# Patient Record
Sex: FEMALE | Race: BLACK OR AFRICAN AMERICAN | Employment: UNEMPLOYED | URBAN - METROPOLITAN AREA
[De-identification: names, ages, dates, MRNs, and addresses within clinical notes are randomized per-mention and may not be internally consistent; named-entity substitution may affect disease eponyms.]

---

## 2017-01-11 ENCOUNTER — OFFICE VISIT (OUTPATIENT)
Dept: FAMILY MEDICINE CLINIC | Age: 57
End: 2017-01-11

## 2017-01-11 VITALS
BODY MASS INDEX: 27.79 KG/M2 | OXYGEN SATURATION: 100 % | DIASTOLIC BLOOD PRESSURE: 100 MMHG | RESPIRATION RATE: 12 BRPM | SYSTOLIC BLOOD PRESSURE: 178 MMHG | TEMPERATURE: 99 F | WEIGHT: 147.2 LBS | HEART RATE: 80 BPM | HEIGHT: 61 IN

## 2017-01-11 DIAGNOSIS — I10 BENIGN ESSENTIAL HTN: Primary | ICD-10-CM

## 2017-01-11 DIAGNOSIS — D50.9 IRON DEFICIENCY ANEMIA, UNSPECIFIED IRON DEFICIENCY ANEMIA TYPE: ICD-10-CM

## 2017-01-11 DIAGNOSIS — Z72.0 TOBACCO USE: ICD-10-CM

## 2017-01-11 DIAGNOSIS — E78.5 HYPERLIPIDEMIA, UNSPECIFIED HYPERLIPIDEMIA TYPE: ICD-10-CM

## 2017-01-11 DIAGNOSIS — E53.8 FOLIC ACID DEFICIENCY: ICD-10-CM

## 2017-01-11 DIAGNOSIS — E87.6 HYPOKALEMIA: ICD-10-CM

## 2017-01-11 RX ORDER — AMLODIPINE BESYLATE 5 MG/1
5 TABLET ORAL DAILY
Qty: 30 TAB | Refills: 5 | Status: SHIPPED | OUTPATIENT
Start: 2017-01-11 | End: 2017-09-14 | Stop reason: SDUPTHER

## 2017-01-11 RX ORDER — ROSUVASTATIN CALCIUM 10 MG/1
10 TABLET, COATED ORAL
Qty: 30 TAB | Refills: 5 | Status: SHIPPED | OUTPATIENT
Start: 2017-01-11 | End: 2017-03-27 | Stop reason: SDUPTHER

## 2017-01-11 RX ORDER — GUAIFENESIN 100 MG/5ML
81 LIQUID (ML) ORAL DAILY
Qty: 30 TAB | Refills: 5 | Status: SHIPPED | OUTPATIENT
Start: 2017-01-11 | End: 2017-09-14 | Stop reason: SDUPTHER

## 2017-01-11 RX ORDER — LOSARTAN POTASSIUM AND HYDROCHLOROTHIAZIDE 25; 100 MG/1; MG/1
1 TABLET ORAL DAILY
Qty: 30 TAB | Refills: 5 | Status: SHIPPED | OUTPATIENT
Start: 2017-01-11 | End: 2017-09-14 | Stop reason: SDUPTHER

## 2017-01-11 NOTE — PROGRESS NOTES
1. Have you been to the ER, urgent care clinic since your last visit? Hospitalized since your last visit? No    2. Have you seen or consulted any other health care providers outside of the [address] [place] [address] since your last visit? Include any pap smears or colon screening.  No     Chief Complaint   Patient presents with    Headache    Medication Evaluation     stopped lipitor completely and headache pain decreased- would like an alternative

## 2017-01-11 NOTE — MR AVS SNAPSHOT
Visit Information Date & Time Provider Department Dept. Phone Encounter #  
 1/11/2017  9:30 AM Ernesto Love  [facility] [address] [phone] [id number] Follow-up Instructions Return in about 2 weeks (around 1/25/2017) for blood pressure. Upcoming Health Maintenance Date Due Pneumococcal 19-64 Medium Risk (1 of 1 - PPSV23) 2/21/1980 DTaP/Tdap/Td series (1 - Tdap) 2/21/1982 BREAST CANCER SCRN MAMMOGRAM 6/13/2018 PAP AKA CERVICAL CYTOLOGY 6/21/2019 COLONOSCOPY 7/1/2023 Allergies as of 1/11/2017  Review Complete On: 1/11/2017 By: Ernesto Love NP Severity Noted Reaction Type Reactions Robaxin [Methocarbamol] High 01/04/2016    Hives, Rash, Itching Red splotches Codeine  02/07/2012    Itching Neosporin [Neomycin-bacitracin-polymyxin]  03/19/2010    Rash Current Immunizations  Reviewed on 2/7/2012 No immunizations on file. Not reviewed this visit You Were Diagnosed With   
  
 Codes Comments Benign essential HTN    -  Primary ICD-10-CM: I10 
ICD-9-CM: 401.1 Hyperlipidemia, unspecified hyperlipidemia type     ICD-10-CM: E78.5 ICD-9-CM: 272.4 Iron deficiency anemia, unspecified     ICD-10-CM: D50.9 ICD-9-CM: 280.9 Hypokalemia     ICD-10-CM: E87.6 ICD-9-CM: 276.8 Vitals BP Pulse Temp Resp Height(growth percentile) Weight(growth percentile) (!) 178/100 (BP 1 Location: Left arm, BP Patient Position: Sitting) 80 99 °F (37.2 °C) (Oral) 12 5' 1\" (1.549 m) 147 lb 3.2 oz (66.8 kg) SpO2 BMI OB Status Smoking Status 100% 27.81 kg/m2 Premenopausal Current Every Day Smoker Vitals History BMI and BSA Data Body Mass Index Body Surface Area  
 27.81 kg/m 2 1.7 m 2 Preferred Pharmacy Pharmacy Name Phone [facility] [address], [address] [place] [address] Darylene Lenz [phone] Your Updated Medication List  
  
   
 This list is accurate as of: 1/11/17 10:19 AM.  Always use your most recent med list. amLODIPine 5 mg tablet Commonly known as:  Emani Hernandezer Take 1 Tab by mouth daily. for blood pressure  
  
 aspirin 81 mg chewable tablet Take 1 Tab by mouth daily. folic acid 1 mg tablet Commonly known as:  FOLVITE  
TAKE ONE TABLET BY MOUTH DAILY  
  
 KLOR-CON M20 20 mEq tablet Generic drug:  potassium chloride TAKE ONE TABLET BY MOUTH TWICE A DAY  
  
 losartan-hydroCHLOROthiazide 100-25 mg per tablet Commonly known as:  HYZAAR Take 1 Tab by mouth daily. For blood pressure  
  
 rosuvastatin 10 mg tablet Commonly known as:  CRESTOR Take 1 Tab by mouth nightly. For cholesterol  
  
 triamcinolone acetonide 0.1 % topical cream  
Commonly known as:  KENALOG  
APPLY A THIN LAYER TO AFFECTED AREA(S) TWO TIMES A DAY AS NEEDED FOR SKIN IRRITATION  
  
 zolpidem 10 mg tablet Commonly known as:  AMBIEN  
TAKE ONE TABLET BY MOUTH EVERY NIGHT AT BEDTIME AS NEEDED FOR INSOMNIA Prescriptions Sent to Pharmacy Refills  
 losartan-hydroCHLOROthiazide (HYZAAR) 100-25 mg per tablet 5 Sig: Take 1 Tab by mouth daily. For blood pressure Class: Normal  
 Pharmacy: [address] [place] [address]  [address] Ph #: [phone] Route: Oral  
 aspirin 81 mg chewable tablet 5 Sig: Take 1 Tab by mouth daily. Class: Normal  
 Pharmacy: [address] [facility]  [address] Ph #: [phone] Route: Oral  
 rosuvastatin (CRESTOR) 10 mg tablet 5 Sig: Take 1 Tab by mouth nightly. For cholesterol Class: Normal  
 Pharmacy: [address] [facility]  [address] Ph #: [phone] Route: Oral  
 amLODIPine (NORVASC) 5 mg tablet 5 Sig: Take 1 Tab by mouth daily. for blood pressure  Class: Normal  
 Pharmacy: [address] [place] [address]  [address]  #: [phone] Route: Oral  
  
Follow-up Instructions Return in about 2 weeks (around 1/25/2017) for blood pressure. Patient Instructions High Blood Pressure: Care Instructions Your Care Instructions If your blood pressure is usually above 140/90, you have high blood pressure, or hypertension. That means the top number is 140 or higher or the bottom number is 90 or higher, or both. Despite what a lot of people think, high blood pressure usually doesn't cause headaches or make you feel dizzy or lightheaded. It usually has no symptoms. But it does increase your risk for heart attack, stroke, and kidney or eye damage. The higher your blood pressure, the more your risk increases. Your doctor will give you a goal for your blood pressure. Your goal will be based on your health and your age. An example of a goal is to keep your blood pressure below 140/90. Lifestyle changes, such as eating healthy and being active, are always important to help lower blood pressure. You might also take medicine to reach your blood pressure goal. 
Follow-up care is a key part of your treatment and safety. Be sure to make and go to all appointments, and call your doctor if you are having problems. It's also a good idea to know your test results and keep a list of the medicines you take. How can you care for yourself at home? Medical treatment · If you stop taking your medicine, your blood pressure will go back up. You may take one or more types of medicine to lower your blood pressure. Be safe with medicines. Take your medicine exactly as prescribed. Call your doctor if you think you are having a problem with your medicine. · Talk to your doctor before you start taking aspirin every day. Aspirin can help certain people lower their risk of a heart attack or stroke. But taking aspirin isn't right for everyone, because it can cause serious bleeding. · See your doctor regularly. You may need to see the doctor more often at first or until your blood pressure comes down. · If you are taking blood pressure medicine, talk to your doctor before you take decongestants or anti-inflammatory medicine, such as ibuprofen. Some of these medicines can raise blood pressure. · Learn how to check your blood pressure at home. Lifestyle changes · Stay at a healthy weight. This is especially important if you put on weight around the waist. Losing even 10 pounds can help you lower your blood pressure. · If your doctor recommends it, get more exercise. Walking is a good choice. Bit by bit, increase the amount you walk every day. Try for at least 30 minutes on most days of the week. You also may want to swim, bike, or do other activities. · Avoid or limit alcohol. Talk to your doctor about whether you can drink any alcohol. · Try to limit how much sodium you eat to less than 2,300 milligrams (mg) a day. Your doctor may ask you to try to eat less than 1,500 mg a day. · Eat plenty of fruits (such as bananas and oranges), vegetables, legumes, whole grains, and low-fat dairy products. · Lower the amount of saturated fat in your diet. Saturated fat is found in animal products such as milk, cheese, and meat. Limiting these foods may help you lose weight and also lower your risk for heart disease. · Do not smoke. Smoking increases your risk for heart attack and stroke. If you need help quitting, talk to your doctor about stop-smoking programs and medicines. These can increase your chances of quitting for good. When should you call for help? Call 911 anytime you think you may need emergency care. This may mean having symptoms that suggest that your blood pressure is causing a serious heart or blood vessel problem. Your blood pressure may be over 180/110. For example, call 911 if: 
· You have symptoms of a heart attack. These may include: ¨ Chest pain or pressure, or a strange feeling in the chest. 
¨ Sweating. ¨ Shortness of breath. ¨ Nausea or vomiting. ¨ Pain, pressure, or a strange feeling in the back, neck, jaw, or upper belly or in one or both shoulders or arms. ¨ Lightheadedness or sudden weakness. ¨ A fast or irregular heartbeat. · You have symptoms of a stroke. These may include: 
¨ Sudden numbness, tingling, weakness, or loss of movement in your face, arm, or leg, especially on only one side of your body. ¨ Sudden vision changes. ¨ Sudden trouble speaking. ¨ Sudden confusion or trouble understanding simple statements. ¨ Sudden problems with walking or balance. ¨ A sudden, severe headache that is different from past headaches. · You have severe back or belly pain. Do not wait until your blood pressure comes down on its own. Get help right away. Call your doctor now or seek immediate care if: 
· Your blood pressure is much higher than normal (such as 180/110 or higher), but you don't have symptoms. · You think high blood pressure is causing symptoms, such as: ¨ Severe headache. ¨ Blurry vision. Watch closely for changes in your health, and be sure to contact your doctor if: 
· Your blood pressure measures 140/90 or higher at least 2 times. That means the top number is 140 or higher or the bottom number is 90 or higher, or both. · You think you may be having side effects from your blood pressure medicine. · Your blood pressure is usually normal, but it goes above normal at least 2 times. Where can you learn more? Go to http://erick-jason.info/. Enter F522 in the search box to learn more about \"High Blood Pressure: Care Instructions. \" Current as of: August 8, 2016 Content Version: 11.1 © 7397-6773 [employer]. Care instructions adapted under license by [employer] (which disclaims liability or warranty for this information).  If you have questions about a medical condition or this instruction, always ask your healthcare professional. [facility] [address] any warranty or liability for your use of this information. Introducing [facility]! [facility] introduces GI Track patient portal. Now you can access parts of your medical record, email your doctor's office, and request medication refills online. 1. In your internet browser, go to [email or web address] 2. Click on the First Time User? Click Here link in the Sign In box. You will see the New Member Sign Up page. 3. Enter your GI Track Access Code exactly as it appears below. You will not need to use this code after youve completed the sign-up process. If you do not sign up before the expiration date, you must request a new code. · GI Track Access Code: [email or web address] Expires: 4/11/2017 10:19 AM 
 
4. Enter the last four digits of your Social Security Number (xxxx) and Date of Birth (mm/dd/yyyy) as indicated and click Submit. You will be taken to the next sign-up page. 5. Create a GI Track ID. This will be your GI Track login ID and cannot be changed, so think of one that is secure and easy to remember. 6. Create a GI Track password. You can change your password at any time. 7. Enter your Password Reset Question and Answer. This can be used at a later time if you forget your password. 8. Enter your e-mail address. You will receive e-mail notification when new information is available in [address]. 9. Click Sign Up. You can now view and download portions of your medical record. 10. Click the Download Summary menu link to download a portable copy of your medical information. If you have questions, please visit the Frequently Asked Questions section of the GI Track website. Remember, GI Track is NOT to be used for urgent needs. For medical emergencies, dial 911. Now available from your iPhone and Android! Please provide this summary of care documentation to your next provider. Your primary care clinician is listed as Tati Grimaldo. If you have any questions after today's visit, please call [phone].

## 2017-01-11 NOTE — PROGRESS NOTES
HISTORY OF PRESENT ILLNESS  Gail Thompson is a 55 y.o. female. HPI  Cardiovascular Review:  The patient has hypertension and hyperlipidemia. Diet and Lifestyle: generally follows a low fat low cholesterol diet, generally follows a low sodium diet, smoker 1/2 ppd  Home BP Monitoring: is not measured at home. Pertinent ROS: taking medications as instructed, no TIA's, no chest pain on exertion, no dyspnea on exertion, no swelling of ankles. Patient reports Lipitor caused a headache so she discontinued the medication. Anemia  Patient presents for evaluation of anemia. It has been present for several years. Associated signs & symptoms: none. Stable at present, not currently taking supplement. Colonoscopy in 2013 was normal.       Current Outpatient Prescriptions   Medication Sig Dispense Refill    losartan-hydroCHLOROthiazide (HYZAAR) 100-25 mg per tablet Take 1 Tab by mouth daily. For blood pressure 30 Tab 5    aspirin 81 mg chewable tablet Take 1 Tab by mouth daily. 30 Tab 5    rosuvastatin (CRESTOR) 10 mg tablet Take 1 Tab by mouth nightly. For cholesterol 30 Tab 5    amLODIPine (NORVASC) 5 mg tablet Take 1 Tab by mouth daily.  for blood pressure 30 Tab 5    zolpidem (AMBIEN) 10 mg tablet TAKE ONE TABLET BY MOUTH EVERY NIGHT AT BEDTIME AS NEEDED FOR INSOMNIA 30 Tab 5    folic acid (FOLVITE) 1 mg tablet TAKE ONE TABLET BY MOUTH DAILY 30 Tab 3    KLOR-CON M20 20 mEq tablet TAKE ONE TABLET BY MOUTH TWICE A DAY 60 Tab 4    triamcinolone acetonide (KENALOG) 0.1 % topical cream APPLY A THIN LAYER TO AFFECTED AREA(S) TWO TIMES A DAY AS NEEDED FOR SKIN IRRITATION 80 g 1     Past Medical History   Diagnosis Date    Anemia 4/04    Anemia NEC     Insomnia 11/09    PPD positive      treated w/ INH- tests since have been negative    Pure hypercholesterolemia     Scoliosis 12/28/15     [facility]    Tobacco dependence     Unspecified essential hypertension     Uterine bleeding, dysfunctional 2/12    Uterine fibroid       Lab Results   Component Value Date/Time    Hemoglobin A1c 5.5 09/03/2015 11:09 AM    Glucose 94 09/28/2016 09:52 AM    LDL, calculated 163 09/28/2016 09:52 AM    Creatinine 0.68 09/28/2016 09:52 AM      Lab Results   Component Value Date/Time    Cholesterol, total 254 09/28/2016 09:52 AM    HDL Cholesterol 76 09/28/2016 09:52 AM    LDL, calculated 163 09/28/2016 09:52 AM    Triglyceride 77 09/28/2016 09:52 AM     Review of Systems   Constitutional: Negative for malaise/fatigue and weight loss. Cardiovascular: Negative for palpitations and leg swelling. Gastrointestinal: Negative for heartburn. Musculoskeletal: Positive for back pain. Negative for joint pain and myalgias. Neurological: Negative for dizziness and weakness. Psychiatric/Behavioral: Negative for depression. Physical Exam   Constitutional: She is oriented to person, place, and time. She appears well-developed and well-nourished. Neck: Normal range of motion. Neck supple. No JVD present. Carotid bruit is not present. No thyromegaly present. Cardiovascular: Normal rate, regular rhythm and intact distal pulses. Exam reveals no gallop and no friction rub. No murmur heard. Pulmonary/Chest: Effort normal and breath sounds normal. No respiratory distress. Musculoskeletal: She exhibits no edema. Lymphadenopathy:     She has no cervical adenopathy. Neurological: She is alert and oriented to person, place, and time. Psychiatric: She has a normal mood and affect. Her behavior is normal.   Nursing note and vitals reviewed. ASSESSMENT and PLAN  Jacinta Sewell was seen today for headache and medication evaluation. Diagnoses and all orders for this visit:    Benign essential HTN  Not to goal. Add HCTZ and Amlodipine. Stop Atenolol. Begin home monitoring and call for reading >140/90  -     losartan-hydroCHLOROthiazide (HYZAAR) 100-25 mg per tablet; Take 1 Tab by mouth daily. For blood pressure  -     amLODIPine (NORVASC) 5 mg tablet; Take 1 Tab by mouth daily. for blood pressure    Hyperlipidemia, unspecified hyperlipidemia type  Given CVD risk factors, patient needs cholesterol lowering medication. Hold Lipitor given ?side effects. Begin Crestor daily. Repeat labs in 4 weeks. Carotid dopplers given risk factors and family history. -     aspirin 81 mg chewable tablet; Take 1 Tab by mouth daily. -     rosuvastatin (CRESTOR) 10 mg tablet; Take 1 Tab by mouth nightly. For cholesterol  -     DUPLEX CAROTID BILATERAL; Future    Hypokalemia  Stable, no changes to current therapy    Tobacco use  Counseled patient on need to quit smoking.  -     DUPLEX CAROTID BILATERAL; Future    Iron deficiency anemia, unspecified iron deficiency anemia type  Stable, no changes to current therapy    Folic acid deficiency  Stable, no changes to current therapy      I have discussed the diagnosis with the patient and the intended plan as seen in the above orders. The patient has received an after-visit summary along with patient information handout. I have discussed medication side effects and warnings with the patient as well. Follow-up Disposition:  Return in about 2 weeks (around 1/25/2017) for blood pressure.

## 2017-01-11 NOTE — PATIENT INSTRUCTIONS

## 2017-01-16 ENCOUNTER — HOSPITAL ENCOUNTER (OUTPATIENT)
Dept: VASCULAR SURGERY | Age: 57
Discharge: HOME | End: 2017-01-16
Payer: COMMERCIAL

## 2017-01-16 DIAGNOSIS — Z72.0 TOBACCO USE: ICD-10-CM

## 2017-01-16 DIAGNOSIS — E78.5 HYPERLIPIDEMIA, UNSPECIFIED HYPERLIPIDEMIA TYPE: ICD-10-CM

## 2017-01-16 PROCEDURE — 93880 EXTRACRANIAL BILAT STUDY: CPT

## 2017-01-16 NOTE — PROCEDURES
[facility]  *** FINAL REPORT ***    Name: Chasity Lacy  MRN: [id number]    Outpatient  : 1960  HIS Order #: [id number]  [address] [facility] Visit #: [id number]  Date: 2017    TYPE OF TEST: Cerebrovascular Duplex    REASON FOR TEST  Smoker, HTN, Hyperlipedemia    Right Carotid:-             Proximal               Mid                 Distal  cm/s  Systolic  Diastolic  Systolic  Diastolic  Systolic  Diastolic  CCA:     91.1                                      66.0  Bulb:  ECA:     56.0  ICA:     50.0                 73.0                 67.0  ICA/CCA:  0.8    ICA Stenosis: <50%    Right Vertebral:-  Finding: Antegrade  Sys:       61.0  Priyanka:    Right Subclavian: Normal    Left Carotid:-            Proximal                Mid                 Distal  cm/s  Systolic  Diastolic  Systolic  Diastolic  Systolic  Diastolic  CCA:     58.6                                      66.0  Bulb:  ECA:     42.0  ICA:     41.0                 72.0                 79.0  ICA/CCA:  0.6    ICA Stenosis: <50%    Left Vertebral:-  Finding: Antegrade  Sys:       46.0  Priyanka:    Left Subclavian: Normal    INTERPRETATION/FINDINGS  PROCEDURE:  Carotid Duplex Examination using B-mode, color and  spectral Doppler of the extracranial cerebrovascular arteries. 1. Bilateral <50% stenosis of the internal carotid arteries. 2. No significant stenosis in the external carotid arteries  bilaterally. 3. Antegrade flow in both vertebral arteries. 4. Normal flow in both subclavian arteries. Plaque Morphology:  1. Heterogeneous plaque in the bulb and right ICA. 2. Heterogeneous plaque in the bulb and left ICA. ADDITIONAL COMMENTS    I have personally reviewed the data relevant to the interpretation of  this  study.     TECHNOLOGIST: Irais Bear RVT  Signed: 2017 02:24 PM    PHYSICIAN: Wilfredo Drake MD  Signed: 2017 03:02 PM

## 2017-01-16 NOTE — PROGRESS NOTES
[facility]  Preliminary Report:  Carotid Duplex Scan    Right:  Mild plaque noted in the right carotid system. Right ICA velocities suggest less than 50% diameter reduction. Right vertebral artery flow is antegrade. Left:  Mild plaque noted in the left carotid system. Left ICA velocities suggest less than 50% diameter reduction. Left vertebral artery flow is antegrade. Final report to follow.

## 2017-01-23 NOTE — PROGRESS NOTES
Carotid artery study showed mild plaque (<50%) build up of her arteries. I recommend continuing the Crestor and smoking cessation as previously discussed to prevent further blockage.

## 2017-01-23 NOTE — PROGRESS NOTES
Outbound call to pt, informed her of her results/recommendations, pt states she will need a rx to help with smoking cessation, pt also states cholesterol medication causes her to have muscle pain in her legs and also causes her toes to lock up.

## 2017-03-06 ENCOUNTER — TELEPHONE (OUTPATIENT)
Dept: FAMILY MEDICINE CLINIC | Age: 57
End: 2017-03-06

## 2017-03-06 NOTE — TELEPHONE ENCOUNTER
Contact # is  [phone]    Patient states her  is being seen on March 27th @ 9am and would like to know if she can be seen at the same time for their convenience.

## 2017-03-14 DIAGNOSIS — F51.01 PRIMARY INSOMNIA: ICD-10-CM

## 2017-03-15 RX ORDER — ZOLPIDEM TARTRATE 10 MG/1
TABLET ORAL
Qty: 30 TAB | Refills: 0 | Status: SHIPPED | OUTPATIENT
Start: 2017-03-15 | End: 2017-03-27 | Stop reason: DRUGHIGH

## 2017-03-27 ENCOUNTER — OFFICE VISIT (OUTPATIENT)
Dept: FAMILY MEDICINE CLINIC | Age: 57
End: 2017-03-27

## 2017-03-27 VITALS
OXYGEN SATURATION: 98 % | HEART RATE: 85 BPM | SYSTOLIC BLOOD PRESSURE: 101 MMHG | RESPIRATION RATE: 12 BRPM | DIASTOLIC BLOOD PRESSURE: 65 MMHG | WEIGHT: 146.2 LBS | BODY MASS INDEX: 27.6 KG/M2 | TEMPERATURE: 98.7 F | HEIGHT: 61 IN

## 2017-03-27 DIAGNOSIS — I10 ESSENTIAL HYPERTENSION: Primary | ICD-10-CM

## 2017-03-27 DIAGNOSIS — F51.01 PRIMARY INSOMNIA: ICD-10-CM

## 2017-03-27 DIAGNOSIS — M41.9 SCOLIOSIS OF LUMBOSACRAL SPINE, UNSPECIFIED SCOLIOSIS TYPE: ICD-10-CM

## 2017-03-27 DIAGNOSIS — D64.9 ANEMIA, UNSPECIFIED TYPE: ICD-10-CM

## 2017-03-27 DIAGNOSIS — E78.5 HYPERLIPIDEMIA, UNSPECIFIED HYPERLIPIDEMIA TYPE: ICD-10-CM

## 2017-03-27 RX ORDER — ZOLPIDEM TARTRATE 10 MG/1
TABLET ORAL
Qty: 30 TAB | Refills: 2 | Status: CANCELLED | OUTPATIENT
Start: 2017-03-27

## 2017-03-27 RX ORDER — ZOLPIDEM TARTRATE 5 MG/1
5 TABLET ORAL
Qty: 30 TAB | Refills: 0 | Status: SHIPPED | OUTPATIENT
Start: 2017-03-27 | End: 2017-05-03 | Stop reason: SDUPTHER

## 2017-03-27 RX ORDER — ROSUVASTATIN CALCIUM 10 MG/1
10 TABLET, COATED ORAL
Qty: 30 TAB | Refills: 5 | Status: SHIPPED | OUTPATIENT
Start: 2017-03-27 | End: 2017-09-25 | Stop reason: SINTOL

## 2017-03-27 NOTE — PATIENT INSTRUCTIONS
Acute High Blood Pressure: Care Instructions  Your Care Instructions  Acute high blood pressure is very high blood pressure. It's a serious problem. Very high blood pressure can damage your brain, heart, eyes, and kidneys. You may have been given medicines to lower your blood pressure. You may have gotten them as pills or through a needle in one of your veins. This is called an IV. And maybe you were given other medicines too. These can be needed when high blood pressure causes other problems. To keep your blood pressure at a lower level, you may need to make healthy lifestyle changes. And you will probably need to take medicines. Be sure to follow up with your doctor about your blood pressure and what you can do about it. Follow-up care is a key part of your treatment and safety. Be sure to make and go to all appointments, and call your doctor if you are having problems. It's also a good idea to know your test results and keep a list of the medicines you take. How can you care for yourself at home? · See your doctor as often as he or she recommends. This is to make sure your blood pressure is under control. You will probably go at least 2 times a year. But it may be more often at first.  · Take your blood pressure medicine exactly as prescribed. You may take one or more types. They include diuretics, beta-blockers, ACE inhibitors, calcium channel blockers, and angiotensin II receptor blockers. Call your doctor if you think you are having a problem with your medicine. · If you take blood pressure medicine, talk to your doctor before you take decongestants or anti-inflammatory medicine, such as ibuprofen. These can raise blood pressure. · Learn how to check your blood pressure at home. Check it often. · Ask your doctor if it's okay to drink alcohol. · Talk to your doctor about lifestyle changes that can help blood pressure. These include being active and not smoking.   When should you call for help?  Call 911 anytime you think you may need emergency care. This may mean having symptoms that suggest that your blood pressure is causing a serious heart or blood vessel problem. Your blood pressure may be over 180/110. For example, call 911 if:  · You have symptoms of a heart attack. These may include:  ¨ Chest pain or pressure, or a strange feeling in the chest.  ¨ Sweating. ¨ Shortness of breath. ¨ Nausea or vomiting. ¨ Pain, pressure, or a strange feeling in the back, neck, jaw, or upper belly or in one or both shoulders or arms. ¨ Lightheadedness or sudden weakness. ¨ A fast or irregular heartbeat. · You have symptoms of a stroke. These may include:  ¨ Sudden numbness, tingling, weakness, or loss of movement in your face, arm, or leg, especially on only one side of your body. ¨ Sudden vision changes. ¨ Sudden trouble speaking. ¨ Sudden confusion or trouble understanding simple statements. ¨ Sudden problems with walking or balance. ¨ A sudden, severe headache that is different from past headaches. · You have severe back or belly pain. Do not wait until your blood pressure comes down on its own. Get help right away. Call your doctor now or seek immediate care if:  · Your blood pressure is much higher than normal (such as 180/110 or higher), but you don't have symptoms. · You think high blood pressure is causing symptoms, such as:  ¨ Severe headache. ¨ Blurry vision. Watch closely for changes in your health, and be sure to contact your doctor if:  · Your blood pressure measures 140/90 or higher at least 2 times. That means the top number is 140 or higher or the bottom number is 90 or higher, or both. · You think you may be having side effects from your blood pressure medicine. · Your blood pressure is usually normal, but it goes above normal at least 2 times. Where can you learn more? Go to http://erick-jason.info/.   Enter K376 in the search box to learn more about \"Acute High Blood Pressure: Care Instructions. \"  Current as of: August 8, 2016  Content Version: 11.1  © 5064-1946 [employer]. Care instructions adapted under license by [employer] (which disclaims liability or warranty for this information). If you have questions about a medical condition or this instruction, always ask your healthcare professional. [address] any warranty or liability for your use of this information.

## 2017-03-27 NOTE — PROGRESS NOTES
1. Have you been to the ER, urgent care clinic since your last visit? Hospitalized since your last visit? No    2. Have you seen or consulted any other health care providers outside of the [address] [place] [address] since your last visit? Include any pap smears or colon screening.  No

## 2017-03-27 NOTE — PROGRESS NOTES
Conrad Thomas is a 65 y.o. female who was seen in clinic today (3/27/2017). Subjective:  Cardiovascular Review:  The patient has hypertension and hyperlipidemia. Diet and Lifestyle: generally follows a low fat low cholesterol diet, generally follows a low sodium diet, smoker 1/2 ppd    Carotid Duplex Examination using B-mode, color and  spectral Doppler of the extracranial cerebrovascular arteries.     1. Bilateral <50% stenosis of the internal carotid arteries. 2. No significant stenosis in the external carotid arteries  bilaterally. Home BP Monitoring: home readings 140/90s. Pertinent ROS: taking medications as instructed, no TIA's, no chest pain on exertion, no dyspnea on exertion, no swelling of ankles. Patient reports Lipitor caused a headache so she discontinued the medication. Taking Crestor and getting myalgias in the legs. Anemia  Patient presents for evaluation of anemia. It has been present for several years. Associated signs & symptoms: none. Stable at present, not currently taking supplement. Colonoscopy in 2013 was normal.     Osteoarthritis and Chronic Pain:  Patient has osteoarthritis, spondylosis and scoliosis, primarily affecting the back. Symptoms onset: problem is longstanding. Rheumatological ROS: using NSAID medication intermittently as needed. Response to treatment plan: symptoms have progressed to a point and plateaued. Insominia  Patient reports presents with difficulty sleeping. Onset: problem is longstanding. Description of symtoms:  difficulty initiating sleep, frequent awakening. Associated symptoms: none. Denies: depression, situational anxiety and ETOH overuse. Treatment to date: Ambien >10 years. Prior to Admission medications    Medication Sig Start Date End Date Taking? Authorizing Provider   rosuvastatin (CRESTOR) 10 mg tablet Take 1 Tab by mouth nightly.  For cholesterol 3/27/17  Yes Ernesto Love, ELIZABETH   zolpidem (AMBIEN) 5 mg tablet Take 1 Tab by mouth nightly as needed for Sleep. Max Daily Amount: 5 mg. 3/27/17  Yes Herson Shea NP   folic acid (FOLVITE) 1 mg tablet TAKE ONE TABLET BY MOUTH DAILY 1/24/17  Yes Herson Shea NP   losartan-hydroCHLOROthiazide (HYZAAR) 100-25 mg per tablet Take 1 Tab by mouth daily. For blood pressure 1/11/17  Yes Herson Shea NP   aspirin 81 mg chewable tablet Take 1 Tab by mouth daily. 1/11/17  Yes Herson Shea NP   amLODIPine (NORVASC) 5 mg tablet Take 1 Tab by mouth daily. for blood pressure 1/11/17  Yes Herson Shea NP   KLOR-CON M20 20 mEq tablet TAKE ONE TABLET BY MOUTH TWICE A DAY 5/23/16  Yes Herson Shea NP   triamcinolone acetonide (KENALOG) 0.1 % topical cream APPLY A THIN LAYER TO AFFECTED AREA(S) TWO TIMES A DAY AS NEEDED FOR SKIN IRRITATION 4/13/16  Yes Herson Shea NP          Allergies   Allergen Reactions    Robaxin [Methocarbamol] Hives, Rash and Itching     Red splotches    Codeine Itching    Neosporin [Neomycin-Bacitracin-Polymyxin] Rash           Review of Systems   Constitutional: Negative for malaise/fatigue and weight loss. Respiratory: Negative for shortness of breath. Cardiovascular: Negative for chest pain, palpitations and leg swelling. Gastrointestinal: Negative for heartburn. Musculoskeletal: Positive for back pain and myalgias. Negative for joint pain. Neurological: Negative for dizziness, weakness and headaches. Psychiatric/Behavioral: Negative for depression. Objective:   Physical Exam   Constitutional: She is oriented to person, place, and time. She appears well-developed and well-nourished. Neck: Normal range of motion. Neck supple. No JVD present. Carotid bruit is not present. No thyromegaly present. Cardiovascular: Normal rate, regular rhythm and intact distal pulses. Exam reveals no gallop and no friction rub. No murmur heard. Pulmonary/Chest: Effort normal and breath sounds normal. No respiratory distress. Musculoskeletal: She exhibits no edema. Lymphadenopathy:     She has no cervical adenopathy. Neurological: She is alert and oriented to person, place, and time. Psychiatric: She has a normal mood and affect. Her behavior is normal.   Nursing note and vitals reviewed. Visit Vitals    /65 (BP 1 Location: Left arm, BP Patient Position: Sitting)    Pulse 85    Temp 98.7 °F (37.1 °C) (Oral)    Resp 12    Ht 5' 1\" (1.549 m)    Wt 146 lb 3.2 oz (66.3 kg)    SpO2 98%    BMI 27.62 kg/m2       Assessment & Plan:  Zulema Arrington was seen today for medication refill. Diagnoses and all orders for this visit:    Essential hypertension  Well controlled, no medication changes. -     CBC W/O DIFF  -     METABOLIC PANEL, COMPREHENSIVE  -     TSH AND FREE T4    Hyperlipidemia, unspecified hyperlipidemia type  Patient can cut Crestor in half and see if myalgias improve. -     rosuvastatin (CRESTOR) 10 mg tablet; Take 1 Tab by mouth nightly. For cholesterol  -     LIPID PANEL    Primary insomnia  Cautioned the patient on long term use of Ambien. Reviewed possible short term and long term side effects with medication. Reviewed BMJ 2/2016 article citing possible link between medications long term use and Alzheimer's. Will reduce to 5 mg QHS. -     zolpidem (AMBIEN) 5 mg tablet; Take 1 Tab by mouth nightly as needed for Sleep. Max Daily Amount: 5 mg. Anemia, unspecified type  Stable, no changes to current therapy    Scoliosis of lumbosacral spine, unspecified scoliosis type  Treatment and referral deferred by patient at present. I have discussed the diagnosis with the patient and the intended plan as seen in the above orders. The patient has received an after-visit summary along with patient information handout. I have discussed medication side effects and warnings with the patient as well. Follow-up Disposition:  Return in about 6 months (around 9/27/2017) for blood pressure.         Cristel Mclaughlin Sumeet Kamara, NP

## 2017-03-27 NOTE — MR AVS SNAPSHOT
Visit Information Date & Time Provider Department Dept. Phone Encounter #  
 3/27/2017  9:45 AM Diomedes Flores  [facility] [phone] [id number] Follow-up Instructions Return in about 6 months (around 9/27/2017) for blood pressure. Your Appointments 3/27/2017  9:45 AM  
ROUTINE CARE with Diomedes Flores  [facility] ([address]) Appt Note: Follow up  
 [address] [phone]  
[phone]  
  
   
 [address] [address] Upcoming Health Maintenance Date Due  
 BREAST CANCER SCRN MAMMOGRAM 6/13/2018 PAP AKA CERVICAL CYTOLOGY 6/21/2019 COLONOSCOPY 7/1/2023 DTaP/Tdap/Td series (2 - Td) 3/27/2027 Allergies as of 3/27/2017  Review Complete On: 3/27/2017 By: Diomedes Flores NP Severity Noted Reaction Type Reactions Robaxin [Methocarbamol] High 01/04/2016    Hives, Rash, Itching Red splotches Codeine  02/07/2012    Itching Neosporin [Neomycin-bacitracin-polymyxin]  03/19/2010    Rash Current Immunizations  Reviewed on 2/7/2012 No immunizations on file. Not reviewed this visit You Were Diagnosed With   
  
 Codes Comments Essential hypertension    -  Primary ICD-10-CM: I10 
ICD-9-CM: 401.9 Hyperlipidemia, unspecified hyperlipidemia type     ICD-10-CM: E78.5 ICD-9-CM: 272.4 Primary insomnia     ICD-10-CM: F51.01 
ICD-9-CM: 307.42 Anemia, unspecified type     ICD-10-CM: D64.9 ICD-9-CM: 285.9 Scoliosis of lumbosacral spine, unspecified scoliosis type     ICD-10-CM: M41.9 ICD-9-CM: 737.30 Vitals BP Pulse Temp Resp Height(growth percentile) Weight(growth percentile) 101/65 (BP 1 Location: Left arm, BP Patient Position: Sitting) 85 98.7 °F (37.1 °C) (Oral) 12 5' 1\" (1.549 m) 146 lb 3.2 oz (66.3 kg) SpO2 BMI OB Status Smoking Status 98% 27.62 kg/m2 Premenopausal Current Every Day Smoker BMI and BSA Data Body Mass Index Body Surface Area  
 27.62 kg/m 2 1.69 m 2 Preferred Pharmacy Pharmacy Name Phone [address] [place] [phone] Your Updated Medication List  
  
   
This list is accurate as of: 3/27/17  9:44 AM.  Always use your most recent med list. amLODIPine 5 mg tablet Commonly known as:  Keith Del Take 1 Tab by mouth daily. for blood pressure  
  
 aspirin 81 mg chewable tablet Take 1 Tab by mouth daily. folic acid 1 mg tablet Commonly known as:  FOLVITE  
TAKE ONE TABLET BY MOUTH DAILY  
  
 KLOR-CON M20 20 mEq tablet Generic drug:  potassium chloride TAKE ONE TABLET BY MOUTH TWICE A DAY  
  
 losartan-hydroCHLOROthiazide 100-25 mg per tablet Commonly known as:  HYZAAR Take 1 Tab by mouth daily. For blood pressure  
  
 rosuvastatin 10 mg tablet Commonly known as:  CRESTOR Take 1 Tab by mouth nightly. For cholesterol  
  
 triamcinolone acetonide 0.1 % topical cream  
Commonly known as:  KENALOG  
APPLY A THIN LAYER TO AFFECTED AREA(S) TWO TIMES A DAY AS NEEDED FOR SKIN IRRITATION  
  
 zolpidem 5 mg tablet Commonly known as:  AMBIEN Take 1 Tab by mouth nightly as needed for Sleep. Max Daily Amount: 5 mg. Prescriptions Printed Refills  
 zolpidem (AMBIEN) 5 mg tablet 0 Sig: Take 1 Tab by mouth nightly as needed for Sleep. Max Daily Amount: 5 mg. Class: Print Route: Oral  
  
Prescriptions Sent to Pharmacy Refills  
 rosuvastatin (CRESTOR) 10 mg tablet 5 Sig: Take 1 Tab by mouth nightly. For cholesterol Class: Normal  
 Pharmacy: [address] [place] Ph #: [phone] Route: Oral  
  
We Performed the Following CBC W/O DIFF [47939 CPT(R)] LIPID PANEL [92912 CPT(R)] METABOLIC PANEL, COMPREHENSIVE [20235 CPT(R)] TSH AND FREE T4 [82673 CPT(R)] Follow-up Instructions Return in about 6 months (around 9/27/2017) for blood pressure. Patient Instructions Acute High Blood Pressure: Care Instructions Your Care Instructions Acute high blood pressure is very high blood pressure. It's a serious problem. Very high blood pressure can damage your brain, heart, eyes, and kidneys. You may have been given medicines to lower your blood pressure. You may have gotten them as pills or through a needle in one of your veins. This is called an IV. And maybe you were given other medicines too. These can be needed when high blood pressure causes other problems. To keep your blood pressure at a lower level, you may need to make healthy lifestyle changes. And you will probably need to take medicines. Be sure to follow up with your doctor about your blood pressure and what you can do about it. Follow-up care is a key part of your treatment and safety. Be sure to make and go to all appointments, and call your doctor if you are having problems. It's also a good idea to know your test results and keep a list of the medicines you take. How can you care for yourself at home? · See your doctor as often as he or she recommends. This is to make sure your blood pressure is under control. You will probably go at least 2 times a year. But it may be more often at first. 
· Take your blood pressure medicine exactly as prescribed. You may take one or more types. They include diuretics, beta-blockers, ACE inhibitors, calcium channel blockers, and angiotensin II receptor blockers. Call your doctor if you think you are having a problem with your medicine. · If you take blood pressure medicine, talk to your doctor before you take decongestants or anti-inflammatory medicine, such as ibuprofen. These can raise blood pressure. · Learn how to check your blood pressure at home. Check it often. · Ask your doctor if it's okay to drink alcohol. · Talk to your doctor about lifestyle changes that can help blood pressure. These include being active and not smoking. When should you call for help? Call 911 anytime you think you may need emergency care. This may mean having symptoms that suggest that your blood pressure is causing a serious heart or blood vessel problem. Your blood pressure may be over 180/110. For example, call 911 if: 
· You have symptoms of a heart attack. These may include: ¨ Chest pain or pressure, or a strange feeling in the chest. 
¨ Sweating. ¨ Shortness of breath. ¨ Nausea or vomiting. ¨ Pain, pressure, or a strange feeling in the back, neck, jaw, or upper belly or in one or both shoulders or arms. ¨ Lightheadedness or sudden weakness. ¨ A fast or irregular heartbeat. · You have symptoms of a stroke. These may include: 
¨ Sudden numbness, tingling, weakness, or loss of movement in your face, arm, or leg, especially on only one side of your body. ¨ Sudden vision changes. ¨ Sudden trouble speaking. ¨ Sudden confusion or trouble understanding simple statements. ¨ Sudden problems with walking or balance. ¨ A sudden, severe headache that is different from past headaches. · You have severe back or belly pain. Do not wait until your blood pressure comes down on its own. Get help right away. Call your doctor now or seek immediate care if: 
· Your blood pressure is much higher than normal (such as 180/110 or higher), but you don't have symptoms. · You think high blood pressure is causing symptoms, such as: ¨ Severe headache. ¨ Blurry vision. Watch closely for changes in your health, and be sure to contact your doctor if: 
· Your blood pressure measures 140/90 or higher at least 2 times. That means the top number is 140 or higher or the bottom number is 90 or higher, or both. · You think you may be having side effects from your blood pressure medicine. · Your blood pressure is usually normal, but it goes above normal at least 2 times. Where can you learn more? Go to http://erick-jason.info/. Enter Z464 in the search box to learn more about \"Acute High Blood Pressure: Care Instructions. \" Current as of: August 8, 2016 Content Version: 11.1 © 9679-3885 [employer]. Care instructions adapted under license by [employer] (which disclaims liability or warranty for this information). If you have questions about a medical condition or this instruction, always ask your healthcare professional. [address] any warranty or liability for your use of this information. Introducing [facility]! [facility] introduces Moprise patient portal. Now you can access parts of your medical record, email your doctor's office, and request medication refills online. 1. In your internet browser, go to [email or web address] 2. Click on the First Time User? Click Here link in the Sign In box. You will see the New Member Sign Up page. 3. Enter your Moprise Access Code exactly as it appears below. You will not need to use this code after youve completed the sign-up process. If you do not sign up before the expiration date, you must request a new code. · Moprise Access Code: [email or web address] Expires: 4/11/2017 11:19 AM 
 
4. Enter the last four digits of your Social Security Number (xxxx) and Date of Birth (mm/dd/yyyy) as indicated and click Submit. You will be taken to the next sign-up page. 5. Create a [employer] Corpt ID. This will be your Moprise login ID and cannot be changed, so think of one that is secure and easy to remember. 6. Create a Moprise password. You can change your password at any time. 7. Enter your Password Reset Question and Answer.  This can be used at a later time if you forget your password. 8. Enter your e-mail address. You will receive e-mail notification when new information is available in [address] 9. Click Sign Up. You can now view and download portions of your medical record. 10. Click the Download Summary menu link to download a portable copy of your medical information. If you have questions, please visit the Frequently Asked Questions section of the [employer]ubaTribe website. Remember, ScubaTribe is NOT to be used for urgent needs. For medical emergencies, dial 911. Now available from your iPhone and Android! Please provide this summary of care documentation to your next provider. Your primary care clinician is listed as Paul Sheldon. If you have any questions after today's visit, please call [phone].

## 2017-03-28 LAB
ALBUMIN SERPL-MCNC: 4.3 G/DL (ref 3.5–5.5)
ALBUMIN/GLOB SERPL: 1.7 {RATIO} (ref 1.2–2.2)
ALP SERPL-CCNC: 71 IU/L (ref 39–117)
ALT SERPL-CCNC: 15 IU/L (ref 0–32)
AST SERPL-CCNC: 27 IU/L (ref 0–40)
BILIRUB SERPL-MCNC: 0.4 MG/DL (ref 0–1.2)
BUN SERPL-MCNC: 22 MG/DL (ref 6–24)
BUN/CREAT SERPL: 19 (ref 9–23)
CALCIUM SERPL-MCNC: 9.5 MG/DL (ref 8.7–10.2)
CHLORIDE SERPL-SCNC: 94 MMOL/L (ref 96–106)
CHOLEST SERPL-MCNC: 202 MG/DL (ref 100–199)
CO2 SERPL-SCNC: 22 MMOL/L (ref 18–29)
CREAT SERPL-MCNC: 1.15 MG/DL (ref 0.57–1)
ERYTHROCYTE [DISTWIDTH] IN BLOOD BY AUTOMATED COUNT: 14.7 % (ref 12.3–15.4)
GLOBULIN SER CALC-MCNC: 2.5 G/DL (ref 1.5–4.5)
GLUCOSE SERPL-MCNC: 95 MG/DL (ref 65–99)
HCT VFR BLD AUTO: 30 % (ref 34–46.6)
HDLC SERPL-MCNC: 76 MG/DL
HGB BLD-MCNC: 10.2 G/DL (ref 11.1–15.9)
INTERPRETATION, 910389: NORMAL
INTERPRETATION: NORMAL
LDLC SERPL CALC-MCNC: 109 MG/DL (ref 0–99)
MCH RBC QN AUTO: 34.2 PG (ref 26.6–33)
MCHC RBC AUTO-ENTMCNC: 34 G/DL (ref 31.5–35.7)
MCV RBC AUTO: 101 FL (ref 79–97)
PDF IMAGE, 910387: NORMAL
PLATELET # BLD AUTO: 279 X10E3/UL (ref 150–379)
POTASSIUM SERPL-SCNC: 5.8 MMOL/L (ref 3.5–5.2)
PROT SERPL-MCNC: 6.8 G/DL (ref 6–8.5)
RBC # BLD AUTO: 2.98 X10E6/UL (ref 3.77–5.28)
SODIUM SERPL-SCNC: 135 MMOL/L (ref 134–144)
T4 FREE SERPL-MCNC: 0.74 NG/DL (ref 0.82–1.77)
TRIGL SERPL-MCNC: 83 MG/DL (ref 0–149)
TSH SERPL DL<=0.005 MIU/L-ACNC: 0.64 UIU/ML (ref 0.45–4.5)
VLDLC SERPL CALC-MCNC: 17 MG/DL (ref 5–40)
WBC # BLD AUTO: 6.1 X10E3/UL (ref 3.4–10.8)

## 2017-04-02 DIAGNOSIS — E87.5 HYPERKALEMIA: ICD-10-CM

## 2017-04-02 DIAGNOSIS — D64.9 ANEMIA, UNSPECIFIED TYPE: Primary | ICD-10-CM

## 2017-04-02 DIAGNOSIS — N28.9 RENAL INSUFFICIENCY: ICD-10-CM

## 2017-04-03 NOTE — PROGRESS NOTES
Patient's potassium level was elevated and kidney function was diminished. Blood cells showed patient has mild anemia. I recommend recheck patient's labs in in next 2-4 weeks. No appointment needed, labs ordered. Total cholesterol, triglycerides and LDL (bad cholesterol) were improved. Good work!

## 2017-04-04 NOTE — PROGRESS NOTES
[phone] (home) verified  Patient notified of above note and voiced understanding of what was read.

## 2017-05-03 DIAGNOSIS — F51.01 PRIMARY INSOMNIA: ICD-10-CM

## 2017-05-03 RX ORDER — ZOLPIDEM TARTRATE 5 MG/1
TABLET ORAL
Qty: 30 TAB | Refills: 0 | Status: SHIPPED | OUTPATIENT
Start: 2017-05-03 | End: 2017-05-30 | Stop reason: SDUPTHER

## 2017-05-30 DIAGNOSIS — F51.01 PRIMARY INSOMNIA: ICD-10-CM

## 2017-05-30 NOTE — TELEPHONE ENCOUNTER
Reeves Michele  [phone]    Patient is requesting refills of Klor-con and Zolpidem. Pharmacy verified. 72 hour rule given, however, patient states that she only has one Zolpidem left.

## 2017-05-30 NOTE — TELEPHONE ENCOUNTER
LOV 3/27/2017   Has appointment 9/25/2017     The Prescription Monitoring Program has been reviewed for recent activity regarding controlled substances for this patient.      Per  last refill for Silvia Elmer 5/3/2017 #30

## 2017-05-31 RX ORDER — POTASSIUM CHLORIDE 20 MEQ/1
20 TABLET, EXTENDED RELEASE ORAL 2 TIMES DAILY
Qty: 60 TAB | Refills: 5 | Status: SHIPPED | OUTPATIENT
Start: 2017-05-31 | End: 2017-08-01 | Stop reason: SDUPTHER

## 2017-05-31 RX ORDER — ZOLPIDEM TARTRATE 5 MG/1
5 TABLET ORAL
Qty: 30 TAB | Refills: 1 | Status: SHIPPED | OUTPATIENT
Start: 2017-05-31 | End: 2017-08-07 | Stop reason: SDUPTHER

## 2017-05-31 NOTE — TELEPHONE ENCOUNTER
[phone] Monica called in prescription for Ambien via      [phone] notified patient prescription was sent to pharmacy

## 2017-08-01 RX ORDER — POTASSIUM CHLORIDE 1500 MG/1
TABLET, EXTENDED RELEASE ORAL
Qty: 60 TAB | Refills: 3 | Status: ON HOLD | OUTPATIENT
Start: 2017-08-01 | End: 2018-01-26

## 2017-08-07 DIAGNOSIS — F51.01 PRIMARY INSOMNIA: ICD-10-CM

## 2017-08-07 NOTE — TELEPHONE ENCOUNTER
Refill request:    Requested Prescriptions     Pending Prescriptions Disp Refills    zolpidem (AMBIEN) 5 mg tablet 30 Tab 1     Sig: Take 1 Tab by mouth nightly as needed for Sleep. Max Daily Amount: 5 mg.

## 2017-08-07 NOTE — TELEPHONE ENCOUNTER
LOV 3/27/2017  Has appointment 9/25  The Prescription Monitoring Program has been reviewed for recent activity regarding controlled substances for this patient.      Per  last refill 6/26/2017 #30

## 2017-08-08 RX ORDER — ZOLPIDEM TARTRATE 5 MG/1
5 TABLET ORAL
Qty: 30 TAB | Refills: 1 | Status: SHIPPED | OUTPATIENT
Start: 2017-08-08 | End: 2017-10-16 | Stop reason: SDUPTHER

## 2017-09-14 DIAGNOSIS — I10 BENIGN ESSENTIAL HTN: ICD-10-CM

## 2017-09-14 DIAGNOSIS — E78.5 HYPERLIPIDEMIA, UNSPECIFIED HYPERLIPIDEMIA TYPE: ICD-10-CM

## 2017-09-15 RX ORDER — AMLODIPINE BESYLATE 5 MG/1
TABLET ORAL
Qty: 30 TAB | Refills: 5 | Status: ON HOLD | OUTPATIENT
Start: 2017-09-15 | End: 2018-01-26

## 2017-09-15 RX ORDER — GUAIFENESIN 100 MG/5ML
LIQUID (ML) ORAL
Qty: 30 TAB | Refills: 5 | Status: SHIPPED | OUTPATIENT
Start: 2017-09-15 | End: 2020-06-02

## 2017-09-15 RX ORDER — LOSARTAN POTASSIUM AND HYDROCHLOROTHIAZIDE 25; 100 MG/1; MG/1
TABLET ORAL
Qty: 30 TAB | Refills: 5 | Status: ON HOLD | OUTPATIENT
Start: 2017-09-15 | End: 2018-01-26

## 2017-09-25 ENCOUNTER — OFFICE VISIT (OUTPATIENT)
Dept: FAMILY MEDICINE CLINIC | Age: 57
End: 2017-09-25

## 2017-09-25 VITALS
BODY MASS INDEX: 24.84 KG/M2 | SYSTOLIC BLOOD PRESSURE: 93 MMHG | HEART RATE: 50 BPM | RESPIRATION RATE: 12 BRPM | WEIGHT: 131.6 LBS | DIASTOLIC BLOOD PRESSURE: 61 MMHG | HEIGHT: 61 IN | OXYGEN SATURATION: 94 %

## 2017-09-25 DIAGNOSIS — Z72.0 TOBACCO USE: ICD-10-CM

## 2017-09-25 DIAGNOSIS — F51.01 PRIMARY INSOMNIA: ICD-10-CM

## 2017-09-25 DIAGNOSIS — I10 BENIGN ESSENTIAL HTN: Primary | ICD-10-CM

## 2017-09-25 DIAGNOSIS — D50.9 IRON DEFICIENCY ANEMIA, UNSPECIFIED: ICD-10-CM

## 2017-09-25 DIAGNOSIS — E78.2 MIXED HYPERLIPIDEMIA: ICD-10-CM

## 2017-09-25 DIAGNOSIS — R63.4 WEIGHT LOSS: ICD-10-CM

## 2017-09-25 RX ORDER — EZETIMIBE 10 MG/1
10 TABLET ORAL DAILY
Qty: 30 TAB | Refills: 5 | Status: SHIPPED | OUTPATIENT
Start: 2017-09-25 | End: 2018-02-02

## 2017-09-25 RX ORDER — BUPROPION HYDROCHLORIDE 150 MG/1
150 TABLET ORAL
Qty: 30 TAB | Refills: 0 | Status: SHIPPED | OUTPATIENT
Start: 2017-09-25 | End: 2017-11-04 | Stop reason: SDUPTHER

## 2017-09-25 RX ORDER — TRAZODONE HYDROCHLORIDE 50 MG/1
50-100 TABLET ORAL
Qty: 60 TAB | Refills: 5 | Status: SHIPPED | OUTPATIENT
Start: 2017-09-25 | End: 2017-10-16 | Stop reason: SINTOL

## 2017-09-25 NOTE — PROGRESS NOTES
Liane Cordova is a 65 y.o. female who was seen in clinic today (9/25/2017). Subjective:  Cardiovascular Review:  The patient has hypertension, hyperlipidemia and PVD (Bilateral <50% stenosis of the internal carotid arteries). Diet and Lifestyle: generally follows a low fat low cholesterol diet, generally follows a low sodium diet, smoker 1/2 ppd  Home BP Monitoring: home readings 140/90s.     Pertinent ROS: taking medications as instructed, no TIA's, no chest pain on exertion, no dyspnea on exertion, no swelling of ankles.      Patient reports Lipitor caused a headache so she discontinued the medication. Reports Crestor caused myalgias in the legs.      Weight down 15 lbs in last 6 months through decreased appetite. Anemia  Patient presents for evaluation of anemia. It has been present for several years. Associated signs & symptoms: feeling cold. Stable at present, not currently taking supplement. Colonoscopy in 2013 was normal. Denies abnormal uterine bleeding but reports h/o uterine fibroids.      Osteoarthritis and Chronic Pain:  Patient has osteoarthritis, spondylosis and scoliosis, primarily affecting the back. Symptoms onset: problem is longstanding. Rheumatological ROS: using NSAID medication intermittently as needed. Response to treatment plan: symptoms have progressed to a point and plateaued.     Insominia  Patient reports presents with difficulty sleeping. Onset: problem is longstanding. Description of symtoms:  difficulty initiating sleep, frequent awakening. Associated symptoms: none. Denies: depression, situational anxiety and ETOH overuse. Treatment to date: Ambien >10 years. Reports medication is ineffective. Prior to Admission medications    Medication Sig Start Date End Date Taking? Authorizing Provider   buPROPion XL (WELLBUTRIN XL) 150 mg tablet Take 1 Tab by mouth every morning.  For smoking cessation 9/25/17  Yes Darryle Pulley, NP   ezetimibe (ZETIA) 10 mg tablet Take 1 Tab by mouth daily. For cholesterol 9/25/17  Yes Ellis Stephens NP   traZODone (DESYREL) 50 mg tablet Take 1-2 Tabs by mouth nightly. For sleep 9/25/17  Yes Ellis Stephens NP   aspirin 81 mg chewable tablet CHEW ONE TABLET BY MOUTH DAILY 9/15/17  Yes Chalo Edgar MD   losartan-hydroCHLOROthiazide (HYZAAR) 100-25 mg per tablet TAKE ONE TABLET BY MOUTH DAILY 9/15/17  Yes Chalo Edgar MD   amLODIPine (NORVASC) 5 mg tablet TAKE ONE TABLET BY MOUTH DAILY 9/15/17  Yes Chalo Edgar MD   zolpidem (AMBIEN) 5 mg tablet Take 1 Tab by mouth nightly as needed for Sleep. Max Daily Amount: 5 mg. 8/8/17  Yes Ellis Stephens NP   KLOR-CON M20 20 mEq tablet TAKE ONE TABLET BY MOUTH TWICE A DAY 8/1/17  Yes Ellis Stephens NP   folic acid (FOLVITE) 1 mg tablet TAKE ONE TABLET BY MOUTH DAILY 1/24/17  Yes Ellis Stephens NP   triamcinolone acetonide (KENALOG) 0.1 % topical cream APPLY A THIN LAYER TO AFFECTED AREA(S) TWO TIMES A DAY AS NEEDED FOR SKIN IRRITATION 4/13/16  Yes Ellis Stephens NP          Allergies   Allergen Reactions    Robaxin [Methocarbamol] Hives, Rash and Itching     Red splotches    Statins-Hmg-Coa Reductase Inhibitors Myalgia    Codeine Itching    Neosporin [Neomycin-Bacitracin-Polymyxin] Rash           ROS  See HPI    Objective:   Physical Exam   Constitutional: She is oriented to person, place, and time. She appears well-developed and well-nourished. Neck: Normal range of motion. Neck supple. No JVD present. Carotid bruit is not present. No thyromegaly present. Cardiovascular: Normal rate, regular rhythm and intact distal pulses. Exam reveals no gallop and no friction rub. No murmur heard. Pulmonary/Chest: Effort normal and breath sounds normal. No respiratory distress. Musculoskeletal: She exhibits no edema. Lymphadenopathy:     She has no cervical adenopathy. Neurological: She is alert and oriented to person, place, and time.    Psychiatric: She has a normal mood and affect. Her behavior is normal.   Nursing note and vitals reviewed. Visit Vitals    BP 93/61 (BP 1 Location: Right arm, BP Patient Position: Sitting)    Pulse (!) 50    Resp 12    Ht 5' 1\" (1.549 m)    Wt 131 lb 9.6 oz (59.7 kg)    SpO2 94%    BMI 24.87 kg/m2       Assessment & Plan:  Diagnoses and all orders for this visit:    1. Benign essential HTN  Hypotensive at present, hold medications. Continue home monitoring and call for reading >107/59  -     METABOLIC PANEL, COMPREHENSIVE    2. Iron deficiency anemia, unspecified  Check iron levels. GI or hematology consults as needed. -     ANEMIA PROFILE B  -     PATHOLOGIST REVIEW SMEARS  -     OCCULT BLOOD, IMMUNOASSAY (FIT)    3. Mixed hyperlipidemia  Intolerant of statins. Begin Zetia. -     ezetimibe (ZETIA) 10 mg tablet; Take 1 Tab by mouth daily. For cholesterol    4. Primary insomnia  Ambien ineffective. Begin Trazodone. -     traZODone (DESYREL) 50 mg tablet; Take 1-2 Tabs by mouth nightly. For sleep    5. Tobacco use  -     Begin buPROPion XL (WELLBUTRIN XL) 150 mg tablet; Take 1 Tab by mouth every morning. For smoking cessation    6. Weight loss  Likely s/t to decreased caloric intake. Will monitor.   -     Check TSH AND FREE T4      I have discussed the diagnosis with the patient and the intended plan as seen in the above orders. The patient has received an after-visit summary along with patient information handout. I have discussed medication side effects and warnings with the patient as well. Follow-up Disposition:  Return in about 3 months (around 12/25/2017) for blood pressure, anemia.         Barbara Murphy NP

## 2017-09-25 NOTE — PROGRESS NOTES
1. Have you been to the ER, urgent care clinic since your last visit? Hospitalized since your last visit? No    2. Have you seen or consulted any other health care providers outside of the [address] [place] [address] since your last visit? Include any pap smears or colon screening.  No       Chief Complaint   Patient presents with    Abdominal Pain     was dx with IBS-    Medication Evaluation     crestor causing terrible leg pain, and ambien only works for about 4 hours- would like to discuss alternatives

## 2017-09-25 NOTE — MR AVS SNAPSHOT
Visit Information Date & Time Provider Department Dept. Phone Encounter #  
 9/25/2017  9:30 AM Oliva Rosenberg  [facility] [phone] [id number] Follow-up Instructions Return in about 3 months (around 12/25/2017) for blood pressure, anemia. Upcoming Health Maintenance Date Due  
 BREAST CANCER SCRN MAMMOGRAM 6/13/2018 PAP AKA CERVICAL CYTOLOGY 6/21/2019 COLONOSCOPY 7/1/2023 DTaP/Tdap/Td series (2 - Td) 3/27/2027 Allergies as of 9/25/2017  Review Complete On: 9/25/2017 By: Tung Willis LPN Severity Noted Reaction Type Reactions Robaxin [Methocarbamol] High 01/04/2016    Hives, Rash, Itching Red splotches Statins-hmg-coa Reductase Inhibitors Medium 09/25/2017   Side Effect Myalgia Codeine  02/07/2012    Itching Neosporin [Neomycin-bacitracin-polymyxin]  03/19/2010    Rash Current Immunizations  Reviewed on 2/7/2012 No immunizations on file. Not reviewed this visit You Were Diagnosed With   
  
 Codes Comments Benign essential HTN    -  Primary ICD-10-CM: I10 
ICD-9-CM: 401.1 Iron deficiency anemia, unspecified     ICD-10-CM: D50.9 ICD-9-CM: 280.9 Mixed hyperlipidemia     ICD-10-CM: E78.2 ICD-9-CM: 272.2 Primary insomnia     ICD-10-CM: F51.01 
ICD-9-CM: 307.42 Tobacco use     ICD-10-CM: Z72.0 ICD-9-CM: 305.1 Weight loss     ICD-10-CM: R63.4 ICD-9-CM: 783.21 Vitals BP Pulse Resp Height(growth percentile) Weight(growth percentile) SpO2  
 93/61 (BP 1 Location: Right arm, BP Patient Position: Sitting) (!) 50 12 5' 1\" (1.549 m) 131 lb 9.6 oz (59.7 kg) 94% BMI OB Status Smoking Status 24.87 kg/m2 Premenopausal Current Every Day Smoker Vitals History BMI and BSA Data Body Mass Index Body Surface Area  
 24.87 kg/m 2 1.6 m 2 Preferred Pharmacy Pharmacy Name Phone  [facility] [address], [address] [place] [address] [address] [facility] Thuan Madsen [phone] Your Updated Medication List  
  
   
This list is accurate as of: 9/25/17 10:31 AM.  Always use your most recent med list. amLODIPine 5 mg tablet Commonly known as:  Alphonsecie Collins TAKE ONE TABLET BY MOUTH DAILY  
  
 aspirin 81 mg chewable tablet CHEW ONE TABLET BY MOUTH DAILY  
  
 buPROPion  mg tablet Commonly known as:  Fran Beulah Take 1 Tab by mouth every morning. For smoking cessation  
  
 ezetimibe 10 mg tablet Commonly known as:  Al More Take 1 Tab by mouth daily. For cholesterol  
  
 folic acid 1 mg tablet Commonly known as:  FOLVITE  
TAKE ONE TABLET BY MOUTH DAILY  
  
 KLOR-CON M20 20 mEq tablet Generic drug:  potassium chloride TAKE ONE TABLET BY MOUTH TWICE A DAY  
  
 losartan-hydroCHLOROthiazide 100-25 mg per tablet Commonly known as:  HYZAAR  
TAKE ONE TABLET BY MOUTH DAILY  
  
 traZODone 50 mg tablet Commonly known as:  Suni Homme Take 1-2 Tabs by mouth nightly. For sleep  
  
 triamcinolone acetonide 0.1 % topical cream  
Commonly known as:  KENALOG  
APPLY A THIN LAYER TO AFFECTED AREA(S) TWO TIMES A DAY AS NEEDED FOR SKIN IRRITATION  
  
 zolpidem 5 mg tablet Commonly known as:  AMBIEN Take 1 Tab by mouth nightly as needed for Sleep. Max Daily Amount: 5 mg. Prescriptions Sent to Pharmacy Refills buPROPion XL (WELLBUTRIN XL) 150 mg tablet 0 Sig: Take 1 Tab by mouth every morning. For smoking cessation Class: Normal  
 Pharmacy: [address] [place], [address]  [address] Ph #: [phone] Route: Oral  
 ezetimibe (ZETIA) 10 mg tablet 5 Sig: Take 1 Tab by mouth daily. For cholesterol Class: Normal  
 Pharmacy: [address] [place], [address]  [address] Ph #: [phone] Route: Oral  
 traZODone (DESYREL) 50 mg tablet 5 Sig: Take 1-2 Tabs by mouth nightly. For sleep  Class: Normal  
 Pharmacy: [facility] [address] [place] [address]  [address]  #: [phone] Route: Oral  
  
We Performed the Following ANEMIA PROFILE B [id number] CPT(R)] METABOLIC PANEL, COMPREHENSIVE [96341 CPT(R)] OCCULT BLOOD, IMMUNOASSAY (FIT) [id number] CPT(R)] PATHOLOGIST REVIEW SMEARS [JCB7971 Custom] TSH AND FREE T4 [41154 CPT(R)] Follow-up Instructions Return in about 3 months (around 12/25/2017) for blood pressure, anemia. Patient Instructions Iron Deficiency Anemia: Care Instructions Your Care Instructions Anemia means that you do not have enough red blood cells. Red blood cells carry oxygen around your body. When you have anemia, it can make you pale, weak, and tired. Many things can cause anemia. The most common cause is loss of blood. This can happen if you have heavy menstrual periods. It can also happen if you have bleeding in your stomach or bowel. You can also get anemia if you don't have enough iron in your diet or if it's hard for your body to absorb iron. In some cases, pregnancy causes anemia. That's because a pregnant woman needs more iron. Your doctor may do more tests to find the cause of your anemia. If a disease or other health problem is causing it, your doctor will treat that problem. It's important to follow up with your doctor to make sure that your iron level returns to normal. 
Follow-up care is a key part of your treatment and safety. Be sure to make and go to all appointments, and call your doctor if you are having problems. It's also a good idea to know your test results and keep a list of the medicines you take. How can you care for yourself at home? · If your doctor recommended iron pills, take them as directed. ¨ Try to take the pills on an empty stomach. You can do this about 1 hour before or 2 hours after meals. But you may need to take iron with food to avoid an upset stomach. ¨ Do not take antacids or drink milk or anything with caffeine within 2 hours of when you take your iron. They can keep your body from absorbing the iron well. ¨ Vitamin C helps your body absorb iron. You may want to take iron pills with a glass of orange juice or some other food high in vitamin C. 
¨ Iron pills may cause stomach problems. These include heartburn, nausea, diarrhea, constipation, and cramps. It can help to drink plenty of fluids and include fruits, vegetables, and fiber in your diet. ¨ It's normal for iron pills to make your stool a greenish or grayish black. But internal bleeding can also cause dark stool. So it's important to tell your doctor about any color changes. ¨ Call your doctor if you think you are having a problem with your iron pills. Even after you start to feel better, it will take several months for your body to build up its supply of iron. ¨ If you miss a pill, don't take a double dose. ¨ Keep iron pills out of the reach of small children. Too much iron can be very dangerous. · Eat foods with a lot of iron. These include red meat, shellfish, poultry, and eggs. They also include beans, raisins, whole-grain bread, and leafy green vegetables. · Steam your vegetables. This is the best way to prepare them if you want to get as much iron as possible. · Be safe with medicines. Do not take nonsteroidal anti-inflammatory pain relievers unless your doctor tells you to. These include aspirin, naproxen (Aleve), and ibuprofen (Advil, Motrin). · Liquid iron can stain your teeth. But you can mix it with water or juice and drink it with a straw. Then it won't get on your teeth. When should you call for help? Call 911 anytime you think you may need emergency care. For example, call if: 
· You passed out (lost consciousness). · You vomit blood or what looks like coffee grounds. · You pass maroon or very bloody stools. Call your doctor now or seek immediate medical care if: · Your stools are black and look like tar, or they have streaks of blood. · You are dizzy or lightheaded, or you feel like you may faint. Watch closely for changes in your health, and be sure to contact your doctor if: 
· Your fatigue and weakness continue or get worse. · You have side effects from taking iron pills, such as nausea, vomiting, constipation, diarrhea, or heartburn. · You do not get better as expected. Where can you learn more? Go to http://erick-jason.info/. Enter K273 in the search box to learn more about \"Iron Deficiency Anemia: Care Instructions. \" Current as of: October 13, 2016 Content Version: 11.3 © 2229-4204 [employer]. Care instructions adapted under license by [employer] (which disclaims liability or warranty for this information). If you have questions about a medical condition or this instruction, always ask your healthcare professional. [address] any warranty or liability for your use of this information. Introducing [facility]! [employer] introduces EUDOWEB patient portal. Now you can access parts of your medical record, email your doctor's office, and request medication refills online. 1. In your internet browser, go to [email or web address] 2. Click on the First Time User? Click Here link in the Sign In box. You will see the New Member Sign Up page. 3. Enter your EUDOWEB Access Code exactly as it appears below. You will not need to use this code after youve completed the sign-up process. If you do not sign up before the expiration date, you must request a new code. · EUDOWEB Access Code: [email or web address] Expires: 12/24/2017 10:31 AM 
 
4. Enter the last four digits of your Social Security Number (xxxx) and Date of Birth (mm/dd/yyyy) as indicated and click Submit. You will be taken to the next sign-up page. 5. Create a Imperva ID. This will be your Imperva login ID and cannot be changed, so think of one that is secure and easy to remember. 6. Create a Imperva password. You can change your password at any time. 7. Enter your Password Reset Question and Answer. This can be used at a later time if you forget your password. 8. Enter your e-mail address. You will receive e-mail notification when new information is available in [address] 9. Click Sign Up. You can now view and download portions of your medical record. 10. Click the Download Summary menu link to download a portable copy of your medical information. If you have questions, please visit the Frequently Asked Questions section of the Imperva website. Remember, Imperva is NOT to be used for urgent needs. For medical emergencies, dial 911. Now available from your iPhone and Android! Please provide this summary of care documentation to your next provider. Your primary care clinician is listed as Francis Siddiqui. If you have any questions after today's visit, please call [phone].

## 2017-09-25 NOTE — PATIENT INSTRUCTIONS
Iron Deficiency Anemia: Care Instructions  Your Care Instructions    Anemia means that you do not have enough red blood cells. Red blood cells carry oxygen around your body. When you have anemia, it can make you pale, weak, and tired. Many things can cause anemia. The most common cause is loss of blood. This can happen if you have heavy menstrual periods. It can also happen if you have bleeding in your stomach or bowel. You can also get anemia if you don't have enough iron in your diet or if it's hard for your body to absorb iron. In some cases, pregnancy causes anemia. That's because a pregnant woman needs more iron. Your doctor may do more tests to find the cause of your anemia. If a disease or other health problem is causing it, your doctor will treat that problem. It's important to follow up with your doctor to make sure that your iron level returns to normal.  Follow-up care is a key part of your treatment and safety. Be sure to make and go to all appointments, and call your doctor if you are having problems. It's also a good idea to know your test results and keep a list of the medicines you take. How can you care for yourself at home? · If your doctor recommended iron pills, take them as directed. ¨ Try to take the pills on an empty stomach. You can do this about 1 hour before or 2 hours after meals. But you may need to take iron with food to avoid an upset stomach. ¨ Do not take antacids or drink milk or anything with caffeine within 2 hours of when you take your iron. They can keep your body from absorbing the iron well. ¨ Vitamin C helps your body absorb iron. You may want to take iron pills with a glass of orange juice or some other food high in vitamin C.  ¨ Iron pills may cause stomach problems. These include heartburn, nausea, diarrhea, constipation, and cramps. It can help to drink plenty of fluids and include fruits, vegetables, and fiber in your diet.   ¨ It's normal for iron pills to make your stool a greenish or grayish black. But internal bleeding can also cause dark stool. So it's important to tell your doctor about any color changes. ¨ Call your doctor if you think you are having a problem with your iron pills. Even after you start to feel better, it will take several months for your body to build up its supply of iron. ¨ If you miss a pill, don't take a double dose. ¨ Keep iron pills out of the reach of small children. Too much iron can be very dangerous. · Eat foods with a lot of iron. These include red meat, shellfish, poultry, and eggs. They also include beans, raisins, whole-grain bread, and leafy green vegetables. · Steam your vegetables. This is the best way to prepare them if you want to get as much iron as possible. · Be safe with medicines. Do not take nonsteroidal anti-inflammatory pain relievers unless your doctor tells you to. These include aspirin, naproxen (Aleve), and ibuprofen (Advil, Motrin). · Liquid iron can stain your teeth. But you can mix it with water or juice and drink it with a straw. Then it won't get on your teeth. When should you call for help? Call 911 anytime you think you may need emergency care. For example, call if:  · You passed out (lost consciousness). · You vomit blood or what looks like coffee grounds. · You pass maroon or very bloody stools. Call your doctor now or seek immediate medical care if:  · Your stools are black and look like tar, or they have streaks of blood. · You are dizzy or lightheaded, or you feel like you may faint. Watch closely for changes in your health, and be sure to contact your doctor if:  · Your fatigue and weakness continue or get worse. · You have side effects from taking iron pills, such as nausea, vomiting, constipation, diarrhea, or heartburn. · You do not get better as expected. Where can you learn more? Go to http://erick-jason.info/.   Enter B538 in the search box to learn more about \"Iron Deficiency Anemia: Care Instructions. \"  Current as of: October 13, 2016  Content Version: 11.3  © 8427-0553 [employer]. Care instructions adapted under license by [employer] (which disclaims liability or warranty for this information). If you have questions about a medical condition or this instruction, always ask your healthcare professional. [address] any warranty or liability for your use of this information.

## 2017-09-26 LAB
ALBUMIN SERPL-MCNC: 4.6 G/DL (ref 3.5–5.5)
ALBUMIN/GLOB SERPL: 1.8 {RATIO} (ref 1.2–2.2)
ALP SERPL-CCNC: 114 IU/L (ref 39–117)
ALT SERPL-CCNC: 31 IU/L (ref 0–32)
AST SERPL-CCNC: 101 IU/L (ref 0–40)
BASOPHILS # BLD AUTO: 0 X10E3/UL (ref 0–0.2)
BASOPHILS NFR BLD AUTO: 0 %
BILIRUB SERPL-MCNC: 0.5 MG/DL (ref 0–1.2)
BUN SERPL-MCNC: 9 MG/DL (ref 6–24)
BUN/CREAT SERPL: 12 (ref 9–23)
CALCIUM SERPL-MCNC: 8.8 MG/DL (ref 8.7–10.2)
CHLORIDE SERPL-SCNC: 83 MMOL/L (ref 96–106)
CO2 SERPL-SCNC: 20 MMOL/L (ref 18–29)
CREAT SERPL-MCNC: 0.75 MG/DL (ref 0.57–1)
EOSINOPHIL # BLD AUTO: 0 X10E3/UL (ref 0–0.4)
EOSINOPHIL NFR BLD AUTO: 1 %
ERYTHROCYTE [DISTWIDTH] IN BLOOD BY AUTOMATED COUNT: 15.1 % (ref 12.3–15.4)
FERRITIN SERPL-MCNC: 722 NG/ML (ref 15–150)
FOLATE SERPL-MCNC: 14.7 NG/ML
GLOBULIN SER CALC-MCNC: 2.5 G/DL (ref 1.5–4.5)
GLUCOSE SERPL-MCNC: 70 MG/DL (ref 65–99)
HCT VFR BLD AUTO: 29.4 % (ref 34–46.6)
HGB BLD-MCNC: 9.6 G/DL (ref 11.1–15.9)
IMM GRANULOCYTES # BLD: 0 X10E3/UL (ref 0–0.1)
IMM GRANULOCYTES NFR BLD: 0 %
IRON SATN MFR SERPL: 53 % (ref 15–55)
IRON SERPL-MCNC: 119 UG/DL (ref 27–159)
LYMPHOCYTES # BLD AUTO: 1.2 X10E3/UL (ref 0.7–3.1)
LYMPHOCYTES NFR BLD AUTO: 30 %
MCH RBC QN AUTO: 32.9 PG (ref 26.6–33)
MCHC RBC AUTO-ENTMCNC: 32.7 G/DL (ref 31.5–35.7)
MCV RBC AUTO: 101 FL (ref 79–97)
MONOCYTES # BLD AUTO: 0.4 X10E3/UL (ref 0.1–0.9)
MONOCYTES NFR BLD AUTO: 9 %
NEUTROPHILS # BLD AUTO: 2.5 X10E3/UL (ref 1.4–7)
NEUTROPHILS NFR BLD AUTO: 60 %
PATH REV BLD -IMP: ABNORMAL
PATH REV BLD -IMP: NORMAL
PATH REV BLD -IMP: NORMAL
PATHOLOGIST NAME: ABNORMAL
PLATELET # BLD AUTO: 174 X10E3/UL (ref 150–379)
POTASSIUM SERPL-SCNC: 4.5 MMOL/L (ref 3.5–5.2)
PROT SERPL-MCNC: 7.1 G/DL (ref 6–8.5)
RBC # BLD AUTO: 2.92 X10E6/UL (ref 3.77–5.28)
RETICS/RBC NFR AUTO: 1.4 % (ref 0.6–2.6)
SODIUM SERPL-SCNC: 128 MMOL/L (ref 134–144)
T4 FREE SERPL-MCNC: 0.97 NG/DL (ref 0.82–1.77)
TIBC SERPL-MCNC: 225 UG/DL (ref 250–450)
TSH SERPL DL<=0.005 MIU/L-ACNC: 0.93 UIU/ML (ref 0.45–4.5)
UIBC SERPL-MCNC: 106 UG/DL (ref 131–425)
VIT B12 SERPL-MCNC: 259 PG/ML (ref 211–946)
WBC # BLD AUTO: 4.2 X10E3/UL (ref 3.4–10.8)

## 2017-10-02 NOTE — PROGRESS NOTES
Iron levels elevated. Please advise patient to stop iron supplement if she is taking one. If she is not taking one, I recommend an evaluation by hematology. Patient's liver enzyme (AST) was slightly elevated. This can be caused by many things including infection, medications, diet, alcohol, etc. I recommend rechecking her liver function in 2 weeks.     Her thyroid levels, glucose and kidneys were normal.

## 2017-10-05 ENCOUNTER — TELEPHONE (OUTPATIENT)
Dept: FAMILY MEDICINE CLINIC | Age: 57
End: 2017-10-05

## 2017-10-05 NOTE — PROGRESS NOTES
[phone] verified  Patient notified of above note and voiced understanding of what was read.   Patient is ok to see Hematologist for her Iron needs referral

## 2017-10-16 ENCOUNTER — OFFICE VISIT (OUTPATIENT)
Dept: FAMILY MEDICINE CLINIC | Age: 57
End: 2017-10-16

## 2017-10-16 VITALS
OXYGEN SATURATION: 98 % | WEIGHT: 128 LBS | RESPIRATION RATE: 14 BRPM | HEIGHT: 61 IN | SYSTOLIC BLOOD PRESSURE: 125 MMHG | TEMPERATURE: 97.4 F | BODY MASS INDEX: 24.17 KG/M2 | DIASTOLIC BLOOD PRESSURE: 92 MMHG | HEART RATE: 105 BPM

## 2017-10-16 DIAGNOSIS — F51.01 PRIMARY INSOMNIA: ICD-10-CM

## 2017-10-16 DIAGNOSIS — L50.9 URTICARIA: Primary | ICD-10-CM

## 2017-10-16 DIAGNOSIS — R63.4 WEIGHT LOSS: ICD-10-CM

## 2017-10-16 DIAGNOSIS — R79.89 ELEVATED FERRITIN LEVEL: ICD-10-CM

## 2017-10-16 DIAGNOSIS — R53.83 MALAISE AND FATIGUE: ICD-10-CM

## 2017-10-16 DIAGNOSIS — R74.01 ELEVATED AST (SGOT): ICD-10-CM

## 2017-10-16 DIAGNOSIS — R53.81 MALAISE AND FATIGUE: ICD-10-CM

## 2017-10-16 RX ORDER — CETIRIZINE HCL 10 MG
10 TABLET ORAL DAILY
Qty: 30 TAB | Refills: 1 | Status: ON HOLD | OUTPATIENT
Start: 2017-10-16 | End: 2018-01-26

## 2017-10-16 RX ORDER — FAMOTIDINE 40 MG/1
40 TABLET, FILM COATED ORAL DAILY
Qty: 30 TAB | Refills: 1 | Status: ON HOLD | OUTPATIENT
Start: 2017-10-16 | End: 2018-01-26

## 2017-10-16 RX ORDER — ZOLPIDEM TARTRATE 5 MG/1
5 TABLET ORAL
Qty: 30 TAB | Refills: 1 | Status: SHIPPED | OUTPATIENT
Start: 2017-10-16 | End: 2018-01-22 | Stop reason: SDUPTHER

## 2017-10-16 NOTE — PROGRESS NOTES
1. Have you been to the ER, urgent care clinic since your last visit? Hospitalized since your last visit? No    2. Have you seen or consulted any other health care providers outside of the [address] [place] [address] since your last visit? Include any pap smears or colon screening. No     Chief Complaint   Patient presents with    Allergic Reaction     Patient noticed whelps all over her body on Sat[place], states they are red and itchy. Does not remember doing anything different. States she has also been feeling very weak recently.       Not fasting

## 2017-10-16 NOTE — PROGRESS NOTES
Fei Brown is a 65 y.o. female who was seen in clinic today (10/16/2017). Subjective: Allergic Reaction  Patient presents for evaluation of hives. Onset of symptoms was abrupt and 2 days ago, with unchanged since that time. Patient denies shortness of breath, wheezing, or swelling of mouth. Patient denies similar previous allergic reactions. Patient reports that Trazodone may have caused her symptoms. Elevated LFTs  Patient seen for follow up of elevated LFTs (AST). Patient reports fatigue, nausea/vomiting and weight loss. Denies abdominal pain or blood in stool. Denies recent change in medications, history of blood transfusions, tattoos, or intravenous drug abuse. Patient denies any known history of chronic liver disease. Admits to drinking gin 500-750 mL per week. Prior to Admission medications    Medication Sig Start Date End Date Taking? Authorizing Provider   cetirizine (ZYRTEC) 10 mg tablet Take 1 Tab by mouth daily. For allergic reaction 10/16/17  Yes Cathy Keane NP   famotidine (PEPCID) 40 mg tablet Take 1 Tab by mouth daily. For allergic reaction 10/16/17  Yes Cathy Keane NP   zolpidem (AMBIEN) 5 mg tablet Take 1 Tab by mouth nightly as needed for Sleep. Max Daily Amount: 5 mg. 10/16/17  Yes Cathy Keane NP   buPROPion XL (WELLBUTRIN XL) 150 mg tablet Take 1 Tab by mouth every morning. For smoking cessation 9/25/17  Yes Cathy Keane NP   ezetimibe (ZETIA) 10 mg tablet Take 1 Tab by mouth daily.  For cholesterol 9/25/17  Yes Cathy Keane NP   aspirin 81 mg chewable tablet CHEW ONE TABLET BY MOUTH DAILY 9/15/17  Yes Balwinder Hoyt MD   folic acid (FOLVITE) 1 mg tablet TAKE ONE TABLET BY MOUTH DAILY 1/24/17  Yes Cathy Keane NP   triamcinolone acetonide (KENALOG) 0.1 % topical cream APPLY A THIN LAYER TO AFFECTED AREA(S) TWO TIMES A DAY AS NEEDED FOR SKIN IRRITATION 4/13/16  Yes Cathy Keane NP   losartan-hydroCHLOROthiazide (HYZAAR) 100-25 mg per tablet TAKE ONE TABLET BY MOUTH DAILY  Patient not taking: Reported on 10/16/2017 9/15/17   Shena Proctor MD   amLODIPine (NORVASC) 5 mg tablet TAKE ONE TABLET BY MOUTH DAILY  Patient not taking: Reported on 10/16/2017 9/15/17   Shena Proctor MD   KLOR-CON M20 20 mEq tablet TAKE ONE TABLET BY MOUTH TWICE A DAY  Patient not taking: Reported on 10/16/2017 8/1/17   Hui Jack NP          Allergies   Allergen Reactions    Robaxin [Methocarbamol] Hives, Rash and Itching     Red splotches    Statins-Hmg-Coa Reductase Inhibitors Myalgia    Codeine Itching    Neosporin [Neomycin-Bacitracin-Polymyxin] Rash           ROS  See HPI    Objective:   Physical Exam   Constitutional: She is oriented to person, place, and time. She appears well-developed and well-nourished. Neck: Normal range of motion. Neck supple. No JVD present. Carotid bruit is not present. No thyromegaly present. Cardiovascular: Normal rate, regular rhythm and intact distal pulses. Exam reveals no gallop and no friction rub. No murmur heard. Pulmonary/Chest: Effort normal and breath sounds normal. No respiratory distress. Abdominal: Normal appearance and bowel sounds are normal. There is no hepatosplenomegaly. There is no tenderness. Musculoskeletal: She exhibits no edema. Lymphadenopathy:     She has no cervical adenopathy. Neurological: She is alert and oriented to person, place, and time. Skin: Rash noted. Rash is urticarial (of legs). Psychiatric: She has a normal mood and affect. Her behavior is normal.   Nursing note and vitals reviewed. Visit Vitals    BP (!) 125/92 (BP 1 Location: Left arm, BP Patient Position: Sitting)    Pulse (!) 105    Temp 97.4 °F (36.3 °C) (Oral)    Resp 14    Ht 5' 1\" (1.549 m)    Wt 128 lb (58.1 kg)    SpO2 98%    BMI 24.19 kg/m2       Assessment & Plan:  Diagnoses and all orders for this visit:    1. Urticaria  Etiology unclear. Stop Trazodone. Cover with H1/H2 blocker.   - cetirizine (ZYRTEC) 10 mg tablet; Take 1 Tab by mouth daily. For allergic reaction  -     famotidine (PEPCID) 40 mg tablet; Take 1 Tab by mouth daily. For allergic reaction    2. Elevated AST (SGOT)  Secondary to alcohol use. Advised patient to abstain form drinking. Recheck levels. US to evaluate for cirrhosis. Hepatology referral as needed. -      ABD LTD; Future  -     VITAMIN B1, WHOLE BLOOD  -     HEPATITIS PANEL, ACUTE  -     GGT    3. Elevated ferritin level  Likely hepatic etiology. 4. Weight loss  Etiology unclear, consider s/t alcohol use. 5. Malaise and fatigue  -     METABOLIC PANEL, COMPREHENSIVE  -     CBC WITH AUTOMATED DIFF  -     MAGNESIUM    6. Primary insomnia  -     Refill zolpidem (AMBIEN) 5 mg tablet; Take 1 Tab by mouth nightly as needed for Sleep. Max Daily Amount: 5 mg. I have discussed the diagnosis with the patient and the intended plan as seen in the above orders. The patient has received an after-visit summary along with patient information handout. I have discussed medication side effects and warnings with the patient as well. Follow-up Disposition:  Return if symptoms worsen or fail to improve.         Jc Allison NP

## 2017-10-16 NOTE — PATIENT INSTRUCTIONS
Abnormal Weight Loss: Care Instructions  Your Care Instructions  There are two types of weight loss--normal and abnormal. The normal kind happens when you are trying to lose weight by exercising more or eating less. The abnormal kind happens when you are not trying to lose weight. Many medical problems can cause abnormal weight loss. These include problems with your thyroid gland, long-term infections, mouth or throat problems that make it hard to eat, and digestive problems. They also include depression and cancer. Some medicines also may cause you to lose weight. You can work with your doctor to find the cause of your weight loss. You will probably need tests to do this. Follow-up care is a key part of your treatment and safety. Be sure to make and go to all appointments, and call your doctor if you are having problems. It's also a good idea to know your test results and keep a list of the medicines you take. How can you care for yourself at home? · Weigh yourself at the same time every day. It's best to do it first thing in the morning after you empty your bladder. Be sure to always wear the same amount of clothing. · Write down any changes in your weight and the possible causes. Discuss these with your doctor. · Your doctor may want you to change your diet. Do your best to follow his or her advice. · Ask your doctor if you should see a dietitian. This is a person who can help you plan meals that work best for your lifestyle. · Note any changes in bowel habits. These may include changes in how often you have a bowel movement. Other changes include the color and size of your stools and how solid they are. · If you are prescribed medicines, take them exactly as prescribed. Call your doctor if you think you are having a problem with your medicine. You will get more details on the specific medicines your doctor prescribes. When should you call for help?   Watch closely for changes in your health, and be sure to contact your doctor if:  · You do not get better as expected. · You continue to lose weight. Where can you learn more? Go to http://erick-jason.info/. Enter A790 in the search box to learn more about \"Abnormal Weight Loss: Care Instructions. \"  Current as of: October 13, 2016  Content Version: 11.3  © 6746-9932 [employer]. Care instructions adapted under license by [employer] (which disclaims liability or warranty for this information). If you have questions about a medical condition or this instruction, always ask your healthcare professional. [place] [address] any warranty or liability for your use of this information.

## 2017-10-16 NOTE — MR AVS SNAPSHOT
Visit Information Date & Time Provider Department Dept. Phone Encounter #  
 10/16/2017  2:45 PM Otf Fabricio, [address] [phone] [id number] Follow-up Instructions Return if symptoms worsen or fail to improve. Upcoming Health Maintenance Date Due  
 BREAST CANCER SCRN MAMMOGRAM 6/13/2018 PAP AKA CERVICAL CYTOLOGY 6/21/2019 COLONOSCOPY 7/1/2023 DTaP/Tdap/Td series (2 - Td) 3/27/2027 Allergies as of 10/16/2017  Review Complete On: 10/16/2017 By: Otf Regalado NP Severity Noted Reaction Type Reactions Robaxin [Methocarbamol] High 01/04/2016    Hives, Rash, Itching Red splotches Statins-hmg-coa Reductase Inhibitors Medium 09/25/2017   Side Effect Myalgia Codeine  02/07/2012    Itching Neosporin [Neomycin-bacitracin-polymyxin]  03/19/2010    Rash Current Immunizations  Reviewed on 2/7/2012 No immunizations on file. Not reviewed this visit You Were Diagnosed With   
  
 Codes Comments Weight loss    -  Primary ICD-10-CM: R63.4 ICD-9-CM: 783.21 Malaise and fatigue     ICD-10-CM: R53.81, R53.83 ICD-9-CM: 780.79 Urticaria     ICD-10-CM: L50.9 ICD-9-CM: 708.9 Elevated AST (SGOT)     ICD-10-CM: R74.0 ICD-9-CM: 790.4 Elevated ferritin level     ICD-10-CM: R79.89 ICD-9-CM: 790.6 Primary insomnia     ICD-10-CM: F51.01 
ICD-9-CM: 307.42 Vitals BP Pulse Temp Resp Height(growth percentile) Weight(growth percentile) (!) 125/92 (BP 1 Location: Left arm, BP Patient Position: Sitting) (!) 105 97.4 °F (36.3 °C) (Oral) 14 5' 1\" (1.549 m) 128 lb (58.1 kg) SpO2 BMI OB Status Smoking Status 98% 24.19 kg/m2 Premenopausal Current Every Day Smoker Vitals History BMI and BSA Data Body Mass Index Body Surface Area  
 24.19 kg/m 2 1.58 m 2 Preferred Pharmacy Pharmacy Name Phone  [address] [place], [address] [facility] [address] Ajay Martinez [phone] Your Updated Medication List  
  
   
This list is accurate as of: 10/16/17  3:43 PM.  Always use your most recent med list. amLODIPine 5 mg tablet Commonly known as:  Keerthi Colon TAKE ONE TABLET BY MOUTH DAILY  
  
 aspirin 81 mg chewable tablet CHEW ONE TABLET BY MOUTH DAILY  
  
 buPROPion  mg tablet Commonly known as:  Jefferson Jake Take 1 Tab by mouth every morning. For smoking cessation  
  
 cetirizine 10 mg tablet Commonly known as:  ZYRTEC Take 1 Tab by mouth daily. For allergic reaction  
  
 ezetimibe 10 mg tablet Commonly known as:  Elner Glassing Take 1 Tab by mouth daily. For cholesterol  
  
 famotidine 40 mg tablet Commonly known as:  PEPCID Take 1 Tab by mouth daily. For allergic reaction  
  
 folic acid 1 mg tablet Commonly known as:  FOLVITE  
TAKE ONE TABLET BY MOUTH DAILY  
  
 KLOR-CON M20 20 mEq tablet Generic drug:  potassium chloride TAKE ONE TABLET BY MOUTH TWICE A DAY  
  
 losartan-hydroCHLOROthiazide 100-25 mg per tablet Commonly known as:  HYZAAR  
TAKE ONE TABLET BY MOUTH DAILY  
  
 triamcinolone acetonide 0.1 % topical cream  
Commonly known as:  KENALOG  
APPLY A THIN LAYER TO AFFECTED AREA(S) TWO TIMES A DAY AS NEEDED FOR SKIN IRRITATION  
  
 zolpidem 5 mg tablet Commonly known as:  AMBIEN Take 1 Tab by mouth nightly as needed for Sleep. Max Daily Amount: 5 mg. Prescriptions Printed Refills  
 zolpidem (AMBIEN) 5 mg tablet 1 Sig: Take 1 Tab by mouth nightly as needed for Sleep. Max Daily Amount: 5 mg. Class: Print Route: Oral  
  
Prescriptions Sent to Pharmacy Refills  
 cetirizine (ZYRTEC) 10 mg tablet 1 Sig: Take 1 Tab by mouth daily. For allergic reaction Class: Normal  
 Pharmacy: [address] [place], [address] [facility] [address] [place] [address]  [address] Ph #: [phone]  Route: Oral  
 famotidine (PEPCID) 40 mg tablet 1  
 Sig: Take 1 Tab by mouth daily. For allergic reaction Class: Normal  
 Pharmacy: [facility] [address], [address] [facility] [place] Ph #: [phone] Route: Oral  
  
We Performed the Following CBC WITH AUTOMATED DIFF [93358 CPT(R)] GGT [74291 CPT(R)] HEPATITIS PANEL, ACUTE [40930 CPT(R)] MAGNESIUM [id number] CPT(R)] METABOLIC PANEL, COMPREHENSIVE [25643 CPT(R)] VITAMIN B1, WHOLE BLOOD [id number] CPT(R)] Follow-up Instructions Return if symptoms worsen or fail to improve. To-Do List   
 10/16/2017 Imaging:  US ABD LTD Patient Instructions Abnormal Weight Loss: Care Instructions Your Care Instructions There are two types of weight lossnormal and abnormal. The normal kind happens when you are trying to lose weight by exercising more or eating less. The abnormal kind happens when you are not trying to lose weight. Many medical problems can cause abnormal weight loss. These include problems with your thyroid gland, long-term infections, mouth or throat problems that make it hard to eat, and digestive problems. They also include depression and cancer. Some medicines also may cause you to lose weight. You can work with your doctor to find the cause of your weight loss. You will probably need tests to do this. Follow-up care is a key part of your treatment and safety. Be sure to make and go to all appointments, and call your doctor if you are having problems. It's also a good idea to know your test results and keep a list of the medicines you take. How can you care for yourself at home? · Weigh yourself at the same time every day. It's best to do it first thing in the morning after you empty your bladder. Be sure to always wear the same amount of clothing. · Write down any changes in your weight and the possible causes. Discuss these with your doctor. · Your doctor may want you to change your diet.  Do your best to follow his or her advice. · Ask your doctor if you should see a dietitian. This is a person who can help you plan meals that work best for your lifestyle. · Note any changes in bowel habits. These may include changes in how often you have a bowel movement. Other changes include the color and size of your stools and how solid they are. · If you are prescribed medicines, take them exactly as prescribed. Call your doctor if you think you are having a problem with your medicine. You will get more details on the specific medicines your doctor prescribes. When should you call for help? Watch closely for changes in your health, and be sure to contact your doctor if: 
· You do not get better as expected. · You continue to lose weight. Where can you learn more? Go to http://erick-jason.info/. Enter A790 in the search box to learn more about \"Abnormal Weight Loss: Care Instructions. \" Current as of: October 13, 2016 Content Version: 11.3 © 4226-2816 [employer]. Care instructions adapted under license by [employer] (which disclaims liability or warranty for this information). If you have questions about a medical condition or this instruction, always ask your healthcare professional. [address] any warranty or liability for your use of this information. Introducing hospitals & HEALTH SERVICES! Sumeet Hernandez introduces in2nite patient portal. Now you can access parts of your medical record, email your doctor's office, and request medication refills online. 1. In your internet browser, go to [email or web address] 2. Click on the First Time User? Click Here link in the Sign In box. You will see the New Member Sign Up page. 3. Enter your in2nite Access Code exactly as it appears below. You will not need to use this code after youve completed the sign-up process. If you do not sign up before the expiration date, you must request a new code. · ShopEx Access Code: [email or web address] Expires: 12/24/2017 10:31 AM 
 
4. Enter the last four digits of your Social Security Number (xxxx) and Date of Birth (mm/dd/yyyy) as indicated and click Submit. You will be taken to the next sign-up page. 5. Create a ShopEx ID. This will be your ShopEx login ID and cannot be changed, so think of one that is secure and easy to remember. 6. Create a ShopEx password. You can change your password at any time. 7. Enter your Password Reset Question and Answer. This can be used at a later time if you forget your password. 8. Enter your e-mail address. You will receive e-mail notification when new information is available in [address] 9. Click Sign Up. You can now view and download portions of your medical record. 10. Click the Download Summary menu link to download a portable copy of your medical information. If you have questions, please visit the Frequently Asked Questions section of the ShopEx website. Remember, ShopEx is NOT to be used for urgent needs. For medical emergencies, dial 911. Now available from your iPhone and Android! Please provide this summary of care documentation to your next provider. Your primary care clinician is listed as Hanna Dawkins. If you have any questions after today's visit, please call [phone].

## 2017-10-18 LAB
ALBUMIN SERPL-MCNC: 4.2 G/DL (ref 3.5–5.5)
ALBUMIN/GLOB SERPL: 1.7 {RATIO} (ref 1.2–2.2)
ALP SERPL-CCNC: 55 IU/L (ref 39–117)
ALT SERPL-CCNC: 10 IU/L (ref 0–32)
AST SERPL-CCNC: 25 IU/L (ref 0–40)
BASOPHILS # BLD AUTO: 0 X10E3/UL (ref 0–0.2)
BASOPHILS NFR BLD AUTO: 0 %
BILIRUB SERPL-MCNC: 0.5 MG/DL (ref 0–1.2)
BUN SERPL-MCNC: 7 MG/DL (ref 6–24)
BUN/CREAT SERPL: 9 (ref 9–23)
CALCIUM SERPL-MCNC: 9.2 MG/DL (ref 8.7–10.2)
CHLORIDE SERPL-SCNC: 88 MMOL/L (ref 96–106)
CO2 SERPL-SCNC: 26 MMOL/L (ref 18–29)
CREAT SERPL-MCNC: 0.79 MG/DL (ref 0.57–1)
EOSINOPHIL # BLD AUTO: 0 X10E3/UL (ref 0–0.4)
EOSINOPHIL NFR BLD AUTO: 0 %
ERYTHROCYTE [DISTWIDTH] IN BLOOD BY AUTOMATED COUNT: 15.5 % (ref 12.3–15.4)
GGT SERPL-CCNC: 83 IU/L (ref 0–60)
GLOBULIN SER CALC-MCNC: 2.5 G/DL (ref 1.5–4.5)
GLUCOSE SERPL-MCNC: 103 MG/DL (ref 65–99)
HAV IGM SERPL QL IA: NEGATIVE
HBV CORE IGM SERPL QL IA: NEGATIVE
HBV SURFACE AG SERPL QL IA: NEGATIVE
HCT VFR BLD AUTO: 32.2 % (ref 34–46.6)
HCV AB S/CO SERPL IA: <0.1 S/CO RATIO (ref 0–0.9)
HGB BLD-MCNC: 10.9 G/DL (ref 11.1–15.9)
IMM GRANULOCYTES # BLD: 0 X10E3/UL (ref 0–0.1)
IMM GRANULOCYTES NFR BLD: 1 %
LYMPHOCYTES # BLD AUTO: 0.7 X10E3/UL (ref 0.7–3.1)
LYMPHOCYTES NFR BLD AUTO: 12 %
MAGNESIUM SERPL-MCNC: 1.3 MG/DL (ref 1.6–2.3)
MCH RBC QN AUTO: 31.8 PG (ref 26.6–33)
MCHC RBC AUTO-ENTMCNC: 33.9 G/DL (ref 31.5–35.7)
MCV RBC AUTO: 94 FL (ref 79–97)
MONOCYTES # BLD AUTO: 0.4 X10E3/UL (ref 0.1–0.9)
MONOCYTES NFR BLD AUTO: 7 %
NEUTROPHILS # BLD AUTO: 4.5 X10E3/UL (ref 1.4–7)
NEUTROPHILS NFR BLD AUTO: 80 %
PLATELET # BLD AUTO: 193 X10E3/UL (ref 150–379)
POTASSIUM SERPL-SCNC: 3.7 MMOL/L (ref 3.5–5.2)
PROT SERPL-MCNC: 6.7 G/DL (ref 6–8.5)
RBC # BLD AUTO: 3.43 X10E6/UL (ref 3.77–5.28)
SODIUM SERPL-SCNC: 136 MMOL/L (ref 134–144)
VIT B1 BLD-SCNC: 61.9 NMOL/L (ref 66.5–200)
WBC # BLD AUTO: 5.6 X10E3/UL (ref 3.4–10.8)

## 2017-10-23 ENCOUNTER — HOSPITAL ENCOUNTER (OUTPATIENT)
Dept: ULTRASOUND IMAGING | Age: 57
Discharge: HOME | End: 2017-10-23
Payer: COMMERCIAL

## 2017-10-23 DIAGNOSIS — R74.01 ELEVATED AST (SGOT): ICD-10-CM

## 2017-10-23 PROCEDURE — 76705 ECHO EXAM OF ABDOMEN: CPT

## 2017-10-24 DIAGNOSIS — E83.42 HYPOMAGNESEMIA: Primary | ICD-10-CM

## 2017-10-24 DIAGNOSIS — E51.9 THIAMIN DEFICIENCY: ICD-10-CM

## 2017-10-24 RX ORDER — LANOLIN ALCOHOL/MO/W.PET/CERES
100 CREAM (GRAM) TOPICAL DAILY
Qty: 30 TAB | Refills: 5 | Status: SHIPPED | OUTPATIENT
Start: 2017-10-24 | End: 2018-10-08 | Stop reason: SDUPTHER

## 2017-10-24 RX ORDER — MAGNESIUM 200 MG
200 TABLET ORAL DAILY
Qty: 30 TAB | Refills: 5 | Status: SHIPPED | OUTPATIENT
Start: 2017-10-24 | End: 2019-01-18 | Stop reason: SDUPTHER

## 2017-10-24 NOTE — PROGRESS NOTES
Discussed US results with patient. US showed patient has hepatic steatosis (or a fatty liver). This occurs when cholesterol deposits into the liver. Treatment is low fat diet, control of cholesterol and weight loss.

## 2017-11-04 DIAGNOSIS — Z72.0 TOBACCO USE: ICD-10-CM

## 2017-11-06 RX ORDER — BUPROPION HYDROCHLORIDE 150 MG/1
TABLET ORAL
Qty: 30 TAB | Refills: 5 | Status: SHIPPED | OUTPATIENT
Start: 2017-11-06 | End: 2018-05-02 | Stop reason: SDUPTHER

## 2018-01-22 DIAGNOSIS — F51.01 PRIMARY INSOMNIA: ICD-10-CM

## 2018-01-23 RX ORDER — ZOLPIDEM TARTRATE 5 MG/1
TABLET ORAL
Qty: 30 TAB | Refills: 0 | Status: SHIPPED | OUTPATIENT
Start: 2018-01-23 | End: 2018-03-28 | Stop reason: SDUPTHER

## 2018-01-25 ENCOUNTER — OFFICE VISIT (OUTPATIENT)
Dept: FAMILY MEDICINE CLINIC | Age: 58
End: 2018-01-25

## 2018-01-25 VITALS
TEMPERATURE: 97.7 F | HEIGHT: 61 IN | HEART RATE: 84 BPM | BODY MASS INDEX: 18.77 KG/M2 | RESPIRATION RATE: 17 BRPM | DIASTOLIC BLOOD PRESSURE: 77 MMHG | WEIGHT: 99.4 LBS | OXYGEN SATURATION: 100 % | SYSTOLIC BLOOD PRESSURE: 105 MMHG

## 2018-01-25 DIAGNOSIS — R53.1 WEAKNESS GENERALIZED: ICD-10-CM

## 2018-01-25 DIAGNOSIS — R63.4 UNINTENTIONAL WEIGHT LOSS: Primary | ICD-10-CM

## 2018-01-25 DIAGNOSIS — E51.9 THIAMINE DEFICIENCY: ICD-10-CM

## 2018-01-25 DIAGNOSIS — R29.6 FREQUENT FALLS: ICD-10-CM

## 2018-01-25 DIAGNOSIS — F10.10 ALCOHOL ABUSE: ICD-10-CM

## 2018-01-25 DIAGNOSIS — D64.9 ANEMIA, UNSPECIFIED TYPE: ICD-10-CM

## 2018-01-25 NOTE — PROGRESS NOTES
Contreras Smith is a 65 y.o. female who was seen in clinic today (1/25/2018). Subjective:  Cardiovascular Review:  The patient has hypertension, hyperlipidemia and PVD (Bilateral <50% stenosis of the internal carotid arteries). Diet and Lifestyle: generally follows a low fat low cholesterol diet, generally follows a low sodium diet, smoker 1/2 ppd  Home BP Monitoring: home readings 140/90s.     Pertinent ROS: taking medications as instructed, no TIA's, no chest pain on exertion, no dyspnea on exertion, no swelling of ankles.      Weight loss  Weight down 30 lbs in last 3 months, 45 in last 6 months. Reports diminished appetite, stomach pain with eating and intermittent diarrhea. Patient reports no alcohol use in last 2 weeks. Patient has a h/o alcohol abuse drinking gin 500-750 mL per week. Patient reports progressive weakness of legs and multiple falls over last 3 months. She is unable to walk without assistance and needs help with ADLs. Denies specific joint or back pain. Anemia  Patient has a history of  anemia. It has been present for several years. Associated signs & symptoms: feeling cold. Stable at present, not currently taking supplement. Colonoscopy in 2013 was normal. Denies abnormal uterine bleeding but reports h/o uterine fibroids. Prior to Admission medications    Medication Sig Start Date End Date Taking? Authorizing Provider   zolpidem (AMBIEN) 5 mg tablet TAKE ONE TABLET BY MOUTH NIGHTLY AS NEEDED FOR SLEEP 1/23/18  Yes Debbie Donahue NP   buPROPion XL (WELLBUTRIN XL) 150 mg tablet TAKE ONE TABLET BY MOUTH EVERY MORNING FOR SMOKING CESSATION 11/6/17  Yes Debbie Donahue NP   thiamine (B-1) 100 mg tablet Take 1 Tab by mouth daily. 10/24/17  Yes Debbie Donahue NP   magnesium 200 mg tab Take 200 mg by mouth daily. 10/24/17  Yes Debbie Donahue NP   famotidine (PEPCID) 40 mg tablet Take 1 Tab by mouth daily.  For allergic reaction 10/16/17  Yes Debbie Donahue NP ezetimibe (ZETIA) 10 mg tablet Take 1 Tab by mouth daily. For cholesterol 9/25/17  Yes Misha Salgado NP   aspirin 81 mg chewable tablet CHEW ONE TABLET BY MOUTH DAILY 9/15/17  Yes Danilo Liu MD   folic acid (FOLVITE) 1 mg tablet TAKE ONE TABLET BY MOUTH DAILY 1/24/17  Yes Misha Salgado NP   triamcinolone acetonide (KENALOG) 0.1 % topical cream APPLY A THIN LAYER TO AFFECTED AREA(S) TWO TIMES A DAY AS NEEDED FOR SKIN IRRITATION 4/13/16  Yes Misha Salgado NP   cetirizine (ZYRTEC) 10 mg tablet Take 1 Tab by mouth daily. For allergic reaction 10/16/17   Misha Salgado NP   losartan-hydroCHLOROthiazide (HYZAAR) 100-25 mg per tablet TAKE ONE TABLET BY MOUTH DAILY  Patient not taking: Reported on 10/16/2017 9/15/17   Danilo Liu MD   amLODIPine (NORVASC) 5 mg tablet TAKE ONE TABLET BY MOUTH DAILY 9/15/17   Garrett Burns MD   KLOR-CON M20 20 mEq tablet TAKE ONE TABLET BY MOUTH TWICE A DAY  Patient not taking: Reported on 10/16/2017 8/1/17   Misha Salgado NP          Allergies   Allergen Reactions    Robaxin [Methocarbamol] Hives, Rash and Itching     Red splotches    Statins-Hmg-Coa Reductase Inhibitors Myalgia    Codeine Itching    Neosporin [Neomycin-Bacitracin-Polymyxin] Rash           Review of Systems   Constitutional: Positive for fever, malaise/fatigue and weight loss. Respiratory: Negative for cough, hemoptysis and shortness of breath. Cardiovascular: Negative for chest pain, palpitations and leg swelling. Gastrointestinal: Positive for diarrhea. Negative for abdominal pain, blood in stool, constipation, heartburn, melena, nausea and vomiting. Genitourinary: Negative for frequency and urgency. Musculoskeletal: Negative for back pain, joint pain and myalgias. Skin: Negative for rash. Neurological: Positive for weakness. Negative for dizziness, focal weakness and headaches. Psychiatric/Behavioral: Negative for depression and memory loss.          Objective: Physical Exam   Constitutional: She is oriented to person, place, and time. She appears well-developed and well-nourished. Neck: Normal range of motion. Neck supple. No JVD present. Carotid bruit is not present. No thyromegaly present. Cardiovascular: Normal rate, regular rhythm and intact distal pulses. Exam reveals no gallop and no friction rub. No murmur heard. Pulmonary/Chest: Effort normal and breath sounds normal. No respiratory distress. Abdominal: Normal appearance and bowel sounds are normal. There is no tenderness. There is no rigidity and no guarding. Musculoskeletal: She exhibits no edema. Lymphadenopathy:     She has no cervical adenopathy. Neurological: She is alert and oriented to person, place, and time. In wheelchair. Generalized weakness of all extremities noted. Psychiatric: She has a normal mood and affect. Her behavior is normal.   Nursing note and vitals reviewed. Visit Vitals    /77 (BP 1 Location: Left arm, BP Patient Position: Sitting)    Pulse 84    Temp 97.7 °F (36.5 °C) (Oral)    Resp 17    Ht 5' 1\" (1.549 m)    Wt 99 lb 6.4 oz (45.1 kg)    SpO2 100%    BMI 18.78 kg/m2       Assessment & Plan:  Diagnoses and all orders for this visit:    1. Unintentional weight loss  Rule out malignancy. CXR and labs today. Will order CT chest and abdomen to evaluate further. GI consult as needed. Go to ER for new or worsening symptoms  -     XR CHEST PA LAT; Future  -     METABOLIC PANEL, COMPREHENSIVE  -     TSH AND FREE T4  -     CBC WITH AUTOMATED DIFF  -     SED RATE (ESR)  -     C REACTIVE PROTEIN, QT  -     HIV 1/2 AG/AB, 4TH GENERATION,W RFLX CONFIRM  -     ANEMIA PROFILE B  -     HEPATITIS C AB  -     HEMOGLOBIN A1C W/O EAG  -     CT CHEST ABD PELV W CONT; Future    2. Weakness generalized  As above. No focal finding but will request neurology consult as needed. 3. Frequent falls  Use walker. Neurology consult as needed.     4. Anemia, unspecified type  GI consult as needed. -     OCCULT BLOOD, IMMUNOASSAY (FIT)  -     PATHOLOGIST REVIEW SMEARS    5. Alcohol abuse  Counseled patient on need to quit drinking. I have discussed the diagnosis with the patient and the intended plan as seen in the above orders. The patient has received an after-visit summary along with patient information handout. I have discussed medication side effects and warnings with the patient as well. Follow-up Disposition:  Return in about 2 weeks (around 2/8/2018) for weight management.         Debbie Goss NP       Counseling More Than 50% of the Appointment Time: Face-to face time >40 minutes

## 2018-01-25 NOTE — PATIENT INSTRUCTIONS
Abnormal Weight Loss: Care Instructions  Your Care Instructions    There are two types of weight loss-normal and abnormal. The normal kind happens when you are trying to lose weight by exercising more or eating less. The abnormal kind happens when you are not trying to lose weight. Many medical problems can cause abnormal weight loss. These include problems with your thyroid gland, long-term infections, mouth or throat problems that make it hard to eat, and digestive problems. They also include depression and cancer. Some medicines also may cause you to lose weight. You can work with your doctor to find the cause of your weight loss. You will probably need tests to do this. Follow-up care is a key part of your treatment and safety. Be sure to make and go to all appointments, and call your doctor if you are having problems. It's also a good idea to know your test results and keep a list of the medicines you take. How can you care for yourself at home? · Weigh yourself at the same time every day. It's best to do it first thing in the morning after you empty your bladder. Be sure to always wear the same amount of clothing. · Write down any changes in your weight and the possible causes. Discuss these with your doctor. · Your doctor may want you to change your diet. Do your best to follow his or her advice. · Ask your doctor if you should see a dietitian. This is a person who can help you plan meals that work best for your lifestyle. · Note any changes in bowel habits. These may include changes in how often you have a bowel movement. Other changes include the color and size of your stools and how solid they are. · If you are prescribed medicines, take them exactly as prescribed. Call your doctor if you think you are having a problem with your medicine. You will get more details on the specific medicines your doctor prescribes. When should you call for help?   Watch closely for changes in your health, and be sure to contact your doctor if:  ? · You do not get better as expected. ? · You continue to lose weight. Where can you learn more? Go to http://erick-jason.info/. Enter A790 in the search box to learn more about \"Abnormal Weight Loss: Care Instructions. \"  Current as of: October 13, 2016  Content Version: 11.4  © 2824-9995 [employer]. Care instructions adapted under license by [employer] (which disclaims liability or warranty for this information). If you have questions about a medical condition or this instruction, always ask your healthcare professional. [place] [address] any warranty or liability for your use of this information.

## 2018-01-25 NOTE — PROGRESS NOTES
Chief Complaint   Patient presents with    Decreased Appetite     patient states she eats less than normal and sometimes doesnt eat at all- started about 6 months ago     Hair/Scalp Problem     patient states her hair has been falling off- first noticed 2 weeks ago     Extremity Weakness     patient states she has trouble with her balance- had 2 falls at home within the last 2 weeks       1. Have you been to the ER, urgent care clinic since your last visit? Hospitalized since your last visit? No    2. Have you seen or consulted any other health care providers outside of the [address] [place] [address] since your last visit? Include any pap smears or colon screening.  No

## 2018-01-26 ENCOUNTER — TELEPHONE (OUTPATIENT)
Dept: FAMILY MEDICINE CLINIC | Age: 58
End: 2018-01-26

## 2018-01-26 ENCOUNTER — APPOINTMENT (OUTPATIENT)
Dept: CT IMAGING | Age: 58
DRG: 391 | End: 2018-01-26
Attending: EMERGENCY MEDICINE
Payer: COMMERCIAL

## 2018-01-26 ENCOUNTER — APPOINTMENT (OUTPATIENT)
Dept: GENERAL RADIOLOGY | Age: 58
DRG: 391 | End: 2018-01-26
Attending: EMERGENCY MEDICINE
Payer: COMMERCIAL

## 2018-01-26 ENCOUNTER — APPOINTMENT (OUTPATIENT)
Dept: ULTRASOUND IMAGING | Age: 58
DRG: 391 | End: 2018-01-26
Attending: INTERNAL MEDICINE
Payer: COMMERCIAL

## 2018-01-26 ENCOUNTER — HOSPITAL ENCOUNTER (INPATIENT)
Age: 58
LOS: 7 days | Discharge: HOME HEALTH | DRG: 391 | End: 2018-02-02
Attending: EMERGENCY MEDICINE | Admitting: INTERNAL MEDICINE
Payer: COMMERCIAL

## 2018-01-26 DIAGNOSIS — K76.9 LIVER LESION: ICD-10-CM

## 2018-01-26 DIAGNOSIS — R59.0 MEDIASTINAL LYMPHADENOPATHY: ICD-10-CM

## 2018-01-26 DIAGNOSIS — D70.9 NEUTROPENIA, UNSPECIFIED TYPE (HCC): ICD-10-CM

## 2018-01-26 DIAGNOSIS — E83.42 HYPOMAGNESEMIA: ICD-10-CM

## 2018-01-26 DIAGNOSIS — R53.1 WEAKNESS GENERALIZED: ICD-10-CM

## 2018-01-26 DIAGNOSIS — R53.81 DEBILITY: ICD-10-CM

## 2018-01-26 DIAGNOSIS — C79.9 METASTATIC CANCER (HCC): ICD-10-CM

## 2018-01-26 DIAGNOSIS — D64.9 ANEMIA, UNSPECIFIED TYPE: ICD-10-CM

## 2018-01-26 DIAGNOSIS — D69.6 THROMBOCYTOPENIA (HCC): ICD-10-CM

## 2018-01-26 DIAGNOSIS — M89.9 BONE LESION: ICD-10-CM

## 2018-01-26 DIAGNOSIS — D64.9 NORMOCYTIC ANEMIA: ICD-10-CM

## 2018-01-26 DIAGNOSIS — K52.9 NONINFECTIOUS GASTROENTERITIS, UNSPECIFIED TYPE: ICD-10-CM

## 2018-01-26 DIAGNOSIS — K52.9 COLITIS: ICD-10-CM

## 2018-01-26 DIAGNOSIS — E87.6 HYPOKALEMIA: Primary | ICD-10-CM

## 2018-01-26 DIAGNOSIS — R63.4 WEIGHT LOSS: ICD-10-CM

## 2018-01-26 LAB
ALBUMIN SERPL-MCNC: 2.1 G/DL (ref 3.5–5)
ALBUMIN SERPL-MCNC: 2.5 G/DL (ref 3.5–5.5)
ALBUMIN/GLOB SERPL: 0.6 {RATIO} (ref 1.1–2.2)
ALBUMIN/GLOB SERPL: 0.8 {RATIO} (ref 1.2–2.2)
ALP SERPL-CCNC: 128 IU/L (ref 39–117)
ALP SERPL-CCNC: 131 U/L (ref 45–117)
ALT SERPL-CCNC: 19 IU/L (ref 0–32)
ALT SERPL-CCNC: 29 U/L (ref 12–78)
ANION GAP SERPL CALC-SCNC: 9 MMOL/L (ref 5–15)
APPEARANCE UR: CLEAR
AST SERPL-CCNC: 45 IU/L (ref 0–40)
AST SERPL-CCNC: 61 U/L (ref 15–37)
ATRIAL RATE: 79 BPM
BACTERIA URNS QL MICRO: NEGATIVE /HPF
BASOPHILS # BLD AUTO: 0 X10E3/UL (ref 0–0.2)
BASOPHILS # BLD: 0 K/UL (ref 0–0.1)
BASOPHILS NFR BLD AUTO: 0 %
BASOPHILS NFR BLD: 0 % (ref 0–1)
BILIRUB DIRECT SERPL-MCNC: 0.3 MG/DL (ref 0–0.2)
BILIRUB SERPL-MCNC: 0.6 MG/DL (ref 0–1.2)
BILIRUB SERPL-MCNC: 0.7 MG/DL (ref 0.2–1)
BILIRUB UR QL CFM: NEGATIVE
BUN BLD-MCNC: <3 MG/DL (ref 9–20)
BUN BLD-MCNC: <3 MG/DL (ref 9–20)
BUN SERPL-MCNC: 4 MG/DL (ref 6–20)
BUN SERPL-MCNC: 4 MG/DL (ref 6–24)
BUN/CREAT SERPL: 5 (ref 12–20)
BUN/CREAT SERPL: 7 (ref 9–23)
CA-I BLD-MCNC: 1.05 MMOL/L (ref 1.12–1.32)
CA-I BLD-MCNC: 1.06 MMOL/L (ref 1.12–1.32)
CALCIUM SERPL-MCNC: 7.5 MG/DL (ref 8.7–10.2)
CALCIUM SERPL-MCNC: 7.8 MG/DL (ref 8.5–10.1)
CALCULATED P AXIS, ECG09: 83 DEGREES
CALCULATED R AXIS, ECG10: 52 DEGREES
CALCULATED T AXIS, ECG11: -23 DEGREES
CHLORIDE BLD-SCNC: 98 MMOL/L (ref 98–107)
CHLORIDE BLD-SCNC: 99 MMOL/L (ref 98–107)
CHLORIDE SERPL-SCNC: 101 MMOL/L (ref 96–106)
CHLORIDE SERPL-SCNC: 106 MMOL/L (ref 97–108)
CO2 BLD-SCNC: 26 MMOL/L (ref 21–32)
CO2 BLD-SCNC: 26 MMOL/L (ref 21–32)
CO2 SERPL-SCNC: 29 MMOL/L (ref 21–32)
CO2 SERPL-SCNC: 31 MMOL/L (ref 18–29)
COLOR UR: ABNORMAL
CREAT BLD-MCNC: 0.6 MG/DL (ref 0.6–1.3)
CREAT BLD-MCNC: 0.6 MG/DL (ref 0.6–1.3)
CREAT SERPL-MCNC: 0.54 MG/DL (ref 0.57–1)
CREAT SERPL-MCNC: 0.75 MG/DL (ref 0.55–1.02)
CRP SERPL-MCNC: 11 MG/L (ref 0–4.9)
DIAGNOSIS, 93000: NORMAL
DIFFERENTIAL METHOD BLD: ABNORMAL
EOSINOPHIL # BLD AUTO: 0 X10E3/UL (ref 0–0.4)
EOSINOPHIL # BLD: 0 K/UL (ref 0–0.4)
EOSINOPHIL NFR BLD AUTO: 0 %
EOSINOPHIL NFR BLD: 0 % (ref 0–7)
EPITH CASTS URNS QL MICRO: ABNORMAL /LPF
ERYTHROCYTE [DISTWIDTH] IN BLOOD BY AUTOMATED COUNT: 17.1 % (ref 12.3–15.4)
ERYTHROCYTE [DISTWIDTH] IN BLOOD BY AUTOMATED COUNT: 17.5 % (ref 11.5–14.5)
ERYTHROCYTE [SEDIMENTATION RATE] IN BLOOD BY WESTERGREN METHOD: 15 MM/HR (ref 0–40)
FERRITIN SERPL-MCNC: 333 NG/ML (ref 15–150)
FOLATE SERPL-MCNC: 13.3 NG/ML
GFR SERPLBLD CREATININE-BSD FMLA CKD-EPI: 106 ML/MIN/1.73
GFR SERPLBLD CREATININE-BSD FMLA CKD-EPI: 122 ML/MIN/1.73
GLOBULIN SER CALC-MCNC: 3 G/DL (ref 1.5–4.5)
GLOBULIN SER CALC-MCNC: 3.6 G/DL (ref 2–4)
GLUCOSE BLD-MCNC: 107 MG/DL (ref 65–100)
GLUCOSE BLD-MCNC: 91 MG/DL (ref 65–100)
GLUCOSE SERPL-MCNC: 111 MG/DL (ref 65–99)
GLUCOSE SERPL-MCNC: 112 MG/DL (ref 65–100)
GLUCOSE UR STRIP.AUTO-MCNC: NEGATIVE MG/DL
HBA1C MFR BLD: 4.3 % (ref 4.8–5.6)
HCT VFR BLD AUTO: 31.3 % (ref 35–47)
HCT VFR BLD AUTO: 31.4 % (ref 34–46.6)
HCT VFR BLD CALC: 31 % (ref 35–47)
HCT VFR BLD CALC: 33 % (ref 35–47)
HCV AB S/CO SERPL IA: 0.1 S/CO RATIO (ref 0–0.9)
HGB BLD-MCNC: 10.5 GM/DL (ref 11.5–16)
HGB BLD-MCNC: 11.2 GM/DL (ref 11.5–16)
HGB BLD-MCNC: 9.5 G/DL (ref 11.5–16)
HGB BLD-MCNC: 9.6 G/DL (ref 11.1–15.9)
HGB UR QL STRIP: NEGATIVE
HIV 1+2 AB+HIV1 P24 AG SERPL QL IA: NON REACTIVE
IMM GRANULOCYTES # BLD: 0 K/UL (ref 0–0.04)
IMM GRANULOCYTES # BLD: 0 X10E3/UL (ref 0–0.1)
IMM GRANULOCYTES NFR BLD AUTO: 0 % (ref 0–0.5)
IMM GRANULOCYTES NFR BLD: 0 %
IRON SATN MFR SERPL: >86 % (ref 15–55)
IRON SERPL-MCNC: 105 UG/DL (ref 27–159)
KETONES UR QL STRIP.AUTO: ABNORMAL MG/DL
LACTATE SERPL-SCNC: 2.3 MMOL/L (ref 0.4–2)
LEUKOCYTE ESTERASE UR QL STRIP.AUTO: NEGATIVE
LYMPHOCYTES # BLD AUTO: 1.2 X10E3/UL (ref 0.7–3.1)
LYMPHOCYTES # BLD: 1.2 K/UL (ref 0.8–3.5)
LYMPHOCYTES NFR BLD AUTO: 19 %
LYMPHOCYTES NFR BLD: 18 % (ref 12–49)
MAGNESIUM SERPL-MCNC: 1.4 MG/DL (ref 1.6–2.4)
MCH RBC QN AUTO: 28 PG (ref 26–34)
MCH RBC QN AUTO: 28.5 PG (ref 26.6–33)
MCHC RBC AUTO-ENTMCNC: 30.4 G/DL (ref 30–36.5)
MCHC RBC AUTO-ENTMCNC: 30.6 G/DL (ref 31.5–35.7)
MCV RBC AUTO: 92.3 FL (ref 80–99)
MCV RBC AUTO: 93 FL (ref 79–97)
MONOCYTES # BLD AUTO: 0.6 X10E3/UL (ref 0.1–0.9)
MONOCYTES # BLD: 0.6 K/UL (ref 0–1)
MONOCYTES NFR BLD AUTO: 10 %
MONOCYTES NFR BLD: 9 % (ref 5–13)
NEUTROPHILS # BLD AUTO: 4.5 X10E3/UL (ref 1.4–7)
NEUTROPHILS NFR BLD AUTO: 71 %
NEUTS SEG # BLD: 5 K/UL (ref 1.8–8)
NEUTS SEG NFR BLD: 73 % (ref 32–75)
NITRITE UR QL STRIP.AUTO: NEGATIVE
NRBC # BLD: 0 K/UL (ref 0–0.01)
NRBC BLD-RTO: 0 PER 100 WBC
P-R INTERVAL, ECG05: 152 MS
PH UR STRIP: 7 [PH] (ref 5–8)
PLATELET # BLD AUTO: 235 K/UL (ref 150–400)
PLATELET # BLD AUTO: 255 X10E3/UL (ref 150–379)
PMV BLD AUTO: 8.8 FL (ref 8.9–12.9)
POTASSIUM BLD-SCNC: <2 MMOL/L (ref 3.5–5.1)
POTASSIUM BLD-SCNC: <2 MMOL/L (ref 3.5–5.1)
POTASSIUM SERPL-SCNC: 1.6 MMOL/L (ref 3.5–5.2)
POTASSIUM SERPL-SCNC: <1.8 MMOL/L (ref 3.5–5.1)
PROT SERPL-MCNC: 5.5 G/DL (ref 6–8.5)
PROT SERPL-MCNC: 5.7 G/DL (ref 6.4–8.2)
PROT UR STRIP-MCNC: ABNORMAL MG/DL
Q-T INTERVAL, ECG07: 450 MS
QRS DURATION, ECG06: 72 MS
QTC CALCULATION (BEZET), ECG08: 516 MS
RBC # BLD AUTO: 3.37 X10E6/UL (ref 3.77–5.28)
RBC # BLD AUTO: 3.39 M/UL (ref 3.8–5.2)
RBC #/AREA URNS HPF: ABNORMAL /HPF (ref 0–5)
RBC MORPH BLD: ABNORMAL
RBC MORPH BLD: ABNORMAL
RETICS/RBC NFR AUTO: 2.2 % (ref 0.6–2.6)
SERVICE CMNT-IMP: ABNORMAL
SERVICE CMNT-IMP: ABNORMAL
SODIUM BLD-SCNC: 143 MMOL/L (ref 136–145)
SODIUM BLD-SCNC: 144 MMOL/L (ref 136–145)
SODIUM SERPL-SCNC: 143 MMOL/L (ref 134–144)
SODIUM SERPL-SCNC: 144 MMOL/L (ref 136–145)
SP GR UR REFRACTOMETRY: 1.01 (ref 1–1.03)
T4 FREE SERPL-MCNC: 0.9 NG/DL (ref 0.82–1.77)
TIBC SERPL-MCNC: <122 UG/DL (ref 250–450)
TROPONIN I SERPL-MCNC: 0.11 NG/ML
TSH SERPL DL<=0.005 MIU/L-ACNC: 4.35 UIU/ML (ref 0.45–4.5)
UA: UC IF INDICATED,UAUC: ABNORMAL
UIBC SERPL-MCNC: <17 UG/DL (ref 131–425)
UROBILINOGEN UR QL STRIP.AUTO: 1 EU/DL (ref 0.2–1)
VENTRICULAR RATE, ECG03: 79 BPM
VIT B12 SERPL-MCNC: 594 PG/ML (ref 232–1245)
WBC # BLD AUTO: 6.4 X10E3/UL (ref 3.4–10.8)
WBC # BLD AUTO: 6.8 K/UL (ref 3.6–11)
WBC URNS QL MICRO: ABNORMAL /HPF (ref 0–4)

## 2018-01-26 PROCEDURE — 74011636320 HC RX REV CODE- 636/320: Performed by: RADIOLOGY

## 2018-01-26 PROCEDURE — 96366 THER/PROPH/DIAG IV INF ADDON: CPT

## 2018-01-26 PROCEDURE — 80047 BASIC METABLC PNL IONIZED CA: CPT

## 2018-01-26 PROCEDURE — 77030012890

## 2018-01-26 PROCEDURE — 83735 ASSAY OF MAGNESIUM: CPT | Performed by: EMERGENCY MEDICINE

## 2018-01-26 PROCEDURE — 83605 ASSAY OF LACTIC ACID: CPT | Performed by: EMERGENCY MEDICINE

## 2018-01-26 PROCEDURE — 74011250636 HC RX REV CODE- 250/636: Performed by: INTERNAL MEDICINE

## 2018-01-26 PROCEDURE — 84484 ASSAY OF TROPONIN QUANT: CPT | Performed by: EMERGENCY MEDICINE

## 2018-01-26 PROCEDURE — 74011250636 HC RX REV CODE- 250/636: Performed by: EMERGENCY MEDICINE

## 2018-01-26 PROCEDURE — 76856 US EXAM PELVIC COMPLETE: CPT

## 2018-01-26 PROCEDURE — 65270000029 HC RM PRIVATE

## 2018-01-26 PROCEDURE — 80048 BASIC METABOLIC PNL TOTAL CA: CPT | Performed by: EMERGENCY MEDICINE

## 2018-01-26 PROCEDURE — 70450 CT HEAD/BRAIN W/O DYE: CPT

## 2018-01-26 PROCEDURE — 77030032490 HC SLV COMPR SCD KNE COVD -B

## 2018-01-26 PROCEDURE — 93005 ELECTROCARDIOGRAM TRACING: CPT

## 2018-01-26 PROCEDURE — 74011250637 HC RX REV CODE- 250/637: Performed by: INTERNAL MEDICINE

## 2018-01-26 PROCEDURE — 99284 EMERGENCY DEPT VISIT MOD MDM: CPT

## 2018-01-26 PROCEDURE — 74177 CT ABD & PELVIS W/CONTRAST: CPT

## 2018-01-26 PROCEDURE — 76830 TRANSVAGINAL US NON-OB: CPT

## 2018-01-26 PROCEDURE — 96365 THER/PROPH/DIAG IV INF INIT: CPT

## 2018-01-26 PROCEDURE — 86923 COMPATIBILITY TEST ELECTRIC: CPT | Performed by: INTERNAL MEDICINE

## 2018-01-26 PROCEDURE — 72125 CT NECK SPINE W/O DYE: CPT

## 2018-01-26 PROCEDURE — 36415 COLL VENOUS BLD VENIPUNCTURE: CPT | Performed by: EMERGENCY MEDICINE

## 2018-01-26 PROCEDURE — 80076 HEPATIC FUNCTION PANEL: CPT | Performed by: EMERGENCY MEDICINE

## 2018-01-26 PROCEDURE — 86900 BLOOD TYPING SEROLOGIC ABO: CPT | Performed by: INTERNAL MEDICINE

## 2018-01-26 PROCEDURE — 85025 COMPLETE CBC W/AUTO DIFF WBC: CPT | Performed by: EMERGENCY MEDICINE

## 2018-01-26 PROCEDURE — 81001 URINALYSIS AUTO W/SCOPE: CPT | Performed by: EMERGENCY MEDICINE

## 2018-01-26 RX ORDER — POTASSIUM CHLORIDE 7.45 MG/ML
10 INJECTION INTRAVENOUS
Status: COMPLETED | OUTPATIENT
Start: 2018-01-26 | End: 2018-01-26

## 2018-01-26 RX ORDER — POTASSIUM CHLORIDE 750 MG/1
20 TABLET, FILM COATED, EXTENDED RELEASE ORAL 2 TIMES DAILY
Status: DISCONTINUED | OUTPATIENT
Start: 2018-01-26 | End: 2018-01-27

## 2018-01-26 RX ORDER — ACETAMINOPHEN 325 MG/1
650 TABLET ORAL
Status: DISCONTINUED | OUTPATIENT
Start: 2018-01-26 | End: 2018-02-02 | Stop reason: HOSPADM

## 2018-01-26 RX ORDER — MAGNESIUM SULFATE HEPTAHYDRATE 40 MG/ML
2 INJECTION, SOLUTION INTRAVENOUS ONCE
Status: COMPLETED | OUTPATIENT
Start: 2018-01-26 | End: 2018-01-26

## 2018-01-26 RX ORDER — HYDROMORPHONE HYDROCHLORIDE 2 MG/ML
0.5 INJECTION, SOLUTION INTRAMUSCULAR; INTRAVENOUS; SUBCUTANEOUS
Status: DISCONTINUED | OUTPATIENT
Start: 2018-01-26 | End: 2018-02-02 | Stop reason: HOSPADM

## 2018-01-26 RX ORDER — SODIUM CHLORIDE 9 MG/ML
75 INJECTION, SOLUTION INTRAVENOUS CONTINUOUS
Status: DISCONTINUED | OUTPATIENT
Start: 2018-01-26 | End: 2018-01-27

## 2018-01-26 RX ORDER — FOLIC ACID 1 MG/1
1 TABLET ORAL DAILY
Status: DISCONTINUED | OUTPATIENT
Start: 2018-01-27 | End: 2018-02-02 | Stop reason: HOSPADM

## 2018-01-26 RX ORDER — ASPIRIN 325 MG/1
100 TABLET, FILM COATED ORAL DAILY
Status: DISCONTINUED | OUTPATIENT
Start: 2018-01-27 | End: 2018-02-02 | Stop reason: HOSPADM

## 2018-01-26 RX ORDER — PROCHLORPERAZINE EDISYLATE 5 MG/ML
10 INJECTION INTRAMUSCULAR; INTRAVENOUS
Status: DISCONTINUED | OUTPATIENT
Start: 2018-01-26 | End: 2018-02-02 | Stop reason: HOSPADM

## 2018-01-26 RX ORDER — ZOLPIDEM TARTRATE 5 MG/1
5 TABLET ORAL
Status: DISCONTINUED | OUTPATIENT
Start: 2018-01-26 | End: 2018-02-02 | Stop reason: HOSPADM

## 2018-01-26 RX ORDER — ENOXAPARIN SODIUM 100 MG/ML
30 INJECTION SUBCUTANEOUS DAILY
Status: DISCONTINUED | OUTPATIENT
Start: 2018-01-26 | End: 2018-01-30

## 2018-01-26 RX ORDER — FAMOTIDINE 20 MG/1
20 TABLET, FILM COATED ORAL DAILY
COMMUNITY
End: 2018-02-02

## 2018-01-26 RX ORDER — EZETIMIBE 10 MG/1
10 TABLET ORAL DAILY
Status: DISCONTINUED | OUTPATIENT
Start: 2018-01-27 | End: 2018-02-02 | Stop reason: HOSPADM

## 2018-01-26 RX ORDER — BUPROPION HYDROCHLORIDE 150 MG/1
150 TABLET ORAL
Status: DISCONTINUED | OUTPATIENT
Start: 2018-01-27 | End: 2018-02-02 | Stop reason: HOSPADM

## 2018-01-26 RX ORDER — DIPHENHYDRAMINE HCL 25 MG
25 CAPSULE ORAL
Status: DISCONTINUED | OUTPATIENT
Start: 2018-01-26 | End: 2018-02-02 | Stop reason: HOSPADM

## 2018-01-26 RX ORDER — SODIUM CHLORIDE 0.9 % (FLUSH) 0.9 %
5-10 SYRINGE (ML) INJECTION EVERY 8 HOURS
Status: DISCONTINUED | OUTPATIENT
Start: 2018-01-26 | End: 2018-02-02 | Stop reason: HOSPADM

## 2018-01-26 RX ORDER — LANOLIN ALCOHOL/MO/W.PET/CERES
400 CREAM (GRAM) TOPICAL 2 TIMES DAILY
Status: DISCONTINUED | OUTPATIENT
Start: 2018-01-26 | End: 2018-02-02 | Stop reason: HOSPADM

## 2018-01-26 RX ORDER — SODIUM CHLORIDE 0.9 % (FLUSH) 0.9 %
5-10 SYRINGE (ML) INJECTION AS NEEDED
Status: DISCONTINUED | OUTPATIENT
Start: 2018-01-26 | End: 2018-02-02 | Stop reason: HOSPADM

## 2018-01-26 RX ORDER — OXYCODONE AND ACETAMINOPHEN 5; 325 MG/1; MG/1
1 TABLET ORAL
Status: DISCONTINUED | OUTPATIENT
Start: 2018-01-26 | End: 2018-02-02 | Stop reason: HOSPADM

## 2018-01-26 RX ADMIN — SODIUM CHLORIDE 2000 ML: 9 INJECTION, SOLUTION INTRAVENOUS at 13:40

## 2018-01-26 RX ADMIN — POTASSIUM CHLORIDE 10 MEQ: 10 INJECTION, SOLUTION INTRAVENOUS at 17:53

## 2018-01-26 RX ADMIN — IOPAMIDOL 95 ML: 755 INJECTION, SOLUTION INTRAVENOUS at 14:47

## 2018-01-26 RX ADMIN — SODIUM CHLORIDE 75 ML/HR: 9 INJECTION, SOLUTION INTRAVENOUS at 17:27

## 2018-01-26 RX ADMIN — POTASSIUM CHLORIDE 10 MEQ: 10 INJECTION, SOLUTION INTRAVENOUS at 14:52

## 2018-01-26 RX ADMIN — Medication 400 MG: at 20:13

## 2018-01-26 RX ADMIN — POTASSIUM CHLORIDE 10 MEQ: 10 INJECTION, SOLUTION INTRAVENOUS at 13:40

## 2018-01-26 RX ADMIN — ENOXAPARIN SODIUM 30 MG: 30 INJECTION SUBCUTANEOUS at 19:26

## 2018-01-26 RX ADMIN — POTASSIUM CHLORIDE 10 MEQ: 10 INJECTION, SOLUTION INTRAVENOUS at 17:23

## 2018-01-26 RX ADMIN — MAGNESIUM SULFATE HEPTAHYDRATE 2 G: 40 INJECTION, SOLUTION INTRAVENOUS at 16:18

## 2018-01-26 RX ADMIN — POTASSIUM CHLORIDE 20 MEQ: 750 TABLET, FILM COATED, EXTENDED RELEASE ORAL at 20:13

## 2018-01-26 RX ADMIN — HYDROMORPHONE HYDROCHLORIDE 0.5 MG: 2 INJECTION INTRAMUSCULAR; INTRAVENOUS; SUBCUTANEOUS at 17:52

## 2018-01-26 NOTE — ED NOTES
3:20 PM  Dr. Vinita Simmons: Change of shift. Care of patient taken over from Dr. Eileen Zuniga; H&P reviewed, handoff complete. Awaiting labs/imaging/consultant. CONSULT NOTE:  3:52 PM Dr. Vinita Simmons spoke with Dr. Chris Cannon MD, Consult for Hospitalist. Discussed available diagnostic tests and clinical findings. Provider is in agreement with care plans as outlined. Dr. Chris Cannon MD will see and admit the patient.

## 2018-01-26 NOTE — PROGRESS NOTES
[facility] Dosing Services: 1/26/18    Enoxaparin by Dr. Viktoria Dickey made for this 65 y.o. female, for prophylaxis. Wt Readings from Last 1 Encounters:   01/26/18 44.9 kg (99 lb)       Ht Readings from Last 1 Encounters:   01/26/18 156.2 cm (61.5\")      Previous Dose 40 mg every 24 hours   Creatinine Clearance Estimated Creatinine Clearance: 59.4 mL/min (based on Cr of 0.75). Creatinine Lab Results   Component Value Date/Time    Creatinine 0.75 01/26/2018 01:02 PM    Creatinine (POC) 0.6 01/26/2018 04:08 PM       Platelet Lab Results   Component Value Date/Time    PLATELET 614 31/80/2071 01:02 PM      H/H Lab Results   Component Value Date/Time    HGB 9.5 01/26/2018 01:02 PM          Pharmacist made change to enoxaparin therapy based on:  [ X ] Low Body Weight: dose changed to: 30 mg every 24 hours    - Protocol for female 35-45 Kg    Pharmacy to monitor patients progress. Will make dose adjustment as needed per changing renal function. Will communicate further recommendations regarding patients anticoagulation therapy with prescriber. Signed Brianna Dooley .  Contact information: [phone]

## 2018-01-26 NOTE — ED PROVIDER NOTES
HPI Comments: 65 y.o. female with past medical history significant for unspecified essential hypertension, tobacco dependence, anemia, insomnia, and scoliosis who presents from home with chief complaint of weakness. Daughter reports patient was sent to the ED for further evaluation of low potassium (1.6). Patient states onset of generalized weakness over the last 3 months that has progressively worsened. Patient admits she is unable to do anything without the help of her daughter now including walking and going to the bathroom. Patient also notes a loss of appetite over the last few months. Daughter mentions the daughter weighted 128 lb in 09/2017 and dropped down to 100 lb yesterday. Patient states she has also been having increased falls at home. Patient fell last week and 3 days ago leaving residual moderate generalized body aches that are worse in the back and neck area. Patient also complains of a mild cough. Patient specifically denies chest pain, shortness of breath, and abdominal pain. There are no other acute medical concerns at this time. Old Chart Review: Per note, patient was evaluated by her PCP, Korina Zendejas NP, for 30 lb weight loss over the last 3 months and 45 lb over the last 6 months. Patient also had progressive weakness of legs and multiple falls over the last 3 months and needs assistance with ADL's. She had a pottassium of 1.6 in the office and was subsequently sent to the ED for further evaluation today. Social hx: Tobacco Use: Yes (1/2 pack per day), Alcohol Use: Yes, Drug Use: No    PCP: Skyler Vail NP    Note written by Shawna Soulier, Scribe, as dictated by Teofilo Vigil MD 1:25 PM      The history is provided by a relative, medical records and the patient.         Past Medical History:   Diagnosis Date    Anemia 4/04    Anemia NEC     Insomnia 11/09    PPD positive     treated w/ INH- tests since have been negative    Pure hypercholesterolemia     Scoliosis 12/28/15    [facility]    Tobacco dependence     Unspecified essential hypertension     Uterine bleeding, dysfunctional 2/12    Uterine fibroid        Past Surgical History:   Procedure Laterality Date    HX CHOLECYSTECTOMY      HX COLONOSCOPY  07/01/2013    MCV, repeat 10 years    HX ORTHOPAEDIC      Shattered bilateral ankles-metal plates & screws    HX OTHER SURGICAL      Benign cyst removal- L. foot    LEG/ANKLE SURGERY PROC UNLISTED           Family History:   Problem Relation Age of Onset    Hypertension Mother     Diabetes Mother     Cancer Mother      breast    Heart Disease Father      MI 42's    Kidney Disease Father     Hypertension Son        Social History     Social History    Marital status:      Spouse name: N/A    Number of children: N/A    Years of education: N/A     Occupational History    part time      Social History Main Topics    Smoking status: Current Every Day Smoker     Packs/day: 0.50     Years: 17.00    Smokeless tobacco: Never Used    Alcohol use 1.2 oz/week     1 Cans of beer, 1 Shots of liquor per week      Comment: Socially    Drug use: No    Sexual activity: Yes     Partners: Male     Birth control/ protection: None     Other Topics Concern    Not on file     Social History Narrative         ALLERGIES: Robaxin [methocarbamol]; Statins-hmg-coa reductase inhibitors; Codeine; and Neosporin [neomycin-bacitracin-polymyxin]    Review of Systems   Constitutional: Positive for appetite change, fatigue and unexpected weight change (30 lb loss over last 4 months). Respiratory: Positive for cough. Negative for shortness of breath. Cardiovascular: Negative for chest pain. Gastrointestinal: Negative for abdominal pain. Musculoskeletal: Positive for back pain, myalgias (generalized pain worse in back and neck) and neck pain. Neurological: Positive for weakness (generalized).    All other systems reviewed and are negative. Vitals:    01/26/18 1231   BP: (!) 85/69   Pulse: 79   Resp: 20   SpO2: 100%   Weight: 44.9 kg (99 lb)   Height: 5' 1.5\" (1.562 m)            Physical Exam   Constitutional: She is oriented to person, place, and time. No distress. Patient is chronically ill-appearing   Cachectic   HENT:   Head: Normocephalic and atraumatic. Eyes: Conjunctivae are normal. No scleral icterus. Neck: Neck supple. No tracheal deviation present. Cardiovascular: Normal rate, regular rhythm, normal heart sounds and intact distal pulses. Exam reveals no gallop and no friction rub. No murmur heard. Pulmonary/Chest: Effort normal and breath sounds normal. She has no wheezes. She has no rales. Abdominal: Soft. She exhibits no distension. There is no tenderness. There is no rebound and no guarding. Musculoskeletal: She exhibits no edema. Neurological: She is alert and oriented to person, place, and time. Skin: Skin is warm and dry. No rash noted. Psychiatric: She has a normal mood and affect. Nursing note and vitals reviewed. Note written by Ashley Hylton, as dictated by Sebas Stehpens MD 12:53 PM    [facility]  ED Course       Procedures    ED EKG interpretation:  Rhythm: normal sinus rhythm; and regular . Rate (approx.): 79; Axis: normal; ST/T wave: non-specific changes; Prolonged QT interval. No acute ST changes. Note written by Ashley Hylton, as dictated by Sebas Stephens MD 1:01 PM    A/P: weakness, weight loss, anorexia, low potassium/Mg - unclear etiology; repleting electrolytes; CT's pending; will need admission. Sebas Stephens MD  3:16 PM     Consult note: Dr Meghana Mar - will admit. Sebas Stephens MD    3:16 PM  Change of shift. Care of patient signed over to Dr. Xiomara Flores. Bedside handoff complete.   Sebsa Stephens MD

## 2018-01-26 NOTE — PROGRESS NOTES
1/26/2018   CARE MANAGEMENT NOTE:  CM is following pt in the ER for initial discharge planning. EMR reviewed. CM met with pt and her  ([id number]) at bedside to obtain hx for this needs assessment. Reportedly, pt and her  reside in a [facility] home. PTA, pt was ambulatory, indepn with ADLs, and drives. She obtains medications from [facility] in [place]. Pt does not have home healthcare at this time. DME - has a cane. PCP is Nadine Freed N.P.  CM notified Nurse Navigator, Chin Rosa of pt's admission. Plan is to return home when medically stable.   Javier

## 2018-01-26 NOTE — IP AVS SNAPSHOT
[address] 
[phone] Patient: Carlitos Bautista MRN: [id number] :1960 You are allergic to the following Allergen Reactions Robaxin (Methocarbamol) Hives Rash Itching Red splotches Statins-Hmg-Coa Reductase Inhibitors Myalgia Codeine Itching Neosporin (Neomycin-Bacitracin-Polymyxin) Rash Recent Documentation Height Weight BMI OB Status Smoking Status 1.549 m 44.9 kg 18.71 kg/m2 Premenopausal Current Every Day Smoker Unresulted Labs-Please follow up with your PCP about these lab tests Order Current Status CANCER ANTIGEN 125 In process Emergency Contacts  (Rel.) Home Phone Work Phone Mobile Phone Damaso Rojas (Spouse) [phone] -- -- Veronica Quintero (Daughter) [phone] -- [phone] About your hospitalization You were admitted on:  2018 You last received care in the:  [facility]  MED SURG 2 You were discharged on:  2018 Why you were hospitalized Your primary diagnosis was:  Not on File Your diagnoses also included:  Benign Essential Htn, Hyperlipidemia, Weight Loss, Hypokalemia Due To Loss Of Potassium, Hypomagnesemia, Iron Deficiency Anemia, Debility, Colitis, Lactic Acid Acidosis, Elevated Troponin, Protein-Calorie Malnutrition, Severe (Hcc), Hypotension, Emphysema Of Lung (Hcc), Abnormal Ct Scan, Chest, Abnormal Ct Of The Abdomen Providers Seen During Your Hospitalization Provider Specialty Primary office phone Rachel Trujillo MD Emergency Medicine [phone] Toi Schwartz MD Internal Medicine [phone] Aguila Evans MD Internal Medicine [phone] Your Primary Care Physician (PCP) Primary Care Physician Office Phone Office Fax Blessing Diggs [phone] Follow-up Information Follow up With Details Comments Contact Info Severino Kirkpatrick NP Go on 2/7/2018 Hospital follow-up appointment at [address] [facility] 
[phone] Gaby Ulloa 227  THEY WILL CONTACT YOU TO SCHEDULE A HOME VISIT [address] [place] [address] 
[phone] Ilsa Singh MD In 1 week  [address] [facility] [address] [facility] 
[phone] Appointments for Next 14 days 2/7/2018  9:00 AM  [address] My Medications STOP taking these medications   
 ezetimibe 10 mg tablet Commonly known as:  Azeb Avalos PEPCID 20 mg tablet Generic drug:  famotidine TAKE these medications as instructed Instructions Each Dose to Equal  
 Morning Noon Evening Bedtime  
 aspirin 81 mg chewable tablet Your last dose was: Your next dose is:    
   
   
 CHEW ONE TABLET BY MOUTH DAILY  
     
   
   
   
  
 buPROPion  mg tablet Commonly known as:  Lion Upton Your last dose was: Your next dose is: TAKE ONE TABLET BY MOUTH EVERY MORNING FOR SMOKING CESSATION  
     
   
   
   
  
 levoFLOXacin 750 mg tablet Commonly known as:  Huy Lenny Start taking on:  2/3/2018 Your last dose was: Your next dose is: Take 1 Tab by mouth Daily (before breakfast). 750 mg  
    
   
   
   
  
 magnesium 200 mg Tab Your last dose was: Your next dose is: Take 200 mg by mouth daily. 200 mg  
    
   
   
   
  
 metroNIDAZOLE 500 mg tablet Commonly known as:  FLAGYL Your last dose was: Your next dose is: Take 1 Tab by mouth every twelve (12) hours. 500 mg  
    
   
   
   
  
 pantoprazole 40 mg tablet Commonly known as:  PROTONIX Start taking on:  2/3/2018 Your last dose was: Your next dose is: Take 1 Tab by mouth Daily (before breakfast). 40 mg  
    
   
   
   
  
 potassium chloride 20 mEq packet Commonly known as:  KLOR-CON Your last dose was: Your next dose is: Take 1 Packet by mouth daily. 20 mEq  
    
   
   
   
  
 thiamine 100 mg tablet Commonly known as:  B-1 Your last dose was: Your next dose is: Take 1 Tab by mouth daily. 100 mg  
    
   
   
   
  
 zolpidem 5 mg tablet Commonly known as:  AMBIEN Your last dose was: Your next dose is: TAKE ONE TABLET BY MOUTH NIGHTLY AS NEEDED FOR SLEEP Where to Get Your Medications Information on where to get these meds will be given to you by the nurse or doctor. ! Ask your nurse or doctor about these medications  
  levoFLOXacin 750 mg tablet  
 metroNIDAZOLE 500 mg tablet  
 pantoprazole 40 mg tablet  
 potassium chloride 20 mEq packet Discharge Instructions HOSPITALIST DISCHARGE INSTRUCTIONS 
NAME: Alek Arevalo :  1960 MRN:  [id number] Date/Time:  2018 9:52 AM 
 
ADMIT DATE: 2018 DISCHARGE DATE: 2018 ADMITTING DIAGNOSIS: 
Diarrhea Possible Sarcoidosis DISCHARGE DIAGNOSIS: 
As above MEDICATIONS: 
 
· It is important that you take the medication exactly as they are prescribed. · Keep your medication in the bottles provided by the pharmacist and keep a list of the medication names, dosages, and times to be taken in your wallet. · Do not take other medications without consulting your doctor. Pain Management: per above medications What to do at Home: 1. Be sure to take the full course of antibiotics for your diarrhea 2. You need to follow up with pulmonary in clinic for further testing to determine if you have sarcoidosis 3. Follow up with your primary care physician within one week to get your potassium level checked Recommended diet:  Regular Diet Recommended activity: Activity as tolerated If you experience any of the following symptoms then please call your primary care physician or return to the emergency room if you cannot get hold of your doctor: 
Fever, chills, nausea, vomiting, diarrhea, change in mentation, falling, bleeding, shortness of breath Follow Up: 
Dr. Israel Diallo, NP  you are to call and set up an appointment to see them in 1 week. Information obtained by : 
I understand that if any problems occur once I am at home I am to contact my physician. I understand and acknowledge receipt of the instructions indicated above. Physician's or R.N.'s Signature                                                                  Date/Time Patient or Representative Signature                                                          Date/Time Discharge Instructions Attachments/References SARCOIDOSIS (ENGLISH) Discharge Orders None InSphero Announcement We are excited to announce that we are making your provider's discharge notes available to you in InSphero. You will see these notes when they are completed and signed by the physician that discharged you from your recent hospital stay. If you have any questions or concerns about any information you see in InSphero, please call the Health Information Department where you were seen or reach out to your Primary Care Provider for more information about your plan of care. Introducing [facility]!    
 Radha Jo introduces InSphero patient portal. Now you can access parts of your medical record, email your doctor's office, and request medication refills online. 1. In your internet browser, go to [email or web address] 2. Click on the First Time User? Click Here link in the Sign In box. You will see the New Member Sign Up page. 3. Enter your Sookbox Access Code exactly as it appears below. You will not need to use this code after youve completed the sign-up process. If you do not sign up before the expiration date, you must request a new code. · Sookbox Access Code: [email or web address] Expires: 4/25/2018 11:42 AM 
 
4. Enter the last four digits of your Social Security Number (xxxx) and Date of Birth (mm/dd/yyyy) as indicated and click Submit. You will be taken to the next sign-up page. 5. Create a Sookbox ID. This will be your Sookbox login ID and cannot be changed, so think of one that is secure and easy to remember. 6. Create a Sookbox password. You can change your password at any time. 7. Enter your Password Reset Question and Answer. This can be used at a later time if you forget your password. 8. Enter your e-mail address. You will receive e-mail notification when new information is available in [address] 9. Click Sign Up. You can now view and download portions of your medical record. 10. Click the Download Summary menu link to download a portable copy of your medical information. If you have questions, please visit the Frequently Asked Questions section of the Sookbox website. Remember, Sookbox is NOT to be used for urgent needs. For medical emergencies, dial 911. Now available from your iPhone and Android! General Information Please provide this summary of care documentation to your next provider. Patient Signature:  ____________________________________________________________ Date:  ____________________________________________________________  
  
Nini Sleeper Provider Signature:  ____________________________________________________________ Date:  ____________________________________________________________

## 2018-01-26 NOTE — ED NOTES
Discussed with Dr. Alfred Brown and he approved to start magnesium after second bag of potasium is completed

## 2018-01-26 NOTE — H&P
[place] [address]  [phone]    Admission History and Physical      NAME:  Andrew CarlosB:   1960   MRN:  [id number]     PCP:  Ritchie Hurst NP     Date/Time:  2018         Subjective:     CHIEF COMPLAINT: Weakness     HISTORY OF PRESENT ILLNESS:     Ms. Nawaf Barber is a 65 y.o.  female who is admitted with probable metastatic cancer. Ms. Nawaf Barber presented to the Emergency Department this PM complaining of weakness: for the past 3 months, now severe, steadily progressive, associated with weight loss. She was seen by her PCP yesterday and told to come to the ED as her potassium was 1.6    History obtained from spouse, chart review and the patient.      Previous records reviewed    Past Medical History:   Diagnosis Date    Anemia     Anemia NEC     Insomnia     PPD positive     treated w/ INH- tests since have been negative    Pure hypercholesterolemia     Scoliosis 12/28/15    [facility]    Tobacco dependence     Unspecified essential hypertension     Uterine bleeding, dysfunctional     Uterine fibroid         Past Surgical History:   Procedure Laterality Date    HX CHOLECYSTECTOMY      HX COLONOSCOPY  2013    [facility], repeat 10 years    HX ORTHOPAEDIC      Shattered bilateral ankles-metal plates & screws    HX OTHER SURGICAL      Benign cyst removal- L. foot    LEG/ANKLE SURGERY PROC UNLISTED         Social History   Substance Use Topics    Smoking status: Current Every Day Smoker     Packs/day: 0.50     Years: 17.00    Smokeless tobacco: Never Used    Alcohol use 1.2 oz/week     1 Cans of beer, 1 Shots of liquor per week      Comment: Socially        Family History   Problem Relation Age of Onset    Hypertension Mother     Diabetes Mother     Cancer Mother      breast    Heart Disease Father      MI 42's    Kidney Disease Father  Hypertension Son         Allergies   Allergen Reactions    Robaxin [Methocarbamol] Hives, Rash and Itching     Red splotches    Statins-Hmg-Coa Reductase Inhibitors Myalgia    Codeine Itching    Neosporin [Neomycin-Bacitracin-Polymyxin] Rash        Prior to Admission medications    Medication Sig Start Date End Date Taking? Authorizing Provider   zolpidem (AMBIEN) 5 mg tablet TAKE ONE TABLET BY MOUTH NIGHTLY AS NEEDED FOR SLEEP 1/23/18   Israel Diallo NP   buPROPion XL (WELLBUTRIN XL) 150 mg tablet TAKE ONE TABLET BY MOUTH EVERY MORNING FOR SMOKING CESSATION 11/6/17   Israel Diallo NP   thiamine (B-1) 100 mg tablet Take 1 Tab by mouth daily. 10/24/17   Israel Diallo NP   magnesium 200 mg tab Take 200 mg by mouth daily. 10/24/17   Israel Diallo NP   cetirizine (ZYRTEC) 10 mg tablet Take 1 Tab by mouth daily. For allergic reaction 10/16/17   Israel Diallo NP   famotidine (PEPCID) 40 mg tablet Take 1 Tab by mouth daily. For allergic reaction 10/16/17   Israel Diallo NP   ezetimibe (ZETIA) 10 mg tablet Take 1 Tab by mouth daily.  For cholesterol 9/25/17   Israel Diallo NP   aspirin 81 mg chewable tablet CHEW ONE TABLET BY MOUTH DAILY 9/15/17   Lauren Neil MD   losartan-hydroCHLOROthiazide (HYZAAR) 100-25 mg per tablet TAKE ONE TABLET BY MOUTH DAILY  Patient not taking: Reported on 10/16/2017 9/15/17   Lauren Neil MD   amLODIPine (NORVASC) 5 mg tablet TAKE ONE TABLET BY MOUTH DAILY 9/15/17   Garrett Gupta MD   KLOR-CON M20 20 mEq tablet TAKE ONE TABLET BY MOUTH TWICE A DAY  Patient not taking: Reported on 10/16/2017 8/1/17   Israel Diallo NP   folic acid (FOLVITE) 1 mg tablet TAKE ONE TABLET BY MOUTH DAILY 1/24/17   Israel Diallo NP   triamcinolone acetonide (KENALOG) 0.1 % topical cream APPLY A THIN LAYER TO AFFECTED AREA(S) TWO TIMES A DAY AS NEEDED FOR SKIN IRRITATION 4/13/16   Israel Diallo NP         Review of Systems:  (bold if positive, if negative)    Gen:  weight lossfatigueEyes:  ENT:  CVS:  Pulm:  GI:    :    MS:  PainSkin:  Psych:  Endo:    Hem:  Renal:    Neuro:  weakness          Objective:      VITALS:    Vital signs reviewed; most recent are:    Visit Vitals    /60    Pulse 75    Temp 98 °F (36.7 °C)    Resp 22    Ht 5' 1.5\" (1.562 m)    Wt 44.9 kg (99 lb)    SpO2 100%    BMI 18.4 kg/m2     SpO2 Readings from Last 6 Encounters:   01/26/18 100%   01/25/18 100%   10/16/17 98%   09/25/17 94%   03/27/17 98%   01/11/17 100%        No intake or output data in the 24 hours ending 01/26/18 1701         Exam:     Physical Exam:    Gen: Well-developed, thin, cachectic, frail, chronically ill-appearing,, in no acute distress  HEENT:  Pink conjunctivae, PERRL, hearing intact to voice, moist mucous membranes, bruising around right eye  Neck: Supple, without masses, thyroid non-tender  Resp: No accessory muscle use, clear breath sounds without wheezes rales or rhonchi  Card: No murmurs, normal S1, S2 without thrills, bruits or peripheral edema, 2+ LE peripheral pulses  Abd:  Soft, non-tender, non-distended, normoactive bowel sounds are present, no palpable organomegaly and no detectable hernias  Lymph:  No cervical or inguinal adenopathy  Musc: No cyanosis or clubbing  Skin: No rashes or ulcers, skin turgor is good, cap refill <2 sec  Neuro:  Cranial nerves are grossly intact, no focal motor weakness, follows commands appropriately  Psych:  Good insight, oriented to person, place and time, alert             Labs:    Recent Labs      01/26/18   1302   WBC  6.8   HGB  9.5*   HCT  31.3*   PLT  235     Recent Labs      01/26/18   1302   NA  144   K  <1.8*   CL  106   CO2  29   GLU  112*   BUN  4*   CREA  0.75   CA  7.8*   MG  1.4*   ALB  2.1*   TBILI  0.7   SGOT  61*   ALT  29     Lab Results   Component Value Date/Time    Glucose (POC) 91 01/26/2018 04:08 PM    Glucose (POC) 107 01/26/2018 01:08 PM     No results for input(s): PH, PCO2, PO2, HCO3, FIO2 in the last 72 hours. No results for input(s): INR in the last 72 hours. No lab exists for component: INREXT, INREXT    Additional testing:  Chest/Abd/Pelvic CT with diffuse metastatic disease, ovarian mass, visualized by me.          Assessment/Plan:       Principal Problem:    Metastatic cancer ([address] (1/26/2018)   - will get pelvic US   - Hem/Onc to see, will need tissue diagnosis from somewhere   - widely metastatic and unlikely to be curable   - she is interested in treatment options, however I have serious reservations about her performance status and her ability to tolerate any therapy; I expressed some of this to her    Active Problems:    Iron deficiency anemia (2/8/2012)   - Hgb stable, follow with hydration      Benign essential HTN (6/26/2014)   - BP low, hold all meds      Hyperlipidemia (6/26/2014)   - hold statin for now, unlikely to resume      Weight loss (1/26/2018)   - due to primary issue as above   - Nutrition consult   - suspect severe protein calorie malnutrition POA      Hypokalemia (1/26/2018)/Hypomagnesemia (1/26/2018)   - replete and follow electrolytes      Debility (1/26/2018)   - PT/OT, suspect has limited rehab potential       Code status:   - patient is FULL CODE      Total time spent with patient: 86 [phone] discussed with: Patient, Family, Nursing Staff and Dr. Filiberto Winter    Discussed:  Code Status, Care Plan and D/C Planning    Prophylaxis:  Lovenox and SCD's    Probable Disposition:   PT, OT, RN           ___________________________________________________    Attending Physician: Kwesi Valencia MD

## 2018-01-26 NOTE — ROUTINE PROCESS
TRANSFER - IN REPORT:    Verbal report received from RN(name) on Lela Drafts  being received from ED(unit) for routine progression of care      Report consisted of patients Situation, Background, Assessment and   Recommendations(SBAR). Information from the following report(s) SBAR, Kardex, ED Summary, Intake/Output, MAR and Recent Results was reviewed with the receiving nurse. Opportunity for questions and clarification was provided. Assessment completed upon patients arrival to unit and care assumed.      Primary Nurse Gabriel Dwyer RN and ZURDO Vargas performed a dual skin assessment on this patient Impairment noted- see wound doc flow sheet  Hilton score is 20

## 2018-01-26 NOTE — TELEPHONE ENCOUNTER
Call to on call provider: Receive critical lab value from [facility]. Potassium 1.6. I attempted to contact patient by phone. I left a message to return my call on voicemail. I then call patient's  and advised him to alert patient to go to ED for treatment of her hypokalemia as it could be life threatening. Patient's  verbalizes understanding.

## 2018-01-27 PROBLEM — R77.8 ELEVATED TROPONIN: Status: ACTIVE | Noted: 2018-01-27

## 2018-01-27 PROBLEM — K52.9 COLITIS: Status: ACTIVE | Noted: 2018-01-27

## 2018-01-27 PROBLEM — E87.20 LACTIC ACID ACIDOSIS: Status: ACTIVE | Noted: 2018-01-27

## 2018-01-27 PROBLEM — I95.9 HYPOTENSION: Status: ACTIVE | Noted: 2018-01-27

## 2018-01-27 PROBLEM — E43 PROTEIN-CALORIE MALNUTRITION, SEVERE (HCC): Status: ACTIVE | Noted: 2018-01-27

## 2018-01-27 PROBLEM — J43.9 EMPHYSEMA OF LUNG (HCC): Status: ACTIVE | Noted: 2018-01-27

## 2018-01-27 LAB
ANION GAP SERPL CALC-SCNC: 7 MMOL/L (ref 5–15)
BUN SERPL-MCNC: 4 MG/DL (ref 6–20)
BUN/CREAT SERPL: 6 (ref 12–20)
CALCIUM SERPL-MCNC: 6.7 MG/DL (ref 8.5–10.1)
CHLORIDE SERPL-SCNC: 110 MMOL/L (ref 97–108)
CO2 SERPL-SCNC: 27 MMOL/L (ref 21–32)
CREAT SERPL-MCNC: 0.63 MG/DL (ref 0.55–1.02)
ERYTHROCYTE [DISTWIDTH] IN BLOOD BY AUTOMATED COUNT: 17.6 % (ref 11.5–14.5)
GLUCOSE SERPL-MCNC: 96 MG/DL (ref 65–100)
HCT VFR BLD AUTO: 23.4 % (ref 35–47)
HGB BLD-MCNC: 7 G/DL (ref 11.5–16)
LACTATE SERPL-SCNC: 1.9 MMOL/L (ref 0.4–2)
MAGNESIUM SERPL-MCNC: 1.9 MG/DL (ref 1.6–2.4)
MCH RBC QN AUTO: 27.9 PG (ref 26–34)
MCHC RBC AUTO-ENTMCNC: 29.9 G/DL (ref 30–36.5)
MCV RBC AUTO: 93.2 FL (ref 80–99)
NRBC # BLD: 0 K/UL (ref 0–0.01)
NRBC BLD-RTO: 0 PER 100 WBC
PHOSPHATE SERPL-MCNC: 2.4 MG/DL (ref 2.6–4.7)
PLATELET # BLD AUTO: 165 K/UL (ref 150–400)
PMV BLD AUTO: 9 FL (ref 8.9–12.9)
POTASSIUM SERPL-SCNC: <1.8 MMOL/L (ref 3.5–5.1)
RBC # BLD AUTO: 2.51 M/UL (ref 3.8–5.2)
SODIUM SERPL-SCNC: 144 MMOL/L (ref 136–145)
TROPONIN I SERPL-MCNC: 0.08 NG/ML
WBC # BLD AUTO: 4.3 K/UL (ref 3.6–11)

## 2018-01-27 PROCEDURE — 84100 ASSAY OF PHOSPHORUS: CPT | Performed by: INTERNAL MEDICINE

## 2018-01-27 PROCEDURE — 83735 ASSAY OF MAGNESIUM: CPT | Performed by: INTERNAL MEDICINE

## 2018-01-27 PROCEDURE — 83605 ASSAY OF LACTIC ACID: CPT | Performed by: INTERNAL MEDICINE

## 2018-01-27 PROCEDURE — 30233N1 TRANSFUSION OF NONAUTOLOGOUS RED BLOOD CELLS INTO PERIPHERAL VEIN, PERCUTANEOUS APPROACH: ICD-10-PCS | Performed by: INTERNAL MEDICINE

## 2018-01-27 PROCEDURE — 74011000250 HC RX REV CODE- 250: Performed by: INTERNAL MEDICINE

## 2018-01-27 PROCEDURE — 65270000029 HC RM PRIVATE

## 2018-01-27 PROCEDURE — 74011250637 HC RX REV CODE- 250/637: Performed by: INTERNAL MEDICINE

## 2018-01-27 PROCEDURE — 84484 ASSAY OF TROPONIN QUANT: CPT | Performed by: INTERNAL MEDICINE

## 2018-01-27 PROCEDURE — 74011250636 HC RX REV CODE- 250/636: Performed by: INTERNAL MEDICINE

## 2018-01-27 PROCEDURE — 36430 TRANSFUSION BLD/BLD COMPNT: CPT

## 2018-01-27 PROCEDURE — 36415 COLL VENOUS BLD VENIPUNCTURE: CPT | Performed by: INTERNAL MEDICINE

## 2018-01-27 PROCEDURE — P9016 RBC LEUKOCYTES REDUCED: HCPCS | Performed by: INTERNAL MEDICINE

## 2018-01-27 PROCEDURE — 85027 COMPLETE CBC AUTOMATED: CPT | Performed by: INTERNAL MEDICINE

## 2018-01-27 PROCEDURE — 80048 BASIC METABOLIC PNL TOTAL CA: CPT | Performed by: INTERNAL MEDICINE

## 2018-01-27 RX ORDER — SODIUM CHLORIDE 9 MG/ML
250 INJECTION, SOLUTION INTRAVENOUS AS NEEDED
Status: DISCONTINUED | OUTPATIENT
Start: 2018-01-27 | End: 2018-02-02 | Stop reason: HOSPADM

## 2018-01-27 RX ORDER — METRONIDAZOLE 500 MG/100ML
500 INJECTION, SOLUTION INTRAVENOUS EVERY 8 HOURS
Status: DISCONTINUED | OUTPATIENT
Start: 2018-01-27 | End: 2018-02-01

## 2018-01-27 RX ORDER — POTASSIUM CHLORIDE 750 MG/1
40 TABLET, FILM COATED, EXTENDED RELEASE ORAL EVERY 4 HOURS
Status: COMPLETED | OUTPATIENT
Start: 2018-01-27 | End: 2018-01-27

## 2018-01-27 RX ORDER — LEVOFLOXACIN 5 MG/ML
750 INJECTION, SOLUTION INTRAVENOUS EVERY 24 HOURS
Status: DISCONTINUED | OUTPATIENT
Start: 2018-01-28 | End: 2018-02-01

## 2018-01-27 RX ORDER — LEVOFLOXACIN 5 MG/ML
500 INJECTION, SOLUTION INTRAVENOUS EVERY 24 HOURS
Status: DISCONTINUED | OUTPATIENT
Start: 2018-01-27 | End: 2018-01-27

## 2018-01-27 RX ORDER — POTASSIUM CHLORIDE AND SODIUM CHLORIDE 900; 300 MG/100ML; MG/100ML
INJECTION, SOLUTION INTRAVENOUS CONTINUOUS
Status: DISCONTINUED | OUTPATIENT
Start: 2018-01-27 | End: 2018-01-28

## 2018-01-27 RX ORDER — SODIUM CHLORIDE 9 MG/ML
1000 INJECTION, SOLUTION INTRAVENOUS ONCE
Status: COMPLETED | OUTPATIENT
Start: 2018-01-27 | End: 2018-01-27

## 2018-01-27 RX ADMIN — OXYCODONE HYDROCHLORIDE AND ACETAMINOPHEN 1 TABLET: 5; 325 TABLET ORAL at 06:56

## 2018-01-27 RX ADMIN — FOLIC ACID 1 MG: 1 TABLET ORAL at 08:24

## 2018-01-27 RX ADMIN — SODIUM CHLORIDE 1000 ML: 9 INJECTION, SOLUTION INTRAVENOUS at 08:25

## 2018-01-27 RX ADMIN — Medication 100 MG: at 08:24

## 2018-01-27 RX ADMIN — Medication 400 MG: at 17:52

## 2018-01-27 RX ADMIN — POTASSIUM CHLORIDE 40 MEQ: 750 TABLET, FILM COATED, EXTENDED RELEASE ORAL at 12:48

## 2018-01-27 RX ADMIN — POTASSIUM CHLORIDE 40 MEQ: 750 TABLET, FILM COATED, EXTENDED RELEASE ORAL at 15:50

## 2018-01-27 RX ADMIN — SODIUM CHLORIDE 75 ML/HR: 9 INJECTION, SOLUTION INTRAVENOUS at 00:44

## 2018-01-27 RX ADMIN — Medication 10 ML: at 15:50

## 2018-01-27 RX ADMIN — BUPROPION HYDROCHLORIDE 150 MG: 150 TABLET, FILM COATED, EXTENDED RELEASE ORAL at 07:01

## 2018-01-27 RX ADMIN — METRONIDAZOLE 500 MG: 500 INJECTION, SOLUTION INTRAVENOUS at 23:49

## 2018-01-27 RX ADMIN — METRONIDAZOLE 500 MG: 500 INJECTION, SOLUTION INTRAVENOUS at 15:50

## 2018-01-27 RX ADMIN — METRONIDAZOLE 500 MG: 500 INJECTION, SOLUTION INTRAVENOUS at 08:24

## 2018-01-27 RX ADMIN — PROCHLORPERAZINE EDISYLATE 10 MG: 5 INJECTION INTRAMUSCULAR; INTRAVENOUS at 11:16

## 2018-01-27 RX ADMIN — HYDROMORPHONE HYDROCHLORIDE 0.5 MG: 2 INJECTION INTRAMUSCULAR; INTRAVENOUS; SUBCUTANEOUS at 00:45

## 2018-01-27 RX ADMIN — OXYCODONE HYDROCHLORIDE AND ACETAMINOPHEN 1 TABLET: 5; 325 TABLET ORAL at 21:02

## 2018-01-27 RX ADMIN — Medication 10 ML: at 21:02

## 2018-01-27 RX ADMIN — HYDROMORPHONE HYDROCHLORIDE 0.5 MG: 2 INJECTION INTRAMUSCULAR; INTRAVENOUS; SUBCUTANEOUS at 11:30

## 2018-01-27 RX ADMIN — SODIUM CHLORIDE AND POTASSIUM CHLORIDE: 9; 2.98 INJECTION, SOLUTION INTRAVENOUS at 08:24

## 2018-01-27 RX ADMIN — ENOXAPARIN SODIUM 30 MG: 30 INJECTION SUBCUTANEOUS at 21:02

## 2018-01-27 RX ADMIN — LEVOFLOXACIN 500 MG: 500 INJECTION, SOLUTION INTRAVENOUS at 08:24

## 2018-01-27 RX ADMIN — POTASSIUM CHLORIDE 40 MEQ: 750 TABLET, FILM COATED, EXTENDED RELEASE ORAL at 08:24

## 2018-01-27 RX ADMIN — Medication 400 MG: at 08:24

## 2018-01-27 RX ADMIN — DIPHENHYDRAMINE HYDROCHLORIDE 25 MG: 25 CAPSULE ORAL at 23:49

## 2018-01-27 RX ADMIN — EZETIMIBE 10 MG: 10 TABLET ORAL at 08:24

## 2018-01-27 NOTE — PROGRESS NOTES
0600. DR Ernesto Meckel notified of patients blood pressures running low and last was 87/54; patient still at baseline and has been getting up to bedside commode with assistance; No new orders received    0700 DR Tere Kirk notified of patients drop in HGB (7.0)and continues to have low potassium level (<2.0) ;  Dr Tere Kirk will be up to talk with patient this AM

## 2018-01-27 NOTE — PROGRESS NOTES
PHYSICAL THERAPY:    Attempted to see pt for PT initial eval.  Pt is currently receiving a blood transfusion and is not appropriate for functional assessment. Will add pt to be seen tomorrow, Sunday, Jan. 28, 2018.

## 2018-01-27 NOTE — PROGRESS NOTES
Zhang Boyce [address]  [address] [facility], [address] [place] [address]  [phone]      Medical Progress Note      NAME:         Simone Song   :        1960  MRM:        [id number]    Date:          2018      Subjective: Patient has been seen and examined as a follow up for metastatic disease. Chart, labs, diagnostics reviewed. She remains very weak, with poor appetite and a persistent intermittent non bloody diarrhea. On and off chills. No nausea or vomiting. She denies any chest discomfort    Objective:    Vital Signs:    Visit Vitals    BP (!) 87/54 (BP 1 Location: Right arm, BP Patient Position: At rest)    Pulse 79    Temp 98.4 °F (36.9 °C)    Resp 20    Ht 5' 1.5\" (1.562 m)    Wt 44.9 kg (99 lb)    SpO2 93%    BMI 18.4 kg/m2      No intake or output data in the 24 hours ending 18 0736     Physical Examination:    General:   Weak and very cachectic female patient, uncomfortable   Eyes:   Pale conjunctivae, PERRLA with no discharge. ENT:   no ottorrhea or rhinorrhea with dry mucous membranes  Neck: no masses, thyroid non-tender and trachea central.  Pulm:  no accessory muscle use, decreased and clear breath sounds without crackles or wheezes  Card:  no JVD or murmurs, has regular and normal S1, S2 without thrills, bruits or peripheral edema  Abd:  Soft, mild generalized, non-distended, normoactive bowel sounds with no palpable organomegaly  Musc:  No cyanosis, clubbing, atrophy or deformities. Skin:  No rashes, bruising or ulcers. Neuro: Awake and alert but very feeble.  Generally a non focal exam. Follows commands appropriately  Psych:  Has a fair insight and is oriented x 3    Current Facility-Administered Medications   Medication Dose Route Frequency    potassium chloride SR (KLOR-CON 10) tablet 40 mEq  40 mEq Oral Q4H    0.9% sodium chloride with KCl 40 mEq/L infusion   IntraVENous CONTINUOUS  0.9% sodium chloride infusion 1,000 mL  1,000 mL IntraVENous ONCE    0.9% sodium chloride infusion 250 mL  250 mL IntraVENous PRN    metroNIDAZOLE (FLAGYL) IVPB premix 500 mg  500 mg IntraVENous Q8H    levoFLOXacin (LEVAQUIN) 500 mg in D5W IVPB  500 mg IntraVENous Q24H    sodium chloride (NS) flush 5-10 mL  5-10 mL IntraVENous Q8H    sodium chloride (NS) flush 5-10 mL  5-10 mL IntraVENous PRN    acetaminophen (TYLENOL) tablet 650 mg  650 mg Oral Q4H PRN    oxyCODONE-acetaminophen (PERCOCET) 5-325 mg per tablet 1 Tab  1 Tab Oral Q4H PRN    HYDROmorphone (DILAUDID) injection 0.5 mg  0.5 mg IntraVENous Q4H PRN    prochlorperazine (COMPAZINE) injection 10 mg  10 mg IntraVENous Q6H PRN    zolpidem (AMBIEN) tablet 5 mg  5 mg Oral QHS PRN    enoxaparin (LOVENOX) injection 30 mg  30 mg SubCUTAneous DAILY    diphenhydrAMINE (BENADRYL) capsule 25 mg  25 mg Oral Q6H PRN    buPROPion XL (WELLBUTRIN XL) tablet 150 mg  150 mg Oral 7am    magnesium oxide (MAG-OX) tablet 400 mg  400 mg Oral BID    Thiamine Mononitrate (B-1) tablet 100 mg  100 mg Oral DAILY    ezetimibe (ZETIA) tablet 10 mg  10 mg Oral DAILY    folic acid (FOLVITE) tablet 1 mg  1 mg Oral DAILY        Laboratory data and review:    Recent Labs      01/27/18   0536  01/26/18   1302  01/25/18   1317   WBC  4.3  6.8  6.4   HGB  7.0*  9.5*  9.6*   HCT  23.4*  31.3*  31.4*   PLT  165  235  255     Recent Labs      01/27/18   0536  01/26/18   1302  01/25/18   1316   NA  144  144  143   K  <1.8*  <1.8*  1.6*   CL  110*  106  101   CO2  27  29  31*   GLU  96  112*  111*   BUN  4*  4*  4*   CREA  0.63  0.75  0.54*   CA  6.7*  7.8*  7.5*   MG  1.9  1.4*   --    PHOS  2.4*   --    --    ALB   --   2.1*  2.5*   SGOT   --   61*  45*   ALT   --   29  19     No components found for: [place]    Other Diagnostics:    12 lead EKG - sinus with no acute changes     Assessment and Plan:    Metastatic cancer ([facility] (1/26/2018): mets to lung, liver and bone.  Unknown primary. Pelvic ultrasound suggests a right adnexal structure which is more suggestive of hydrosalpinx than cystic neoplasm. She says she is up todate with mammograms and colonoscopy was due in a year. She is a smoker. Awaiting an oncology evaluation. Palliative care consult    Hypotension (1/27/2018): likely due to volume depletion. Give a fluid bolus and increase fluid rate. Getting blood. Hypokalemia due to loss of potassium (1/26/2018)/ Hypomagnesemia (1/26/2018): likely loss from diarrhea. Replete and follow    Iron deficiency anemia (2/8/2012): Hgb now 7. Transfuse 2 units PRBCs. Monitor    Colitis (1/27/2018): CT abdomen and pelvis showed diffuse colitis. With her diarrhea, will need to exclude an infectious etiology. Check stool cultures. Empiric IV Levofloxacin and metronidazole    Lactic acid acidosis (1/27/2018): presumed to be from volume depletion from colitis. Repeat lactate and continue IV fluids    Elevated troponin (1/27/2018): likely due to demand ischemia. EKG done in ED was non ischemic. Monitor    Hyperlipidemia (6/26/2014): with her poor appetite and metastatic disease, I will discontinue Zetia. Recent LDL was 109    Weight loss (1/26/2018)/  Protein-calorie malnutrition, severe ([address] (1/27/2018): due to metastatic disease. Nutritional services consulted, Diet as tolerated    Emphysema of lung POA: not wheezing. Noted on CT. Duoneb as needed    Debility (1/26/2018): has a poor functional status.  Mobilize as tolerated once more stable    Code status: FULL CODE    Total time spent for the patient's care: [address] discussed with: Patient, Family and Nursing Staff    Discussed:  Care Plan and D/C Planning    Prophylaxis:  Lovenox    Anticipated Disposition:  Home w/Family           ___________________________________________________    Attending Physician:   Capri Clark MD

## 2018-01-27 NOTE — PROGRESS NOTES
[facility]: MED RECONCILIATION    Medications removed:    · Amlodipine  · Zyrtec  · Folic acid  · Potassium  · Hyzaar  · kenalog    Information obtained from: patient, daughter, bottles    Allergies: Robaxin [methocarbamol]; Statins-hmg-coa reductase inhibitors; Codeine; and Neosporin [neomycin-bacitracin-polymyxin]    Prior to Admission Medications:     Prior to Admission Medications   Prescriptions Last Dose Informant Patient Reported? Taking?   aspirin 81 mg chewable tablet 1/25/2018 at AM Child No Yes   Sig: CHEW ONE TABLET BY MOUTH DAILY   buPROPion XL (WELLBUTRIN XL) 150 mg tablet 1/25/2018 at AM Child No Yes   Sig: TAKE ONE TABLET BY MOUTH EVERY MORNING FOR SMOKING CESSATION   ezetimibe (ZETIA) 10 mg tablet 1/25/2018 at AM Child No Yes   Sig: Take 1 Tab by mouth daily. For cholesterol   famotidine (PEPCID) 20 mg tablet 1/25/2018 at AM Child Yes Yes   Sig: Take 20 mg by mouth daily. magnesium 200 mg tab 1/25/2018 at AM Child No Yes   Sig: Take 200 mg by mouth daily. thiamine (B-1) 100 mg tablet 1/25/2018 at AM Child No Yes   Sig: Take 1 Tab by mouth daily.    zolpidem (AMBIEN) 5 mg tablet  Child No No   Sig: TAKE ONE TABLET BY MOUTH NIGHTLY AS NEEDED FOR SLEEP      Facility-Administered Medications: None         PANFILO Alatorre   Contact: [phone]

## 2018-01-27 NOTE — PROGRESS NOTES
Nutrition Assessment:    RECOMMENDATIONS/INTERVENTION(S):   1. Continue Regular diet    2.  RD to start ONS trial:  · Mighty Shake BID  · Magic Cup QD    3. Continue to monitor & replete lytes prn    ASSESSMENT:   1/27:  Chart accessed & reviewed remotely. Consult received for general nutrition management & supplements. MST referral received for wt loss & decreased appetite PTA (ADDENDUM). 65 yof admitted for metastatic CA. Admitted w/ K of 1.6. Labs/meds reviewed. K 1.8. Mg requiring repletion. Phos low. NS w/ KCl IVF. S/p abnormal CT. Oncology following, note possible plans for liver bx. Treatment plans pending path dx of CA. GI consulted for colitis/bowel changes. Spoke w/ pt's  by phone (pt resting, declined talking). No meal intake today, sipping cranberry juice. Decreased appetite x at least 3 mo PTA. Eating fruit, drinking icees, bites of meals here and there when  cooks. -160#. Eating normally, more \"meat & potatoes\" before summer. Noted 22.7% wt loss x 3 mo & 32.7% loss x 1 yr PTA. At risk of underwt per BMI. Encouraged /pt to order meals as desired and RD to start ONS trial.   states pt has declined protein shakes he consumes at home. Meets Criteria for Severe Chronic Malnutrition as evidenced by:   [] Severe muscle wasting, loss of subcutaneous fat   [x] Nutritional intake of <75% of recommended intake for >1 month   [x] Weight loss of  >5% in 1 month, >7.5% in 3 months, >10% in 6 months, >20% in 1 year   [] Severe edema        SUBJECTIVE/OBJECTIVE:     Diet Order: Regular  % Eaten:  No data found.     Pertinent Medications: [x] Reviewed - folic acid, thiamine, mag sulf, mag ox, KCl    Past Medical History:   Diagnosis Date    Anemia 4/04    Anemia NEC     Insomnia 11/09    PPD positive     treated w/ INH- tests since have been negative    Pure hypercholesterolemia     Scoliosis 12/28/15    [facility]    Tobacco dependence     Unspecified essential hypertension     Uterine bleeding, dysfunctional 2/12    Uterine fibroid        Chemistries:  Lab Results   Component Value Date/Time    Sodium 144 01/27/2018 05:36 AM    Potassium <1.8 01/27/2018 05:36 AM    Chloride 110 01/27/2018 05:36 AM    CO2 27 01/27/2018 05:36 AM    Anion gap 7 01/27/2018 05:36 AM    Glucose 96 01/27/2018 05:36 AM    BUN 4 01/27/2018 05:36 AM    Creatinine 0.63 01/27/2018 05:36 AM    BUN/Creatinine ratio 6 01/27/2018 05:36 AM    GFR est AA >60 01/27/2018 05:36 AM    GFR est non-AA >60 01/27/2018 05:36 AM    Calcium 6.7 01/27/2018 05:36 AM    AST (SGOT) 61 01/26/2018 01:02 PM    Alk. phosphatase 131 01/26/2018 01:02 PM    Protein, total 5.7 01/26/2018 01:02 PM    Albumin 2.1 01/26/2018 01:02 PM    Globulin 3.6 01/26/2018 01:02 PM    A-G Ratio 0.6 01/26/2018 01:02 PM    ALT (SGPT) 29 01/26/2018 01:02 PM      Anthropometrics: Height: 5' 1\" (154.9 cm) Weight: 44.9 kg (98 lb 15.8 oz)    IBW (%IBW): 52.5 kg (115 lb 11.9 oz) (85.52 %) UBW (%UBW): 68 kg (150 lb) (65.99 %)    BMI: Body mass index is 18.7 kg/(m^2). This BMI is indicative of:   [] Underweight    [x] Normal - @ risk of underwt   [] Overweight    []  Obesity    []  Extreme Obesity (BMI>40)  Estimated Nutrition Needs (Based on): 1768 Kcals/day (BMR (976) x 1.3 AF + 500) , 63 g (-73 (1.2-1.4 g/kg x IBW)) Protein  Carbohydrate:  At Least 130 g/day  Fluids: 1800 mL/day    Last BM: 1/26 - soft   []Active     []Hyperactive  []Hypoactive       [] Absent   BS - not documented  Skin:    [] Intact   [] Incision  [] Breakdown   [] DTI   [x] Tears/Excoriation/Abrasion - abrasion to chin  []Edema [x] Other:heels w/ blanchable redness     Wt Readings from Last 30 Encounters:   01/27/18 44.9 kg (98 lb 15.8 oz)   01/25/18 45.1 kg (99 lb 6.4 oz)   10/16/17 58.1 kg (128 lb)   09/25/17 59.7 kg (131 lb 9.6 oz)   03/27/17 66.3 kg (146 lb 3.2 oz)   01/11/17 66.8 kg (147 lb 3.2 oz)   09/28/16 67.6 kg (149 lb)   04/13/16 68.5 kg (151 lb)   01/04/16 67.9 kg (149 lb 9.6 oz)   09/03/15 67.4 kg (148 lb 9.6 oz)   08/01/15 66.1 kg (145 lb 12.8 oz)   07/10/15 67.3 kg (148 lb 6.4 oz)   02/12/15 60.3 kg (133 lb)   06/26/14 60.3 kg (133 lb)   12/19/13 59 kg (130 lb)   03/25/13 60.3 kg (133 lb)   09/28/12 65.3 kg (144 lb)   02/14/12 63.5 kg (140 lb)   02/07/12 63.4 kg (139 lb 12 oz)   02/07/12 63.4 kg (139 lb 12.8 oz)   12/12/11 65.4 kg (144 lb 3.2 oz)   04/25/11 68.2 kg (150 lb 6.4 oz)   07/20/10 69.7 kg (153 lb 9.6 oz)      NUTRITION DIAGNOSES:   Problem:  Unintended weight loss     Etiology: related to conditions associated with a dx - metastatic disease?      Signs/Symptoms: as evidenced by reports of decreased appetite, PO, wt loss of 32.7% in 1 yr PTA      NUTRITION INTERVENTIONS:  Meals/Snacks: General/healthful diet   Supplements: Commercial supplement              GOAL:   PO intake of meals or ONS >10% w/ stable lytes in the next 2-3 days    Cultural, Confucianist, or Ethnic Dietary Needs: None     EDUCATION & DISCHARGE NEEDS:    [x] None Identified   [] Identified and Education Provided/Documented   [] Identified and Pt declined/was not appropriate      [x] Interdisciplinary Care Plan Reviewed/Documented    [x] Discharge Needs:    tbd   [] No Nutrition Related Discharge Needs    NUTRITION RISK:   Pt Is At Nutrition Risk  [x]     No Nutrition Risk Identified  []       PT SEEN FOR:    [x]  MD Consult: []Calorie Count      []Diabetic Diet Education        []Diet Education     []Electrolyte Management     [x]General Nutrition Management and Supplements     []Management of Tube Feeding     []TPN Recommendations    [x]  RN Referral:  [x]MST score >=2     []Enteral/Parenteral Nutrition PTA     []Pregnant: Gestational DM or Multigestation                 [] Pressure Ulcer    []  Low BMI      []  Length of Stay       [] Dysphagia Diet         [] Ventilator  []  Follow-up     Previous Recommendations:   [] Implemented [] Not Implemented          [x] Not Applicable    Previous Goal:   [] Met              [] Progressing Towards Goal              [] Not Progressing Towards Goal   [x] Not Applicable            Adolfo Zuluaga, [address] [place] [address]  Pager [phone]

## 2018-01-27 NOTE — PROGRESS NOTES
[facility] Dosing Services: 1/27/2018    Pharmacist Renal Dosing Progress Note for levofloxacin   Physician Dr. Mikki Mohs    The following medication: Levofloxacin was automatically dose-adjusted per [facility] P&T Committee Protocol, with respect to renal function. Consult provided for this   65 y.o. , female , for the indication of intra-abdominal infection. Pt Weight:   Wt Readings from Last 1 Encounters:   01/27/18 44.9 kg (98 lb 15.8 oz)         Previous Regimen 500 mg IV every 24 hours   Serum Creatinine Lab Results   Component Value Date/Time    Creatinine 0.63 01/27/2018 05:36 AM    Creatinine (POC) 0.6 01/26/2018 04:08 PM       Creatinine Clearance Estimated Creatinine Clearance: 70.7 mL/min (based on Cr of 0.63). BUN Lab Results   Component Value Date/Time    BUN 4 01/27/2018 05:36 AM    BUN (POC) <3 01/26/2018 04:08 PM         Dosage changed to:  750 mg IV every 24 hours    Additional notes:      Pharmacy to continue to monitor patient daily. Will make dosage adjustments based upon changing renal function. Signed Cortez Seip, RPH.  Contact information:  [phone]

## 2018-01-27 NOTE — PROGRESS NOTES
Bedside and Verbal shift change report given to Елена Mascorro (oncoming nurse) by Bia Hoffman (offgoing nurse). Report included the following information SBAR, Kardex, Intake/Output, MAR and Recent Results.

## 2018-01-27 NOTE — ROUTINE PROCESS
Bedside and Verbal shift change report given to Artie Kitchen RN (oncoming nurse) by Mell Jacobsen RN (offgoing nurse). Report included the following information SBAR, Kardex, ED Summary, Intake/Output, MAR and Recent Results.

## 2018-01-27 NOTE — CONSULTS
ANNEMARIE Burgess MD  [phone] office     Gastroenterology Consultation Note      Admit Date: 1/26/2018  Consult Date: 1/27/2018   I greatly appreciate your asking me to see Loraine Matias, thank you very much for the opportunity to participate in her care. Narrative Assessment and Plan   · 65year old female who presented with weakness and hypokalemia and was found to have possible metastatic malignancy on CT. She appears cachectic. From a GI standpoint, a colonoscopy is indicated, but she is too weak to tolerate a bowel prep, and a bowel prep right now is contraindicated in the setting of her significant hypokalemia. Her diarrhea has been chronic, so doubt an infectious etiology. Would characterize her stools during this admission. For now, would replace her potassium and otherwise continue supportive care. It seems reasonable to start with a biopsy of her liver lesion to obtain a tissue diagnosis. Will follow. Subjective:     Chief Complaint: Diarrhea    History of Present Illness:     Ms. Chanda Cheng is a 65year old female who has had progressive weakness and loss of appetite for the past several months. She presented to the ER when labs checked by her PCP showed significant hypokalemia. A CT on admission was concerning for metastatic malignancy. From a GI standpoint, the scan showed possible colitis. The patient reports intermittent diarrhea for the past few months, but at times she has had constipation as well. No rectal bleeding. She believes that she has had a prior colonoscopy, but she is not sure when.        Ajay Hernandez NP    Past Medical History:   Diagnosis Date    Anemia 4/04    Anemia NEC     Insomnia 11/09    PPD positive     treated w/ INH- tests since have been negative    Pure hypercholesterolemia     Scoliosis 12/28/15    [facility]    Tobacco dependence     Unspecified essential hypertension     Uterine bleeding, dysfunctional 2/12    Uterine fibroid         Past Surgical History:   Procedure Laterality Date    HX CHOLECYSTECTOMY      HX COLONOSCOPY  07/01/2013    [facility], repeat 10 years    HX ORTHOPAEDIC      Shattered bilateral ankles-metal plates & screws    HX OTHER SURGICAL      Benign cyst removal- L. foot    LEG/ANKLE SURGERY PROC UNLISTED         Social History   Substance Use Topics    Smoking status: Current Every Day Smoker     Packs/day: 0.50     Years: 17.00    Smokeless tobacco: Never Used    Alcohol use 1.2 oz/week     1 Cans of beer, 1 Shots of liquor per week      Comment: Socially        Family History   Problem Relation Age of Onset    Hypertension Mother     Diabetes Mother     Cancer Mother      breast    Heart Disease Father      MI 42's    Kidney Disease Father     Hypertension Son         Allergies   Allergen Reactions    Robaxin [Methocarbamol] Hives, Rash and Itching     Red splotches    Statins-Hmg-Coa Reductase Inhibitors Myalgia    Codeine Itching    Neosporin [Neomycin-Bacitracin-Polymyxin] Rash            Home Medications:  Prior to Admission Medications   Prescriptions Last Dose Informant Patient Reported? Taking?   aspirin 81 mg chewable tablet 1/25/2018 at AM Child No Yes   Sig: CHEW ONE TABLET BY MOUTH DAILY   buPROPion XL (WELLBUTRIN XL) 150 mg tablet 1/25/2018 at AM Child No Yes   Sig: TAKE ONE TABLET BY MOUTH EVERY MORNING FOR SMOKING CESSATION   ezetimibe (ZETIA) 10 mg tablet 1/25/2018 at AM Child No Yes   Sig: Take 1 Tab by mouth daily. For cholesterol   famotidine (PEPCID) 20 mg tablet 1/25/2018 at AM Child Yes Yes   Sig: Take 20 mg by mouth daily. magnesium 200 mg tab 1/25/2018 at AM Child No Yes   Sig: Take 200 mg by mouth daily. thiamine (B-1) 100 mg tablet 1/25/2018 at AM Child No Yes   Sig: Take 1 Tab by mouth daily.    zolpidem (AMBIEN) 5 mg tablet  Child No No   Sig: TAKE ONE TABLET BY MOUTH NIGHTLY AS NEEDED FOR SLEEP Facility-Administered Medications: None       Hospital Medications:  Current Facility-Administered Medications   Medication Dose Route Frequency    0.9% sodium chloride with KCl 40 mEq/L infusion   IntraVENous CONTINUOUS    0.9% sodium chloride infusion 250 mL  250 mL IntraVENous PRN    metroNIDAZOLE (FLAGYL) IVPB premix 500 mg  500 mg IntraVENous Q8H    [START ON 1/28/2018] levoFLOXacin (LEVAQUIN) 750 mg in D5W IVPB  750 mg IntraVENous Q24H    sodium chloride (NS) flush 5-10 mL  5-10 mL IntraVENous Q8H    sodium chloride (NS) flush 5-10 mL  5-10 mL IntraVENous PRN    acetaminophen (TYLENOL) tablet 650 mg  650 mg Oral Q4H PRN    oxyCODONE-acetaminophen (PERCOCET) 5-325 mg per tablet 1 Tab  1 Tab Oral Q4H PRN    HYDROmorphone (DILAUDID) injection 0.5 mg  0.5 mg IntraVENous Q4H PRN    prochlorperazine (COMPAZINE) injection 10 mg  10 mg IntraVENous Q6H PRN    zolpidem (AMBIEN) tablet 5 mg  5 mg Oral QHS PRN    enoxaparin (LOVENOX) injection 30 mg  30 mg SubCUTAneous DAILY    diphenhydrAMINE (BENADRYL) capsule 25 mg  25 mg Oral Q6H PRN    buPROPion XL (WELLBUTRIN XL) tablet 150 mg  150 mg Oral 7am    magnesium oxide (MAG-OX) tablet 400 mg  400 mg Oral BID    Thiamine Mononitrate (B-1) tablet 100 mg  100 mg Oral DAILY    ezetimibe (ZETIA) tablet 10 mg  10 mg Oral DAILY    folic acid (FOLVITE) tablet 1 mg  1 mg Oral DAILY       Review of Systems:  Full ROS was done and was negative except as noted in the HPI.        Objective:     Physical Exam:  Visit Vitals    /71 (BP 1 Location: Right leg)    Pulse 91    Temp 98.6 °F (37 °C)    Resp 20    Ht 5' 1\" (1.549 m)    Wt 44.9 kg (98 lb 15.8 oz)    SpO2 98%    BMI 18.7 kg/m2     SpO2 Readings from Last 6 Encounters:   01/27/18 98%   01/25/18 100%   10/16/17 98%   09/25/17 94%   03/27/17 98%   01/11/17 100%          Intake/Output Summary (Last 24 hours) at 01/27/18 1711  Last data filed at 01/27/18 1538   Gross per 24 hour   Intake 150 ml   Output                0 ml   Net              150 ml      General: no distress, thin, chronically ill appearing  Skin:  No rash or jaundice  HEENT: Pupils equal, sclera anicteric  Cardiovascular: Regular, well perfused, no edema  Respiratory:  Clear bilaterally, normal respiratory effort  GI:  Abdomen soft, nondistended, nontender  Musculoskeletal:  No skeletal deformity nor acute arthritis noted.   Neurological:  Motor and sensory function intact in upper extremities  Psychiatric:  Flat affect, memory intact, appears to have insight into current illness    Laboratory:    Recent Results (from the past 24 hour(s))   CBC W/O DIFF    Collection Time: 01/27/18  5:36 AM   Result Value Ref Range    WBC 4.3 3.6 - 11.0 K/uL    RBC 2.51 (L) 3.80 - 5.20 M/uL    HGB 7.0 (L) 11.5 - 16.0 g/dL    HCT 23.4 (L) 35.0 - 47.0 %    MCV 93.2 80.0 - 99.0 FL    MCH 27.9 26.0 - 34.0 PG    MCHC 29.9 (L) 30.0 - 36.5 g/dL    RDW 17.6 (H) 11.5 - 14.5 %    PLATELET 421 266 - 524 K/uL    MPV 9.0 8.9 - 12.9 FL    NRBC 0.0 0  WBC    ABSOLUTE NRBC 0.00 0.00 - 0.01 K/uL   MAGNESIUM    Collection Time: 01/27/18  5:36 AM   Result Value Ref Range    Magnesium 1.9 1.6 - 2.4 mg/dL   METABOLIC PANEL, BASIC    Collection Time: 01/27/18  5:36 AM   Result Value Ref Range    Sodium 144 136 - 145 mmol/L    Potassium <1.8 (LL) 3.5 - 5.1 mmol/L    Chloride 110 (H) 97 - 108 mmol/L    CO2 27 21 - 32 mmol/L    Anion gap 7 5 - 15 mmol/L    Glucose 96 65 - 100 mg/dL    BUN 4 (L) 6 - 20 MG/DL    Creatinine 0.63 0.55 - 1.02 MG/DL    BUN/Creatinine ratio 6 (L) 12 - 20      GFR est AA >60 >60 ml/min/1.73m2    GFR est non-AA >60 >60 ml/min/1.73m2    Calcium 6.7 (L) 8.5 - 10.1 MG/DL   PHOSPHORUS    Collection Time: 01/27/18  5:36 AM   Result Value Ref Range    Phosphorus 2.4 (L) 2.6 - 4.7 MG/DL   TROPONIN I    Collection Time: 01/27/18  5:36 AM   Result Value Ref Range    Troponin-I, Qt. 0.08 (H) <0.05 ng/mL   LACTIC ACID    Collection Time: 01/27/18  8:55 AM   Result Value Ref Range    Lactic acid 1.9 0.4 - 2.0 MMOL/L         Assessment/Plan:     Principal Problem:    Metastatic cancer ([address] (1/26/2018)    Active Problems:    Iron deficiency anemia (2/8/2012)      Benign essential HTN (6/26/2014)      Hyperlipidemia (6/26/2014)      Weight loss (1/26/2018)      Hypokalemia due to loss of potassium (1/26/2018)      Hypomagnesemia (1/26/2018)      Debility (1/26/2018)      Colitis (1/27/2018)      Lactic acid acidosis (1/27/2018)      Elevated troponin (1/27/2018)      Protein-calorie malnutrition, severe ([address] (1/27/2018)      Hypotension (1/27/2018)      Emphysema of lung ([address] (1/27/2018)         See above narrative for full detail.

## 2018-01-27 NOTE — CONSULTS
NEW HEME/ONC CONSULT       Tacos Prado is a 65 y.o. 1960 female and presents with Abnormal Lab Results    CC  abnormal CTs    HPI  Pt seen today in hospital consult at request of hospitalist for abnormal CTs. CTs show mediastinal LAD, liver lesion, diffuse colitis/ bowel changes, bone lesions/ ovary cyst/ fibroids. Pt is a poor historian today. C/o diarrhea. Smoker but no lung mass on CT. Ms. Juleen Phalen presented to the Emergency Department complaining of weakness: for the past 3 months, now severe, steadily progressive, associated with weight loss. She was seen by her PCP and told to come to the ED as her potassium was 1.6    DX   Encounter Diagnoses   Name Primary?     Hypokalemia Yes    Hypomagnesemia     Metastatic cancer (HCC)     Noninfectious gastroenteritis, unspecified type           Past Medical History:   Diagnosis Date    Anemia 4/04    Anemia NEC     Insomnia 11/09    PPD positive     treated w/ INH- tests since have been negative    Pure hypercholesterolemia     Scoliosis 12/28/15    [facility]    Tobacco dependence     Unspecified essential hypertension     Uterine bleeding, dysfunctional 2/12    Uterine fibroid      Past Surgical History:   Procedure Laterality Date    HX CHOLECYSTECTOMY      HX COLONOSCOPY  07/01/2013    [facility], repeat 10 years    HX ORTHOPAEDIC      Shattered bilateral ankles-metal plates & screws    HX OTHER SURGICAL      Benign cyst removal- L. foot    LEG/ANKLE SURGERY PROC UNLISTED       Social History     Social History    Marital status:      Spouse name: N/A    Number of children: N/A    Years of education: N/A     Occupational History    part time      Social History Main Topics    Smoking status: Current Every Day Smoker     Packs/day: 0.50     Years: 17.00    Smokeless tobacco: Never Used    Alcohol use 1.2 oz/week     1 Cans of beer, 1 Shots of liquor per week      Comment: Socially  Drug use: No    Sexual activity: Yes     Partners: Male     Birth control/ protection: None     Other Topics Concern    Not on file     Social History Narrative     Family History   Problem Relation Age of Onset    Hypertension Mother     Diabetes Mother     Cancer Mother      breast    Heart Disease Father      MI 42's    Kidney Disease Father     Hypertension Son        Current Facility-Administered Medications   Medication Dose Route Frequency Provider Last Rate Last Dose    potassium chloride SR (KLOR-CON 10) tablet 40 mEq  40 mEq Oral Q4H Capri MD Amber   40 mEq at 01/27/18 0824    0.9% sodium chloride with KCl 40 mEq/L infusion   IntraVENous CONTINUOUS [place] MD Amber 100 mL/hr at 01/27/18 0824      0.9% sodium chloride infusion 250 mL  250 mL IntraVENous PRN Capri Clark MD        metroNIDAZOLE (FLAGYL) IVPB premix 500 mg  500 mg IntraVENous Q8H Capri MD Amber 100 mL/hr at 01/27/18 0824 500 mg at 01/27/18 0824    levoFLOXacin (LEVAQUIN) 500 mg in D5W IVPB  500 mg IntraVENous Q24H [place] MD Amber 100 mL/hr at 01/27/18 0824 500 mg at 01/27/18 0824    sodium chloride (NS) flush 5-10 mL  5-10 mL IntraVENous Q8H Paulino Bailey MD        sodium chloride (NS) flush 5-10 mL  5-10 mL IntraVENous PRN Paulino Bailey MD        acetaminophen (TYLENOL) tablet 650 mg  650 mg Oral Q4H PRN Paulino Bailey MD        oxyCODONE-acetaminophen (PERCOCET) 5-325 mg per tablet 1 Tab  1 Tab Oral Q4H PRN Paulino Bailey MD   1 Tab at 01/27/18 0656    HYDROmorphone (DILAUDID) injection 0.5 mg  0.5 mg IntraVENous Q4H PRN Paulino Bailey MD   0.5 mg at 01/27/18 0045    prochlorperazine (COMPAZINE) injection 10 mg  10 mg IntraVENous Q6H PRN Paulino Bailey MD   10 mg at 01/27/18 1116    zolpidem (AMBIEN) tablet 5 mg  5 mg Oral QHS PRN Paulino Bailey MD        enoxaparin (LOVENOX) injection 30 mg  30 mg SubCUTAneous DAILY Paulino Bailey MD   30 mg at 01/26/18 1926    diphenhydrAMINE (BENADRYL) capsule 25 mg  25 mg Oral Q6H PRN Cecile Dugan MD        buPROPion XL (WELLBUTRIN XL) tablet 150 mg  150 mg Oral 7am Cecile Dugan MD   150 mg at 01/27/18 0701    magnesium oxide (MAG-OX) tablet 400 mg  400 mg Oral BID Cecile Dugan MD   400 mg at 01/27/18 7949    Thiamine Mononitrate (B-1) tablet 100 mg  100 mg Oral DAILY Cecile Dugan MD   100 mg at 01/27/18 5542    ezetimibe (ZETIA) tablet 10 mg  10 mg Oral DAILY Cecile Dugan MD   10 mg at 78/35/16 5610    folic acid (FOLVITE) tablet 1 mg  1 mg Oral DAILY Cecile Dugan MD   1 mg at 01/27/18 2847       Allergies   Allergen Reactions    Robaxin [Methocarbamol] Hives, Rash and Itching     Red splotches    Statins-Hmg-Coa Reductase Inhibitors Myalgia    Codeine Itching    Neosporin [Neomycin-Bacitracin-Polymyxin] Rash       Review of Systems    A comprehensive review of systems was negative except for: per HPI     Objective:  Visit Vitals    /69    Pulse 90    Temp 98.5 °F (36.9 °C)    Resp 20    Ht 5' 1.5\" (1.562 m)    Wt 99 lb (44.9 kg)    SpO2 100%    BMI 18.4 kg/m2       Physical Exam:   General appearance - thin ill appearing AAW in bed in NAD  Mental status - quiet, sleepy, conversant  EYE-conj clear  Mouth - mucous membranes moist  Neck - supple  Chest - clear to auscultation  Heart - normal rate and regular rhythm   Abdomen - soft, nontender  Ext-no pedal edema noted  Skin-Warm and dry. Neuro -no focal findings       Diagnostic Imaging   reviewed  Results for orders placed during the hospital encounter of 09/03/15   XR CHEST PA LAT    Narrative **Final Report**       ICD Codes / Adm. Diagnosis: V70.0   / Routine general medical examin    Examination:  CR CHEST PA AND LATERAL  - [id number] - Sep  3 2015 11:25AM  Accession No:  [id number]  Reason:  smoker screener      REPORT:  INDICATION: Routine general medical examin . smoker screener  COMPARISON: Previous chest xray, 2/7/2012. [facility]   FINDINGS: PA and lateral view of the chest.   .  Lines/tubes/surgical: None. Heart/mediastinum: Calcifications in the aortic arch. Lungs/pleura:  No focal consolidation or mass. No visualized pleural   effusion or pneumothorax. Additional Comments: Surgical clips in the right upper quadrant suggesting   previous cholecystectomy. Degenerative changes in the spine. .      IMPRESSION:  1. No radiographic evidence of acute cardiopulmonary disease. 2. Atherosclerosis. Signing/Reading Doctor: Lance Wood ([id number])    [facility] ([id number])  Sep  3 2015  3:46PM                                   Results for orders placed during the hospital encounter of 01/26/18   CT CHEST ABD PELV W CONT    Narrative EXAM:  CT CHEST ABD PELV W CONT    INDICATION: weakness, back pain, weight loss, anorexia, low magnesium level;  concerned for malignancy     COMPARISON: None    CONTRAST:  95 mL of Isovue-370. TECHNIQUE:   Following the uneventful intravenous administration of contrast, thin axial  images were obtained through the chest, abdomen and pelvis. Coronal and sagittal  reconstructions were generated. Oral contrast was not administered. CT dose  reduction was achieved through use of a standardized protocol tailored for this  examination and automatic exposure control for dose modulation. FINDINGS:     THYROID: Mild heterogeneity. MEDIASTINUM: Right paratracheal node measures 10.5 x 16.9 mm (image 7). Additional right paratracheal node measures 2.3 x 1.2 cm (image 14) and 1.6 x  1.5 cm (image 17). Prevascular nodes measure 1.0 x 1.3 cm and 1.8 x 1.1 cm  (image 18). Precarinal nodes measure 2.1 x 1.3 cm and 1.6 x 1.2 cm (image 20). Right hilar node measures 1.8 x 2.3 cm (image 24). Second right infrahilar node  measures 1.3 x 1.0 cm (image 30). Subcarinal node measures 1.7 x 3.3 cm (image  26). Left infrahilar node measures 1.3 x 1.2 cm (image 26). THORACIC AORTA: No dissection or aneurysm.   MAIN PULMONARY ARTERY: Normal in caliber. TRACHEA/BRONCHI: Patent. ESOPHAGUS: No wall thickening or dilatation. HEART: Normal in size. Small pericardial effusion. PLEURA: No effusion or pneumothorax. LUNGS: No nodule, mass, or airspace disease. LIVER: No biliary dilatation. Diffuse hypodensity. Part low-density partly  enhancing 2.8 x 2.3 x 3 point for cm mass (axial image 62 and coronal image 25). GALLBLADDER: Unremarkable. SPLEEN: No mass. PANCREAS: No mass or ductal dilatation. ADRENALS: Unremarkable. KIDNEYS: No mass, calculus, or hydronephrosis. STOMACH: Unremarkable. SMALL BOWEL: No dilatation or wall thickening. COLON: Marked circumferential thickening of a 14 cm segment of sigmoid (axial  image 89). Marked circumferential thickening of the ascending colon and  transverse colon. APPENDIX: Not well seen. PERITONEUM: Free fluid in the right hemipelvis with a density measurement of 12. RETROPERITONEUM: No lymphadenopathy or aortic aneurysm. REPRODUCTIVE ORGANS: Multiple fibroids, some of which are calcified. Possible  right variant of 5.3 x 2.1 x 4.7 cm cystic structure (axial image 95 and coronal  image 73 with a Hounsfield unit measurement of 521). URINARY BLADDER: No mass or calculus. BONES: Sclerotic T2 lesion (sagittal image 80). Sclerotic appearing 1 cm upper  sacral lesion (sagittal image 87). ADDITIONAL COMMENTS: N/A      Impression IMPRESSION:    Mediastinal and hilar adenopathy, liver lesion, and sclerotic osseous lesions,  compatible with metastatic disease  Possible right ovarian cystic mass for which pelvic ultrasound is recommended. Marked emphysema  Fibroid uterus  Colitis of the ascending colon, transverse colon and sigmoid colon.   Trace pericardial effusion and small amount of free fluid in the right pelvis,  nonspecific       Lab Results  reviewed  Lab Results   Component Value Date/Time    WBC 4.3 01/27/2018 05:36 AM    HGB 7.0 01/27/2018 05:36 AM    HCT 23.4 01/27/2018 05:36 AM    PLATELET 964 01/27/2018 05:36 AM    MCV 93.2 01/27/2018 05:36 AM       Lab Results   Component Value Date/Time    Sodium 144 01/27/2018 05:36 AM    Potassium <1.8 01/27/2018 05:36 AM    Chloride 110 01/27/2018 05:36 AM    CO2 27 01/27/2018 05:36 AM    Anion gap 7 01/27/2018 05:36 AM    Glucose 96 01/27/2018 05:36 AM    BUN 4 01/27/2018 05:36 AM    Creatinine 0.63 01/27/2018 05:36 AM    BUN/Creatinine ratio 6 01/27/2018 05:36 AM    GFR est AA >60 01/27/2018 05:36 AM    GFR est non-AA >60 01/27/2018 05:36 AM    Calcium 6.7 01/27/2018 05:36 AM    AST (SGOT) 61 01/26/2018 01:02 PM    Alk. phosphatase 131 01/26/2018 01:02 PM    Protein, total 5.7 01/26/2018 01:02 PM    Albumin 2.1 01/26/2018 01:02 PM    Globulin 3.6 01/26/2018 01:02 PM    A-G Ratio 0.6 01/26/2018 01:02 PM    ALT (SGPT) 29 01/26/2018 01:02 PM       .    Assessment/Plan:    1. Mediastinal LAD, liver lesion, diffuse colitis/ diarrhea, bone lesions with no tissue dx of cancer    Records reviewed. Reviewed scans with pt today. No family present. Reviewed need for tissue dx and studies to try to figure out site of origin of possible cancer. Pt is weak and tired but converses. Recommend GI eval for CT changes/ colitis / diarrhea ? Colon primary. Consider CT guided liver biopsy for tissue dx. May be easiest site. Treatment plan pending path dx of cancer. 2  Anemia due to colitis/ chronic disease. Transfuse prn.     3.  Diarrhea due to colitis. Per IM/ GI.    4.  Smoker. Needs to quit. We will follow. Call if questions over weekend. ICD-10-CM ICD-9-CM    1. Hypokalemia E87.6 276.8    2. Hypomagnesemia E83.42 275.2    3. Metastatic cancer (HCC) C79.9 199.1    4.  Noninfectious gastroenteritis, unspecified type K52.9 558.9        Current Facility-Administered Medications   Medication Dose Route Frequency    potassium chloride SR (KLOR-CON 10) tablet 40 mEq  40 mEq Oral Q4H    0.9% sodium chloride with KCl 40 mEq/L infusion IntraVENous CONTINUOUS    0.9% sodium chloride infusion 250 mL  250 mL IntraVENous PRN    metroNIDAZOLE (FLAGYL) IVPB premix 500 mg  500 mg IntraVENous Q8H    levoFLOXacin (LEVAQUIN) 500 mg in D5W IVPB  500 mg IntraVENous Q24H    sodium chloride (NS) flush 5-10 mL  5-10 mL IntraVENous Q8H    sodium chloride (NS) flush 5-10 mL  5-10 mL IntraVENous PRN    acetaminophen (TYLENOL) tablet 650 mg  650 mg Oral Q4H PRN    oxyCODONE-acetaminophen (PERCOCET) 5-325 mg per tablet 1 Tab  1 Tab Oral Q4H PRN    HYDROmorphone (DILAUDID) injection 0.5 mg  0.5 mg IntraVENous Q4H PRN    prochlorperazine (COMPAZINE) injection 10 mg  10 mg IntraVENous Q6H PRN    zolpidem (AMBIEN) tablet 5 mg  5 mg Oral QHS PRN    enoxaparin (LOVENOX) injection 30 mg  30 mg SubCUTAneous DAILY    diphenhydrAMINE (BENADRYL) capsule 25 mg  25 mg Oral Q6H PRN    buPROPion XL (WELLBUTRIN XL) tablet 150 mg  150 mg Oral 7am    magnesium oxide (MAG-OX) tablet 400 mg  400 mg Oral BID    Thiamine Mononitrate (B-1) tablet 100 mg  100 mg Oral DAILY    ezetimibe (ZETIA) tablet 10 mg  10 mg Oral DAILY    folic acid (FOLVITE) tablet 1 mg  1 mg Oral DAILY         There are no outpatient Patient Instructions on file for this admission. Follow-up Disposition: Not on File    No name on file.

## 2018-01-28 LAB
ABO + RH BLD: NORMAL
ANION GAP SERPL CALC-SCNC: 10 MMOL/L (ref 5–15)
BASOPHILS # BLD: 0 K/UL (ref 0–0.1)
BASOPHILS NFR BLD: 0 % (ref 0–1)
BLD PROD TYP BPU: NORMAL
BLOOD GROUP ANTIBODIES SERPL: NORMAL
BPU ID: NORMAL
BUN SERPL-MCNC: 3 MG/DL (ref 6–20)
BUN/CREAT SERPL: 5 (ref 12–20)
CALCIUM SERPL-MCNC: 6.7 MG/DL (ref 8.5–10.1)
CHLORIDE SERPL-SCNC: 115 MMOL/L (ref 97–108)
CO2 SERPL-SCNC: 24 MMOL/L (ref 21–32)
CREAT SERPL-MCNC: 0.6 MG/DL (ref 0.55–1.02)
CROSSMATCH RESULT,%XM: NORMAL
DIFFERENTIAL METHOD BLD: ABNORMAL
EOSINOPHIL # BLD: 0 K/UL (ref 0–0.4)
EOSINOPHIL NFR BLD: 0 % (ref 0–7)
ERYTHROCYTE [DISTWIDTH] IN BLOOD BY AUTOMATED COUNT: 21.8 % (ref 11.5–14.5)
GLUCOSE SERPL-MCNC: 93 MG/DL (ref 65–100)
HCT VFR BLD AUTO: 27.3 % (ref 35–47)
HGB BLD-MCNC: 8.5 G/DL (ref 11.5–16)
IMM GRANULOCYTES # BLD: 0 K/UL (ref 0–0.04)
IMM GRANULOCYTES NFR BLD AUTO: 0 % (ref 0–0.5)
LYMPHOCYTES # BLD: 1.1 K/UL (ref 0.8–3.5)
LYMPHOCYTES NFR BLD: 16 % (ref 12–49)
MAGNESIUM SERPL-MCNC: 1.8 MG/DL (ref 1.6–2.4)
MCH RBC QN AUTO: 27.2 PG (ref 26–34)
MCHC RBC AUTO-ENTMCNC: 31.1 G/DL (ref 30–36.5)
MCV RBC AUTO: 87.5 FL (ref 80–99)
MONOCYTES # BLD: 0.7 K/UL (ref 0–1)
MONOCYTES NFR BLD: 11 % (ref 5–13)
NEUTS SEG # BLD: 4.7 K/UL (ref 1.8–8)
NEUTS SEG NFR BLD: 72 % (ref 32–75)
NRBC # BLD: 0 K/UL (ref 0–0.01)
NRBC BLD-RTO: 0 PER 100 WBC
PHOSPHATE SERPL-MCNC: 2.1 MG/DL (ref 2.6–4.7)
PLATELET # BLD AUTO: 166 K/UL (ref 150–400)
PMV BLD AUTO: 8.9 FL (ref 8.9–12.9)
POTASSIUM SERPL-SCNC: 2.1 MMOL/L (ref 3.5–5.1)
RBC # BLD AUTO: 3.12 M/UL (ref 3.8–5.2)
SODIUM SERPL-SCNC: 149 MMOL/L (ref 136–145)
SPECIMEN EXP DATE BLD: NORMAL
STATUS OF UNIT,%ST: NORMAL
UNIT DIVISION, %UDIV: 0
WBC # BLD AUTO: 6.6 K/UL (ref 3.6–11)

## 2018-01-28 PROCEDURE — 97161 PT EVAL LOW COMPLEX 20 MIN: CPT

## 2018-01-28 PROCEDURE — 80048 BASIC METABOLIC PNL TOTAL CA: CPT | Performed by: INTERNAL MEDICINE

## 2018-01-28 PROCEDURE — 65270000029 HC RM PRIVATE

## 2018-01-28 PROCEDURE — 74011250636 HC RX REV CODE- 250/636: Performed by: INTERNAL MEDICINE

## 2018-01-28 PROCEDURE — 87045 FECES CULTURE AEROBIC BACT: CPT | Performed by: INTERNAL MEDICINE

## 2018-01-28 PROCEDURE — 74011000250 HC RX REV CODE- 250: Performed by: INTERNAL MEDICINE

## 2018-01-28 PROCEDURE — 74011250637 HC RX REV CODE- 250/637: Performed by: INTERNAL MEDICINE

## 2018-01-28 PROCEDURE — 83735 ASSAY OF MAGNESIUM: CPT | Performed by: INTERNAL MEDICINE

## 2018-01-28 PROCEDURE — 97116 GAIT TRAINING THERAPY: CPT

## 2018-01-28 PROCEDURE — 97165 OT EVAL LOW COMPLEX 30 MIN: CPT

## 2018-01-28 PROCEDURE — 36415 COLL VENOUS BLD VENIPUNCTURE: CPT | Performed by: INTERNAL MEDICINE

## 2018-01-28 PROCEDURE — 85025 COMPLETE CBC W/AUTO DIFF WBC: CPT | Performed by: INTERNAL MEDICINE

## 2018-01-28 PROCEDURE — 84100 ASSAY OF PHOSPHORUS: CPT | Performed by: INTERNAL MEDICINE

## 2018-01-28 PROCEDURE — 97535 SELF CARE MNGMENT TRAINING: CPT

## 2018-01-28 RX ORDER — POTASSIUM CHLORIDE 750 MG/1
40 TABLET, FILM COATED, EXTENDED RELEASE ORAL
Status: COMPLETED | OUTPATIENT
Start: 2018-01-28 | End: 2018-01-28

## 2018-01-28 RX ORDER — POTASSIUM CHLORIDE 750 MG/1
40 TABLET, FILM COATED, EXTENDED RELEASE ORAL EVERY 4 HOURS
Status: COMPLETED | OUTPATIENT
Start: 2018-01-28 | End: 2018-01-28

## 2018-01-28 RX ORDER — DEXTROSE, SODIUM CHLORIDE, AND POTASSIUM CHLORIDE 5; .9; .15 G/100ML; G/100ML; G/100ML
100 INJECTION INTRAVENOUS CONTINUOUS
Status: DISCONTINUED | OUTPATIENT
Start: 2018-01-28 | End: 2018-02-02 | Stop reason: HOSPADM

## 2018-01-28 RX ADMIN — OXYCODONE HYDROCHLORIDE AND ACETAMINOPHEN 1 TABLET: 5; 325 TABLET ORAL at 22:08

## 2018-01-28 RX ADMIN — OXYCODONE HYDROCHLORIDE AND ACETAMINOPHEN 1 TABLET: 5; 325 TABLET ORAL at 09:15

## 2018-01-28 RX ADMIN — ENOXAPARIN SODIUM 30 MG: 30 INJECTION SUBCUTANEOUS at 22:07

## 2018-01-28 RX ADMIN — METRONIDAZOLE 500 MG: 500 INJECTION, SOLUTION INTRAVENOUS at 09:09

## 2018-01-28 RX ADMIN — DEXTROSE MONOHYDRATE, SODIUM CHLORIDE, AND POTASSIUM CHLORIDE 100 ML/HR: 50; 9; 1.49 INJECTION, SOLUTION INTRAVENOUS at 22:43

## 2018-01-28 RX ADMIN — LEVOFLOXACIN 750 MG: 5 INJECTION, SOLUTION INTRAVENOUS at 09:09

## 2018-01-28 RX ADMIN — Medication 100 MG: at 09:08

## 2018-01-28 RX ADMIN — METRONIDAZOLE 500 MG: 500 INJECTION, SOLUTION INTRAVENOUS at 16:18

## 2018-01-28 RX ADMIN — SODIUM CHLORIDE AND POTASSIUM CHLORIDE: 9; 2.98 INJECTION, SOLUTION INTRAVENOUS at 01:36

## 2018-01-28 RX ADMIN — EZETIMIBE 10 MG: 10 TABLET ORAL at 09:08

## 2018-01-28 RX ADMIN — BUPROPION HYDROCHLORIDE 150 MG: 150 TABLET, FILM COATED, EXTENDED RELEASE ORAL at 05:42

## 2018-01-28 RX ADMIN — POTASSIUM CHLORIDE 40 MEQ: 750 TABLET, FILM COATED, EXTENDED RELEASE ORAL at 11:02

## 2018-01-28 RX ADMIN — FOLIC ACID 1 MG: 1 TABLET ORAL at 09:08

## 2018-01-28 RX ADMIN — DEXTROSE MONOHYDRATE, SODIUM CHLORIDE, AND POTASSIUM CHLORIDE 100 ML/HR: 50; 9; 1.49 INJECTION, SOLUTION INTRAVENOUS at 11:02

## 2018-01-28 RX ADMIN — Medication 400 MG: at 09:08

## 2018-01-28 RX ADMIN — POTASSIUM CHLORIDE 40 MEQ: 750 TABLET, EXTENDED RELEASE ORAL at 03:27

## 2018-01-28 RX ADMIN — Medication 400 MG: at 18:07

## 2018-01-28 RX ADMIN — POTASSIUM CHLORIDE 40 MEQ: 750 TABLET, FILM COATED, EXTENDED RELEASE ORAL at 16:18

## 2018-01-28 NOTE — PROGRESS NOTES
Zhang Boyce manish [place] [address]  [address] [facility], [address], [address] [place] [address]  [phone]      Medical Progress Note      NAME:         Elsa Cordova   :        1960  MRM:        [id number]    Date:          2018      Subjective: Patient has been seen and examined as a follow up for metastatic disease. Chart, labs, diagnostics reviewed. She remains very weak, with poor appetite and a persistent intermittent non bloody diarrhea. Afebrile. Appetite remains poor. Objective:    Vital Signs:    Visit Vitals    /76 (BP 1 Location: Right arm, BP Patient Position: At rest)    Pulse 91    Temp 98.4 °F (36.9 °C)    Resp 20    Ht 5' 1\" (1.549 m)    Wt 44.9 kg (98 lb 15.8 oz)    SpO2 99%    BMI 18.7 kg/m2          Intake/Output Summary (Last 24 hours) at 18 1000  Last data filed at 18 8744   Gross per 24 hour   Intake              150 ml   Output              250 ml   Net             -100 ml      Physical Examination:    General:   Weak and very cachectic female patient, uncomfortable   Eyes:   Pale conjunctivae, PERRLA with no discharge. ENT:   no ottorrhea or rhinorrhea with dry mucous membranes  Neck: no masses, thyroid non-tender and trachea central.  Pulm:  decreased and clear breath sounds without crackles or wheezes  Card:  no JVD or murmurs, has regular and normal S1, S2 without thrills, bruits or peripheral edema  Abd:  Soft, mild generalized, non-distended, normoactive bowel sounds with no palpable organomegaly  Musc:  No cyanosis, clubbing, atrophy or deformities. Skin:  No rashes, bruising or ulcers. Neuro: Awake and alert but very feeble.  Generally a non focal exam.   Psych:  Has a fair insight and is oriented x 3    Current Facility-Administered Medications   Medication Dose Route Frequency    0.9% sodium chloride with KCl 40 mEq/L infusion   IntraVENous CONTINUOUS    0.9% sodium chloride infusion 250 mL  250 mL IntraVENous PRN    metroNIDAZOLE (FLAGYL) IVPB premix 500 mg  500 mg IntraVENous Q8H    levoFLOXacin (LEVAQUIN) 750 mg in D5W IVPB  750 mg IntraVENous Q24H    sodium chloride (NS) flush 5-10 mL  5-10 mL IntraVENous Q8H    sodium chloride (NS) flush 5-10 mL  5-10 mL IntraVENous PRN    acetaminophen (TYLENOL) tablet 650 mg  650 mg Oral Q4H PRN    oxyCODONE-acetaminophen (PERCOCET) 5-325 mg per tablet 1 Tab  1 Tab Oral Q4H PRN    HYDROmorphone (DILAUDID) injection 0.5 mg  0.5 mg IntraVENous Q4H PRN    prochlorperazine (COMPAZINE) injection 10 mg  10 mg IntraVENous Q6H PRN    zolpidem (AMBIEN) tablet 5 mg  5 mg Oral QHS PRN    enoxaparin (LOVENOX) injection 30 mg  30 mg SubCUTAneous DAILY    diphenhydrAMINE (BENADRYL) capsule 25 mg  25 mg Oral Q6H PRN    buPROPion XL (WELLBUTRIN XL) tablet 150 mg  150 mg Oral 7am    magnesium oxide (MAG-OX) tablet 400 mg  400 mg Oral BID    Thiamine Mononitrate (B-1) tablet 100 mg  100 mg Oral DAILY    ezetimibe (ZETIA) tablet 10 mg  10 mg Oral DAILY    folic acid (FOLVITE) tablet 1 mg  1 mg Oral DAILY        Laboratory data and review:    Recent Labs      01/28/18   0154  01/27/18   0536  01/26/18   1302   WBC  6.6  4.3  6.8   HGB  8.5*  7.0*  9.5*   HCT  27.3*  23.4*  31.3*   PLT  166  165  235     Recent Labs      01/28/18   0154  01/27/18   0536  01/26/18   1302  01/25/18   1316   NA  149*  144  144  143   K  2.1*  <1.8*  <1.8*  1.6*   CL  115*  110*  106  101   CO2  24  27  29  31*   GLU  93  96  112*  111*   BUN  3*  4*  4*  4*   CREA  0.60  0.63  0.75  0.54*   CA  6.7*  6.7*  7.8*  7.5*   MG  1.8  1.9  1.4*   --    PHOS  2.1*  2.4*   --    --    ALB   --    --   2.1*  2.5*   SGOT   --    --   61*  45*   ALT   --    --   29  19     No components found for: [place]    Other Diagnostics:    12 lead EKG - sinus with no acute changes     Assessment and Plan:    Metastatic cancer ([facility] (1/26/2018): mets to lung, liver and bone. Unknown primary. Pelvic ultrasound suggests a right adnexal structure which is more suggestive of hydrosalpinx than cystic neoplasm. She says she is up todate with mammograms and colonoscopy was due in a year. She is a smoker. Seen by Oncology. Will consult GI for a possible colonoscopy. Palliative care consult    Colitis (1/27/2018): CT abdomen and pelvis showed diffuse colitis. With her diarrhea, will need to exclude an infectious etiology. Follow stool cultures. Continue empiric IV Levofloxacin and metronidazole. Consult GI in light of metastatic disease    Hypotension (1/27/2018): likely due to volume depletion. This has resolved. Continue IVFs. Hypokalemia due to loss of potassium (1/26/2018)/ Hypomagnesemia (1/26/2018)/Hypernatremia: likely loss from diarrhea. Still low. Replete some more. Iron deficiency anemia (2/8/2012): Hgb now 8.5 post transfusion with 2 units PRBCs. Lactic acid acidosis (1/27/2018): presumed to be from volume depletion from colitis. Repeat lactate normal. Continue IV fluids    Elevated troponin (1/27/2018): likely due to demand ischemia. EKG done in ED was non ischemic. No ACS. Hyperlipidemia (6/26/2014): with her poor appetite and metastatic disease. recent LDL was 109, discontinued Zetia    Weight loss (1/26/2018)/  Protein-calorie malnutrition, severe ([address] (1/27/2018): due to metastatic disease. Nutritional services consulted, Diet as tolerated    Emphysema of lung POA: not wheezing. Noted on CT. Duoneb as needed    Debility (1/26/2018): has a poor functional status.  Mobilize as tolerated once more stable    Code status: FULL CODE    Total time spent for the patient's care: [address] discussed with: Patient, Family and Nursing Staff    Discussed:  Care Plan and D/C Planning    Prophylaxis:  Lovenox    Anticipated Disposition:  Home w/Family           ___________________________________________________    Attending Physician:   [place] Rodríguez MD

## 2018-01-28 NOTE — PROGRESS NOTES
Problem: Self Care Deficits Care Plan (Adult)  Goal: *Acute Goals and Plan of Care (Insert Text)  Occupational Therapy Goals  Initiated 1/28/2018  1. Patient will perform grooming with supervision in standing >3 minutes within 7 day(s). 2.  Patient will perform lower body dressing with supervision/set-up using adaptive equipment as needed within 7 day(s). 3.  Patient will perform upper body dressing with independence within 7 day(s). 4.  Patient will perform toilet transfers with modified independence within 7 day(s). 5.  Patient will perform all aspects of toileting with modified independence within 7 day(s). 6.  Patient will participate in upper extremity therapeutic exercise/activities with independence for 5 minutes within 7 day(s). 7.  Patient will utilize energy conservation techniques during functional activities with minimal verbal cues within 7 day(s). Occupational Therapy EVALUATION  Patient: Jana Cortez (89 y.o. female)  Date: 1/28/2018  Primary Diagnosis: Metastatic cancer [facility])        Precautions:  Fall    ASSESSMENT :  Cleared by RN to see pt for therapy session. Based on the objective data described below, the patient presents with decreased endurance, balance and strength, back and LE pain, and overall decreased activity tolerance following admission for metastatic cancer. Pt received supine in bed, initially drowsy but rousing to voice and agreeable to participate, daughter present. She lives with her  (who is gone at night due to work) and was previously I with ADLs and functional mobility, although daughter reports increased need for assistance over the last few weeks. Pt performed bed mobility with moderate assistance, good sitting balance at EOB. Pt unable to reach down to LEs or use leg crossover technique to don socks due to back pain, so required total A.  Stood with min A x2 and ambulated to doorway of room and back to chair with hand-held assist, mildly unsteady and with forward flexed posture. Pt fatigued easily with mobility limited distance, and c/o mild dizziness with transitions although BP stable, dizziness subsided quickly. Vitals monitored and stable during session on RA (O2 sats between %, BP and HR stable). In chair pt performed grooming ADLs with setup, UE ROM and strength generally decreased but functional. Educated pt and daughter of benefits of being OOB to chair to prevent deconditioning and improve lung function, pt agreeable to be up and to call for assistance prior to transferring. Overall pt's functional performance limited at this time compared to baseline by decreased balance, endurance and strength. Patient will benefit from skilled intervention to address the above impairments. Recommend rehab vs. [facility] pending progress. Patients rehabilitation potential is considered to be Good  Factors which may influence rehabilitation potential include:   [x]             None noted  []             Mental ability/status  []             Medical condition  []             Home/family situation and support systems  []             Safety awareness  []             Pain tolerance/management  []             Other:      PLAN :  Recommendations and Planned Interventions:  [x]               Self Care Training                  [x]        Therapeutic Activities  [x]               Functional Mobility Training    []        Cognitive Retraining  [x]               Therapeutic Exercises           [x]        Endurance Activities  [x]               Balance Training                   []        Neuromuscular Re-Education  []               Visual/Perceptual Training     [x]   Home Safety Training  [x]               Patient Education                 [x]        Family Training/Education  []               Other (comment):    Frequency/Duration: Patient will be followed by occupational therapy 5 times a week to address goals.   Discharge Recommendations: Rehab vs. [facility] pending progress  Further Equipment Recommendations for Discharge: shower chair, potentially LB AE     SUBJECTIVE:   Patient stated I need to go sit down.     OBJECTIVE DATA SUMMARY:   HISTORY:   Past Medical History:   Diagnosis Date    Anemia 4/04    Anemia NEC     Insomnia 11/09    PPD positive     treated w/ INH- tests since have been negative    Pure hypercholesterolemia     Scoliosis 12/28/15    [facility]    Tobacco dependence     Unspecified essential hypertension     Uterine bleeding, dysfunctional 2/12    Uterine fibroid      Past Surgical History:   Procedure Laterality Date    HX CHOLECYSTECTOMY      HX COLONOSCOPY  07/01/2013    MCV, repeat 10 years    HX ORTHOPAEDIC      Shattered bilateral ankles-metal plates & screws    HX OTHER SURGICAL      Benign cyst removal- L. foot    LEG/ANKLE SURGERY PROC UNLISTED         Prior Level of Function/Environment/Context: She lives with her  (who is gone at night due to work) and was previously I with ADLs and functional mobility, although daughter reports increased need for assistance over the last few weeks. Home Situation  Home Environment: Private residence  # Steps to Enter: 3  Rails to Enter: Yes  Hand Rails : Bilateral  One/Two Story Residence: One story  Living Alone: No  Support Systems: Spouse/Significant Other/Partner  Patient Expects to be Discharged to[de-identified] Private residence  Current DME Used/Available at Home: Cane, straight  Tub or Shower Type: Tub/Shower combination  [x]  Right hand dominant   []  Left hand dominant    EXAMINATION OF PERFORMANCE DEFICITS:  Cognitive/Behavioral Status:  Neurologic State: Alert  Orientation Level: Oriented X4  Cognition: Follows commands  Perception: Appears intact  Perseveration: No perseveration noted  Safety/Judgement: Awareness of environment; Fall prevention;Home safety;Decreased insight into deficits    Skin: Bruising and edema noted in L arm, LUE positioned on pillow for comfort/relief of edema at end of session      Hearing: Auditory  Auditory Impairment: None    Vision/Perceptual:      Glasses         Range of Motion:  AROM: Generally decreased, functional       Strength:  Strength: Generally decreased, functional     Coordination:  Coordination: Generally decreased, functional  Fine Motor Skills-Upper: Left Intact; Right Intact    Gross Motor Skills-Upper: Left Intact; Right Intact    Tone & Sensation:  Tone: Normal  Sensation: Intact        Balance:  Sitting: Intact; With support  Standing: Impaired; Without support  Standing - Static: Fair;Constant support  Standing - Dynamic : Fair    Functional Mobility and Transfers for ADLs:  Bed Mobility:  Rolling: Minimum assistance  Supine to Sit: Moderate assistance    Transfers:  Sit to Stand: Minimum assistance;Assist x2  Stand to Sit: Minimum assistance  Bed to Chair: Minimum assistance    ADL Assessment:  Feeding: Setup    Oral Facial Hygiene/Grooming: Setup    Bathing: Moderate assistance    Upper Body Dressing: Setup    Lower Body Dressing: Maximum assistance    Toileting: Moderate assistance          ADL Intervention and task modifications:    Pt unable to reach down to LEs or use leg crossover technique to don socks due to back pain, so required total A. Stood with min A x2 and ambulated to doorway of room and back to chair with hand-held assist, mildly unsteady and with forward flexed posture. . Educated pt and daughter of benefits of being OOB to chair to prevent deconditioning and improve lung function, pt agreeable to be up and to call for assistance prior to transferring. Grooming  Washing Face: Supervision/set-up (in sitting)       Lower Body Dressing Assistance  Socks: Total assistance (dependent)         Cognitive Retraining  Safety/Judgement: Awareness of environment; Fall prevention;Home safety;Decreased insight into deficits    Functional Measure:  Barthel Index:    Bathin  Bladder: 10  Bowels: 10  Groomin  Dressin  Feeding: 10  Mobility: 0  Stairs: 0  Toilet Use: 5  Transfer (Bed to Chair and Back): 10  Total: 55       Barthel and G-code impairment scale:  Percentage of impairment CH  0% CI  1-19% CJ  20-39% CK  40-59% CL  60-79% CM  80-99% CN  100%   Barthel Score 0-100 100 99-80 79-60 59-40 20-39 1-19   0   Barthel Score 0-20 20 17-19 13-16 9-12 5-8 1-4 0      The Barthel ADL Index: Guidelines  1. The index should be used as a record of what a patient does, not as a record of what a patient could do. 2. The main aim is to establish degree of independence from any help, physical or verbal, however minor and for whatever reason. 3. The need for supervision renders the patient not independent. 4. A patient's performance should be established using the best available evidence. Asking the patient, friends/relatives and nurses are the usual sources, but direct observation and common sense are also important. However direct testing is not needed. 5. Usually the patient's performance over the preceding 24-48 hours is important, but occasionally longer periods will be relevant. 6. Middle categories imply that the patient supplies over 50 per cent of the effort. 7. Use of aids to be independent is allowed. Brittani Morales., Barthel, DBertinW. ([id number]). Functional evaluation: the Barthel Index. [facility] (14)2. Judith Denise, CHUCK, Epi Miranda., Laron Mirza., [place], [address] [place] [address] (). Measuring the change indisability after inpatient rehabilitation; comparison of the responsiveness of the Barthel Index and Functional [place] Measure. Journal of Neurology, Neurosurgery, and Psychiatry, 66(4), 332-966. Melida Barrow, N.[place].A, RAMAN Wu.MALENA, & John Vazquez MBertinA. (2004.) Assessment of post-stroke quality of life in cost-effectiveness studies: The usefulness of the Barthel Index and the EuroQoL-5D. Quality of Life Research, 13, 731-98       G codes:   In compliance with CMSs Claims Based Outcome Reporting, the following G-code set was chosen for this patient based on their primary functional limitation being treated: The outcome measure chosen to determine the severity of the functional limitation was the Barthel Index with a score of 55/100 which was correlated with the impairment scale. ? Self Care:     - CURRENT STATUS: CK - 40%-59% impaired, limited or restricted    - GOAL STATUS: CI - 1%-19% impaired, limited or restricted    - D/C STATUS:  ---------------To be determined---------------     Occupational Therapy Evaluation Charge Determination   History Examination Decision-Making   LOW Complexity : Brief history review  MEDIUM Complexity : 3-5 performance deficits relating to physical, cognitive , or psychosocial skils that result in activity limitations and / or participation restrictions MEDIUM Complexity : Patient may present with comorbidities that affect occupational performnce. Miniml to moderate modification of tasks or assistance (eg, physical or verbal ) with assesment(s) is necessary to enable patient to complete evaluation       Based on the above components, the patient evaluation is determined to be of the following complexity level: LOW   Pain:  Pain Scale 1: Numeric (0 - 10)  Pain Intensity 1: 0              Activity Tolerance:   Good  Please refer to the flowsheet for vital signs taken during this treatment. After treatment:   [x] Patient left in no apparent distress sitting up in chair  [] Patient left in no apparent distress in bed  [x] Call bell left within reach  [x] Nursing notified  [x] Caregiver present  [] Bed alarm activated    COMMUNICATION/EDUCATION:   The patients plan of care was discussed with: Physical Therapist and Registered Nurse. [x] Home safety education was provided and the patient/caregiver indicated understanding. [x] Patient/family have participated as able in goal setting and plan of care.   [x] Patient/family agree to work toward stated goals and plan of care. [] Patient understands intent and goals of therapy, but is neutral about his/her participation. [] Patient is unable to participate in goal setting and plan of care. This patients plan of care is appropriate for delegation to SELMA.     Thank you for this referral.  Yash Knapp OT  Time Calculation: 23 mins

## 2018-01-28 NOTE — CONSULTS
NEW HEME/ONC CONSULT       Emeli Roe is a 65 y.o. 1960 female and presents with Abnormal Lab Results    CC  abnormal CTs    HPI  Pt seen today in hospital consult at request of hospitalist for abnormal CTs. CTs show mediastinal LAD, liver lesion, diffuse colitis/ bowel changes, bone lesions/ ovary cyst/ fibroids. Pt is a poor historian today. C/o diarrhea. Smoker but no lung mass on CT. Ms. Jj Cade presented to the Emergency Department complaining of weakness: for the past 3 months, now severe, steadily progressive, associated with weight loss. She was seen by her PCP and told to come to the ED as her potassium was 1.6    Interim Hx:  Pt seen today for f/u of abnormal CTs / possible new metastatic cancer. Pt is in chair today with daughter at bedside. Pt doing better overall after prbc tx and k replacement. Seen by GI yesterday and too sick/ weak for colo but needs this. Plan has been to proceed with CT guided liver biopsy but k has still been low. Pt is eating little. DX   Encounter Diagnoses   Name Primary?     Hypokalemia Yes    Hypomagnesemia     Metastatic cancer (HCC)     Noninfectious gastroenteritis, unspecified type     Liver lesion     Mediastinal lymphadenopathy     Bone lesion     Colitis     Anemia, unspecified type     Weakness generalized           Past Medical History:   Diagnosis Date    Anemia 4/04    Anemia NEC     Insomnia 11/09    PPD positive     treated w/ INH- tests since have been negative    Pure hypercholesterolemia     Scoliosis 12/28/15    [facility]    Tobacco dependence     Unspecified essential hypertension     Uterine bleeding, dysfunctional 2/12    Uterine fibroid      Past Surgical History:   Procedure Laterality Date    HX CHOLECYSTECTOMY      HX COLONOSCOPY  07/01/2013    MCV, repeat 10 years    HX ORTHOPAEDIC      Shattered bilateral ankles-metal plates & screws    HX OTHER SURGICAL Benign cyst removal- L. foot    LEG/ANKLE SURGERY PROC UNLISTED       Social History     Social History    Marital status:      Spouse name: N/A    Number of children: N/A    Years of education: N/A     Occupational History    part time      Social History Main Topics    Smoking status: Current Every Day Smoker     Packs/day: 0.50     Years: 17.00    Smokeless tobacco: Never Used    Alcohol use 1.2 oz/week     1 Cans of beer, 1 Shots of liquor per week      Comment: Socially    Drug use: No    Sexual activity: Yes     Partners: Male     Birth control/ protection: None     Other Topics Concern    Not on file     Social History Narrative     Family History   Problem Relation Age of Onset    Hypertension Mother     Diabetes Mother     Cancer Mother      breast    Heart Disease Father      MI 42's    Kidney Disease Father     Hypertension Son        Current Facility-Administered Medications   Medication Dose Route Frequency Provider Last Rate Last Dose    dextrose 5% - 0.9% NaCl with KCl 20 mEq/L infusion  100 mL/hr IntraVENous CONTINUOUS Soni Flynn MD        potassium chloride SR (KLOR-CON 10) tablet 40 mEq  40 mEq Oral Q4H Soni Flynn MD        0.9% sodium chloride infusion 250 mL  250 mL IntraVENous PRN Soni Flynn MD        metroNIDAZOLE (FLAGYL) IVPB premix 500 mg  500 mg IntraVENous Q8H Soni Flynn  mL/hr at 01/28/18 0909 500 mg at 01/28/18 0909    levoFLOXacin (LEVAQUIN) 750 mg in D5W IVPB  750 mg IntraVENous Q24H Soni Flynn  mL/hr at 01/28/18 0909 750 mg at 01/28/18 0909    sodium chloride (NS) flush 5-10 mL  5-10 mL IntraVENous Q8H Nic Roque MD   10 mL at 01/27/18 2102    sodium chloride (NS) flush 5-10 mL  5-10 mL IntraVENous PRN Nic Roque MD        acetaminophen (TYLENOL) tablet 650 mg  650 mg Oral Q4H PRN Nic Roque MD        oxyCODONE-acetaminophen (PERCOCET) 5-325 mg per tablet 1 Tab  1 Tab Oral Q4H PRN Christal Pod Dioni Mcduffie MD   1 Tab at 01/28/18 0915    HYDROmorphone (DILAUDID) injection 0.5 mg  0.5 mg IntraVENous Q4H PRN Jeremiah Meraz MD   0.5 mg at 01/27/18 1130    prochlorperazine (COMPAZINE) injection 10 mg  10 mg IntraVENous Q6H PRN Jeremiah Meraz MD   10 mg at 01/27/18 1116    zolpidem (AMBIEN) tablet 5 mg  5 mg Oral QHS PRN Jeremiah Meraz MD        enoxaparin (LOVENOX) injection 30 mg  30 mg SubCUTAneous DAILY Jeremiah Meraz MD   30 mg at 01/27/18 2102    diphenhydrAMINE (BENADRYL) capsule 25 mg  25 mg Oral Q6H PRN Jeremiah Meraz MD   25 mg at 01/27/18 2349    buPROPion XL (WELLBUTRIN XL) tablet 150 mg  150 mg Oral 7am Jeremiah Meraz MD   150 mg at 01/28/18 0542    magnesium oxide (MAG-OX) tablet 400 mg  400 mg Oral BID Jeremiah Meraz MD   400 mg at 01/28/18 7078    Thiamine Mononitrate (B-1) tablet 100 mg  100 mg Oral DAILY Jeremiah Meraz MD   100 mg at 01/28/18 5468    ezetimibe (ZETIA) tablet 10 mg  10 mg Oral DAILY Jeremiah Meraz MD   10 mg at 62/69/89 3472    folic acid (FOLVITE) tablet 1 mg  1 mg Oral DAILY Jeremiah Meraz MD   1 mg at 01/28/18 0908       Allergies   Allergen Reactions    Robaxin [Methocarbamol] Hives, Rash and Itching     Red splotches    Statins-Hmg-Coa Reductase Inhibitors Myalgia    Codeine Itching    Neosporin [Neomycin-Bacitracin-Polymyxin] Rash       Review of Systems    A comprehensive review of systems was negative except for: per HPI     Objective:  Visit Vitals    /77 (BP 1 Location: Right arm, BP Patient Position: At rest;Sitting)    Pulse 90    Temp 98.4 °F (36.9 °C)    Resp 20    Ht 5' 1\" (1.549 m)    Wt 98 lb 15.8 oz (44.9 kg)    SpO2 100%    BMI 18.7 kg/m2       Physical Exam:   General appearance - thin ill appearing AAW in chair in NAD  Mental status -  conversant  EYE-conj clear  Mouth - mucous membranes moist  Neck - supple  Chest - clear to auscultation  Heart - normal rate and regular rhythm   Abdomen - soft, nontender  Ext-no pedal edema noted  Skin-Warm and dry. Neuro -no focal findings       Diagnostic Imaging   reviewed  Results for orders placed during the hospital encounter of 09/03/15   XR CHEST PA LAT    Narrative **Final Report**       ICD Codes / Adm. Diagnosis: V70.0   / Routine general medical examin    Examination:  CR CHEST PA AND LATERAL  - [id number] - Sep  3 2015 11:25AM  Accession No:  [id number]  Reason:  smoker screener      REPORT:  INDICATION: Routine general medical examin . smoker screener  COMPARISON: Previous chest xray, 2/7/2012. Rodolfo Barfield FINDINGS: PA and lateral view of the chest.   .  Lines/tubes/surgical: None. Heart/mediastinum: Calcifications in the aortic arch. Lungs/pleura:  No focal consolidation or mass. No visualized pleural   effusion or pneumothorax. Additional Comments: Surgical clips in the right upper quadrant suggesting   previous cholecystectomy. Degenerative changes in the spine. .      IMPRESSION:  1. No radiographic evidence of acute cardiopulmonary disease. 2. Atherosclerosis. Signing/Reading Doctor: Julio Nelson ([id number])    Carol Roberts ([id number])  Sep  3 2015  3:46PM                                   Results for orders placed during the hospital encounter of 01/26/18   CT CHEST ABD PELV W CONT    Narrative EXAM:  CT CHEST ABD PELV W CONT    INDICATION: weakness, back pain, weight loss, anorexia, low magnesium level;  concerned for malignancy     COMPARISON: None    CONTRAST:  95 mL of Isovue-370. TECHNIQUE:   Following the uneventful intravenous administration of contrast, thin axial  images were obtained through the chest, abdomen and pelvis. Coronal and sagittal  reconstructions were generated. Oral contrast was not administered. CT dose  reduction was achieved through use of a standardized protocol tailored for this  examination and automatic exposure control for dose modulation. FINDINGS:     THYROID: Mild heterogeneity.   MEDIASTINUM: Right paratracheal node measures 10.5 x 16.9 mm (image 7). Additional right paratracheal node measures 2.3 x 1.2 cm (image 14) and 1.6 x  1.5 cm (image 17). Prevascular nodes measure 1.0 x 1.3 cm and 1.8 x 1.1 cm  (image 18). Precarinal nodes measure 2.1 x 1.3 cm and 1.6 x 1.2 cm (image 20). Right hilar node measures 1.8 x 2.3 cm (image 24). Second right infrahilar node  measures 1.3 x 1.0 cm (image 30). Subcarinal node measures 1.7 x 3.3 cm (image  26). Left infrahilar node measures 1.3 x 1.2 cm (image 26). THORACIC AORTA: No dissection or aneurysm. MAIN PULMONARY ARTERY: Normal in caliber. TRACHEA/BRONCHI: Patent. ESOPHAGUS: No wall thickening or dilatation. HEART: Normal in size. Small pericardial effusion. PLEURA: No effusion or pneumothorax. LUNGS: No nodule, mass, or airspace disease. LIVER: No biliary dilatation. Diffuse hypodensity. Part low-density partly  enhancing 2.8 x 2.3 x 3 point for cm mass (axial image 62 and coronal image 25). GALLBLADDER: Unremarkable. SPLEEN: No mass. PANCREAS: No mass or ductal dilatation. ADRENALS: Unremarkable. KIDNEYS: No mass, calculus, or hydronephrosis. STOMACH: Unremarkable. SMALL BOWEL: No dilatation or wall thickening. COLON: Marked circumferential thickening of a 14 cm segment of sigmoid (axial  image 89). Marked circumferential thickening of the ascending colon and  transverse colon. APPENDIX: Not well seen. PERITONEUM: Free fluid in the right hemipelvis with a density measurement of 12. RETROPERITONEUM: No lymphadenopathy or aortic aneurysm. REPRODUCTIVE ORGANS: Multiple fibroids, some of which are calcified. Possible  right variant of 5.3 x 2.1 x 4.7 cm cystic structure (axial image 95 and coronal  image 73 with a Hounsfield unit measurement of 521). URINARY BLADDER: No mass or calculus. BONES: Sclerotic T2 lesion (sagittal image 80). Sclerotic appearing 1 cm upper  sacral lesion (sagittal image 87).   ADDITIONAL COMMENTS: N/A      Impression IMPRESSION:    Mediastinal and hilar adenopathy, liver lesion, and sclerotic osseous lesions,  compatible with metastatic disease  Possible right ovarian cystic mass for which pelvic ultrasound is recommended. Marked emphysema  Fibroid uterus  Colitis of the ascending colon, transverse colon and sigmoid colon. Trace pericardial effusion and small amount of free fluid in the right pelvis,  nonspecific       Lab Results  reviewed  Lab Results   Component Value Date/Time    WBC 6.6 01/28/2018 01:54 AM    HGB 8.5 01/28/2018 01:54 AM    HCT 27.3 01/28/2018 01:54 AM    PLATELET 638 22/20/6322 01:54 AM    MCV 87.5 01/28/2018 01:54 AM       Lab Results   Component Value Date/Time    Sodium 149 01/28/2018 01:54 AM    Potassium 2.1 01/28/2018 01:54 AM    Chloride 115 01/28/2018 01:54 AM    CO2 24 01/28/2018 01:54 AM    Anion gap 10 01/28/2018 01:54 AM    Glucose 93 01/28/2018 01:54 AM    BUN 3 01/28/2018 01:54 AM    Creatinine 0.60 01/28/2018 01:54 AM    BUN/Creatinine ratio 5 01/28/2018 01:54 AM    GFR est AA >60 01/28/2018 01:54 AM    GFR est non-AA >60 01/28/2018 01:54 AM    Calcium 6.7 01/28/2018 01:54 AM    AST (SGOT) 61 01/26/2018 01:02 PM    Alk. phosphatase 131 01/26/2018 01:02 PM    Protein, total 5.7 01/26/2018 01:02 PM    Albumin 2.1 01/26/2018 01:02 PM    Globulin 3.6 01/26/2018 01:02 PM    A-G Ratio 0.6 01/26/2018 01:02 PM    ALT (SGPT) 29 01/26/2018 01:02 PM       .    Assessment/Plan:    1. Mediastinal LAD, liver lesion, diffuse colitis/ diarrhea, bone lesions with no tissue dx of cancer    Reviewed need for tissue dx and studies to try to figure out site of origin of possible cance     GI eval done for CT changes/ colitis / diarrhea ? Colon primary. [place] pending as pt improves. Consider CT guided liver biopsy for tissue dx. May be easiest site. Clinically pt is slowly improving with supportive care. May be ready for CT guided biopsy tmw.      Discussed all with pt and daughter at bedside this am. Treatment plan pending path dx of cancer. 2  Anemia due to colitis/ chronic disease. Better post tx.     3.  Diarrhea due to colitis. Per IM/ GI. Better per daughter. 4.  Smoker. Needs to quit. We will follow. Call if questions over weekend. Primary onc team here to see pt in am.           ICD-10-CM ICD-9-CM    1. Hypokalemia E87.6 276.8    2. Hypomagnesemia E83.42 275.2    3. Metastatic cancer (HCC) C79.9 199.1    4. Noninfectious gastroenteritis, unspecified type K52.9 558.9    5. Liver lesion K76.9 573.8    6. Mediastinal lymphadenopathy R59.0 785.6    7. Bone lesion M89.9 733.90    8. Colitis K52.9 558.9    9.  Anemia, unspecified type D64.9 285.9    10. Weakness generalized R53.1 780.79        Current Facility-Administered Medications   Medication Dose Route Frequency    dextrose 5% - 0.9% NaCl with KCl 20 mEq/L infusion  100 mL/hr IntraVENous CONTINUOUS    potassium chloride SR (KLOR-CON 10) tablet 40 mEq  40 mEq Oral Q4H    0.9% sodium chloride infusion 250 mL  250 mL IntraVENous PRN    metroNIDAZOLE (FLAGYL) IVPB premix 500 mg  500 mg IntraVENous Q8H    levoFLOXacin (LEVAQUIN) 750 mg in D5W IVPB  750 mg IntraVENous Q24H    sodium chloride (NS) flush 5-10 mL  5-10 mL IntraVENous Q8H    sodium chloride (NS) flush 5-10 mL  5-10 mL IntraVENous PRN    acetaminophen (TYLENOL) tablet 650 mg  650 mg Oral Q4H PRN    oxyCODONE-acetaminophen (PERCOCET) 5-325 mg per tablet 1 Tab  1 Tab Oral Q4H PRN    HYDROmorphone (DILAUDID) injection 0.5 mg  0.5 mg IntraVENous Q4H PRN    prochlorperazine (COMPAZINE) injection 10 mg  10 mg IntraVENous Q6H PRN    zolpidem (AMBIEN) tablet 5 mg  5 mg Oral QHS PRN    enoxaparin (LOVENOX) injection 30 mg  30 mg SubCUTAneous DAILY    diphenhydrAMINE (BENADRYL) capsule 25 mg  25 mg Oral Q6H PRN    buPROPion XL (WELLBUTRIN XL) tablet 150 mg  150 mg Oral 7am    magnesium oxide (MAG-OX) tablet 400 mg  400 mg Oral BID    Thiamine Mononitrate (B-1) tablet 100 mg  100 mg Oral DAILY    ezetimibe (ZETIA) tablet 10 mg  10 mg Oral DAILY    folic acid (FOLVITE) tablet 1 mg  1 mg Oral DAILY         There are no outpatient Patient Instructions on file for this admission. Follow-up Disposition: Not on File    No name on file.

## 2018-01-28 NOTE — PROGRESS NOTES
Problem: Mobility Impaired (Adult and Pediatric)  Goal: *Acute Goals and Plan of Care (Insert Text)  Physical Therapy Goals  Initiated 1/28/2018  1. Patient will move from supine to sit and sit to supine  and roll side to side in bed with modified independence within 7 day(s). 2.  Patient will transfer from bed to chair and chair to bed with modified independence using the least restrictive device within 7 day(s). 3.  Patient will perform sit to stand with modified independence within 7 day(s). 4.  Patient will ambulate with modified independence for 150 feet with the least restrictive device within 7 day(s). 5.  Patient will ascend/descend 3 stairs with 2 handrail(s) with modified independence within 7 day(s). physical Therapy EVALUATION  Patient: Belkys Mcgarry (51 y.o. female)  Date: 1/28/2018  Primary Diagnosis: Metastatic cancer [facility])        Precautions:   Fall    ASSESSMENT :  Based on the objective data described below, the patient presents with decreased endurance, balance and strength, body pain and aches particulary back and knees, and overall decreased activity tolerance following admission for metastatic cancer. Pt lives with her  (who is gone at night due to work) and was previously independent with functional mobility, although daughter reports increased need for assistance over the last few weeks. Pt performed bed mobility with moderate assistance, good sitting balance at EOB. Stood with min A x2 and ambulated to doorway of room and back to chair with bilateral hand-held assist progressing to R hand only, mildly unsteady and with forward flexed posture. One episode of LOB requiring Mod A to regain balance when turning in hallway. Pt requesting to have a rollator at home so she can have the seat when she gets tired. Pt fatigued easily with mobility limited distance, and c/o mild dizziness with transitions although BP stable, dizziness subsided quickly.  Educated pt and daughter of benefits of being OOB to chair to prevent deconditioning and improve lung function, pt agreeable to be up and to call for assistance prior to transferring. Overall pt's functional performance limited at this time compared to baseline by decreased balance, endurance and strength. Patient will benefit from skilled intervention to address the above impairments. Recommend rehab vs. [facility] Anabel pending progress. Patient will benefit from skilled intervention to address the above impairments. Patients rehabilitation potential is considered to be Good  Factors which may influence rehabilitation potential include:   []         None noted  []         Mental ability/status  []         Medical condition  []         Home/family situation and support systems  []         Safety awareness  []         Pain tolerance/management  []         Other:      PLAN :  Recommendations and Planned Interventions:  []           Bed Mobility Training             []    Neuromuscular Re-Education  []           Transfer Training                   []    Orthotic/Prosthetic Training  []           Gait Training                         []    Modalities  []           Therapeutic Exercises           []    Edema Management/Control  []           Therapeutic Activities            []    Patient and Family Training/Education  []           Other (comment):    Frequency/Duration: Patient will be followed by physical therapy  5 times a week to address goals. Discharge Recommendations: Home Health and To Be Determined  Further Equipment Recommendations for Discharge: rollator-has not been ordered     SUBJECTIVE:   Patient stated I would like the walker with the seat on it.     OBJECTIVE DATA SUMMARY:   HISTORY:    Past Medical History:   Diagnosis Date    Anemia 4/04    Anemia NEC     Insomnia 11/09    PPD positive     treated w/ INH- tests since have been negative    Pure hypercholesterolemia     Scoliosis 12/28/15    [facility]    Tobacco dependence     Unspecified essential hypertension     Uterine bleeding, dysfunctional 2/12    Uterine fibroid      Past Surgical History:   Procedure Laterality Date    HX CHOLECYSTECTOMY      HX COLONOSCOPY  07/01/2013    MCV, repeat 10 years    HX ORTHOPAEDIC      Shattered bilateral ankles-metal plates & screws    HX OTHER SURGICAL      Benign cyst removal- L. foot    LEG/ANKLE SURGERY PROC UNLISTED       Prior Level of Function/Home Situation: Independent up until recently. Personal factors and/or comorbidities impacting plan of care: metastatic cancer    Home Situation  Home Environment: Private residence  # Steps to Enter: 3  Rails to Enter: Yes  Hand Rails : Bilateral  One/Two Story Residence: One story  Living Alone: No  Support Systems: Spouse/Significant Other/Partner  Patient Expects to be Discharged to[de-identified] Private residence  Current DME Used/Available at Home: Cane, straight  Tub or Shower Type: Tub/Shower combination    EXAMINATION/PRESENTATION/DECISION MAKING:   Critical Behavior:  Neurologic State: Alert  Orientation Level: Oriented X4  Cognition: Follows commands  Safety/Judgement: Awareness of environment, Fall prevention, Home safety, Decreased insight into deficits  Hearing: Auditory  Auditory Impairment: None    Range Of Motion:  AROM: Generally decreased, functional                       Strength:    Strength: Generally decreased, functional                    Tone & Sensation:   Tone: Normal              Sensation: Intact               Coordination:  Coordination: Generally decreased, functional  Vision:      Functional Mobility:  Bed Mobility:  Rolling: Minimum assistance  Supine to Sit: Moderate assistance        Transfers:  Sit to Stand: Minimum assistance;Assist x2  Stand to Sit: Minimum assistance        Bed to Chair: Minimum assistance              Balance:   Sitting: Intact; With support  Standing: Impaired; Without support  Standing - Static: Fair;Constant support  Standing - Dynamic : Fair  Ambulation/Gait Training:  Distance (ft): 45 Feet (ft)  Assistive Device: Gait belt; Other (comment) (bilateral HHA)  Ambulation - Level of Assistance: Minimal assistance;Assist x2; Additional time        Gait Abnormalities: Decreased step clearance;Trunk sway increased        Base of Support: Narrowed     Speed/Diana: Pace decreased (<100 feet/min); Slow  Step Length: Right shortened;Left shortened                Functional Measure:  Tinetti test:    Sitting Balance: 1  Arises: 1  Attempts to Rise: 0  Immediate Standing Balance: 1  Standing Balance: 0  Nudged: 1  Eyes Closed: 0  Turn 360 Degrees - Continuous/Discontinuous: 0  Turn 360 Degrees - Steady/Unsteady: 0  Sitting Down: 1  Balance Score: 5  Indication of Gait: 0  R Step Length/Height: 0  L Step Length/Height: 0  R Foot Clearance: 1  L Foot Clearance: 1  Step Symmetry: 1  Step Continuity: 0  Path: 0  Trunk: 0  Walking Time: 0  Gait Score: 3  Total Score: 8       Tinetti Test and G-code impairment scale:  Percentage of Impairment CH    0%   CI    1-19% CJ    20-39% CK    40-59% CL    60-79% CM    80-99% CN     100%   Tinetti  Score 0-28 28 23-27 17-22 12-16 6-11 1-5 0       Tinetti Tool Score Risk of Falls  <19 = High Fall Risk  19-24 = Moderate Fall Risk  25-28 = Low Fall Risk  Tinetti ME. Performance-Oriented Assessment of Mobility Problems in Elderly Patients. Loo 66; [id number]. (Scoring Description: PT Bulletin Feb. 10, 1993)    Older adults: Patrice Solorio et al, 2009; n = 1000 [facility] elderly evaluated with ABC, SALVADOR, ADL, and IADL)  · Mean SALVADOR score for males aged 69-68 years = 26.21(3.40)  · Mean SALVADOR score for females age 69-68 years = 25.16(4.30)  · Mean SALVADOR score for males over 80 years = 23.29(6.02)  · Mean SALVADOR score for females over 80 years = 17.20(8.32)         G codes:   In compliance with CMSs Claims Based Outcome Reporting, the following G-code set was chosen for this patient based on their primary functional limitation being treated: The outcome measure chosen to determine the severity of the functional limitation was the Tinetti with a score of 8/28 which was correlated with the impairment scale. ? Mobility - Walking and Moving Around:     - CURRENT STATUS: CL - 60%-79% impaired, limited or restricted    - GOAL STATUS: CK - 40%-59% impaired, limited or restricted    - D/C STATUS:  ---------------To be determined---------------      Physical Therapy Evaluation Charge Determination   History Examination Presentation Decision-Making   MEDIUM  Complexity : 1-2 comorbidities / personal factors will impact the outcome/ POC  MEDIUM Complexity : 3 Standardized tests and measures addressing body structure, function, activity limitation and / or participation in recreation  LOW Complexity : Stable, uncomplicated  LOW Complexity : FOTO score of       Based on the above components, the patient evaluation is determined to be of the following complexity level: LOW           Activity Tolerance:   Good  Please refer to the flowsheet for vital signs taken during this treatment. After treatment:   [x]         Patient left in no apparent distress sitting up in chair  []         Patient left in no apparent distress in bed  [x]         Call bell left within reach  [x]         Nursing notified  []         Caregiver present  []         Bed alarm activated    COMMUNICATION/EDUCATION:   The patients plan of care was discussed with: Registered Nurse. [x]         Fall prevention education was provided and the patient/caregiver indicated understanding. [x]         Patient/family have participated as able in goal setting and plan of care. [x]         Patient/family agree to work toward stated goals and plan of care. []         Patient understands intent and goals of therapy, but is neutral about his/her participation. []         Patient is unable to participate in goal setting and plan of care.     Thank you for this referral.  Mary Fernandez, PT   Time Calculation: 25 mins

## 2018-01-28 NOTE — ROUTINE PROCESS
Bedside and Verbal shift change report given to Giles Blount RN (oncoming nurse) by Praveen Segovia (offgoing nurse). Report included the following information SBAR, Kardex, Intake/Output and MAR.

## 2018-01-28 NOTE — PROGRESS NOTES
ANNEMARIE Cifuentes MD  [phone] office       Gastroenterology Progress Note    January 28, 2018  Admit Date: 1/26/2018         Narrative Assessment and Plan   65year old female who presented with weakness and hypokalemia and was found to have possible metastatic malignancy on CT. She appears cachectic. She is somewhat improved today. Nursing reports small volume stools, not watery diarrhea. A colonoscopy is indicated, but would continue to hold off on a prep until her potassium is corrected. A liver biopsy would be the best first step for a diagnosis. GI will follow. Subjective:   · No new concerns today. No significant abdominal pain. Still with loose stools. Breathing is stable. No fever. ROS otherwise negative.      Current Medications:     Current Facility-Administered Medications   Medication Dose Route Frequency    dextrose 5% - 0.9% NaCl with KCl 20 mEq/L infusion  100 mL/hr IntraVENous CONTINUOUS    0.9% sodium chloride infusion 250 mL  250 mL IntraVENous PRN    metroNIDAZOLE (FLAGYL) IVPB premix 500 mg  500 mg IntraVENous Q8H    levoFLOXacin (LEVAQUIN) 750 mg in D5W IVPB  750 mg IntraVENous Q24H    sodium chloride (NS) flush 5-10 mL  5-10 mL IntraVENous Q8H    sodium chloride (NS) flush 5-10 mL  5-10 mL IntraVENous PRN    acetaminophen (TYLENOL) tablet 650 mg  650 mg Oral Q4H PRN    oxyCODONE-acetaminophen (PERCOCET) 5-325 mg per tablet 1 Tab  1 Tab Oral Q4H PRN    HYDROmorphone (DILAUDID) injection 0.5 mg  0.5 mg IntraVENous Q4H PRN    prochlorperazine (COMPAZINE) injection 10 mg  10 mg IntraVENous Q6H PRN    zolpidem (AMBIEN) tablet 5 mg  5 mg Oral QHS PRN    enoxaparin (LOVENOX) injection 30 mg  30 mg SubCUTAneous DAILY    diphenhydrAMINE (BENADRYL) capsule 25 mg  25 mg Oral Q6H PRN    buPROPion XL (WELLBUTRIN XL) tablet 150 mg  150 mg Oral 7am    magnesium oxide (MAG-OX) tablet 400 mg  400 mg Oral BID    Thiamine Mononitrate (B-1) tablet 100 mg  100 mg Oral DAILY    ezetimibe (ZETIA) tablet 10 mg  10 mg Oral DAILY    folic acid (FOLVITE) tablet 1 mg  1 mg Oral DAILY       Objective:     VITALS:   Last 24hrs VS reviewed since prior progress note. Most recent are:  Visit Vitals    /86 (BP 1 Location: Right arm, BP Patient Position: At rest)    Pulse 92    Temp 98.2 °F (36.8 °C)    Resp 20    Ht 5' 1\" (1.549 m)    Wt 44.9 kg (98 lb 15.8 oz)    SpO2 100%    BMI 18.7 kg/m2     Temp (24hrs), Av.6 °F (37 °C), Min:98.2 °F (36.8 °C), Max:99.2 °F (37.3 °C)      Intake/Output Summary (Last 24 hours) at 18 1633  Last data filed at 18 0055   Gross per 24 hour   Intake                0 ml   Output              250 ml   Net             -250 ml       EXAM:  General: no distress, thin, chronically ill appearing  Skin:  No rash or jaundice  HEENT: Pupils equal, sclera anicteric  Cardiovascular: Regular, well perfused, no edema  Respiratory:  Clear bilaterally, normal respiratory effort  GI:  Abdomen soft, nondistended, nontender  Musculoskeletal:  No skeletal deformity nor acute arthritis noted.   Neurological:  Motor and sensory function intact in upper extremities  Psychiatric:  Flat affect, memory intact, appears to have insight into current illness    Lab Data Reviewed:   Recent Labs      18   0154  18   0536  18   1302   WBC  6.6  4.3  6.8   HGB  8.5*  7.0*  9.5*   HCT  27.3*  23.4*  31.3*   PLT  166  165  235     Recent Labs      18   0154  18   0536  18   1302   NA  149*  144  144   K  2.1*  <1.8*  <1.8*   CL  115*  110*  106   CO2  24  27  29   GLU  93  96  112*   BUN  3*  4*  4*   CREA  0.60  0.63  0.75   CA  6.7*  6.7*  7.8*   MG  1.8  1.9  1.4*   PHOS  2.1*  2.4*   --    ALB   --    --   2.1*   TBILI   --    --   0.7   SGOT   --    --   61*   ALT   --    --   29     Lab Results   Component Value Date/Time    Glucose (POC) 91 2018 04:08 PM    Glucose (POC) 107 2018 01:08 PM     No results for input(s): PH, PCO2, PO2, HCO3, FIO2 in the last 72 hours. No results for input(s): INR in the last 72 hours.     No lab exists for component: INREXT        Assessment:   (See above)  Principal Problem:    Metastatic cancer ([address] (1/26/2018)    Active Problems:    Iron deficiency anemia (2/8/2012)      Benign essential HTN (6/26/2014)      Hyperlipidemia (6/26/2014)      Weight loss (1/26/2018)      Hypokalemia due to loss of potassium (1/26/2018)      Hypomagnesemia (1/26/2018)      Debility (1/26/2018)      Colitis (1/27/2018)      Lactic acid acidosis (1/27/2018)      Elevated troponin (1/27/2018)      Protein-calorie malnutrition, severe ([address] (1/27/2018)      Hypotension (1/27/2018)      Emphysema of lung ([address] (1/27/2018)        Plan:   (See above)      Signed By: Maryam Eastman MD     1/28/2018  4:33 PM

## 2018-01-29 ENCOUNTER — ANESTHESIA EVENT (OUTPATIENT)
Dept: ENDOSCOPY | Age: 58
DRG: 391 | End: 2018-01-29
Payer: COMMERCIAL

## 2018-01-29 ENCOUNTER — ANESTHESIA (OUTPATIENT)
Dept: ENDOSCOPY | Age: 58
DRG: 391 | End: 2018-01-29
Payer: COMMERCIAL

## 2018-01-29 LAB
ANION GAP SERPL CALC-SCNC: 9 MMOL/L (ref 5–15)
APTT PPP: 38.3 SEC (ref 22.1–32.5)
BUN SERPL-MCNC: 1 MG/DL (ref 6–20)
BUN/CREAT SERPL: 2 (ref 12–20)
CALCIUM SERPL-MCNC: 6.9 MG/DL (ref 8.5–10.1)
CHLORIDE SERPL-SCNC: 119 MMOL/L (ref 97–108)
CO2 SERPL-SCNC: 20 MMOL/L (ref 21–32)
CREAT SERPL-MCNC: 0.63 MG/DL (ref 0.55–1.02)
GLUCOSE SERPL-MCNC: 120 MG/DL (ref 65–100)
INR PPP: 1.2 (ref 0.9–1.1)
POTASSIUM SERPL-SCNC: 3 MMOL/L (ref 3.5–5.1)
PROTHROMBIN TIME: 12.5 SEC (ref 9–11.1)
SODIUM SERPL-SCNC: 148 MMOL/L (ref 136–145)
THERAPEUTIC RANGE,PTTT: ABNORMAL SECS (ref 58–77)

## 2018-01-29 PROCEDURE — 88305 TISSUE EXAM BY PATHOLOGIST: CPT | Performed by: INTERNAL MEDICINE

## 2018-01-29 PROCEDURE — 74011250637 HC RX REV CODE- 250/637: Performed by: INTERNAL MEDICINE

## 2018-01-29 PROCEDURE — 85730 THROMBOPLASTIN TIME PARTIAL: CPT | Performed by: NURSE PRACTITIONER

## 2018-01-29 PROCEDURE — 74011250636 HC RX REV CODE- 250/636: Performed by: INTERNAL MEDICINE

## 2018-01-29 PROCEDURE — 88173 CYTOPATH EVAL FNA REPORT: CPT | Performed by: INTERNAL MEDICINE

## 2018-01-29 PROCEDURE — 77030015483 HC NDL ASPIR OCOA -B: Performed by: INTERNAL MEDICINE

## 2018-01-29 PROCEDURE — 77030009046 HC CATH BRNCH BLLN OCOA -B: Performed by: INTERNAL MEDICINE

## 2018-01-29 PROCEDURE — 65270000029 HC RM PRIVATE

## 2018-01-29 PROCEDURE — 76060000032 HC ANESTHESIA 0.5 TO 1 HR: Performed by: INTERNAL MEDICINE

## 2018-01-29 PROCEDURE — 85610 PROTHROMBIN TIME: CPT | Performed by: NURSE PRACTITIONER

## 2018-01-29 PROCEDURE — 74011000250 HC RX REV CODE- 250

## 2018-01-29 PROCEDURE — 07D74ZX EXTRACTION OF THORAX LYMPHATIC, PERCUTANEOUS ENDOSCOPIC APPROACH, DIAGNOSTIC: ICD-10-PCS | Performed by: INTERNAL MEDICINE

## 2018-01-29 PROCEDURE — 74011250636 HC RX REV CODE- 250/636

## 2018-01-29 PROCEDURE — 76040000007: Performed by: INTERNAL MEDICINE

## 2018-01-29 PROCEDURE — 76450000000

## 2018-01-29 PROCEDURE — 74011000250 HC RX REV CODE- 250: Performed by: INTERNAL MEDICINE

## 2018-01-29 PROCEDURE — 36415 COLL VENOUS BLD VENIPUNCTURE: CPT | Performed by: INTERNAL MEDICINE

## 2018-01-29 PROCEDURE — 80048 BASIC METABOLIC PNL TOTAL CA: CPT | Performed by: INTERNAL MEDICINE

## 2018-01-29 RX ORDER — SODIUM CHLORIDE 9 MG/ML
INJECTION, SOLUTION INTRAVENOUS
Status: DISCONTINUED | OUTPATIENT
Start: 2018-01-29 | End: 2018-01-29 | Stop reason: HOSPADM

## 2018-01-29 RX ORDER — POTASSIUM CHLORIDE 750 MG/1
40 TABLET, FILM COATED, EXTENDED RELEASE ORAL EVERY 4 HOURS
Status: COMPLETED | OUTPATIENT
Start: 2018-01-29 | End: 2018-01-29

## 2018-01-29 RX ORDER — SUCCINYLCHOLINE CHLORIDE 20 MG/ML
INJECTION INTRAMUSCULAR; INTRAVENOUS AS NEEDED
Status: DISCONTINUED | OUTPATIENT
Start: 2018-01-29 | End: 2018-01-29 | Stop reason: HOSPADM

## 2018-01-29 RX ORDER — LIDOCAINE HYDROCHLORIDE 20 MG/ML
2 INJECTION, SOLUTION INFILTRATION; PERINEURAL
Status: DISCONTINUED | OUTPATIENT
Start: 2018-01-29 | End: 2018-01-29 | Stop reason: HOSPADM

## 2018-01-29 RX ORDER — LIDOCAINE HYDROCHLORIDE 20 MG/ML
INJECTION, SOLUTION EPIDURAL; INFILTRATION; INTRACAUDAL; PERINEURAL AS NEEDED
Status: DISCONTINUED | OUTPATIENT
Start: 2018-01-29 | End: 2018-01-29 | Stop reason: HOSPADM

## 2018-01-29 RX ORDER — PROPOFOL 10 MG/ML
INJECTION, EMULSION INTRAVENOUS
Status: DISCONTINUED | OUTPATIENT
Start: 2018-01-29 | End: 2018-01-29 | Stop reason: HOSPADM

## 2018-01-29 RX ORDER — SODIUM CHLORIDE 9 MG/ML
50 INJECTION, SOLUTION INTRAVENOUS CONTINUOUS
Status: DISCONTINUED | OUTPATIENT
Start: 2018-01-29 | End: 2018-02-02 | Stop reason: HOSPADM

## 2018-01-29 RX ORDER — LIDOCAINE HYDROCHLORIDE 40 MG/ML
SOLUTION TOPICAL ONCE
Status: DISCONTINUED | OUTPATIENT
Start: 2018-01-29 | End: 2018-01-29 | Stop reason: HOSPADM

## 2018-01-29 RX ORDER — LIDOCAINE HYDROCHLORIDE AND EPINEPHRINE 20; 10 MG/ML; UG/ML
2 INJECTION, SOLUTION INFILTRATION; PERINEURAL AS NEEDED
Status: DISCONTINUED | OUTPATIENT
Start: 2018-01-29 | End: 2018-01-29 | Stop reason: HOSPADM

## 2018-01-29 RX ORDER — LIDOCAINE HYDROCHLORIDE 20 MG/ML
JELLY TOPICAL ONCE
Status: DISCONTINUED | OUTPATIENT
Start: 2018-01-29 | End: 2018-01-29 | Stop reason: HOSPADM

## 2018-01-29 RX ORDER — PROPOFOL 10 MG/ML
INJECTION, EMULSION INTRAVENOUS AS NEEDED
Status: DISCONTINUED | OUTPATIENT
Start: 2018-01-29 | End: 2018-01-29 | Stop reason: HOSPADM

## 2018-01-29 RX ADMIN — LIDOCAINE HYDROCHLORIDE 60 MG: 20 INJECTION, SOLUTION EPIDURAL; INFILTRATION; INTRACAUDAL; PERINEURAL at 14:49

## 2018-01-29 RX ADMIN — BUPROPION HYDROCHLORIDE 150 MG: 150 TABLET, FILM COATED, EXTENDED RELEASE ORAL at 06:30

## 2018-01-29 RX ADMIN — FOLIC ACID 1 MG: 1 TABLET ORAL at 09:27

## 2018-01-29 RX ADMIN — EZETIMIBE 10 MG: 10 TABLET ORAL at 09:27

## 2018-01-29 RX ADMIN — Medication 400 MG: at 09:27

## 2018-01-29 RX ADMIN — Medication 400 MG: at 17:12

## 2018-01-29 RX ADMIN — DEXTROSE MONOHYDRATE, SODIUM CHLORIDE, AND POTASSIUM CHLORIDE 100 ML/HR: 50; 9; 1.49 INJECTION, SOLUTION INTRAVENOUS at 09:28

## 2018-01-29 RX ADMIN — PROPOFOL 50 MCG/KG/MIN: 10 INJECTION, EMULSION INTRAVENOUS at 14:54

## 2018-01-29 RX ADMIN — METRONIDAZOLE 500 MG: 500 INJECTION, SOLUTION INTRAVENOUS at 17:11

## 2018-01-29 RX ADMIN — POTASSIUM CHLORIDE 40 MEQ: 750 TABLET, FILM COATED, EXTENDED RELEASE ORAL at 12:33

## 2018-01-29 RX ADMIN — SODIUM CHLORIDE: 9 INJECTION, SOLUTION INTRAVENOUS at 15:12

## 2018-01-29 RX ADMIN — POTASSIUM CHLORIDE 40 MEQ: 750 TABLET, FILM COATED, EXTENDED RELEASE ORAL at 09:27

## 2018-01-29 RX ADMIN — OXYCODONE HYDROCHLORIDE AND ACETAMINOPHEN 1 TABLET: 5; 325 TABLET ORAL at 21:29

## 2018-01-29 RX ADMIN — SUCCINYLCHOLINE CHLORIDE 60 MG: 20 INJECTION INTRAMUSCULAR; INTRAVENOUS at 14:50

## 2018-01-29 RX ADMIN — METRONIDAZOLE 500 MG: 500 INJECTION, SOLUTION INTRAVENOUS at 09:27

## 2018-01-29 RX ADMIN — LEVOFLOXACIN 750 MG: 5 INJECTION, SOLUTION INTRAVENOUS at 09:27

## 2018-01-29 RX ADMIN — METRONIDAZOLE 500 MG: 500 INJECTION, SOLUTION INTRAVENOUS at 00:15

## 2018-01-29 RX ADMIN — POTASSIUM CHLORIDE 40 MEQ: 750 TABLET, FILM COATED, EXTENDED RELEASE ORAL at 17:11

## 2018-01-29 RX ADMIN — SODIUM CHLORIDE: 9 INJECTION, SOLUTION INTRAVENOUS at 14:40

## 2018-01-29 RX ADMIN — Medication 10 ML: at 21:30

## 2018-01-29 RX ADMIN — ENOXAPARIN SODIUM 30 MG: 30 INJECTION SUBCUTANEOUS at 21:29

## 2018-01-29 RX ADMIN — Medication 100 MG: at 09:27

## 2018-01-29 RX ADMIN — PROPOFOL 100 MG: 10 INJECTION, EMULSION INTRAVENOUS at 14:49

## 2018-01-29 NOTE — PROGRESS NOTES
The left arm with the infiltrated iv site is slowly improving swelling is gone but bruising still remains but is some better today. Dr. Anthony Brito was made aware of the progress and wants me to cont to monitor. I will report back with changes.

## 2018-01-29 NOTE — PROGRESS NOTES
GI note    K still too low to allow safe colonoscopy prep. Multifocal nature of disease would call into question lymphoma as opposed to GI primary. Hopefully we can prep her tomorrow for colonoscopy Wednesday. Following.   Karl eWiss MD

## 2018-01-29 NOTE — CONSULTS
Name: Pancho Horse: [address]   : 1960 Admit Date: 2018   Phone: [phone]  Room: 4/01   PCP: Ajay Hernandez NP  MRN: [id number]   Date: 2018  Code: Full Code          Chart and notes reviewed. Data reviewed. I review the patient's current medications in the medical record at each encounter. I have evaluated and examined the patient. HPI:    11:46 AM       History was obtained from patient and chart. I was asked by Rosie Clifford MD to see Loraine Matias in consultation for a chief complaint of mediastinal adenopathy. Ms. Chanda Cheng is a pleasant 65year old female who presented to the [facility] ED with weakness and weight loss of about 20lbs over the past year. K was also noted to be 1.6 in the ED. Has had non-bloody diarrhea as well. There was initially concern over potential malignant process, but further review by radiology points toward possible sarcoid or infectious process. Patient was initially scheduled for liver biopsy, but that was canceled in favor of EBUS. Patient denies history of lung disease. Does have history of + PPD which was treated with INH and reports subsequent tests have been negative. She is a current 1/2 PPD smoker. Does not use home O2 or inhalers. Chest CT is personally visualized. There is extensive mediastinal and hilar adenopathy with mild calcification. There is emphysema.     Hgb 8.5 (7.0 at admission); ferritin and TIBC elevated; iron normal  K 3.0  Lactic acid 1.9 (2.3 at admission)  INR 1.2   LFTs elevated  Trop 0.08, down from 0.11    Past Medical History:   Diagnosis Date    Anemia     Anemia NEC     Insomnia     PPD positive     treated w/ INH- tests since have been negative    Pure hypercholesterolemia     Scoliosis 12/28/15    [facility]    Tobacco dependence     Unspecified essential hypertension     Uterine bleeding, dysfunctional 2/12    Uterine fibroid        Past Surgical History:   Procedure Laterality Date    HX CHOLECYSTECTOMY      HX COLONOSCOPY  07/01/2013    MCV, repeat 10 years    HX ORTHOPAEDIC      Shattered bilateral ankles-metal plates & screws    HX OTHER SURGICAL      Benign cyst removal- L. foot    LEG/ANKLE SURGERY PROC UNLISTED         Family History   Problem Relation Age of Onset    Hypertension Mother     Diabetes Mother     Cancer Mother      breast    Heart Disease Father      MI 42's    Kidney Disease Father     Hypertension Son        Social History   Substance Use Topics    Smoking status: Current Every Day Smoker     Packs/day: 0.50     Years: 17.00    Smokeless tobacco: Never Used    Alcohol use 1.2 oz/week     1 Cans of beer, 1 Shots of liquor per week      Comment: Socially       Allergies   Allergen Reactions    Robaxin [Methocarbamol] Hives, Rash and Itching     Red splotches    Statins-Hmg-Coa Reductase Inhibitors Myalgia    Codeine Itching    Neosporin [Neomycin-Bacitracin-Polymyxin] Rash       Current Facility-Administered Medications   Medication Dose Route Frequency    potassium chloride SR (KLOR-CON 10) tablet 40 mEq  40 mEq Oral Q4H    dextrose 5% - 0.9% NaCl with KCl 20 mEq/L infusion  100 mL/hr IntraVENous CONTINUOUS    0.9% sodium chloride infusion 250 mL  250 mL IntraVENous PRN    metroNIDAZOLE (FLAGYL) IVPB premix 500 mg  500 mg IntraVENous Q8H    levoFLOXacin (LEVAQUIN) 750 mg in D5W IVPB  750 mg IntraVENous Q24H    sodium chloride (NS) flush 5-10 mL  5-10 mL IntraVENous Q8H    sodium chloride (NS) flush 5-10 mL  5-10 mL IntraVENous PRN    acetaminophen (TYLENOL) tablet 650 mg  650 mg Oral Q4H PRN    oxyCODONE-acetaminophen (PERCOCET) 5-325 mg per tablet 1 Tab  1 Tab Oral Q4H PRN    HYDROmorphone (DILAUDID) injection 0.5 mg  0.5 mg IntraVENous Q4H PRN    prochlorperazine (COMPAZINE) injection 10 mg  10 mg IntraVENous Q6H PRN    zolpidem (AMBIEN) tablet 5 mg  5 mg Oral QHS PRN    enoxaparin (LOVENOX) injection 30 mg  30 mg SubCUTAneous DAILY    diphenhydrAMINE (BENADRYL) capsule 25 mg  25 mg Oral Q6H PRN    buPROPion XL (WELLBUTRIN XL) tablet 150 mg  150 mg Oral 7am    magnesium oxide (MAG-OX) tablet 400 mg  400 mg Oral BID    Thiamine Mononitrate (B-1) tablet 100 mg  100 mg Oral DAILY    ezetimibe (ZETIA) tablet 10 mg  10 mg Oral DAILY    folic acid (FOLVITE) tablet 1 mg  1 mg Oral DAILY         REVIEW OF SYSTEMS   12 point ROS negative except as stated in the HPI. Physical Exam:   Visit Vitals    /79 (BP 1 Location: Right arm, BP Patient Position: At rest)    Pulse 95    Temp 98.6 °F (37 °C)    Resp 24    Ht 5' 1\" (1.549 m)    Wt 44.9 kg (98 lb 15.8 oz)    SpO2 99%    BMI 18.7 kg/m2       General:  Alert, weak/frail, ill appearing, cooperative, no distress, appears stated age. Head:  Normocephalic, without obvious abnormality, atraumatic. Eyes:  Conjunctivae/corneas clear. Nose: Nares normal. Septum midline. Mucosa normal.    Throat: Lips, mucosa, and tongue normal.    Neck: Supple, symmetrical, trachea midline, no adenopathy. Lungs:   Decreased, but clear to auscultation bilaterally. No wheeze or rales. Chest wall:  No tenderness or deformity. Heart:  Regular rate and rhythm, S1, S2 normal, no murmur, click, rub or gallop. Abdomen:   Soft, non-tender. Bowel sounds normal. No masses,  No organomegaly. Extremities: Extremities normal, atraumatic, no cyanosis or edema. Pulses: 2+ and symmetric all extremities.    Skin: Skin color, texture, turgor normal. No rashes or lesions   Lymph nodes: Cervical, supraclavicular nodes normal.   Neurologic: Grossly nonfocal       Lab Results   Component Value Date/Time    Sodium 148 01/29/2018 06:10 AM    Potassium 3.0 01/29/2018 06:10 AM    Chloride 119 01/29/2018 06:10 AM    CO2 20 01/29/2018 06:10 AM    BUN 1 01/29/2018 06:10 AM    Creatinine 0.63 01/29/2018 06:10 AM    Glucose 120 01/29/2018 06:10 AM    Calcium 6.9 01/29/2018 06:10 AM    Magnesium 1.8 01/28/2018 01:54 AM    Phosphorus 2.1 01/28/2018 01:54 AM    Lactic acid 1.9 01/27/2018 08:55 AM       Lab Results   Component Value Date/Time    WBC 6.6 01/28/2018 01:54 AM    HGB 8.5 01/28/2018 01:54 AM    PLATELET 094 04/04/1713 01:54 AM    MCV 87.5 01/28/2018 01:54 AM       Lab Results   Component Value Date/Time    INR 1.2 01/29/2018 10:03 AM    aPTT 38.3 01/29/2018 10:03 AM    AST (SGOT) 61 01/26/2018 01:02 PM    Alk.  phosphatase 131 01/26/2018 01:02 PM    Protein, total 5.7 01/26/2018 01:02 PM    Albumin 2.1 01/26/2018 01:02 PM    Globulin 3.6 01/26/2018 01:02 PM       Lab Results   Component Value Date/Time    Iron 105 01/25/2018 01:17 PM    TIBC <122 01/25/2018 01:17 PM    Iron % saturation >86 01/25/2018 01:17 PM    Ferritin 333 01/25/2018 01:17 PM       Lab Results   Component Value Date/Time    Sed rate (ESR) 15 01/25/2018 01:17 PM    TSH 4.350 01/25/2018 01:16 PM        No results found for: PH, PHI, PCO2, PCO2I, PO2, PO2I, HCO3, HCO3I, FIO2, FIO2I    Lab Results   Component Value Date/Time    Troponin-I, Qt. 0.08 01/27/2018 05:36 AM        Lab Results   Component Value Date/Time    Culture result:  01/28/2018 08:25 AM     NO ROUTINE ENTERIC PATHOGENS ISOLATED INCLUDING SALMONELLA, SHIGELLA, YERSINIA, VIBRIO OR SHIGA TOXIN PRODUCING E. COLI , SO FAR    Culture result: MARKEDLY REDUCED COLIFORMS NOTED 01/28/2018 08:25 AM       No results found for: TOXA1, RPR, HBCM, HBSAG, HAAB, HCAB1, HAAT, G6PD, CRYAC, HIVGT, HIVR, HIV1, HIV12, HIVPC, HIVRPI    No results found for: VANCT, CPK    Lab Results   Component Value Date/Time    Color DARK YELLOW 01/26/2018 01:13 PM    Appearance CLEAR 01/26/2018 01:13 PM    pH (UA) 7.0 01/26/2018 01:13 PM    Protein TRACE 01/26/2018 01:13 PM    Glucose NEGATIVE  01/26/2018 01:13 PM    Ketone TRACE 01/26/2018 01:13 PM    Bilirubin Negative 09/28/2012 08:58 AM    Blood NEGATIVE  01/26/2018 01:13 PM    Urobilinogen 1.0 01/26/2018 01:13 PM    Nitrites NEGATIVE  01/26/2018 01:13 PM    Leukocyte Esterase NEGATIVE  01/26/2018 01:13 PM    WBC 0-4 01/26/2018 01:13 PM    RBC 0-5 01/26/2018 01:13 PM    Epithelial cells >10 09/28/2012 08:58 AM    Bacteria NEGATIVE  01/26/2018 01:13 PM       IMPRESSION  · Abnormal chest CT: mediastinal and hilar adenopathy  · Weight loss  · Hypokalemia  · Hx of + PPD; treated with INR  · ? Colitis  · Emphysema on chest CT   · Anemia: unclear etiology  · Active tobacco use    PLAN  · Will plan for EBUS this afternoon  · Hematology following; s/p transfusion. Closely follow Hgb and transfuse if < 7.  · Repleting K  · Levaquin and Flagyl per primary team/GI. Planning future colonoscopy one K improved enough for patient to take prep  · Prn Duonebs  · DV prophylaxis: Lovenox; last received last night      Thank you for allowing us to participate in the care of this patient. We will be happy to follow along in her progress with you.     Paloma Kim

## 2018-01-29 NOTE — PROGRESS NOTES
Physical therapy    1444 Chart reviewed pt currently off floor for ENDOSCOPIC BRONCHOSCOPY ULTRASOUND (EBUS). Harish Kim

## 2018-01-29 NOTE — PERIOP NOTES
TRANSFER - OUT REPORT:    Verbal report given to [facility] RN on Christen Farr  being transferred to 5th floor(unit) for routine progression of care       Report consisted of patients Situation, Background, Assessment and   Recommendations(SBAR). Information from the following report(s) Procedure Summary, Recent Results and Cardiac Rhythm S tachy was reviewed with the receiving nurse. Lines:   Peripheral IV 01/29/18 Right Antecubital (Active)   Site Assessment Clean, dry, & intact 1/29/2018  2:00 PM   Phlebitis Assessment 0 1/29/2018  2:00 PM   Infiltration Assessment 0 1/29/2018  2:00 PM   Dressing Status Clean, dry, & intact 1/29/2018  2:00 PM   Dressing Type Transparent;Tape 1/29/2018  2:00 PM   Hub Color/Line Status Infusing;Blue 1/29/2018  2:00 PM   Alcohol Cap Used Yes 1/29/2018  2:00 PM        Opportunity for questions and clarification was provided.       Patient transported with:   [employer]

## 2018-01-29 NOTE — PROGRESS NOTES
Occupational Therapy Note: Pt too fatigued to work with therapies this AM will attempt OT this afternoon as able.

## 2018-01-29 NOTE — PERIOP NOTES
Elsa Cordova  1960  [id number]    Situation:    Scheduled Procedure: Procedure(s):  ENDOSCOPIC BRONCHOSCOPY ULTRASOUND (EBUS)  Verbal report received from: Danna Modi RN  Preoperative diagnosis: abnormal CT Scan    Background:    Procedure: Procedure(s):  ENDOSCOPIC BRONCHOSCOPY ULTRASOUND (EBUS)  Physician performing procedure; Dr. Caitlyn Mancini PO Intake: 12 midnight    Pregnancy Test:Not applicable If yes, result: none    Is the patient taking Blood Thinners: yes If yes, list: Lovenox and last taken 2207 1/28/17  Is the patient diabetic:no      If yes, what was the last BS:    Time taken? Anything given? no           Does the patient have a Pacemaker/Defibrillator in place?: no   Does the patient need antibiotics before/during/after procedure: no   If the patient is having a colon, How much prep was drank? NA   What were the Colon prep results? Na   Does the patient have SCD in place:no   Is patient on CONTACT precautions:no        If yes, what kind of CONTACT precautions:     Assessment:  Are the vital signs stable prior to patient coming to ENDO?  yes  Is the patient alert/oriented and able to sign consent for the procedures:yes  How does the patient's abdomen feel prior to coming to ENDO? round and soft yes   Does the patient have a patient IV in place?  yes     Recommendation:  Family or Friend present yes     Permission to share finding with Family or Friend yes

## 2018-01-29 NOTE — PROGRESS NOTES
Dr. Murray Prior notified of pt left arm iv infiltration. The arm is swollen and starting to bruise from the elbow down. Dr. Murray Prior just said to watch it and see if it improves. I elevated the arm with 2 pillows and applied ice. The pt complains of some discomfort about a 3 when it is touched otherwise pain free.   I will cont to monitor the site and document any changes

## 2018-01-29 NOTE — ROUTINE PROCESS
Bedside and Verbal shift change report given to ZURDO Adams (oncoming nurse) by Nimo Angulo (offgoing nurse). Report included the following information SBAR, Kardex, Intake/Output and MAR.

## 2018-01-29 NOTE — ANESTHESIA PREPROCEDURE EVALUATION
Anesthetic History   No history of anesthetic complications            Review of Systems / Medical History  Patient summary reviewed and nursing notes reviewed    Pulmonary    COPD: mild      Smoker         Neuro/Psych         Psychiatric history    Comments: Anxiety/depression Cardiovascular    Hypertension: well controlled          Hyperlipidemia    Exercise tolerance: <4 METS     GI/Hepatic/Renal  Within defined limits              Endo/Other        Anemia     Other Findings              Physical Exam    Airway  Mallampati: III      Mouth opening: Normal     Cardiovascular    Rhythm: regular  Rate: normal         Dental    Dentition: Lower dentition intact and Upper dentition intact     Pulmonary      Decreased breath sounds: bilateral           Abdominal         Other Findings            Anesthetic Plan    ASA: 3  Anesthesia type: general            Anesthetic plan and risks discussed with: Patient      Informed consent obtained.

## 2018-01-29 NOTE — PROGRESS NOTES
[facility] [address], [place], [address] [facility]  [phone]      Medical Progress Note      NAME:         Elsa Cordova   :        1960  MRM:        [id number]    Date:          2018      Subjective: Patient has been seen and examined as a follow up for metastatic disease. Chart, labs, diagnostics reviewed. She remains very weak, with poor appetite and a persistent intermittent non bloody diarrhea. Afebrile. Appetite remains poor. Objective:    Vital Signs:    Visit Vitals    /78    Pulse 92    Temp 97.8 °F (36.6 °C)    Resp 16    Ht 5' 1\" (1.549 m)    Wt 44.9 kg (99 lb)    SpO2 100%    BMI 18.71 kg/m2          Intake/Output Summary (Last 24 hours) at 18 1701  Last data filed at 18 1533   Gross per 24 hour   Intake              700 ml   Output              103 ml   Net              597 ml      Physical Examination:    General:   Weak and very cachectic female patient, uncomfortable   Eyes:   Pale conjunctivae, PERRLA with no discharge. ENT:   no ottorrhea or rhinorrhea with dry mucous membranes  Neck: no masses, thyroid non-tender and trachea central.  Pulm:  decreased and clear breath sounds without crackles or wheezes  Card:  no JVD or murmurs, has regular and normal S1, S2 without thrills, bruits or peripheral edema  Abd:  Soft, mild generalized, non-distended, normoactive bowel sounds with no palpable organomegaly  Musc:  No cyanosis, clubbing, atrophy or deformities. Skin:  No rashes, bruising or ulcers. Neuro: Awake and alert but very feeble.  Generally a non focal exam.   Psych:  Has a fair insight and is oriented x 3    Current Facility-Administered Medications   Medication Dose Route Frequency    potassium chloride SR (KLOR-CON 10) tablet 40 mEq  40 mEq Oral Q4H    0.9% sodium chloride infusion  50 mL/hr IntraVENous CONTINUOUS    dextrose 5% - 0.9% NaCl with KCl 20 mEq/L infusion  100 mL/hr IntraVENous CONTINUOUS    0.9% sodium chloride infusion 250 mL  250 mL IntraVENous PRN    metroNIDAZOLE (FLAGYL) IVPB premix 500 mg  500 mg IntraVENous Q8H    levoFLOXacin (LEVAQUIN) 750 mg in D5W IVPB  750 mg IntraVENous Q24H    sodium chloride (NS) flush 5-10 mL  5-10 mL IntraVENous Q8H    sodium chloride (NS) flush 5-10 mL  5-10 mL IntraVENous PRN    acetaminophen (TYLENOL) tablet 650 mg  650 mg Oral Q4H PRN    oxyCODONE-acetaminophen (PERCOCET) 5-325 mg per tablet 1 Tab  1 Tab Oral Q4H PRN    HYDROmorphone (DILAUDID) injection 0.5 mg  0.5 mg IntraVENous Q4H PRN    prochlorperazine (COMPAZINE) injection 10 mg  10 mg IntraVENous Q6H PRN    zolpidem (AMBIEN) tablet 5 mg  5 mg Oral QHS PRN    enoxaparin (LOVENOX) injection 30 mg  30 mg SubCUTAneous DAILY    diphenhydrAMINE (BENADRYL) capsule 25 mg  25 mg Oral Q6H PRN    buPROPion XL (WELLBUTRIN XL) tablet 150 mg  150 mg Oral 7am    magnesium oxide (MAG-OX) tablet 400 mg  400 mg Oral BID    Thiamine Mononitrate (B-1) tablet 100 mg  100 mg Oral DAILY    ezetimibe (ZETIA) tablet 10 mg  10 mg Oral DAILY    folic acid (FOLVITE) tablet 1 mg  1 mg Oral DAILY        Laboratory data and review:    Recent Labs      01/28/18   0154  01/27/18   0536   WBC  6.6  4.3   HGB  8.5*  7.0*   HCT  27.3*  23.4*   PLT  166  165     Recent Labs      01/29/18   1003  01/29/18   0610  01/28/18   0154  01/27/18   0536   NA   --   148*  149*  144   K   --   3.0*  2.1*  <1.8*   CL   --   119*  115*  110*   CO2   --   20*  24  27   GLU   --   120*  93  96   BUN   --   1*  3*  4*   CREA   --   0.63  0.60  0.63   CA   --   6.9*  6.7*  6.7*   MG   --    --   1.8  1.9   PHOS   --    --   2.1*  2.4*   INR  1.2*   --    --    --      No components found for: [place]    Other Diagnostics:    12 lead EKG - sinus with no acute changes     Assessment and Plan:    ? Metast[facility] ([facility] (1/26/2018): initial CT scans had suggested possible mets to lung, liver and bone of unknown primary. Pelvic ultrasound suggested a right adnexal structure which was more suggestive of hydrosalpinx than cystic neoplasm. She says she is up todate with mammograms and colonoscopy was due in a year. She is a smoker. Seen by Oncology. Review of her CT scans by a different radiologist indicate that metastatic disease is less likely. See edited CT scan reports. She had an unsuccessful EBUS 1/29. Seen by GI and will continue to prepare her for a colonoscopy. I will ask Gynecology to comment on adnexal mass. Colitis (1/27/2018): CT abdomen and pelvis showed diffuse colitis. Her diarrhea improving. Follow stool cultures. Continue empiric IV Levofloxacin and metronidazole. Awaiting a colonoscopy. Hypokalemia due to loss of potassium (1/26/2018)/ Hypomagnesemia (1/26/2018)/Hypernatremia: likely loss from diarrhea. Still low. Continue to replace      Iron deficiency anemia (2/8/2012): Hgb now 8.5 post transfusion with 2 units PRBCs. Lactic acid acidosis (1/27/2018): presumed to be from volume depletion from colitis. Repeat lactate normal.     Elevated troponin (1/27/2018): likely due to demand ischemia. EKG done in ED was non ischemic. No ACS. Hyperlipidemia (6/26/2014): with her poor appetite and metastatic disease. recent LDL was 109, discontinued Zetia    Weight loss (1/26/2018)/  Protein-calorie malnutrition, severe ([address] (1/27/2018): due to poor oral intake and now apparently less likely metastatic disease. Nutritional services consulted, Diet as tolerated    Emphysema of lung POA: not wheezing. Noted on CT. Duoneb as needed    Debility (1/26/2018): has a poor functional status.  Mobilize as tolerated once more stable    Code status: FULL CODE    Total time spent for the patient's care: [address] discussed with: Patient, Family and Nursing Staff    Discussed:  Care Plan and D/C Planning    Prophylaxis:  Lovenox    Anticipated Disposition: Home w/Family           ___________________________________________________    Attending Physician:   Rene Herrmann MD

## 2018-01-29 NOTE — PROGRESS NOTES
Pt left arm that the iv infiltrated on is better today no swelling and bruising has improved significantly. I will cont to elevate and ice as needed.

## 2018-01-29 NOTE — PROGRESS NOTES
[facility]  1960  [id number]    Situation:  Verbal report received from: Darrion Horton RN  Procedure: Procedure(s):  ENDOSCOPIC BRONCHOSCOPY ULTRASOUND (EBUS)  BRONCHOSCOPY ULTRASOUND W FINE NEEDLE ASPIRATION    Background:    Preoperative diagnosis: abnormal CT Scan  Postoperative diagnosis: * No post-op diagnosis entered *    :  Dr. Ayla Tavares  Assistant(s): Endoscopy Technician-1: Sumit Scott  Endoscopy RN-1: Latanya Serra RN    Specimens: * No specimens in log *  H. Pylori  no    Assessment:  Intra-procedure medications     Anesthesia gave intra-procedure sedation and medications, see anesthesia flow sheet yes    Intravenous fluids: NS@ KVO     Vital signs stable   yes    Abdominal assessment: round and soft   yes    Recommendation:  Discharge patient per MD order  inpatient.   Return to floor  yes  Family or Friend spouse  Permission to share finding with family or friend yes

## 2018-01-30 ENCOUNTER — APPOINTMENT (OUTPATIENT)
Dept: NUCLEAR MEDICINE | Age: 58
DRG: 391 | End: 2018-01-30
Attending: NURSE PRACTITIONER
Payer: COMMERCIAL

## 2018-01-30 ENCOUNTER — ANESTHESIA EVENT (OUTPATIENT)
Dept: ENDOSCOPY | Age: 58
DRG: 391 | End: 2018-01-30
Payer: COMMERCIAL

## 2018-01-30 PROBLEM — R93.89 ABNORMAL CT SCAN, CHEST: Status: ACTIVE | Noted: 2018-01-30

## 2018-01-30 PROBLEM — R93.5 ABNORMAL CT OF THE ABDOMEN: Status: ACTIVE | Noted: 2018-01-30

## 2018-01-30 LAB
ANION GAP SERPL CALC-SCNC: 8 MMOL/L (ref 5–15)
BACTERIA SPEC CULT: ABNORMAL
BASOPHILS # BLD: 0 K/UL (ref 0–0.1)
BASOPHILS NFR BLD: 0 % (ref 0–1)
BUN SERPL-MCNC: 1 MG/DL (ref 6–20)
BUN/CREAT SERPL: 1 (ref 12–20)
C JEJUNI+C COLI AG STL QL: NEGATIVE
CALCIUM SERPL-MCNC: 7.1 MG/DL (ref 8.5–10.1)
CHLORIDE SERPL-SCNC: 119 MMOL/L (ref 97–108)
CO2 SERPL-SCNC: 21 MMOL/L (ref 21–32)
CREAT SERPL-MCNC: 0.72 MG/DL (ref 0.55–1.02)
DIFFERENTIAL METHOD BLD: ABNORMAL
E COLI SXT1+2 STL IA: NEGATIVE
EOSINOPHIL # BLD: 0 K/UL (ref 0–0.4)
EOSINOPHIL NFR BLD: 0 % (ref 0–7)
ERYTHROCYTE [DISTWIDTH] IN BLOOD BY AUTOMATED COUNT: 21.6 % (ref 11.5–14.5)
GLUCOSE SERPL-MCNC: 126 MG/DL (ref 65–100)
HCT VFR BLD AUTO: 27.6 % (ref 35–47)
HGB BLD-MCNC: 8.3 G/DL (ref 11.5–16)
IMM GRANULOCYTES # BLD: 0 K/UL
IMM GRANULOCYTES NFR BLD AUTO: 0 %
LYMPHOCYTES # BLD: 1.4 K/UL (ref 0.8–3.5)
LYMPHOCYTES NFR BLD: 44 % (ref 12–49)
MCH RBC QN AUTO: 27.2 PG (ref 26–34)
MCHC RBC AUTO-ENTMCNC: 30.1 G/DL (ref 30–36.5)
MCV RBC AUTO: 90.5 FL (ref 80–99)
MONOCYTES # BLD: 0.3 K/UL (ref 0–1)
MONOCYTES NFR BLD: 9 % (ref 5–13)
NEUTS SEG # BLD: 1.5 K/UL (ref 1.8–8)
NEUTS SEG NFR BLD: 47 % (ref 32–75)
NRBC # BLD: 0 K/UL (ref 0–0.01)
NRBC BLD-RTO: 0 PER 100 WBC
PLATELET # BLD AUTO: 136 K/UL (ref 150–400)
PMV BLD AUTO: 8.9 FL (ref 8.9–12.9)
POTASSIUM SERPL-SCNC: 3.2 MMOL/L (ref 3.5–5.1)
POTASSIUM SERPL-SCNC: 3.6 MMOL/L (ref 3.5–5.1)
RBC # BLD AUTO: 3.05 M/UL (ref 3.8–5.2)
RBC MORPH BLD: ABNORMAL
SERVICE CMNT-IMP: ABNORMAL
SODIUM SERPL-SCNC: 148 MMOL/L (ref 136–145)
WBC # BLD AUTO: 3.2 K/UL (ref 3.6–11)

## 2018-01-30 PROCEDURE — 82164 ANGIOTENSIN I ENZYME TEST: CPT | Performed by: PHYSICIAN ASSISTANT

## 2018-01-30 PROCEDURE — 85025 COMPLETE CBC W/AUTO DIFF WBC: CPT | Performed by: PHYSICIAN ASSISTANT

## 2018-01-30 PROCEDURE — 74011250636 HC RX REV CODE- 250/636: Performed by: INTERNAL MEDICINE

## 2018-01-30 PROCEDURE — 65270000029 HC RM PRIVATE

## 2018-01-30 PROCEDURE — A9503 TC99M MEDRONATE: HCPCS

## 2018-01-30 PROCEDURE — 77010033678 HC OXYGEN DAILY

## 2018-01-30 PROCEDURE — 80048 BASIC METABOLIC PNL TOTAL CA: CPT | Performed by: PHYSICIAN ASSISTANT

## 2018-01-30 PROCEDURE — 84132 ASSAY OF SERUM POTASSIUM: CPT | Performed by: INTERNAL MEDICINE

## 2018-01-30 PROCEDURE — 93971 EXTREMITY STUDY: CPT

## 2018-01-30 PROCEDURE — 74011000250 HC RX REV CODE- 250: Performed by: SPECIALIST

## 2018-01-30 PROCEDURE — 74011250637 HC RX REV CODE- 250/637: Performed by: INTERNAL MEDICINE

## 2018-01-30 PROCEDURE — 74011000250 HC RX REV CODE- 250: Performed by: INTERNAL MEDICINE

## 2018-01-30 PROCEDURE — 36415 COLL VENOUS BLD VENIPUNCTURE: CPT | Performed by: PHYSICIAN ASSISTANT

## 2018-01-30 RX ORDER — POTASSIUM CHLORIDE 750 MG/1
40 TABLET, FILM COATED, EXTENDED RELEASE ORAL EVERY 4 HOURS
Status: COMPLETED | OUTPATIENT
Start: 2018-01-30 | End: 2018-01-30

## 2018-01-30 RX ADMIN — POTASSIUM CHLORIDE 40 MEQ: 750 TABLET, FILM COATED, EXTENDED RELEASE ORAL at 10:41

## 2018-01-30 RX ADMIN — POLYETHYLENE GLYCOL-3350 AND ELECTROLYTES 4000 ML: 236; 6.74; 5.86; 2.97; 22.74 POWDER, FOR SOLUTION ORAL at 21:25

## 2018-01-30 RX ADMIN — POLYETHYLENE GLYCOL-3350 AND ELECTROLYTES 2000 ML: 236; 6.74; 5.86; 2.97; 22.74 POWDER, FOR SOLUTION ORAL at 13:11

## 2018-01-30 RX ADMIN — FOLIC ACID 1 MG: 1 TABLET ORAL at 09:04

## 2018-01-30 RX ADMIN — DEXTROSE MONOHYDRATE, SODIUM CHLORIDE, AND POTASSIUM CHLORIDE 100 ML/HR: 50; 9; 1.49 INJECTION, SOLUTION INTRAVENOUS at 19:11

## 2018-01-30 RX ADMIN — EZETIMIBE 10 MG: 10 TABLET ORAL at 09:04

## 2018-01-30 RX ADMIN — Medication 400 MG: at 09:04

## 2018-01-30 RX ADMIN — POTASSIUM CHLORIDE 40 MEQ: 750 TABLET, FILM COATED, EXTENDED RELEASE ORAL at 16:07

## 2018-01-30 RX ADMIN — METRONIDAZOLE 500 MG: 500 INJECTION, SOLUTION INTRAVENOUS at 00:04

## 2018-01-30 RX ADMIN — METRONIDAZOLE 500 MG: 500 INJECTION, SOLUTION INTRAVENOUS at 16:07

## 2018-01-30 RX ADMIN — POTASSIUM CHLORIDE 40 MEQ: 750 TABLET, FILM COATED, EXTENDED RELEASE ORAL at 07:14

## 2018-01-30 RX ADMIN — Medication 10 ML: at 05:55

## 2018-01-30 RX ADMIN — OXYCODONE HYDROCHLORIDE AND ACETAMINOPHEN 1 TABLET: 5; 325 TABLET ORAL at 05:54

## 2018-01-30 RX ADMIN — BUPROPION HYDROCHLORIDE 150 MG: 150 TABLET, FILM COATED, EXTENDED RELEASE ORAL at 05:54

## 2018-01-30 RX ADMIN — METRONIDAZOLE 500 MG: 500 INJECTION, SOLUTION INTRAVENOUS at 09:00

## 2018-01-30 RX ADMIN — DEXTROSE MONOHYDRATE, SODIUM CHLORIDE, AND POTASSIUM CHLORIDE 100 ML/HR: 50; 9; 1.49 INJECTION, SOLUTION INTRAVENOUS at 05:54

## 2018-01-30 RX ADMIN — Medication 10 ML: at 13:11

## 2018-01-30 RX ADMIN — LEVOFLOXACIN 750 MG: 5 INJECTION, SOLUTION INTRAVENOUS at 07:45

## 2018-01-30 RX ADMIN — Medication 100 MG: at 09:04

## 2018-01-30 NOTE — PROGRESS NOTES
Name: Lucie Vega: [address]   : 1960 Admit Date: 2018   Phone: [phone]  Room: [facility]   PCP: Shanice Daniels NP  MRN: [id number]   Date: 2018  Code: Full Code          Chart and notes reviewed. Data reviewed. I review the patient's current medications in the medical record at each encounter. I have evaluated and examined the patient. Overnight events  Afebrile  Sats 98% on 1L  K 3.2  Stool cx with no routine ENTERIC PATHOGENS ISOLATED INCLUDING SALMONELLA, SHIGELLA, YERSINIA, VIBRIO OR SHIGA TOXIN PRODUCING E. COLI ; + LIGHT YEAST ISOLATED  Bronch with dense rubber like lymph nodes with specs of calcifications inside    ROS: Patient without specific complaints this morning. Denies SOB or CP. Denies fever or chills. Denies much of a cough. Has some weakness. Diarrhea improved. Denies abd pain or LE pain/swelling.     Current Facility-Administered Medications   Medication Dose Route Frequency    potassium chloride SR (KLOR-CON 10) tablet 40 mEq  40 mEq Oral Q4H    0.9% sodium chloride infusion  50 mL/hr IntraVENous CONTINUOUS    dextrose 5% - 0.9% NaCl with KCl 20 mEq/L infusion  100 mL/hr IntraVENous CONTINUOUS    0.9% sodium chloride infusion 250 mL  250 mL IntraVENous PRN    metroNIDAZOLE (FLAGYL) IVPB premix 500 mg  500 mg IntraVENous Q8H    levoFLOXacin (LEVAQUIN) 750 mg in D5W IVPB  750 mg IntraVENous Q24H    sodium chloride (NS) flush 5-10 mL  5-10 mL IntraVENous Q8H    sodium chloride (NS) flush 5-10 mL  5-10 mL IntraVENous PRN    acetaminophen (TYLENOL) tablet 650 mg  650 mg Oral Q4H PRN    oxyCODONE-acetaminophen (PERCOCET) 5-325 mg per tablet 1 Tab  1 Tab Oral Q4H PRN    HYDROmorphone (DILAUDID) injection 0.5 mg  0.5 mg IntraVENous Q4H PRN    prochlorperazine (COMPAZINE) injection 10 mg  10 mg IntraVENous Q6H PRN    zolpidem (AMBIEN) tablet 5 mg  5 mg Oral QHS PRN    diphenhydrAMINE (BENADRYL) capsule 25 mg  25 mg Oral Q6H PRN  buPROPion XL (WELLBUTRIN XL) tablet 150 mg  150 mg Oral 7am    magnesium oxide (MAG-OX) tablet 400 mg  400 mg Oral BID    Thiamine Mononitrate (B-1) tablet 100 mg  100 mg Oral DAILY    ezetimibe (ZETIA) tablet 10 mg  10 mg Oral DAILY    folic acid (FOLVITE) tablet 1 mg  1 mg Oral DAILY         REVIEW OF SYSTEMS   12 point ROS negative except as stated in the HPI. Physical Exam:   Visit Vitals    /76 (BP 1 Location: Right arm, BP Patient Position: At rest;Supine)    Pulse 96    Temp 98 °F (36.7 °C)    Resp 16    Ht 5' 1\" (1.549 m)    Wt 44.9 kg (99 lb)    SpO2 98%    BMI 18.71 kg/m2       General:  Alert, thin, weak/frail, ill appearing, cooperative, no distress, appears stated age. Head:  Normocephalic, temporal wasting, atraumatic. Eyes:  Conjunctivae/corneas clear. Nose: Nares normal. Septum midline. Mucosa normal.    Throat: Lips, mucosa, and tongue normal.    Neck: Supple, symmetrical, trachea midline, no adenopathy. Lungs:   Decreased, but clear to auscultation bilaterally. No wheeze or rales. Chest wall:  No tenderness or deformity. Heart:  Regular rate and rhythm, S1, S2 normal, no murmur, click, rub or gallop. Abdomen:   Soft, non-tender. Bowel sounds normal. No masses,  No organomegaly. Extremities: Extremities normal, atraumatic, no cyanosis or edema. Pulses: 2+ and symmetric all extremities.    Skin: Skin color, texture, turgor normal. No rashes or lesions   Lymph nodes: Cervical, supraclavicular nodes normal.   Neurologic: Grossly nonfocal       Lab Results   Component Value Date/Time    Sodium 148 01/29/2018 06:10 AM    Potassium 3.2 01/30/2018 02:00 AM    Chloride 119 01/29/2018 06:10 AM    CO2 20 01/29/2018 06:10 AM    BUN 1 01/29/2018 06:10 AM    Creatinine 0.63 01/29/2018 06:10 AM    Glucose 120 01/29/2018 06:10 AM    Calcium 6.9 01/29/2018 06:10 AM    Magnesium 1.8 01/28/2018 01:54 AM    Phosphorus 2.1 01/28/2018 01:54 AM    Lactic acid 1.9 01/27/2018 08:55 AM       Lab Results   Component Value Date/Time    WBC 6.6 01/28/2018 01:54 AM    HGB 8.5 01/28/2018 01:54 AM    PLATELET 591 90/42/6314 01:54 AM    MCV 87.5 01/28/2018 01:54 AM       Lab Results   Component Value Date/Time    INR 1.2 01/29/2018 10:03 AM    aPTT 38.3 01/29/2018 10:03 AM    AST (SGOT) 61 01/26/2018 01:02 PM    Alk.  phosphatase 131 01/26/2018 01:02 PM    Protein, total 5.7 01/26/2018 01:02 PM    Albumin 2.1 01/26/2018 01:02 PM    Globulin 3.6 01/26/2018 01:02 PM       Lab Results   Component Value Date/Time    Iron 105 01/25/2018 01:17 PM    TIBC <122 01/25/2018 01:17 PM    Iron % saturation >86 01/25/2018 01:17 PM    Ferritin 333 01/25/2018 01:17 PM       Lab Results   Component Value Date/Time    Sed rate (ESR) 15 01/25/2018 01:17 PM    TSH 4.350 01/25/2018 01:16 PM        No results found for: PH, PHI, PCO2, PCO2I, PO2, PO2I, HCO3, HCO3I, FIO2, FIO2I    Lab Results   Component Value Date/Time    Troponin-I, Qt. 0.08 01/27/2018 05:36 AM        Lab Results   Component Value Date/Time    Culture result:  01/28/2018 08:25 AM     NO ROUTINE ENTERIC PATHOGENS ISOLATED INCLUDING SALMONELLA, SHIGELLA, YERSINIA, VIBRIO OR SHIGA TOXIN PRODUCING E. COLI    Culture result: MARKEDLY REDUCED COLIFORMS NOTED 01/28/2018 08:25 AM    Culture result: LIGHT YEAST ISOLATED 01/28/2018 08:25 AM       No results found for: TOXA1, RPR, HBCM, HBSAG, HAAB, HCAB1, HAAT, G6PD, CRYAC, HIVGT, HIVR, HIV1, HIV12, HIVPC, HIVRPI    No results found for: VANCT, CPK    Lab Results   Component Value Date/Time    Color DARK YELLOW 01/26/2018 01:13 PM    Appearance CLEAR 01/26/2018 01:13 PM    pH (UA) 7.0 01/26/2018 01:13 PM    Protein TRACE 01/26/2018 01:13 PM    Glucose NEGATIVE  01/26/2018 01:13 PM    Ketone TRACE 01/26/2018 01:13 PM    Bilirubin Negative 09/28/2012 08:58 AM    Blood NEGATIVE  01/26/2018 01:13 PM    Urobilinogen 1.0 01/26/2018 01:13 PM    Nitrites NEGATIVE  01/26/2018 01:13 PM    Leukocyte Esterase NEGATIVE 01/26/2018 01:13 PM    WBC 0-4 01/26/2018 01:13 PM    RBC 0-5 01/26/2018 01:13 PM    Epithelial cells >10 09/28/2012 08:58 AM    Bacteria NEGATIVE  01/26/2018 01:13 PM       Images: no new images this morning    IMPRESSION  · Abnormal chest CT: large, bulky lymphnodes in the mediastinum  · Weight loss  · Hypokalemia  · Subtle nonspecific sclerotic bone lesions at T2 and S1   · Hx of + PPD; treated with INR  · CT suggestive of colitis  · Emphysema on chest CT   · Anemia: unclear etiology  · Active tobacco use    PLAN  · F/U EBUS, but likely low yield  · Bone scan today  · ACE level re-ordered  · Check CBC and BMP  · Hematology following; s/p transfusion. Closely follow Hgb and transfuse if < 7.  · Repleting K  · Levaquin and Flagyl per primary team/GI. Planning colonoscopy likely tomorrow if K improved enough for patient to take bowel prep  · Would consider mediastinoscopy if workup turns out negative.   · Prn Duonebs  · DVT prophylaxis: LovePaloma Vazquez

## 2018-01-30 NOTE — PROGRESS NOTES
Gastroenterology Progress Note    January 30, 2018  Admit Date: 1/26/2018         Narrative Assessment and Plan   · Abnormal CT scan suggesting colitis  · Change in bowel habits  · Hypokalemia    Potassium level is improving and on continued replacement. Will discuss with Dr. Caitie Lu regarding colonoscopy for tomorrow. If able, will begin bowel prep today. ----  Charlotte Lu MD  [phone] office  [phone] voicemail   I have personally reviewed the history and independently examined the patient. I have reviewed the chart and agree with the documentation recorded by the Mid Level Provider, including the assessment, treatment plan, and disposition. ASSESSMENT AND PLAN:  She's agreed to colonoscopy prep  Will attempt prep for EGD and colonoscopy tomorrow. Mary Jane Reddy MD        Subjective:   · Resting in bed. Still feels tired and weak. Denies abdominal pain, nausea, or vomiting. Although diarrhea improving she states still not resolved. · Events from yesterday noted and per chart review - liver biopsy not performed as re-evaluation of imaging she does not appear to have metastatic disease. Further workup pending. ROS:  The previous review of systems on initial consultation / H&P is noted and reviewed. Specific changes noted above in HPI.     Current Medications:     Current Facility-Administered Medications   Medication Dose Route Frequency    potassium chloride SR (KLOR-CON 10) tablet 40 mEq  40 mEq Oral Q4H    0.9% sodium chloride infusion  50 mL/hr IntraVENous CONTINUOUS    dextrose 5% - 0.9% NaCl with KCl 20 mEq/L infusion  100 mL/hr IntraVENous CONTINUOUS    0.9% sodium chloride infusion 250 mL  250 mL IntraVENous PRN    metroNIDAZOLE (FLAGYL) IVPB premix 500 mg  500 mg IntraVENous Q8H    levoFLOXacin (LEVAQUIN) 750 mg in D5W IVPB  750 mg IntraVENous Q24H    sodium chloride (NS) flush 5-10 mL  5-10 mL IntraVENous Q8H    sodium chloride (NS) flush 5-10 mL 5-10 mL IntraVENous PRN    acetaminophen (TYLENOL) tablet 650 mg  650 mg Oral Q4H PRN    oxyCODONE-acetaminophen (PERCOCET) 5-325 mg per tablet 1 Tab  1 Tab Oral Q4H PRN    HYDROmorphone (DILAUDID) injection 0.5 mg  0.5 mg IntraVENous Q4H PRN    prochlorperazine (COMPAZINE) injection 10 mg  10 mg IntraVENous Q6H PRN    zolpidem (AMBIEN) tablet 5 mg  5 mg Oral QHS PRN    enoxaparin (LOVENOX) injection 30 mg  30 mg SubCUTAneous DAILY    diphenhydrAMINE (BENADRYL) capsule 25 mg  25 mg Oral Q6H PRN    buPROPion XL (WELLBUTRIN XL) tablet 150 mg  150 mg Oral 7am    magnesium oxide (MAG-OX) tablet 400 mg  400 mg Oral BID    Thiamine Mononitrate (B-1) tablet 100 mg  100 mg Oral DAILY    ezetimibe (ZETIA) tablet 10 mg  10 mg Oral DAILY    folic acid (FOLVITE) tablet 1 mg  1 mg Oral DAILY       Objective:     VITALS:   Last 24hrs VS reviewed since prior progress note. Most recent are:  Visit Vitals    /76 (BP 1 Location: Right arm, BP Patient Position: At rest;Supine)    Pulse 96    Temp 98 °F (36.7 °C)    Resp 16    Ht 5' 1\" (1.549 m)    Wt 44.9 kg (99 lb)    SpO2 98%    BMI 18.71 kg/m2     Temp (24hrs), Av.5 °F (36.9 °C), Min:97.8 °F (36.6 °C), Max:99.5 °F (37.5 °C)      Intake/Output Summary (Last 24 hours) at 18 0952  Last data filed at 18 1533   Gross per 24 hour   Intake              700 ml   Output                3 ml   Net              697 ml       EXAM:  General: Comfortable, no distress    HEENT: Atraumatic skull, pupils equal  Abdomen: Nondistended, nontender.   No mass, guarding or rebound  Neurologic:  CN II-XII grossly intact, no focal deficits  Psych:   Good insight. Not anxious nor agitated    Lab Data Reviewed:   Recent Labs      18   0154   WBC  6.6   HGB  8.5*   HCT  27.3*   PLT  166     Recent Labs      18   0200  18   1003  18   0610  18   0154   NA   --    --   148*  149*   K  3.2*   --   3.0*  2.1*   CL   --    --   119*  115*   CO2 --    --   20*  24   GLU   --    --   120*  93   BUN   --    --   1*  3*   CREA   --    --   0.63  0.60   CA   --    --   6.9*  6.7*   MG   --    --    --   1.8   PHOS   --    --    --   2.1*   INR   --   1.2*   --    --      Lab Results   Component Value Date/Time    Glucose (POC) 91 01/26/2018 04:08 PM    Glucose (POC) 107 01/26/2018 01:08 PM     No results for input(s): PH, PCO2, PO2, HCO3, FIO2 in the last 72 hours.   Recent Labs      01/29/18   1003   INR  1.2*           Assessment:   (See above)  Principal Problem:    Metastatic cancer ([facility] (1/26/2018)    Active Problems:    Iron deficiency anemia (2/8/2012)      Benign essential HTN (6/26/2014)      Hyperlipidemia (6/26/2014)      Weight loss (1/26/2018)      Hypokalemia due to loss of potassium (1/26/2018)      Hypomagnesemia (1/26/2018)      Debility (1/26/2018)      Colitis (1/27/2018)      Lactic acid acidosis (1/27/2018)      Elevated troponin (1/27/2018)      Protein-calorie malnutrition, severe ([facility] (1/27/2018)      Hypotension (1/27/2018)      [facility] (1/27/2018)        Plan:   (See above)    Signed By:  Palomo Cardoso PA-C  1/30/2018  9:52 AM

## 2018-01-30 NOTE — PROGRESS NOTES
Occupational Therapy Note: Pt declined OT despite much encouragement this AM stating \" I have already been up to the bathroom this AM and walking around. \"  RN stating she transferred to [facility] and back to bed. Will attempt OT later as able.

## 2018-01-30 NOTE — OP NOTES
[address] [facility] [address]  [address] [address] [address]  [phone]    Simone Song  1960  [id number]      Date of Procedure: 1/29/2018    Preoperative diagnosis: abnormal CT Scan    Procedure: Procedure(s):  ENDOSCOPIC BRONCHOSCOPY ULTRASOUND (EBUS)  BRONCHOSCOPY ULTRASOUND W FINE NEEDLE ASPIRATION    Indication: abnormal chest CT    :  Guerda Robertson MD    Assistant(s): Endoscopy Technician-1: [place] Ayala  Endoscopy RN-1: Marti Woods RN    Anesthesia/Sedation:  General anesthesia      Procedure Details:  After infomed consent was obtained for the procedure, with all risks and benefits of procedure explained the patient was taken to the endoscopy suite and placed in the supine position. Following sequential administration of sedation as per above, the EBUS was inserted into the ETT and advanced under direct vision to the tracheobronchial tree. Station 7 vizualied and 3 passes made. Lymphnode was large and rubber like in consistency. 3 TBNA passes made on 4R made with only specs of material as result. Lymphnode again large and rubber like with specs of calcification. 3 TBNA passes made at 10R with same futile results. Findings:   Dense rubber like lymphnodes with specs of calcifications inside. Therapies:   None    Specimens:   Cytology- scant           Complications:   None noted; patient tolerated the procedure well. EBL:  Minimal           Impression:    abnormal CT      Recommendations: Follow up any path, although scant. ACE level ordered. Discussed findings with .       Emelina Hernandez MD  1/29/2018  9:01 PM

## 2018-01-30 NOTE — PROGRESS NOTES
Zhang Samuelselsen [facility] [address]  [address] [place] [address], [address] [place] [address], [address] [place] [address]  [phone]      Medical Progress Note      NAME:         Talon Guerra   :        1960  MRM:        [id number]    Date:          2018      Subjective: Patient has been seen and examined as a follow up for abnormal CT scans, weight loss. Chart, labs, diagnostics reviewed. She remains weak. Much less diarrhea. No fever or chills. She denies any chest pain or SOB    Objective:    Vital Signs:    Visit Vitals    /76 (BP 1 Location: Right arm, BP Patient Position: At rest;Supine)    Pulse 96    Temp 98 °F (36.7 °C)    Resp 16    Ht 5' 1\" (1.549 m)    Wt 44.9 kg (99 lb)    SpO2 98%    BMI 18.71 kg/m2        Intake/Output Summary (Last 24 hours) at 18 1040  Last data filed at 18 1533   Gross per 24 hour   Intake              700 ml   Output                3 ml   Net              697 ml        Physical Examination:    General:   Weak and cachectic looking female patient, uncomfortable but not in distress   Eyes:   pale conjunctivae, PERRLA with no discharge. ENT:   no ottorrhea or rhinorrhea with moist mucous membranes  Neck: no masses, thyroid non-tender and trachea central.  Pulm: decreased but clear breath sounds without crackles or wheezes  Card:  no JVD or murmurs, has regular and normal S1, S2 without thrills, bruits or peripheral edema  Abd:  Soft, non-tender, non-distended, normoactive bowel sounds with no palpable organomegaly  Musc:  No cyanosis, clubbing, atrophy. Swollen, bruised LUE   Skin:  No rashes or ulcers. Neuro: Awake and alert.  Generally a non focal exam. Follows commands appropriately  Psych:  Has a fair insight and is oriented x 3    Current Facility-Administered Medications   Medication Dose Route Frequency    potassium chloride SR (KLOR-CON 10) tablet 40 mEq  40 mEq Oral Q4H    0.9% sodium chloride infusion  50 mL/hr IntraVENous CONTINUOUS    dextrose 5% - 0.9% NaCl with KCl 20 mEq/L infusion  100 mL/hr IntraVENous CONTINUOUS    0.9% sodium chloride infusion 250 mL  250 mL IntraVENous PRN    metroNIDAZOLE (FLAGYL) IVPB premix 500 mg  500 mg IntraVENous Q8H    levoFLOXacin (LEVAQUIN) 750 mg in D5W IVPB  750 mg IntraVENous Q24H    sodium chloride (NS) flush 5-10 mL  5-10 mL IntraVENous Q8H    sodium chloride (NS) flush 5-10 mL  5-10 mL IntraVENous PRN    acetaminophen (TYLENOL) tablet 650 mg  650 mg Oral Q4H PRN    oxyCODONE-acetaminophen (PERCOCET) 5-325 mg per tablet 1 Tab  1 Tab Oral Q4H PRN    HYDROmorphone (DILAUDID) injection 0.5 mg  0.5 mg IntraVENous Q4H PRN    prochlorperazine (COMPAZINE) injection 10 mg  10 mg IntraVENous Q6H PRN    zolpidem (AMBIEN) tablet 5 mg  5 mg Oral QHS PRN    diphenhydrAMINE (BENADRYL) capsule 25 mg  25 mg Oral Q6H PRN    buPROPion XL (WELLBUTRIN XL) tablet 150 mg  150 mg Oral 7am    magnesium oxide (MAG-OX) tablet 400 mg  400 mg Oral BID    Thiamine Mononitrate (B-1) tablet 100 mg  100 mg Oral DAILY    ezetimibe (ZETIA) tablet 10 mg  10 mg Oral DAILY    folic acid (FOLVITE) tablet 1 mg  1 mg Oral DAILY        Laboratory data and review:    Recent Labs      01/28/18   0154   WBC  6.6   HGB  8.5*   HCT  27.3*   PLT  166     Recent Labs      01/30/18   0200  01/29/18   1003  01/29/18   0610  01/28/18   0154   NA   --    --   148*  149*   K  3.2*   --   3.0*  2.1*   CL   --    --   119*  115*   CO2   --    --   20*  24   GLU   --    --   120*  93   BUN   --    --   1*  3*   CREA   --    --   0.63  0.60   CA   --    --   6.9*  6.7*   MG   --    --    --   1.8   PHOS   --    --    --   2.1*   INR   --   1.2*   --    --      No components found for: [place]    Assessment and Plan:    Abnormal CT scan, chest (1/30/2018)/ Abnormal CT of the abdomen (1/30/2018): concern for metastatic disease vs other.  Initial CT scans done 1/26 had suggested possible mets to lung, liver and bone of unknown primary. Pelvic ultrasound suggested a right adnexal structure which was more suggestive of hydrosalpinx than cystic neoplasm. She says she is up todate with mammograms and colonoscopy was due in a year. She is a smoker. Seen by Oncology. Review of her CT scans by a different radiologist indicate that metastatic disease is less likely. See edited CT scan reports. She had an unsuccessful EBUS 1/29. Seen by GI who plan for a colonoscopy 1/31. Bone scan today. Awaiting a Gynecology consult regarding the adnexal mass.      Colitis (1/27/2018): CT abdomen and pelvis showed diffuse colitis. She has no diarrhea now. Cultures unremarkable other than scant candida. Continue empiric IV Levofloxacin and metronidazole pending the colonoscopy.      Weight loss (1/26/2018)/  Protein-calorie malnutrition, severe ([address] (1/27/2018): due to poor oral intake and still concerning of other etiology. Continue diet as tolerated    Hypokalemia due to loss of potassium (1/26/2018)/ Hypomagnesemia (1/26/2018)/Hypernatremia: likely loss from diarrhea. Replete some more today.        Iron deficiency anemia (2/8/2012): Hgb now 8.5 post transfusion with 2 units PRBCs. Hgb stable.      Lactic acid acidosis (1/27/2018): presumed to be from volume depletion from colitis. Repeat lactate normal.      Elevated troponin (1/27/2018): likely due to demand ischemia. EKG done in ED was non ischemic. No ACS.    Hyperlipidemia (6/26/2014): with her poor appetite and metastatic disease. recent LDL was 109, discontinued Zetia    Emphysema of lung POA: not wheezing. Noted on CT. Duoneb as needed     Debility (1/26/2018): has a poor functional status.  Mobilize as tolerated once more stable     Code status: FULL CODE    Total time spent for the patient's care: [address] discussed with: Patient, Care Manager and Nursing Staff    Discussed:  Care Plan and D/C Planning    Prophylaxis:  SCD's    Anticipated Disposition:  Home w/Family           ___________________________________________________    Attending Physician:   Eveline Meraz MD

## 2018-01-30 NOTE — PROGRESS NOTES
[facility]  Medical Oncology at [facility]  [phone]    Hematology / Oncology Follow-up         Erica Monroy is a 65 y.o. 1960 female and presents with Abnormal Lab Results    CC  abnormal CTs    HPI  Pt seen today in hospital consult at request of hospitalist for abnormal CTs. CTs show mediastinal LAD, liver lesion, diffuse colitis/ bowel changes, bone lesions/ ovary cyst/ fibroids. Pt is a poor historian today. C/o diarrhea. Smoker but no lung mass on CT. Ms. Ayla Horta presented to the Emergency Department complaining of weakness: for the past 3 months, now severe, steadily progressive, associated with weight loss. She was seen by her PCP and told to come to the ED as her potassium was 1.6      Interval History:    Denies any pain; continues with diarrhea. Reports up 9 times during the night with diarrhea. Unable to state whether it is watery or has any substance. Denies any N/V. No family at beside at present.        Current Facility-Administered Medications   Medication Dose Route Frequency Provider Last Rate Last Dose    potassium chloride SR (KLOR-CON 10) tablet 40 mEq  40 mEq Oral Q4H Gulshan Magdaleno MD   40 mEq at 01/30/18 4334    0.9% sodium chloride infusion  50 mL/hr IntraVENous CONTINUOUS Viktor Castro MD        dextrose 5% - 0.9% NaCl with KCl 20 mEq/L infusion  100 mL/hr IntraVENous CONTINUOUS Gulshan Magdaleno  mL/hr at 01/30/18 0554 100 mL/hr at 01/30/18 0554    0.9% sodium chloride infusion 250 mL  250 mL IntraVENous PRN Gulshan Magdaleno MD        metroNIDAZOLE (FLAGYL) IVPB premix 500 mg  500 mg IntraVENous Q8H Gulshan Magdaleno  mL/hr at 01/30/18 0900 500 mg at 01/30/18 0900    levoFLOXacin (LEVAQUIN) 750 mg in D5W IVPB  750 mg IntraVENous Q24H Gulshan Magdaleno  mL/hr at 01/30/18 0745 750 mg at 01/30/18 0745    sodium chloride (NS) flush 5-10 mL  5-10 mL IntraVENous Q8H Steven Mcdowell MD   10 mL at 01/30/18 0555    sodium chloride (NS) flush 5-10 mL  5-10 mL IntraVENous PRN Kwesi Valencia MD        acetaminophen (TYLENOL) tablet 650 mg  650 mg Oral Q4H PRN Kwesi Valencia MD        oxyCODONE-acetaminophen (PERCOCET) 5-325 mg per tablet 1 Tab  1 Tab Oral Q4H PRN Kwesi Valencia MD   1 Tab at 01/30/18 0554    HYDROmorphone (DILAUDID) injection 0.5 mg  0.5 mg IntraVENous Q4H PRN Kwesi Valencia MD   0.5 mg at 01/27/18 1130    prochlorperazine (COMPAZINE) injection 10 mg  10 mg IntraVENous Q6H PRN Kwesi Valencia MD   10 mg at 01/27/18 1116    zolpidem (AMBIEN) tablet 5 mg  5 mg Oral QHS PRN Kwesi Valencia MD        enoxaparin (LOVENOX) injection 30 mg  30 mg SubCUTAneous DAILY Kwesi Valencia MD   30 mg at 01/29/18 2129    diphenhydrAMINE (BENADRYL) capsule 25 mg  25 mg Oral Q6H PRN Kwesi Valencia MD   25 mg at 01/27/18 2349    buPROPion XL (WELLBUTRIN XL) tablet 150 mg  150 mg Oral 7am Kwesi Valencia MD   150 mg at 01/30/18 0554    magnesium oxide (MAG-OX) tablet 400 mg  400 mg Oral BID Kwesi Valencia MD   400 mg at 01/30/18 6917    Thiamine Mononitrate (B-1) tablet 100 mg  100 mg Oral DAILY Kwesi Valencia MD   100 mg at 01/30/18 1145    ezetimibe (ZETIA) tablet 10 mg  10 mg Oral DAILY Kwesi Valencia MD   10 mg at 65/03/18 4357    folic acid (FOLVITE) tablet 1 mg  1 mg Oral DAILY Kwesi Valencia MD   1 mg at 01/30/18 2227       Allergies   Allergen Reactions    Robaxin [Methocarbamol] Hives, Rash and Itching     Red splotches    Statins-Hmg-Coa Reductase Inhibitors Myalgia    Codeine Itching    Neosporin [Neomycin-Bacitracin-Polymyxin] Rash       Review of Systems    A comprehensive review of systems was negative except for: per HPI     Objective:  Visit Vitals    /76 (BP 1 Location: Right arm, BP Patient Position: At rest;Supine)    Pulse 96    Temp 98 °F (36.7 °C)    Resp 16    Ht 5' 1\" (1.549 m)    Wt 44.9 kg (99 lb)    SpO2 98%    BMI 18.71 kg/m2       Physical Exam:   General: No distress  Eyes: PERRLA, anicteric sclerae  HENT: Atraumatic, OP clear  Neck: Supple  Respiratory: diminished bases, normal respiratory effort  CV: Normal rate, regular rhythm, no murmurs, left arm ecchymosis and edema noted  GI: Soft, nontender, nondistended, no masses, no hepatomegaly, no splenomegaly  Skin: No rashes, ecchymoses, or petechiae. Normal temperature, turgor, and texture. Psych: Alert, oriented, appropriate affect, normal judgment/insight    Diagnostic Imaging   reviewed    1/26/2018 CT CHEST ABD PELV W CONT    IMPRESSION:     Mediastinal and hilar adenopathy, liver lesion, and sclerotic osseous lesions,  compatible with metastatic disease  Possible right ovarian cystic mass for which pelvic ultrasound is recommended. Marked emphysema  Fibroid uterus  Colitis of the ascending colon, transverse colon and sigmoid colon. Trace pericardial effusion and small amount of free fluid in the right pelvis,  nonspecific    Addendum:      Examination was re-reviewed at the request of the oncology service. The enlarged  mediastinal and hilar lymph nodes demonstrate internal calcification; therefore,  granulomatous disease is favored over malignancy. The low-attenuation lesion in  hepatic segment 5 has the appearance of focal fat with peripheral venous  shunting; no suspicious hepatic mass is evident. The subtle sclerotic bone  lesions at T2 and S1 are nonspecific but not typical of metastatic disease;  whole-body bone scan could be performed for further evaluation.  Findings were  discussed with Socrates Meade NP.      1/30/2018 Doppler TOSHIA  Pending      Lab Results  reviewed  Lab Results   Component Value Date/Time    WBC 6.6 01/28/2018 01:54 AM    HGB 8.5 01/28/2018 01:54 AM    HCT 27.3 01/28/2018 01:54 AM    PLATELET 778 91/30/5498 01:54 AM    MCV 87.5 01/28/2018 01:54 AM       Lab Results   Component Value Date/Time    Sodium 148 01/29/2018 06:10 AM    Potassium 3.2 01/30/2018 02:00 AM    Chloride 119 01/29/2018 06:10 AM    CO2 20 01/29/2018 06:10 AM    Anion gap 9 01/29/2018 06:10 AM    Glucose 120 01/29/2018 06:10 AM    BUN 1 01/29/2018 06:10 AM    Creatinine 0.63 01/29/2018 06:10 AM    BUN/Creatinine ratio 2 01/29/2018 06:10 AM    GFR est AA >60 01/29/2018 06:10 AM    GFR est non-AA >60 01/29/2018 06:10 AM    Calcium 6.9 01/29/2018 06:10 AM    AST (SGOT) 61 01/26/2018 01:02 PM    Alk. phosphatase 131 01/26/2018 01:02 PM    Protein, total 5.7 01/26/2018 01:02 PM    Albumin 2.1 01/26/2018 01:02 PM    Globulin 3.6 01/26/2018 01:02 PM    A-G Ratio 0.6 01/26/2018 01:02 PM    ALT (SGPT) 29 01/26/2018 01:02 PM       1/29/2018 EBUS / Dr Tracy Meraz  Findings:   Dense rubber like lymphnodes with specs of calcifications inside. Specimens:   Cytology- scant          Assessment/ Plan:     1. Abnormal CT scan  Enlarged mediastinal and hilar lymph nodes demonstrate internal calcification; granulomatous disease is favored over malignancy; subtle sclerotic bone lesions T2 and S1  Pulmonary consulted: 1/29/2018 EBUS; waiting scant cytology; ACE added   Eval bone lesions with bone scan today (1/30)      Reviewed with Dr Rafaela Agudelo       2  Anemia , normocytic (s/p 2 units PRBC)  Unclear etiology  Responded to transfusion  Possibly secondary to  Colitis vs  chronic disease. Continue to monitor and transfuse for Hgb < 7    3. Diarrhea   possibly due to colitis. GI following; eval tentatively on  Wed with colonoscopy    4. Hypokalemia  Improving   Receiving repletion    5. Left arm swelling  eval with doppler     6.  Adnexal mass  Gyn eval pending      Plan reviewed with Dr Lauren Meng

## 2018-01-30 NOTE — PROGRESS NOTES
Bedside and Verbal shift change report given to Yanna Dover RN (oncoming nurse) by Karolynn Bumpers, RN (offgoing nurse). Report included the following information SBAR, Kardex, Accordion and Recent Results.

## 2018-01-30 NOTE — PROGRESS NOTES
1- CASE MANAGEMENT NOTE:  I have reviewed the pt's EMR and CM will be available to assist with discharge needs as indicated.  ARAM Lacy, CM

## 2018-01-30 NOTE — CONSULTS
Consult Date: 2018    Consults    Ms. Isauro Price is a 63yo postmenopausal AAF admitted for acute on chronic diarrhea, hypokalemia, weight loss, deconditioning. CT on admission was initially concerning for widely metastatic malignancy of unknown primary. Her consulting physicians have since determined that on re-read of CT that her midhilar LAD and osseous lesions are more c/w granulomatous disease with acute on chronic colitis. Final work-up and diagnosis are pending. GYN-Oncology consulted for finding of [id number] pelvic mass in setting of possible malignancy and general wasting. Subjective     Pt resting comfortably in bed, NAD. Family present to corroborate hx. Pt states regular Gyn care with Dr. Eunice Mo ([facility]). , h/o TSVD x3. Unsure exact age of menopause, approximately 5 years ago. Denies h/o postmenopausal bleeding. Does note a h/o AUB to anemia necessitating transfusion, however was told this was fibroids and last needed transfusion >10yrs ago. Denies h/o abnormal pap smears. Denies h/o STI  Lifelong tobacco use. 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 Graves Neither at patient              first  No date: Tobacco dependence  No date: Unspecified essential hypertension  : Uterine bleeding, dysfunctional  No date: Uterine fibroid   Past Surgical History:  No date: HX CHOLECYSTECTOMY  2013: HX COLONOSCOPY     Comment: MCV, repeat 10 years  No date: HX ORTHOPAEDIC     Comment: Shattered bilateral ankles-metal plates &               screws  No date: HX OTHER SURGICAL     Comment: Benign cyst removal- L. foot  No date: LEG/ANKLE SURGERY PROC UNLISTED    Family History    Hypertension Mother     Diabetes Mother     Cancer Mother     Comment: breast    Heart Disease Father     Comment: MI 42's    Kidney Disease Father     Hypertension Son          Smoking status: Current Every Day Smoker  0.50 Packs/day  For 17.00 Years     Smokeless tobacco: Never Used    Alcohol use Yes  1.2 oz/week    1 Cans of beer, 1 Shots of liquor per week         Comment: Socially       Current Facility-Administered Medications:  peg 3350-electrolytes (COLYTE) 4000 mL 4,000 mL Oral ONCE Bel Ventura MD     0.9% sodium chloride infusion 50 mL/hr IntraVENous CONTINUOUS Kai Cisneros MD     dextrose 5% - 0.9% NaCl with KCl 20 mEq/L infusion 100 mL/hr IntraVENous CONTINUOUS Gualberto Epstein MD Last Rate: 100 mL/hr at 01/30/18 0554 100 mL/hr at 01/30/18 0554   0.9% sodium chloride infusion 250 mL 250 mL IntraVENous PRN Gualberto Epstein MD     metroNIDAZOLE (FLAGYL) IVPB premix 500 mg 500 mg IntraVENous Q8H Gualberto Epstein MD Last Rate: 100 mL/hr at 01/30/18 1607 500 mg at 01/30/18 1607   levoFLOXacin (LEVAQUIN) 750 mg in D5W IVPB 750 mg IntraVENous Q24H Gualberto Epstein MD Last Rate: 100 mL/hr at 01/30/18 0745 750 mg at 01/30/18 0745   sodium chloride (NS) flush 5-10 mL 5-10 mL IntraVENous Q8H Indira Angulo MD 10 mL at 01/30/18 1311    sodium chloride (NS) flush 5-10 mL 5-10 mL IntraVENous PRN Indira Angulo MD     acetaminophen (TYLENOL) tablet 650 mg 650 mg Oral Q4H PRN Indira Angulo MD     oxyCODONE-acetaminophen (PERCOCET) 5-325 mg per tablet 1 Tab 1 Tab Oral Q4H PRN Indira Angulo MD 1 Tab at 01/30/18 0554    HYDROmorphone (DILAUDID) injection 0.5 mg 0.5 mg IntraVENous Q4H PRCOLUMBA Angulo MD 0.5 mg at 01/27/18 1130    prochlorperazine (COMPAZINE) injection 10 mg 10 mg IntraVENous Q6H PRN Indira Angulo MD 10 mg at 01/27/18 1116    zolpidem (AMBIEN) tablet 5 mg 5 mg Oral QHS PRN Indira Angulo MD     diphenhydrAMINE (BENADRYL) capsule 25 mg 25 mg Oral Q6H PRN Indira Angulo MD 25 mg at 01/27/18 9621    buPROPion XL (WELLBUTRIN XL) tablet 150 mg 150 mg Oral 7am [facility] Cabrera  mg at 01/30/18 0554    magnesium oxide (MAG-OX) tablet 400 mg 400 mg Oral BID Ashkan Oliveira  mg at 01/30/18 3751    Thiamine Mononitrate (B-1) tablet 100 mg 100 mg Oral DAILY Ashkan Oliveira  mg at 01/30/18 7666    ezetimibe (ZETIA) tablet 10 mg 10 mg Oral DAILY Ashkan Oliveira MD 10 mg at 42/30/83 1883    folic acid (FOLVITE) tablet 1 mg 1 mg Oral DAILY Ashkan Oliveira MD 1 mg at 01/30/18 7443         Review of Systems   Constitutional: Positive for fatigue and unexpected weight change. HENT: Negative. Eyes: Negative. Respiratory: Negative. Gastrointestinal: Positive for diarrhea. Negative for abdominal distention, abdominal pain, anal bleeding and blood in stool. Endocrine: Positive for cold intolerance. Genitourinary: Negative for dysuria, pelvic pain and vaginal bleeding. Skin: Negative. Neurological: Positive for weakness. Psychiatric/Behavioral: Negative.         Objective    Vital signs for last 24 hours:  /85 (BP 1 Location: Right arm, BP Patient Position: Post activity)  Pulse 92  Temp 98.5 °F (36.9 °C)  Resp 17  Ht 5' 1\" (1.549 m)  Wt 44.9 kg (99 lb)  SpO2 96%  BMI 18.71 kg/m2    Intake/Output this shift:  Current Shift:    Last 3 Shifts: 01/28 1901 - 01/30 0700  In: 700 [I.V.:700]  Out: 103 [Urine:100]    Data Review:   Recent Results (from the past 24 hour(s))  -POTASSIUM   Collection Time: 01/30/18  2:00 AM   Result Value Ref Range   Potassium 3.2 (L) 3.5 - 5.1 mmol/L   -CBC WITH AUTOMATED DIFF   Collection Time: 01/30/18 12:14 PM   Result Value Ref Range   WBC 3.2 (L) 3.6 - 11.0 K/uL   RBC 3.05 (L) 3.80 - 5.20 M/uL   HGB 8.3 (L) 11.5 - 16.0 g/dL   HCT 27.6 (L) 35.0 - 47.0 %   MCV 90.5 80.0 - 99.0 FL   MCH 27.2 26.0 - 34.0 PG   MCHC 30.1 30.0 - 36.5 g/dL   RDW 21.6 (H) 11.5 - 14.5 %   PLATELET 089 (L) 532 - 400 K/uL   MPV 8.9 8.9 - 12.9 FL   NRBC 0.0 0  WBC   ABSOLUTE NRBC 0.00 0.00 - 0.01 K/uL   NEUTROPHILS 47 32 - 75 % LYMPHOCYTES 44 12 - 49 %   MONOCYTES 9 5 - 13 %   EOSINOPHILS 0 0 - 7 %   BASOPHILS 0 0 - 1 %   IMMATURE GRANULOCYTES 0 %   ABS. NEUTROPHILS 1.5 (L) 1.8 - 8.0 K/UL   ABS. LYMPHOCYTES 1.4 0.8 - 3.5 K/UL   ABS. MONOCYTES 0.3 0.0 - 1.0 K/UL   ABS. EOSINOPHILS 0.0 0.0 - 0.4 K/UL   ABS. BASOPHILS 0.0 0.0 - 0.1 K/UL   ABS. IMM. GRANS. 0.0 K/UL   DF MANUAL    RBC COMMENTS OVALOCYTES  PRESENT      RBC COMMENTS QUIQUE CELLS  PRESENT      RBC COMMENTS ANISOCYTOSIS  1+      -METABOLIC PANEL, BASIC   Collection Time: 01/30/18 12:14 PM   Result Value Ref Range   Sodium 148 (H) 136 - 145 mmol/L   Potassium 3.6 3.5 - 5.1 mmol/L   Chloride 119 (H) 97 - 108 mmol/L   CO2 21 21 - 32 mmol/L   Anion gap 8 5 - 15 mmol/L   Glucose 126 (H) 65 - 100 mg/dL   BUN 1 (L) 6 - 20 MG/DL   Creatinine 0.72 0.55 - 1.02 MG/DL   BUN/Creatinine ratio 1 (L) 12 - 20     GFR est AA >60 >60 ml/min/1.73m2   GFR est non-AA >60 >60 ml/min/1.73m2   Calcium 7.1 (L) 8.5 - 10.1 MG/DL       Physical Exam   Constitutional: She is oriented to person, place, and time. HENT:   Head: Normocephalic. Mouth/Throat: Oropharynx is clear and moist.   Eyes: No scleral icterus. Neck: Normal range of motion. Cardiovascular: Normal rate and regular rhythm. Pulmonary/Chest: Effort normal and breath sounds normal.   Abdominal: Soft. Bowel sounds are normal. She exhibits no distension and no mass. There is no rebound and no guarding. Musculoskeletal: Normal range of motion. Lymphadenopathy:     She has no cervical adenopathy. Neurological: She is alert and oriented to person, place, and time. Skin: Skin is warm and dry. Psychiatric: She has a normal mood and affect. Her behavior is normal. Judgment and thought content normal.       Assessment/Plan    56yo postmenopausal AAF admitted for acute on chronic colitis with associated weight loss and hypokalemia; new mediastinal LAD with unclear etiology.  Likely incidental finding of pelvic mass c/w hydrosalpinx    Pelvic mass  - very low suspicion for malignancy, however in setting of acute weight loss and wasting would recommend obtaining a CA-125 with next labs  - if CA-125 elevated, can follow as outpatient  - if CA-125 returns wnl, findings most consistent with hydrosalpinx and no acute intervention indicated, appropriate to f/u with established general GYN in the community. Thank you for this consult. Please re-call with further questions or concerns.      Pt seen and rounded on with attending, Dr. Yonis William MD  OBGYN PGY4

## 2018-01-30 NOTE — PROGRESS NOTES
Nutrition Assessment:    RECOMMENDATIONS/INTERVENTION(S):   Resume Regular diet once able    Will monitor the plan of care, PO intake & tolerance, labs & resume/add appropriate ONS  ASSESSMENT:   1/30:  Pt w/ diarrhea, continued concern for colitis. Plans for EGD, colonoscopy tomorrow if lytes WDL. Labs/meds reviewed. Lytes continuing to require repletion. K, Mg WDL. Phos low. On Clears. Appetite improving per nursing - poor to fair. D5 w/ KCl @ 100 ml/hr IVF. S/p bronch. Noted metastatic disease less likely after further review of CT scans. Bone scan today. 1/27:  Chart accessed & reviewed remotely. Consult received for general nutrition management & supplements. MST referral received for wt loss & decreased appetite PTA (ADDENDUM). 65 yof admitted for metastatic CA. Admitted w/ K of 1.6. Labs/meds reviewed. K 1.8. Mg requiring repletion. Phos low. NS w/ KCl IVF. S/p abnormal CT. Oncology following, note possible plans for liver bx. Treatment plans pending path dx of CA. GI consulted for colitis/bowel changes. Spoke w/ pt's  by phone (pt resting, declined talking). No meal intake today, sipping cranberry juice. Decreased appetite x at least 3 mo PTA. Eating fruit, drinking icees, bites of meals here and there when  cooks. -160#. Eating normally, more \"meat & potatoes\" before summer. Noted 22.7% wt loss x 3 mo & 32.7% loss x 1 yr PTA. At risk of underwt per BMI. Encouraged /pt to order meals as desired and RD to start ONS trial.   states pt has declined protein shakes he consumes at home.           Meets Criteria for Severe Chronic Malnutrition as evidenced by:   [] Severe muscle wasting, loss of subcutaneous fat   [x] Nutritional intake of <75% of recommended intake for >1 month   [x] Weight loss of  >5% in 1 month, >7.5% in 3 months, >10% in 6 months, >20% in 1 year   [] Severe edema        SUBJECTIVE/OBJECTIVE:     Diet Order: CLD  % Eaten:  No data found. Pertinent Medications: [x] Reviewed - folic acid, thiamine, mag sulf, mag ox, KCl, colyte    Past Medical History:   Diagnosis Date    Anemia 4/04    Anemia NEC     Insomnia 11/09    PPD positive     treated w/ INH- tests since have been negative    Pure hypercholesterolemia     Scoliosis 12/28/15    [facility]   [address] dependence     Unspecified essential hypertension     Uterine bleeding, dysfunctional 2/12    Uterine fibroid        Chemistries:  Lab Results   Component Value Date/Time    Sodium 148 01/30/2018 12:14 PM    Potassium 3.6 01/30/2018 12:14 PM    Chloride 119 01/30/2018 12:14 PM    CO2 21 01/30/2018 12:14 PM    Anion gap 8 01/30/2018 12:14 PM    Glucose 126 01/30/2018 12:14 PM    BUN 1 01/30/2018 12:14 PM    Creatinine 0.72 01/30/2018 12:14 PM    BUN/Creatinine ratio 1 01/30/2018 12:14 PM    GFR est AA >60 01/30/2018 12:14 PM    GFR est non-AA >60 01/30/2018 12:14 PM    Calcium 7.1 01/30/2018 12:14 PM    AST (SGOT) 61 01/26/2018 01:02 PM    Alk. phosphatase 131 01/26/2018 01:02 PM    Protein, total 5.7 01/26/2018 01:02 PM    Albumin 2.1 01/26/2018 01:02 PM    Globulin 3.6 01/26/2018 01:02 PM    A-G Ratio 0.6 01/26/2018 01:02 PM    ALT (SGPT) 29 01/26/2018 01:02 PM      Anthropometrics: Height: 5' 1\" (154.9 cm) Weight: 44.9 kg (99 lb)    IBW (%IBW): 52.5 kg (115 lb 11.9 oz) (85.52 %) UBW (%UBW): 68 kg (150 lb) (65.99 %)    BMI: Body mass index is 18.71 kg/(m^2). This BMI is indicative of:   [] Underweight    [x] Normal - @ risk of underwt   [] Overweight    []  Obesity    []  Extreme Obesity (BMI>40)  Estimated Nutrition Needs (Based on): 1768 Kcals/day (BMR (976) x 1.3 AF + 500) , 63 g (-73 (1.2-1.4 g/kg x IBW)) Protein  Carbohydrate:  At Least 130 g/day  Fluids: 1800 mL/day    Last BM: 1/30 - loose   [x]Active     []Hyperactive  []Hypoactive       [] Absent   BS   Skin:    [] Intact   [] Incision  [] Breakdown   [] DTI [x] Tears/Excoriation/Abrasion - abrasion to chin  []Edema [x] Other:heels w/ blanchable redness     Wt Readings from Last 30 Encounters:   01/29/18 44.9 kg (99 lb)   01/25/18 45.1 kg (99 lb 6.4 oz)   10/16/17 58.1 kg (128 lb)   09/25/17 59.7 kg (131 lb 9.6 oz)   03/27/17 66.3 kg (146 lb 3.2 oz)   01/11/17 66.8 kg (147 lb 3.2 oz)   09/28/16 67.6 kg (149 lb)   04/13/16 68.5 kg (151 lb)   01/04/16 67.9 kg (149 lb 9.6 oz)   09/03/15 67.4 kg (148 lb 9.6 oz)   08/01/15 66.1 kg (145 lb 12.8 oz)   07/10/15 67.3 kg (148 lb 6.4 oz)   02/12/15 60.3 kg (133 lb)   06/26/14 60.3 kg (133 lb)   12/19/13 59 kg (130 lb)   03/25/13 60.3 kg (133 lb)   09/28/12 65.3 kg (144 lb)   02/14/12 63.5 kg (140 lb)   02/07/12 63.4 kg (139 lb 12 oz)   02/07/12 63.4 kg (139 lb 12.8 oz)   12/12/11 65.4 kg (144 lb 3.2 oz)   04/25/11 68.2 kg (150 lb 6.4 oz)   07/20/10 69.7 kg (153 lb 9.6 oz)      NUTRITION DIAGNOSES:   Problem:  Unintended weight loss     Etiology: related to conditions associated with a dx - metastatic disease?      Signs/Symptoms: as evidenced by reports of decreased appetite, PO, wt loss of 32.7% in 1 yr PTA      NUTRITION INTERVENTIONS:  Meals/Snacks: General/healthful diet   Supplements: Commercial supplement              GOAL:   PO diet w/ no s/s of intolerance in the next 1-3 days    Cultural, Quaker, or Ethnic Dietary Needs: None     EDUCATION & DISCHARGE NEEDS:    [x] None Identified   [] Identified and Education Provided/Documented   [] Identified and Pt declined/was not appropriate      [x] Interdisciplinary Care Plan Reviewed/Documented    [x] Discharge Needs:    tbd   [] No Nutrition Related Discharge Needs    NUTRITION RISK:   Pt Is At Nutrition Risk  [x]     No Nutrition Risk Identified  []       PT SEEN FOR:    []  MD Consult: []Calorie Count      []Diabetic Diet Education        []Diet Education     []Electrolyte Management     []General Nutrition Management and Supplements     []Management of Tube Feeding     []TPN Recommendations    []  RN Referral:  []MST score >=2     []Enteral/Parenteral Nutrition PTA     []Pregnant: Gestational DM or Multigestation                 [] Pressure Ulcer    []  Low BMI      []  Length of Stay       [] Dysphagia Diet         [] Ventilator  [x]  Follow-up     Previous Recommendations:   [x] Implemented          [] Not Implemented          [] Not Applicable    Previous Goal:   [] Met              [x] Progressing Towards Goal              [] Not Progressing Towards Goal   [] Not Applicable            Jose Rushing, [address] [place] [address]  Pager [phone]

## 2018-01-31 ENCOUNTER — ANESTHESIA (OUTPATIENT)
Dept: ENDOSCOPY | Age: 58
DRG: 391 | End: 2018-01-31
Payer: COMMERCIAL

## 2018-01-31 DIAGNOSIS — F51.01 PRIMARY INSOMNIA: ICD-10-CM

## 2018-01-31 PROCEDURE — 0DBN8ZX EXCISION OF SIGMOID COLON, VIA NATURAL OR ARTIFICIAL OPENING ENDOSCOPIC, DIAGNOSTIC: ICD-10-PCS | Performed by: SPECIALIST

## 2018-01-31 PROCEDURE — 88305 TISSUE EXAM BY PATHOLOGIST: CPT | Performed by: SPECIALIST

## 2018-01-31 PROCEDURE — 74011250636 HC RX REV CODE- 250/636: Performed by: INTERNAL MEDICINE

## 2018-01-31 PROCEDURE — 77030009426 HC FCPS BIOP ENDOSC BSC -B: Performed by: SPECIALIST

## 2018-01-31 PROCEDURE — 74011250636 HC RX REV CODE- 250/636

## 2018-01-31 PROCEDURE — 88342 IMHCHEM/IMCYTCHM 1ST ANTB: CPT | Performed by: SPECIALIST

## 2018-01-31 PROCEDURE — 76060000031 HC ANESTHESIA FIRST 0.5 HR: Performed by: SPECIALIST

## 2018-01-31 PROCEDURE — 97116 GAIT TRAINING THERAPY: CPT

## 2018-01-31 PROCEDURE — 77030018712 HC DEV BLLN INFL BSC -B: Performed by: SPECIALIST

## 2018-01-31 PROCEDURE — 74011250637 HC RX REV CODE- 250/637: Performed by: INTERNAL MEDICINE

## 2018-01-31 PROCEDURE — C1726 CATH, BAL DIL, NON-VASCULAR: HCPCS | Performed by: SPECIALIST

## 2018-01-31 PROCEDURE — 76040000019: Performed by: SPECIALIST

## 2018-01-31 PROCEDURE — 74011250637 HC RX REV CODE- 250/637: Performed by: PHYSICIAN ASSISTANT

## 2018-01-31 PROCEDURE — 65270000029 HC RM PRIVATE

## 2018-01-31 PROCEDURE — 0DB68ZX EXCISION OF STOMACH, VIA NATURAL OR ARTIFICIAL OPENING ENDOSCOPIC, DIAGNOSTIC: ICD-10-PCS | Performed by: SPECIALIST

## 2018-01-31 PROCEDURE — 74011000250 HC RX REV CODE- 250

## 2018-01-31 PROCEDURE — 88312 SPECIAL STAINS GROUP 1: CPT | Performed by: SPECIALIST

## 2018-01-31 PROCEDURE — 74011250637 HC RX REV CODE- 250/637: Performed by: SPECIALIST

## 2018-01-31 PROCEDURE — 97530 THERAPEUTIC ACTIVITIES: CPT

## 2018-01-31 PROCEDURE — 74011000250 HC RX REV CODE- 250: Performed by: INTERNAL MEDICINE

## 2018-01-31 RX ORDER — ATROPINE SULFATE 0.1 MG/ML
0.5 INJECTION INTRAVENOUS
Status: DISCONTINUED | OUTPATIENT
Start: 2018-01-31 | End: 2018-01-31 | Stop reason: HOSPADM

## 2018-01-31 RX ORDER — DEXTROMETHORPHAN/PSEUDOEPHED 2.5-7.5/.8
1.2 DROPS ORAL
Status: DISCONTINUED | OUTPATIENT
Start: 2018-01-31 | End: 2018-01-31 | Stop reason: HOSPADM

## 2018-01-31 RX ORDER — EPINEPHRINE 0.1 MG/ML
1 INJECTION INTRACARDIAC; INTRAVENOUS
Status: DISCONTINUED | OUTPATIENT
Start: 2018-01-31 | End: 2018-01-31 | Stop reason: HOSPADM

## 2018-01-31 RX ORDER — PROPOFOL 10 MG/ML
INJECTION, EMULSION INTRAVENOUS
Status: DISCONTINUED | OUTPATIENT
Start: 2018-01-31 | End: 2018-01-31 | Stop reason: HOSPADM

## 2018-01-31 RX ORDER — ZOLPIDEM TARTRATE 5 MG/1
TABLET ORAL
Qty: 30 TAB | Refills: 0 | Status: CANCELLED | OUTPATIENT
Start: 2018-01-31

## 2018-01-31 RX ORDER — SODIUM CHLORIDE 9 MG/ML
50 INJECTION, SOLUTION INTRAVENOUS CONTINUOUS
Status: DISPENSED | OUTPATIENT
Start: 2018-01-31 | End: 2018-01-31

## 2018-01-31 RX ORDER — PANTOPRAZOLE SODIUM 40 MG/1
40 TABLET, DELAYED RELEASE ORAL
Status: DISCONTINUED | OUTPATIENT
Start: 2018-01-31 | End: 2018-02-02 | Stop reason: HOSPADM

## 2018-01-31 RX ORDER — LIDOCAINE HYDROCHLORIDE 20 MG/ML
INJECTION, SOLUTION EPIDURAL; INFILTRATION; INTRACAUDAL; PERINEURAL AS NEEDED
Status: DISCONTINUED | OUTPATIENT
Start: 2018-01-31 | End: 2018-01-31 | Stop reason: HOSPADM

## 2018-01-31 RX ORDER — NALOXONE HYDROCHLORIDE 0.4 MG/ML
0.4 INJECTION, SOLUTION INTRAMUSCULAR; INTRAVENOUS; SUBCUTANEOUS
Status: DISCONTINUED | OUTPATIENT
Start: 2018-01-31 | End: 2018-01-31 | Stop reason: HOSPADM

## 2018-01-31 RX ORDER — FENTANYL CITRATE 50 UG/ML
25 INJECTION, SOLUTION INTRAMUSCULAR; INTRAVENOUS AS NEEDED
Status: DISCONTINUED | OUTPATIENT
Start: 2018-01-31 | End: 2018-01-31 | Stop reason: HOSPADM

## 2018-01-31 RX ORDER — FLUMAZENIL 0.1 MG/ML
0.2 INJECTION INTRAVENOUS
Status: DISCONTINUED | OUTPATIENT
Start: 2018-01-31 | End: 2018-01-31 | Stop reason: HOSPADM

## 2018-01-31 RX ORDER — MIDAZOLAM HYDROCHLORIDE 1 MG/ML
.25-5 INJECTION, SOLUTION INTRAMUSCULAR; INTRAVENOUS AS NEEDED
Status: DISCONTINUED | OUTPATIENT
Start: 2018-01-31 | End: 2018-01-31 | Stop reason: HOSPADM

## 2018-01-31 RX ORDER — SODIUM CHLORIDE 9 MG/ML
INJECTION, SOLUTION INTRAVENOUS
Status: DISCONTINUED | OUTPATIENT
Start: 2018-01-31 | End: 2018-01-31 | Stop reason: HOSPADM

## 2018-01-31 RX ORDER — PROPOFOL 10 MG/ML
INJECTION, EMULSION INTRAVENOUS AS NEEDED
Status: DISCONTINUED | OUTPATIENT
Start: 2018-01-31 | End: 2018-01-31 | Stop reason: HOSPADM

## 2018-01-31 RX ADMIN — METRONIDAZOLE 500 MG: 500 INJECTION, SOLUTION INTRAVENOUS at 11:39

## 2018-01-31 RX ADMIN — METRONIDAZOLE 500 MG: 500 INJECTION, SOLUTION INTRAVENOUS at 00:31

## 2018-01-31 RX ADMIN — Medication 400 MG: at 16:31

## 2018-01-31 RX ADMIN — EZETIMIBE 10 MG: 10 TABLET ORAL at 11:40

## 2018-01-31 RX ADMIN — PANTOPRAZOLE SODIUM 40 MG: 40 TABLET, DELAYED RELEASE ORAL at 11:40

## 2018-01-31 RX ADMIN — METRONIDAZOLE 500 MG: 500 INJECTION, SOLUTION INTRAVENOUS at 16:31

## 2018-01-31 RX ADMIN — SODIUM CHLORIDE: 9 INJECTION, SOLUTION INTRAVENOUS at 09:01

## 2018-01-31 RX ADMIN — SODIUM CHLORIDE 50 ML/HR: 900 INJECTION, SOLUTION INTRAVENOUS at 08:31

## 2018-01-31 RX ADMIN — DEXTROSE MONOHYDRATE, SODIUM CHLORIDE, AND POTASSIUM CHLORIDE 100 ML/HR: 50; 9; 1.49 INJECTION, SOLUTION INTRAVENOUS at 16:31

## 2018-01-31 RX ADMIN — METRONIDAZOLE 500 MG: 500 INJECTION, SOLUTION INTRAVENOUS at 23:23

## 2018-01-31 RX ADMIN — LEVOFLOXACIN 750 MG: 5 INJECTION, SOLUTION INTRAVENOUS at 11:40

## 2018-01-31 RX ADMIN — Medication 400 MG: at 11:40

## 2018-01-31 RX ADMIN — FOLIC ACID 1 MG: 1 TABLET ORAL at 11:40

## 2018-01-31 RX ADMIN — PROPOFOL 50 MG: 10 INJECTION, EMULSION INTRAVENOUS at 09:16

## 2018-01-31 RX ADMIN — SODIUM CHLORIDE: 9 INJECTION, SOLUTION INTRAVENOUS at 08:45

## 2018-01-31 RX ADMIN — Medication 100 MG: at 11:40

## 2018-01-31 RX ADMIN — Medication 10 ML: at 11:40

## 2018-01-31 RX ADMIN — LIDOCAINE HYDROCHLORIDE 60 MG: 20 INJECTION, SOLUTION EPIDURAL; INFILTRATION; INTRACAUDAL; PERINEURAL at 09:16

## 2018-01-31 RX ADMIN — PROPOFOL 100 MCG/KG/MIN: 10 INJECTION, EMULSION INTRAVENOUS at 09:16

## 2018-01-31 RX ADMIN — SIMETHICONE 80 MG: 20 SUSPENSION/ DROPS ORAL at 09:36

## 2018-01-31 NOTE — PROGRESS NOTES
Problem: Mobility Impaired (Adult and Pediatric)  Goal: *Acute Goals and Plan of Care (Insert Text)  Physical Therapy Goals  Initiated 1/28/2018  1. Patient will move from supine to sit and sit to supine  and roll side to side in bed with modified independence within 7 day(s). 2.  Patient will transfer from bed to chair and chair to bed with modified independence using the least restrictive device within 7 day(s). 3.  Patient will perform sit to stand with modified independence within 7 day(s). 4.  Patient will ambulate with modified independence for 150 feet with the least restrictive device within 7 day(s). 5.  Patient will ascend/descend 3 stairs with 2 handrail(s) with modified independence within 7 day(s). physical Therapy TREATMENT  Patient: Carlitos Bautista (84 y.o. female)  Date: 1/31/2018  Diagnosis: Metastatic cancer ([address]  abnormal CT Scan  abnormal CT <principal problem not specified>  Procedure(s) (LRB):  ESOPHAGOGASTRODUODENOSCOPY (EGD) (N/A)  COLONOSCOPY (N/A)  ESOPHAGOGASTRODUODENAL (EGD) BIOPSY (N/A)  ESOPHAGEAL DILATION (N/A)  COLON BIOPSY (N/A) Day of Surgery  Precautions: Fall  Chart, physical therapy assessment, plan of care and goals were reviewed. ASSESSMENT:  Pt comes to sit with CGA to min assist.Pt to stand with CGA. Pt ambulated 40ft with RW CGA to min assist.Pt back to bed with min assist.Pt tolerated treatment fairly well. Continue goals. Progression toward goals:  []      Improving appropriately and progressing toward goals  [x]      Improving slowly and progressing toward goals  []      Not making progress toward goals and plan of care will be adjusted     PLAN:  Patient continues to benefit from skilled intervention to address the above impairments. Continue treatment per established plan of care.   Discharge Recommendations:  Rehab vs SNF  Further Equipment Recommendations for Discharge:  rolling walker     SUBJECTIVE:        OBJECTIVE DATA SUMMARY:   Critical Behavior:  Neurologic State: Alert, Appropriate for age  Orientation Level: Oriented X4  Cognition: Appropriate for age attention/concentration  Safety/Judgement: Awareness of environment, Fall prevention, Home safety, Decreased insight into deficits  Functional Mobility Training:  Bed Mobility:  Rolling: Contact guard assistance;Minimum assistance                  Transfers:  Sit to Stand: Contact guard assistance  Stand to Sit: Contact guard assistance                             Balance:  Sitting: Intact  Standing: With support  Standing - Static: Fair;Good  Ambulation/Gait Training:  Distance (ft): 40 Feet (ft)     Ambulation - Level of Assistance: Contact guard assistance                 Base of Support: Narrowed     Speed/Diana: Pace decreased (<100 feet/min)  Step Length: Left shortened;Right shortened                Pain:  Pain Scale 1: Numeric (0 - 10)  Pain Intensity 1: 0              Activity Tolerance:   Pt tolerated treatment fairly  well. Please refer to the flowsheet for vital signs taken during this treatment.   After treatment:   [] Patient left in no apparent distress sitting up in chair  [x] Patient left in no apparent distress in bed  [] Call bell left within reach  [] Nursing notified  [] Caregiver present  [] Bed alarm activated    COMMUNICATION/COLLABORATION:   The patients plan of care was discussed with: Physical Therapist    Hunter Jaramillo PTA   Time Calculation: 23 mins

## 2018-01-31 NOTE — PROGRESS NOTES
Name: Jong Vega: [facility]   : 1960 Admit Date: 2018   Phone: [phone]  Room: [facility]/   PCP: Rafaela Rueda NP  MRN: [id number]   Date: 2018  Code: Full Code          Chart and notes reviewed. Data reviewed. I review the patient's current medications in the medical record at each encounter. I have evaluated and examined the patient. Overnight events  Afebrile  Sats 100% on RA   labs  WBC 3.2  Hgb 8.3  K 3.6  ACE level in process  Stool cx with no routine ENTERIC PATHOGENS ISOLATED INCLUDING SALMONELLA, SHIGELLA, YERSINIA, VIBRIO OR SHIGA TOXIN PRODUCING E. COLI ; + LIGHT YEAST ISOLATED  Bronch with dense rubber like lymph nodes with specs of calcifications inside  [address] with PUD; colonoscopy with diverticulosis, otherwiseh unremarkable  Bone scan: activity appears nonspecific and appears more typical of spondylotic changes than metastatic disease    ROS: Just returned to the room from colonoscopy and EGD. Patient with no complaints this morning other than being cold. Denies SOB or CP. Denies fever or chills. Denies much of a cough. Has some weakness. Diarrhea improved. Denies abd pain or LE pain/swelling. States legs still feel wekn and states she is not able to stand on her own.     Current Facility-Administered Medications   Medication Dose Route Frequency    0.9% sodium chloride infusion  50 mL/hr IntraVENous CONTINUOUS    pantoprazole (PROTONIX) tablet 40 mg  40 mg Oral ACB    0.9% sodium chloride infusion  50 mL/hr IntraVENous CONTINUOUS    dextrose 5% - 0.9% NaCl with KCl 20 mEq/L infusion  100 mL/hr IntraVENous CONTINUOUS    0.9% sodium chloride infusion 250 mL  250 mL IntraVENous PRN    metroNIDAZOLE (FLAGYL) IVPB premix 500 mg  500 mg IntraVENous Q8H    levoFLOXacin (LEVAQUIN) 750 mg in D5W IVPB  750 mg IntraVENous Q24H    sodium chloride (NS) flush 5-10 mL  5-10 mL IntraVENous Q8H    sodium chloride (NS) flush 5-10 mL  5-10 mL IntraVENous PRN    acetaminophen (TYLENOL) tablet 650 mg  650 mg Oral Q4H PRN    oxyCODONE-acetaminophen (PERCOCET) 5-325 mg per tablet 1 Tab  1 Tab Oral Q4H PRN    HYDROmorphone (DILAUDID) injection 0.5 mg  0.5 mg IntraVENous Q4H PRN    prochlorperazine (COMPAZINE) injection 10 mg  10 mg IntraVENous Q6H PRN    zolpidem (AMBIEN) tablet 5 mg  5 mg Oral QHS PRN    diphenhydrAMINE (BENADRYL) capsule 25 mg  25 mg Oral Q6H PRN    buPROPion XL (WELLBUTRIN XL) tablet 150 mg  150 mg Oral 7am    magnesium oxide (MAG-OX) tablet 400 mg  400 mg Oral BID    Thiamine Mononitrate (B-1) tablet 100 mg  100 mg Oral DAILY    ezetimibe (ZETIA) tablet 10 mg  10 mg Oral DAILY    folic acid (FOLVITE) tablet 1 mg  1 mg Oral DAILY         REVIEW OF SYSTEMS   12 point ROS negative except as stated in the HPI. Physical Exam:   Visit Vitals    /73    Pulse 86    Temp 98.2 °F (36.8 °C)    Resp 16    Ht 5' 1\" (1.549 m)    Wt 44.9 kg (99 lb)    SpO2 100%    BMI 18.71 kg/m2       General:  Alert, thin, weak/frail, ill appearing, cooperative, no distress, appears stated age. Head:  Normocephalic, temporal wasting, atraumatic. Eyes:  Conjunctivae/corneas clear. Nose: Nares normal. Septum midline. Mucosa normal.    Throat: Lips, mucosa, and tongue normal.    Neck: Supple, symmetrical, trachea midline, no adenopathy. Lungs:   Decreased, but clear to auscultation bilaterally. No wheeze or rales. Chest wall:  No tenderness or deformity. Heart:  Regular rate and rhythm, S1, S2 normal, no murmur, click, rub or gallop. Abdomen:   Soft, non-tender. Bowel sounds normal. No masses,  No organomegaly. Extremities: Extremities normal, atraumatic, no cyanosis or edema. Pulses: 2+ and symmetric all extremities.    Skin: Skin color, texture, turgor normal. No rashes or lesions   Lymph nodes: Cervical, supraclavicular nodes normal.   Neurologic: Grossly nonfocal       Lab Results   Component Value Date/Time    Sodium 148 01/30/2018 12:14 PM    Potassium 3.6 01/30/2018 12:14 PM    Chloride 119 01/30/2018 12:14 PM    CO2 21 01/30/2018 12:14 PM    BUN 1 01/30/2018 12:14 PM    Creatinine 0.72 01/30/2018 12:14 PM    Glucose 126 01/30/2018 12:14 PM    Calcium 7.1 01/30/2018 12:14 PM    Magnesium 1.8 01/28/2018 01:54 AM    Phosphorus 2.1 01/28/2018 01:54 AM    Lactic acid 1.9 01/27/2018 08:55 AM       Lab Results   Component Value Date/Time    WBC 3.2 01/30/2018 12:14 PM    HGB 8.3 01/30/2018 12:14 PM    PLATELET 607 08/97/8747 12:14 PM    MCV 90.5 01/30/2018 12:14 PM       Lab Results   Component Value Date/Time    INR 1.2 01/29/2018 10:03 AM    aPTT 38.3 01/29/2018 10:03 AM    AST (SGOT) 61 01/26/2018 01:02 PM    Alk.  phosphatase 131 01/26/2018 01:02 PM    Protein, total 5.7 01/26/2018 01:02 PM    Albumin 2.1 01/26/2018 01:02 PM    Globulin 3.6 01/26/2018 01:02 PM       Lab Results   Component Value Date/Time    Iron 105 01/25/2018 01:17 PM    TIBC <122 01/25/2018 01:17 PM    Iron % saturation >86 01/25/2018 01:17 PM    Ferritin 333 01/25/2018 01:17 PM       Lab Results   Component Value Date/Time    Sed rate (ESR) 15 01/25/2018 01:17 PM    TSH 4.350 01/25/2018 01:16 PM        No results found for: PH, PHI, PCO2, PCO2I, PO2, PO2I, HCO3, HCO3I, FIO2, FIO2I    Lab Results   Component Value Date/Time    Troponin-I, Qt. 0.08 01/27/2018 05:36 AM        Lab Results   Component Value Date/Time    Culture result:  01/28/2018 08:25 AM     NO ROUTINE ENTERIC PATHOGENS ISOLATED INCLUDING SALMONELLA, SHIGELLA, YERSINIA, VIBRIO OR SHIGA TOXIN PRODUCING E. COLI    Culture result: MARKEDLY REDUCED COLIFORMS NOTED 01/28/2018 08:25 AM    Culture result: LIGHT YEAST ISOLATED 01/28/2018 08:25 AM       No results found for: TOXA1, RPR, HBCM, HBSAG, HAAB, HCAB1, HAAT, G6PD, CRYAC, HIVGT, HIVR, HIV1, HIV12, HIVPC, HIVRPI    No results found for: VANCT, CPK    Lab Results   Component Value Date/Time    Color DARK YELLOW 01/26/2018 01:13 PM    Appearance CLEAR 01/26/2018 01:13 PM    pH (UA) 7.0 01/26/2018 01:13 PM    Protein TRACE 01/26/2018 01:13 PM    Glucose NEGATIVE  01/26/2018 01:13 PM    Ketone TRACE 01/26/2018 01:13 PM    Bilirubin Negative 09/28/2012 08:58 AM    Blood NEGATIVE  01/26/2018 01:13 PM    Urobilinogen 1.0 01/26/2018 01:13 PM    Nitrites NEGATIVE  01/26/2018 01:13 PM    Leukocyte Esterase NEGATIVE  01/26/2018 01:13 PM    WBC 0-4 01/26/2018 01:13 PM    RBC 0-5 01/26/2018 01:13 PM    Epithelial cells >10 09/28/2012 08:58 AM    Bacteria NEGATIVE  01/26/2018 01:13 PM       Images: no new images this morning    IMPRESSION  · Abnormal chest CT: large, bulky lymphnodes in the mediastinum  · Weight loss  · Hypokalemia  · Subtle nonspecific sclerotic bone lesions at T2 and S1   · Hx of + PPD; treated with INR  · CT suggestive of colitis  · Emphysema on chest CT   · Anemia: unclear etiology  · Active tobacco use    PLAN  · F/U EBUS, but likely low yield  · F/U ACE level  · Hematology following; s/p transfusion. Closely follow Hgb and transfuse if < 7.  · Levaquin and Flagyl per primary team/GI. · Would consider mediastinoscopy if workup turns out negative.   · Prn Duonebs  · DVT prophylaxis: Lovenox  · GI prophylaxis: daily Protonix added today per GI      CODY Kern

## 2018-01-31 NOTE — PROGRESS NOTES
Problem: Falls - Risk of  Goal: *Absence of Falls  Document Roselia Fall Risk and appropriate interventions in the flowsheet.    Outcome: Progressing Towards Goal  Fall Risk Interventions:  Mobility Interventions: Bed/chair exit alarm, Patient to call before getting OOB    Mentation Interventions: Bed/chair exit alarm, Family/sitter at bedside, More frequent rounding, Reorient patient, Toileting rounds    Medication Interventions: Bed/chair exit alarm, Patient to call before getting OOB, Teach patient to arise slowly    Elimination Interventions: Bed/chair exit alarm, Call light in reach, Patient to call for help with toileting needs, Toilet paper/wipes in reach, Toileting schedule/hourly rounds    History of Falls Interventions: Bed/chair exit alarm

## 2018-01-31 NOTE — TELEPHONE ENCOUNTER
----- Message from Rhoda Salinas sent at 1/30/2018  3:00 PM EST -----  Regarding: Jenaro Meek/Refill  Pt requesting a refill of medication \" for sleep\" and also for BP medication.  Best contact number [phone]. alterant number [phone]

## 2018-01-31 NOTE — PROGRESS NOTES
Bedside and Verbal shift change report given to ZURDO Galindo (oncoming nurse) by Ivet Chaidez (offgoing nurse). Report included the following information SBAR and Kardex.

## 2018-01-31 NOTE — PROCEDURES
[facility]  *** FINAL REPORT ***    Name: Colby Adan  MRN: [id number]    Inpatient  : 1960  HIS Order #: [id number]  [address] [facility] Visit #: [id number]  Date: 2018    TYPE OF TEST: Peripheral Venous Testing    REASON FOR TEST  Limb swelling    Left Arm:-  Deep venous thrombosis:           No  Superficial venous thrombosis:    No      INTERPRETATION/FINDINGS  PROCEDURE: LEFT UPPER EXTREMITY VENOUS DUPLEX: Evaluation of upper  extremity veins with ultrasound (B-mode imaging, pulsed Doppler, color   Doppler). Includes the internal jugular, subclavian, axillary,  brachial, radial, ulnar, basilic, and cephalic veins. FINDINGS: Technically difficult exam due to patient edema. Minus Broadwater scale  imaging is suboptimal.  The left basilic vein in the forearm was not  visualized. Gray scale and color flow duplex images of the visualized  veins of the left upper extremity and bilateral subclavian veins  demonstrate normal compressibility, absence of filing defects, reflux  or phlebitic changes of the internal jugular, bilateral subclavian,  axillary, upper arm and forearm veins of the left arm. CONCLUSION: Left upper extremity venous duplex negative for deep vein  thrombosis and thrombophlebitis in the veins visualized. No evidence  of thrombus in contralateral right subclavian vein. ADDITIONAL COMMENTS    I have personally reviewed the data relevant to the interpretation of  this  study. TECHNOLOGIST: Shirley Nava  Signed: 2018 07:35 PM    PHYSICIAN: Jeremiah Mei.  Maria E Peters MD  Signed: 2018 12:03 PM

## 2018-01-31 NOTE — ROUTINE PROCESS
Carlitos Alert  1960  [id number]    Situation:  Verbal report received from: Jocelyne Colón RN  Procedure: Procedure(s):  ESOPHAGOGASTRODUODENOSCOPY (EGD)  COLONOSCOPY  ESOPHAGOGASTRODUODENAL (EGD) BIOPSY  ESOPHAGEAL DILATION  COLON BIOPSY    Background:    Preoperative diagnosis: abnormal CT  Postoperative diagnosis: Gastric Ulcer  Diverticulosis    :  Dr. Demetria Galvez  Assistant(s): Endoscopy Technician-1: Ad Coe  Endoscopy RN-1: Bob Witt RN  Endoscopy RN-2: Opal Wolff RN    Specimens:   ID Type Source Tests Collected by Time Destination   1 : antrum bxs. Preservative Hepatic Flexure  Refdada Bernal MD 1/31/2018 0715 Pathology   2 : GE junction bxs. Preservative   Refdada Bernal MD 1/31/2018 0071 Pathology   3 : random colon bxs Preservative   Teo Bernal MD 1/31/2018 6948 Pathology     H. Pylori  no    Assessment:  Intra-procedure medications     Anesthesia gave intra-procedure sedation and medications, see anesthesia flow sheet yes    Intravenous fluids: NS@ KVO     Vital signs stable     Abdominal assessment: round and soft   No crepitus felt around collarbones    Recommendation:  Discharge patient per MD order. Return to floor  Permission to share finding with family or friend yes.

## 2018-01-31 NOTE — PERIOP NOTES
Anuja Jeff  1960  [id number]    Situation:    Scheduled Procedure: Procedure(s):  ESOPHAGOGASTRODUODENOSCOPY (EGD)  COLONOSCOPY  Verbal report received from: Nnamdi Craft RN  Preoperative diagnosis: abnormal CT    Background:    Procedure: Procedure(s):  ESOPHAGOGASTRODUODENOSCOPY (EGD)  COLONOSCOPY  Physician performing procedure; Dr. Stephanie Morgan RN    NPO Status/Last PO Intake: midnight    Pregnancy Test:Not applicable If yes, result: none    Is the patient taking Blood Thinners: NO If yes, list:  and last taken   Is the patient diabetic:no       If yes, what was the last BS:    Time taken? Anything given? no           Does the patient have a Pacemaker/Defibrillator in place?: no   Does the patient need antibiotics before/during/after procedure: no   If the patient is having a colon, How much prep was drank? All except 500 cc   What were the Colon prep results? Yellow liquid   Does the patient have SCD in place:no   Is patient on CONTACT precautions:no        If yes, what kind of CONTACT precautions:     Assessment:  Are the vital signs stable prior to patient coming to ENDO?  yes  Is the patient alert/oriented and able to sign consent for the procedures:yes     Does the patient have a patient IV in place?  yes     Recommendation:  Family or Friend present no     Permission to share finding with Family or Friend n/a    EGD/Colonscopy scheduled for 0900

## 2018-01-31 NOTE — PROGRESS NOTES
Bedside and Verbal shift change report given to ZURDO Galindo(oncoming nurse) by Dorothy Lock (offgoing nurse). Report included the following information SBAR and Kardex.

## 2018-01-31 NOTE — PROCEDURES
[address] [facility] D. Evonnie Denver, MD  [phone]      2018    Esophagogastroduodenoscopy & Colonoscopy Procedure Note  Ankur Tanner  : 1960  [facility] Medical Record Number: [id number]      Indications:    Abnormal CT scan Weight loss  Referring Physician:  Aurelio Marrero NP  Anesthesia/Sedation: Conscious Sedation/Moderate Sedation/MAC  Endoscopist:  Dr. Joe Dewey  Complications:  None  Estimated Blood Loss:  None    Permit:  The indications, risks, benefits and alternatives were reviewed with the patient or their decision maker who was provided an opportunity to ask questions and all questions were answered. The specific risks of esophagogastroduodenoscopy with conscious sedation were reviewed, including but not limited to anesthetic complication, bleeding, adverse drug reaction, missed lesion, infection, IV site reactions, and intestinal perforation which would lead to the need for surgical repair. Alternatives to EGD and colonoscopy including radiographic imaging, observation without testing, or laboratory testing were reviewed as well as the limitations of those alternatives discussed. After considering the options and having all their questions answered, the patient or their decision maker provided both verbal and written consent to proceed. -----------EGD------------   Procedure in Detail:  After obtaining informed consent, positioning of the patient in the left lateral decubitus position, and conduction of a pre-procedure pause or \"time out\" the endoscope was introduced into the mouth and advanced to the duodenum. A careful inspection was made, and findings or interventions are described below. Findings:   Esophagus:normal  Stomach: Antral gastritis and several excavated ulcers of the stomach are noted in the antrum.   Cold forceps biopsies obtained for histology. Duodenum/jejunum: normal        ----------Colonoscopy-----------    Procedure in Detail:  After obtaining informed consent, positioning of the patient in the left lateral decubitus position, and conduction of a pre-procedure pause or \"time out\" the endoscope was introduced into the anus and advanced to the cecum, which was identified by the ileocecal valve and appendiceal orifice. The quality of the colonic preparation was good. A careful inspection was made as the colonoscope was withdrawn, findings and interventions are described below. Findings: There is diverticulosis in the sigmoid colon without complications such as bleeding, inflammatory change, or luminal narrowing. Normal mucosa is appreciated throughout the colon. No Crohn's no ulcerative colitis. I took random colon biopsies for histology. ------------------------------  Specimens:    See above    Complications:   None; patient tolerated the procedure well. Impressions:  EGD:  Peptic ulcer disease. Colonoscopy: Normal colonoscopy to the cecum, with no evidence of neoplasia, clinically significant diverticular disease, or mucosal abnormality. Recommendations:     - Await pathology. -PPI daily for 2 months. Thank you for entrusting me with this patient's care. Please do not hesitate to contact me with any questions or if I can be of assistance with any of your other patients' GI needs.     Signed By: Zuleika Casas MD                        January 31, 2018

## 2018-01-31 NOTE — ANESTHESIA PREPROCEDURE EVALUATION
Anesthetic History   No history of anesthetic complications            Review of Systems / Medical History  Patient summary reviewed, nursing notes reviewed and pertinent labs reviewed    Pulmonary    COPD (copd appearance on ct)      Smoker         Neuro/Psych   Within defined limits           Cardiovascular    Hypertension              Exercise tolerance: >4 METS     GI/Hepatic/Renal  Within defined limits              Endo/Other        Anemia (hg=8.5)     Other Findings            Physical Exam    Airway  Mallampati: III  TM Distance: 4 - 6 cm  Neck ROM: normal range of motion   Mouth opening: Normal     Cardiovascular    Rhythm: regular  Rate: normal         Dental    Dentition: Bridges     Pulmonary  Breath sounds clear to auscultation               Abdominal  GI exam deferred       Other Findings            Anesthetic Plan    ASA: 3  Anesthesia type: MAC          Induction: Intravenous  Anesthetic plan and risks discussed with: Patient

## 2018-01-31 NOTE — TELEPHONE ENCOUNTER
[phone] attempted to call patient no answer left message to call me back in regards to her medication refill request     The Prescription Monitoring Program has been reviewed for recent activity regarding controlled substances for this patient.      Per  last refill 1/24/2018 #30 Ambien

## 2018-01-31 NOTE — PROGRESS NOTES
Primary Nurse José Luis Wheeler RN and Merly Vázquez RN performed a dual skin assessment on this patient. Impairment noted- see wound doc flow sheet. Noted horizontal linear abrasion to sacral area & abrasion to L flank area. Hilton score is 18.

## 2018-01-31 NOTE — PERIOP NOTES
CRE balloon dilatation of the esophagus   18mm Balloon inflated to 3 ATMs and held for 30 seconds. 19 mm Balloon inflated to 4.5 ATMs and held for 30 seconds. No subcutaneous crepitus of the chest or cervical region was noted post dilatation.

## 2018-01-31 NOTE — ANESTHESIA POSTPROCEDURE EVALUATION
Post-Anesthesia Evaluation and Assessment    Patient: Erica Monroy MRN: [id number]  SSN: [id number]    YOB: 1960  Age: 65 y.o. Sex: female       Cardiovascular Function/Vital Signs  Visit Vitals    /73    Pulse 95    Temp 37.3 °C (99.1 °F)    Resp 16    Ht 5' 1\" (1.549 m)    Wt 44.9 kg (99 lb)    SpO2 100%    BMI 18.71 kg/m2       Patient is status post MAC anesthesia for Procedure(s):  ESOPHAGOGASTRODUODENOSCOPY (EGD)  COLONOSCOPY  ESOPHAGOGASTRODUODENAL (EGD) BIOPSY  ESOPHAGEAL DILATION  COLON BIOPSY. Nausea/Vomiting: None    Postoperative hydration reviewed and adequate. Pain:  Pain Scale 1: Numeric (0 - 10) (01/31/18 0808)  Pain Intensity 1: 0 (01/31/18 0808)   Managed    Neurological Status:   Neuro  Neurologic State: Alert (periods of confusion) (01/30/18 2133)  Orientation Level: Oriented to person;Oriented to place;Oriented to situation (01/30/18 2133)  Cognition: Follows commands (01/30/18 2133)  Speech: Clear (01/30/18 2133)  LUE Motor Response: Weak (01/30/18 2133)  LLE Motor Response: Weak (01/30/18 2133)  RUE Motor Response: Weak (01/30/18 2133)  RLE Motor Response: Weak (01/30/18 2133)   At baseline    Mental Status and Level of Consciousness: Arousable    Pulmonary Status:   O2 Device: Room air (01/31/18 4347)   Adequate oxygenation and airway patent    Complications related to anesthesia: None    Post-anesthesia assessment completed.  No concerns    Signed By: Arnetha Favre, MD     January 31, 2018

## 2018-01-31 NOTE — TELEPHONE ENCOUNTER
Called patient back to get name of BP medication, patient advised PSR that she does not know what the name of it was but the nurse would.      Best call back # for patient: [phone]

## 2018-01-31 NOTE — ROUTINE PROCESS
Bedside and Verbal shift change report given to jc andre rn (oncoming nurse) by sirisha ramos rn (offgoing nurse). Report included the following information SBAR, Procedure Summary and MAR.

## 2018-01-31 NOTE — PROGRESS NOTES
1/31/2018   12:51 PM  CM met w/ Pt to discuss [place] referral, Pt has not had HH previously, Pt. Stated she \"can not make any rash decisions and will review it with my \", CM emphasized need to arrange services prior to d/c, offered [facility], Pt. Would not choose agency. Pt confirmed family will transport home, CM advised d/c is between 10:00 AM and 12:00PM.  CM will follow and assist.  Emmett Kramer     10:02 AM  CM discussed Pt w/ Dr. Radha Adhikari in AM rounds, Pt to have colonoscopy today, d/c to home today pending results. EMR reviewed PT alejandra 1/28/18 recommending HH at d/c, order received will arrange d/c services.   Emmett Kramer

## 2018-01-31 NOTE — PROGRESS NOTES
TRANSFER - OUT REPORT:    Verbal report given to ZURDO Crawford(name) on Jonathan Salazar  being transferred to [facility]) for ordered procedure       Report consisted of patients Situation, Background, Assessment and   Recommendations(SBAR). Information from the following report(s) Kardex was reviewed with the receiving nurse. Lines:   Peripheral IV 01/31/18 Right Forearm (Active)   Site Assessment Clean, dry, & intact 1/31/2018 12:28 AM   Phlebitis Assessment 0 1/31/2018 12:28 AM   Infiltration Assessment 0 1/31/2018 12:28 AM   Dressing Status New 1/31/2018 12:28 AM   Dressing Type Transparent 1/31/2018 12:28 AM   Hub Color/Line Status Blue 1/31/2018 12:28 AM   Alcohol Cap Used Yes 1/31/2018 12:28 AM        Opportunity for questions and clarification was provided.       Patient transported with:   Transporter

## 2018-01-31 NOTE — PERIOP NOTES
Care of patient assumed from the anesthesia provider. Patient tolerated procedure well. Abdomen remains soft and non tender post procedure, no complaints or indication of discomfort noted at this time. See anesthesia note. Patient transferred to Endoscopy Recovery and report given to recovery nurse.

## 2018-01-31 NOTE — ROUTINE PROCESS
TRANSFER - OUT REPORT:    Verbal report given to Charge nurse Felix Ybarra on Carlitos Alert  being transferred to 524(unit) for routine post - op       Report consisted of patients Situation, Background, Assessment and   Recommendations(SBAR). Information from the following report(s) SBAR, Procedure Summary and Recent Results was reviewed with the receiving nurse. Lines:   Peripheral IV 01/31/18 Right Hand (Active)   Site Assessment Clean, dry, & intact 1/31/2018 10:10 AM   Phlebitis Assessment 0 1/31/2018 10:10 AM   Infiltration Assessment 0 1/31/2018 10:10 AM   Dressing Status Clean, dry, & intact 1/31/2018 10:10 AM   Dressing Type Tape;Transparent 1/31/2018 10:10 AM   Hub Color/Line Status Pink;Capped 1/31/2018 10:10 AM   Action Taken Open ports on tubing capped 1/31/2018 10:10 AM   Alcohol Cap Used Yes 1/31/2018 10:10 AM        Opportunity for questions and clarification was provided.       Patient transported with:   [facility]

## 2018-02-01 ENCOUNTER — HOME HEALTH ADMISSION (OUTPATIENT)
Dept: HOME HEALTH SERVICES | Facility: HOME HEALTH | Age: 58
End: 2018-02-01
Payer: COMMERCIAL

## 2018-02-01 ENCOUNTER — TELEPHONE (OUTPATIENT)
Dept: FAMILY MEDICINE CLINIC | Age: 58
End: 2018-02-01

## 2018-02-01 LAB
ACE SERPL-CCNC: 48 U/L (ref 14–82)
ERYTHROCYTE [DISTWIDTH] IN BLOOD BY AUTOMATED COUNT: 20.6 % (ref 11.5–14.5)
HCT VFR BLD AUTO: 25.8 % (ref 35–47)
HGB BLD-MCNC: 8 G/DL (ref 11.5–16)
MCH RBC QN AUTO: 27.8 PG (ref 26–34)
MCHC RBC AUTO-ENTMCNC: 31 G/DL (ref 30–36.5)
MCV RBC AUTO: 89.6 FL (ref 80–99)
NRBC # BLD: 0 K/UL (ref 0–0.01)
NRBC BLD-RTO: 0 PER 100 WBC
PLATELET # BLD AUTO: 98 K/UL (ref 150–400)
PMV BLD AUTO: 9.1 FL (ref 8.9–12.9)
RBC # BLD AUTO: 2.88 M/UL (ref 3.8–5.2)
WBC # BLD AUTO: 4.3 K/UL (ref 3.6–11)

## 2018-02-01 PROCEDURE — 65270000029 HC RM PRIVATE

## 2018-02-01 PROCEDURE — 36416 COLLJ CAPILLARY BLOOD SPEC: CPT | Performed by: INTERNAL MEDICINE

## 2018-02-01 PROCEDURE — 74011250637 HC RX REV CODE- 250/637: Performed by: SPECIALIST

## 2018-02-01 PROCEDURE — 97535 SELF CARE MNGMENT TRAINING: CPT

## 2018-02-01 PROCEDURE — 74011250637 HC RX REV CODE- 250/637: Performed by: INTERNAL MEDICINE

## 2018-02-01 PROCEDURE — 74011000250 HC RX REV CODE- 250: Performed by: INTERNAL MEDICINE

## 2018-02-01 PROCEDURE — 85027 COMPLETE CBC AUTOMATED: CPT | Performed by: INTERNAL MEDICINE

## 2018-02-01 PROCEDURE — 74011250636 HC RX REV CODE- 250/636: Performed by: INTERNAL MEDICINE

## 2018-02-01 PROCEDURE — 86304 IMMUNOASSAY TUMOR CA 125: CPT | Performed by: INTERNAL MEDICINE

## 2018-02-01 RX ORDER — METRONIDAZOLE 250 MG/1
500 TABLET ORAL EVERY 12 HOURS
Status: DISCONTINUED | OUTPATIENT
Start: 2018-02-01 | End: 2018-02-02 | Stop reason: HOSPADM

## 2018-02-01 RX ADMIN — FOLIC ACID 1 MG: 1 TABLET ORAL at 08:13

## 2018-02-01 RX ADMIN — OXYCODONE HYDROCHLORIDE AND ACETAMINOPHEN 1 TABLET: 5; 325 TABLET ORAL at 21:27

## 2018-02-01 RX ADMIN — Medication 10 ML: at 17:57

## 2018-02-01 RX ADMIN — Medication 10 ML: at 21:29

## 2018-02-01 RX ADMIN — Medication 100 MG: at 08:13

## 2018-02-01 RX ADMIN — LEVOFLOXACIN 750 MG: 5 INJECTION, SOLUTION INTRAVENOUS at 08:13

## 2018-02-01 RX ADMIN — METRONIDAZOLE 500 MG: 250 TABLET ORAL at 21:27

## 2018-02-01 RX ADMIN — DEXTROSE MONOHYDRATE, SODIUM CHLORIDE, AND POTASSIUM CHLORIDE 100 ML/HR: 50; 9; 1.49 INJECTION, SOLUTION INTRAVENOUS at 05:27

## 2018-02-01 RX ADMIN — EZETIMIBE 10 MG: 10 TABLET ORAL at 08:13

## 2018-02-01 RX ADMIN — METRONIDAZOLE 500 MG: 500 INJECTION, SOLUTION INTRAVENOUS at 08:13

## 2018-02-01 RX ADMIN — Medication 400 MG: at 08:13

## 2018-02-01 RX ADMIN — Medication 400 MG: at 17:57

## 2018-02-01 RX ADMIN — DEXTROSE MONOHYDRATE, SODIUM CHLORIDE, AND POTASSIUM CHLORIDE 100 ML/HR: 50; 9; 1.49 INJECTION, SOLUTION INTRAVENOUS at 17:57

## 2018-02-01 RX ADMIN — PANTOPRAZOLE SODIUM 40 MG: 40 TABLET, DELAYED RELEASE ORAL at 06:36

## 2018-02-01 RX ADMIN — BUPROPION HYDROCHLORIDE 150 MG: 150 TABLET, FILM COATED, EXTENDED RELEASE ORAL at 06:36

## 2018-02-01 NOTE — PROGRESS NOTES
Bedside and Verbal shift change report given to ZURDO Galindo (oncoming nurse) by Ga Winn (offgoing nurse). Report included the following information SBAR and Kardex.

## 2018-02-01 NOTE — PROGRESS NOTES
[facility] [facility]  [address] [facility], [place], [id number] [place] [address]  [phone]      Medical Progress Note      NAME:         Jana Cortez   :        1960  MRM:        [id number]    Date:          2018      Subjective: no acute events. Denies cp, sob, abd pain. States the diarrhea is improving, now only has a few loose stools    Objective:    Vital Signs:    Visit Vitals    /85 (BP 1 Location: Left arm, BP Patient Position: At rest)    Pulse 100    Temp 98.6 °F (37 °C)    Resp 18    Ht 5' 1\" (1.549 m)    Wt 44.9 kg (99 lb)    SpO2 100%    BMI 18.71 kg/m2          Intake/Output Summary (Last 24 hours) at 18 1546  Last data filed at 18 0640   Gross per 24 hour   Intake             1270 ml   Output                0 ml   Net             1270 ml        Physical Examination:    General:   Weak and cachectic looking female patient, not in distress    Eyes:   pale conjunctivae, PERRLA with no discharge. ENT:   no ottorrhea or rhinorrhea with moist mucous membranes  Neck: no masses, thyroid non-tender and trachea central.  Pulm:  clear breath sounds without crackles or wheezes  Card:  no JVD or murmurs, has regular and normal S1, S2 without thrills, bruits or peripheral edema  Abd:  Soft, non-tender, non-distended, normoactive bowel sounds   Musc:  No cyanosis, clubbing, atrophy. Swollen, bruised LUE   Skin:  No rashes or ulcers. Neuro: Awake and alert.  Generally a non focal exam.   Psych:  Has a fair insight and is oriented x 3    Current Facility-Administered Medications   Medication Dose Route Frequency    zinc oxide (DESITIN) 13 % topical cream   Topical PRN    [START ON 2018] levoFLOXacin (LEVAQUIN) tablet 750 mg  750 mg Oral ACB    metroNIDAZOLE (FLAGYL) tablet 500 mg  500 mg Oral Q12H    pantoprazole (PROTONIX) tablet 40 mg  40 mg Oral ACB    0.9% sodium chloride infusion  50 mL/hr IntraVENous CONTINUOUS    dextrose 5% - 0.9% NaCl with KCl 20 mEq/L infusion  100 mL/hr IntraVENous CONTINUOUS    0.9% sodium chloride infusion 250 mL  250 mL IntraVENous PRN    sodium chloride (NS) flush 5-10 mL  5-10 mL IntraVENous Q8H    sodium chloride (NS) flush 5-10 mL  5-10 mL IntraVENous PRN    acetaminophen (TYLENOL) tablet 650 mg  650 mg Oral Q4H PRN    oxyCODONE-acetaminophen (PERCOCET) 5-325 mg per tablet 1 Tab  1 Tab Oral Q4H PRN    HYDROmorphone (DILAUDID) injection 0.5 mg  0.5 mg IntraVENous Q4H PRN    prochlorperazine (COMPAZINE) injection 10 mg  10 mg IntraVENous Q6H PRN    zolpidem (AMBIEN) tablet 5 mg  5 mg Oral QHS PRN    diphenhydrAMINE (BENADRYL) capsule 25 mg  25 mg Oral Q6H PRN    buPROPion XL (WELLBUTRIN XL) tablet 150 mg  150 mg Oral 7am    magnesium oxide (MAG-OX) tablet 400 mg  400 mg Oral BID    Thiamine Mononitrate (B-1) tablet 100 mg  100 mg Oral DAILY    ezetimibe (ZETIA) tablet 10 mg  10 mg Oral DAILY    folic acid (FOLVITE) tablet 1 mg  1 mg Oral DAILY        Laboratory data and review:    Recent Labs      02/01/18   1137  01/30/18   1214   WBC  4.3  3.2*   HGB  8.0*  8.3*   HCT  25.8*  27.6*   PLT  98*  136*     Recent Labs      01/30/18   1214  01/30/18   0200   NA  148*   --    K  3.6  3.2*   CL  119*   --    CO2  21   --    GLU  126*   --    BUN  1*   --    CREA  0.72   --    CA  7.1*   --      No components found for: [place]    Assessment and Plan:    Abnormal CT scan, chest (1/30/2018)/ Abnormal CT of the abdomen (1/30/2018): initially concern for metastatic disease but now this seems much less likely. Initial CT scans done 1/26 had suggested possible mets to lung, liver and bone of unknown primary. Pelvic ultrasound suggested a right adnexal structure which was more suggestive of hydrosalpinx than cystic neoplasm. Seen by Oncology. Review of her CT scans by a different radiologist indicate that metastatic disease was unlikely. See edited CT scan reports.  She had an unsuccessful EBUS 1/29. Seen by GI who did a colonoscopy that was unremarkable. Bone scan was neg. Seen by Gynecology and a  and ACE pending.    --plan for outpt follow up with pulmonary     Colitis (1/27/2018): CT abdomen and pelvis showed diffuse colitis. She has no diarrhea now. Cultures unremarkable other than scant candida. Continue empiric IV Levofloxacin and metronidazole.       Weight loss (1/26/2018)/  Protein-calorie malnutrition, severe ([address] (1/27/2018): due to poor oral intake vs other. Continue diet as tolerated    Hypokalemia due to loss of potassium (1/26/2018)/ Hypomagnesemia (1/26/2018)/Hypernatremia: likely loss from diarrhea. Repleted.         Iron deficiency anemia (2/8/2012): Hgb now 8.5 post transfusion with 2 units PRBCs. Hgb stable.      Elevated troponin (1/27/2018): likely due to demand ischemia. EKG done in ED was non ischemic. No ACS.    Hyperlipidemia (6/26/2014): with her poor appetite and metastatic disease. recent LDL was 109, discontinued Zetia    Emphysema of lung POA: not wheezing. Noted on CT. Duoneb as needed     Debility (1/26/2018): has a poor functional status. Mobilize as tolerated once more stable.  Will need HH with PT, OT     Code status: FULL CODE    Total time spent for the patient's care: [address] discussed with: Patient, Care Manager and Nursing Staff    Discussed:  Care Plan and D/C Planning    Prophylaxis:  SCD's    Anticipated Disposition:  Home w/Family           ___________________________________________________    Attending Physician:   Kiley Patel MD

## 2018-02-01 NOTE — PROGRESS NOTES
Gastroenterology Progress Note    February 1, 2018  Admit Date: 1/26/2018         Narrative Assessment and Plan   · Peptic ulcer disease  · Diarrhea - improving  · Diverticulosis     Plan  - continue PPI daily x 2 months  - await biopsy results  - add desitin cream for comfort    ----  As above  Cruz Estevez MD       Subjective:   · Resting in bed with daughter at bedside. Complaints of burning discomfort around rectum from frequent diarrhea after bowel prep. Denies abdominal pain, nausea, or vomiting. Discussed findings of EGD/Colonoscopy. No AM labs. ROS:  The previous review of systems on initial consultation / H&P is noted and reviewed. Specific changes noted above in HPI.     Current Medications:     Current Facility-Administered Medications   Medication Dose Route Frequency    zinc oxide (DESITIN) 13 % topical cream   Topical PRN    pantoprazole (PROTONIX) tablet 40 mg  40 mg Oral ACB    0.9% sodium chloride infusion  50 mL/hr IntraVENous CONTINUOUS    dextrose 5% - 0.9% NaCl with KCl 20 mEq/L infusion  100 mL/hr IntraVENous CONTINUOUS    0.9% sodium chloride infusion 250 mL  250 mL IntraVENous PRN    metroNIDAZOLE (FLAGYL) IVPB premix 500 mg  500 mg IntraVENous Q8H    levoFLOXacin (LEVAQUIN) 750 mg in D5W IVPB  750 mg IntraVENous Q24H    sodium chloride (NS) flush 5-10 mL  5-10 mL IntraVENous Q8H    sodium chloride (NS) flush 5-10 mL  5-10 mL IntraVENous PRN    acetaminophen (TYLENOL) tablet 650 mg  650 mg Oral Q4H PRN    oxyCODONE-acetaminophen (PERCOCET) 5-325 mg per tablet 1 Tab  1 Tab Oral Q4H PRN    HYDROmorphone (DILAUDID) injection 0.5 mg  0.5 mg IntraVENous Q4H PRN    prochlorperazine (COMPAZINE) injection 10 mg  10 mg IntraVENous Q6H PRN    zolpidem (AMBIEN) tablet 5 mg  5 mg Oral QHS PRN    diphenhydrAMINE (BENADRYL) capsule 25 mg  25 mg Oral Q6H PRN    buPROPion XL (WELLBUTRIN XL) tablet 150 mg  150 mg Oral 7am    magnesium oxide (MAG-OX) tablet 400 mg  400 mg Oral BID    Thiamine Mononitrate (B-1) tablet 100 mg  100 mg Oral DAILY    ezetimibe (ZETIA) tablet 10 mg  10 mg Oral DAILY    folic acid (FOLVITE) tablet 1 mg  1 mg Oral DAILY       Objective:     VITALS:   Last 24hrs VS reviewed since prior progress note. Most recent are:  Visit Vitals    /81 (BP 1 Location: Left arm, BP Patient Position: At rest)    Pulse 97    Temp 98.8 °F (37.1 °C)    Resp 18    Ht 5' 1\" (1.549 m)    Wt 44.9 kg (99 lb)    SpO2 100%    BMI 18.71 kg/m2     Temp (24hrs), Av.6 °F (37 °C), Min:97.9 °F (36.6 °C), Max:99 °F (37.2 °C)      Intake/Output Summary (Last 24 hours) at 18 0916  Last data filed at 18 0640   Gross per 24 hour   Intake             1770 ml   Output                0 ml   Net             1770 ml       EXAM:  General: Comfortable, no distress    HEENT: Atraumatic skull, pupils equal  Abdomen: Nondistended, nontender. No mass, guarding or rebound  Neurologic:  CN II-XII grossly intact, no focal deficits  Psych:   Good insight. Not anxious nor agitated    Lab Data Reviewed:   Recent Labs      18   1214   WBC  3.2*   HGB  8.3*   HCT  27.6*   PLT  136*     Recent Labs      18   1214  18   0200  18   1003   NA  148*   --    --    K  3.6  3.2*   --    CL  119*   --    --    CO2  21   --    --    GLU  126*   --    --    BUN  1*   --    --    CREA  0.72   --    --    CA  7.1*   --    --    INR   --    --   1.2*     Lab Results   Component Value Date/Time    Glucose (POC) 91 2018 04:08 PM    Glucose (POC) 107 2018 01:08 PM     No results for input(s): PH, PCO2, PO2, HCO3, FIO2 in the last 72 hours.   Recent Labs      18   1003   INR  1.2*           Assessment:   (See above)  Active Problems:    Iron deficiency anemia (2012)      Benign essential HTN (2014)      Hyperlipidemia (2014)      Weight loss (2018)      Hypokalemia due to loss of potassium (2018)      Hypomagnesemia (2018) Debility (1/26/2018)      Colitis (1/27/2018)      Lactic acid acidosis (1/27/2018)      Elevated troponin (1/27/2018)      Protein-calorie malnutrition, severe ([address] (1/27/2018)      Hypotension (1/27/2018)      Emphysema of lung ([address] (1/27/2018)      Abnormal CT scan, chest (1/30/2018)      Abnormal CT of the abdomen (1/30/2018)        Plan:   (See above)    Signed By:  Ursula Hassan PA-C  2/1/2018  9:16 AM

## 2018-02-01 NOTE — PROGRESS NOTES
[facility] Dosing Services: IV to PO Conversion    The pharmacist has determined that this patient meets P & T approved criteria for conversion from IV to oral therapy for the following medication:Metronidazole, levofloxacin (day 6 for intra-abdominal infection)    The pharmacist has written the following order for the patient: Metronidazole 500 mg po q12h (frequency changed from q8h to q12h per P&T approved protocol)  Levofloxacin 750 mg po daily    The pharmacist will continue to monitor the patient's status and advise the physician if conversion back to IV therapy is recommended.     [address] [place] [address] Contact information:  [phone]

## 2018-02-01 NOTE — PROGRESS NOTES
[employer]  Medical Oncology at [address] [place] [address]  [phone]    Hematology / Oncology Follow-up         Gucci Womack is a 65 y.o. 1960 female and presents with Abnormal Lab Results    CC  abnormal CTs    HPI  Pt seen today in hospital consult at request of hospitalist for abnormal CTs. CTs show mediastinal LAD, liver lesion, diffuse colitis/ bowel changes, bone lesions/ ovary cyst/ fibroids. Pt is a poor historian today. C/o diarrhea. Smoker but no lung mass on CT. Ms. Doris Kim presented to the Emergency Department complaining of weakness: for the past 3 months, now severe, steadily progressive, associated with weight loss. She was seen by her PCP and told to come to the ED as her potassium was 1.6      Interval History:   Denies any pain; describes burning pain around rectum  Daughter at bedside. 1/31/18 EGD and colonoscopy  EGD:  Peptic ulcer disease.     Colonoscopy: Normal colonoscopy to the cecum, with no evidence of neoplasia, clinically significant diverticular disease, or mucosal abnormality.       Current Facility-Administered Medications   Medication Dose Route Frequency Provider Last Rate Last Dose    zinc oxide (DESITIN) 13 % topical cream   Topical PRN Lady Gustabo PA-C        pantoprazole (PROTONIX) tablet 40 mg  40 mg Oral ACB Rom Mccullough MD   40 mg at 02/01/18 0636    0.9% sodium chloride infusion  50 mL/hr IntraVENous CONTINUOUS Altshanita Puentes MD 50 mL/hr at 01/31/18 0831 50 mL/hr at 01/31/18 0831    dextrose 5% - 0.9% NaCl with KCl 20 mEq/L infusion  100 mL/hr IntraVENous CONTINUOUS Vladislav Rolle  mL/hr at 02/01/18 0527 100 mL/hr at 02/01/18 0527    0.9% sodium chloride infusion 250 mL  250 mL IntraVENous PRN Vladislav Rolle MD        metroNIDAZOLE (FLAGYL) IVPB premix 500 mg  500 mg IntraVENous Q8H Vladislav Rolle  mL/hr at 02/01/18 0813 500 mg at 02/01/18 0813    levoFLOXacin (LEVAQUIN) 750 mg in D5W IVPB  750 mg IntraVENous Q24H Toi Schwartz  mL/hr at 02/01/18 0813 750 mg at 02/01/18 0813    sodium chloride (NS) flush 5-10 mL  5-10 mL IntraVENous Q8H Truman Patel MD   10 mL at 01/30/18 1311    sodium chloride (NS) flush 5-10 mL  5-10 mL IntraVENous PRN Truman Patel MD   10 mL at 01/31/18 1140    acetaminophen (TYLENOL) tablet 650 mg  650 mg Oral Q4H PRN Truman Patel MD        oxyCODONE-acetaminophen (PERCOCET) 5-325 mg per tablet 1 Tab  1 Tab Oral Q4H PRN Truman Patel MD   1 Tab at 01/30/18 0554    HYDROmorphone (DILAUDID) injection 0.5 mg  0.5 mg IntraVENous Q4H PRN Truman Patel MD   0.5 mg at 01/27/18 1130    prochlorperazine (COMPAZINE) injection 10 mg  10 mg IntraVENous Q6H PRN Truman Patel MD   10 mg at 01/27/18 1116    zolpidem (AMBIEN) tablet 5 mg  5 mg Oral QHS PRN Truman Patel MD        diphenhydrAMINE (BENADRYL) capsule 25 mg  25 mg Oral Q6H PRN Truman Patel MD   25 mg at 01/27/18 2349    buPROPion XL (WELLBUTRIN XL) tablet 150 mg  150 mg Oral 7am Truman Patel MD   150 mg at 02/01/18 0636    magnesium oxide (MAG-OX) tablet 400 mg  400 mg Oral BID Truman Patel MD   400 mg at 02/01/18 0813    Thiamine Mononitrate (B-1) tablet 100 mg  100 mg Oral DAILY Truman Patel MD   100 mg at 02/01/18 0813    ezetimibe (ZETIA) tablet 10 mg  10 mg Oral DAILY Truman Patel MD   10 mg at 94/91/65 7756    folic acid (FOLVITE) tablet 1 mg  1 mg Oral DAILY Truman Patel MD   1 mg at 02/01/18 0794       Allergies   Allergen Reactions    Robaxin [Methocarbamol] Hives, Rash and Itching     Red splotches    Statins-Hmg-Coa Reductase Inhibitors Myalgia    Codeine Itching    Neosporin [Neomycin-Bacitracin-Polymyxin] Rash       Review of Systems    A comprehensive review of systems was negative except for: per HPI     Objective:  Visit Vitals    /81 (BP 1 Location: Left arm, BP Patient Position: At rest)    Pulse 97    Temp 98.8 °F (37.1 °C)    Resp 18    Ht 5' 1\" (1.549 m)    Wt 99 lb (44.9 kg)    SpO2 100%    BMI 18.71 kg/m2       Physical Exam:   General: No distress  Eyes: PERRLA, anicteric sclerae  HENT: Atraumatic, OP clear  Neck: Supple  Respiratory: diminished bases, normal respiratory effort  CV: Normal rate, regular rhythm, no murmurs, left arm ecchymosis and edema noted  GI: Soft, nontender, nondistended, no masses, no hepatomegaly, no splenomegaly  Skin: No rashes, ecchymoses, or petechiae. Normal temperature, turgor, and texture. Psych: Alert, oriented, appropriate affect, normal judgment/insight    Diagnostic Imaging   reviewed    1/26/2018 CT CHEST ABD PELV W CONT    IMPRESSION:     Mediastinal and hilar adenopathy, liver lesion, and sclerotic osseous lesions,  compatible with metastatic disease  Possible right ovarian cystic mass for which pelvic ultrasound is recommended. Marked emphysema  Fibroid uterus  Colitis of the ascending colon, transverse colon and sigmoid colon. Trace pericardial effusion and small amount of free fluid in the right pelvis,  nonspecific    Addendum:      Examination was re-reviewed at the request of the oncology service. The enlarged  mediastinal and hilar lymph nodes demonstrate internal calcification; therefore,  granulomatous disease is favored over malignancy. The low-attenuation lesion in  hepatic segment 5 has the appearance of focal fat with peripheral venous  shunting; no suspicious hepatic mass is evident. The subtle sclerotic bone  lesions at T2 and S1 are nonspecific but not typical of metastatic disease;  whole-body bone scan could be performed for further evaluation. Findings were  discussed with Gretel Moore NP.      1/30/2018 Doppler LUE  Negative    1/30/18 bone scan  Examination was re-reviewed at the request of clinical service. Thoracic bone scan activity appears nonspecific and appears more typical of  spondylotic changes than metastatic disease.  I reviewed this examination myself  and with one of my partners additional expertise in nuclear medicine imaging. Lab Results  reviewed  Lab Results   Component Value Date/Time    WBC 3.2 01/30/2018 12:14 PM    HGB 8.3 01/30/2018 12:14 PM    HCT 27.6 01/30/2018 12:14 PM    PLATELET 307 57/97/2885 12:14 PM    MCV 90.5 01/30/2018 12:14 PM       Lab Results   Component Value Date/Time    Sodium 148 01/30/2018 12:14 PM    Potassium 3.6 01/30/2018 12:14 PM    Chloride 119 01/30/2018 12:14 PM    CO2 21 01/30/2018 12:14 PM    Anion gap 8 01/30/2018 12:14 PM    Glucose 126 01/30/2018 12:14 PM    BUN 1 01/30/2018 12:14 PM    Creatinine 0.72 01/30/2018 12:14 PM    BUN/Creatinine ratio 1 01/30/2018 12:14 PM    GFR est AA >60 01/30/2018 12:14 PM    GFR est non-AA >60 01/30/2018 12:14 PM    Calcium 7.1 01/30/2018 12:14 PM    AST (SGOT) 61 01/26/2018 01:02 PM    Alk. phosphatase 131 01/26/2018 01:02 PM    Protein, total 5.7 01/26/2018 01:02 PM    Albumin 2.1 01/26/2018 01:02 PM    Globulin 3.6 01/26/2018 01:02 PM    A-G Ratio 0.6 01/26/2018 01:02 PM    ALT (SGPT) 29 01/26/2018 01:02 PM       1/29/2018 EBUS / Dr Perrin Lombard  Findings:   Dense rubber like lymphnodes with specs of calcifications inside. Specimens:   Cytology- scant    Lab Results   Component Value Date/Time    Reticulocyte count 2.2 01/25/2018 01:17 PM    Iron % saturation >86 01/25/2018 01:17 PM    TIBC <122 01/25/2018 01:17 PM    Ferritin 333 01/25/2018 01:17 PM    Vitamin B12 594 01/25/2018 01:17 PM    Folate 13.3 01/25/2018 01:17 PM    Sed rate (ESR) 15 01/25/2018 01:17 PM    C-Reactive Protein, Qt 11.0 01/25/2018 01:16 PM    TSH 4.350 01/25/2018 01:16 PM    Hep C Virus Ab 0.1 01/25/2018 01:16 PM    HIV SCREEN 4TH GENERATION WRFX Non Reactive 01/25/2018 01:16 PM     Lab Results   Component Value Date/Time    INR 1.2 01/29/2018 10:03 AM    aPTT 38.3 01/29/2018 10:03 AM           Assessment/ Plan:     1.   Abnormal CT scan  Enlarged mediastinal and hilar lymph nodes demonstrate internal calcification; granulomatous disease is favored over malignancy    Pulmonary consulted: 1/29/2018 EBUS; scant cells only, but negative for malignancy; ACE in normal range    Discussed bone scan with Dr. Ivania Dumont, see his addendum, no clear evidence of metastatic disease    Reviewed Dr. Matias Luke note, may need mediastinoscopy with thoracic surgery as outpatient      2. Anemia , normocytic (s/p 2 units PRBC)  Responded to transfusion  Possibly secondary to  Colitis vs  chronic disease. Continue to monitor and transfuse for Hgb < 7    3. Diarrhea   Improving, colonoscopy normal    4. Adnexal mass  Ca-125 pending    5. Thrombocytopenia:  New 2 days ago, recommend rechecking cbc    6.  Neutropenia:  Mild and new 2 days ago, recommend rechecking cbc for trend

## 2018-02-01 NOTE — PROGRESS NOTES
Nutrition Assessment:    RECOMMENDATIONS/INTERVENTION(S):   Continue regular diet  Monitor PO intakes, wt, Gi status  Pt doesn't like Ensure \"protein shakes\"     ASSESSMENT:   2/1: Pt on regular diet. EGD/Colonoscopy done. Results WNL- No Colitis or Crohn's. PUD, diverticulitis. Diarrhea improving. Pt to take PPI x 2 per day. Na 148 , 120 mg/dL. Phos 2.1 (1/28). Appetite fair. Awaiting biopsy results per GI.     1/30:  Pt w/ diarrhea, continued concern for colitis. Plans for EGD, colonoscopy tomorrow if lytes WDL. Labs/meds reviewed. Lytes continuing to require repletion. K, Mg WDL. Phos low. On Clears. Appetite improving per nursing - poor to fair. D5 w/ KCl @ 100 ml/hr IVF. S/p bronch. Noted metastatic disease less likely after further review of CT scans. Bone scan today. 1/27:  Chart accessed & reviewed remotely. Consult received for general nutrition management & supplements. MST referral received for wt loss & decreased appetite PTA (ADDENDUM). 65 yof admitted for metastatic CA. Admitted w/ K of 1.6. Labs/meds reviewed. K 1.8. Mg requiring repletion. Phos low. NS w/ KCl IVF. S/p abnormal CT. Oncology following, note possible plans for liver bx. Treatment plans pending path dx of CA. GI consulted for colitis/bowel changes. Spoke w/ pt's  by phone (pt resting, declined talking). No meal intake today, sipping cranberry juice. Decreased appetite x at least 3 mo PTA. Eating fruit, drinking icees, bites of meals here and there when  cooks. -160#. Eating normally, more \"meat & potatoes\" before summer. Noted 22.7% wt loss x 3 mo & 32.7% loss x 1 yr PTA. At risk of underwt per BMI. Encouraged /pt to order meals as desired and RD to start ONS trial.   states pt has declined protein shakes he consumes at home.           Meets Criteria for Severe Chronic Malnutrition as evidenced by:   [] Severe muscle wasting, loss of subcutaneous fat   [x] Nutritional intake of <75% of recommended intake for >1 month   [x] Weight loss of  >5% in 1 month, >7.5% in 3 months, >10% in 6 months, >20% in 1 year   [] Severe edema       SUBJECTIVE/OBJECTIVE:     Diet Order: Regular  % Eaten:  No data found. Pertinent Medications: [x] Reviewed - folic acid, thiamine, mag sulf, mag ox, KCl, colyte    Past Medical History:   Diagnosis Date    Anemia 4/04    Anemia NEC     Insomnia 11/09    PPD positive     treated w/ INH- tests since have been negative    Pure hypercholesterolemia     Scoliosis 12/28/15    [facility]   [address] dependence     Unspecified essential hypertension     Uterine bleeding, dysfunctional 2/12    Uterine fibroid        Chemistries:  Lab Results   Component Value Date/Time    Sodium 148 01/30/2018 12:14 PM    Potassium 3.6 01/30/2018 12:14 PM    Chloride 119 01/30/2018 12:14 PM    CO2 21 01/30/2018 12:14 PM    Anion gap 8 01/30/2018 12:14 PM    Glucose 126 01/30/2018 12:14 PM    BUN 1 01/30/2018 12:14 PM    Creatinine 0.72 01/30/2018 12:14 PM    BUN/Creatinine ratio 1 01/30/2018 12:14 PM    GFR est AA >60 01/30/2018 12:14 PM    GFR est non-AA >60 01/30/2018 12:14 PM    Calcium 7.1 01/30/2018 12:14 PM    AST (SGOT) 61 01/26/2018 01:02 PM    Alk. phosphatase 131 01/26/2018 01:02 PM    Protein, total 5.7 01/26/2018 01:02 PM    Albumin 2.1 01/26/2018 01:02 PM    Globulin 3.6 01/26/2018 01:02 PM    A-G Ratio 0.6 01/26/2018 01:02 PM    ALT (SGPT) 29 01/26/2018 01:02 PM      Anthropometrics: Height: 5' 1\" (154.9 cm) Weight: 44.9 kg (99 lb)    IBW (%IBW): 52.5 kg (115 lb 11.9 oz) (85.52 %) UBW (%UBW): 68 kg (150 lb) (65.99 %)    BMI: Body mass index is 18.71 kg/(m^2).     This BMI is indicative of:   [] Underweight    [x] Normal - @ risk of underwt   [] Overweight    []  Obesity    []  Extreme Obesity (BMI>40)  Estimated Nutrition Needs (Based on): 1768 Kcals/day (BMR (976) x 1.3 AF + 500) , 63 g (-73 (1.2-1.4 g/kg x IBW)) Protein  Carbohydrate: At Least 130 g/day  Fluids: 1800 mL/day    Last BM: 2/1- mucous - loose   [x]Active     []Hyperactive  []Hypoactive       [] Absent   BS   Skin:    [] Intact   [] Incision  [] Breakdown   [] DTI   [x] Tears/Excoriation/Abrasion - abrasion to chin  [x]Edema LUE 2+ nonpitting, RUE non-pitting [x] Other:heels w/ blanchable redness     Wt Readings from Last 30 Encounters:   01/29/18 44.9 kg (99 lb)   01/25/18 45.1 kg (99 lb 6.4 oz)   10/16/17 58.1 kg (128 lb)   09/25/17 59.7 kg (131 lb 9.6 oz)   03/27/17 66.3 kg (146 lb 3.2 oz)   01/11/17 66.8 kg (147 lb 3.2 oz)   09/28/16 67.6 kg (149 lb)   04/13/16 68.5 kg (151 lb)   01/04/16 67.9 kg (149 lb 9.6 oz)   09/03/15 67.4 kg (148 lb 9.6 oz)   08/01/15 66.1 kg (145 lb 12.8 oz)   07/10/15 67.3 kg (148 lb 6.4 oz)   02/12/15 60.3 kg (133 lb)   06/26/14 60.3 kg (133 lb)   12/19/13 59 kg (130 lb)   03/25/13 60.3 kg (133 lb)   09/28/12 65.3 kg (144 lb)   02/14/12 63.5 kg (140 lb)   02/07/12 63.4 kg (139 lb 12 oz)   02/07/12 63.4 kg (139 lb 12.8 oz)   12/12/11 65.4 kg (144 lb 3.2 oz)   04/25/11 68.2 kg (150 lb 6.4 oz)   07/20/10 69.7 kg (153 lb 9.6 oz)      NUTRITION DIAGNOSES:   Problem:  Unintended weight loss     Etiology: related to conditions associated with a dx - metastatic disease?      Signs/Symptoms: as evidenced by reports of decreased appetite, PO, wt loss of 32.7% in 1 yr PTA      NUTRITION INTERVENTIONS:  Meals/Snacks: General/healthful diet   Supplements: Commercial supplement              GOAL:   PO diet w/ no s/s of intolerance in the next 1-3 days    Cultural, Lutheran, or Ethnic Dietary Needs: None     EDUCATION & DISCHARGE NEEDS:    [x] None Identified   [] Identified and Education Provided/Documented   [] Identified and Pt declined/was not appropriate      [x] Interdisciplinary Care Plan Reviewed/Documented    [x] Discharge Needs:    tbd   [] No Nutrition Related Discharge Needs    NUTRITION RISK:   Pt Is At Nutrition Risk  [x]     No Nutrition Risk Identified  []       PT SEEN FOR:    []  MD Consult: []Calorie Count      []Diabetic Diet Education        []Diet Education     []Electrolyte Management     []General Nutrition Management and Supplements     []Management of Tube Feeding     []TPN Recommendations    []  RN Referral:  []MST score >=2     []Enteral/Parenteral Nutrition PTA     []Pregnant: Gestational DM or Multigestation                 [] Pressure Ulcer    []  Low BMI      []  Length of Stay       [] Dysphagia Diet         [] Ventilator  [x]  Follow-up     Previous Recommendations:   [x] Implemented          [] Not Implemented          [] Not Applicable    Previous Goal:   [] Met              [x] Progressing Towards Goal              [] Not Progressing Towards Goal   [] Not Applicable            Issac Orozco  Pager [phone]

## 2018-02-01 NOTE — PROGRESS NOTES
[address]   Automatic Dosing Conversion for Metronidazole (Flagyl©)   Metronidazole dosed at 500mg every 12 hours obtains peak serum levels of approximately 23ug/ml (more than 10 times the BRANDON for B. fragilis [1-2ug/ml]) and trough levels of about 7 ug/ml. Based on these pharmacokinetic and pharmacodynamic properties, dosing of metronidazole every 12 or every 24 hours is appropriate for the treatment of anaerobic infections. Pharmacy will automatically change the dose of metronidazole to 500 mg every 12 hours for all indications except for the following:   · C. difficile infection   · CNS infections   · Non-anaerobic infections (giardiasis, trichomonas, H pylori, etc)      Metronidazole 500 mg q8h changed to 500 mg po q12h    Thank you,    Michele Cordero.  Barbaraann Koyanagi, [facility]

## 2018-02-01 NOTE — PROGRESS NOTES
Assisted to bedside commode passed out gasses w/o stool. Pt c/o bilateral leg pain. Informed pt MD will doing rounds and to address leg pain.

## 2018-02-01 NOTE — PROGRESS NOTES
Problem: Falls - Risk of  Goal: *Absence of Falls  Document Roselia Fall Risk and appropriate interventions in the flowsheet.    Outcome: Progressing Towards Goal  Fall Risk Interventions:  Mobility Interventions: Bed/chair exit alarm, Communicate number of staff needed for ambulation/transfer, Patient to call before getting OOB    Mentation Interventions: Adequate sleep, hydration, pain control, Increase mobility, More frequent rounding, Reorient patient    Medication Interventions: Assess postural VS orthostatic hypotension, Bed/chair exit alarm, Evaluate medications/consider consulting pharmacy, Patient to call before getting OOB, Teach patient to arise slowly    Elimination Interventions: Bed/chair exit alarm, Call light in reach, Toilet paper/wipes in reach    History of Falls Interventions: Bed/chair exit alarm, Room close to nurse's station

## 2018-02-01 NOTE — PROGRESS NOTES
Name: Marylene Basques: [facility]   : 1960 Admit Date: 2018   Phone: [phone]  Room: 4/01   PCP: Mundo Frazier NP  MRN: [id number]   Date: 2018  Code: Full Code          Chart and notes reviewed. Data reviewed. I review the patient's current medications in the medical record at each encounter. I have evaluated and examined the patient. Overnight events  Afebrile  Sats 100% on RA  ACE level 48   in process  Bronch with dense rubber like lymph nodes with specs of calcifications inside  Path with no cells diagnostic for malignancy, but results limited by hypocellularity   EGD with PUD; colonoscopy with diverticulosis, otherwiseh unremarkable  Bone scan: activity appears nonspecific and appears more typical of spondylotic changes than metastatic disease    ROS: Patient with no complaints this morning. Had LE discomfort earlier from SCDs. Denies SOB or CP. Denies fever or chills. Denies much of a cough. Still has some weakness. Diarrhea improving. Denies abd pain or LE pain/swelling.       Current Facility-Administered Medications   Medication Dose Route Frequency    zinc oxide (DESITIN) 13 % topical cream   Topical PRN    pantoprazole (PROTONIX) tablet 40 mg  40 mg Oral ACB    0.9% sodium chloride infusion  50 mL/hr IntraVENous CONTINUOUS    dextrose 5% - 0.9% NaCl with KCl 20 mEq/L infusion  100 mL/hr IntraVENous CONTINUOUS    0.9% sodium chloride infusion 250 mL  250 mL IntraVENous PRN    metroNIDAZOLE (FLAGYL) IVPB premix 500 mg  500 mg IntraVENous Q8H    levoFLOXacin (LEVAQUIN) 750 mg in D5W IVPB  750 mg IntraVENous Q24H    sodium chloride (NS) flush 5-10 mL  5-10 mL IntraVENous Q8H    sodium chloride (NS) flush 5-10 mL  5-10 mL IntraVENous PRN    acetaminophen (TYLENOL) tablet 650 mg  650 mg Oral Q4H PRN    oxyCODONE-acetaminophen (PERCOCET) 5-325 mg per tablet 1 Tab  1 Tab Oral Q4H PRN    HYDROmorphone (DILAUDID) injection 0.5 mg 0.5 mg IntraVENous Q4H PRN    prochlorperazine (COMPAZINE) injection 10 mg  10 mg IntraVENous Q6H PRN    zolpidem (AMBIEN) tablet 5 mg  5 mg Oral QHS PRN    diphenhydrAMINE (BENADRYL) capsule 25 mg  25 mg Oral Q6H PRN    buPROPion XL (WELLBUTRIN XL) tablet 150 mg  150 mg Oral 7am    magnesium oxide (MAG-OX) tablet 400 mg  400 mg Oral BID    Thiamine Mononitrate (B-1) tablet 100 mg  100 mg Oral DAILY    ezetimibe (ZETIA) tablet 10 mg  10 mg Oral DAILY    folic acid (FOLVITE) tablet 1 mg  1 mg Oral DAILY         REVIEW OF SYSTEMS   12 point ROS negative except as stated in the HPI. Physical Exam:   Visit Vitals    /81 (BP 1 Location: Left arm, BP Patient Position: At rest)    Pulse 97    Temp 98.8 °F (37.1 °C)    Resp 18    Ht 5' 1\" (1.549 m)    Wt 44.9 kg (99 lb)    SpO2 100%    BMI 18.71 kg/m2       General:  Alert, thin, weak/frail, ill appearing, cooperative, no distress, appears stated age. Head:  Normocephalic, temporal wasting, atraumatic. Eyes:  Conjunctivae/corneas clear. Nose: Nares normal. Septum midline. Mucosa normal.    Throat: Lips, mucosa, and tongue normal.    Neck: Supple, symmetrical, trachea midline, no adenopathy. Lungs:   Decreased mildly, but clear to auscultation bilaterally. No wheeze or rales. Chest wall:  No tenderness or deformity. Heart:  Regular rate and rhythm, S1, S2 normal, no murmur, click, rub or gallop. Abdomen:   Soft, non-tender. Bowel sounds normal. No masses,  No organomegaly. Extremities: Extremities normal, atraumatic, no cyanosis or edema. Pulses: 2+ and symmetric all extremities.    Skin: Skin color, texture, turgor normal. No rashes or lesions   Lymph nodes: Cervical, supraclavicular nodes normal.   Neurologic: Grossly nonfocal       Lab Results   Component Value Date/Time    Sodium 148 01/30/2018 12:14 PM    Potassium 3.6 01/30/2018 12:14 PM    Chloride 119 01/30/2018 12:14 PM    CO2 21 01/30/2018 12:14 PM    BUN 1 01/30/2018 12:14 PM    Creatinine 0.72 01/30/2018 12:14 PM    Glucose 126 01/30/2018 12:14 PM    Calcium 7.1 01/30/2018 12:14 PM    Magnesium 1.8 01/28/2018 01:54 AM    Phosphorus 2.1 01/28/2018 01:54 AM    Lactic acid 1.9 01/27/2018 08:55 AM       Lab Results   Component Value Date/Time    WBC 3.2 01/30/2018 12:14 PM    HGB 8.3 01/30/2018 12:14 PM    PLATELET 353 20/48/0297 12:14 PM    MCV 90.5 01/30/2018 12:14 PM       Lab Results   Component Value Date/Time    INR 1.2 01/29/2018 10:03 AM    aPTT 38.3 01/29/2018 10:03 AM    AST (SGOT) 61 01/26/2018 01:02 PM    Alk.  phosphatase 131 01/26/2018 01:02 PM    Protein, total 5.7 01/26/2018 01:02 PM    Albumin 2.1 01/26/2018 01:02 PM    Globulin 3.6 01/26/2018 01:02 PM       Lab Results   Component Value Date/Time    Iron 105 01/25/2018 01:17 PM    TIBC <122 01/25/2018 01:17 PM    Iron % saturation >86 01/25/2018 01:17 PM    Ferritin 333 01/25/2018 01:17 PM       Lab Results   Component Value Date/Time    Sed rate (ESR) 15 01/25/2018 01:17 PM    TSH 4.350 01/25/2018 01:16 PM        No results found for: PH, PHI, PCO2, PCO2I, PO2, PO2I, HCO3, HCO3I, FIO2, FIO2I    Lab Results   Component Value Date/Time    Troponin-I, Qt. 0.08 01/27/2018 05:36 AM        Lab Results   Component Value Date/Time    Culture result:  01/28/2018 08:25 AM     NO ROUTINE ENTERIC PATHOGENS ISOLATED INCLUDING SALMONELLA, SHIGELLA, YERSINIA, VIBRIO OR SHIGA TOXIN PRODUCING E. COLI    Culture result: MARKEDLY REDUCED COLIFORMS NOTED 01/28/2018 08:25 AM    Culture result: LIGHT YEAST ISOLATED 01/28/2018 08:25 AM       No results found for: TOXA1, RPR, HBCM, HBSAG, HAAB, HCAB1, HAAT, G6PD, CRYAC, HIVGT, HIVR, HIV1, HIV12, HIVPC, HIVRPI    No results found for: VANCT, CPK    Lab Results   Component Value Date/Time    Color DARK YELLOW 01/26/2018 01:13 PM    Appearance CLEAR 01/26/2018 01:13 PM    pH (UA) 7.0 01/26/2018 01:13 PM    Protein TRACE 01/26/2018 01:13 PM    Glucose NEGATIVE  01/26/2018 01:13 PM    Ketone TRACE 01/26/2018 01:13 PM    Bilirubin Negative 09/28/2012 08:58 AM    Blood NEGATIVE  01/26/2018 01:13 PM    Urobilinogen 1.0 01/26/2018 01:13 PM    Nitrites NEGATIVE  01/26/2018 01:13 PM    Leukocyte Esterase NEGATIVE  01/26/2018 01:13 PM    WBC 0-4 01/26/2018 01:13 PM    RBC 0-5 01/26/2018 01:13 PM    Epithelial cells >10 09/28/2012 08:58 AM    Bacteria NEGATIVE  01/26/2018 01:13 PM       Images: no new images this morning    IMPRESSION  · Abnormal chest CT: large, bulky lymphnodes in the mediastinum  · Weight loss  · Hypokalemia  · Subtle nonspecific sclerotic bone lesions at T2 and S1   · Hx of + PPD; treated with INR  · CT suggestive of colitis  · Emphysema on chest CT   · Anemia: unclear etiology  · Active tobacco use    PLAN  · Hematology following; s/p transfusion. Closely follow Hgb and transfuse if < 7.  · Levaquin and Flagyl per primary team/GI. · Patient will need mediastinoscopy outpatient if remainder of her work up workup turns out negative. · F/U EGD path  · F/U   · Prn Duonebs  · DVT prophylaxis: Lovenox  · GI prophylaxis: Protonix BID per GI    Patient is stable from a pulmonary standpoint. We will sign off and arrange for thoracic surgery referral and outpatient pulmonary follow up next week. Please call with questions.     Paloma Kern

## 2018-02-01 NOTE — PROGRESS NOTES
Problem: Self Care Deficits Care Plan (Adult)  Goal: *Acute Goals and Plan of Care (Insert Text)  Occupational Therapy Goals  Initiated 1/28/2018  1. Patient will perform grooming with supervision in standing >3 minutes within 7 day(s). 2.  Patient will perform lower body dressing with supervision/set-up using adaptive equipment as needed within 7 day(s). 3.  Patient will perform upper body dressing with independence within 7 day(s). 4.  Patient will perform toilet transfers with modified independence within 7 day(s). 5.  Patient will perform all aspects of toileting with modified independence within 7 day(s). 6.  Patient will participate in upper extremity therapeutic exercise/activities with independence for 5 minutes within 7 day(s). 7.  Patient will utilize energy conservation techniques during functional activities with minimal verbal cues within 7 day(s). Occupational Therapy TREATMENT  Patient: Sunil Foreman (89 y.o. female)  Date: 2/1/2018  Diagnosis: Metastatic cancer ([facility]  abnormal CT Scan  abnormal CT <principal problem not specified>  Procedure(s) (LRB):  ESOPHAGOGASTRODUODENOSCOPY (EGD) (N/A)  COLONOSCOPY (N/A)  ESOPHAGOGASTRODUODENAL (EGD) BIOPSY (N/A)  ESOPHAGEAL DILATION (N/A)  COLON BIOPSY (N/A) 1 Day Post-Op  Precautions: Fall  Chart, occupational therapy assessment, plan of care, and goals were reviewed. ASSESSMENT:  Pt stood with contact guard and was able to apply lip moisturizer. Pt able to tolerate standing about 2 min. She fatigues easily. Pt left sitting edge of bed to eat her lunch. RN aware. Encouraged pt to engage with UE therapeutic exercises to increase strength and endurance for functional tasks. Recommend home health.  .  Progression toward goals:  []          Improving appropriately and progressing toward goals  [x]          Improving slowly and progressing toward goals  []          Not making progress toward goals and plan of care will be adjusted PLAN:  Patient continues to benefit from skilled intervention to address the above impairments. Continue treatment per established plan of care. Discharge Recommendations:  Home health  Further Equipment Recommendations for Discharge: bedside commode     SUBJECTIVE:   Patient stated I can't stand by myself I know that .     OBJECTIVE DATA SUMMARY:   Cognitive/Behavioral Status:  Neurologic State: Alert  Orientation Level: Oriented X4  Cognition: Follows commands           Functional Mobility and Transfers for ADLs:   Bed Mobility:      Min assist supine to sit           Transfers:  Sit to Stand: Contact guard assistance       Balance:  Sitting: Intact  Standing: With support  ADL Intervention:     Pt contact guard to stand to apply lip gloss. Recommend BSC for home use. Therapeutic Exercises:   Encouraged pt to engage with UE therapeutic exercises to increase strength and endurance for functional tasks. Pain:  Pain Scale 1: Numeric (0 - 10)  Pain Intensity 1: 0                Activity Tolerance:    Fair  Please refer to the flowsheet for vital signs taken during this treatment.   After treatment:   []  Patient left in no apparent distress sitting up in chair  [x]  Patient left in no apparent distress in bed  [x]  Call bell left within reach  [x]  Nursing notified  []  Caregiver present  []  Bed alarm activated    COMMUNICATION/COLLABORATION:   The patients plan of care was discussed with: Occupational Therapist and Registered Nurse    RADHA Vallejo  Time Calculation: 20 mins

## 2018-02-01 NOTE — TELEPHONE ENCOUNTER
Alexsandra Hernandez from [facility] is calling, she states that the patient is being discharged tomorrow from [facility] and she would like to know if ELIZAB[facility] Butler would sign off on home health orders for PT and OT for the patient.      Best call back # for Alexsandra Hernandez: [phone]

## 2018-02-01 NOTE — PROGRESS NOTES
2-1-2018 CASE MANAGEMENT NOTE:  I met with the pt to discuss the referral for home health PT/OT once discharged. She is agreeable to using [address] and I sent the referral thru [facility] [place] [facility]. Care Management Interventions  PCP Verified by CM:  Yes  Transition of Care Consult (CM Consult): [address]: Yes  Current Support Network: Lives with Spouse, Own Home  Plan discussed with Pt/Family/Caregiver: Yes  Discharge Location  Discharge Placement:  (Home with  and [address])    [address] [place] [address]

## 2018-02-01 NOTE — ROUTINE PROCESS
Bedside and Verbal shift change report given to Jesica RN (oncoming nurse) by Josie RN (offgoing nurse). Report included the following information SBAR, Kardex, Intake/Output and Recent Results.

## 2018-02-02 VITALS
SYSTOLIC BLOOD PRESSURE: 107 MMHG | TEMPERATURE: 98.6 F | OXYGEN SATURATION: 100 % | BODY MASS INDEX: 18.69 KG/M2 | HEIGHT: 61 IN | RESPIRATION RATE: 20 BRPM | WEIGHT: 99 LBS | DIASTOLIC BLOOD PRESSURE: 80 MMHG | HEART RATE: 100 BPM

## 2018-02-02 LAB
CANCER AG125 SERPL-ACNC: 8 U/ML (ref 0–30)
POTASSIUM SERPL-SCNC: 3.4 MMOL/L (ref 3.5–5.1)

## 2018-02-02 PROCEDURE — 74011250637 HC RX REV CODE- 250/637: Performed by: INTERNAL MEDICINE

## 2018-02-02 PROCEDURE — 84132 ASSAY OF SERUM POTASSIUM: CPT | Performed by: INTERNAL MEDICINE

## 2018-02-02 PROCEDURE — 36415 COLL VENOUS BLD VENIPUNCTURE: CPT | Performed by: INTERNAL MEDICINE

## 2018-02-02 PROCEDURE — 97530 THERAPEUTIC ACTIVITIES: CPT

## 2018-02-02 PROCEDURE — 74011250637 HC RX REV CODE- 250/637: Performed by: SPECIALIST

## 2018-02-02 RX ORDER — POTASSIUM CHLORIDE 7.45 MG/ML
10 INJECTION INTRAVENOUS
Status: DISCONTINUED | OUTPATIENT
Start: 2018-02-02 | End: 2018-02-02

## 2018-02-02 RX ORDER — POTASSIUM CHLORIDE 1.5 G/1.77G
20 POWDER, FOR SOLUTION ORAL DAILY
Qty: 30 EACH | Refills: 0 | Status: SHIPPED | OUTPATIENT
Start: 2018-02-02 | End: 2018-02-09 | Stop reason: SDUPTHER

## 2018-02-02 RX ORDER — METRONIDAZOLE 500 MG/1
500 TABLET ORAL EVERY 12 HOURS
Qty: 8 TAB | Refills: 0 | Status: SHIPPED | OUTPATIENT
Start: 2018-02-02 | End: 2018-02-16

## 2018-02-02 RX ORDER — PANTOPRAZOLE SODIUM 40 MG/1
40 TABLET, DELAYED RELEASE ORAL
Qty: 60 TAB | Refills: 0 | Status: SHIPPED | OUTPATIENT
Start: 2018-02-03 | End: 2018-05-02 | Stop reason: SDUPTHER

## 2018-02-02 RX ORDER — POTASSIUM CHLORIDE 750 MG/1
40 TABLET, FILM COATED, EXTENDED RELEASE ORAL
Status: COMPLETED | OUTPATIENT
Start: 2018-02-02 | End: 2018-02-02

## 2018-02-02 RX ORDER — LEVOFLOXACIN 750 MG/1
750 TABLET ORAL
Qty: 4 TAB | Refills: 0 | Status: SHIPPED | OUTPATIENT
Start: 2018-02-03 | End: 2018-02-16

## 2018-02-02 RX ADMIN — EZETIMIBE 10 MG: 10 TABLET ORAL at 08:52

## 2018-02-02 RX ADMIN — LEVOFLOXACIN 750 MG: 500 TABLET, FILM COATED ORAL at 06:39

## 2018-02-02 RX ADMIN — PANTOPRAZOLE SODIUM 40 MG: 40 TABLET, DELAYED RELEASE ORAL at 06:39

## 2018-02-02 RX ADMIN — Medication 400 MG: at 08:52

## 2018-02-02 RX ADMIN — BUPROPION HYDROCHLORIDE 150 MG: 150 TABLET, FILM COATED, EXTENDED RELEASE ORAL at 06:39

## 2018-02-02 RX ADMIN — POTASSIUM CHLORIDE 40 MEQ: 750 TABLET, FILM COATED, EXTENDED RELEASE ORAL at 11:19

## 2018-02-02 RX ADMIN — METRONIDAZOLE 500 MG: 250 TABLET ORAL at 08:52

## 2018-02-02 RX ADMIN — Medication 100 MG: at 08:52

## 2018-02-02 RX ADMIN — FOLIC ACID 1 MG: 1 TABLET ORAL at 08:52

## 2018-02-02 RX ADMIN — Medication 10 ML: at 06:41

## 2018-02-02 NOTE — PROGRESS NOTES
Gastroenterology Progress Note    February 2, 2018  Admit Date: 1/26/2018         Narrative Assessment and Plan   · Peptic ulcer disease  · Diarrhea - improving  · Diverticulosis     Plan  - continue PPI daily x 2 months  - stable from GI standpoint for discharge  - will see again as needed    ---  Could use immodium PRN diarrhea  From GI standpoint discharge is appropriate  Unifying diagnosis not clear but to my eye sarcoid seems most likely. Based on endoscopic exam and biopsies, a primary GI process is not likely. GI is signing off. Mary Jane Reddy MD       Subjective:   · No complaints this morning. Wants to go home. States diarrhea \"is trying to stop\". Discussed biopsy results with patient   · Random colon biopsy: benign colonic mucosa, no evidence of microscopic colitis  · Gastric/esophageal biopsy: benign antral gastric mucosa with mild chronic gastritis and focal erosion, benign squamous mucosa with mild acute esophagitis. H pylori negative. Fungal stain negative. ROS:  The previous review of systems on initial consultation / H&P is noted and reviewed. Specific changes noted above in HPI.     Current Medications:     Current Facility-Administered Medications   Medication Dose Route Frequency    potassium chloride 10 mEq in 100 ml IVPB  10 mEq IntraVENous Q1H    zinc oxide (DESITIN) 13 % topical cream   Topical PRN    levoFLOXacin (LEVAQUIN) tablet 750 mg  750 mg Oral ACB    metroNIDAZOLE (FLAGYL) tablet 500 mg  500 mg Oral Q12H    pantoprazole (PROTONIX) tablet 40 mg  40 mg Oral ACB    0.9% sodium chloride infusion  50 mL/hr IntraVENous CONTINUOUS    dextrose 5% - 0.9% NaCl with KCl 20 mEq/L infusion  100 mL/hr IntraVENous CONTINUOUS    0.9% sodium chloride infusion 250 mL  250 mL IntraVENous PRN    sodium chloride (NS) flush 5-10 mL  5-10 mL IntraVENous Q8H    sodium chloride (NS) flush 5-10 mL  5-10 mL IntraVENous PRN    acetaminophen (TYLENOL) tablet 650 mg  650 mg Oral Q4H PRN    oxyCODONE-acetaminophen (PERCOCET) 5-325 mg per tablet 1 Tab  1 Tab Oral Q4H PRN    HYDROmorphone (DILAUDID) injection 0.5 mg  0.5 mg IntraVENous Q4H PRN    prochlorperazine (COMPAZINE) injection 10 mg  10 mg IntraVENous Q6H PRN    zolpidem (AMBIEN) tablet 5 mg  5 mg Oral QHS PRN    diphenhydrAMINE (BENADRYL) capsule 25 mg  25 mg Oral Q6H PRN    buPROPion XL (WELLBUTRIN XL) tablet 150 mg  150 mg Oral 7am    magnesium oxide (MAG-OX) tablet 400 mg  400 mg Oral BID    Thiamine Mononitrate (B-1) tablet 100 mg  100 mg Oral DAILY    ezetimibe (ZETIA) tablet 10 mg  10 mg Oral DAILY    folic acid (FOLVITE) tablet 1 mg  1 mg Oral DAILY       Objective:     VITALS:   Last 24hrs VS reviewed since prior progress note. Most recent are:  Visit Vitals    /80    Pulse 100    Temp 98.6 °F (37 °C)    Resp 20    Ht 5' 1\" (1.549 m)    Wt 44.9 kg (99 lb)    SpO2 100%    BMI 18.71 kg/m2     Temp (24hrs), Av °F (37.2 °C), Min:98.6 °F (37 °C), Max:99.7 °F (37.6 °C)    No intake or output data in the 24 hours ending 18 0849    EXAM:  General: Comfortable, no distress      Lab Data Reviewed:   Recent Labs      18   1137  18   1214   WBC  4.3  3.2*   HGB  8.0*  8.3*   HCT  25.8*  27.6*   PLT  98*  136*     Recent Labs      18   1214   NA  148*   K  3.6   CL  119*   CO2  21   GLU  126*   BUN  1*   CREA  0.72   CA  7.1*     Lab Results   Component Value Date/Time    Glucose (POC) 91 2018 04:08 PM    Glucose (POC) 107 2018 01:08 PM     No results for input(s): PH, PCO2, PO2, HCO3, FIO2 in the last 72 hours. No results for input(s): INR in the last 72 hours.     No lab exists for component: INREXT, INREXT        Assessment:   (See above)  Active Problems:    Iron deficiency anemia (2012)      Benign essential HTN (2014)      Hyperlipidemia (2014)      Weight loss (2018)      Hypokalemia due to loss of potassium (2018)      Hypomagnesemia (2018) Debility (1/26/2018)      Colitis (1/27/2018)      Lactic acid acidosis (1/27/2018)      Elevated troponin (1/27/2018)      Protein-calorie malnutrition, severe ([address] (1/27/2018)      Hypotension (1/27/2018)      Emphysema of lung ([address] (1/27/2018)      Abnormal CT scan, chest (1/30/2018)      Abnormal CT of the abdomen (1/30/2018)        Plan:   (See above)    Signed By:  Ursula Hassan PA-C  2/2/2018  8:49 AM

## 2018-02-02 NOTE — DISCHARGE INSTRUCTIONS
HOSPITALIST DISCHARGE INSTRUCTIONS  NAME: Jana Cortez   :  1960   MRN:  [id number]     Date/Time:  2018 9:52 AM    ADMIT DATE: 2018     DISCHARGE DATE: 2018     ADMITTING DIAGNOSIS:  Diarrhea  Possible Sarcoidosis    DISCHARGE DIAGNOSIS:  As above    MEDICATIONS:    · It is important that you take the medication exactly as they are prescribed. · Keep your medication in the bottles provided by the pharmacist and keep a list of the medication names, dosages, and times to be taken in your wallet. · Do not take other medications without consulting your doctor. Pain Management: per above medications    What to do at Home:  1. Be sure to take the full course of antibiotics for your diarrhea  2. You need to follow up with pulmonary in clinic for further testing to determine if you have sarcoidosis  3. Follow up with your primary care physician within one week to get your potassium level checked    Recommended diet:  Regular Diet    Recommended activity: Activity as tolerated    If you experience any of the following symptoms then please call your primary care physician or return to the emergency room if you cannot get hold of your doctor:  Fever, chills, nausea, vomiting, diarrhea, change in mentation, falling, bleeding, shortness of breath    Follow Up:  Dr. Afsaneh Brown, NP  you are to call and set up an appointment to see them in 1 week. Information obtained by :  I understand that if any problems occur once I am at home I am to contact my physician. I understand and acknowledge receipt of the instructions indicated above.                                                                                                                                            Physician's or R.N.'s Signature                                                                  Date/Time Patient or Representative Signature                                                          Date/Time

## 2018-02-02 NOTE — PROGRESS NOTES
[employer]  Medical Oncology at [facility]  [phone]    Hematology / Oncology Follow-up         Belkys Mcgarry is a 65 y.o. 1960 female and presents with Abnormal Lab Results    CC  abnormal CTs    HPI  Pt seen today in hospital consult at request of hospitalist for abnormal CTs. CTs show mediastinal LAD, liver lesion, diffuse colitis/ bowel changes, bone lesions/ ovary cyst/ fibroids. Pt is a poor historian today. C/o diarrhea. Smoker but no lung mass on CT. Ms. Alecia Mariano presented to the Emergency Department complaining of weakness: for the past 3 months, now severe, steadily progressive, associated with weight loss. She was seen by her PCP and told to come to the ED as her potassium was 1.6      Interval History:   Denies any pain; diarrhea improved, plan for discharge today    1/31/18 EGD and colonoscopy  EGD:  Peptic ulcer disease.     Colonoscopy: Normal colonoscopy to the cecum, with no evidence of neoplasia, clinically significant diverticular disease, or mucosal abnormality.       Current Facility-Administered Medications   Medication Dose Route Frequency Provider Last Rate Last Dose    zinc oxide (DESITIN) 13 % topical cream   Topical PRN Toshia GUTIERREZ PA-C        levoFLOXacin (LEVAQUIN) tablet 750 mg  750 mg Oral ACB Odin Gaxiola MD   750 mg at 02/02/18 5154    metroNIDAZOLE (FLAGYL) tablet 500 mg  500 mg Oral Q12H Odin Gaxiola MD   500 mg at 02/02/18 2696    pantoprazole (PROTONIX) tablet 40 mg  40 mg Oral ACB Tanja Guajardo MD   40 mg at 02/02/18 2251    0.9% sodium chloride infusion  50 mL/hr IntraVENous CONTINUOUS El Ramsay MD 50 mL/hr at 01/31/18 0831 50 mL/hr at 01/31/18 0831    dextrose 5% - 0.9% NaCl with KCl 20 mEq/L infusion  100 mL/hr IntraVENous CONTINUOUS Blanca Thompson  mL/hr at 02/01/18 1757 100 mL/hr at 02/01/18 1757    0.9% sodium chloride infusion 250 mL  250 mL IntraVENous PRN Blanca Thompson MD        sodium chloride (NS) flush 5-10 mL  5-10 mL IntraVENous Q8H Sirisha Paz MD   10 mL at 02/02/18 0641    sodium chloride (NS) flush 5-10 mL  5-10 mL IntraVENous PRN Sirisha Paz MD   10 mL at 02/01/18 1757    acetaminophen (TYLENOL) tablet 650 mg  650 mg Oral Q4H PRN Sirisha Paz MD        oxyCODONE-acetaminophen (PERCOCET) 5-325 mg per tablet 1 Tab  1 Tab Oral Q4H PRN Sirisha Paz MD   1 Tab at 02/01/18 2127    HYDROmorphone (DILAUDID) injection 0.5 mg  0.5 mg IntraVENous Q4H PRN Sirisha Paz MD   0.5 mg at 01/27/18 1130    prochlorperazine (COMPAZINE) injection 10 mg  10 mg IntraVENous Q6H PRN Sirisha Paz MD   10 mg at 01/27/18 1116    zolpidem (AMBIEN) tablet 5 mg  5 mg Oral QHS PRN Sirisha Paz MD        diphenhydrAMINE (BENADRYL) capsule 25 mg  25 mg Oral Q6H PRN Sirisha Paz MD   25 mg at 01/27/18 2349    buPROPion XL (WELLBUTRIN XL) tablet 150 mg  150 mg Oral 7am Sirisha Paz MD   150 mg at 02/02/18 8093    magnesium oxide (MAG-OX) tablet 400 mg  400 mg Oral BID Sirisha Paz MD   400 mg at 02/02/18 3969    Thiamine Mononitrate (B-1) tablet 100 mg  100 mg Oral DAILY Sirisha Paz MD   100 mg at 02/02/18 8582    ezetimibe (ZETIA) tablet 10 mg  10 mg Oral DAILY Sirisha Paz MD   10 mg at 98/74/06 5630    folic acid (FOLVITE) tablet 1 mg  1 mg Oral DAILY Sirisha Paz MD   1 mg at 02/02/18 3827       Allergies   Allergen Reactions    Robaxin [Methocarbamol] Hives, Rash and Itching     Red splotches    Statins-Hmg-Coa Reductase Inhibitors Myalgia    Codeine Itching    Neosporin [Neomycin-Bacitracin-Polymyxin] Rash       Review of Systems    A comprehensive review of systems was negative except for: per HPI     Objective:  Visit Vitals    /80    Pulse 100    Temp 98.6 °F (37 °C)    Resp 20    Ht 5' 1\" (1.549 m)    Wt 99 lb (44.9 kg)    SpO2 100%    BMI 18.71 kg/m2       Physical Exam:   General: No distress  Eyes: PERRLA, anicteric sclerae  HENT: Atraumatic, OP clear  Neck: Supple  Respiratory: diminished bases, normal respiratory effort  CV: Normal rate, regular rhythm, no murmurs, left arm ecchymosis and edema noted  GI: Soft, nontender, nondistended, no masses, no hepatomegaly, no splenomegaly  Skin: No rashes, ecchymoses, or petechiae. Normal temperature, turgor, and texture. Psych: Alert, oriented, appropriate affect, normal judgment/insight    Diagnostic Imaging   reviewed    1/26/2018 CT CHEST ABD PELV W CONT    IMPRESSION:     Mediastinal and hilar adenopathy, liver lesion, and sclerotic osseous lesions,  compatible with metastatic disease  Possible right ovarian cystic mass for which pelvic ultrasound is recommended. Marked emphysema  Fibroid uterus  Colitis of the ascending colon, transverse colon and sigmoid colon. Trace pericardial effusion and small amount of free fluid in the right pelvis,  nonspecific    Addendum:      Examination was re-reviewed at the request of the oncology service. The enlarged  mediastinal and hilar lymph nodes demonstrate internal calcification; therefore,  granulomatous disease is favored over malignancy. The low-attenuation lesion in  hepatic segment 5 has the appearance of focal fat with peripheral venous  shunting; no suspicious hepatic mass is evident. The subtle sclerotic bone  lesions at T2 and S1 are nonspecific but not typical of metastatic disease;  whole-body bone scan could be performed for further evaluation. Findings were  discussed with Baron Stanley NP.      1/30/2018 Doppler LUE  Negative    1/30/18 bone scan  Examination was re-reviewed at the request of clinical service. Thoracic bone scan activity appears nonspecific and appears more typical of  spondylotic changes than metastatic disease. I reviewed this examination myself  and with one of my partners additional expertise in nuclear medicine imaging.       Lab Results  reviewed  Lab Results   Component Value Date/Time    WBC 4.3 02/01/2018 11:37 AM    HGB 8.0 02/01/2018 11:37 AM    HCT 25.8 02/01/2018 11:37 AM    PLATELET 98 96/08/3169 11:37 AM    MCV 89.6 02/01/2018 11:37 AM       Lab Results   Component Value Date/Time    Sodium 148 01/30/2018 12:14 PM    Potassium 3.4 02/02/2018 10:06 AM    Chloride 119 01/30/2018 12:14 PM    CO2 21 01/30/2018 12:14 PM    Anion gap 8 01/30/2018 12:14 PM    Glucose 126 01/30/2018 12:14 PM    BUN 1 01/30/2018 12:14 PM    Creatinine 0.72 01/30/2018 12:14 PM    BUN/Creatinine ratio 1 01/30/2018 12:14 PM    GFR est AA >60 01/30/2018 12:14 PM    GFR est non-AA >60 01/30/2018 12:14 PM    Calcium 7.1 01/30/2018 12:14 PM    AST (SGOT) 61 01/26/2018 01:02 PM    Alk. phosphatase 131 01/26/2018 01:02 PM    Protein, total 5.7 01/26/2018 01:02 PM    Albumin 2.1 01/26/2018 01:02 PM    Globulin 3.6 01/26/2018 01:02 PM    A-G Ratio 0.6 01/26/2018 01:02 PM    ALT (SGPT) 29 01/26/2018 01:02 PM       1/29/2018 EBUS / Dr Gonzalez [place]  Findings:   Dense rubber like lymphnodes with specs of calcifications inside. Specimens:   Cytology- scant    Lab Results   Component Value Date/Time    Reticulocyte count 2.2 01/25/2018 01:17 PM    Iron % saturation >86 01/25/2018 01:17 PM    TIBC <122 01/25/2018 01:17 PM    Ferritin 333 01/25/2018 01:17 PM    Vitamin B12 594 01/25/2018 01:17 PM    Folate 13.3 01/25/2018 01:17 PM    Sed rate (ESR) 15 01/25/2018 01:17 PM    C-Reactive Protein, Qt 11.0 01/25/2018 01:16 PM    TSH 4.350 01/25/2018 01:16 PM    Hep C Virus Ab 0.1 01/25/2018 01:16 PM    HIV SCREEN 4TH GENERATION WRFX Non Reactive 01/25/2018 01:16 PM     Lab Results   Component Value Date/Time    INR 1.2 01/29/2018 10:03 AM    aPTT 38.3 01/29/2018 10:03 AM           Assessment/ Plan:     1.   Abnormal CT scan  Enlarged mediastinal and hilar lymph nodes demonstrate internal calcification; granulomatous disease is favored over malignancy    Pulmonary consulted: 1/29/2018 EBUS; scant cells only, but negative for malignancy; ACE in normal range    Discussed bone scan with Dr. Mikel Hayes, see his addendum, no clear evidence of metastatic disease    Reviewed Dr. Savita Medeiros note, may need mediastinoscopy with thoracic surgery as outpatient      2. Anemia , normocytic (s/p 2 units PRBC)  Responded to transfusion  Possibly secondary to  Colitis vs  chronic disease. Continue to monitor and transfuse for Hgb < 7    3. Diarrhea   Improving, colonoscopy normal    4. Adnexal mass  Ca-125 pending    5. Thrombocytopenia:  New 3 days ago, worsening. Recommend cbc wo diff early next week as outpatient drawn by home health and sent to me    6. Neutropenia: improved    Thank you for this consult. All of the patient's questions were answered today.

## 2018-02-02 NOTE — PROGRESS NOTES
Bedside and Verbal shift change report given to Petra RN (oncoming nurse) by Josie RN (offgoing nurse). Report included the following information SBAR, Kardex, Intake/Output and Recent Results.

## 2018-02-02 NOTE — PROGRESS NOTES
2-2-2018 CASE MANAGEMENT NOTE:  The pt is anticipating discharge today and her  will transport her. Order for rolling walker noted and, with the pt's permission, I sent it thru [employer] to [place] and delivered it from their supply closet. I explained to the pt that her insurance will not cover the cost or a bedside commode as her bathroom is on the same level and she was fine with not obtaining that. She also understands [facility] will contact her to schedule a home visit. Care Management Interventions  PCP Verified by CM:  Yes  Transition of Care Consult (CM Consult): [address]: Yes  Discharge Durable Medical Equipment: Yes ([employer] from [place])  Physical Therapy Consult: Yes  Occupational Therapy Consult: Yes  Current Support Network: Lives with Spouse, Own Home  Confirm Follow Up Transport: Family  Plan discussed with Pt/Family/Caregiver: Yes  Freedom of Choice Offered: Yes  Discharge Location  Discharge Placement:  (Home with [facility] and a rolling walker from [place])      [facility], BSW, CM

## 2018-02-02 NOTE — PROGRESS NOTES
Pharmacist Discharge Medication Reconciliation    Discharge Provider:  Chapis Moore MD       Discharge Medications:      My Medications        STOP taking these medications              PEPCID 20 mg tablet   Generic drug:  famotidine                 TAKE these medications as instructed         Instructions Each Dose to Equal   Morning Noon Evening Bedtime      aspirin 81 mg chewable tablet       Your last dose was: Your next dose is:              CHEW ONE TABLET BY MOUTH DAILY                         buPROPion  mg tablet   Commonly known as:  WELLBUTRIN XL       Your last dose was: Your next dose is:              TAKE ONE TABLET BY MOUTH EVERY MORNING FOR SMOKING CESSATION                         ezetimibe 10 mg tablet   Commonly known as:  ZETIA       Your last dose was: Your next dose is: Take 1 Tab by mouth daily. For cholesterol    10 mg                        levoFLOXacin 750 mg tablet   Commonly known as:  LEVAQUIN   Start taking on:  2/3/2018       Your last dose was: Your next dose is: Take 1 Tab by mouth Daily (before breakfast). 750 mg                        magnesium 200 mg Tab       Your last dose was: Your next dose is: Take 200 mg by mouth daily. 200 mg                        metroNIDAZOLE 500 mg tablet   Commonly known as:  FLAGYL       Your last dose was: Your next dose is: Take 1 Tab by mouth every twelve (12) hours. 500 mg                        pantoprazole 40 mg tablet   Commonly known as:  PROTONIX   Start taking on:  2/3/2018       Your last dose was: Your next dose is: Take 1 Tab by mouth Daily (before breakfast). 40 mg                        potassium chloride 20 mEq packet   Commonly known as:  KLOR-CON       Your last dose was: Your next dose is: Take 1 Packet by mouth daily.     20 mEq                        thiamine 100 mg tablet Commonly known as:  B-1       Your last dose was: Your next dose is: Take 1 Tab by mouth daily. 100 mg                        zolpidem 5 mg tablet   Commonly known as:  AMBIEN       Your last dose was: Your next dose is:              TAKE ONE TABLET BY MOUTH NIGHTLY AS NEEDED FOR SLEEP                                  Where to Get Your Medications        Information on where to get these meds will be given to you by the nurse or doctor. !  Ask your nurse or doctor about these medications     levoFLOXacin 750 mg tablet    metroNIDAZOLE 500 mg tablet    pantoprazole 40 mg tablet    potassium chloride 20 mEq packet             The patient's chart, MAR, and AVS were reviewed by   Barbara Myers, [address] [facility],   Contact: [phone]

## 2018-02-02 NOTE — PROGRESS NOTES
Problem: Patient Education: Go to Patient Education Activity  Goal: Patient/Family Education  physical Therapy TREATMENT  Patient: Alan Monte (43 y.o. female)  Date: 2/2/2018  Diagnosis: [facility]  abnormal CT Scan  abnormal CT <principal problem not specified>  Procedure(s) (LRB):  ESOPHAGOGASTRODUODENOSCOPY (EGD) (N/A)  COLONOSCOPY (N/A)  ESOPHAGOGASTRODUODENAL (EGD) BIOPSY (N/A)  ESOPHAGEAL DILATION (N/A)  COLON BIOPSY (N/A) 2 Days Post-Op  Precautions: Fall  Chart, physical therapy assessment, plan of care and goals were reviewed. Plan is for pt D/C. Pt to sit with mod assist.Pt to stand with CGA to min assist.Pts new RW was sized for home use. Pt took 2 side steps to head of bed with RW CGA. Pt back to bed with mod assist.PT will continue to follow. Progression toward goals:  []      Improving appropriately and progressing toward goals  [x]      Improving slowly and progressing toward goals  []      Not making progress toward goals and plan of care will be adjusted     PLAN:  Patient continues to benefit from skilled intervention to address the above impairments. Continue treatment per established plan of care. Discharge Recommendations:  Home Health  Further Equipment Recommendations for Discharge:  rolling walker     SUBJECTIVE:       OBJECTIVE DATA SUMMARY:   Critical Behavior:  Neurologic State: (P) Alert  Orientation Level: (P) Oriented X4  Cognition: (P) Follows commands  Safety/Judgement: Awareness of environment, Fall prevention, Home safety, Decreased insight into deficits  Functional Mobility Training:  Bed Mobility:     Supine to Sit: Moderate assistance               Transfers:  Sit to Stand: Contact guard assistance;Minimum assistance  Stand to Sit: Contact guard assistance                             Balance:  Sitting: Intact  Standing: Intact; With support  Standing - Static: Fair;Good    Pain:  Pain Scale 1: Numeric (0 - 10)  Pain Intensity 1: 0              Activity Tolerance:   Pt tolerated treatment fairly well. Please refer to the flowsheet for vital signs taken during this treatment.   After treatment:   [] Patient left in no apparent distress sitting up in chair  [x] Patient left in no apparent distress in bed  [] Call bell left within reach  [] Nursing notified  [] Caregiver present  [] Bed alarm activated    COMMUNICATION/COLLABORATION:   The patients plan of care was discussed with: Physical Therapist    Jamar Mason PTA   Time Calculation: 23 mins

## 2018-02-02 NOTE — DISCHARGE SUMMARY
Physician Discharge Summary     Patient ID:  Talon Guerra  [id number]  77 y.o.  1960    Admit date: 1/26/2018    Discharge date and time: 2/2/2018    Admission Diagnoses: Metastatic cancer [facility])  abnormal CT Scan  abnormal CT    Discharge Diagnoses: Active Problems:    Iron deficiency anemia (2/8/2012)      Benign essential HTN (6/26/2014)      Hyperlipidemia (6/26/2014)      Weight loss (1/26/2018)      Hypokalemia due to loss of potassium (1/26/2018)      Hypomagnesemia (1/26/2018)      Debility (1/26/2018)      Colitis (1/27/2018)      Lactic acid acidosis (1/27/2018)      Elevated troponin (1/27/2018)      Protein-calorie malnutrition, severe (HCC) (1/27/2018)      Hypotension (1/27/2018)      Emphysema of lung (HCC) (1/27/2018)      Abnormal CT scan, chest (1/30/2018)      Abnormal CT of the abdomen (1/30/2018)           Hospital Course: Abnormal CT scan, chest / Abnormal CT of the abdomen): initially concern for metastatic disease but now this seems much less likely. Initial CT scans done 1/26 had suggested possible mets to lung, liver and bone of unknown primary. Pelvic ultrasound suggested a right adnexal structure which was more suggestive of hydrosalpinx than cystic neoplasm. Seen by Oncology. Review of her CT scans by a different radiologist indicate that metastatic disease was unlikely. See edited CT scan reports. She had an unsuccessful EBUS 1/29. Seen by GI who did a colonoscopy that was unremarkable. Bone scan was neg. Seen by Gynecology and a  and ACE pending.    --plan for outpt follow up with pulmonary for consideration of mediastinoscopy      Colitis (1/27/2018): CT abdomen and pelvis showed diffuse colitis. She has no diarrhea now. Cultures unremarkable other than scant candida.  Improving on levaquin and flagyl and transitioned to PO to complete course on discharge      Weight loss (1/26/2018)/  Protein-calorie malnutrition, severe ([address] (1/27/2018): due to poor oral intake vs other. Continue diet as tolerated     Hypokalemia due to loss of potassium (1/26/2018)/ Hypomagnesemia (1/26/2018)/Hypernatremia: likely loss from diarrhea. Repleted. Pt will need repeat potassium in one week with PCP.        Iron deficiency anemia (2/8/2012): Hgb now 8.5 post transfusion with 2 units PRBCs. Hgb stable.       Elevated troponin (1/27/2018): likely due to demand ischemia. EKG done in ED was non ischemic. No ACS.     Hyperlipidemia (6/26/2014): with her poor appetite and metastatic disease. recent LDL was 109, discontinued Zetia     Emphysema of lung POA: not wheezing. Noted on CT. Duoneb as needed      Debility (1/26/2018): has a poor functional status. Mobilize as tolerated once more stable. Will need HH with PT, OT    PCP: Tri Sosa NP     Consults: Pulmonary/Intensive care, GI and Hematology/Oncology    Condition of patient at discharge: improved    Discharge Exam:  Please refer to progress note from today. Disposition: home    Patient Instructions:   Current Discharge Medication List      START taking these medications    Details   levoFLOXacin (LEVAQUIN) 750 mg tablet Take 1 Tab by mouth Daily (before breakfast). Qty: 4 Tab, Refills: 0      metroNIDAZOLE (FLAGYL) 500 mg tablet Take 1 Tab by mouth every twelve (12) hours. Qty: 8 Tab, Refills: 0      pantoprazole (PROTONIX) 40 mg tablet Take 1 Tab by mouth Daily (before breakfast). Qty: 60 Tab, Refills: 0      potassium chloride (KLOR-CON) 20 mEq packet Take 1 Packet by mouth daily. Qty: 30 Each, Refills: 0         CONTINUE these medications which have NOT CHANGED    Details   buPROPion XL (WELLBUTRIN XL) 150 mg tablet TAKE ONE TABLET BY MOUTH EVERY MORNING FOR SMOKING CESSATION  Qty: 30 Tab, Refills: 5    Associated Diagnoses: Tobacco use      thiamine (B-1) 100 mg tablet Take 1 Tab by mouth daily. Qty: 30 Tab, Refills: 5    Associated Diagnoses: Thiamin deficiency      magnesium 200 mg tab Take 200 mg by mouth daily.   Qty: 30 Tab, Refills: 5    Associated Diagnoses: Hypomagnesemia      ezetimibe (ZETIA) 10 mg tablet Take 1 Tab by mouth daily. For cholesterol  Qty: 30 Tab, Refills: 5    Associated Diagnoses: Mixed hyperlipidemia      aspirin 81 mg chewable tablet CHEW ONE TABLET BY MOUTH DAILY  Qty: 30 Tab, Refills: 5    Associated Diagnoses: Hyperlipidemia, unspecified hyperlipidemia type      zolpidem (AMBIEN) 5 mg tablet TAKE ONE TABLET BY MOUTH NIGHTLY AS NEEDED FOR SLEEP  Qty: 30 Tab, Refills: 0    Associated Diagnoses: Primary insomnia         STOP taking these medications       famotidine (PEPCID) 20 mg tablet Comments:   Reason for Stopping:         folic acid (FOLVITE) 1 mg tablet Comments:   Reason for Stopping:             Activity: Activity as tolerated  Diet: Regular Diet  Wound Care: None needed    Follow-up with Nino Lesches, NP in 1 week.   Follow-up tests/labs repeat potassium levels    Approximate time spent in patient care on day of discharge: 33 minutes    Signed:  Dexter Alcaraz MD  2/2/2018  9:58 AM

## 2018-02-02 NOTE — ROUTINE PROCESS
2/2/18  9:56AM  PCP STAR appt scheduled with Rayshawn Veliz on 2/7/18 at 9:00AM. Appt added to AVS. Edilberto Merino CM Specialist

## 2018-02-02 NOTE — PROGRESS NOTES
1915-LUE swelling near elbow. IV lower arm, flushes, no pain. 3252 Bedside and Verbal shift change report given to [facility], RN (oncoming nurse) by Leatha Wan RN (offgoing nurse). Report included the following information SBAR, Kardex, ED Summary, Procedure Summary, MAR and Recent Results.

## 2018-02-02 NOTE — PROGRESS NOTES
Problem: Falls - Risk of  Goal: *Absence of Falls  Document Roselia Fall Risk and appropriate interventions in the flowsheet.    Outcome: Progressing Towards Goal  Fall Risk Interventions:  Mobility Interventions: Patient to call before getting OOB    Mentation Interventions: Adequate sleep, hydration, pain control, Bed/chair exit alarm, Door open when patient unattended, Family/sitter at bedside, Gait belt with transfers/ambulation, Increase mobility, More frequent rounding, Toileting rounds, Update white board    Medication Interventions: Patient to call before getting OOB    Elimination Interventions: Bed/chair exit alarm    History of Falls Interventions: Bed/chair exit alarm, Consult care management for discharge planning, Door open when patient unattended, Evaluate medications/consider consulting pharmacy, Utilize gait belt for transfer/ambulation

## 2018-02-05 ENCOUNTER — HOME CARE VISIT (OUTPATIENT)
Dept: SCHEDULING | Facility: HOME HEALTH | Age: 58
End: 2018-02-05
Payer: COMMERCIAL

## 2018-02-05 VITALS
SYSTOLIC BLOOD PRESSURE: 110 MMHG | TEMPERATURE: 98.2 F | HEART RATE: 70 BPM | DIASTOLIC BLOOD PRESSURE: 60 MMHG | RESPIRATION RATE: 16 BRPM

## 2018-02-05 LAB
BASOPHILS # BLD AUTO: 0 X10E3/UL (ref 0–0.2)
BASOPHILS NFR BLD AUTO: 0 %
EOSINOPHIL # BLD AUTO: 0 X10E3/UL (ref 0–0.4)
EOSINOPHIL NFR BLD AUTO: 0 %
ERYTHROCYTE [DISTWIDTH] IN BLOOD BY AUTOMATED COUNT: 17.1 % (ref 12.3–15.4)
HCT VFR BLD AUTO: 29.8 % (ref 34–46.6)
HGB BLD-MCNC: 9.4 G/DL (ref 11.1–15.9)
IMM GRANULOCYTES # BLD: 0 X10E3/UL (ref 0–0.1)
IMM GRANULOCYTES NFR BLD: 0 %
LYMPHOCYTES # BLD AUTO: 1.1 X10E3/UL (ref 0.7–3.1)
LYMPHOCYTES NFR BLD AUTO: 18 %
MCH RBC QN AUTO: 28.5 PG (ref 26.6–33)
MCHC RBC AUTO-ENTMCNC: 31.5 G/DL (ref 31.5–35.7)
MCV RBC AUTO: 90 FL (ref 79–97)
MONOCYTES # BLD AUTO: 0.6 X10E3/UL (ref 0.1–0.9)
MONOCYTES NFR BLD AUTO: 10 %
NEUTROPHILS # BLD AUTO: 4.5 X10E3/UL (ref 1.4–7)
NEUTROPHILS NFR BLD AUTO: 72 %
PATH REV BLD -IMP: ABNORMAL
PATH REV BLD -IMP: NORMAL
PATH REV BLD -IMP: NORMAL
PATHOLOGIST NAME: ABNORMAL
PLATELET # BLD AUTO: 273 X10E3/UL (ref 150–379)
RBC # BLD AUTO: 3.3 X10E6/UL (ref 3.77–5.28)
WBC # BLD AUTO: 6.3 X10E3/UL (ref 3.4–10.8)

## 2018-02-05 PROCEDURE — G0151 HHCP-SERV OF PT,EA 15 MIN: HCPCS

## 2018-02-05 PROCEDURE — 3331090002 HH PPS REVENUE DEBIT

## 2018-02-05 PROCEDURE — 3331090001 HH PPS REVENUE CREDIT

## 2018-02-05 PROCEDURE — 400013 HH SOC

## 2018-02-06 PROCEDURE — 3331090001 HH PPS REVENUE CREDIT

## 2018-02-06 PROCEDURE — 3331090002 HH PPS REVENUE DEBIT

## 2018-02-07 ENCOUNTER — OFFICE VISIT (OUTPATIENT)
Dept: FAMILY MEDICINE CLINIC | Age: 58
End: 2018-02-07

## 2018-02-07 VITALS
DIASTOLIC BLOOD PRESSURE: 61 MMHG | TEMPERATURE: 98.1 F | WEIGHT: 133 LBS | RESPIRATION RATE: 17 BRPM | SYSTOLIC BLOOD PRESSURE: 99 MMHG | OXYGEN SATURATION: 100 % | HEIGHT: 61 IN | BODY MASS INDEX: 25.11 KG/M2 | HEART RATE: 113 BPM

## 2018-02-07 DIAGNOSIS — R53.81 DEBILITY: ICD-10-CM

## 2018-02-07 DIAGNOSIS — J43.9 PULMONARY EMPHYSEMA, UNSPECIFIED EMPHYSEMA TYPE (HCC): ICD-10-CM

## 2018-02-07 DIAGNOSIS — R93.5 ABNORMAL CT OF THE ABDOMEN: ICD-10-CM

## 2018-02-07 DIAGNOSIS — K52.9 COLITIS: ICD-10-CM

## 2018-02-07 DIAGNOSIS — R60.9 PERIPHERAL EDEMA: ICD-10-CM

## 2018-02-07 DIAGNOSIS — E87.6 HYPOKALEMIA DUE TO LOSS OF POTASSIUM: ICD-10-CM

## 2018-02-07 DIAGNOSIS — D63.8 ANEMIA, CHRONIC DISEASE: ICD-10-CM

## 2018-02-07 DIAGNOSIS — E83.118 OTHER HEMOCHROMATOSIS: ICD-10-CM

## 2018-02-07 DIAGNOSIS — R93.89 ABNORMAL CT SCAN, CHEST: Primary | ICD-10-CM

## 2018-02-07 DIAGNOSIS — R63.4 WEIGHT LOSS: ICD-10-CM

## 2018-02-07 DIAGNOSIS — D64.9 ERYTHROPENIA: ICD-10-CM

## 2018-02-07 PROCEDURE — 3331090002 HH PPS REVENUE DEBIT

## 2018-02-07 PROCEDURE — 3331090001 HH PPS REVENUE CREDIT

## 2018-02-07 NOTE — PROGRESS NOTES
Irais Meraz is a 65 y.o. female who was seen in clinic today (2/7/2018). Subjective:  Hospital Follow up  Patient seen for hospital follow up for weight loss and generalized weakness. Patient was referred to [facility] ED for hypokalemia and anemia. She was admitted and went through extensive testing. Initial CT scans showed concern for metastatic disease. However follow up testing has ruled out this possibility. CT scans done 1/26 had suggested possible mets to lung, liver and bone of unknown primary. Pelvic ultrasound suggested a right adnexal structure which was more suggestive of hydrosalpinx than cystic neoplasm. She was evaluated by oncology. CA-125 negative. She had an unsuccessful EBUS 1/29. GI performd a colonoscopy. Treated for colitis  With Levaquin and Flagyl with improved symptoms. Cultures unremarkable other than scant candida. Bone scan was negative. Weight loss thought yo be s/t protein-calorie malnutrition. Diet changes recommended. Advised to quit alcohol use. Anemia was treated with 2 units PRBCs during admission. Peripheral smear showed erythropenia. Iron levels elevated. CBC unchanged.        Emphysema noted on CT. ACE normal. Patient to follow up with pulmonology as outpatient for consideration of mediastinoscopy      Patient is currently seen by home health - nursing, PT/OT. Prior to Admission medications    Medication Sig Start Date End Date Taking? Authorizing Provider   folic acid (FOLVITE) 1 mg tablet Take 1 mg by mouth daily. Yes Historical Provider   levoFLOXacin (LEVAQUIN) 750 mg tablet Take 1 Tab by mouth Daily (before breakfast). 2/3/18  Yes Patricia Wasserman MD   metroNIDAZOLE (FLAGYL) 500 mg tablet Take 1 Tab by mouth every twelve (12) hours. 2/2/18  Yes Patricia Wasserman MD   pantoprazole (PROTONIX) 40 mg tablet Take 1 Tab by mouth Daily (before breakfast). 2/3/18  Yes Patricia Wasserman MD   potassium chloride (KLOR-CON) 20 mEq packet Take 1 Packet by mouth daily. 2/2/18  Yes Joyce Ferguson MD   zolpidem (AMBIEN) 5 mg tablet TAKE ONE TABLET BY MOUTH NIGHTLY AS NEEDED FOR SLEEP 1/23/18  Yes Debbie Donahue NP   buPROPion XL (WELLBUTRIN XL) 150 mg tablet TAKE ONE TABLET BY MOUTH EVERY MORNING FOR SMOKING CESSATION 11/6/17  Yes Debbie Donahue NP   thiamine (B-1) 100 mg tablet Take 1 Tab by mouth daily. 10/24/17  Yes Debbie Donahue NP   magnesium 200 mg tab Take 200 mg by mouth daily. 10/24/17  Yes Debbie Donahue NP   aspirin 81 mg chewable tablet CHEW ONE TABLET BY MOUTH DAILY 9/15/17  Yes Bart Moffett MD          Allergies   Allergen Reactions    Robaxin [Methocarbamol] Hives, Rash and Itching     Red splotches    Statins-Hmg-Coa Reductase Inhibitors Myalgia    Codeine Itching    Neosporin [Neomycin-Bacitracin-Polymyxin] Rash           ROS  See HPI    Objective:   Physical Exam   Constitutional: She is oriented to person, place, and time. She appears well-developed and well-nourished. Neck: Normal range of motion. Neck supple. No JVD present. Carotid bruit is not present. No thyromegaly present. Cardiovascular: Normal rate, regular rhythm and intact distal pulses. Exam reveals no gallop and no friction rub. No murmur heard. Pulmonary/Chest: Effort normal and breath sounds normal. No respiratory distress. Musculoskeletal: She exhibits edema (2+ pitting edema of lower legs). Lymphadenopathy:     She has no cervical adenopathy. Neurological: She is alert and oriented to person, place, and time. Gait (in wheel chair) abnormal.   Psychiatric: She has a normal mood and affect. Her behavior is normal.   Nursing note and vitals reviewed. Visit Vitals    BP 99/61 (BP 1 Location: Right arm, BP Patient Position: Sitting)    Pulse (!) 113    Temp 98.1 °F (36.7 °C) (Oral)    Resp 17    Ht 5' 1\" (1.549 m)    Wt 133 lb (60.3 kg)    SpO2 100%    BMI 25.13 kg/m2       Assessment & Plan:  Diagnoses and all orders for this visit:    1. Abnormal CT scan, chest  Follow up with pulmonology as outpatient. 2. Abnormal CT of the abdomen  No metastatic disease as previously thought. 3. Weight loss  Increase protein and caloric intake. 4. Debility  Continue home health PT/OT    5. Colitis  Resolving. Complete antibiotics as prescribed. 6. Hypokalemia due to loss of potassium  Resolved, recheck renal function and potassium.   -     METABOLIC PANEL, COMPREHENSIVE    7. Pulmonary emphysema, unspecified emphysema type ([facility] [address]  Counseled patient on need to quit smoking. Follow up with pulmonology. 8. Anemia, chronic disease  Check erythropoietin level. Request hematology evaluation.   -     CBC W/O DIFF  -     ERYTHROPOIETIN  -     REFERRAL TO HEMATOLOGY ONCOLOGY    9. Other hemochromatosis  Request hematology evaluation. 10. Erythropenia  -     ERYTHROPOIETIN  -     REFERRAL TO HEMATOLOGY ONCOLOGY    11. Peripheral edema  Likely fluid overload following IVF in hospital. Eelavted legs and increase activity. Will ad diuretic as needed      I have discussed the diagnosis with the patient and the intended plan as seen in the above orders. The patient has received an after-visit summary along with patient information handout. I have discussed medication side effects and warnings with the patient as well. Follow-up Disposition:  Return in about 2 weeks (around 2/21/2018) for disease management.         Heather Larsen NP       Counseling More Than 50% of the Appointment Time: Face-to face time >40 minutes

## 2018-02-07 NOTE — PROGRESS NOTES
Chief Complaint   Patient presents with    Transitions Of Care     [facility]- 1/26/18 to 2/2/2018- Benign Essential Hypertension     1. Have you been to the ER, urgent care clinic since your last visit? Hospitalized since your last visit? Yes- [facility]- 1/26/18 to 2/2/2018- Benign Essential Hypertension    2. Have you seen or consulted any other health care providers outside of the [address] [place] [address] since your last visit? Include any pap smears or colon screening.  No

## 2018-02-07 NOTE — PATIENT INSTRUCTIONS
Anemia: Care Instructions  Your Care Instructions    Anemia is a low level of red blood cells, which carry oxygen throughout your body. Many things can cause anemia. Lack of iron is one of the most common causes. Your body needs iron to make hemoglobin, a substance in red blood cells that carries oxygen from the lungs to your body's cells. Without enough iron, the body produces fewer and smaller red blood cells. As a result, your body's cells do not get enough oxygen, and you feel tired and weak. And you may have trouble concentrating. Bleeding is the most common cause of a lack of iron. You may have heavy menstrual bleeding or bleeding caused by conditions such as ulcers, hemorrhoids, or cancer. Regular use of aspirin or other anti-inflammatory medicines (such as ibuprofen) also can cause bleeding in some people. A lack of iron in your diet also can cause anemia, especially at times when the body needs more iron, such as during pregnancy, infancy, and the teen years. Your doctor may have prescribed iron pills. It may take several months of treatment for your iron levels to return to normal. Your doctor also may suggest that you eat foods that are rich in iron, such as meat and beans. There are many other causes of anemia. It is not always due to a lack of iron. Finding the specific cause of your anemia will help your doctor find the right treatment for you. Follow-up care is a key part of your treatment and safety. Be sure to make and go to all appointments, and call your doctor if you are having problems. It's also a good idea to know your test results and keep a list of the medicines you take. How can you care for yourself at home? · Take your medicines exactly as prescribed. Call your doctor if you think you are having a problem with your medicine. · If your doctor recommends iron pills, take them as directed:  ¨ Try to take the pills on an empty stomach about 1 hour before or 2 hours after meals. But you may need to take iron with food to avoid an upset stomach. ¨ Do not take antacids or drink milk or caffeine drinks (such as coffee, tea, or cola) at the same time or within 2 hours of the time that you take your iron. They can make it hard for your body to absorb the iron. ¨ Vitamin C (from food or supplements) helps your body absorb iron. Try taking iron pills with a glass of orange juice or some other food that is high in vitamin C, such as citrus fruits. ¨ Iron pills may cause stomach problems, such as heartburn, nausea, diarrhea, constipation, and cramps. Be sure to drink plenty of fluids, and include fruits, vegetables, and fiber in your diet each day. Iron pills often make your bowel movements dark or green. ¨ If you forget to take an iron pill, do not take a double dose of iron the next time you take a pill. ¨ Keep iron pills out of the reach of small children. An overdose of iron can be very dangerous. · Follow your doctor's advice about eating iron-rich foods. These include red meat, shellfish, poultry, eggs, beans, raisins, whole-grain bread, and leafy green vegetables. · Steam vegetables to help them keep their iron content. When should you call for help? Call 911 anytime you think you may need emergency care. For example, call if:  ? · You have symptoms of a heart attack. These may include:  ¨ Chest pain or pressure, or a strange feeling in the chest.  ¨ Sweating. ¨ Shortness of breath. ¨ Nausea or vomiting. ¨ Pain, pressure, or a strange feeling in the back, neck, jaw, or upper belly or in one or both shoulders or arms. ¨ Lightheadedness or sudden weakness. ¨ A fast or irregular heartbeat. After you call 911, the  may tell you to chew 1 adult-strength or 2 to 4 low-dose aspirin. Wait for an ambulance. Do not try to drive yourself. ? · You passed out (lost consciousness).    ?Call your doctor now or seek immediate medical care if:  ? · You have new or increased shortness of breath. ? · You are dizzy or lightheaded, or you feel like you may faint. ? · Your fatigue and weakness continue or get worse. ? · You have any abnormal bleeding, such as:  ¨ Nosebleeds. ¨ Vaginal bleeding that is different (heavier, more frequent, at a different time of the month) than what you are used to. ¨ Bloody or black stools, or rectal bleeding. ¨ Bloody or pink urine. ? Watch closely for changes in your health, and be sure to contact your doctor if:  ? · You do not get better as expected. Where can you learn more? Go to http://erick-jason.info/. Enter R301 in the search box to learn more about \"Anemia: Care Instructions. \"  Current as of: October 13, 2016  Content Version: 11.4  © 0161-6077 [employer]. Care instructions adapted under license by [employer] (which disclaims liability or warranty for this information). If you have questions about a medical condition or this instruction, always ask your healthcare professional. [place] [address] any warranty or liability for your use of this information.

## 2018-02-07 NOTE — MR AVS SNAPSHOT
[facility], [place], operated [facility] 
 
 
 [address] [facility] [address] 
[phone] Patient: Emeli Roe MRN: [id number] :[id number] Visit Information Date & Time Provider Department Dept. Phone Encounter #  
 2018  9:00 AM Austin Fournier  [facility] [phone] [id number] Follow-up Instructions Return in about 2 weeks (around 2018) for disease management. Upcoming Health Maintenance Date Due Pneumococcal 19-64 Highest Risk (1 of 3 - PCV13) 1980 BREAST CANCER SCRN MAMMOGRAM 2018 PAP AKA CERVICAL CYTOLOGY 2019 DTaP/Tdap/Td series (2 - Td) 3/27/2027 COLONOSCOPY 2028 Allergies as of 2018  Review Complete On: 2018 By: Tiffanie Pereira LPN Severity Noted Reaction Type Reactions Robaxin [Methocarbamol] High 2016    Hives, Rash, Itching Red splotches Statins-hmg-coa Reductase Inhibitors Medium 2017   Side Effect Myalgia Codeine  2012    Itching Neosporin [Neomycin-bacitracin-polymyxin]  2010    Rash Current Immunizations  Reviewed on 2012 No immunizations on file. Not reviewed this visit You Were Diagnosed With   
  
 Codes Comments Abnormal CT scan, chest    -  Primary ICD-10-CM: R93.8 ICD-9-CM: 793.2 Abnormal CT of the abdomen     ICD-10-CM: R93.5 ICD-9-CM: 793.6 Colitis     ICD-10-CM: K52.9 ICD-9-CM: 558.9 Hypokalemia due to loss of potassium     ICD-10-CM: E87.6 ICD-9-CM: 276.8 Weight loss     ICD-10-CM: R63.4 ICD-9-CM: 783.21 Debility     ICD-10-CM: R53.81 ICD-9-CM: 799.3 Pulmonary emphysema, unspecified emphysema type ([facility] [place] [address]     ICD-10-CM: J43.9 ICD-9-CM: 492.8 Anemia, chronic disease     ICD-10-CM: D63.8 ICD-9-CM: 285.29 Other hemochromatosis     ICD-10-CM: E83.118 
ICD-9-CM: 275.03 Peripheral edema     ICD-10-CM: R60.9 ICD-9-CM: 782.3 Erythropenia     ICD-10-CM: D64.9 ICD-9-CM: 565. 9 Vitals BP Pulse Temp Resp Height(growth percentile) Weight(growth percentile) 99/61 (BP 1 Location: Right arm, BP Patient Position: Sitting) (!) 113 98.1 °F (36.7 °C) (Oral) 17 5' 1\" (1.549 m) 133 lb (60.3 kg) SpO2 BMI OB Status Smoking Status 100% 25.13 kg/m2 Premenopausal Current Every Day Smoker Vitals History BMI and BSA Data Body Mass Index Body Surface Area  
 25.13 kg/m 2 1.61 m 2 Preferred Pharmacy Pharmacy Name Phone [facility] [address], [address] [facility] [address] [phone] Your Updated Medication List  
  
   
This list is accurate as of: 2/7/18 10:05 AM.  Always use your most recent med list.  
  
  
  
  
 aspirin 81 mg chewable tablet CHEW ONE TABLET BY MOUTH DAILY  
  
 buPROPion  mg tablet Commonly known as:  WELLBUTRIN XL  
TAKE ONE TABLET BY MOUTH EVERY MORNING FOR SMOKING CESSATION  
  
 folic acid 1 mg tablet Commonly known as:  Google Take 1 mg by mouth daily. levoFLOXacin 750 mg tablet Commonly known as:  Jeanell Castor Take 1 Tab by mouth Daily (before breakfast). magnesium 200 mg Tab Take 200 mg by mouth daily. metroNIDAZOLE 500 mg tablet Commonly known as:  FLAGYL Take 1 Tab by mouth every twelve (12) hours. pantoprazole 40 mg tablet Commonly known as:  PROTONIX Take 1 Tab by mouth Daily (before breakfast). potassium chloride 20 mEq packet Commonly known as:  KLOR-CON Take 1 Packet by mouth daily. thiamine 100 mg tablet Commonly known as:  B-1 Take 1 Tab by mouth daily. zolpidem 5 mg tablet Commonly known as:  AMBIEN  
TAKE ONE TABLET BY MOUTH NIGHTLY AS NEEDED FOR SLEEP We Performed the Following CBC W/O DIFF [23127 CPT(R)] ERYTHROPOIETIN [49885 CPT(R)] METABOLIC PANEL, COMPREHENSIVE [28253 CPT(R)] REFERRAL TO HEMATOLOGY ONCOLOGY [PZN82 Custom] Follow-up Instructions Return in about 2 weeks (around 2/21/2018) for disease management. To-Do List   
 02/08/2018 1:30 PM  
  Appointment with Nathanael Netters at [place] [address]  
  
 02/12/2018 To Be Determined Appointment with Nathanael Netters at [place] [address]  
  
 02/14/2018 To Be Determined Appointment with Nathanael Netters at [place] [address]  
  
 02/16/2018 To Be Determined Appointment with Nathanael Netters at [place] [address]  
  
 02/19/2018 To Be Determined Appointment with Nathanael Netters at [place] [address]  
  
 02/21/2018 To Be Determined Appointment with Nathanael Netters at [place] [address]  
  
 02/23/2018 To Be Determined Appointment with Nathanael Netters at [place] [address]  
  
 02/26/2018 To Be Determined Appointment with Nathanael Netters at [place] [address]  
  
 03/01/2018 To Be Determined Appointment with Annabel Henry at [place] [address] Referral Information Referral ID Referred By Referred To  
  
 [id number] BradnerNikko MD   
   [address] [facility] [address] [facility] [address] Phone: [phone] Fax: [phone] Visits Status Start Date End Date 1 New Request 2/7/18 2/7/19 If your referral has a status of pending review or denied, additional information will be sent to support the outcome of this decision. Patient Instructions Anemia: Care Instructions Your Care Instructions Anemia is a low level of red blood cells, which carry oxygen throughout your body. Many things can cause anemia.  Lack of iron is one of the most common causes. Your body needs iron to make hemoglobin, a substance in red blood cells that carries oxygen from the lungs to your body's cells. Without enough iron, the body produces fewer and smaller red blood cells. As a result, your body's cells do not get enough oxygen, and you feel tired and weak. And you may have trouble concentrating. Bleeding is the most common cause of a lack of iron. You may have heavy menstrual bleeding or bleeding caused by conditions such as ulcers, hemorrhoids, or cancer. Regular use of aspirin or other anti-inflammatory medicines (such as ibuprofen) also can cause bleeding in some people. A lack of iron in your diet also can cause anemia, especially at times when the body needs more iron, such as during pregnancy, infancy, and the teen years. Your doctor may have prescribed iron pills. It may take several months of treatment for your iron levels to return to normal. Your doctor also may suggest that you eat foods that are rich in iron, such as meat and beans. There are many other causes of anemia. It is not always due to a lack of iron. Finding the specific cause of your anemia will help your doctor find the right treatment for you. Follow-up care is a key part of your treatment and safety. Be sure to make and go to all appointments, and call your doctor if you are having problems. It's also a good idea to know your test results and keep a list of the medicines you take. How can you care for yourself at home? · Take your medicines exactly as prescribed. Call your doctor if you think you are having a problem with your medicine. · If your doctor recommends iron pills, take them as directed: ¨ Try to take the pills on an empty stomach about 1 hour before or 2 hours after meals. But you may need to take iron with food to avoid an upset stomach.  
¨ Do not take antacids or drink milk or caffeine drinks (such as coffee, tea, or cola) at the same time or within 2 hours of the time that you take your iron. They can make it hard for your body to absorb the iron. ¨ Vitamin C (from food or supplements) helps your body absorb iron. Try taking iron pills with a glass of orange juice or some other food that is high in vitamin C, such as citrus fruits. ¨ Iron pills may cause stomach problems, such as heartburn, nausea, diarrhea, constipation, and cramps. Be sure to drink plenty of fluids, and include fruits, vegetables, and fiber in your diet each day. Iron pills often make your bowel movements dark or green. ¨ If you forget to take an iron pill, do not take a double dose of iron the next time you take a pill. ¨ Keep iron pills out of the reach of small children. An overdose of iron can be very dangerous. · Follow your doctor's advice about eating iron-rich foods. These include red meat, shellfish, poultry, eggs, beans, raisins, whole-grain bread, and leafy green vegetables. · Steam vegetables to help them keep their iron content. When should you call for help? Call 911 anytime you think you may need emergency care. For example, call if: 
? · You have symptoms of a heart attack. These may include: ¨ Chest pain or pressure, or a strange feeling in the chest. 
¨ Sweating. ¨ Shortness of breath. ¨ Nausea or vomiting. ¨ Pain, pressure, or a strange feeling in the back, neck, jaw, or upper belly or in one or both shoulders or arms. ¨ Lightheadedness or sudden weakness. ¨ A fast or irregular heartbeat. After you call 911, the  may tell you to chew 1 adult-strength or 2 to 4 low-dose aspirin. Wait for an ambulance. Do not try to drive yourself. ? · You passed out (lost consciousness). ?Call your doctor now or seek immediate medical care if: 
? · You have new or increased shortness of breath. ? · You are dizzy or lightheaded, or you feel like you may faint. ? · Your fatigue and weakness continue or get worse. ? · You have any abnormal bleeding, such as: 
¨ Nosebleeds. ¨ Vaginal bleeding that is different (heavier, more frequent, at a different time of the month) than what you are used to. ¨ Bloody or black stools, or rectal bleeding. ¨ Bloody or pink urine. ? Watch closely for changes in your health, and be sure to contact your doctor if: 
? · You do not get better as expected. Where can you learn more? Go to http://erick-jason.info/. Enter R301 in the search box to learn more about \"Anemia: Care Instructions. \" Current as of: October 13, 2016 Content Version: 11.4 © 5826-9596 [employer]. Care instructions adapted under license by [employer] (which disclaims liability or warranty for this information). If you have questions about a medical condition or this instruction, always ask your healthcare professional. [address] any warranty or liability for your use of this information. Introducing \[facility]\"" & HEALTH SERVICES! Dear Elo Clifford: Thank you for requesting a [employer] account. Our records indicate that you already have an active [employer] account. You can access your account anytime at https://[employer] Did you know that you can access your hospital and ER discharge instructions at any time in [employer]? You can also review all of your test results from your hospital stay or ER visit. Additional Information If you have questions, please visit the Frequently Asked Questions section of the [employer] website at https://[employer])/. Remember, [employer] is NOT to be used for urgent needs. For medical emergencies, dial 911. Now available from your iPhone and Android! Please provide this summary of care documentation to your next provider. Your primary care clinician is listed as Mariaelena Bermudez. If you have any questions after today's visit, please call [phone].

## 2018-02-08 ENCOUNTER — HOME CARE VISIT (OUTPATIENT)
Dept: SCHEDULING | Facility: HOME HEALTH | Age: 58
End: 2018-02-08
Payer: COMMERCIAL

## 2018-02-08 VITALS
RESPIRATION RATE: 19 BRPM | OXYGEN SATURATION: 97 % | SYSTOLIC BLOOD PRESSURE: 112 MMHG | HEART RATE: 108 BPM | DIASTOLIC BLOOD PRESSURE: 68 MMHG | TEMPERATURE: 99 F

## 2018-02-08 LAB
ALBUMIN SERPL-MCNC: 2.1 G/DL (ref 3.5–5.5)
ALBUMIN/GLOB SERPL: 0.8 {RATIO} (ref 1.2–2.2)
ALP SERPL-CCNC: 307 IU/L (ref 39–117)
ALT SERPL-CCNC: 11 IU/L (ref 0–32)
AST SERPL-CCNC: 41 IU/L (ref 0–40)
BILIRUB SERPL-MCNC: 0.8 MG/DL (ref 0–1.2)
BUN SERPL-MCNC: 3 MG/DL (ref 6–24)
BUN/CREAT SERPL: 8 (ref 9–23)
CALCIUM SERPL-MCNC: 7.5 MG/DL (ref 8.7–10.2)
CHLORIDE SERPL-SCNC: 100 MMOL/L (ref 96–106)
CO2 SERPL-SCNC: 18 MMOL/L (ref 18–29)
CREAT SERPL-MCNC: 0.36 MG/DL (ref 0.57–1)
EPO SERPL-ACNC: 77.2 MIU/ML (ref 2.6–18.5)
ERYTHROCYTE [DISTWIDTH] IN BLOOD BY AUTOMATED COUNT: 18.9 % (ref 12.3–15.4)
GFR SERPLBLD CREATININE-BSD FMLA CKD-EPI: 121 ML/MIN/1.73
GFR SERPLBLD CREATININE-BSD FMLA CKD-EPI: 139 ML/MIN/1.73
GLOBULIN SER CALC-MCNC: 2.6 G/DL (ref 1.5–4.5)
GLUCOSE SERPL-MCNC: 76 MG/DL (ref 65–99)
HCT VFR BLD AUTO: 26.7 % (ref 34–46.6)
HGB BLD-MCNC: 8.5 G/DL (ref 11.1–15.9)
MCH RBC QN AUTO: 27.2 PG (ref 26.6–33)
MCHC RBC AUTO-ENTMCNC: 31.8 G/DL (ref 31.5–35.7)
MCV RBC AUTO: 86 FL (ref 79–97)
PLATELET # BLD AUTO: 155 X10E3/UL (ref 150–379)
POTASSIUM SERPL-SCNC: 3.5 MMOL/L (ref 3.5–5.2)
PROT SERPL-MCNC: 4.7 G/DL (ref 6–8.5)
RBC # BLD AUTO: 3.12 X10E6/UL (ref 3.77–5.28)
SODIUM SERPL-SCNC: 137 MMOL/L (ref 134–144)
WBC # BLD AUTO: 8.7 X10E3/UL (ref 3.4–10.8)

## 2018-02-08 PROCEDURE — G0157 HHC PT ASSISTANT EA 15: HCPCS

## 2018-02-08 PROCEDURE — 3331090001 HH PPS REVENUE CREDIT

## 2018-02-08 PROCEDURE — 3331090002 HH PPS REVENUE DEBIT

## 2018-02-09 DIAGNOSIS — E83.51 HYPOCALCEMIA: ICD-10-CM

## 2018-02-09 DIAGNOSIS — E87.6 HYPOKALEMIA: ICD-10-CM

## 2018-02-09 DIAGNOSIS — R60.9 PERIPHERAL EDEMA: Primary | ICD-10-CM

## 2018-02-09 PROCEDURE — 3331090002 HH PPS REVENUE DEBIT

## 2018-02-09 PROCEDURE — 3331090001 HH PPS REVENUE CREDIT

## 2018-02-09 RX ORDER — POTASSIUM CHLORIDE 1.5 G/1.77G
20 POWDER, FOR SOLUTION ORAL DAILY
Qty: 30 EACH | Refills: 0 | Status: SHIPPED | OUTPATIENT
Start: 2018-02-09 | End: 2018-10-08 | Stop reason: SDUPTHER

## 2018-02-09 RX ORDER — CALCIUM CARBONATE/VITAMIN D3 600MG-5MCG
1 TABLET ORAL 2 TIMES DAILY WITH MEALS
Qty: 60 TAB | Refills: 0 | Status: SHIPPED | OUTPATIENT
Start: 2018-02-09 | End: 2018-06-26 | Stop reason: SDUPTHER

## 2018-02-09 RX ORDER — FUROSEMIDE 20 MG/1
20 TABLET ORAL DAILY
Qty: 30 TAB | Refills: 0 | Status: SHIPPED | OUTPATIENT
Start: 2018-02-09 | End: 2018-02-16

## 2018-02-10 PROCEDURE — 3331090001 HH PPS REVENUE CREDIT

## 2018-02-10 PROCEDURE — 3331090002 HH PPS REVENUE DEBIT

## 2018-02-11 PROCEDURE — 3331090002 HH PPS REVENUE DEBIT

## 2018-02-11 PROCEDURE — 3331090001 HH PPS REVENUE CREDIT

## 2018-02-12 ENCOUNTER — HOME CARE VISIT (OUTPATIENT)
Dept: SCHEDULING | Facility: HOME HEALTH | Age: 58
End: 2018-02-12
Payer: COMMERCIAL

## 2018-02-12 VITALS
SYSTOLIC BLOOD PRESSURE: 87 MMHG | RESPIRATION RATE: 17 BRPM | HEART RATE: 101 BPM | TEMPERATURE: 97.8 F | DIASTOLIC BLOOD PRESSURE: 66 MMHG | OXYGEN SATURATION: 97 %

## 2018-02-12 PROCEDURE — G0157 HHC PT ASSISTANT EA 15: HCPCS

## 2018-02-12 PROCEDURE — G0152 HHCP-SERV OF OT,EA 15 MIN: HCPCS

## 2018-02-12 PROCEDURE — 3331090001 HH PPS REVENUE CREDIT

## 2018-02-12 PROCEDURE — 3331090002 HH PPS REVENUE DEBIT

## 2018-02-13 ENCOUNTER — HOME CARE VISIT (OUTPATIENT)
Dept: HOME HEALTH SERVICES | Facility: HOME HEALTH | Age: 58
End: 2018-02-13
Payer: COMMERCIAL

## 2018-02-13 VITALS
OXYGEN SATURATION: 97 % | SYSTOLIC BLOOD PRESSURE: 87 MMHG | TEMPERATURE: 97.8 F | DIASTOLIC BLOOD PRESSURE: 66 MMHG | RESPIRATION RATE: 17 BRPM | HEART RATE: 101 BPM

## 2018-02-13 PROCEDURE — E0700 SAFETY EQUIPMENT: HCPCS

## 2018-02-13 PROCEDURE — 3331090001 HH PPS REVENUE CREDIT

## 2018-02-13 PROCEDURE — 3331090002 HH PPS REVENUE DEBIT

## 2018-02-14 DIAGNOSIS — F51.01 PRIMARY INSOMNIA: ICD-10-CM

## 2018-02-14 PROCEDURE — 3331090002 HH PPS REVENUE DEBIT

## 2018-02-14 PROCEDURE — 3331090001 HH PPS REVENUE CREDIT

## 2018-02-14 RX ORDER — ZOLPIDEM TARTRATE 5 MG/1
TABLET ORAL
Qty: 30 TAB | Refills: 0 | Status: CANCELLED | OUTPATIENT
Start: 2018-02-14

## 2018-02-14 RX ORDER — METRONIDAZOLE 500 MG/1
500 TABLET ORAL EVERY 12 HOURS
Qty: 8 TAB | Refills: 0 | Status: CANCELLED | OUTPATIENT
Start: 2018-02-14

## 2018-02-14 NOTE — TELEPHONE ENCOUNTER
----- Message from Lora Oliva sent at 2/13/2018  5:07 PM EST -----  Regarding: Cindy Mota, NP/Refill  Pt is requesting refills on FLAGYL® (metronidazole) 500 mg, Zolpiden 5 mg and skinny white pill to protect stomach lining (unsure of name) called to [facility]  [phone]    Pt contact [phone]

## 2018-02-14 NOTE — TELEPHONE ENCOUNTER
[phone] spoke to patient and  Domi Hunterdon needs refill for flagyl I advised Flagyl is antibiotic and if she finished it she is done with medication, requesting zolpidem 5 mg I advised last refill 1/24/2018 she's 10 days early they understand.  I advised skinny white pill is unknown to us and to call me back what medication name is patient understand

## 2018-02-15 ENCOUNTER — HOME CARE VISIT (OUTPATIENT)
Dept: SCHEDULING | Facility: HOME HEALTH | Age: 58
End: 2018-02-15
Payer: COMMERCIAL

## 2018-02-15 VITALS
TEMPERATURE: 99.6 F | HEART RATE: 92 BPM | OXYGEN SATURATION: 90 % | RESPIRATION RATE: 16 BRPM | DIASTOLIC BLOOD PRESSURE: 60 MMHG | SYSTOLIC BLOOD PRESSURE: 105 MMHG

## 2018-02-15 PROCEDURE — 3331090001 HH PPS REVENUE CREDIT

## 2018-02-15 PROCEDURE — G0157 HHC PT ASSISTANT EA 15: HCPCS

## 2018-02-15 PROCEDURE — 3331090002 HH PPS REVENUE DEBIT

## 2018-02-16 ENCOUNTER — HOSPITAL ENCOUNTER (INPATIENT)
Age: 58
LOS: 14 days | Discharge: NURSING, REHAB OR LONG-TERM CARE | DRG: 640 | End: 2018-03-02
Attending: EMERGENCY MEDICINE | Admitting: HOSPITALIST
Payer: COMMERCIAL

## 2018-02-16 ENCOUNTER — APPOINTMENT (OUTPATIENT)
Dept: GENERAL RADIOLOGY | Age: 58
DRG: 640 | End: 2018-02-16
Attending: EMERGENCY MEDICINE
Payer: COMMERCIAL

## 2018-02-16 ENCOUNTER — TELEPHONE (OUTPATIENT)
Dept: FAMILY MEDICINE CLINIC | Age: 58
End: 2018-02-16

## 2018-02-16 ENCOUNTER — HOME CARE VISIT (OUTPATIENT)
Dept: SCHEDULING | Facility: HOME HEALTH | Age: 58
End: 2018-02-16
Payer: COMMERCIAL

## 2018-02-16 ENCOUNTER — OFFICE VISIT (OUTPATIENT)
Dept: FAMILY MEDICINE CLINIC | Age: 58
End: 2018-02-16

## 2018-02-16 VITALS
OXYGEN SATURATION: 94 % | TEMPERATURE: 97.8 F | SYSTOLIC BLOOD PRESSURE: 78 MMHG | HEART RATE: 134 BPM | DIASTOLIC BLOOD PRESSURE: 61 MMHG

## 2018-02-16 VITALS
DIASTOLIC BLOOD PRESSURE: 56 MMHG | WEIGHT: 102 LBS | HEART RATE: 93 BPM | RESPIRATION RATE: 18 BRPM | HEIGHT: 61 IN | SYSTOLIC BLOOD PRESSURE: 79 MMHG | BODY MASS INDEX: 19.26 KG/M2 | TEMPERATURE: 97.7 F

## 2018-02-16 DIAGNOSIS — E87.6 HYPOKALEMIA: Primary | ICD-10-CM

## 2018-02-16 DIAGNOSIS — D64.9 ANEMIA, UNSPECIFIED TYPE: ICD-10-CM

## 2018-02-16 DIAGNOSIS — R53.81 MALAISE AND FATIGUE: ICD-10-CM

## 2018-02-16 DIAGNOSIS — E83.42 HYPOMAGNESEMIA: ICD-10-CM

## 2018-02-16 DIAGNOSIS — R53.83 MALAISE AND FATIGUE: ICD-10-CM

## 2018-02-16 DIAGNOSIS — R93.89 ABNORMAL CT SCAN, CHEST: ICD-10-CM

## 2018-02-16 DIAGNOSIS — R63.4 WEIGHT LOSS: ICD-10-CM

## 2018-02-16 DIAGNOSIS — R53.81 DEBILITY: ICD-10-CM

## 2018-02-16 DIAGNOSIS — D63.8 ANEMIA OF CHRONIC DISEASE: ICD-10-CM

## 2018-02-16 DIAGNOSIS — E87.6 HYPOKALEMIA DUE TO LOSS OF POTASSIUM: ICD-10-CM

## 2018-02-16 DIAGNOSIS — M79.89 SWELLING IN RIGHT ARMPIT: ICD-10-CM

## 2018-02-16 DIAGNOSIS — R93.5 ABNORMAL CT OF THE ABDOMEN: ICD-10-CM

## 2018-02-16 DIAGNOSIS — D50.0 IRON DEFICIENCY ANEMIA DUE TO CHRONIC BLOOD LOSS: Chronic | ICD-10-CM

## 2018-02-16 DIAGNOSIS — D64.9 ANEMIA, UNSPECIFIED TYPE: Primary | ICD-10-CM

## 2018-02-16 DIAGNOSIS — E43 PROTEIN-CALORIE MALNUTRITION, SEVERE (HCC): ICD-10-CM

## 2018-02-16 DIAGNOSIS — F10.10 ALCOHOL ABUSE: ICD-10-CM

## 2018-02-16 LAB
ALBUMIN SERPL-MCNC: 2.1 G/DL (ref 3.5–5)
ALBUMIN/GLOB SERPL: 0.5 {RATIO} (ref 1.1–2.2)
ALP SERPL-CCNC: 196 U/L (ref 45–117)
ALT SERPL-CCNC: 12 U/L (ref 12–78)
ANION GAP SERPL CALC-SCNC: 10 MMOL/L (ref 5–15)
APPEARANCE UR: CLEAR
APTT PPP: 28.3 SEC (ref 22.1–32.5)
AST SERPL-CCNC: 50 U/L (ref 15–37)
BACTERIA URNS QL MICRO: ABNORMAL /HPF
BASOPHILS # BLD: 0 K/UL (ref 0–0.1)
BASOPHILS NFR BLD: 0 % (ref 0–1)
BILIRUB SERPL-MCNC: 1.1 MG/DL (ref 0.2–1)
BILIRUB UR QL CFM: NEGATIVE
BUN SERPL-MCNC: 4 MG/DL (ref 6–20)
BUN/CREAT SERPL: 7 (ref 12–20)
CALCIUM SERPL-MCNC: 7.6 MG/DL (ref 8.5–10.1)
CHLORIDE SERPL-SCNC: 92 MMOL/L (ref 97–108)
CO2 SERPL-SCNC: 36 MMOL/L (ref 21–32)
COLOR UR: ABNORMAL
CREAT SERPL-MCNC: 0.56 MG/DL (ref 0.55–1.02)
DIFFERENTIAL METHOD BLD: ABNORMAL
EOSINOPHIL # BLD: 0 K/UL (ref 0–0.4)
EOSINOPHIL NFR BLD: 0 % (ref 0–7)
EPITH CASTS URNS QL MICRO: ABNORMAL /LPF
ERYTHROCYTE [DISTWIDTH] IN BLOOD BY AUTOMATED COUNT: 18.8 % (ref 11.5–14.5)
FLUAV AG NPH QL IA: NEGATIVE
FLUBV AG NOSE QL IA: NEGATIVE
GLOBULIN SER CALC-MCNC: 4.5 G/DL (ref 2–4)
GLUCOSE SERPL-MCNC: 126 MG/DL (ref 65–100)
GLUCOSE UR STRIP.AUTO-MCNC: NEGATIVE MG/DL
HCT VFR BLD AUTO: 25.7 % (ref 35–47)
HGB BLD-MCNC: 4.9 G/DL
HGB BLD-MCNC: 8.2 G/DL (ref 11.5–16)
HGB UR QL STRIP: NEGATIVE
HYALINE CASTS URNS QL MICRO: ABNORMAL /LPF (ref 0–5)
IMM GRANULOCYTES # BLD: 0.1 K/UL (ref 0–0.04)
IMM GRANULOCYTES NFR BLD AUTO: 1 % (ref 0–0.5)
INR PPP: 1.2 (ref 0.9–1.1)
KETONES UR QL STRIP.AUTO: ABNORMAL MG/DL
LACTATE SERPL-SCNC: 1.3 MMOL/L (ref 0.4–2)
LACTATE SERPL-SCNC: 3.4 MMOL/L (ref 0.4–2)
LEUKOCYTE ESTERASE UR QL STRIP.AUTO: NEGATIVE
LYMPHOCYTES # BLD: 0.9 K/UL (ref 0.8–3.5)
LYMPHOCYTES NFR BLD: 10 % (ref 12–49)
MAGNESIUM SERPL-MCNC: 1.4 MG/DL (ref 1.6–2.4)
MAGNESIUM SERPL-MCNC: 1.8 MG/DL (ref 1.6–2.4)
MCH RBC QN AUTO: 28 PG (ref 26–34)
MCHC RBC AUTO-ENTMCNC: 31.9 G/DL (ref 30–36.5)
MCV RBC AUTO: 87.7 FL (ref 80–99)
MONOCYTES # BLD: 1.3 K/UL (ref 0–1)
MONOCYTES NFR BLD: 14 % (ref 5–13)
NEUTS SEG # BLD: 6.6 K/UL (ref 1.8–8)
NEUTS SEG NFR BLD: 74 % (ref 32–75)
NITRITE UR QL STRIP.AUTO: NEGATIVE
NRBC # BLD: 0 K/UL (ref 0–0.01)
NRBC BLD-RTO: 0 PER 100 WBC
PH UR STRIP: 7 [PH] (ref 5–8)
PLATELET # BLD AUTO: 323 K/UL (ref 150–400)
PMV BLD AUTO: 9.9 FL (ref 8.9–12.9)
POTASSIUM SERPL-SCNC: 1.9 MMOL/L (ref 3.5–5.1)
POTASSIUM SERPL-SCNC: 2.3 MMOL/L (ref 3.5–5.1)
PROT SERPL-MCNC: 6.6 G/DL (ref 6.4–8.2)
PROT UR STRIP-MCNC: ABNORMAL MG/DL
PROTHROMBIN TIME: 12.1 SEC (ref 9–11.1)
RBC # BLD AUTO: 2.93 M/UL (ref 3.8–5.2)
RBC #/AREA URNS HPF: ABNORMAL /HPF (ref 0–5)
SODIUM SERPL-SCNC: 138 MMOL/L (ref 136–145)
SP GR UR REFRACTOMETRY: 1.01 (ref 1–1.03)
THERAPEUTIC RANGE,PTTT: NORMAL SECS (ref 58–77)
UR CULT HOLD, URHOLD: NORMAL
UROBILINOGEN UR QL STRIP.AUTO: 0.2 EU/DL (ref 0.2–1)
WBC # BLD AUTO: 8.8 K/UL (ref 3.6–11)
WBC URNS QL MICRO: ABNORMAL /HPF (ref 0–4)

## 2018-02-16 PROCEDURE — 93005 ELECTROCARDIOGRAM TRACING: CPT

## 2018-02-16 PROCEDURE — 83735 ASSAY OF MAGNESIUM: CPT | Performed by: HOSPITALIST

## 2018-02-16 PROCEDURE — 51798 US URINE CAPACITY MEASURE: CPT

## 2018-02-16 PROCEDURE — 85610 PROTHROMBIN TIME: CPT | Performed by: EMERGENCY MEDICINE

## 2018-02-16 PROCEDURE — 83605 ASSAY OF LACTIC ACID: CPT | Performed by: EMERGENCY MEDICINE

## 2018-02-16 PROCEDURE — 3331090002 HH PPS REVENUE DEBIT

## 2018-02-16 PROCEDURE — 80053 COMPREHEN METABOLIC PANEL: CPT | Performed by: PHYSICIAN ASSISTANT

## 2018-02-16 PROCEDURE — 83735 ASSAY OF MAGNESIUM: CPT | Performed by: EMERGENCY MEDICINE

## 2018-02-16 PROCEDURE — 74011250636 HC RX REV CODE- 250/636: Performed by: HOSPITALIST

## 2018-02-16 PROCEDURE — 87040 BLOOD CULTURE FOR BACTERIA: CPT | Performed by: EMERGENCY MEDICINE

## 2018-02-16 PROCEDURE — 71045 X-RAY EXAM CHEST 1 VIEW: CPT

## 2018-02-16 PROCEDURE — 86900 BLOOD TYPING SEROLOGIC ABO: CPT | Performed by: EMERGENCY MEDICINE

## 2018-02-16 PROCEDURE — 84132 ASSAY OF SERUM POTASSIUM: CPT | Performed by: HOSPITALIST

## 2018-02-16 PROCEDURE — 85730 THROMBOPLASTIN TIME PARTIAL: CPT | Performed by: EMERGENCY MEDICINE

## 2018-02-16 PROCEDURE — 81001 URINALYSIS AUTO W/SCOPE: CPT | Performed by: EMERGENCY MEDICINE

## 2018-02-16 PROCEDURE — 87804 INFLUENZA ASSAY W/OPTIC: CPT | Performed by: HOSPITALIST

## 2018-02-16 PROCEDURE — 96365 THER/PROPH/DIAG IV INF INIT: CPT

## 2018-02-16 PROCEDURE — 74011250636 HC RX REV CODE- 250/636: Performed by: FAMILY MEDICINE

## 2018-02-16 PROCEDURE — 93971 EXTREMITY STUDY: CPT

## 2018-02-16 PROCEDURE — C1751 CATH, INF, PER/CENT/MIDLINE: HCPCS

## 2018-02-16 PROCEDURE — 83605 ASSAY OF LACTIC ACID: CPT | Performed by: HOSPITALIST

## 2018-02-16 PROCEDURE — 65610000006 HC RM INTENSIVE CARE

## 2018-02-16 PROCEDURE — 85025 COMPLETE CBC W/AUTO DIFF WBC: CPT | Performed by: PHYSICIAN ASSISTANT

## 2018-02-16 PROCEDURE — 86923 COMPATIBILITY TEST ELECTRIC: CPT | Performed by: EMERGENCY MEDICINE

## 2018-02-16 PROCEDURE — 93306 TTE W/DOPPLER COMPLETE: CPT | Performed by: HOSPITALIST

## 2018-02-16 PROCEDURE — 99285 EMERGENCY DEPT VISIT HI MDM: CPT

## 2018-02-16 PROCEDURE — P9047 ALBUMIN (HUMAN), 25%, 50ML: HCPCS | Performed by: HOSPITALIST

## 2018-02-16 PROCEDURE — 96366 THER/PROPH/DIAG IV INF ADDON: CPT

## 2018-02-16 PROCEDURE — 3331090001 HH PPS REVENUE CREDIT

## 2018-02-16 PROCEDURE — 51701 INSERT BLADDER CATHETER: CPT

## 2018-02-16 PROCEDURE — 74011250636 HC RX REV CODE- 250/636: Performed by: EMERGENCY MEDICINE

## 2018-02-16 PROCEDURE — G0157 HHC PT ASSISTANT EA 15: HCPCS

## 2018-02-16 PROCEDURE — 36415 COLL VENOUS BLD VENIPUNCTURE: CPT | Performed by: EMERGENCY MEDICINE

## 2018-02-16 PROCEDURE — 75810000137 HC PLCMT CENT VENOUS CATH

## 2018-02-16 PROCEDURE — 74011250637 HC RX REV CODE- 250/637: Performed by: EMERGENCY MEDICINE

## 2018-02-16 RX ORDER — BUPROPION HYDROCHLORIDE 150 MG/1
150 TABLET ORAL
Status: DISCONTINUED | OUTPATIENT
Start: 2018-02-17 | End: 2018-02-16 | Stop reason: CLARIF

## 2018-02-16 RX ORDER — ZOLPIDEM TARTRATE 5 MG/1
5 TABLET ORAL
Status: DISCONTINUED | OUTPATIENT
Start: 2018-02-16 | End: 2018-03-02 | Stop reason: HOSPADM

## 2018-02-16 RX ORDER — SODIUM CHLORIDE 0.9 % (FLUSH) 0.9 %
5-10 SYRINGE (ML) INJECTION AS NEEDED
Status: DISCONTINUED | OUTPATIENT
Start: 2018-02-16 | End: 2018-03-02 | Stop reason: HOSPADM

## 2018-02-16 RX ORDER — SODIUM CHLORIDE 9 MG/ML
100 INJECTION, SOLUTION INTRAVENOUS CONTINUOUS
Status: DISCONTINUED | OUTPATIENT
Start: 2018-02-16 | End: 2018-02-17

## 2018-02-16 RX ORDER — BUPROPION HYDROCHLORIDE 150 MG/1
150 TABLET, EXTENDED RELEASE ORAL DAILY
Status: DISCONTINUED | OUTPATIENT
Start: 2018-02-17 | End: 2018-02-20

## 2018-02-16 RX ORDER — FERROUS SULFATE, DRIED 160(50) MG
1 TABLET, EXTENDED RELEASE ORAL 2 TIMES DAILY WITH MEALS
Status: DISCONTINUED | OUTPATIENT
Start: 2018-02-17 | End: 2018-03-02 | Stop reason: HOSPADM

## 2018-02-16 RX ORDER — GUAIFENESIN 100 MG/5ML
81 LIQUID (ML) ORAL DAILY
Status: DISCONTINUED | OUTPATIENT
Start: 2018-02-17 | End: 2018-02-17

## 2018-02-16 RX ORDER — ACETAMINOPHEN 325 MG/1
650 TABLET ORAL
Status: DISCONTINUED | OUTPATIENT
Start: 2018-02-16 | End: 2018-03-02 | Stop reason: HOSPADM

## 2018-02-16 RX ORDER — ONDANSETRON 2 MG/ML
4 INJECTION INTRAMUSCULAR; INTRAVENOUS
Status: DISCONTINUED | OUTPATIENT
Start: 2018-02-16 | End: 2018-02-25

## 2018-02-16 RX ORDER — LANOLIN ALCOHOL/MO/W.PET/CERES
100 CREAM (GRAM) TOPICAL DAILY
Status: DISCONTINUED | OUTPATIENT
Start: 2018-02-17 | End: 2018-02-17

## 2018-02-16 RX ORDER — IBUPROFEN 600 MG/1
600 TABLET ORAL
COMMUNITY
End: 2018-03-02

## 2018-02-16 RX ORDER — HEPARIN SODIUM 5000 [USP'U]/ML
5000 INJECTION, SOLUTION INTRAVENOUS; SUBCUTANEOUS EVERY 8 HOURS
Status: DISCONTINUED | OUTPATIENT
Start: 2018-02-16 | End: 2018-02-17

## 2018-02-16 RX ORDER — FOLIC ACID 1 MG/1
1 TABLET ORAL DAILY
Status: DISCONTINUED | OUTPATIENT
Start: 2018-02-17 | End: 2018-02-17

## 2018-02-16 RX ORDER — MAGNESIUM SULFATE HEPTAHYDRATE 40 MG/ML
2 INJECTION, SOLUTION INTRAVENOUS
Status: COMPLETED | OUTPATIENT
Start: 2018-02-16 | End: 2018-02-16

## 2018-02-16 RX ORDER — POTASSIUM CHLORIDE 14.9 MG/ML
10 INJECTION INTRAVENOUS
Status: COMPLETED | OUTPATIENT
Start: 2018-02-16 | End: 2018-02-16

## 2018-02-16 RX ORDER — SODIUM CHLORIDE 0.9 % (FLUSH) 0.9 %
5-10 SYRINGE (ML) INJECTION EVERY 8 HOURS
Status: DISCONTINUED | OUTPATIENT
Start: 2018-02-16 | End: 2018-02-19

## 2018-02-16 RX ORDER — POTASSIUM CHLORIDE 750 MG/1
40 TABLET, FILM COATED, EXTENDED RELEASE ORAL
Status: COMPLETED | OUTPATIENT
Start: 2018-02-16 | End: 2018-02-16

## 2018-02-16 RX ORDER — ALBUMIN HUMAN 250 G/1000ML
12.5 SOLUTION INTRAVENOUS ONCE
Status: COMPLETED | OUTPATIENT
Start: 2018-02-16 | End: 2018-02-16

## 2018-02-16 RX ADMIN — POTASSIUM CHLORIDE 40 MEQ: 750 TABLET, EXTENDED RELEASE ORAL at 16:54

## 2018-02-16 RX ADMIN — SODIUM CHLORIDE 1000 ML: 900 INJECTION, SOLUTION INTRAVENOUS at 18:04

## 2018-02-16 RX ADMIN — ALBUMIN (HUMAN) 12.5 G: 0.25 INJECTION, SOLUTION INTRAVENOUS at 19:59

## 2018-02-16 RX ADMIN — POTASSIUM CHLORIDE 10 MEQ: 200 INJECTION, SOLUTION INTRAVENOUS at 18:53

## 2018-02-16 RX ADMIN — SODIUM CHLORIDE 500 ML: 900 INJECTION, SOLUTION INTRAVENOUS at 19:29

## 2018-02-16 RX ADMIN — SODIUM CHLORIDE 1000 ML: 900 INJECTION, SOLUTION INTRAVENOUS at 15:29

## 2018-02-16 RX ADMIN — SODIUM CHLORIDE 1000 ML: 900 INJECTION, SOLUTION INTRAVENOUS at 16:10

## 2018-02-16 RX ADMIN — MAGNESIUM SULFATE HEPTAHYDRATE 2 G: 40 INJECTION, SOLUTION INTRAVENOUS at 16:49

## 2018-02-16 RX ADMIN — POTASSIUM CHLORIDE 10 MEQ: 200 INJECTION, SOLUTION INTRAVENOUS at 16:40

## 2018-02-16 RX ADMIN — POTASSIUM CHLORIDE 10 MEQ: 200 INJECTION, SOLUTION INTRAVENOUS at 19:58

## 2018-02-16 RX ADMIN — POTASSIUM CHLORIDE 10 MEQ: 200 INJECTION, SOLUTION INTRAVENOUS at 17:55

## 2018-02-16 RX ADMIN — SODIUM CHLORIDE 100 ML/HR: 900 INJECTION, SOLUTION INTRAVENOUS at 23:13

## 2018-02-16 RX ADMIN — SODIUM CHLORIDE 500 ML: 900 INJECTION, SOLUTION INTRAVENOUS at 19:58

## 2018-02-16 RX ADMIN — HEPARIN SODIUM 5000 UNITS: 5000 INJECTION, SOLUTION INTRAVENOUS; SUBCUTANEOUS at 23:10

## 2018-02-16 NOTE — HOME CARE
Patient opened to [facility] for PT/OT and if admitted to inpatient will need orders to resume services prior to discharge.   Ezequiel Lemus RN Liaison  [facility]

## 2018-02-16 NOTE — PROGRESS NOTES
Contreras Smith is a 65 y.o. female who was seen in clinic today (2/16/2018). Subjective:  Patient seen in clinic for worsening fatigue and new onset of right arm swelling x 2 days.  has noted slurred speech and drowsiness in patient. Patient was admited  to [facility] ED on 1/26/18 for hypokalemia and anemia. She was admitted and went through extensive testing. Initial CT scans showed concern for metastatic disease. However follow up testing has ruled out this possibility. CT scans done 1/26 had suggested possible mets to lung, liver and bone of unknown primary. Pelvic ultrasound suggested a right adnexal structure which was more suggestive of hydrosalpinx than cystic neoplasm. She was evaluated by oncology. CA-125 negative. She had an unsuccessful EBUS 1/29. GI performd a colonoscopy. Treated for colitis with Levaquin and Flagyl with improved symptoms. Cultures unremarkable other than scant candida. Bone scan was negative. Weight loss thought to be s/t protein-calorie malnutrition. Diet changes recommended. Advised to quit alcohol use. Anemia was treated with 2 units PRBCs during admission. Peripheral smear showed erythropenia. Iron levels elevated. CBC unchanged. Hematology appointment pending for 2/19/18 with Dr Deborah Cooney.   Penny Reasoner  Emphysema noted on CT. ACE normal. Patient to follow up with pulmonology as outpatient for consideration of mediastinoscopy      Patient is currently seen by home health - nursing, PT/OT. Prior to Admission medications    Medication Sig Start Date End Date Taking? Authorizing Provider   potassium chloride (KLOR-CON) 20 mEq packet Take 1 Packet by mouth daily. 2/9/18  Yes Debbie Donahue, NP   calcium-vitamin D (CALCIUM 600 + D,3,) 600 mg(1,500mg) -200 unit tab Take 1 Tab by mouth two (2) times daily (with meals). 2/9/18  Yes Debbie Donahue NP   folic acid (FOLVITE) 1 mg tablet Take 1 mg by mouth daily.    Yes Historical Provider   pantoprazole (PROTONIX) 40 mg tablet Take 1 Tab by mouth Daily (before breakfast). 2/3/18  Yes Josiane Chawla MD   zolpidem (AMBIEN) 5 mg tablet TAKE ONE TABLET BY MOUTH NIGHTLY AS NEEDED FOR SLEEP 1/23/18  Yes Afsaneh Brown NP   buPROPion XL (WELLBUTRIN XL) 150 mg tablet TAKE ONE TABLET BY MOUTH EVERY MORNING FOR SMOKING CESSATION 11/6/17  Yes Afsaneh Brown NP   thiamine (B-1) 100 mg tablet Take 1 Tab by mouth daily. 10/24/17  Yes Afsaneh Brown NP   magnesium 200 mg tab Take 200 mg by mouth daily. 10/24/17  Yes Afsaneh Brown NP   aspirin 81 mg chewable tablet CHEW ONE TABLET BY MOUTH DAILY 9/15/17  Yes Sharon Ness MD   ibuprofen (MOTRIN) 600 mg tablet Take 600 mg by mouth daily as needed for Pain. Historical Provider          Allergies   Allergen Reactions    Robaxin [Methocarbamol] Hives, Rash and Itching     Red splotches    Statins-Hmg-Coa Reductase Inhibitors Myalgia    Codeine Itching    Neosporin [Neomycin-Bacitracin-Polymyxin] Rash         ROS  See HPI    Objective:   Physical Exam   Constitutional: She is oriented to person, place, and time. She appears well-developed and well-nourished. She appears lethargic. Neck: Normal range of motion. Neck supple. No JVD present. Carotid bruit is not present. No thyromegaly present. Cardiovascular: Normal rate, regular rhythm and intact distal pulses. Exam reveals no gallop and no friction rub. No murmur heard. Pulmonary/Chest: Effort normal and breath sounds normal. No respiratory distress. Musculoskeletal: She exhibits no edema. Arms:  Lymphadenopathy:     She has no cervical adenopathy. Neurological: She is oriented to person, place, and time. She appears lethargic. Gait (in wheel chair) abnormal.   Slurred speech   Skin: There is pallor. Psychiatric: She has a normal mood and affect. Her behavior is normal.   Nursing note and vitals reviewed.         Visit Vitals    BP (!) 79/56 (BP 1 Location: Left arm, BP Patient Position: Sitting)  Pulse 93    Temp 97.7 °F (36.5 °C) (Oral)    Resp 18    Ht 5' 1\" (1.549 m)    Wt 102 lb (46.3 kg)    BMI 19.27 kg/m2       Assessment & Plan:  Diagnoses and all orders for this visit:    1. Anemia, unspecified type  AMB POC HEMOGLOBIN (HGB): 4.9. Advised patient and  to go to ED for further evalaution. Recommended EMS transfer but refused by patient. Report called to [facility] ED.     2. Swelling in right armpit  Consider DVT. Referral to ED as above. 3. Malaise and fatigue  Secondary to #1 vs hypotension of electrolyte deficiency. 4. Protein-calorie malnutrition, severe ([address] Consider adding Megace for appetite increase. I have discussed the diagnosis with the patient and the intended plan as seen in the above orders. The patient has received an after-visit summary along with patient information handout. I have discussed medication side effects and warnings with the patient as well. Follow-up Disposition:  Return if symptoms worsen or fail to improve.         Nino Lesches, NP

## 2018-02-16 NOTE — ED TRIAGE NOTES
Triage:  Pt sent to ED by referral of PCP due to concerns over increasing weakness, low H/H, right hand swelling, and lethargy. Pt recently discharged from [facility]. Over past few days has noted swelling to right hand, increased fatigue. Pt in to triage via wheel chair, Pt appears drowsy but responsive, skin color pale.

## 2018-02-16 NOTE — ED NOTES
2:53 PM  I have evaluated the patient as the Provider in Triage. I have reviewed Her vital signs and the triage nurse assessment. I have talked with the patient and any available family and advised that I am the provider in triage and have ordered the appropriate study to initiate their work up based on the clinical presentation during my assessment. I have advised that the patient will be accommodated in the Main ED as soon as possible. I have also requested to contact the triage nurse or myself immediately if the patient experiences any changes in their condition during this brief waiting period.  notes pt has been out of hospital for about a week. Wednesday started with right hand swelling. Had visit with home health nurse this AM, HR was \"extremtly high,\" BP was low. Went to PCP and was sent here, HGB ~ 4.  Pt reports that she feels \"sleepy. \"  Pt was seen by Dallas Brown, diagnosed with PUD.  notes compliance with medications.       CODY Patel

## 2018-02-16 NOTE — PROGRESS NOTES
Admission Medication Reconciliation:    Information obtained from: Family member    Significant PMH/Disease States:   Past Medical History:   Diagnosis Date    Alcohol abuse     GI (gastrointestinal bleed)     Insomnia 11/09    Iron deficiency anemia 04/2004    PPD positive     treated w/ INH- tests since have been negative    Pure hypercholesterolemia     Scoliosis 12/28/15    [facility]    Tobacco dependence     Unspecified essential hypertension     Uterine bleeding, dysfunctional 2/12    Uterine fibroid        Chief Complaint for this Admission:  weakness, lethargy, R hand swelling    Allergies:  Robaxin [methocarbamol]; Statins-hmg-coa reductase inhibitors; Codeine; and Neosporin [neomycin-bacitracin-polymyxin]    Prior to Admission Medications:   Prior to Admission Medications   Prescriptions Last Dose Informant Patient Reported? Taking?   aspirin 81 mg chewable tablet 2/16/2018 Child No Yes   Sig: CHEW ONE TABLET BY MOUTH DAILY   buPROPion XL (WELLBUTRIN XL) 150 mg tablet 2/16/2018 Child No Yes   Sig: TAKE ONE TABLET BY MOUTH EVERY MORNING FOR SMOKING CESSATION   calcium-vitamin D (CALCIUM 600 + D,3,) 600 mg(1,500mg) -200 unit tab 2/16/2018  No Yes   Sig: Take 1 Tab by mouth two (2) times daily (with meals). folic acid (FOLVITE) 1 mg tablet 2/16/2018  Yes Yes   Sig: Take 1 mg by mouth daily. ibuprofen (MOTRIN) 600 mg tablet 2/16/2018  Yes Yes   Sig: Take 600 mg by mouth daily as needed for Pain.   magnesium 200 mg tab 2/16/2018 Child No Yes   Sig: Take 200 mg by mouth daily. pantoprazole (PROTONIX) 40 mg tablet 2/16/2018  No Yes   Sig: Take 1 Tab by mouth Daily (before breakfast). potassium chloride (KLOR-CON) 20 mEq packet 2/16/2018  No Yes   Sig: Take 1 Packet by mouth daily. thiamine (B-1) 100 mg tablet 2/16/2018 Child No Yes   Sig: Take 1 Tab by mouth daily.    zolpidem (AMBIEN) 5 mg tablet 2/15/2018 Child No Yes   Sig: TAKE ONE TABLET BY MOUTH NIGHTLY AS NEEDED FOR SLEEP      Facility-Administered Medications: None         Comments/Recommendations: Completed medication reconciliation with the patient's family member. The following changes were made:    1) She is no longer taking furosemide  2) Completed levofloxacin and metronidazole from [facility] discharge on 2/2/18  3) The family member stated he has been giving the patient one tablet of ibuprofen per day for pain, otherwise he had been following discharge orders from the recent hospitalization at [facility].

## 2018-02-16 NOTE — PROGRESS NOTES
Chief Complaint   Patient presents with    Swelling     right arm, lips, under right eye- started 2 days ago    Hypotension     patient's blood pressure was 78/45 this morning       1. Have you been to the ER, urgent care clinic since your last visit? Hospitalized since your last visit? No    2. Have you seen or consulted any other health care providers outside of the [address] [place] [address] since your last visit? Include any pap smears or colon screening.  No

## 2018-02-16 NOTE — IP AVS SNAPSHOT
[address] 
[phone] Patient: Elsa Cordova MRN: [id number] :1960 You are allergic to the following Allergen Reactions Robaxin (Methocarbamol) Hives Rash Itching Red splotches Statins-Hmg-Coa Reductase Inhibitors Myalgia Codeine Itching Neosporin (Neomycin-Bacitracin-Polymyxin) Rash Recent Documentation Height Weight BMI OB Status Smoking Status 1.549 m 56.3 kg 23.45 kg/m2 Premenopausal Current Every Day Smoker Unresulted Labs-Please follow up with your PCP about these lab tests Order Current Status OVA & PARASITES, STOOL In process CELIAC ANTIBODY PROFILE Preliminary result Emergency Contacts  (Rel.) Home Phone Work Phone Mobile Phone Mata Guerrero (Spouse) [phone] -- -- Veronica Quintero (Daughter) [phone] -- [phone] Jackie Dixon, (Spouse) [phone] -- -- About your hospitalization You were admitted on:  2018 You last received care in the:  [facility] You were discharged on:  2018 Why you were hospitalized Your primary diagnosis was:  Hypokalemia Providers Seen During Your Hospitalization Provider Specialty Primary office phone Adriana Reyes MD Emergency Medicine [phone] Selene Shepard MD Hospitalist [phone] Evander Hodgkin, MD Internal Medicine [phone] Aziza Bass MD Internal Medicine [phone] Anna Thompson MD Internal Medicine [phone] Your Primary Care Physician (PCP) Primary Care Physician Office Phone Office Fax Hernan Hardin [phone] Follow-up Information Follow up With Details Comments Contact Info  Professor Colby 108 PT/OT and RN (Please follow-up with agency if you have not heard from them by 12:00 pm the following day post discharge.) [address] [facility] [place] [address] 
[phone] [address] [address] SANTIAGO [facility]   [address] [place] [address] [facility] 
[phone] Wilfredo Ortiz, NP   [address] [place] [address] 
[phone] My Medications STOP taking these medications   
 ibuprofen 600 mg tablet Commonly known as:  MOTRIN  
   
  
  
TAKE these medications as instructed Instructions Each Dose to Equal  
 Morning Noon Evening Bedtime  
 amylase-lipase-protease 24,000-76,000 -120,000 unit capsule Commonly known as:  CREON 81277 Your last dose was: Your next dose is: Take 2 Caps by mouth three (3) times daily (with meals). 2 Cap  
    
   
   
   
  
 aspirin 81 mg chewable tablet Your last dose was: Your next dose is:    
   
   
 CHEW ONE TABLET BY MOUTH DAILY  
     
   
   
   
  
 buPROPion  mg tablet Commonly known as:  Nikia Ocasio Your last dose was: Your next dose is: TAKE ONE TABLET BY MOUTH EVERY MORNING FOR SMOKING CESSATION  
     
   
   
   
  
 calcium-vitamin D 600 mg(1,500mg) -200 unit Tab Commonly known as:  CALCIUM 600 + D(3) Your last dose was: Your next dose is: Take 1 Tab by mouth two (2) times daily (with meals). 1 Tab  
    
   
   
   
  
 ferrous sulfate 325 mg (65 mg iron) tablet Start taking on:  3/3/2018 Your last dose was: Your next dose is: Take 1 Tab by mouth daily (with breakfast). 325 mg  
    
   
   
   
  
 folic acid 1 mg tablet Commonly known as:  Gucci Your last dose was: Your next dose is: Take 1 mg by mouth daily. 1 mg  
    
   
   
   
  
 magnesium 200 mg Tab Your last dose was: Your next dose is: Take 200 mg by mouth daily. 200 mg  
    
   
   
   
  
 nicotine 21 mg/24 hr  
Commonly known as:  Florecita Olivarez Start taking on:  3/3/2018 Your last dose was: Your next dose is:    
   
   
 1 Patch by TransDERmal route daily for 30 days. 1 Patch  
    
   
   
   
  
 pantoprazole 40 mg tablet Commonly known as:  PROTONIX Your last dose was: Your next dose is: Take 1 Tab by mouth Daily (before breakfast). 40 mg  
    
   
   
   
  
 potassium chloride 20 mEq packet Commonly known as:  KLOR-CON Your last dose was: Your next dose is: Take 1 Packet by mouth daily. 20 mEq  
    
   
   
   
  
 thiamine 100 mg tablet Commonly known as:  B-1 Your last dose was: Your next dose is: Take 1 Tab by mouth daily. 100 mg  
    
   
   
   
  
 zolpidem 5 mg tablet Commonly known as:  AMBIEN Your last dose was: Your next dose is: TAKE ONE TABLET BY MOUTH NIGHTLY AS NEEDED FOR SLEEP Where to Get Your Medications Information on where to get these meds will be given to you by the nurse or doctor. ! Ask your nurse or doctor about these medications  
  amylase-lipase-protease 24,000-76,000 -120,000 unit capsule  
 ferrous sulfate 325 mg (65 mg iron) tablet  
 nicotine 21 mg/24 hr  
  
  
 
  
  
Discharge Instructions Discharge SNF/Rehab Instructions/LTAC  
 
 
PATIENT ID: Kaiden Moore MRN: [id number] YOB: 1960 DATE OF ADMISSION: 2/16/2018  3:00 PM   
DATE OF DISCHARGE: 3/2/2018 PRIMARY CARE PROVIDER: Mabel Castro NP  
 
 
ATTENDING PHYSICIAN: Claudio Hernandez MD 
DISCHARGING PROVIDER: Claudio Hernandez MD    
To contact this individual call [phone] and ask the  to page. If unavailable ask to be transferred the Adult Hospitalist Department. CONSULTATIONS: IP CONSULT TO HEMATOLOGY 
IP CONSULT TO NEPHROLOGY IP CONSULT TO GASTROENTEROLOGY PROCEDURES/SURGERIES: * No surgery found * [address] COURSE:  
 
A 66 yo woman with HTN, HLD, DUB, iron deficiency anemia, severe protein-calorie malnutrition, recently diagnosed colitis, EtOH abuse, and hx of GI bleed was sent by her PCP to the ED on 2/16/18 for fatigue, generalized weakness, and right arm swelling. She was recently hospitalized at [facility] 1/26/18 - 2/2/18 for hypokalemia and anemia. She was admitted to the ICU for severe hypokalemia, hypotension, and chronic RUE superficial venous thrombosis. Severe hypokalemia (POA) - likely due to anorexia and malabsorption and recent use of diuretics; not resumed on admission 
- resolved with multiple IV KCL,  continue KCL 40 meq po q dialy EtOH withdrawal with chronic alcohol abuse  
-On CIWA protocol and banana bag; prn Ativan, now out of the window, lorazepam is discontinued  
-continue folic acid and thiamin 
-advised to stop alcohol Chronic diarrhea  
-Afebrile, no leukocytosis, 
-Stool test for occult blood negative, wbc, fat, crypto, culture result pending  
-started Creaon 3/1 and continue on it 
-seen by GI and further work up as an outpatient  
Hepatic encephalopathy due to hyperammonemia  
-ammonia level improved 
-Had lactulose enema, patient with chronic diarrhea, will hold lactulose 
-mental status resolved, conscious and well orineted  
Acute on chronic anemia (POA)  
- iron WNL, folate low, B12 low-normal 
- FOBT negative - s/p 2 units PRBC transfusion since admission , H/H low but stable - Hematology consulted by [facility] due to recent hx of possible occult malignancy - per  at bedside, EGD, colonoscopy, bone scan all unremarkable recently 
- recent PET was abnormal 
- stopped SC heparin and change to SCDs 
- IR unable to do CT-guided BM bx due to unstable airway 
-outpatient follow up for further work up Right ankle pain unclear etiology -x ray of right ankle and foot nonspecific soft tissue swelling. No evidence of osteomyelitis. -venous duplex b/l LEs 2/19 negative 
-Elevated right ankle Severe protein-calorie malnutrition 
-nutrition consulted, encourage more po intake 
-off TPN Chronic RUE VTE 
- no indication for [facility] for RUE VTE as pt had IV in that arm during previous hospitalization   
Generalized weakness/debility with EtOH abuse (POA) - likely due to anorexia and EtOH abuse 
- continue PT/OT Hypovolemic shock (POA) -hx of HTN 
-resolved s/p pressor x4h and IVF 
- TTE 2/17 EF 55-60%, no RWMA, mild-mod TR, small pericardial effusion 
-BP normal 
Ruled out RLL HCAP 
- BCx 2/16 NGTD 
- started empiric Zosyn/vancomycin on 2/17 and discontinued on 2/19 
- BCx 2/16 NGTD 
- MRSA swab negative 
- urine strep/legionella antigens negative 
- flu swab negative - cxr from 2/17 to 2/19 cleared as to RLL findings, not c/c pna, all abx dc/d on assumption of care 2/22.   
Mild transaminitis  
- elevated AST likely due to EtOH, now resolved - RUQ US 2/18 hepatic steatosis Hypernatremia  
-resolved, now hyponatremia Hypophosphatemia  
-resolved Hypomagnesemia  
-resolved 
  
Code status: Full Code DVT prophylaxis: SCD 
  
 
DISCHARGE DIAGNOSES / PLAN:   
 
Severe hypokalemia (POA)  
- resolved, continue KCL 40 meq po q dialy EtOH withdrawal with chronic alcohol abuse  
-continue folic acid and thiamin 
-advised to stop alcohol Chronic diarrhea  
-continue Creaon 3/1 and continue on it 
-outpatient follow up with GI as an outpatient Hepatic encephalopathy due to hyperammonemia  
-Resolved, conscious and well orineted  
Acute on chronic anemia (POA)  
-continue folic acid, ferrous sulfat 
-outpatient follow up for further work up Right ankle pain unclear etiology 
-Elevated right ankle Severe protein-calorie malnutrition 
-nutrition consulted, encourage more po intake Chronic RUE VTE 
- no indication for [facility] for now Generalized weakness/debility with EtOH abuse (POA)  
- continue PT/OT Hypovolemic shock (POA)  
-BP normal, monitor BP Ruled out RLL HCAP 
-no further intervention Mild transaminitis  
-resolved 
-advised to stop alcohol Hypernatremia  
-resolved, Hypophosphatemia  
-resolved Hypomagnesemia  
-resolved PENDING TEST RESULTS:  
At the time of discharge the following test results are still pending: Stool studies FOLLOW UP APPOINTMENTS:   
Follow-up Information Follow up With Details Comments Contact Info 1. Professor Earnestine Spencer PT/OT and RN (Please follow-up with agency if you have not heard from them by 12:00 pm the following day post discharge.) [address] [facility] [address] 
[phone]  
2. [id number] [facility]   [address] [place] [address] [facility] [address] [phone] 
  
3. Nino Lesches, NP In one week  [address] [facility] [address] [place] [address] 
[phone] 4. Laine Lucero MD in 2-3 weeks after discharge ADDITIONAL CARE RECOMMENDATIONS: 
 
DIET: Regular Diet with supplement TUBE FEEDING INSTRUCTIONS: none OXYGEN / BiPAP SETTINGS: none ACTIVITY: Activity as tolerated WOUND CARE: none EQUIPMENT needed: none DISCHARGE MEDICATIONS: 
 See Medication Reconciliation Form NOTIFY YOUR PHYSICIAN FOR ANY OF THE FOLLOWING:  
Fever over 101 degrees for 24 hours. Chest pain, shortness of breath, fever, chills, nausea, vomiting, diarrhea, change in mentation, falling, weakness, bleeding. Severe pain or pain not relieved by medications. Or, any other signs or symptoms that you may have questions about. DISPOSITION: 
  Home With: 
 OT  PT  HH  RN  
  
x SNF/Inpatient Rehab/LTAC Independent/assisted living Hospice Other:  
 
 
PATIENT CONDITION AT DISCHARGE:  
 
Functional status Poor   
x Deconditioned Independent Cognition  
x  Lucid Forgetful Dementia Catheters/lines (plus indication) Rendon PICC   
 PEG   
x None Code status  
 x Full code DNR   
 
PHYSICAL EXAMINATION AT DISCHARGE: 
 Refer to Progress Note CHRONIC MEDICAL DIAGNOSES: 
Problem List as of 3/1/2018  Date Reviewed: 2/19/2018 Codes Class Noted - Resolved * (Principal)Hypokalemia ICD-10-CM: E87.6 ICD-9-CM: 276.8  2/16/2018 - Present Abnormal CT scan, chest ICD-10-CM: R93.8 ICD-9-CM: 793.2  1/30/2018 - Present Abnormal CT of the abdomen ICD-10-CM: R93.5 ICD-9-CM: 793.6  1/30/2018 - Present Colitis ICD-10-CM: K52.9 ICD-9-CM: 558.9  1/27/2018 - Present Lactic acid acidosis ICD-10-CM: E87.2 ICD-9-CM: 276.2  1/27/2018 - Present Elevated troponin ICD-10-CM: R74.8 ICD-9-CM: 790.6  1/27/2018 - Present Protein-calorie malnutrition, severe ([facility].) ICD-10-CM: I50 ICD-9-CM: 873  1/27/2018 - Present Hypotension ICD-10-CM: I95.9 ICD-9-CM: 458.9  1/27/2018 - Present Emphysema of lung ([facility] 75.) ICD-10-CM: J43.9 ICD-9-CM: 492.8  1/27/2018 - Present Weight loss ICD-10-CM: R63.4 ICD-9-CM: 783.21  1/26/2018 - Present Hypokalemia due to loss of potassium ICD-10-CM: E87.6 ICD-9-CM: 276.8  1/26/2018 - Present Hypomagnesemia ICD-10-CM: R28.59 
ICD-9-CM: 275.2  1/26/2018 - Present Debility ICD-10-CM: R53.81 ICD-9-CM: 799.3  1/26/2018 - Present Scoliosis (Chronic) ICD-10-CM: M41.9 ICD-9-CM: 737.30  Unknown - Present Overview Signed 4/13/2016  9:50 AM by Raffi Guan LPN Dr.  
  
  
   
 Benign essential HTN (Chronic) ICD-10-CM: I10 
ICD-9-CM: 401.1  6/26/2014 - Present Hyperlipidemia (Chronic) ICD-10-CM: B83.1 ICD-9-CM: 272.4  6/26/2014 - Present Folic acid deficiency (Chronic) ICD-10-CM: E53.8 ICD-9-CM: 266.2  2/8/2012 - Present Iron deficiency anemia (Chronic) ICD-10-CM: D50.9 ICD-9-CM: 280.9  2/8/2012 - Present RESOLVED: Vaginal bleeding (Chronic) ICD-10-CM: N93.9 ICD-9-CM: 623.8  2/7/2012 - 6/26/2014 RESOLVED: Unspecified essential hypertension (Chronic) ICD-10-CM: I10 
ICD-9-CM: 401.9  2/7/2012 - 2/8/2012 RESOLVED: HTN (hypertension) ICD-10-CM: I10 
ICD-9-CM: 401.9  3/19/2010 - 6/26/2014 RESOLVED: High cholesterol ICD-10-CM: E78.00 ICD-9-CM: 272.0  3/19/2010 - 6/26/2014 CDMP Checked:  
Yes x PROBLEM LIST Updated: 
Yes x Signed:  
Annel Villela MD 
3/1/2018 12:45 PM 
 
  
 
Discharge Orders None [employer] Announcement We are excited to announce that we are making your provider's discharge notes available to you in [employer]t. You will see these notes when they are completed and signed by the physician that discharged you from your recent hospital stay. If you have any questions or concerns about any information you see in [employer]t, please call the Health Information Department where you were seen or reach out to your Primary Care Provider for more information about your plan of care. Introducing [facility]! Dear Chris Kelly: Thank you for requesting a [employer]t account. Our records indicate that you have previously registered for a [employer] account but its currently inactive. Please call our [employer] support line at [phone]. Additional Information If you have questions, please visit the Frequently Asked Questions section of the [employer]t website at [email or web address] Remember, [employer]t is NOT to be used for urgent needs. For medical emergencies, dial 911. Now available from your iPhone and Android! General Information Please provide this summary of care documentation to your next provider. Patient Signature:  ____________________________________________________________ Date:  ____________________________________________________________  
  
Ryan Can  Provider Signature: ____________________________________________________________ Date:  ____________________________________________________________

## 2018-02-16 NOTE — TELEPHONE ENCOUNTER
Patient called in ref to his wife Karla Vora. She was released from hospital 1 week ago and her Right arm has been swollen for 2 days. [facility]. Also wants to know if he should take her to hospital instead.   Karen Rodríguez  02/16/18

## 2018-02-16 NOTE — ED PROVIDER NOTES
HPI Comments: 65 y.o. female with past medical history significant for HTN, hypercholesterolemia, uterine fibroid, dysfunctional uterine bleeding, EtOH abuse, iron deficiency anemia, and GI bleed who presents from PCP's office via EMS with chief complaint of fatigue. Pt reports she has felt fatigued for the last 1 week and she has not been eating because she \"doesn't like the food she is being offered\". She also c/o R arm swelling for the last 2-3 days. She denies new changes in her stool. There are no other acute medical concerns at this time. Full history, physical exam, and ROS unable to be obtained due to:  Pt is sluggish to respond, poor historian. PCP: Austin Fournier NP    Note written by Ashley Osborne, as dictated by Newton Green MD 3:15 PM    The history is provided by the patient.         Past Medical History:   Diagnosis Date    Alcohol abuse     GI (gastrointestinal bleed)     Insomnia 11/09    Iron deficiency anemia 04/2004    PPD positive     treated w/ INH- tests since have been negative    Pure hypercholesterolemia     Scoliosis 12/28/15    [facility]    Tobacco dependence     Unspecified essential hypertension     Uterine bleeding, dysfunctional 2/12    Uterine fibroid        Past Surgical History:   Procedure Laterality Date    COLONOSCOPY N/A 1/31/2018    COLONOSCOPY performed by Farrah Lindsay MD at [address] [facility] HX CHOLECYSTECTOMY      HX COLONOSCOPY  07/01/2013    [facility], repeat 10 years   [facility] HX ORTHOPAEDIC      Shattered bilateral ankles-metal plates & screws    HX OTHER SURGICAL      Benign cyst removal- L. foot    LEG/ANKLE SURGERY PROC UNLISTED           Family History:   Problem Relation Age of Onset    Hypertension Mother     Diabetes Mother     Cancer Mother      breast    Heart Disease Father      MI 42's    Kidney Disease Father     Hypertension Son        Social History     Social History    Marital status:      Spouse name: N/A    Number of children: N/A    Years of education: N/A     Occupational History    part time      Social History Main Topics    Smoking status: Current Every Day Smoker     Packs/day: 0.50     Years: 17.00    Smokeless tobacco: Never Used    Alcohol use 1.2 oz/week     1 Cans of beer, 1 Shots of liquor per week      Comment: Socially    Drug use: No    Sexual activity: Yes     Partners: Male     Birth control/ protection: None     Other Topics Concern    Not on file     Social History Narrative         ALLERGIES: Robaxin [methocarbamol]; Statins-hmg-coa reductase inhibitors; Codeine; and Neosporin [neomycin-bacitracin-polymyxin]    Review of Systems   Unable to perform ROS: Other       Vitals:    02/16/18 1457 02/16/18 1520   BP: 90/71    Pulse: 97 99   Resp: 18 27   Temp: 98.5 °F (36.9 °C)    SpO2: 95% 95%   Weight: 46.2 kg (101 lb 13.6 oz)    Height: 5' 1\" (1.549 m)             Physical Exam   Constitutional: No distress. HENT:   Head: Normocephalic and atraumatic. Eyes: Pupils are equal, round, and reactive to light. Neck: Normal range of motion. Neck supple. Cardiovascular: Normal rate, regular rhythm and normal heart sounds. Exam reveals no gallop and no friction rub. No murmur heard. Pulmonary/Chest: Effort normal and breath sounds normal. No respiratory distress. She has no wheezes. Abdominal: Soft. Bowel sounds are normal. She exhibits no distension. There is no tenderness. There is no rebound and no guarding. Musculoskeletal: Normal range of motion. She exhibits edema. Grossly edematous RUE up to axilla. Neurological: She is alert. Sluggish to respond. Skin: Skin is warm. Rash noted. She is not diaphoretic. Scaly erythematous rash over LUE and LLE. Psychiatric: She has a normal mood and affect. Her behavior is normal. Judgment and thought content normal.   Nursing note and vitals reviewed.   Note written by Paige Torres Ashley, as dictated by Meggan Angulo MD 3:15 PM    MDM  Number of Diagnoses or Management Options  Hypokalemia:   Hypomagnesemia:         ED Course     This is a 64-year-old female with past medical history, review of systems, physical exam as above, presenting from her primary care physician's office where she was seen today for weakness, and right upper extremity swelling. Of significance patient had a recent admission at [facility] for anemia, weakness, discovered to be per family anemia, requiring blood transfusions, without obvious source of blood loss. Workup only really significant for granulomatous tissue found on chest imaging, endoscopy and colonoscopy, without source of bleeding. Today she states she's been having right upper extremity swelling and edema for 3-4 days, patient is noted to be a poor historian at baseline. She arrives awake, alert, hypotensive without tachycardia or fevers. Primary care physician who called stated that her point of care hemoglobin was 4.9. Physical exam remarkable for cachectic female older than stated age, week, intermittently answering questions appropriately, with clear breath sounds, soft nontender abdomen, erythematous scaly rash on her upper or lower extremities. Suspect anemia according to presentation and point of care studies, source remains unclear, plan to obtain CMP, CBC, blood cultures, lactate, type and screen. Based on background information and presentation, patient will likely require admission for further care and evaluation.     CENTRAL VENOUS LINE INSERTION  Date/Time: 2/16/2018 10:00 PM  Performed by: Bethany Villatoro by: Kenyatta Lynn     Consent:     Consent obtained:  Written    Consent given by:  Patient and spouse    Risks discussed:  Arterial puncture, bleeding, incorrect placement and infection    Alternatives discussed:  No treatment  Pre-procedure details:     Hand hygiene: Hand hygiene performed prior to insertion Sterile barrier technique: All elements of maximal sterile technique followed      Skin preparation:  2% chlorhexidine    Skin preparation agent: Skin preparation agent completely dried prior to procedure    Anesthesia (see MAR for exact dosages): Anesthesia method:  Local infiltration    Local anesthetic:  Lidocaine 1% w/o epi  Procedure details:     Location:  R internal jugular    Patient position:  Reverse Trendelenburg    Procedural supplies:  Triple lumen    Landmarks identified: yes      Ultrasound guidance: yes      Sterile ultrasound techniques: Sterile gel and sterile probe covers were used      Number of attempts:  1    Successful placement: yes    Post-procedure details:     Post-procedure:  Dressing applied and line sutured    Assessment:  Blood return through all ports and free fluid flow    Patient tolerance of procedure: Tolerated well, no immediate complications          CONSULT NOTE:  5:35 PM Jonathan Shay MD spoke with Dr. Ximena Finley, Consult for Hospitalist.  Discussed available diagnostic tests and clinical findings. He is in agreement with care plans as outlined. Dr. Ximena Finley will admit Pt.

## 2018-02-16 NOTE — ED NOTES
ER MD aware of patients low blood pressures (74'P systolic). More IV fluid bolus ordered and administered. Patient is resting comfortably in ED bed, significant other remains at bedside.

## 2018-02-16 NOTE — PROCEDURES
[facility]  *** FINAL REPORT ***    Name: Burt Hayes  MRN: [id number]  : 1960  HIS Order #: [id number]  [address] [facility] Visit #: [id number]  Date: 2018    TYPE OF TEST: Peripheral Venous Testing    REASON FOR TEST  Limb swelling    Right Arm:-  Deep venous thrombosis:           No  Superficial venous thrombosis:    Yes      INTERPRETATION/FINDINGS  PROCEDURE:  Color duplex ultrasound imaging of upper extremity veins. FINDINGS:       Right:  Consistent with thrombosis involving the superficial  forearm and upper arm cephalic vein as demonstrated by vein  non-compressibility, and by a narrowing or occlusion of the flow  channel on color Doppler imaging. The remaining segments; internal  jugular, subclavian, axillary and brachial veins are patent and  without evidence of thrombus;  each is fully compressible and there is   no narrowing of the flow channel on color Doppler imaging. The  basilic vein was not visualized. Phasic/pulsatile flow is observed in   the subclavian vein. Left:    The subclavian vein is patent and without evidence of  thrombus. Phasic/pulsatile flow is observed. This side was not  otherwise evaluated. IMPRESSION:  No evidence of right upper extremity deep vein  thrombosis. There is evidence of superficial vein thrombosis, as  described above. The ultrasound appearance is more consistent with a  chronic than an acute process. ADDITIONAL COMMENTS    I have personally reviewed the data relevant to the interpretation of  this  study.     TECHNOLOGIST: Hannah Tolentino RVT  Signed: 2018 04:56 PM    PHYSICIAN: Jin Lea., MD  Signed: 2018 04:10 PM

## 2018-02-17 ENCOUNTER — APPOINTMENT (OUTPATIENT)
Dept: GENERAL RADIOLOGY | Age: 58
DRG: 640 | End: 2018-02-17
Attending: FAMILY MEDICINE
Payer: COMMERCIAL

## 2018-02-17 ENCOUNTER — APPOINTMENT (OUTPATIENT)
Dept: CT IMAGING | Age: 58
DRG: 640 | End: 2018-02-17
Attending: FAMILY MEDICINE
Payer: COMMERCIAL

## 2018-02-17 LAB
ALBUMIN SERPL-MCNC: 1.6 G/DL (ref 3.5–5)
ALBUMIN/GLOB SERPL: 0.5 {RATIO} (ref 1.1–2.2)
ALP SERPL-CCNC: 130 U/L (ref 45–117)
ALT SERPL-CCNC: 12 U/L (ref 12–78)
ANION GAP SERPL CALC-SCNC: 6 MMOL/L (ref 5–15)
ANION GAP SERPL CALC-SCNC: 9 MMOL/L (ref 5–15)
AST SERPL-CCNC: 36 U/L (ref 15–37)
ATRIAL RATE: 119 BPM
BASOPHILS # BLD: 0 K/UL (ref 0–0.1)
BASOPHILS NFR BLD: 0 % (ref 0–1)
BILIRUB SERPL-MCNC: 0.5 MG/DL (ref 0.2–1)
BUN SERPL-MCNC: 3 MG/DL (ref 6–20)
BUN SERPL-MCNC: 4 MG/DL (ref 6–20)
BUN/CREAT SERPL: 14 (ref 12–20)
BUN/CREAT SERPL: 9 (ref 12–20)
CALCIUM SERPL-MCNC: 6.5 MG/DL (ref 8.5–10.1)
CALCIUM SERPL-MCNC: 6.7 MG/DL (ref 8.5–10.1)
CALCIUM UR-MCNC: <5 MG/DL
CALCULATED R AXIS, ECG10: 38 DEGREES
CALCULATED T AXIS, ECG11: -118 DEGREES
CHLORIDE SERPL-SCNC: 105 MMOL/L (ref 97–108)
CHLORIDE SERPL-SCNC: 110 MMOL/L (ref 97–108)
CHLORIDE UR-SCNC: 27 MMOL/L
CK SERPL-CCNC: 196 U/L (ref 26–192)
CO2 SERPL-SCNC: 27 MMOL/L (ref 21–32)
CO2 SERPL-SCNC: 29 MMOL/L (ref 21–32)
CREAT SERPL-MCNC: 0.29 MG/DL (ref 0.55–1.02)
CREAT SERPL-MCNC: 0.32 MG/DL (ref 0.55–1.02)
CREAT UR-MCNC: 126 MG/DL
DIAGNOSIS, 93000: NORMAL
DIFFERENTIAL METHOD BLD: ABNORMAL
EOSINOPHIL # BLD: 0 K/UL (ref 0–0.4)
EOSINOPHIL NFR BLD: 0 % (ref 0–7)
ERYTHROCYTE [DISTWIDTH] IN BLOOD BY AUTOMATED COUNT: 18.7 % (ref 11.5–14.5)
FERRITIN SERPL-MCNC: 427 NG/ML (ref 8–252)
FOLATE SERPL-MCNC: 4.9 NG/ML (ref 5–21)
GLOBULIN SER CALC-MCNC: 3.3 G/DL (ref 2–4)
GLUCOSE SERPL-MCNC: 107 MG/DL (ref 65–100)
GLUCOSE SERPL-MCNC: 96 MG/DL (ref 65–100)
HCT VFR BLD AUTO: 16.9 % (ref 35–47)
HCT VFR BLD AUTO: 26.8 % (ref 35–47)
HEMOCCULT STL QL: NEGATIVE
HGB BLD-MCNC: 5.4 G/DL (ref 11.5–16)
HGB BLD-MCNC: 9 G/DL (ref 11.5–16)
IMM GRANULOCYTES # BLD: 0 K/UL
IMM GRANULOCYTES NFR BLD AUTO: 0 %
IRON SATN MFR SERPL: 89 % (ref 20–50)
IRON SERPL-MCNC: 67 UG/DL (ref 35–150)
LACTATE SERPL-SCNC: 1 MMOL/L (ref 0.4–2)
LYMPHOCYTES # BLD: 0.8 K/UL (ref 0.8–3.5)
LYMPHOCYTES NFR BLD: 13 % (ref 12–49)
MAGNESIUM SERPL-MCNC: 1.7 MG/DL (ref 1.6–2.4)
MAGNESIUM SERPL-MCNC: 1.7 MG/DL (ref 1.6–2.4)
MAGNESIUM SERPL-MCNC: 2.2 MG/DL (ref 1.6–2.4)
MCH RBC QN AUTO: 27.7 PG (ref 26–34)
MCHC RBC AUTO-ENTMCNC: 32 G/DL (ref 30–36.5)
MCV RBC AUTO: 86.7 FL (ref 80–99)
MONOCYTES # BLD: 0.4 K/UL (ref 0–1)
MONOCYTES NFR BLD: 7 % (ref 5–13)
NEUTS BAND NFR BLD MANUAL: 3 % (ref 0–6)
NEUTS SEG # BLD: 5.1 K/UL (ref 1.8–8)
NEUTS SEG NFR BLD: 77 % (ref 32–75)
NRBC # BLD: 0 K/UL (ref 0–0.01)
NRBC BLD-RTO: 0 PER 100 WBC
OSMOLALITY SERPL: 295 MOSM/KG H2O
OSMOLALITY UR: 370 MOSM/KG H2O
PATH REV BLD -IMP: ABNORMAL
PHOSPHATE SERPL-MCNC: 1.8 MG/DL (ref 2.6–4.7)
PHOSPHATE SERPL-MCNC: 2 MG/DL (ref 2.6–4.7)
PLATELET # BLD AUTO: 211 K/UL (ref 150–400)
PMV BLD AUTO: 10.2 FL (ref 8.9–12.9)
POTASSIUM SERPL-SCNC: 2.8 MMOL/L (ref 3.5–5.1)
POTASSIUM SERPL-SCNC: 2.8 MMOL/L (ref 3.5–5.1)
POTASSIUM SERPL-SCNC: 4.1 MMOL/L (ref 3.5–5.1)
POTASSIUM UR-SCNC: 27 MMOL/L
PROT SERPL-MCNC: 4.9 G/DL (ref 6.4–8.2)
Q-T INTERVAL, ECG07: 480 MS
QRS DURATION, ECG06: 70 MS
QTC CALCULATION (BEZET), ECG08: 606 MS
RBC # BLD AUTO: 1.95 M/UL (ref 3.8–5.2)
RBC MORPH BLD: ABNORMAL
SODIUM SERPL-SCNC: 143 MMOL/L (ref 136–145)
SODIUM SERPL-SCNC: 143 MMOL/L (ref 136–145)
SODIUM UR-SCNC: 35 MMOL/L
T4 FREE SERPL-MCNC: 1 NG/DL (ref 0.8–1.5)
TIBC SERPL-MCNC: 75 UG/DL (ref 250–450)
TSH SERPL DL<=0.05 MIU/L-ACNC: 1.59 UIU/ML (ref 0.36–3.74)
VENTRICULAR RATE, ECG03: 96 BPM
VIT B12 SERPL-MCNC: 458 PG/ML (ref 211–911)
WBC # BLD AUTO: 6.3 K/UL (ref 3.6–11)
WBC MORPH BLD: ABNORMAL

## 2018-02-17 PROCEDURE — 3331090002 HH PPS REVENUE DEBIT

## 2018-02-17 PROCEDURE — 65610000006 HC RM INTENSIVE CARE

## 2018-02-17 PROCEDURE — 74011250636 HC RX REV CODE- 250/636: Performed by: INTERNAL MEDICINE

## 2018-02-17 PROCEDURE — 80053 COMPREHEN METABOLIC PANEL: CPT | Performed by: HOSPITALIST

## 2018-02-17 PROCEDURE — 74011000250 HC RX REV CODE- 250: Performed by: FAMILY MEDICINE

## 2018-02-17 PROCEDURE — 96374 THER/PROPH/DIAG INJ IV PUSH: CPT

## 2018-02-17 PROCEDURE — 80048 BASIC METABOLIC PNL TOTAL CA: CPT | Performed by: INTERNAL MEDICINE

## 2018-02-17 PROCEDURE — 87449 NOS EACH ORGANISM AG IA: CPT | Performed by: INTERNAL MEDICINE

## 2018-02-17 PROCEDURE — 74011250636 HC RX REV CODE- 250/636: Performed by: FAMILY MEDICINE

## 2018-02-17 PROCEDURE — 82550 ASSAY OF CK (CPK): CPT | Performed by: HOSPITALIST

## 2018-02-17 PROCEDURE — 83935 ASSAY OF URINE OSMOLALITY: CPT | Performed by: INTERNAL MEDICINE

## 2018-02-17 PROCEDURE — 84443 ASSAY THYROID STIM HORMONE: CPT | Performed by: INTERNAL MEDICINE

## 2018-02-17 PROCEDURE — 74011250637 HC RX REV CODE- 250/637: Performed by: INTERNAL MEDICINE

## 2018-02-17 PROCEDURE — 84100 ASSAY OF PHOSPHORUS: CPT | Performed by: INTERNAL MEDICINE

## 2018-02-17 PROCEDURE — 71250 CT THORAX DX C-: CPT

## 2018-02-17 PROCEDURE — 87899 AGENT NOS ASSAY W/OPTIC: CPT | Performed by: INTERNAL MEDICINE

## 2018-02-17 PROCEDURE — 82272 OCCULT BLD FECES 1-3 TESTS: CPT | Performed by: INTERNAL MEDICINE

## 2018-02-17 PROCEDURE — 77010033678 HC OXYGEN DAILY

## 2018-02-17 PROCEDURE — 3331090001 HH PPS REVENUE CREDIT

## 2018-02-17 PROCEDURE — 84439 ASSAY OF FREE THYROXINE: CPT | Performed by: INTERNAL MEDICINE

## 2018-02-17 PROCEDURE — 30233N1 TRANSFUSION OF NONAUTOLOGOUS RED BLOOD CELLS INTO PERIPHERAL VEIN, PERCUTANEOUS APPROACH: ICD-10-PCS | Performed by: INTERNAL MEDICINE

## 2018-02-17 PROCEDURE — 36415 COLL VENOUS BLD VENIPUNCTURE: CPT | Performed by: INTERNAL MEDICINE

## 2018-02-17 PROCEDURE — 36430 TRANSFUSION BLD/BLD COMPNT: CPT

## 2018-02-17 PROCEDURE — 74011000258 HC RX REV CODE- 258: Performed by: INTERNAL MEDICINE

## 2018-02-17 PROCEDURE — 74011250636 HC RX REV CODE- 250/636: Performed by: HOSPITALIST

## 2018-02-17 PROCEDURE — 71045 X-RAY EXAM CHEST 1 VIEW: CPT

## 2018-02-17 PROCEDURE — P9016 RBC LEUKOCYTES REDUCED: HCPCS | Performed by: EMERGENCY MEDICINE

## 2018-02-17 PROCEDURE — 83735 ASSAY OF MAGNESIUM: CPT | Performed by: HOSPITALIST

## 2018-02-17 PROCEDURE — 85025 COMPLETE CBC W/AUTO DIFF WBC: CPT | Performed by: HOSPITALIST

## 2018-02-17 PROCEDURE — 83930 ASSAY OF BLOOD OSMOLALITY: CPT | Performed by: INTERNAL MEDICINE

## 2018-02-17 PROCEDURE — 82310 ASSAY OF CALCIUM: CPT | Performed by: INTERNAL MEDICINE

## 2018-02-17 PROCEDURE — 84300 ASSAY OF URINE SODIUM: CPT | Performed by: INTERNAL MEDICINE

## 2018-02-17 PROCEDURE — 74011000250 HC RX REV CODE- 250: Performed by: INTERNAL MEDICINE

## 2018-02-17 PROCEDURE — 82607 VITAMIN B-12: CPT | Performed by: INTERNAL MEDICINE

## 2018-02-17 PROCEDURE — 74011250637 HC RX REV CODE- 250/637: Performed by: HOSPITALIST

## 2018-02-17 PROCEDURE — 83605 ASSAY OF LACTIC ACID: CPT | Performed by: HOSPITALIST

## 2018-02-17 PROCEDURE — 84100 ASSAY OF PHOSPHORUS: CPT | Performed by: HOSPITALIST

## 2018-02-17 PROCEDURE — 83540 ASSAY OF IRON: CPT | Performed by: INTERNAL MEDICINE

## 2018-02-17 PROCEDURE — 82728 ASSAY OF FERRITIN: CPT | Performed by: INTERNAL MEDICINE

## 2018-02-17 PROCEDURE — 82570 ASSAY OF URINE CREATININE: CPT | Performed by: INTERNAL MEDICINE

## 2018-02-17 PROCEDURE — 85018 HEMOGLOBIN: CPT | Performed by: INTERNAL MEDICINE

## 2018-02-17 PROCEDURE — 77030011943

## 2018-02-17 PROCEDURE — 82436 ASSAY OF URINE CHLORIDE: CPT | Performed by: INTERNAL MEDICINE

## 2018-02-17 PROCEDURE — 83735 ASSAY OF MAGNESIUM: CPT | Performed by: INTERNAL MEDICINE

## 2018-02-17 PROCEDURE — 84132 ASSAY OF SERUM POTASSIUM: CPT | Performed by: HOSPITALIST

## 2018-02-17 PROCEDURE — 84133 ASSAY OF URINE POTASSIUM: CPT | Performed by: INTERNAL MEDICINE

## 2018-02-17 PROCEDURE — 82746 ASSAY OF FOLIC ACID SERUM: CPT | Performed by: INTERNAL MEDICINE

## 2018-02-17 RX ORDER — VANCOMYCIN HYDROCHLORIDE
1250 ONCE
Status: COMPLETED | OUTPATIENT
Start: 2018-02-17 | End: 2018-02-18

## 2018-02-17 RX ORDER — POTASSIUM CHLORIDE 750 MG/1
40 TABLET, FILM COATED, EXTENDED RELEASE ORAL EVERY 4 HOURS
Status: COMPLETED | OUTPATIENT
Start: 2018-02-17 | End: 2018-02-17

## 2018-02-17 RX ORDER — SODIUM CHLORIDE 9 MG/ML
250 INJECTION, SOLUTION INTRAVENOUS AS NEEDED
Status: DISCONTINUED | OUTPATIENT
Start: 2018-02-17 | End: 2018-03-02 | Stop reason: HOSPADM

## 2018-02-17 RX ORDER — LORAZEPAM 1 MG/1
1 TABLET ORAL
Status: DISCONTINUED | OUTPATIENT
Start: 2018-02-17 | End: 2018-02-17

## 2018-02-17 RX ORDER — POTASSIUM CHLORIDE 29.8 MG/ML
20 INJECTION INTRAVENOUS
Status: COMPLETED | OUTPATIENT
Start: 2018-02-17 | End: 2018-02-17

## 2018-02-17 RX ORDER — LORAZEPAM 2 MG/ML
1 INJECTION INTRAMUSCULAR
Status: DISCONTINUED | OUTPATIENT
Start: 2018-02-17 | End: 2018-03-02 | Stop reason: HOSPADM

## 2018-02-17 RX ORDER — SODIUM CHLORIDE 450 MG/100ML
100 INJECTION, SOLUTION INTRAVENOUS CONTINUOUS
Status: DISCONTINUED | OUTPATIENT
Start: 2018-02-17 | End: 2018-02-18

## 2018-02-17 RX ORDER — SODIUM,POTASSIUM PHOSPHATES 280-250MG
1 POWDER IN PACKET (EA) ORAL 4 TIMES DAILY
Status: DISCONTINUED | OUTPATIENT
Start: 2018-02-17 | End: 2018-02-18

## 2018-02-17 RX ORDER — LORAZEPAM 2 MG/ML
2 INJECTION INTRAMUSCULAR
Status: DISCONTINUED | OUTPATIENT
Start: 2018-02-17 | End: 2018-02-17

## 2018-02-17 RX ORDER — MAGNESIUM SULFATE HEPTAHYDRATE 40 MG/ML
2 INJECTION, SOLUTION INTRAVENOUS ONCE
Status: COMPLETED | OUTPATIENT
Start: 2018-02-17 | End: 2018-02-17

## 2018-02-17 RX ORDER — LORAZEPAM 2 MG/ML
2 INJECTION INTRAMUSCULAR
Status: DISCONTINUED | OUTPATIENT
Start: 2018-02-17 | End: 2018-02-25

## 2018-02-17 RX ORDER — NOREPINEPHRINE BITARTRATE/D5W 8 MG/250ML
2-16 PLASTIC BAG, INJECTION (ML) INTRAVENOUS
Status: DISCONTINUED | OUTPATIENT
Start: 2018-02-17 | End: 2018-02-17

## 2018-02-17 RX ORDER — LORAZEPAM 2 MG/ML
1 INJECTION INTRAMUSCULAR
Status: DISCONTINUED | OUTPATIENT
Start: 2018-02-17 | End: 2018-02-17

## 2018-02-17 RX ORDER — THERA TABS 400 MCG
1 TAB ORAL DAILY
Status: DISCONTINUED | OUTPATIENT
Start: 2018-02-17 | End: 2018-02-17

## 2018-02-17 RX ADMIN — Medication 10 ML: at 13:38

## 2018-02-17 RX ADMIN — Medication 10 ML: at 05:45

## 2018-02-17 RX ADMIN — POTASSIUM & SODIUM PHOSPHATES POWDER PACK 280-160-250 MG 1 PACKET: 280-160-250 PACK at 12:25

## 2018-02-17 RX ADMIN — POTASSIUM CHLORIDE 20 MEQ: 400 INJECTION, SOLUTION INTRAVENOUS at 11:26

## 2018-02-17 RX ADMIN — POTASSIUM & SODIUM PHOSPHATES POWDER PACK 280-160-250 MG 1 PACKET: 280-160-250 PACK at 18:16

## 2018-02-17 RX ADMIN — Medication 10 ML: at 22:37

## 2018-02-17 RX ADMIN — PIPERACILLIN SODIUM AND TAZOBACTAM SODIUM 4.5 G: 4; .5 INJECTION, POWDER, LYOPHILIZED, FOR SOLUTION INTRAVENOUS at 18:42

## 2018-02-17 RX ADMIN — POTASSIUM CHLORIDE 40 MEQ: 750 TABLET, FILM COATED, EXTENDED RELEASE ORAL at 12:25

## 2018-02-17 RX ADMIN — CALCIUM CARBONATE-VITAMIN D TAB 500 MG-200 UNIT 1 TABLET: 500-200 TAB at 08:58

## 2018-02-17 RX ADMIN — SODIUM CHLORIDE 100 ML/HR: 900 INJECTION, SOLUTION INTRAVENOUS at 10:32

## 2018-02-17 RX ADMIN — VANCOMYCIN HYDROCHLORIDE 1250 MG: 10 INJECTION, POWDER, LYOPHILIZED, FOR SOLUTION INTRAVENOUS at 18:40

## 2018-02-17 RX ADMIN — CALCIUM CARBONATE-VITAMIN D TAB 500 MG-200 UNIT 1 TABLET: 500-200 TAB at 18:16

## 2018-02-17 RX ADMIN — HEPARIN SODIUM 5000 UNITS: 5000 INJECTION, SOLUTION INTRAVENOUS; SUBCUTANEOUS at 05:42

## 2018-02-17 RX ADMIN — MAGNESIUM SULFATE HEPTAHYDRATE 2 G: 40 INJECTION, SOLUTION INTRAVENOUS at 08:54

## 2018-02-17 RX ADMIN — LORAZEPAM 2 MG: 2 INJECTION INTRAMUSCULAR; INTRAVENOUS at 23:23

## 2018-02-17 RX ADMIN — Medication 2 MCG/MIN: at 00:43

## 2018-02-17 RX ADMIN — SODIUM CHLORIDE 100 ML/HR: 450 INJECTION, SOLUTION INTRAVENOUS at 13:37

## 2018-02-17 RX ADMIN — Medication 100 MG: at 08:58

## 2018-02-17 RX ADMIN — BUPROPION HYDROCHLORIDE 150 MG: 150 TABLET, EXTENDED RELEASE ORAL at 08:58

## 2018-02-17 RX ADMIN — FOLIC ACID: 5 INJECTION, SOLUTION INTRAMUSCULAR; INTRAVENOUS; SUBCUTANEOUS at 19:01

## 2018-02-17 RX ADMIN — FOLIC ACID 1 MG: 1 TABLET ORAL at 08:58

## 2018-02-17 RX ADMIN — LORAZEPAM 2 MG: 2 INJECTION INTRAMUSCULAR; INTRAVENOUS at 20:39

## 2018-02-17 RX ADMIN — POTASSIUM CHLORIDE 40 MEQ: 750 TABLET, FILM COATED, EXTENDED RELEASE ORAL at 08:58

## 2018-02-17 RX ADMIN — POTASSIUM & SODIUM PHOSPHATES POWDER PACK 280-160-250 MG 1 PACKET: 280-160-250 PACK at 08:58

## 2018-02-17 RX ADMIN — POTASSIUM CHLORIDE 20 MEQ: 400 INJECTION, SOLUTION INTRAVENOUS at 02:02

## 2018-02-17 RX ADMIN — PIPERACILLIN, TAZOBACTAM 13.5 G: 4; .5 INJECTION, POWDER, LYOPHILIZED, FOR SOLUTION INTRAVENOUS at 20:03

## 2018-02-17 RX ADMIN — POTASSIUM CHLORIDE 20 MEQ: 400 INJECTION, SOLUTION INTRAVENOUS at 12:24

## 2018-02-17 RX ADMIN — POTASSIUM CHLORIDE 20 MEQ: 400 INJECTION, SOLUTION INTRAVENOUS at 00:43

## 2018-02-17 RX ADMIN — LORAZEPAM 2 MG: 2 INJECTION INTRAMUSCULAR; INTRAVENOUS at 18:44

## 2018-02-17 RX ADMIN — POTASSIUM CHLORIDE 20 MEQ: 400 INJECTION, SOLUTION INTRAVENOUS at 10:23

## 2018-02-17 NOTE — PROGRESS NOTES
0730: Bedside and Verbal shift change report given to Saranya Lainez RN (oncoming nurse) by Sophia Galdamez RN (offgoing nurse). Report included the following information SBAR, Kardex, Intake/Output, MAR, Recent Results and Cardiac Rhythm NSR.     0800:  Assessment complete. No new issues. Spoke to Dr. Patricia Keane regarding patient's electrolytes. Orders received. Blood transfusion orders noted. 4020:  1st unit PRBC's started    1200:  Reassessment complete. No new issues. 1245:  2nd unit PRBC's started. 1400:  Patient has not voided. Bladder scan revealed over 320 mls, 420 out. Patient straight cathed. Urine samples sent to lab.    1600:  Reassessment complete. No new issues. Patient tolerated 2 units PRBC's without issue, Hemoglobin up to 9.0 from 5.4. Potassium up to 4.1 from 2.8. Dr. Patricia Keane notified. No new orders at this time. 1830:  Patient appears to be withdrawing from alcohol. CIWA score of 15.  Spoke to Dr. aPtricia Keane. Patient drinks a fifth of liquor a day per . CIWA protocol ordered. Will give ativan as ordered. 1930:  Bedside and Verbal shift change report given to Lisa Cotto RN and Pretty Amador, Student nurse (oncoming nurse) by Saranya Lainez RN (offgoing nurse). Report included the following information SBAR, Kardex, Intake/Output, MAR, Recent Results and Cardiac Rhythm NSR/Sinus Tach. Shift Summary:  Patient received 2 units PRBC's, 3 runs of potassium chloride, 1 run of magnesium sulfate seen by Nephrology and Heme/Onc. Patient now withdrawing from alcohol with intermittent agitation, confusion, tremors; CIWA protocol implemented.

## 2018-02-17 NOTE — PROGRESS NOTES
Hospitalist Progress Note  Jesus Epperson MD  Answering service: [phone] from in house phone      Date of Service:  2018  NAME:  Amy Galvan  :  1960  MRN:  [id number]      Admission Summary:   66 yo woman with HTN, HLD, DUB, iron deficiency anemia, severe protein-calorie malnutrition, recently diagnosed colitis, EtOH abuse, and h/o GI bleed was sent by her PCP to the ED on 18 for fatigue, generalized weakness, and right arm swelling. She was recently hospitalized at [facility] 18 - 18 for hypokalemia and anemia. She was admitted to the ICU for severe hypokalemia, hypotension, and chronic RUE superficial venous thrombosis.      Interval history / Subjective:   C/o left side hurting and chills;  at bedside, d/w nurse     Assessment & Plan:     Severe hypokalemia (POA) - replete until K+ >=4 and magnesium >=2  - check urine/serum studies for possible renal wasting  - consult Renal  - likely due to anorexia and malabsorption and recent use of diuretics; not resumed on admission    Acute on chronic anemia (POA)   - check anemia studies, FOBT; 2 units PRBC transfusion already ordered  - Hematology consulted by [facility] due to recent h/o possible occult malignancy  - per  at bedside, EGD, colonoscopy, bone scan all unremarkable  - recent PET was abnormal  - stop SC heparin and change to SCDs    Severe protein-calorie malnutrition - check prealbumin, consult Nutrition  - s/p 1 dose IV albumin    Chronic RUE VTE and RLE>LLE edema - check venous duplex b/l LEs  - no indication for [facility] for RUE VTE as pt had IV in that arm during previous hospitalization    Generalized weakness/debility with EtOH abuse (POA)   - likely due to anorexia and EtOH abuse  - PT/OT/ST evals, Nutrition eval  - thiamine, folic acid, MVI    Hypotension (POA) on chronic HTN - likely due to RLL HCAP on CXR  - continue IVF and prn pressor   - check TTE    Possible RLL HCAP (POA) - BCx 2/16 NGTD  - start Zosyn/vancomycin  - check MRSA swab, sputum Cx, urine strep/legionella antigens  - flu swab negative    Code status: full  DVT prophylaxis: change heparin SC to [address] discussed with: Patient/Family, Nurse and Consultant Renal  Disposition: TBD. Hospital Problems  Date Reviewed: 2/16/2018          Codes Class Noted POA    * (Principal)Hypokalemia ICD-10-CM: E87.6  ICD-9-CM: 276.8  2/16/2018 Yes            Review of Systems:   Pertinent items are noted in HPI. Vital Signs:    Last 24hrs VS reviewed since prior progress note.  Most recent are:  Visit Vitals    /78    Pulse 96    Temp 98.8 °F (37.1 °C)    Resp 23    Ht 5' 1\" (1.549 m)    Wt 55 kg (121 lb 3.2 oz)    SpO2 98%    BMI 22.9 kg/m2       Intake/Output Summary (Last 24 hours) at 02/17/18 1134  Last data filed at 02/17/18 0900   Gross per 24 hour   Intake          1045.27 ml   Output                0 ml   Net          1045.27 ml      Physical Examination:     Constitutional:  awake, no acute distress, appears toxic and wasted   ENT:  oral mucosa dry, poor dentition  Neck supple, no masses   Resp:  CTA bilaterally, no wheezing/rhonchi/rales   CV:  regular rhythm, normal rate, no m/r/g appreciated, RUE edema, RLE>LLE edema    GI:  +BS, soft, non distended, non tender     Musculoskeletal:  moves all extremities with difficulty    Neurologic:  awake, responds appropriately to questions and commands     Skin:  warm, dry  Eyes:  PERRL    Data Review:    Review and/or order of clinical lab test  Review and/or order of tests in the radiology section of CPT  Review and/or order of tests in the medicine section of CPT    Labs:     Recent Labs      02/17/18   0554  02/16/18   1520   WBC  6.3  8.8   HGB  5.4*  8.2*   HCT  16.9*  25.7*   PLT  211  323     Recent Labs      02/17/18   0554  02/16/18   2259  02/16/18   1520   NA  143   --   138   K  2.8*  2.8*  2.3*  1.9*   CL  105   -- 92*   CO2  29   --   36*   BUN  4*   --   4*   CREA  0.29*   --   0.56   GLU  96   --   126*   CA  6.5*   --   7.6*   MG  1.7  1.7  1.8  1.4*   PHOS  2.0*   --    --      Recent Labs      02/17/18   0554  02/16/18   1520   SGOT  36  50*   ALT  12  12   AP  130*  196*   TBILI  0.5  1.1*   TP  4.9*  6.6   ALB  1.6*  2.1*   GLOB  3.3  4.5*     Recent Labs      02/16/18   1520   INR  1.2*   PTP  12.1*   APTT  28.3      Recent Labs      02/17/18   0554   TIBC  75*   PSAT  89*   FERR  427*      Lab Results   Component Value Date/Time    Folate 13.3 01/25/2018 01:17 PM      No results for input(s): PH, PCO2, PO2 in the last 72 hours.   Recent Labs      02/17/18   0554   CPK  196*     Lab Results   Component Value Date/Time    Cholesterol, total 202 (H) 03/27/2017 09:47 AM    HDL Cholesterol 76 03/27/2017 09:47 AM    LDL, calculated 109 (H) 03/27/2017 09:47 AM    Triglyceride 83 03/27/2017 09:47 AM     Lab Results   Component Value Date/Time    Glucose (POC) 91 01/26/2018 04:08 PM    Glucose (POC) 107 (H) 01/26/2018 01:08 PM     Lab Results   Component Value Date/Time    Color DARK YELLOW 02/16/2018 08:30 PM    Appearance CLEAR 02/16/2018 08:30 PM    Specific gravity 1.013 02/16/2018 08:30 PM    pH (UA) 7.0 02/16/2018 08:30 PM    Protein TRACE (A) 02/16/2018 08:30 PM    Glucose NEGATIVE  02/16/2018 08:30 PM    Ketone TRACE (A) 02/16/2018 08:30 PM    Bilirubin Negative 09/28/2012 08:58 AM    Urobilinogen 0.2 02/16/2018 08:30 PM    Nitrites NEGATIVE  02/16/2018 08:30 PM    Leukocyte Esterase NEGATIVE  02/16/2018 08:30 PM    Epithelial cells FEW 02/16/2018 08:30 PM    Bacteria 1+ (A) 02/16/2018 08:30 PM    WBC 0-4 02/16/2018 08:30 PM    RBC 0-5 02/16/2018 08:30 PM     Medications Reviewed:     Current Facility-Administered Medications   Medication Dose Route Frequency    NOREPINephrine (LEVOPHED) 8 mg in 5% dextrose 250mL infusion  2-16 mcg/min IntraVENous TITRATE    0.9% sodium chloride infusion 250 mL  250 mL IntraVENous PRN    potassium, sodium phosphates (NEUTRA-PHOS) packet 1 Packet  1 Packet Oral QID    potassium chloride SR (KLOR-CON 10) tablet 40 mEq  40 mEq Oral Q4H    potassium chloride 20 mEq in 50 ml IVPB  20 mEq IntraVENous Q1H    thiamine (B-1) tablet 100 mg  100 mg Oral DAILY    zolpidem (AMBIEN) tablet 5 mg  5 mg Oral QHS PRN    folic acid (FOLVITE) tablet 1 mg  1 mg Oral DAILY    calcium-vitamin D (OS-DELORIS) 500 mg-200 unit tablet  1 Tab Oral BID WITH MEALS    sodium chloride (NS) flush 5-10 mL  5-10 mL IntraVENous Q8H    sodium chloride (NS) flush 5-10 mL  5-10 mL IntraVENous PRN    acetaminophen (TYLENOL) tablet 650 mg  650 mg Oral Q4H PRN    ondansetron (ZOFRAN) injection 4 mg  4 mg IntraVENous Q4H PRN    0.9% sodium chloride infusion  100 mL/hr IntraVENous CONTINUOUS    buPROPion SR (WELLBUTRIN SR) tablet 150 mg  150 mg Oral DAILY     ______________________________________________________________________  EXPECTED LENGTH OF STAY: - - -  ACTUAL LENGTH OF STAY:          1                 Lux Childers MD

## 2018-02-17 NOTE — ED NOTES
Bladder scanned patient. Patient has 331 mL's of urine in bladder. Straight cathed patient for urine. Patient tolerated well, specimen sent to lab.

## 2018-02-17 NOTE — H&P
[address] [facility] [place]  ACUTE CARE HISTORY AND PHYSICAL    Kat Castaneda.  MR#: [id number]  : 1960  ACCOUNT #: [de-identified]   DATE OF SERVICE: 2018    CHIEF COMPLAINT:  Generalized weakness, body aches since Wednesday. HISTORY OF PRESENT ILLNESS:  The patient is a 27-year-old lady who was recently admitted at [facility] on  for weakness and found out to have low potassium of 1.6. History of anemia, scoliosis, now presented with weakness and found out to have low potassium, which was 1.9. As per  at the bedside, since Wednesday she was not feeling well. She was complaining of bodyaches, weakness, and decreased p.o. intake. Yesterday she did not feel good all day long. Today, the home health nurse mentioned her blood pressure was low, heart rate was high. With all of these things, they decided to go to PCP. Today at the PCP's clinic, the i-STAT hemoglobin was around 4 and the patient was directed to the ER. In the ER, the patient's hemoglobin was 8.2, potassium of 1.9, magnesium of 1.4, lactic acid of 3.4. Blood pressure was in the 90s, with recent blood pressure being 72/57. Ordered stat IV fluid bolus. As per the patient, the patient got 2 units of packed RBCs on previous admission to [facility], then she saw her PCP at followup after 3 days of hospital discharge; because of her lower extremity swelling, she was started on Lasix. Lasix was discontinued on Wednesday as her blood pressure was low by home health after talking to one of the physicians (PCP or physician at [facility]). The patient denies any fever, symptoms of UTI, cough, runny nose, abdominal pain. On further asking, the patient's  mentioned maybe a little bit of diarrhea. The patient had right upper extremity swelling for 2-3 days. The patient got ultrasound of the right upper extremity, which showed superficial likely chronic venous thrombosis.   No deep vein thrombosis as per ultrasound of the right upper extremity. REVIEW OF SYSTEMS:  All other systems reviewed and all others are negative. Pertinent positives and negatives as mentioned above in the HPI. PAST MEDICAL HISTORY:    1. Anemia. 2.  Recent hospitalization at [facility] on 01/26 with similar complaints. (It looks like the patient had some workup for possible underlying cancer as CT scan showed some possible lesions in the liver, bone, the patient was evaluated by hem/onc, GYN, the patient also got colonoscopy.)    3. Hypercholesterolemia. 4.  Scoliosis. 5.  Hypertension. 6.  Uterine fibroid. PAST SURGICAL HISTORY:  Cholecystectomy, orthopedic surgery for shattered bilateral ankles, metal plates and screws, ankle/leg surgery. SOCIAL HISTORY:  The patient is , lives at home with . Smokes about a half of pack per day, started when she was [age]years of age. The patient mentions she was smoking more than this earlier. She drinks 1 can of beer, 1 shot of liquor per week, states not much, only once in a while. FAMILY HISTORY:  Mother with hypertension, diabetes, breast cancer. Father with MI in his 45s. ALLERGIES:    1.  ROBAXIN, HIVES, RASH. 2.  STATINS, MYALGIA. 3.  CODEINE, ITCHING. 4.  NEOSPORIN, RASH. HOME MEDICATIONS:    1. Ibuprofen 600 mg p.o. daily p.r.n. for pain. 2.  Potassium chloride 20 mEq p.o. daily. 3.  Calcium and vitamin D supplement. 4.  Folic acid 1 mg p.o. daily. 5.  Protonix 40 mg p.o. daily. 6.  Zolpidem 5 mg p.o. at bedtime p.r.n.  7.  Wellbutrin  mg p.o. daily. 8.  Thiamine 100 mg p.o. daily. 9.  Magnesium 200 mg p.o. daily. 10.  Aspirin 81 mg p.o. daily. PHYSICAL EXAMINATION:    VITAL SIGNS:  Temperature 97.9, heart rate 95, blood pressure 91/55, lowest being 72/57, pulse ox. 93% on room air, respiratory rate 24.    GENERAL:  In mild distress, generalized weakness, bodyaches, tired, cachectic looking lady, appears older than her actual age. HEENT:  Atraumatic, normocephalic. Pale conjunctivae. Extraocular muscles intact. Somewhat dry mucous membranes. NECK:  Supple, no JVD. CARDIOVASCULAR:  S1 and S2 within normal limits, tachycardic, no murmurs. RESPIRATORY:  Decreased respiratory effort, no wheezing. GASTROINTESTINAL:  Bowel sounds present in all 4 quadrants, soft, nontender, nondistended. LOWER EXTREMITIES:  Dorsalis pedis pulse is palpable bilaterally. Some ankle edema and dorsal pedal edema. NEUROLOGIC:  Alert and oriented x3. No focal motor deficit. Generalized weakness. DIAGNOSTIC DATA:  CBC:  Hemoglobin 8.2, hematocrit 25.7, platelets 780. Urinalysis negative for nitrites, leukocyte esterase, negative for bacteria, WBCs 0-4. Chemistry:  Potassium 1.9, sodium 138, magnesium 1.4, calcium 7.6, albumin 2.1, alkaline phosphatase 196, lactic acid 3.4. Blood culture done in the emergency room. Ultrasound of right upper extremity:  No evidence of right upper extremity DVT. There is evidence of superficial vein thrombosis. Ultrasound appearance is more consistent with a chronic than an acute process. ASSESSMENT AND PLAN:  A 52-year-old female with history of anemia, hypertension, presents from PCP's office after noticing hemoglobin of 4 in PCP's clinic. 1.  Severe hypokalemia with potassium of 1.9.  2.  Hypomagnesemia. 3.  Generalized weakness and bodyaches. 4.  Hypotension. 5.  Dehydration. 6.  Lactic acidosis. 7. SIRS positive. PLAN:   1. Most likely her hypokalemia is related to Lasix and/or renal wasting. For now, we will replace with IV and p.o. potassium. The patient already got some potassium replacement in the ER. We will recheck potassium stat. The patient also got some magnesium replacement, we will recheck magnesium level stat and replace as needed. Hold PPIs.          2.   Hypotension, likely dehydration and also hypoalbuminemia with decreased p.o. intake, we will give aggressive IV fluids. At least 2 liters of IV fluid boluses and re-evaluate for the need of pressors. 3.  We will recheck the potassium, magnesium, and lactic acid levels q.6 hours. 4.  There are no signs or symptoms of any infection. So, we will hold off on starting any antibiotics. Urinalysis without any bacteria or leukocyte esterase. The patient does not have any respiratory symptoms, anyway we will get rapid flu test.    5.  Right upper extremity superficial venous thrombosis, warm compression and hand elevation. 6.  We will also get 2D echo to evaluate for EF and diastolic function. We will give 1 dose of IV albumin for hypotension as her albumin is low at 2.1.    7. CODE STATUS:  FULL CODE. 8.  DVT prophylaxis, subQ heparin.       MD ARTEMIO Newman / FRANCESCA  D: 02/16/2018 19:26     T: 02/16/2018 22:23  JOB #: [id number]  CC: Houston Steward NP

## 2018-02-17 NOTE — PROGRESS NOTES
I returned page from Noemi Peck who reports that patient is hypotensive. Pt already received 3000 ml IVFs. Needs vasopressors. Needs central venous line insertion. Will place order for additional 500 ml IVF bolus. Transfer to ICU.

## 2018-02-17 NOTE — ROUTINE PROCESS
TRANSFER - OUT REPORT:    Verbal report given to Lesley Brantley RN(name) on Emeli Roe  being transferred to ICU(unit) for routine progression of care       Report consisted of patients Situation, Background, Assessment and   Recommendations(SBAR). Information from the following report(s) SBAR, ED Summary, Procedure Summary, Intake/Output, MAR, Accordion, Recent Results and Cardiac Rhythm [place] was reviewed with the receiving nurse. Lines:   Triple Lumen 02/16/18 Right Subclavian (Active)       Peripheral IV 02/16/18 Left Antecubital (Active)   Site Assessment Clean, dry, & intact 2/16/2018  3:19 PM   Phlebitis Assessment 0 2/16/2018  3:19 PM   Infiltration Assessment 0 2/16/2018  3:19 PM   Dressing Status Clean, dry, & intact 2/16/2018  3:19 PM   Hub Color/Line Status Pink 2/16/2018  3:19 PM       Peripheral IV 02/16/18 Left Forearm (Active)   Site Assessment Clean, dry, & intact 2/16/2018  4:09 PM   Phlebitis Assessment 0 2/16/2018  4:09 PM   Infiltration Assessment 0 2/16/2018  4:09 PM   Dressing Status Clean, dry, & intact 2/16/2018  4:09 PM   Hub Color/Line Status Green 2/16/2018  4:09 PM        Opportunity for questions and clarification was provided.       Visit Vitals    /70 (BP 1 Location: Left arm, BP Patient Position: At rest)    Pulse 91    Temp 98.1 °F (36.7 °C)    Resp 22    Ht 5' 1\" (1.549 m)    Wt 46.2 kg (101 lb 13.6 oz)    SpO2 100%    BMI 19.24 kg/m2

## 2018-02-17 NOTE — PROGRESS NOTES
Pharmacist Note - Vancomycin Dosing    Consult provided for this 65 y.o. female for indication of HCAP. Antibiotic regimen(s): Vanc + Zosyn    Recent Labs      18   1541  18   0554  18   1520   WBC   --   6.3  8.8   CREA  0.32*  0.29*  0.56   BUN  3*  4*  4*     Frequency of BMP: daily  Height: 155 cm  Weight: 55 kg  Est CrCl: >100 ml/min   Temp (24hrs), Av.5 °F (36.9 °C), Min:97.7 °F (36.5 °C), Max:99.2 °F (37.3 °C)    Goal trough = 15 - 20 mcg/mL    Therapy will be initiated with a loading dose of 1250 mg IV x 1 to be followed by a maintenance dose of 750 mg IV every 8 hours. Pharmacy to follow patient daily and order levels / make dose adjustments as appropriate.

## 2018-02-17 NOTE — CONSULTS
[id number] [facility] Oncology at [facility]  [phone]    Hematology / Oncology Consult    Reason for Visit:   Kalen Ulloa is a 65 y.o. female who is seen in consultation at the request of Dr. Natalie Funk for evaluation of anemia. History of Present Illness:   Ms. Ida Jackson is a 66 y/o female admitted with weakness and hypokalemia (K 1.9). She had a recent hospital admission at [facility] for the same reason on 1/26/18. She has been feeling poorly for the past few days with bodyaches, weakness, and decreased p.o. intake. At PCP's office, i-STAT Hgb was 4, and patient was directed to the ER. Upon arrival in ER, Hgb was 8.2, potassium 1.9, Mg 1.4, lactate 3.4, SBP in 70s-90s. While at [facility], she received 2 units of PRBCs. Given edema, she was treated with Lasix while there. She also has right arm swelling and was found here to have a RUE superficial venous thrombosis. She also notes some mild intermittent diarrhea. No fevers, dysuria, cough, abdom pain. Of note, patient was seen by Hematology while admitted to [facility]. The reason for consultation at that time was abnormal CT scan (mediastinal LAD, liver lesion, diffuse colitis/ bowel changes, bone lesions/ ovary cyst/ fibroids.) Dr. Estrada Gallagher and Dr. Omi Ely in [facility] [place] evaluated patient at that time and felt her mediastinal LAD was likely due to sarcoidosis rather than malignancy. Patient underwent bronchoscopy and biopsy of LN by Dr. Sridhar Saldana. Pathology findings were inconclusive (peripheral blood and scattered ciliated bronchial lining cells. ). For pelvic mass along with weight loss, Gyn was consulted but stated that this was unlikely to be 2/2 malignancy, corroborated by normal CA-125 level. She also underwent EGD by Dr. Shellie Montes with finding of a gastric antrum ulcer. Biopsy revealed benign mucosa with mild chronic active gastritis and surface erosion, no H. Pylori.      Review of labs here reveals drop in Hgb from 8-9 range to now 5.4. Iron sat elevated > 80%. Although VitB12 level was recently checked and normal, there is a low VitB1 (thiamine) level from 9/2017. Home med list includes thiamine tablets 100mg/d. When asked about alcohol intake, she states she drinks a fifth of liquor several times a month, but her  states she drinks this amount daily for the past several years. He states she has had less alcohol in the past 2-3 weeks due to feeling sick. She denies any tremors, seizures or other withdrawal symptoms when she goes without a drink. No recent viral infections or exposures per patient. Her maternal uncle had lymphoma. No personal history of cancer or hemoglobinopathies.     Past Medical History:   Diagnosis Date    Alcohol abuse     GI (gastrointestinal bleed)     Insomnia 11/09    Iron deficiency anemia 04/2004    PPD positive     treated w/ INH- tests since have been negative    Pure hypercholesterolemia     Scoliosis 12/28/15    [facility]    Tobacco dependence     Unspecified essential hypertension     Uterine bleeding, dysfunctional 2/12    Uterine fibroid       Past Surgical History:   Procedure Laterality Date    COLONOSCOPY N/A 1/31/2018    COLONOSCOPY performed by Joyce Chilel MD at [address] HX CHOLECYSTECTOMY      HX COLONOSCOPY  07/01/2013    MCV, repeat 10 years    HX ORTHOPAEDIC      Shattered bilateral ankles-metal plates & screws    HX OTHER SURGICAL      Benign cyst removal- L. foot    LEG/ANKLE SURGERY PROC UNLISTED        Social History   Substance Use Topics    Smoking status: Current Every Day Smoker     Packs/day: 0.50     Years: 17.00    Smokeless tobacco: Never Used    Alcohol use 1.2 oz/week     1 Cans of beer, 1 Shots of liquor per week      Comment: Socially      Family History   Problem Relation Age of Onset    Hypertension Mother     Diabetes Mother     Cancer Mother      breast    Heart Disease Father      MI 42's    Kidney Disease Father     Hypertension Son      Current Facility-Administered Medications   Medication Dose Route Frequency    NOREPINephrine (LEVOPHED) 8 mg in 5% dextrose 250mL infusion  2-16 mcg/min IntraVENous TITRATE    0.9% sodium chloride infusion 250 mL  250 mL IntraVENous PRN    potassium, sodium phosphates (NEUTRA-PHOS) packet 1 Packet  1 Packet Oral QID    potassium chloride SR (KLOR-CON 10) tablet 40 mEq  40 mEq Oral Q4H    potassium chloride 20 mEq in 50 ml IVPB  20 mEq IntraVENous Q1H    thiamine (B-1) tablet 100 mg  100 mg Oral DAILY    zolpidem (AMBIEN) tablet 5 mg  5 mg Oral QHS PRN    folic acid (FOLVITE) tablet 1 mg  1 mg Oral DAILY    calcium-vitamin D (OS-DELORIS) 500 mg-200 unit tablet  1 Tab Oral BID WITH MEALS    sodium chloride (NS) flush 5-10 mL  5-10 mL IntraVENous Q8H    sodium chloride (NS) flush 5-10 mL  5-10 mL IntraVENous PRN    acetaminophen (TYLENOL) tablet 650 mg  650 mg Oral Q4H PRN    ondansetron (ZOFRAN) injection 4 mg  4 mg IntraVENous Q4H PRN    0.9% sodium chloride infusion  100 mL/hr IntraVENous CONTINUOUS    buPROPion SR (WELLBUTRIN SR) tablet 150 mg  150 mg Oral DAILY      Allergies   Allergen Reactions    Robaxin [Methocarbamol] Hives, Rash and Itching     Red splotches    Statins-Hmg-Coa Reductase Inhibitors Myalgia    Codeine Itching    Neosporin [Neomycin-Bacitracin-Polymyxin] Rash        Review of Systems: A complete review of systems was obtained, negative except as described above.     Physical Exam:     Visit Vitals    /88    Pulse 99    Temp 98.8 °F (37.1 °C)    Resp 16    Ht 5' 1\" (1.549 m)    Wt 121 lb 3.2 oz (55 kg)    SpO2 99%    BMI 22.9 kg/m2     ECOG PS: 3  General: No acute distress, appears weak and disheveled  Eyes: PERRLA, anicteric sclerae  HENT: Atraumatic with normal appearance of ears and nose; OP clear  Neck: Supple; no thyromegaly   Lymphatic: No cervical, supraclavicular, or inguinal adenopathy  Respiratory: CTAB, normal respiratory effort  CV: Normal rate, regular rhythm, no murmurs. RUE and BLE edema  GI: Soft, nontender, nondistended, no masses, no hepatomegaly, no splenomegaly  MS: Normal gait and station. Digits without clubbing or cyanosis. Skin: No rashes, ecchymoses, or petechiae. Normal temperature, turgor, and texture. Neuro/Psych: Alert, oriented, appropriate affect, normal judgment/insight. Generalized weakness with inability/difficulty removing blanket off of her body. Results:     Lab Results   Component Value Date/Time    WBC 6.3 02/17/2018 05:54 AM    HGB 5.4 (LL) 02/17/2018 05:54 AM    HCT 16.9 (LL) 02/17/2018 05:54 AM    PLATELET 315 39/20/0954 05:54 AM    MCV 86.7 02/17/2018 05:54 AM    ABS. NEUTROPHILS 5.1 02/17/2018 05:54 AM    Hemoglobin (POC) 4.9 02/16/2018 02:46 PM    Hemoglobin (POC) 11.2 (L) 01/26/2018 04:08 PM    Hematocrit (POC) 33 (L) 01/26/2018 04:08 PM     Lab Results   Component Value Date/Time    Sodium 143 02/17/2018 05:54 AM    Potassium 2.8 (L) 02/17/2018 05:54 AM    Potassium 2.8 (L) 02/17/2018 05:54 AM    Chloride 105 02/17/2018 05:54 AM    CO2 29 02/17/2018 05:54 AM    Glucose 96 02/17/2018 05:54 AM    BUN 4 (L) 02/17/2018 05:54 AM    Creatinine 0.29 (L) 02/17/2018 05:54 AM    GFR est AA >60 02/17/2018 05:54 AM    GFR est non-AA >60 02/17/2018 05:54 AM    Calcium 6.5 (L) 02/17/2018 05:54 AM    Sodium (POC) 144 01/26/2018 04:08 PM    Potassium (POC) <2.0 (LL) 01/26/2018 04:08 PM    Chloride (POC) 98 01/26/2018 04:08 PM    Glucose (POC) 91 01/26/2018 04:08 PM    BUN (POC) <3 (L) 01/26/2018 04:08 PM    Creatinine (POC) 0.6 01/26/2018 04:08 PM    Calcium, ionized (POC) 1.06 (L) 01/26/2018 04:08 PM     Lab Results   Component Value Date/Time    Bilirubin, total 0.5 02/17/2018 05:54 AM    ALT (SGPT) 12 02/17/2018 05:54 AM    AST (SGOT) 36 02/17/2018 05:54 AM    Alk.  phosphatase 130 (H) 02/17/2018 05:54 AM    Protein, total 4.9 (L) 02/17/2018 05:54 AM    Albumin 1.6 (L) 02/17/2018 05:54 AM    Globulin 3.3 02/17/2018 05:54 AM         Assessment and Recommendations:   Raul Meza is a 65 y.o. female admitted with weakness, fatigue, hypokalemia and anemia. 1. Normocytic anemia: Usually a sudden drop in Hgb from 8 to 5 within 1-2 days is due to blood loss. However, patient,  and nursing deny any obvious blood loss. No intense abdominal pain. Patient was found to have a gastric ulcer during admission last month. I suspect she has a combination of blood loss and bone marrow suppression from alcohol abuse. -- Recommend bone marrow biopsy given anemia requiring blood transfusions, lack of adequate reticulocyte response. -- Agree with transfusion for goal Hgb > 7.  -- Recheck CBC after 1 Unit. 2. Alcohol abuse: Patient has been drinking liquor daily for the past several years. This could explain her weight loss, hypoalbuminemia, decreased PO intake, thiamine deficiency. Coags mildly elevated likely due to liver dysfunction. Recent CT comments on diffuse hypodensity. -- Needs alcohol cessation. -- Check liver ultrasound to further characterize liver parenchyma and mass lesion noted on CT    3. Hypokalemia: Due to recent Lasix as well as decreased PO intake and past diarrhea per Nephrology. 4. Gastric ulcer: Needs to avoid NSAIDs and take PPI daily. 5. Mediastinal LAD: Possibly related to sarcoidosis given CT report/addendum from last hospital admission. Bronch biopsy was inconclusive.       Signed By: Michael Armenta MD     February 17, 2018

## 2018-02-17 NOTE — ED NOTES
Received bedside report in SBAR format, updated on [facility], recent results and plan of care from [facility]. Assumed care of patient. Patient BP MAP >65 since 2300. No pressors started. Will continue to monitor patient.

## 2018-02-17 NOTE — CONSULTS
Assessment:  Hypokalemia: recurrent/persistent-> Suspect whole body potassium depletion (poor intake/nutritional status) exacerbated by renal wasting via lasix. Low suspicion for Bartters/Gitelman syndrome. Check TSH to r/o thyrotoxic periodic paralysis. Anemia: requiring PRBCS    Hypotension: volume depletion. Off vasopressor support    Failure to thrive: ETOH abuse/Hypoalbuminemia/Weight loss-> worked up for underlying malignancy- negative    Hypophosphatemia    Plan/Recommendations:  Send off spot Urine K, Urine Cl, Urine Cr  Check TSH/FT4  Agree with current K/Phos supplementation  Repeat BMP at 4pm  Change to 1/2NS at 100cc/hr  PRBCS per primary team  Hem/Onc consultation  Strict I/Os    Discussed with patient/ and ICU RN    Thanks for the consultation. Renal service will follow patient with you. Please contact me with any questions or concerns. Initial Consult note         Patient name: Kalen Ulloa  MR no: [id number]  Date of admission: 2/16/2018  Date of consultation: 2/17/2018  Requested by: Dr. Sohail Hernández  Reason for consult: Hypokalemia    Patient seen and examined. History obtained from patient and chart review. Relevant labs, data and notes reviewed. HPI: Kalen Ulloa is a 65 y.o. female with PMH significant for ETOH abuse,  Active smoker, with a recent admission at [facility] for hypokalemia/anemia approx 2weeks ago. Underwent extensive w/u for underlying malignancy. K was repleted. On f/u with PCP-> LE edema was noted so she was started on Oral lasix. Progressive weakness/fatigue ensued-> presented to [facility] ER yesterday hypotensive with SBP in the 80s and a serum K 1.9. Aggressive repletion with total of 120meq of potassium (IV+ oral)-> serum K up to 2.8 today. Nephrology consulted to evaluate and manage hypokalemia. Patient denies any significant diarrhea at this time but reports some on prior admission.  Denies any laxative use/abuse. Denies any licorice intake    PMH:  Past Medical History:   Diagnosis Date    Alcohol abuse     GI (gastrointestinal bleed)     Insomnia 11/09    Iron deficiency anemia 04/2004    PPD positive     treated w/ INH- tests since have been negative    Pure hypercholesterolemia     Scoliosis 12/28/15    [facility]    Tobacco dependence     Unspecified essential hypertension     Uterine bleeding, dysfunctional 2/12    Uterine fibroid      PSH:  Past Surgical History:   Procedure Laterality Date    COLONOSCOPY N/A 1/31/2018    COLONOSCOPY performed by Alfa Damon MD at [address] [facility] HX CHOLECYSTECTOMY      HX COLONOSCOPY  07/01/2013    MCV, repeat 10 years    HX ORTHOPAEDIC      Shattered bilateral ankles-metal plates & screws    HX OTHER SURGICAL      Benign cyst removal- L. foot    LEG/ANKLE SURGERY PROC UNLISTED         Social history:   Social History   Substance Use Topics    Smoking status: Current Every Day Smoker     Packs/day: 0.50     Years: 17.00    Smokeless tobacco: Never Used    Alcohol use 1.2 oz/week     1 Cans of beer, 1 Shots of liquor per week      Comment: Socially       Family history:  No history of CKD or ESRD in the family.      Allergies   Allergen Reactions    Robaxin [Methocarbamol] Hives, Rash and Itching     Red splotches    Statins-Hmg-Coa Reductase Inhibitors Myalgia    Codeine Itching    Neosporin [Neomycin-Bacitracin-Polymyxin] Rash       Current Facility-Administered Medications   Medication Dose Route Frequency Last Dose    NOREPINephrine (LEVOPHED) 8 mg in 5% dextrose 250mL infusion  2-16 mcg/min IntraVENous TITRATE Stopped at 02/17/18 0545    0.9% sodium chloride infusion 250 mL  250 mL IntraVENous PRN      potassium, sodium phosphates (NEUTRA-PHOS) packet 1 Packet  1 Packet Oral QID 1 Packet at 02/17/18 1225    potassium chloride 20 mEq in 50 ml IVPB  20 mEq IntraVENous Q1H 20 mEq at 02/17/18 1224    thiamine (B-1) tablet 100 mg  100 mg Oral DAILY 100 mg at 02/17/18 0858    zolpidem (AMBIEN) tablet 5 mg  5 mg Oral QHS PRN      folic acid (FOLVITE) tablet 1 mg  1 mg Oral DAILY 1 mg at 02/17/18 0858    calcium-vitamin D (OS-DELORIS) 500 mg-200 unit tablet  1 Tab Oral BID WITH MEALS 1 Tab at 02/17/18 0858    sodium chloride (NS) flush 5-10 mL  5-10 mL IntraVENous Q8H 10 mL at 02/17/18 0545    sodium chloride (NS) flush 5-10 mL  5-10 mL IntraVENous PRN      acetaminophen (TYLENOL) tablet 650 mg  650 mg Oral Q4H PRN      ondansetron (ZOFRAN) injection 4 mg  4 mg IntraVENous Q4H PRN      0.9% sodium chloride infusion  100 mL/hr IntraVENous CONTINUOUS 100 mL/hr at 02/17/18 1032    buPROPion SR (WELLBUTRIN SR) tablet 150 mg  150 mg Oral DAILY 150 mg at 02/17/18 0858       ROS (besides HPI):    General: No fever. +Fatigue. +weight loss  ENT: No hearing loss or visual changes  Cardiovascular: No Chest pain  Pulmonary: No SOB  GI: No abdominal pain. No Nausea/Vomiting/Diarrhea. No blood in stool  : No blood in urine. No foamy or cloudy urine  Musculoskeletal: No joint swelling or redness. +muscle weakness  Endocrine: no cold or heat intolerance  Psych: denies anxiety or depression  Neuro: No light headedness or dizziness    Objective   Visit Vitals    /88    Pulse 99    Temp 98.8 °F (37.1 °C)    Resp 16    Ht 5' 1\" (1.549 m)    Wt 55 kg (121 lb 3.2 oz)    SpO2 99%    BMI 22.9 kg/m2       Physical Exam:    Gen: NAD    HEENT: AT/NC, EOMI, dry mucous membrane, no scleral icterus    Neck: no JVD, no cervical lymphadenopathy, no carotid bruit    Lungs/Chest wall: Breath sounds normal. Symmetrical chest wall expansion. No accessory muscle use. Clear to auscultation    Cardiovascular: Normal S1/S2, normal rate, regular rhythm. Abdomen: soft, NT, ND, BS+, no HSM    Ext: no clubbing or cyanosis. No significant edema    Skin: warm and dry. No rashes    : no CVA tenderness    CNS: alert awake. Answers appropriately.      Labs/Data:    Lab Results   Component Value Date/Time    Sodium 143 02/17/2018 05:54 AM    Potassium 2.8 (L) 02/17/2018 05:54 AM    Potassium 2.8 (L) 02/17/2018 05:54 AM    Chloride 105 02/17/2018 05:54 AM    CO2 29 02/17/2018 05:54 AM    Anion gap 9 02/17/2018 05:54 AM    Glucose 96 02/17/2018 05:54 AM    BUN 4 (L) 02/17/2018 05:54 AM    Creatinine 0.29 (L) 02/17/2018 05:54 AM    BUN/Creatinine ratio 14 02/17/2018 05:54 AM    GFR est AA >60 02/17/2018 05:54 AM    GFR est non-AA >60 02/17/2018 05:54 AM    Calcium 6.5 (L) 02/17/2018 05:54 AM       Lab Results   Component Value Date/Time    WBC 6.3 02/17/2018 05:54 AM    Hemoglobin (POC) 4.9 02/16/2018 02:46 PM    Hemoglobin (POC) 11.2 (L) 01/26/2018 04:08 PM    HGB 5.4 (LL) 02/17/2018 05:54 AM    Hematocrit (POC) 33 (L) 01/26/2018 04:08 PM    HCT 16.9 (LL) 02/17/2018 05:54 AM    PLATELET 174 77/11/9350 05:54 AM    MCV 86.7 02/17/2018 05:54 AM       Urine analysis: reviewed    No components found for: SPEP, UPEP  No results found for: PUQ, PROTU2, PROTU1, BJP1, CPE1, IMEL1, MET2  No results found for: MCACR, MCA1, MCA2, MCA3, MCAU, MCAU2, MCALPOCT      Intake/Output Summary (Last 24 hours) at 02/17/18 1236  Last data filed at 02/17/18 1145   Gross per 24 hour   Intake          1395.27 ml   Output                0 ml   Net          1395.27 ml       Wt Readings from Last 3 Encounters:   02/17/18 55 kg (121 lb 3.2 oz)   02/16/18 46.3 kg (102 lb)   02/07/18 60.3 kg (133 lb)       Signed by:  Declan Tillman MD  Nephrology and Hypertension  [facility]

## 2018-02-17 NOTE — ED NOTES
Discussed central line placement with ER MD. Unable to do central line at this time. Charge RN aware.

## 2018-02-18 ENCOUNTER — APPOINTMENT (OUTPATIENT)
Dept: ULTRASOUND IMAGING | Age: 58
DRG: 640 | End: 2018-02-18
Attending: INTERNAL MEDICINE
Payer: COMMERCIAL

## 2018-02-18 LAB
25(OH)D3 SERPL-MCNC: <9 NG/ML (ref 30–100)
ABO + RH BLD: NORMAL
ALBUMIN SERPL-MCNC: 1.7 G/DL (ref 3.5–5)
ALBUMIN/GLOB SERPL: 0.5 {RATIO} (ref 1.1–2.2)
ALP SERPL-CCNC: 141 U/L (ref 45–117)
ALT SERPL-CCNC: 10 U/L (ref 12–78)
AMMONIA PLAS-SCNC: 46 UMOL/L
ANION GAP SERPL CALC-SCNC: 5 MMOL/L (ref 5–15)
ARTERIAL PATENCY WRIST A: YES
AST SERPL-CCNC: 39 U/L (ref 15–37)
BACTERIA SPEC CULT: NORMAL
BACTERIA SPEC CULT: NORMAL
BASE EXCESS BLD CALC-SCNC: 1 MMOL/L
BASOPHILS # BLD: 0 K/UL (ref 0–0.1)
BASOPHILS NFR BLD: 0 % (ref 0–1)
BDY SITE: ABNORMAL
BILIRUB DIRECT SERPL-MCNC: 0.4 MG/DL (ref 0–0.2)
BILIRUB SERPL-MCNC: 0.8 MG/DL (ref 0.2–1)
BLD PROD TYP BPU: NORMAL
BLD PROD TYP BPU: NORMAL
BLOOD GROUP ANTIBODIES SERPL: NORMAL
BPU ID: NORMAL
BPU ID: NORMAL
BUN SERPL-MCNC: 3 MG/DL (ref 6–20)
BUN/CREAT SERPL: 8 (ref 12–20)
CALCIUM SERPL-MCNC: 7.1 MG/DL (ref 8.5–10.1)
CHLORIDE SERPL-SCNC: 113 MMOL/L (ref 97–108)
CO2 SERPL-SCNC: 29 MMOL/L (ref 21–32)
CREAT SERPL-MCNC: 0.36 MG/DL (ref 0.55–1.02)
CROSSMATCH RESULT,%XM: NORMAL
CROSSMATCH RESULT,%XM: NORMAL
DIFFERENTIAL METHOD BLD: ABNORMAL
EOSINOPHIL # BLD: 0 K/UL (ref 0–0.4)
EOSINOPHIL NFR BLD: 0 % (ref 0–7)
ERYTHROCYTE [DISTWIDTH] IN BLOOD BY AUTOMATED COUNT: 17.3 % (ref 11.5–14.5)
GAS FLOW.O2 O2 DELIVERY SYS: ABNORMAL L/MIN
GAS FLOW.O2 SETTING OXYMISER: 2 L/M
GLOBULIN SER CALC-MCNC: 3.3 G/DL (ref 2–4)
GLUCOSE BLD STRIP.AUTO-MCNC: 114 MG/DL (ref 65–100)
GLUCOSE SERPL-MCNC: 100 MG/DL (ref 65–100)
HCO3 BLD-SCNC: 25.1 MMOL/L (ref 22–26)
HCT VFR BLD AUTO: 25 % (ref 35–47)
HGB BLD-MCNC: 8.3 G/DL (ref 11.5–16)
IMM GRANULOCYTES # BLD: 0 K/UL
IMM GRANULOCYTES NFR BLD AUTO: 0 %
LYMPHOCYTES # BLD: 0.6 K/UL (ref 0.8–3.5)
LYMPHOCYTES NFR BLD: 8 % (ref 12–49)
MAGNESIUM SERPL-MCNC: 1.9 MG/DL (ref 1.6–2.4)
MCH RBC QN AUTO: 27.8 PG (ref 26–34)
MCHC RBC AUTO-ENTMCNC: 33.2 G/DL (ref 30–36.5)
MCV RBC AUTO: 83.6 FL (ref 80–99)
METAMYELOCYTES NFR BLD MANUAL: 1 %
MONOCYTES # BLD: 0.7 K/UL (ref 0–1)
MONOCYTES NFR BLD: 10 % (ref 5–13)
NEUTS SEG # BLD: 5.9 K/UL (ref 1.8–8)
NEUTS SEG NFR BLD: 81 % (ref 32–75)
NRBC # BLD: 0 K/UL (ref 0–0.01)
NRBC BLD-RTO: 0 PER 100 WBC
PCO2 BLD: 36.4 MMHG (ref 35–45)
PH BLD: 7.45 [PH] (ref 7.35–7.45)
PHOSPHATE SERPL-MCNC: 1.9 MG/DL (ref 2.6–4.7)
PLATELET # BLD AUTO: 209 K/UL (ref 150–400)
PLATELET COMMENTS,PCOM: ABNORMAL
PMV BLD AUTO: 10.3 FL (ref 8.9–12.9)
PO2 BLD: 60 MMHG (ref 80–100)
POTASSIUM SERPL-SCNC: 3.5 MMOL/L (ref 3.5–5.1)
PREALB SERPL-MCNC: 4.4 MG/DL (ref 20–40)
PROT SERPL-MCNC: 5 G/DL (ref 6.4–8.2)
RBC # BLD AUTO: 2.99 M/UL (ref 3.8–5.2)
RBC MORPH BLD: ABNORMAL
SAO2 % BLD: 92 % (ref 92–97)
SERVICE CMNT-IMP: ABNORMAL
SERVICE CMNT-IMP: NORMAL
SODIUM SERPL-SCNC: 147 MMOL/L (ref 136–145)
SPECIMEN EXP DATE BLD: NORMAL
SPECIMEN TYPE: ABNORMAL
STATUS OF UNIT,%ST: NORMAL
STATUS OF UNIT,%ST: NORMAL
TOTAL RESP. RATE, ITRR: 30
UNIT DIVISION, %UDIV: 0
UNIT DIVISION, %UDIV: 0
WBC # BLD AUTO: 7.3 K/UL (ref 3.6–11)

## 2018-02-18 PROCEDURE — 80076 HEPATIC FUNCTION PANEL: CPT | Performed by: INTERNAL MEDICINE

## 2018-02-18 PROCEDURE — 82306 VITAMIN D 25 HYDROXY: CPT | Performed by: INTERNAL MEDICINE

## 2018-02-18 PROCEDURE — 83735 ASSAY OF MAGNESIUM: CPT | Performed by: INTERNAL MEDICINE

## 2018-02-18 PROCEDURE — 77010033678 HC OXYGEN DAILY

## 2018-02-18 PROCEDURE — 65610000006 HC RM INTENSIVE CARE

## 2018-02-18 PROCEDURE — 74011250636 HC RX REV CODE- 250/636: Performed by: INTERNAL MEDICINE

## 2018-02-18 PROCEDURE — 82803 BLOOD GASES ANY COMBINATION: CPT

## 2018-02-18 PROCEDURE — 3331090001 HH PPS REVENUE CREDIT

## 2018-02-18 PROCEDURE — 84100 ASSAY OF PHOSPHORUS: CPT | Performed by: INTERNAL MEDICINE

## 2018-02-18 PROCEDURE — 74011250637 HC RX REV CODE- 250/637: Performed by: INTERNAL MEDICINE

## 2018-02-18 PROCEDURE — 74011000250 HC RX REV CODE- 250: Performed by: INTERNAL MEDICINE

## 2018-02-18 PROCEDURE — 74011000258 HC RX REV CODE- 258: Performed by: INTERNAL MEDICINE

## 2018-02-18 PROCEDURE — 36600 WITHDRAWAL OF ARTERIAL BLOOD: CPT

## 2018-02-18 PROCEDURE — 36415 COLL VENOUS BLD VENIPUNCTURE: CPT | Performed by: INTERNAL MEDICINE

## 2018-02-18 PROCEDURE — 82962 GLUCOSE BLOOD TEST: CPT

## 2018-02-18 PROCEDURE — 3331090002 HH PPS REVENUE DEBIT

## 2018-02-18 PROCEDURE — 80048 BASIC METABOLIC PNL TOTAL CA: CPT | Performed by: INTERNAL MEDICINE

## 2018-02-18 PROCEDURE — 82140 ASSAY OF AMMONIA: CPT | Performed by: INTERNAL MEDICINE

## 2018-02-18 PROCEDURE — 85025 COMPLETE CBC W/AUTO DIFF WBC: CPT | Performed by: INTERNAL MEDICINE

## 2018-02-18 PROCEDURE — 76705 ECHO EXAM OF ABDOMEN: CPT

## 2018-02-18 PROCEDURE — 84134 ASSAY OF PREALBUMIN: CPT | Performed by: INTERNAL MEDICINE

## 2018-02-18 RX ORDER — DEXTROSE MONOHYDRATE 50 MG/ML
75 INJECTION, SOLUTION INTRAVENOUS CONTINUOUS
Status: DISCONTINUED | OUTPATIENT
Start: 2018-02-18 | End: 2018-02-18

## 2018-02-18 RX ADMIN — LORAZEPAM 2 MG: 2 INJECTION INTRAMUSCULAR; INTRAVENOUS at 10:39

## 2018-02-18 RX ADMIN — POTASSIUM CHLORIDE: 2 INJECTION, SOLUTION, CONCENTRATE INTRAVENOUS at 18:21

## 2018-02-18 RX ADMIN — VANCOMYCIN HYDROCHLORIDE 750 MG: 10 INJECTION, POWDER, LYOPHILIZED, FOR SOLUTION INTRAVENOUS at 18:19

## 2018-02-18 RX ADMIN — LACTULOSE 1000 ML: 10 SOLUTION ORAL at 17:10

## 2018-02-18 RX ADMIN — DEXTROSE MONOHYDRATE: 5 INJECTION, SOLUTION INTRAVENOUS at 10:39

## 2018-02-18 RX ADMIN — VANCOMYCIN HYDROCHLORIDE 750 MG: 10 INJECTION, POWDER, LYOPHILIZED, FOR SOLUTION INTRAVENOUS at 03:03

## 2018-02-18 RX ADMIN — SODIUM CHLORIDE 100 ML/HR: 450 INJECTION, SOLUTION INTRAVENOUS at 08:37

## 2018-02-18 RX ADMIN — LACTULOSE 1000 ML: 10 SOLUTION ORAL at 21:26

## 2018-02-18 RX ADMIN — Medication 10 ML: at 21:26

## 2018-02-18 RX ADMIN — LORAZEPAM 2 MG: 2 INJECTION INTRAMUSCULAR; INTRAVENOUS at 03:44

## 2018-02-18 RX ADMIN — VANCOMYCIN HYDROCHLORIDE 750 MG: 10 INJECTION, POWDER, LYOPHILIZED, FOR SOLUTION INTRAVENOUS at 10:47

## 2018-02-18 RX ADMIN — POTASSIUM CHLORIDE: 2 INJECTION, SOLUTION, CONCENTRATE INTRAVENOUS at 09:46

## 2018-02-18 RX ADMIN — PIPERACILLIN, TAZOBACTAM 13.5 G: 4; .5 INJECTION, POWDER, LYOPHILIZED, FOR SOLUTION INTRAVENOUS at 19:08

## 2018-02-18 RX ADMIN — Medication 10 ML: at 05:26

## 2018-02-18 NOTE — PROGRESS NOTES
0730: Bedside and Verbal shift change report given to Jessika Saucedo RN (oncoming nurse) by Bashir Garcia RN and Yoselin Acosta, Ralf (offgoing nurse). Report included the following information SBAR, Kardex, Intake/Output, MAR, Recent Results and Cardiac Rhythm Sinus Tach. 0800:  Assessment complete. Patient lethargic, not responding to stimuli or sternal rub. Patient eventually opened her eyes, incomprehensible words. Blood sugar and blood gas checked, both ok, but O2 low so NC titrated up by RT. Dr. Precious Uribe notified. No new orders at this time. 0930:  Updated Dr. Jackie Yarbrough on patient condition. Received orders to send ammonia level. 1200:  Reassessment complete. No new issues. Patient remains lethargic, but could tell me her name and follow simple commands. 1600:  Reassessment complete. No new issues. Patient remains lethargic, sleeping most of the day. Dr. Jackie Yarbrough updated. Received orders for lactulose enema. Also updated Dr. Jackie Yarbrough on patient's low urine output. Bladder scan revealed less than 44 mls. Rendon flushed with no problems. Received orders to increase patient's maintenance fluids to 100 mls/hr. 1930: Bedside and Verbal shift change report given to Bashir Garcia RN (oncoming nurse) by Jessika Saucedo RN (offgoing nurse). Report included the following information SBAR, Kardex, Intake/Output, MAR, Recent Results and Cardiac Rhythm Sinus Tach. Shift Summary:  Uneventful shift. Patient given ativan once for CIWA score. Bone marrow biopsy scheduled for tomorrow, orders entered. Abdominal US pending.

## 2018-02-18 NOTE — PROGRESS NOTES
Bedside shift change report given to Mahad Lopez RN (oncoming nurse) by Frederic Sanchez RN (offgoing nurse). Report included the following information SBAR, Kardex, Procedure Summary, Intake/Output, MAR, Accordion, Recent Results, Cardiac Rhythm NSR/ST and Alarm Parameters . SHIFT SUMMARY: Patient actively withdrawing from etoh. Urinary catheter placed due to retention. Urine output steadily declined throughout shift. Patient not alert enough to take PO medication.  states that patient smokes . 5-1 pack of cigarettes daily, will pass on in report.

## 2018-02-18 NOTE — PROGRESS NOTES
PULMONARY/Critical Care/SLEEP MEDICINE  [facility]  Name: Key Lantigua   : 1960   MRN: [id number]   Date: 2018 8:46 AM   [x]I have reviewed the flowsheet and previous days notes. [x]The patient is unable to give any meaningful history or review of systems because the patient is:  []Intubated [x]Unreliable historian   []Sedated    []Unresponsive      []The patient is critically ill on      []Mechanical ventilation []Pressors   []BiPAP []     : Events noted. More confused and obtunded now. On CIWA for heavy ETOH history. Receiving electrolytes, IV fluids. 2 U PRBCs yesterday. Seen and examined. Chart reviewed. Not a good historian. Admitted with generalized weakness, severe hypokalemia, hypo mag, anemia of unclear source and malnutrition. Was at [address] [place] [address] earlier. Pressor dependent. Anemia source unclear. Chart mentions uterine fibroids, DUB, GI source, iron def vs other. Not sure if hematology has been involved. Receiving aggressive IV electrolyte replacement. ROS:Generalized weakness.  Not a good historian     Vital Signs:    Visit Vitals    /80 (BP 1 Location: Right arm, BP Patient Position: At rest)    Pulse (!) 102    Temp 98.8 °F (37.1 °C)    Resp (!) 36    Ht 5' 1\" (1.549 m)    Wt 55 kg (121 lb 3.2 oz)    SpO2 100%    BMI 22.9 kg/m2       O2 Device: Nasal cannula   O2 Flow Rate (L/min): 2 l/min   Temp (24hrs), Av.8 °F (37.1 °C), Min:98.4 °F (36.9 °C), Max:99.2 °F (37.3 °C)       Intake/Output:   Last shift:       07 -  190  In: 300 [I.V.:300]  Out: 5 [Urine:5]  Last 3 shifts: 1901 -  07  In: 9549 [I.V.:3909]  Out: 810 [Urine:760; Drains:50]    Intake/Output Summary (Last 24 hours) at 18 0847  Last data filed at 18 0800   Gross per 24 hour   Intake          3958.75 ml   Output              815 ml   Net          3143.75 ml          Physical Exam:    General: []Intubated/sedated []No acute distress []    [x]Ill appearing [] []      HEENT: []Icteric []PERRL []    [x]Anicteric Mucosa:[]moist   []dry []      Resp: []Wheeze [x]Clear to ascultation bilaterally []Accessory muscle use    []Rales []Chest tube:  []Air leak []    []Ronchi []Crepitus []   []Coarse bilateral ventilator breath sounds      CV: [x]Regular []Tachycardia []    []Irregular []Bradycardic []    []Murmur []S3 []    []Edema []S4 []      GI: [x]Soft  []NG Tube Bowel sounds: []present []absent    []Firm []PEG []    []Distended  []      : []Rendon []Hematuria []    []Clear urine []Edema []      MSK:    []SCDs []Edema []    []No deformities [] []      Skin: [x]Warm []Dry  []    []Cool []Moist []    []Hot  []Diaphoretic []    []Rash  []Cyanosis []      N-psych: []Sedated  []Agitated [x]Obtunded     []Alert  Follows commands:   []Yes  []No []      Devices: []ET-Tube []Tracheostomy []Chest tube: Air leak? []Y/[]N    [x]Central Line []PA Catheter []    []PICC [] []      DATA:   Current Facility-Administered Medications   Medication Dose Route Frequency    0.9% sodium chloride infusion 250 mL  250 mL IntraVENous PRN    potassium, sodium phosphates (NEUTRA-PHOS) packet 1 Packet  1 Packet Oral QID    0.45% sodium chloride infusion  100 mL/hr IntraVENous CONTINUOUS    piperacillin-tazobactam (ZOSYN) 13.5 g in  mL infusion  13.5 g IntraVENous Q24H    Vancomycin- pharmacy to dose   Other Rx Dosing/Monitoring    vancomycin (VANCOCIN) 750 mg in  ml infusion  750 mg IntraVENous Q8H    dextrose 5 % - 0.45% NaCl 7,745 mL with folic acid 1 mg, thiamine 200 mg, mvi, adult no. 4 with vit K 10 mL infusion   IntraVENous DAILY    LORazepam (ATIVAN) injection 1 mg  1 mg IntraVENous Q2H PRN    LORazepam (ATIVAN) injection 2 mg  2 mg IntraVENous Q2H PRN    zolpidem (AMBIEN) tablet 5 mg  5 mg Oral QHS PRN    calcium-vitamin D (OS-DELORIS) 500 mg-200 unit tablet  1 Tab Oral BID WITH MEALS    sodium chloride (NS) flush 5-10 mL  5-10 mL IntraVENous Q8H    sodium chloride (NS) flush 5-10 mL  5-10 mL IntraVENous PRN    acetaminophen (TYLENOL) tablet 650 mg  650 mg Oral Q4H PRN    ondansetron (ZOFRAN) injection 4 mg  4 mg IntraVENous Q4H PRN    buPROPion SR (WELLBUTRIN SR) tablet 150 mg  150 mg Oral DAILY       Telemetry: []Sinus []A-flutter []Paced    []A-fib []Multiple PVCs                  Labs:  Recent Labs      02/18/18   0356  02/17/18   1547  02/17/18   0554  02/16/18   1520   WBC  7.3   --   6.3  8.8   HGB  8.3*  9.0*  5.4*  8.2*   HCT  25.0*  26.8*  16.9*  25.7*   PLT  209   --   211  323     Recent Labs      02/18/18   0356  02/17/18   1541  02/17/18   0554   02/16/18   1520   NA  147*  143  143   --   138   K  3.5  4.1  2.8*  2.8*   < >  1.9*   CL  113*  110*  105   --   92*   CO2  29  27  29   --   36*   GLU  100  107*  96   --   126*   BUN  3*  3*  4*   --   4*   CREA  0.36*  0.32*  0.29*   --   0.56   CA  7.1*  6.7*  6.5*   --   7.6*   MG  1.9  2.2  1.7  1.7   < >  1.4*   PHOS  1.9*  1.8*  2.0*   --    --    ALB   --    --   1.6*   --   2.1*   TBILI   --    --   0.5   --   1.1*   SGOT   --    --   36   --   50*   ALT   --    --   12   --   12   INR   --    --    --    --   1.2*    < > = values in this interval not displayed. No results for input(s): PH, PCO2, PO2, HCO3, FIO2 in the last 72 hours.     Imaging:  [x]I have personally reviewed the patients radiographs  []Radiographs reviewed with radiologist   []No change from prior, tubes and lines in adequate position  []Improved   []Worsening       IMPRESSION:   · Generalized weakness  · Severe hypokalemia, hypo mag  · Severe Anemia ?source DUB, ?GI,  ?other  · ETOH abuse, withdrawal/DTs   · SIRS   · Hypoalbuminemia  · Malnutrition  · Failure to thrive    PLAN:   · Transfuse PRN  · Pressors  · Replace lytes  · Antibiotics   · Check CXR   · Check LFTs and Ammonia   · IV fluids  · DVT and GI prophylaxis      The patient is: [x] acutely ill Risk of deterioration: [x] moderate    [] critically ill  [] high     []See my orders for details     My assessment/plan was discussed with:  [x]nursing []PT/OT    []respiratory therapy [x]   []family []     []Total critical care time exclusive of procedures       minutes  Amber Voss MD

## 2018-02-18 NOTE — PROGRESS NOTES
Patient name: Kaiden Moore  MRN: [id number]    Nephrology Progress note:    Assessment:  Hypokalemia/Hypophosphatemia: recurrent-> Suspect whole body potassium depletion (ETOH abuse/poor intake/nutritional status) exacerbated by renal wasting via lasix. Improving s/p aggressive replacement     Anemia: requiring PRBCS. Hematology consulting     Hypotension: volume depletion. Improved->Off vasopressor support     Failure to thrive: ETOH abuse/Hypoalbuminemia/Weight loss-> worked up for underlying malignancy- negative     Hypernatremia    Plan/Recommendations:  Change maintenance IVF to D5W +20meq/L KCl at 75cc/hr  Change base in banana bag to D5W as well  IV KPhos 20 mmol x1 dose  CIWA  Repeat BMP at 6pm  Am labs      Subjective: Altered-> withdrawal symptoms. Marginal UOP this morning. BP better    ROS:   UTO    Exam:  Visit Vitals    /80 (BP 1 Location: Right arm, BP Patient Position: At rest)    Pulse (!) 102    Temp 98.8 °F (37.1 °C)    Resp (!) 36    Ht 5' 1\" (1.549 m)    Wt 55 kg (121 lb 3.2 oz)    SpO2 100%    BMI 22.9 kg/m2     Wt Readings from Last 3 Encounters:   02/17/18 55 kg (121 lb 3.2 oz)   02/16/18 46.3 kg (102 lb)   02/07/18 60.3 kg (133 lb)       Intake/Output Summary (Last 24 hours) at 02/18/18 0900  Last data filed at 02/18/18 0800   Gross per 24 hour   Intake          3808.75 ml   Output              815 ml   Net          2993.75 ml       Gen: Resting  HEENT:  AT/NC  Lungs/Chest wall: Clear. Cardiovascular: Regular rate, normal rhythm. Abdomen/: Soft, NT, ND, BS+. Ext: No peripheral edema  CNS: Altered      Current Facility-Administered Medications   Medication Dose Route Frequency Last Dose    potassium phosphate 20 mmol in dextrose 5% 250 mL infusion   IntraVENous ONCE      dextrose 5% 1,000 mL with potassium chloride 20 mEq, folic acid 1 mg, thiamine 200 mg, mvi, adult no. 4 with vit K 10 mL infusion   IntraVENous DAILY      dextrose 5% 1,000 mL with potassium chloride 20 mEq infusion   IntraVENous CONTINUOUS      0.9% sodium chloride infusion 250 mL  250 mL IntraVENous PRN      piperacillin-tazobactam (ZOSYN) 13.5 g in  mL infusion  13.5 g IntraVENous Q24H 13.5 g at 02/17/18 2003    Vancomycin- pharmacy to dose   Other Rx Dosing/Monitoring      vancomycin (VANCOCIN) 750 mg in  ml infusion  750 mg IntraVENous Q8H 750 mg at 02/18/18 0303    LORazepam (ATIVAN) injection 1 mg  1 mg IntraVENous Q2H PRN      LORazepam (ATIVAN) injection 2 mg  2 mg IntraVENous Q2H PRN 2 mg at 02/18/18 0344    zolpidem (AMBIEN) tablet 5 mg  5 mg Oral QHS PRN Stopped at 02/17/18 2003    calcium-vitamin D (OS-DELORIS) 500 mg-200 unit tablet  1 Tab Oral BID WITH MEALS Stopped at 02/18/18 0800    sodium chloride (NS) flush 5-10 mL  5-10 mL IntraVENous Q8H 10 mL at 02/18/18 0526    sodium chloride (NS) flush 5-10 mL  5-10 mL IntraVENous PRN      acetaminophen (TYLENOL) tablet 650 mg  650 mg Oral Q4H PRN      ondansetron (ZOFRAN) injection 4 mg  4 mg IntraVENous Q4H PRN      buPROPion SR (WELLBUTRIN SR) tablet 150 mg  150 mg Oral DAILY Stopped at 02/18/18 0900       Labs/Data:    Lab Results   Component Value Date/Time    WBC 7.3 02/18/2018 03:56 AM    Hemoglobin (POC) 4.9 02/16/2018 02:46 PM    Hemoglobin (POC) 11.2 (L) 01/26/2018 04:08 PM    HGB 8.3 (L) 02/18/2018 03:56 AM    Hematocrit (POC) 33 (L) 01/26/2018 04:08 PM    HCT 25.0 (L) 02/18/2018 03:56 AM    PLATELET 640 89/84/7884 03:56 AM    MCV 83.6 02/18/2018 03:56 AM       Lab Results Component Value Date/Time    Sodium 147 (H) 02/18/2018 03:56 AM    Potassium 3.5 02/18/2018 03:56 AM    Chloride 113 (H) 02/18/2018 03:56 AM    CO2 29 02/18/2018 03:56 AM    Anion gap 5 02/18/2018 03:56 AM    Glucose 100 02/18/2018 03:56 AM    BUN 3 (L) 02/18/2018 03:56 AM    Creatinine 0.36 (L) 02/18/2018 03:56 AM    BUN/Creatinine ratio 8 (L) 02/18/2018 03:56 AM    GFR est AA >60 02/18/2018 03:56 AM    GFR est non-AA >60 02/18/2018 03:56 AM    Calcium 7.1 (L) 02/18/2018 03:56 AM       Patient seen and examined. Chart reviewed. Labs, data and other pertinent notes reviewed in last 24 hrs.     Discussed with RN    Signed by:  Nisha Cramer MD

## 2018-02-18 NOTE — PROGRESS NOTES
[id number] [facility] at [facility]  [phone]    Hematology / Oncology Progress Note    Reason for Visit:   Contreras Smith is a 65 y.o. female who is seen in consultation at the request of Dr. Magdaleno Ann for evaluation of anemia. History of Present Illness:   Ms. Keyla Berry is a 66 y/o female admitted with weakness and hypokalemia (K 1.9). She had a recent hospital admission at [facility] for the same reason on 1/26/18. She has been feeling poorly for the past few days with bodyaches, weakness, and decreased p.o. intake. At PCP's office, i-STAT Hgb was 4, and patient was directed to the ER. Upon arrival in ER, Hgb was 8.2, potassium 1.9, Mg 1.4, lactate 3.4, SBP in 70s-90s. While at [facility], she received 2 units of PRBCs. Given edema, she was treated with Lasix while there. She also has right arm swelling and was found here to have a RUE superficial venous thrombosis. She also notes some mild intermittent diarrhea. No fevers, dysuria, cough, abdom pain. Of note, patient was seen by Hematology while admitted to [facility]. The reason for consultation at that time was abnormal CT scan (mediastinal LAD, liver lesion, diffuse colitis/ bowel changes, bone lesions/ ovary cyst/ fibroids.) Dr. Elmer Booth and Dr. Christy Bingham in [facility] [place] evaluated patient at that time and felt her mediastinal LAD was likely due to sarcoidosis rather than malignancy. Patient underwent bronchoscopy and biopsy of LN by Dr. Jimena Bejarano. Pathology findings were inconclusive (peripheral blood and scattered ciliated bronchial lining cells. ). For pelvic mass along with weight loss, Gyn was consulted but stated that this was unlikely to be 2/2 malignancy, corroborated by normal CA-125 level. She also underwent EGD by Dr. Ariella Corado with finding of a gastric antrum ulcer. Biopsy revealed benign mucosa with mild chronic active gastritis and surface erosion, no H. Pylori.      Review of labs here reveals drop in Hgb from 8-9 range to now 5.4. Iron sat elevated > 80%. Although VitB12 level was recently checked and normal, there is a low VitB1 (thiamine) level from 9/2017. Home med list includes thiamine tablets 100mg/d. When asked about alcohol intake, she states she drinks a fifth of liquor several times a month, but her  states she drinks this amount daily for the past several years. He states she has had less alcohol in the past 2-3 weeks due to feeling sick. She denies any tremors, seizures or other withdrawal symptoms when she goes without a drink. No recent viral infections or exposures per patient. Her maternal uncle had lymphoma. No personal history of cancer or hemoglobinopathies. Interval History:  Patient received 2 Units of PRBCs yesterday with Hgb up to 9 afterwards, 8.3 this morning. No obvious blood loss. Patient seen this morning, has AMS, not answering questions, drooling.  She was started on Ativan with CIWA protocol for alcohol withdrawal.    Past Medical History:   Diagnosis Date    Alcohol abuse     GI (gastrointestinal bleed)     Insomnia 11/09    Iron deficiency anemia 04/2004    PPD positive     treated w/ INH- tests since have been negative    Pure hypercholesterolemia     Scoliosis 12/28/15    [facility]    Tobacco dependence     Unspecified essential hypertension     Uterine bleeding, dysfunctional 2/12    Uterine fibroid       Past Surgical History:   Procedure Laterality Date    COLONOSCOPY N/A 1/31/2018    COLONOSCOPY performed by Josiane Khan MD at [address] [facility] HX CHOLECYSTECTOMY      HX COLONOSCOPY  07/01/2013    MCV, repeat 10 years    HX ORTHOPAEDIC      Shattered bilateral ankles-metal plates & screws    HX OTHER SURGICAL      Benign cyst removal- L. foot    LEG/ANKLE SURGERY PROC UNLISTED        Social History   Substance Use Topics    Smoking status: Current Every Day Smoker     Packs/day: 0.50     Years: 17.00    Smokeless tobacco: Never Used    Alcohol use 1.2 oz/week     1 Cans of beer, 1 Shots of liquor per week      Comment: Socially      Family History   Problem Relation Age of Onset    Hypertension Mother     Diabetes Mother     Cancer Mother      breast    Heart Disease Father      MI 42's    Kidney Disease Father     Hypertension Son      Current Facility-Administered Medications   Medication Dose Route Frequency    potassium phosphate 20 mmol in dextrose 5% 250 mL infusion   IntraVENous ONCE    dextrose 5% 1,000 mL with potassium chloride 20 mEq, folic acid 1 mg, thiamine 200 mg, mvi, adult no. 4 with vit K 10 mL infusion   IntraVENous DAILY    potassium chloride 20 mEq in dextrose 5% 1,000 mL IVPB   IntraVENous CONTINUOUS    [START ON 2/19/2018] vancomycin trough 03:00 on 2/19 - draw immediately prior to dose due @ 03:00   Other ONCE    0.9% sodium chloride infusion 250 mL  250 mL IntraVENous PRN    piperacillin-tazobactam (ZOSYN) 13.5 g in  mL infusion  13.5 g IntraVENous Q24H    Vancomycin- pharmacy to dose   Other Rx Dosing/Monitoring    vancomycin (VANCOCIN) 750 mg in  ml infusion  750 mg IntraVENous Q8H    LORazepam (ATIVAN) injection 1 mg  1 mg IntraVENous Q2H PRN    LORazepam (ATIVAN) injection 2 mg  2 mg IntraVENous Q2H PRN    zolpidem (AMBIEN) tablet 5 mg  5 mg Oral QHS PRN    calcium-vitamin D (OS-DELORIS) 500 mg-200 unit tablet  1 Tab Oral BID WITH MEALS    sodium chloride (NS) flush 5-10 mL  5-10 mL IntraVENous Q8H    sodium chloride (NS) flush 5-10 mL  5-10 mL IntraVENous PRN    acetaminophen (TYLENOL) tablet 650 mg  650 mg Oral Q4H PRN    ondansetron (ZOFRAN) injection 4 mg  4 mg IntraVENous Q4H PRN    buPROPion SR (WELLBUTRIN SR) tablet 150 mg  150 mg Oral DAILY      Allergies   Allergen Reactions    Robaxin [Methocarbamol] Hives, Rash and Itching     Red splotches    Statins-Hmg-Coa Reductase Inhibitors Myalgia    Codeine Itching    Neosporin [Neomycin-Bacitracin-Polymyxin] Rash        Review of Systems: A complete review of systems was obtained, negative except as described above. Physical Exam:     Visit Vitals    /78    Pulse (!) 101    Temp 98.8 °F (37.1 °C)    Resp (!) 31    Ht 5' 1\" (1.549 m)    Wt 121 lb 3.2 oz (55 kg)    SpO2 100%    BMI 22.9 kg/m2     ECOG PS: 3  General: No acute distress, appears weak and disheveled  Eyes: PERRLA, anicteric sclerae  HENT: Atraumatic with normal appearance of ears and nose; OP clear, Drooling  Neck: Supple; no thyromegaly   Lymphatic: No cervical, supraclavicular, or inguinal adenopathy  Respiratory: CTAB, normal respiratory effort  CV: Normal rate, regular rhythm, no murmurs. RUE and BLE edema  GI: Soft, nontender, nondistended, no masses, no hepatomegaly, no splenomegaly  MS: Normal gait and station. Digits without clubbing or cyanosis. Skin: No rashes, ecchymoses, or petechiae. Normal temperature, turgor, and texture. Neuro/Psych: Altered mental status and less responsive today. Generalized weakness with inability/difficulty removing blanket off of her body yesterday and today. Results:     Lab Results   Component Value Date/Time    WBC 7.3 02/18/2018 03:56 AM    HGB 8.3 (L) 02/18/2018 03:56 AM    HCT 25.0 (L) 02/18/2018 03:56 AM    PLATELET 623 09/35/6871 03:56 AM    MCV 83.6 02/18/2018 03:56 AM    ABS.  NEUTROPHILS 5.9 02/18/2018 03:56 AM    Hemoglobin (POC) 4.9 02/16/2018 02:46 PM    Hemoglobin (POC) 11.2 (L) 01/26/2018 04:08 PM    Hematocrit (POC) 33 (L) 01/26/2018 04:08 PM     Lab Results   Component Value Date/Time    Sodium 147 (H) 02/18/2018 03:56 AM    Potassium 3.5 02/18/2018 03:56 AM    Chloride 113 (H) 02/18/2018 03:56 AM    CO2 29 02/18/2018 03:56 AM    Glucose 100 02/18/2018 03:56 AM    BUN 3 (L) 02/18/2018 03:56 AM    Creatinine 0.36 (L) 02/18/2018 03:56 AM    GFR est AA >60 02/18/2018 03:56 AM    GFR est non-AA >60 02/18/2018 03:56 AM    Calcium 7.1 (L) 02/18/2018 03:56 AM    Sodium (POC) 144 01/26/2018 04:08 PM    Potassium (POC) <2.0 (LL) 01/26/2018 04:08 PM    Chloride (POC) 98 01/26/2018 04:08 PM    Glucose (POC) 114 (H) 02/18/2018 08:30 AM    BUN (POC) <3 (L) 01/26/2018 04:08 PM    Creatinine (POC) 0.6 01/26/2018 04:08 PM    Calcium, ionized (POC) 1.06 (L) 01/26/2018 04:08 PM     Lab Results   Component Value Date/Time    Bilirubin, total 0.8 02/18/2018 09:47 AM    ALT (SGPT) 10 (L) 02/18/2018 09:47 AM    AST (SGOT) 39 (H) 02/18/2018 09:47 AM    Alk. phosphatase 141 (H) 02/18/2018 09:47 AM    Protein, total 5.0 (L) 02/18/2018 09:47 AM    Albumin 1.7 (L) 02/18/2018 09:47 AM    Globulin 3.3 02/18/2018 09:47 AM         Assessment and Recommendations:   Jonathan Salazar is a 65 y.o. female admitted with weakness, fatigue, hypokalemia and anemia. 1. Normocytic anemia: Usually a sudden drop in Hgb from 8 to 5 within 1-2 days is due to blood loss. However, patient,  and nursing deny any obvious blood loss. No intense abdominal pain. Patient was found to have a gastric ulcer during admission last month. I suspect she has a combination of blood loss and bone marrow suppression from alcohol abuse. -- Recommend bone marrow biopsy given anemia requiring blood transfusions, lack of adequate reticulocyte response to rule out any abnormal cells. -- Agree with transfusion for goal Hgb > 7.    2. Alcohol abuse: Patient has been drinking liquor daily for the past several years. This could explain her weight loss, hypoalbuminemia, decreased PO intake, thiamine deficiency. Coags mildly elevated likely due to liver dysfunction. Recent CT comments on diffuse hypodensity. -- Needs alcohol cessation. -- On Ativan with [facility] protocol for withdrawal symptoms  -- Check liver ultrasound to further characterize liver parenchyma (eval for cirrhosis) and mass lesion noted on CT    3. Hypokalemia: Due to recent Lasix as well as decreased PO intake and past diarrhea per Nephrology.     4. Gastric ulcer: Needs to avoid NSAIDs and take PPI daily. 5. Mediastinal LAD: Possibly related to sarcoidosis given CT report/addendum from last hospital admission. Bronch biopsy was inconclusive.       Signed By: Orlin Seals MD     February 18, 2018

## 2018-02-18 NOTE — PROGRESS NOTES
Physical Therapy  2/18/18      Order received. Patient chart reviewed. Discussed with RN. Patient on hold for mobility assessment this date due to ETOH DTs. Will follow up tomorrow as appropriate. Analilia Vargas, PT, DPT

## 2018-02-18 NOTE — PROGRESS NOTES
Occupational Therapy [phone]:     Order received. Patient chart reviewed. Discussed with RN. Patient on hold for mobility assessment this date due to ETOH DTs.  Will follow up tomorrow as appropriate.     Princess Ronquillo OTR/L

## 2018-02-18 NOTE — PROGRESS NOTES
Hospitalist Progress Note  Yolanda Mojica MD  Answering service: [phone] from in house phone      Date of Service:  2018  NAME:  Loraine Matias  :  1960  MRN:  [id number]      Admission Summary:   66 yo woman with HTN, HLD, DUB, iron deficiency anemia, severe protein-calorie malnutrition, recently diagnosed colitis, EtOH abuse, and h/o GI bleed was sent by her PCP to the ED on 18 for fatigue, generalized weakness, and right arm swelling. She was recently hospitalized at [address] [place] [address] 18 - 18 for hypokalemia and anemia. She was admitted to the ICU for severe hypokalemia, hypotension, and chronic RUE superficial venous thrombosis.      Interval history / Subjective:   UTO due to patient's mental status; pt lethargic, drooling, snoring; d/w nurse     Assessment & Plan:     Severe hypokalemia (POA) - replete until K+ >=4 and magnesium >=2  - urine/serum studies ordered  - Renal following  - likely due to anorexia and malabsorption and recent use of diuretics; not resumed on admission  - corrected     EtOH withdrawal on chronic alcohol abuse  - now on CIWA protocol and banana bag; prn Ativan  - may need Precedex gtt    Hepatic encephalopathy due to hyperammonemia - start lactulose retention enemas    Acute on chronic anemia (POA)   - iron WNL, folate low, B12 low-normal  - FOBT negative  - s/p 2 units PRBC transfusion since admission   - Hematology consulted by [facility] due to recent h/o possible occult malignancy  - per  at bedside, EGD, colonoscopy, bone scan all unremarkable recently  - recent PET was abnormal  - stopped SC heparin and change to SCDs    Severe protein-calorie malnutrition - prealbumin 4.4  - Nutrition consulted  - s/p 1 dose IV albumin    Chronic RUE VTE and RLE>LLE edema - check venous duplex b/l LEs  - no indication for [facility] for RUE VTE as pt had IV in that arm during previous hospitalization    Generalized weakness/debility with EtOH abuse (POA)   - likely due to anorexia and EtOH abuse  - PT/OT/ST evals, Nutrition eval  - thiamine, folic acid, MVI    Hypotension (POA) on chronic HTN - likely due to RLL HCAP on CXR  - continue IVF and prn pressor   - TTE 2/17 EF 55-60%, no RWMA, mild-mod TR, small pericardial effusion    Possible RLL HCAP (POA) - BCx 2/16 NGTD  - started Zosyn/vancomycin  - BCx 2/16 NGTD  - MRSA swab pending  - sputum Cx ordered  - urine strep/legionella antigens pending  - flu swab negative    Mild transaminitis - elevated AST likely due to EtOH; check RUQ US    Hypernatremia - IVF adjusted by Renal  Hypophosphatemia - replete prn    Code status: full  DVT prophylaxis: SCDs    Care Plan discussed with: Patient/Family and Nurse  Disposition: TBD. Hospital Problems  Date Reviewed: 2/16/2018          Codes Class Noted POA    * (Principal)Hypokalemia ICD-10-CM: E87.6  ICD-9-CM: 276.8  2/16/2018 Yes            Review of Systems:   Pertinent items are noted in HPI. Vital Signs:    Last 24hrs VS reviewed since prior progress note.  Most recent are:  Visit Vitals    /84    Pulse (!) 102    Temp 98.4 °F (36.9 °C)    Resp (!) 32    Ht 5' 1\" (1.549 m)    Wt 55 kg (121 lb 3.2 oz)    SpO2 100%    BMI 22.9 kg/m2       Intake/Output Summary (Last 24 hours) at 02/18/18 1359  Last data filed at 02/18/18 1200   Gross per 24 hour   Intake          3562.75 ml   Output              880 ml   Net          2682.75 ml      Physical Examination:     Constitutional:  lethargic and difficult to arouse, drooling, no acute distress, appears toxic and wasted   ENT:  oral mucosa dry, poor dentition  Neck supple, no masses   Resp:  CTA bilaterally, no wheezing/rhonchi/rales   CV:  regular rhythm, normal rate, no m/r/g appreciated, RUE edema, RLE>LLE edema    GI:  +BS, soft, non distended, non tender     Musculoskeletal:  moves all extremities with difficulty    Neurologic:  lethargic and difficult to arouse Skin:  warm, dry  Eyes:  PERRL    Data Review:    Review and/or order of clinical lab test  Review and/or order of tests in the radiology section of CPT  Review and/or order of tests in the medicine section of CPT    Labs:     Recent Labs      02/18/18   0356  02/17/18   1547  02/17/18   0554   WBC  7.3   --   6.3   HGB  8.3*  9.0*  5.4*   HCT  25.0*  26.8*  16.9*   PLT  209   --   211     Recent Labs      02/18/18   0356  02/17/18   1541  02/17/18   0554   NA  147*  143  143   K  3.5  4.1  2.8*  2.8*   CL  113*  110*  105   CO2  29  27  29   BUN  3*  3*  4*   CREA  0.36*  0.32*  0.29*   GLU  100  107*  96   CA  7.1*  6.7*  6.5*   MG  1.9  2.2  1.7  1.7   PHOS  1.9*  1.8*  2.0*     Recent Labs      02/18/18   0947  02/17/18   0554  02/16/18   1520   SGOT  39*  36  50*   ALT  10*  12  12   AP  141*  130*  196*   TBILI  0.8  0.5  1.1*   TP  5.0*  4.9*  6.6   ALB  1.7*  1.6*  2.1*   GLOB  3.3  3.3  4.5*     Recent Labs      02/16/18   1520   INR  1.2*   PTP  12.1*   APTT  28.3      Recent Labs      02/17/18   0554   TIBC  75*   PSAT  89*   FERR  427*      Lab Results   Component Value Date/Time    Folate 4.9 (L) 02/17/2018 05:54 AM      No results for input(s): PH, PCO2, PO2 in the last 72 hours.   Recent Labs      02/17/18   0554   CPK  196*     Lab Results   Component Value Date/Time    Cholesterol, total 202 (H) 03/27/2017 09:47 AM    HDL Cholesterol 76 03/27/2017 09:47 AM    LDL, calculated 109 (H) 03/27/2017 09:47 AM    Triglyceride 83 03/27/2017 09:47 AM     Lab Results   Component Value Date/Time    Glucose (POC) 114 (H) 02/18/2018 08:30 AM    Glucose (POC) 91 01/26/2018 04:08 PM    Glucose (POC) 107 (H) 01/26/2018 01:08 PM     Lab Results   Component Value Date/Time    Color DARK YELLOW 02/16/2018 08:30 PM    Appearance CLEAR 02/16/2018 08:30 PM    Specific gravity 1.013 02/16/2018 08:30 PM    pH (UA) 7.0 02/16/2018 08:30 PM    Protein TRACE (A) 02/16/2018 08:30 PM    Glucose NEGATIVE  02/16/2018 08:30 PM Ketone TRACE (A) 02/16/2018 08:30 PM    Bilirubin Negative 09/28/2012 08:58 AM    Urobilinogen 0.2 02/16/2018 08:30 PM    Nitrites NEGATIVE  02/16/2018 08:30 PM    Leukocyte Esterase NEGATIVE  02/16/2018 08:30 PM    Epithelial cells FEW 02/16/2018 08:30 PM    Bacteria 1+ (A) 02/16/2018 08:30 PM    WBC 0-4 02/16/2018 08:30 PM    RBC 0-5 02/16/2018 08:30 PM     Medications Reviewed:     Current Facility-Administered Medications   Medication Dose Route Frequency    potassium phosphate 20 mmol in dextrose 5% 250 mL infusion   IntraVENous ONCE    dextrose 5% 1,000 mL with potassium chloride 20 mEq, folic acid 1 mg, thiamine 200 mg, mvi, adult no. 4 with vit K 10 mL infusion   IntraVENous DAILY    potassium chloride 20 mEq in dextrose 5% 1,000 mL IVPB   IntraVENous CONTINUOUS    [START ON 2/19/2018] vancomycin trough 03:00 on 2/19 - draw immediately prior to dose due @ 03:00   Other ONCE    0.9% sodium chloride infusion 250 mL  250 mL IntraVENous PRN    piperacillin-tazobactam (ZOSYN) 13.5 g in  mL infusion  13.5 g IntraVENous Q24H    Vancomycin- pharmacy to dose   Other Rx Dosing/Monitoring    vancomycin (VANCOCIN) 750 mg in  ml infusion  750 mg IntraVENous Q8H    LORazepam (ATIVAN) injection 1 mg  1 mg IntraVENous Q2H PRN    LORazepam (ATIVAN) injection 2 mg  2 mg IntraVENous Q2H PRN    zolpidem (AMBIEN) tablet 5 mg  5 mg Oral QHS PRN    calcium-vitamin D (OS-DELORIS) 500 mg-200 unit tablet  1 Tab Oral BID WITH MEALS    sodium chloride (NS) flush 5-10 mL  5-10 mL IntraVENous Q8H    sodium chloride (NS) flush 5-10 mL  5-10 mL IntraVENous PRN    acetaminophen (TYLENOL) tablet 650 mg  650 mg Oral Q4H PRN    ondansetron (ZOFRAN) injection 4 mg  4 mg IntraVENous Q4H PRN    buPROPion SR (WELLBUTRIN SR) tablet 150 mg  150 mg Oral DAILY     ______________________________________________________________________  EXPECTED LENGTH OF STAY: - - -  ACTUAL LENGTH OF STAY:          2                 Delaney Esme Madrid MD

## 2018-02-19 ENCOUNTER — HOME CARE VISIT (OUTPATIENT)
Dept: HOME HEALTH SERVICES | Facility: HOME HEALTH | Age: 58
End: 2018-02-19
Payer: COMMERCIAL

## 2018-02-19 ENCOUNTER — APPOINTMENT (OUTPATIENT)
Dept: GENERAL RADIOLOGY | Age: 58
DRG: 640 | End: 2018-02-19
Attending: INTERNAL MEDICINE
Payer: COMMERCIAL

## 2018-02-19 ENCOUNTER — APPOINTMENT (OUTPATIENT)
Dept: CT IMAGING | Age: 58
DRG: 640 | End: 2018-02-19
Attending: INTERNAL MEDICINE
Payer: COMMERCIAL

## 2018-02-19 LAB
ALBUMIN SERPL-MCNC: 1.6 G/DL (ref 3.5–5)
ALBUMIN/GLOB SERPL: 0.5 {RATIO} (ref 1.1–2.2)
ALP SERPL-CCNC: 141 U/L (ref 45–117)
ALT SERPL-CCNC: 11 U/L (ref 12–78)
AMMONIA PLAS-SCNC: 40 UMOL/L
ANION GAP SERPL CALC-SCNC: 8 MMOL/L (ref 5–15)
APTT PPP: 35.4 SEC (ref 22.1–32.5)
AST SERPL-CCNC: 31 U/L (ref 15–37)
BASOPHILS # BLD: 0.1 K/UL (ref 0–0.1)
BASOPHILS NFR BLD: 1 % (ref 0–1)
BILIRUB SERPL-MCNC: 0.7 MG/DL (ref 0.2–1)
BUN SERPL-MCNC: 2 MG/DL (ref 6–20)
BUN/CREAT SERPL: 6 (ref 12–20)
CALCIUM SERPL-MCNC: 7.4 MG/DL (ref 8.5–10.1)
CHLORIDE SERPL-SCNC: 110 MMOL/L (ref 97–108)
CO2 SERPL-SCNC: 26 MMOL/L (ref 21–32)
CREAT SERPL-MCNC: 0.33 MG/DL (ref 0.55–1.02)
DATE LAST DOSE: ABNORMAL
DIFFERENTIAL METHOD BLD: ABNORMAL
EOSINOPHIL # BLD: 0.2 K/UL (ref 0–0.4)
EOSINOPHIL NFR BLD: 3 % (ref 0–7)
ERYTHROCYTE [DISTWIDTH] IN BLOOD BY AUTOMATED COUNT: 17.2 % (ref 11.5–14.5)
FLUID CULTURE, SPNG2: NORMAL
GLOBULIN SER CALC-MCNC: 3.4 G/DL (ref 2–4)
GLUCOSE SERPL-MCNC: 90 MG/DL (ref 65–100)
HCT VFR BLD AUTO: 26.1 % (ref 35–47)
HGB BLD-MCNC: 8.6 G/DL (ref 11.5–16)
IMM GRANULOCYTES # BLD: 0.1 K/UL (ref 0–0.04)
IMM GRANULOCYTES NFR BLD AUTO: 1 % (ref 0–0.5)
INR PPP: 1.1 (ref 0.9–1.1)
L PNEUMO1 AG UR QL IA: NEGATIVE
LYMPHOCYTES # BLD: 1 K/UL (ref 0.8–3.5)
LYMPHOCYTES NFR BLD: 12 % (ref 12–49)
MAGNESIUM SERPL-MCNC: 1.6 MG/DL (ref 1.6–2.4)
MCH RBC QN AUTO: 27.7 PG (ref 26–34)
MCHC RBC AUTO-ENTMCNC: 33 G/DL (ref 30–36.5)
MCV RBC AUTO: 84.2 FL (ref 80–99)
MONOCYTES # BLD: 1.3 K/UL (ref 0–1)
MONOCYTES NFR BLD: 16 % (ref 5–13)
NEUTS SEG # BLD: 5.5 K/UL (ref 1.8–8)
NEUTS SEG NFR BLD: 67 % (ref 32–75)
NRBC # BLD: 0 K/UL (ref 0–0.01)
NRBC BLD-RTO: 0 PER 100 WBC
ORGANISM ID, SPNG3: NORMAL
PHOSPHATE SERPL-MCNC: 2.8 MG/DL (ref 2.6–4.7)
PLATELET # BLD AUTO: 216 K/UL (ref 150–400)
PLEASE NOTE, SPNG4: NORMAL
PMV BLD AUTO: 10.1 FL (ref 8.9–12.9)
POTASSIUM SERPL-SCNC: 3.4 MMOL/L (ref 3.5–5.1)
PROT SERPL-MCNC: 5 G/DL (ref 6.4–8.2)
PROTHROMBIN TIME: 11.7 SEC (ref 9–11.1)
RBC # BLD AUTO: 3.1 M/UL (ref 3.8–5.2)
REPORTED DOSE,DOSE: ABNORMAL UNITS
REPORTED DOSE/TIME,TMG: ABNORMAL
S PNEUM AG SPEC QL LA: NEGATIVE
SODIUM SERPL-SCNC: 144 MMOL/L (ref 136–145)
SPECIMEN SOURCE: NORMAL
SPECIMEN SOURCE: NORMAL
SPECIMEN, SPNG1: NORMAL
THERAPEUTIC RANGE,PTTT: ABNORMAL SECS (ref 58–77)
VANCOMYCIN TROUGH SERPL-MCNC: 22.7 UG/ML (ref 5–10)
WBC # BLD AUTO: 8.1 K/UL (ref 3.6–11)

## 2018-02-19 PROCEDURE — 74011250636 HC RX REV CODE- 250/636: Performed by: INTERNAL MEDICINE

## 2018-02-19 PROCEDURE — 74011250636 HC RX REV CODE- 250/636: Performed by: RADIOLOGY

## 2018-02-19 PROCEDURE — 80053 COMPREHEN METABOLIC PANEL: CPT | Performed by: INTERNAL MEDICINE

## 2018-02-19 PROCEDURE — 93970 EXTREMITY STUDY: CPT

## 2018-02-19 PROCEDURE — 77030019563 HC DEV ATTCH FEED HOLL -A

## 2018-02-19 PROCEDURE — 3331090002 HH PPS REVENUE DEBIT

## 2018-02-19 PROCEDURE — 84100 ASSAY OF PHOSPHORUS: CPT | Performed by: INTERNAL MEDICINE

## 2018-02-19 PROCEDURE — 36591 DRAW BLOOD OFF VENOUS DEVICE: CPT

## 2018-02-19 PROCEDURE — 71045 X-RAY EXAM CHEST 1 VIEW: CPT

## 2018-02-19 PROCEDURE — 80202 ASSAY OF VANCOMYCIN: CPT | Performed by: INTERNAL MEDICINE

## 2018-02-19 PROCEDURE — 85610 PROTHROMBIN TIME: CPT | Performed by: INTERNAL MEDICINE

## 2018-02-19 PROCEDURE — 74018 RADEX ABDOMEN 1 VIEW: CPT

## 2018-02-19 PROCEDURE — 65660000001 HC RM ICU INTERMED STEPDOWN

## 2018-02-19 PROCEDURE — 77010033678 HC OXYGEN DAILY

## 2018-02-19 PROCEDURE — 85025 COMPLETE CBC W/AUTO DIFF WBC: CPT | Performed by: INTERNAL MEDICINE

## 2018-02-19 PROCEDURE — 83735 ASSAY OF MAGNESIUM: CPT | Performed by: INTERNAL MEDICINE

## 2018-02-19 PROCEDURE — 36415 COLL VENOUS BLD VENIPUNCTURE: CPT | Performed by: INTERNAL MEDICINE

## 2018-02-19 PROCEDURE — 85730 THROMBOPLASTIN TIME PARTIAL: CPT | Performed by: INTERNAL MEDICINE

## 2018-02-19 PROCEDURE — 82140 ASSAY OF AMMONIA: CPT | Performed by: INTERNAL MEDICINE

## 2018-02-19 PROCEDURE — 74011250637 HC RX REV CODE- 250/637: Performed by: INTERNAL MEDICINE

## 2018-02-19 PROCEDURE — 77030032490 HC SLV COMPR SCD KNE COVD -B

## 2018-02-19 PROCEDURE — 74011000250 HC RX REV CODE- 250: Performed by: INTERNAL MEDICINE

## 2018-02-19 PROCEDURE — 3331090001 HH PPS REVENUE CREDIT

## 2018-02-19 PROCEDURE — 3331090003 HH PPS REVENUE ADJ

## 2018-02-19 PROCEDURE — 77030008771 HC TU NG SALEM SUMP -A

## 2018-02-19 RX ORDER — MIDAZOLAM HYDROCHLORIDE 1 MG/ML
5 INJECTION, SOLUTION INTRAMUSCULAR; INTRAVENOUS
Status: DISCONTINUED | OUTPATIENT
Start: 2018-02-19 | End: 2018-02-19

## 2018-02-19 RX ORDER — SODIUM CHLORIDE 0.9 % (FLUSH) 0.9 %
5-10 SYRINGE (ML) INJECTION
Status: DISCONTINUED | OUTPATIENT
Start: 2018-02-19 | End: 2018-02-19

## 2018-02-19 RX ORDER — SODIUM CHLORIDE 0.9 % (FLUSH) 0.9 %
10-40 SYRINGE (ML) INJECTION EVERY 8 HOURS
Status: DISCONTINUED | OUTPATIENT
Start: 2018-02-19 | End: 2018-03-02 | Stop reason: HOSPADM

## 2018-02-19 RX ORDER — SODIUM CHLORIDE 0.9 % (FLUSH) 0.9 %
20 SYRINGE (ML) INJECTION EVERY 24 HOURS
Status: DISCONTINUED | OUTPATIENT
Start: 2018-02-19 | End: 2018-02-19

## 2018-02-19 RX ORDER — POTASSIUM CHLORIDE 29.8 MG/ML
20 INJECTION INTRAVENOUS
Status: COMPLETED | OUTPATIENT
Start: 2018-02-19 | End: 2018-02-21

## 2018-02-19 RX ORDER — SODIUM CHLORIDE 9 MG/ML
25 INJECTION, SOLUTION INTRAVENOUS
Status: DISCONTINUED | OUTPATIENT
Start: 2018-02-19 | End: 2018-02-19

## 2018-02-19 RX ORDER — SODIUM CHLORIDE 0.9 % (FLUSH) 0.9 %
10-30 SYRINGE (ML) INJECTION AS NEEDED
Status: DISCONTINUED | OUTPATIENT
Start: 2018-02-19 | End: 2018-03-02 | Stop reason: HOSPADM

## 2018-02-19 RX ORDER — IBUPROFEN 200 MG
1 TABLET ORAL DAILY
Status: DISCONTINUED | OUTPATIENT
Start: 2018-02-19 | End: 2018-03-02 | Stop reason: HOSPADM

## 2018-02-19 RX ORDER — MAGNESIUM SULFATE HEPTAHYDRATE 40 MG/ML
2 INJECTION, SOLUTION INTRAVENOUS ONCE
Status: COMPLETED | OUTPATIENT
Start: 2018-02-19 | End: 2018-02-21

## 2018-02-19 RX ORDER — SODIUM CHLORIDE 0.9 % (FLUSH) 0.9 %
10 SYRINGE (ML) INJECTION AS NEEDED
Status: DISCONTINUED | OUTPATIENT
Start: 2018-02-19 | End: 2018-03-02 | Stop reason: HOSPADM

## 2018-02-19 RX ORDER — SODIUM CHLORIDE 0.9 % (FLUSH) 0.9 %
10 SYRINGE (ML) INJECTION EVERY 24 HOURS
Status: DISCONTINUED | OUTPATIENT
Start: 2018-02-19 | End: 2018-02-19

## 2018-02-19 RX ORDER — FENTANYL CITRATE 50 UG/ML
100 INJECTION, SOLUTION INTRAMUSCULAR; INTRAVENOUS
Status: DISCONTINUED | OUTPATIENT
Start: 2018-02-19 | End: 2018-02-19

## 2018-02-19 RX ADMIN — CASTOR OIL AND BALSAM, PERU: 788; 87 OINTMENT TOPICAL at 17:40

## 2018-02-19 RX ADMIN — Medication 10 ML: at 16:28

## 2018-02-19 RX ADMIN — LACTULOSE 1000 ML: 10 SOLUTION ORAL at 22:30

## 2018-02-19 RX ADMIN — Medication 10 ML: at 22:30

## 2018-02-19 RX ADMIN — POTASSIUM CHLORIDE 20 MEQ: 400 INJECTION, SOLUTION INTRAVENOUS at 10:03

## 2018-02-19 RX ADMIN — MAGNESIUM SULFATE HEPTAHYDRATE 2 G: 40 INJECTION, SOLUTION INTRAVENOUS at 08:53

## 2018-02-19 RX ADMIN — LORAZEPAM 1 MG: 2 INJECTION INTRAMUSCULAR; INTRAVENOUS at 00:13

## 2018-02-19 RX ADMIN — LORAZEPAM 2 MG: 2 INJECTION INTRAMUSCULAR; INTRAVENOUS at 02:22

## 2018-02-19 RX ADMIN — POTASSIUM CHLORIDE: 2 INJECTION, SOLUTION, CONCENTRATE INTRAVENOUS at 08:50

## 2018-02-19 RX ADMIN — PIPERACILLIN, TAZOBACTAM 13.5 G: 4; .5 INJECTION, POWDER, LYOPHILIZED, FOR SOLUTION INTRAVENOUS at 19:14

## 2018-02-19 RX ADMIN — VANCOMYCIN HYDROCHLORIDE 750 MG: 10 INJECTION, POWDER, LYOPHILIZED, FOR SOLUTION INTRAVENOUS at 16:59

## 2018-02-19 RX ADMIN — POTASSIUM CHLORIDE: 2 INJECTION, SOLUTION, CONCENTRATE INTRAVENOUS at 18:34

## 2018-02-19 RX ADMIN — Medication 10 ML: at 08:21

## 2018-02-19 RX ADMIN — VANCOMYCIN HYDROCHLORIDE 750 MG: 10 INJECTION, POWDER, LYOPHILIZED, FOR SOLUTION INTRAVENOUS at 03:38

## 2018-02-19 RX ADMIN — POTASSIUM CHLORIDE 20 MEQ: 400 INJECTION, SOLUTION INTRAVENOUS at 12:14

## 2018-02-19 RX ADMIN — LORAZEPAM 1 MG: 2 INJECTION INTRAMUSCULAR; INTRAVENOUS at 22:31

## 2018-02-19 RX ADMIN — LACTULOSE 1000 ML: 10 SOLUTION ORAL at 16:28

## 2018-02-19 NOTE — PROGRESS NOTES
Patient name: Mango Carrillo  MRN: [id number]    Nephrology Progress note:    Assessment:  Hypokalemia/Hypophosphatemia: recurrent-> Suspect whole body potassium depletion (ETOH abuse/poor intake/nutritional status) exacerbated by renal wasting via lasix. Improving/stable s/p aggressive replacement     Anemia: requiring PRBCS. Hematology consulting     Hypotension: volume depletion. Improved->Off vasopressor support     Failure to thrive: ETOH abuse/Hypoalbuminemia/Weight loss-> worked up for underlying malignancy- negative     Hypernatremia: better with hypotonic fluids    Edema: 3rd spacing    Plan/Recommendations:  Ct IVF D5W +20meq/L KCl at 100cc/hr  Daily banana bag   IV KCL/IV Mag sulfate already ordered  CIWA  Am labs      Subjective: Altered-> withdrawal symptoms. I/O inaccurate    ROS:   UTO    Exam:  Visit Vitals    /80    Pulse (!) 112    Temp 97.8 °F (36.6 °C)    Resp (!) 37    Ht 5' 1\" (1.549 m)    Wt 62.5 kg (137 lb 12.6 oz)    SpO2 100%    BMI 26.03 kg/m2     Wt Readings from Last 3 Encounters:   02/19/18 62.5 kg (137 lb 12.6 oz)   02/16/18 46.3 kg (102 lb)   02/07/18 60.3 kg (133 lb)       Intake/Output Summary (Last 24 hours) at 02/19/18 1205  Last data filed at 02/19/18 1100   Gross per 24 hour   Intake          2289.35 ml   Output             1690 ml   Net           599.35 ml       Gen: Resting  HEENT:  AT/NC  Lungs/Chest wall: Clear. Cardiovascular: Regular rate, normal rhythm. Abdomen/: Soft, NT, ND, BS+.    Ext: + peripheral edema  CNS: Altered      Current Facility-Administered Medications   Medication Dose Route Frequency Last Dose    nicotine (NICODERM CQ) 21 mg/24 hr patch 1 Patch  1 Patch TransDERmal DAILY 1 Patch at 02/19/18 0858    dextrose 5% 1,000 mL with potassium chloride 20 mEq, folic acid 1 mg, thiamine 200 mg, mvi, adult no. 4 with vit K 10 mL infusion   IntraVENous DAILY      potassium chloride 20 mEq in dextrose 5% 1,000 mL IVPB   IntraVENous CONTINUOUS      lactulose enema in sterile water  1,000 mL Rectal Q8H Stopped at 02/19/18 0600    0.9% sodium chloride infusion 250 mL  250 mL IntraVENous PRN      piperacillin-tazobactam (ZOSYN) 13.5 g in  mL infusion  13.5 g IntraVENous Q24H 13.5 g at 02/18/18 1908    Vancomycin- pharmacy to dose   Other Rx Dosing/Monitoring      LORazepam (ATIVAN) injection 1 mg  1 mg IntraVENous Q2H PRN 1 mg at 02/19/18 0013    LORazepam (ATIVAN) injection 2 mg  2 mg IntraVENous Q2H PRN 2 mg at 02/19/18 0222    zolpidem (AMBIEN) tablet 5 mg  5 mg Oral QHS PRN Stopped at 02/17/18 2003    calcium-vitamin D (OS-DELORIS) 500 mg-200 unit tablet  1 Tab Oral BID WITH MEALS Stopped at 02/18/18 0800    sodium chloride (NS) flush 5-10 mL  5-10 mL IntraVENous Q8H 10 mL at 02/19/18 0821    sodium chloride (NS) flush 5-10 mL  5-10 mL IntraVENous PRN      acetaminophen (TYLENOL) tablet 650 mg  650 mg Oral Q4H PRN      ondansetron (ZOFRAN) injection 4 mg  4 mg IntraVENous Q4H PRN      buPROPion SR (WELLBUTRIN SR) tablet 150 mg  150 mg Oral DAILY Stopped at 02/18/18 0900       Labs/Data:    Lab Results   Component Value Date/Time    WBC 8.1 02/19/2018 03:34 AM    Hemoglobin (POC) 4.9 02/16/2018 02:46 PM    Hemoglobin (POC) 11.2 (L) 01/26/2018 04:08 PM    HGB 8.6 (L) 02/19/2018 03:34 AM    Hematocrit (POC) 33 (L) 01/26/2018 04:08 PM    HCT 26.1 (L) 02/19/2018 03:34 AM    PLATELET 257 96/16/2308 03:34 AM    MCV 84.2 02/19/2018 03:34 AM       Lab Results   Component Value Date/Time    Sodium 144 02/19/2018 03:34 AM    Potassium 3.4 (L) 02/19/2018 03:34 AM    Chloride 110 (H) 02/19/2018 03:34 AM    CO2 26 02/19/2018 03:34 AM    Anion gap 8 02/19/2018 03:34 AM    Glucose 90 02/19/2018 03:34 AM    BUN 2 (L) 02/19/2018 03:34 AM    Creatinine 0.33 (L) 02/19/2018 03:34 AM    BUN/Creatinine ratio 6 (L) 02/19/2018 03:34 AM    GFR est AA >60 02/19/2018 03:34 AM    GFR est non-AA >60 02/19/2018 03:34 AM    Calcium 7.4 (L) 02/19/2018 03:34 AM       Patient seen and examined. Chart reviewed. Labs, data and other pertinent notes reviewed in last 24 hrs.     Discussed with RN    Signed by:  Radha Steward MD

## 2018-02-19 NOTE — PROGRESS NOTES
[address]  Medical Oncology at [facility]    Hematology / Oncology Progress Note    Reason for Visit:   Ifeanyi Castanon is a 65 y.o. female who is seen in consultation at the request of Dr. Pau Solorio for evaluation of anemia. History of Present Illness:   Ms. Isauro Price is a 66 y/o female admitted with weakness and hypokalemia (K 1.9). She had a recent hospital admission at [facility] for the same reason on 1/26/18. She has been feeling poorly for the past few days with bodyaches, weakness, and decreased p.o. intake. At PCP's office, i-STAT Hgb was 4, and patient was directed to the ER. Upon arrival in ER, Hgb was 8.2, potassium 1.9, Mg 1.4, lactate 3.4, SBP in 70s-90s. While at [facility], she received 2 units of PRBCs. Given edema, she was treated with Lasix while there. She also has right arm swelling and was found here to have a RUE superficial venous thrombosis. She also notes some mild intermittent diarrhea. No fevers, dysuria, cough, abdom pain. Of note, patient was seen by Hematology while admitted to [facility]. The reason for consultation at that time was abnormal CT scan (mediastinal LAD, liver lesion, diffuse colitis/ bowel changes, bone lesions/ ovary cyst/ fibroids.) Dr. Poppy Rothamn and Dr. Daryl Cabrera in [facility] [place] evaluated patient at that time and felt her mediastinal LAD was likely due to sarcoidosis rather than malignancy. Patient underwent bronchoscopy and biopsy of LN by Dr. Madhu Betancourt. Pathology findings were inconclusive (peripheral blood and scattered ciliated bronchial lining cells. ). For pelvic mass along with weight loss, Gyn was consulted but stated that this was unlikely to be 2/2 malignancy, corroborated by normal CA-125 level. She also underwent EGD by Dr. Chao Casarez with finding of a gastric antrum ulcer. Biopsy revealed benign mucosa with mild chronic active gastritis and surface erosion, no H. Pylori. Review of labs here reveals drop in Hgb from 8-9 range to now 5.4. Iron sat elevated > 80%. Although VitB12 level was recently checked and normal, there is a low VitB1 (thiamine) level from 9/2017. Home med list includes thiamine tablets 100mg/d. When asked about alcohol intake, she states she drinks a fifth of liquor several times a month, but her  states she drinks this amount daily for the past several years. He states she has had less alcohol in the past 2-3 weeks due to feeling sick. She denies any tremors, seizures or other withdrawal symptoms when she goes without a drink. No recent viral infections or exposures per patient. Her maternal uncle had lymphoma. No personal history of cancer or hemoglobinopathies. Interval History:  Patient remains altered and is not answering any questions. Does not make eye contact. Bone marrow biopsy has been ordered and is pending - consent to be obtained from . HgB remains stable at 8.6 and no obvious signs of bleeding. No family at the bedside.      Past Medical History:   Diagnosis Date    Alcohol abuse     GI (gastrointestinal bleed)     Insomnia 11/09    Iron deficiency anemia 04/2004    PPD positive     treated w/ INH- tests since have been negative    Pure hypercholesterolemia     Scoliosis 12/28/15    [facility]    Tobacco dependence     Unspecified essential hypertension     Uterine bleeding, dysfunctional 2/12    Uterine fibroid       Past Surgical History:   Procedure Laterality Date    COLONOSCOPY N/A 1/31/2018    COLONOSCOPY performed by Walter Arguello MD at [facility] HX CHOLECYSTECTOMY      HX COLONOSCOPY  07/01/2013    MCV, repeat 10 years    HX ORTHOPAEDIC      Shattered bilateral ankles-metal plates & screws    HX OTHER SURGICAL      Benign cyst removal- L. foot    LEG/ANKLE SURGERY PROC UNLISTED        Social History   Substance Use Topics    Smoking status: Current Every Day Smoker     Packs/day: 0.50     Years: 17.00    Smokeless tobacco: Never Used    Alcohol use 1.2 oz/week     1 Cans of beer, 1 Shots of liquor per week      Comment: Socially      Family History   Problem Relation Age of Onset    Hypertension Mother     Diabetes Mother     Cancer Mother      breast    Heart Disease Father      MI 42's    Kidney Disease Father     Hypertension Son      Current Facility-Administered Medications   Medication Dose Route Frequency    nicotine (NICODERM CQ) 21 mg/24 hr patch 1 Patch  1 Patch TransDERmal DAILY    vancomycin (VANCOCIN) 750 mg in  ml infusion  750 mg IntraVENous Q12H    balsam peru-castor oil (VENELEX)  mg/gram ointment   Topical BID    dextrose 5% 1,000 mL with potassium chloride 20 mEq, folic acid 1 mg, thiamine 200 mg, mvi, adult no. 4 with vit K 10 mL infusion   IntraVENous DAILY    potassium chloride 20 mEq in dextrose 5% 1,000 mL IVPB   IntraVENous CONTINUOUS    lactulose enema in sterile water  1,000 mL Rectal Q8H    0.9% sodium chloride infusion 250 mL  250 mL IntraVENous PRN    piperacillin-tazobactam (ZOSYN) 13.5 g in  mL infusion  13.5 g IntraVENous Q24H    Vancomycin- pharmacy to dose   Other Rx Dosing/Monitoring    LORazepam (ATIVAN) injection 1 mg  1 mg IntraVENous Q2H PRN    LORazepam (ATIVAN) injection 2 mg  2 mg IntraVENous Q2H PRN    zolpidem (AMBIEN) tablet 5 mg  5 mg Oral QHS PRN    calcium-vitamin D (OS-DELORIS) 500 mg-200 unit tablet  1 Tab Oral BID WITH MEALS    sodium chloride (NS) flush 5-10 mL  5-10 mL IntraVENous Q8H    sodium chloride (NS) flush 5-10 mL  5-10 mL IntraVENous PRN    acetaminophen (TYLENOL) tablet 650 mg  650 mg Oral Q4H PRN    ondansetron (ZOFRAN) injection 4 mg  4 mg IntraVENous Q4H PRN    buPROPion SR (WELLBUTRIN SR) tablet 150 mg  150 mg Oral DAILY      Allergies   Allergen Reactions    Robaxin [Methocarbamol] Hives, Rash and Itching     Red splotches    Statins-Hmg-Coa Reductase Inhibitors Myalgia    Codeine Itching    Neosporin [Neomycin-Bacitracin-Polymyxin] Rash       Review of Systems: A complete review of systems was obtained, negative except as described above. Physical Exam:     Visit Vitals    BP (!) 131/92 (BP 1 Location: Left arm, BP Patient Position: At rest)    Pulse (!) 116    Temp 99.2 °F (37.3 °C)    Resp (!) 31    Ht 5' 1\" (1.549 m)    Wt 137 lb 12.6 oz (62.5 kg)    SpO2 100%    BMI 26.03 kg/m2     ECOG PS: 3  General: No acute distress, appears weak and disheveled  Eyes: PERRLA, anicteric sclerae  HENT: Atraumatic with normal appearance of ears and nose; OP clear, Drooling  Neck: Supple; no thyromegaly   Lymphatic: No cervical, supraclavicular, or inguinal adenopathy  Respiratory: CTAB, normal respiratory effort  CV: Normal rate, regular rhythm, no murmurs. RUE and BLE edema  GI: Soft, nontender, nondistended, no masses, no hepatomegaly, no splenomegaly  MS: Normal gait and station. Digits without clubbing or cyanosis. Skin: No rashes, ecchymoses, or petechiae. Normal temperature, turgor, and texture. Neuro/Psych: Altered mental status, not conversant, does not respond to questions or commans. Generalized weakness      Results:     Lab Results   Component Value Date/Time    WBC 8.1 02/19/2018 03:34 AM    HGB 8.6 (L) 02/19/2018 03:34 AM    HCT 26.1 (L) 02/19/2018 03:34 AM    PLATELET 089 37/20/1830 03:34 AM    MCV 84.2 02/19/2018 03:34 AM    ABS.  NEUTROPHILS 5.5 02/19/2018 03:34 AM    Hemoglobin (POC) 4.9 02/16/2018 02:46 PM    Hemoglobin (POC) 11.2 (L) 01/26/2018 04:08 PM    Hematocrit (POC) 33 (L) 01/26/2018 04:08 PM     Lab Results   Component Value Date/Time    Sodium 144 02/19/2018 03:34 AM    Potassium 3.4 (L) 02/19/2018 03:34 AM    Chloride 110 (H) 02/19/2018 03:34 AM    CO2 26 02/19/2018 03:34 AM    Glucose 90 02/19/2018 03:34 AM    BUN 2 (L) 02/19/2018 03:34 AM    Creatinine 0.33 (L) 02/19/2018 03:34 AM    GFR est AA >60 02/19/2018 03:34 AM    GFR est non-AA >60 02/19/2018 03:34 AM    Calcium 7.4 (L) 02/19/2018 03:34 AM    Sodium (POC) 144 01/26/2018 04:08 PM    Potassium (POC) <2.0 (LL) 01/26/2018 04:08 PM    Chloride (POC) 98 01/26/2018 04:08 PM    Glucose (POC) 114 (H) 02/18/2018 08:30 AM    BUN (POC) <3 (L) 01/26/2018 04:08 PM    Creatinine (POC) 0.6 01/26/2018 04:08 PM    Calcium, ionized (POC) 1.06 (L) 01/26/2018 04:08 PM     Lab Results   Component Value Date/Time    Bilirubin, total 0.7 02/19/2018 03:34 AM    ALT (SGPT) 11 (L) 02/19/2018 03:34 AM    AST (SGOT) 31 02/19/2018 03:34 AM    Alk. phosphatase 141 (H) 02/19/2018 03:34 AM    Protein, total 5.0 (L) 02/19/2018 03:34 AM    Albumin 1.6 (L) 02/19/2018 03:34 AM    Globulin 3.4 02/19/2018 03:34 AM         Assessment and Recommendations:   Kalen Ulloa is a 65 y.o. female admitted with weakness, fatigue, hypokalemia and anemia. 1. Normocytic anemia: Usually a sudden drop in Hgb from 8 to 5 within 1-2 days is due to blood loss. However, patient,  and nursing deny any obvious blood loss. No intense abdominal pain. Patient was found to have a gastric ulcer during admission last month. I suspect she has a combination of blood loss and bone marrow suppression from alcohol abuse. -- Bone marrow biopsy pending given anemia requiring blood transfusions, lack of adequate reticulocyte response to rule out any abnormal cells. -- Agree with transfusion for goal Hgb > 7.    2. Alcohol abuse: Patient has been drinking liquor daily for the past several years. This could explain her weight loss, hypoalbuminemia, decreased PO intake, thiamine deficiency. Coags mildly elevated likely due to liver dysfunction. Recent CT comments on diffuse hypodensity. -- Needs alcohol cessation. -- On Ativan with [facility] protocol for withdrawal symptoms  -- Liver ultrasound with diffusely hepatic echogenicity compatible with hepatic steatosis. No sonographic evidence of mass.     3. Hypokalemia: Due to recent Lasix as well as decreased PO intake and past diarrhea per Nephrology. Improved with K 3.4 today. 4. Gastric ulcer: Recommend she avoid NSAIDs and take PPI daily. 5. Mediastinal LAD: Possibly related to sarcoidosis given CT report/addendum from last hospital admission. Bronch biopsy was inconclusive. We will await results from bone marrow biopsy. Recommend continued transfusion support to maintain HgB > 7.0. Please call with any questions.   Pt seen today in conjunction with ELIZABETH Jesus    Signed By: José Luis Rincon DO     February 19, 2018

## 2018-02-19 NOTE — PROGRESS NOTES
Hospitalist Progress Note  America Askew MD  Answering service: [phone] from in house phone      Date of Service:  2018  NAME:  Tacos Prado  :  1960  MRN:  [id number]      Admission Summary:   64 yo woman with HTN, HLD, DUB, iron deficiency anemia, severe protein-calorie malnutrition, recently diagnosed colitis, EtOH abuse, and h/o GI bleed was sent by her PCP to the ED on 18 for fatigue, generalized weakness, and right arm swelling. She was recently hospitalized at [facility] 18 - 18 for hypokalemia and anemia. She was admitted to the ICU for severe hypokalemia, hypotension, and chronic RUE superficial venous thrombosis.      Interval history / Subjective:   UTO due to patient's mental status; pt lethargic, grunting; IR unable to do CT-guided BM bx due to unstable airyway; d/w nurse     Assessment & Plan:     Severe hypokalemia (POA) - replete until K+ >=4 and magnesium >=2  - urine/serum studies ordered  - Renal following  - likely due to anorexia and malabsorption and recent use of diuretics; not resumed on admission  - corrected     EtOH withdrawal on chronic alcohol abuse   - now on CIWA protocol and banana bag; prn Ativan    Hepatic encephalopathy due to hyperammonemia   - start lactulose retention enemas and can change to per NGT once placed    Acute on chronic anemia (POA)   - iron WNL, folate low, B12 low-normal  - FOBT negative  - s/p 2 units PRBC transfusion since admission   - Hematology consulted by [facility] due to recent h/o possible occult malignancy  - per  at bedside, EGD, colonoscopy, bone scan all unremarkable recently  - recent PET was abnormal  - stopped SC heparin and change to SCDs  - IR unable to do CT-guided BM bx today due to unstable airway; re-scheduled    Severe protein-calorie malnutrition - prealbumin 4.4  - Nutrition consulted  - s/p 1 dose IV albumin    Chronic RUE VTE  - no indication for [facility] for RUE VTE as pt had IV in that arm during previous hospitalization    Right ankle deformity and RLE>LLE edema - venous duplex b/l LEs 2/19 negative  - check R ankle XR     Generalized weakness/debility with EtOH abuse (POA)   - likely due to anorexia and EtOH abuse  - PT/OT/ST evals, Nutrition eval  - banana bag    Hypovolemic shock (POA) on chronic HTN - likely due to RLL HCAP on CXR  - continue IVF  - resolved s/p pressor x4h  - TTE 2/17 EF 55-60%, no RWMA, mild-mod TR, small pericardial effusion    Possible RLL HCAP (POA) - BCx 2/16 NGTD  - started empiric Zosyn/vancomycin 2/17  - s/p empiric vancomycin 2/17-2/19   - BCx 2/16 NGTD  - MRSA swab negative  - sputum Cx ordered but no sample sent yet  - urine strep/legionella antigens pending  - flu swab negative    Mild transaminitis - elevated AST likely due to EtOH  - RUQ US 2/18 hepatic steatosis    Hypernatremia - IVF adjusted by Renal; resolved  Hypophosphatemia - replete prn  Hypomagnesemia - replete prn    Code status: full  DVT prophylaxis: SCDs    Care Plan discussed with: Nurse. No family at bedside today  Disposition: TBD. Hospital Problems  Date Reviewed: 2/19/2018          Codes Class Noted POA    * (Principal)Hypokalemia ICD-10-CM: E87.6  ICD-9-CM: 276.8  2/16/2018 Yes            Review of Systems:   Pertinent items are noted in HPI. Vital Signs:    Last 24hrs VS reviewed since prior progress note.  Most recent are:  Visit Vitals    BP (!) 131/92 (BP 1 Location: Left arm, BP Patient Position: At rest)    Pulse (!) 116    Temp 99.2 °F (37.3 °C)    Resp (!) 31    Ht 5' 1\" (1.549 m)    Wt 62.5 kg (137 lb 12.6 oz)    SpO2 100%    BMI 26.03 kg/m2       Intake/Output Summary (Last 24 hours) at 02/19/18 1709  Last data filed at 02/19/18 1642   Gross per 24 hour   Intake          2915.35 ml   Output             1945 ml   Net           970.35 ml      Physical Examination:     Constitutional:  lethargic and difficult to arouse, grunting but no acute distress, appears toxic and wasted   ENT:  oral mucosa dry, poor dentition  Neck supple, no masses   Resp:  tachypnic, CTA bilaterally, no wheezing/rhonchi/rales   CV:  regular rhythm, tachycardic, no m/r/g appreciated, RUE edema, RLE>LLE edema    GI:  +BS, soft, non distended, non tender     Musculoskeletal:  moves extremities with difficulty, right ankle deformity    Neurologic:  lethargic and difficult to arouse     Skin:  warm, dry  Eyes:  PERRL    Data Review:    Review and/or order of clinical lab test  Review and/or order of tests in the radiology section of CPT  Review and/or order of tests in the medicine section of CPT    Labs:     Recent Labs      02/19/18   0334  02/18/18   0356   WBC  8.1  7.3   HGB  8.6*  8.3*   HCT  26.1*  25.0*   PLT  216  209     Recent Labs      02/19/18   0334  02/18/18   0356  02/17/18   1541   NA  144  147*  143   K  3.4*  3.5  4.1   CL  110*  113*  110*   CO2  26  29  27   BUN  2*  3*  3*   CREA  0.33*  0.36*  0.32*   GLU  90  100  107*   CA  7.4*  7.1*  6.7*   MG  1.6  1.9  2.2   PHOS  2.8  1.9*  1.8*     Recent Labs      02/19/18   0334  02/18/18   0947  02/17/18   0554   SGOT  31  39*  36   ALT  11*  10*  12   AP  141*  141*  130*   TBILI  0.7  0.8  0.5   TP  5.0*  5.0*  4.9*   ALB  1.6*  1.7*  1.6*   GLOB  3.4  3.3  3.3     Recent Labs      02/19/18   0334   INR  1.1   PTP  11.7*   APTT  35.4*      Recent Labs      02/17/18   0554   TIBC  75*   PSAT  89*   FERR  427*      Lab Results   Component Value Date/Time    Folate 4.9 (L) 02/17/2018 05:54 AM      No results for input(s): PH, PCO2, PO2 in the last 72 hours.   Recent Labs      02/17/18   0554   CPK  196*     Lab Results   Component Value Date/Time    Cholesterol, total 202 (H) 03/27/2017 09:47 AM    HDL Cholesterol 76 03/27/2017 09:47 AM    LDL, calculated 109 (H) 03/27/2017 09:47 AM    Triglyceride 83 03/27/2017 09:47 AM     Lab Results   Component Value Date/Time    Glucose (POC) 114 (H) 02/18/2018 08:30 AM    Glucose (POC) 91 01/26/2018 04:08 PM    Glucose (POC) 107 (H) 01/26/2018 01:08 PM     Lab Results   Component Value Date/Time    Color DARK YELLOW 02/16/2018 08:30 PM    Appearance CLEAR 02/16/2018 08:30 PM    Specific gravity 1.013 02/16/2018 08:30 PM    pH (UA) 7.0 02/16/2018 08:30 PM    Protein TRACE (A) 02/16/2018 08:30 PM    Glucose NEGATIVE  02/16/2018 08:30 PM    Ketone TRACE (A) 02/16/2018 08:30 PM    Bilirubin Negative 09/28/2012 08:58 AM    Urobilinogen 0.2 02/16/2018 08:30 PM    Nitrites NEGATIVE  02/16/2018 08:30 PM    Leukocyte Esterase NEGATIVE  02/16/2018 08:30 PM    Epithelial cells FEW 02/16/2018 08:30 PM    Bacteria 1+ (A) 02/16/2018 08:30 PM    WBC 0-4 02/16/2018 08:30 PM    RBC 0-5 02/16/2018 08:30 PM     Medications Reviewed:     Current Facility-Administered Medications   Medication Dose Route Frequency    nicotine (NICODERM CQ) 21 mg/24 hr patch 1 Patch  1 Patch TransDERmal DAILY    vancomycin (VANCOCIN) 750 mg in  ml infusion  750 mg IntraVENous Q12H    balsam peru-castor oil (VENELEX)  mg/gram ointment   Topical BID    dextrose 5% 1,000 mL with potassium chloride 20 mEq, folic acid 1 mg, thiamine 200 mg, mvi, adult no. 4 with vit K 10 mL infusion   IntraVENous DAILY    potassium chloride 20 mEq in dextrose 5% 1,000 mL IVPB   IntraVENous CONTINUOUS    lactulose enema in sterile water  1,000 mL Rectal Q8H    0.9% sodium chloride infusion 250 mL  250 mL IntraVENous PRN    piperacillin-tazobactam (ZOSYN) 13.5 g in  mL infusion  13.5 g IntraVENous Q24H    Vancomycin- pharmacy to dose   Other Rx Dosing/Monitoring    LORazepam (ATIVAN) injection 1 mg  1 mg IntraVENous Q2H PRN    LORazepam (ATIVAN) injection 2 mg  2 mg IntraVENous Q2H PRN    zolpidem (AMBIEN) tablet 5 mg  5 mg Oral QHS PRN    calcium-vitamin D (OS-DELORIS) 500 mg-200 unit tablet  1 Tab Oral BID WITH MEALS    sodium chloride (NS) flush 5-10 mL  5-10 mL IntraVENous Q8H    sodium chloride (NS) flush 5-10 mL  5-10 mL IntraVENous PRN    acetaminophen (TYLENOL) tablet 650 mg  650 mg Oral Q4H PRN    ondansetron (ZOFRAN) injection 4 mg  4 mg IntraVENous Q4H PRN    buPROPion SR (WELLBUTRIN SR) tablet 150 mg  150 mg Oral DAILY     ______________________________________________________________________  EXPECTED LENGTH OF STAY: 4d 21h  ACTUAL LENGTH OF STAY:          3                 America Askew MD

## 2018-02-19 NOTE — PROGRESS NOTES
PULMONARY/Critical Care/SLEEP MEDICINE  [facility]  Name: Raul Meza   : 1960   MRN: [id number]   Date: 2018 8:46 AM       Stable for floor    Replete Lytes    Vital Signs:    Visit Vitals    /55    Pulse (!) 111    Temp 98.8 °F (37.1 °C)    Resp (!) 32    Ht 5' 1\" (1.549 m)    Wt 62.5 kg (137 lb 12.6 oz)    SpO2 100%    BMI 26.03 kg/m2       O2 Device: Nasal cannula   O2 Flow Rate (L/min): 2 l/min   Temp (24hrs), Av.7 °F (37.1 °C), Min:98.4 °F (36.9 °C), Max:98.8 °F (37.1 °C)       Intake/Output:   Last shift:         Last 3 shifts:  1901 -  0700  In: 5018.8 [I.V.:4998.8]  Out:  [Urine:740; Drains:1275]    Intake/Output Summary (Last 24 hours) at 18 0844  Last data filed at 18 0400   Gross per 24 hour   Intake          3022.55 ml   Output             1620 ml   Net          1402.55 ml          Physical Exam:    General: []Intubated/sedated []No acute distress []    [x]Ill appearing [] []      HEENT: []Icteric []PERRL []    [x]Anicteric Mucosa:[]moist   []dry []      Resp: []Wheeze [x]Clear to ascultation bilaterally []Accessory muscle use    []Rales []Chest tube:  []Air leak []    []Ronchi []Crepitus []   []Coarse bilateral ventilator breath sounds      CV: [x]Regular []Tachycardia []    []Irregular []Bradycardic []    []Murmur []S3 []    []Edema []S4 []      GI: [x]Soft  []NG Tube Bowel sounds: []present []absent    []Firm []PEG []    []Distended  []      : []Rendon []Hematuria []    []Clear urine []Edema []      MSK:    []SCDs []Edema []    []No deformities [] []      Skin: [x]Warm []Dry  []    []Cool []Moist []    []Hot  []Diaphoretic []    []Rash  []Cyanosis []      N-psych: []Sedated  []Agitated [x]Obtunded     []Alert  Follows commands:   []Yes  []No []      Devices: []ET-Tube []Tracheostomy []Chest tube:  Air leak? []Y/[]N    [x]Central Line []PA Catheter []    []PICC [] []      DATA:   Current Facility-Administered Medications   Medication Dose Route Frequency    potassium chloride 20 mEq in 50 ml IVPB  20 mEq IntraVENous Q1H    magnesium sulfate 2 g/50 ml IVPB (premix or compounded)  2 g IntraVENous ONCE    nicotine (NICODERM CQ) 21 mg/24 hr patch 1 Patch  1 Patch TransDERmal DAILY    dextrose 5% 1,000 mL with potassium chloride 20 mEq, folic acid 1 mg, thiamine 200 mg, mvi, adult no. 4 with vit K 10 mL infusion   IntraVENous DAILY    potassium chloride 20 mEq in dextrose 5% 1,000 mL IVPB   IntraVENous CONTINUOUS    lactulose enema in sterile water  1,000 mL Rectal Q8H    0.9% sodium chloride infusion 250 mL  250 mL IntraVENous PRN    piperacillin-tazobactam (ZOSYN) 13.5 g in  mL infusion  13.5 g IntraVENous Q24H    Vancomycin- pharmacy to dose   Other Rx Dosing/Monitoring    LORazepam (ATIVAN) injection 1 mg  1 mg IntraVENous Q2H PRN    LORazepam (ATIVAN) injection 2 mg  2 mg IntraVENous Q2H PRN    zolpidem (AMBIEN) tablet 5 mg  5 mg Oral QHS PRN    calcium-vitamin D (OS-DELORIS) 500 mg-200 unit tablet  1 Tab Oral BID WITH MEALS    sodium chloride (NS) flush 5-10 mL  5-10 mL IntraVENous Q8H    sodium chloride (NS) flush 5-10 mL  5-10 mL IntraVENous PRN    acetaminophen (TYLENOL) tablet 650 mg  650 mg Oral Q4H PRN    ondansetron (ZOFRAN) injection 4 mg  4 mg IntraVENous Q4H PRN    buPROPion SR (WELLBUTRIN SR) tablet 150 mg  150 mg Oral DAILY       Telemetry: []Sinus []A-flutter []Paced    []A-fib []Multiple PVCs                  Labs:  Recent Labs      02/19/18   0334  02/18/18   0356  02/17/18   1547  02/17/18   0554   WBC  8.1  7.3   --   6.3   HGB  8.6*  8.3*  9.0*  5.4*   HCT  26.1*  25.0*  26.8*  16.9*   PLT  216  209   --   211     Recent Labs      02/19/18   0334  02/18/18   0947  02/18/18   0356  02/17/18   1541  02/17/18   0554   02/16/18   1520   NA  144   --   147*  143  143   --   138   K  3.4*   --   3.5  4.1  2.8*  2.8*   < >  1.9*   CL  110*   --   113*  110*  105   --   92*   CO2  26   -- 29  27  29   --   36*   GLU  90   --   100  107*  96   --   126*   BUN  2*   --   3*  3*  4*   --   4*   CREA  0.33*   --   0.36*  0.32*  0.29*   --   0.56   CA  7.4*   --   7.1*  6.7*  6.5*   --   7.6*   MG  1.6   --   1.9  2.2  1.7  1.7   < >  1.4*   PHOS  2.8   --   1.9*  1.8*  2.0*   < >   --    ALB  1.6*  1.7*   --    --   1.6*   --   2.1*   TBILI  0.7  0.8   --    --   0.5   --   1.1*   SGOT  31  39*   --    --   36   --   50*   ALT  11*  10*   --    --   12   --   12   INR  1.1   --    --    --    --    --   1.2*    < > = values in this interval not displayed. No results for input(s): PH, PCO2, PO2, HCO3, FIO2 in the last 72 hours.     Imaging:  [x]I have personally reviewed the patients radiographs  []Radiographs reviewed with radiologist   []No change from prior, tubes and lines in adequate position  []Improved   []Worsening       IMPRESSION:   · Generalized weakness  · Severe hypokalemia, hypo mag  · Severe Anemia ?source DUB, ?GI,  ?other  · ETOH abuse, withdrawal/DTs   · SIRS   · Hypoalbuminemia  · Malnutrition  · Failure to thrive    PLAN:   · Transfuse PRN  ·  Replace lytes  · Antibiotics   · IV fluids  · DVT and GI prophylaxis                         [x]See my orders for details     My assessment/plan was discussed with:  [x]nursing []PT/OT    []respiratory therapy [x]   []family []     []Total critical care time exclusive of procedures       minutes  Arti Winn MD

## 2018-02-19 NOTE — PROGRESS NOTES
NUTRITION COMPLETE ASSESSMENT    RECOMMENDATIONS:   Enteral nutrition support-see below     Interventions/Plan:   Follow    Assessment:   Reason for Assessment:   [x] Provider Consult    Diet: NPO  Nutritionally Significant Medications: [x] Reviewed & Includes: Os-Harman, Goody bag in D5, magnesium sulfate, KCL, D5 with KCL @ 100 ml/hr  Meal Intake: No data found. Subjective:  I'm okay.  at bedside-reports wife was eating very little at home PTA (fair intake prior to discharge from hospital). Objective:  Ms Sonia Myers was admitted with Hypokalemia. Noted: hypokalemia/hypophosphatemia recurrent 2/2 etoh abuse, poor po intake and renal wasting (lasix)-improved; alcohol withdrawal; anemia requiring frequent transfusions-for BM biopsy today. PMHx: PUD, Scoliosis, tobacco abuse, others noted. Patient appears malnourished with muscle wasting and edema 2/2 severe hypoalbuminemia. Hair very thin and sparse;  relates history of alopecia but suspect this is also 2/2 protein malnutrition. Severe wt loss in the past 6 months. PO intake poor ~ 3 months PTA with inadequate nutrient intake since most recent hospitalization (only home for one week). At high risk for refeeding-goody bag is ordered. Potassium and magnesium replaced today. Phosphorus low-normal today after being replaced past couple of days. Continue to monitor daily. Recommend enteral nutrition support until able to safely resume diet (and pt eating well). Suggested goal feeding: Osmolite 1.5 @ 40 ml/hr with 120 ml water flush q 4 hr. This will provide 960 ml, 1440 calories, 60 gm protein and 1450 ml free water (tube feeding/flush) per day to emet 100% estimated needs. Estimated Nutrition Needs:   Kcals/day: 1325 Kcals/day (9831-2976 (MSJ x 1.3-1.4)  Protein: 55 g (55-64 (1.2-1.4g/kg)  Fluid:  (1 ml/kcal)  Based On:  Prema Simmons  Weight Used: Actual wt (46 kg (standing scale 2/16)    Pt expected to meet estimated nutrient needs:  []   Yes []  No  [x] Unable to predict at this time    Nutrition Diagnosis:   1. Inadequate oral intake related to alcohol withdrawal as evidenced by NPO d/t AMS. 2. Unintended weight loss related to chronic alcohol abuse as evidenced by 30# or 23% wt loss in past 6 months. Meets Criteria for Chronic Malnutrition   [x] Severe Malnutrition, as evidenced by:   [] Severe muscle wasting, loss of subcutaneous fat   [x] Nutritional intake of <75% of recommended intake for >1 month   [x] Weight loss of  >5% in 1 month, >7.5% in 3 months, >10% in 6 months, >20% in 1 year   [x] Severe edema     Goals:     Initiate tube feeding in next 24 hr.     Monitoring & Evaluation:    - Enteral/parenteral nutrition intake   - Electrolyte and renal profile, Weight/weight change     Previous Nutrition Goals Met:  N/A  Previous Recommendations:      N/A    Education & Discharge Needs:   [x] None Identified   [] Identified and addressed    [x] Participated in care plan, discharge planning, and/or interdisciplinary rounds        Cultural, Episcopal and ethnic food preferences identified:   None    Skin Integrity: [x]Intact  []Other  Edema: []None [x] 1+NP BUE's and LLE, 3+ pitting RLE  Last BM: 2/19  Food Allergies: [x]None []Other    Anthropometrics:    Weight Loss Metrics 2/19/2018 2/16/2018 2/16/2018 2/16/2018 2/7/2018 1/29/2018 1/26/2018   Today's Wt 137 lb 12.6 oz - 102 lb - 133 lb 99 lb -   BMI - 26.03 kg/m2 - 19.27 kg/m2 25.13 kg/m2 - 18.71 kg/m2      Last 3 Recorded Weights in this Encounter    02/18/18 0000 02/19/18 0000 02/19/18 1412   Weight: 58.5 kg (128 lb 15.5 oz) 62.5 kg (137 lb 12.6 oz) 62.5 kg (137 lb 12.6 oz)      Weight Source: Bed  Height: 5' 1\" (154.9 cm),    Body mass index is 26.03 kg/(m^2).   IBW : 47.6 kg (105 lb), % IBW (Calculated): 131.23 %   ,      Labs:    Lab Results   Component Value Date/Time    Sodium 144 02/19/2018 03:34 AM    Potassium 3.4 (L) 02/19/2018 03:34 AM    Chloride 110 (H) 02/19/2018 03:34 AM    CO2 26 02/19/2018 03:34 AM    Glucose 90 02/19/2018 03:34 AM    BUN 2 (L) 02/19/2018 03:34 AM    Creatinine 0.33 (L) 02/19/2018 03:34 AM    Calcium 7.4 (L) 02/19/2018 03:34 AM    Magnesium 1.6 02/19/2018 03:34 AM    Phosphorus 2.8 02/19/2018 03:34 AM    Albumin 1.6 (L) 02/19/2018 03:34 AM     Lab Results   Component Value Date/Time    Hemoglobin A1c 4.3 (L) 01/25/2018 01:17 PM     Lab Results   Component Value Date/Time    Glucose (POC) 114 (H) 02/18/2018 08:30 AM      Lab Results   Component Value Date/Time    ALT (SGPT) 11 (L) 02/19/2018 03:34 AM    AST (SGOT) 31 02/19/2018 03:34 AM    GGT 83 (H) 10/16/2017 03:55 PM    Alk.  phosphatase 141 (H) 02/19/2018 03:34 AM    Bilirubin, direct 0.4 (H) 02/18/2018 09:47 AM    Bilirubin, total 0.7 02/19/2018 03:34 AM        Ria Sapp, [facility]

## 2018-02-19 NOTE — WOUND CARE
WOCN Note:     New consult placed by RN for reese, sacrum, elbow, breast    Chart shows:  Admitted for hypokalemia and weakness; history of smoking and ETOH use  Admitted from home    Assessment:   Patient keeps her eyes closed but calls out with repositioning, with furrowed brow. Requires full assists in repositioning. RN, Cristopher gonzalez, in room to help. Patient has a Renodn and Flexi Seal.    Bed: total care    Bilateral heel skin intact and without erythema. Heels offloaded on pillow. 1. POA, bilateral buttocks and lower sacrum DTI  = ~12 x 12 x 0 cm  100% dark non-blanching field on a darker skin toned individual so margins are not clearly defined. Dry linear abrasion to left breast left open to air. Right axillae drying abrasion toward right breast = 1 x 1 cm and mostly dry with dyschromia lines surrounding like tape trauma. Cover with band-aid. Bruise noted to left lower arm. Bruise noted to left shoulder. Dyschromia noted to inner thighs like resolving MASD. No rash noted. Patient repositioned in chair position. Family in room. Wound Recommendations:    Venelex to buttocks and sacrum twice daily. Skin Care & Pressure Relief Recommendations:  Minimize layers of linen/pads under patient to optimize support surface. Turn/reposition approximately every 2 hours and offload heels. Specialty bed: total care sport ordered via [facility]. Use only flat sheet and one incontinence pad. [facility] delivered a sport bed while I was still on the unit. Discussed above plan with RN.     Transition of Care: Plan to follow weekly and as needed while admitted to hospital.    VEGA Casey, RN, [facility]  Certified Wound, Ostomy, Continence Nurse  office [phone]  pager [phone] or call  to page

## 2018-02-19 NOTE — PROGRESS NOTES
Physical Therapy  2/19/18    Patient chart reviewed. Discussed with RN. Observed patient transition into bed<>chair position. Continues to be lethargic and not appropriate to participate in PT assessment this date. Will follow up tomorrow. Analilia Vargas, PT, DPT

## 2018-02-19 NOTE — PROGRESS NOTES
Seen by Dr. Grace Preston MD, Radiologist, who talked with . Patient having tremors, unable to assess airway. Procedure postponed.

## 2018-02-19 NOTE — CDMP QUERY
Dear Hospitalists,    Please clarify if this patient is (was) being treated/managed for:     => Hypovolemic shock in the setting of hypotension requiring vasopressor and monitoring  => Other explanation of clinical findings  => Unable to determine (no explanation for clinical findings)    The medical record reflects the following clinical findings, treatment, and risk factors. Risk Factors:  cc  fatigue, generalized weakness, and right arm swelling     Clinical Indicators:  per PN\"Hypotension (POA) on chronic HTN - likely due to RLL HCAP on CXR\", BP 79/56  Treatment: vasopressor-Levophed, monitoring    Please clarify and document your clinical opinion in the progress notes and discharge summary including the definitive and/or presumptive diagnosis, (suspected or probable), related to the above clinical findings. Please include clinical findings supporting your diagnosis.     Thank You  Sarwat Briones,MSN,BSN,RN,[facility]  [phone]

## 2018-02-19 NOTE — PROCEDURES
[facility]  *** FINAL REPORT ***    Name: Colby Adan  MRN: [id number]    Inpatient  : 1960  HIS Order #: [id number]  [address] [facility] Visit #: [id number]  Date: 2018    TYPE OF TEST: Peripheral Venous Testing    REASON FOR TEST  Limb swelling    Right Leg:-  Deep venous thrombosis:           No  Superficial venous thrombosis:    No  Deep venous insufficiency:        Not examined  Superficial venous insufficiency: Not examined    Left Leg:-  Deep venous thrombosis:           No  Superficial venous thrombosis:    No  Deep venous insufficiency:        Not examined  Superficial venous insufficiency: Not examined      INTERPRETATION/FINDINGS  PROCEDURE:  Color duplex ultrasound imaging of lower extremity veins. FINDINGS:       Right: The common femoral, deep femoral, femoral, popliteal,  posterior tibial, peroneal, and great saphenous are patent and without   evidence of thrombus;  each is fully compressible and there is no  narrowing of the flow channel on color Doppler imaging. Phasic flow  is observed in the common femoral vein. Left:   The common femoral, deep femoral, femoral, popliteal, and   great saphenous are patent and without evidence of thrombus;  each is   fully compressible and there is no narrowing of the flow channel on  color Doppler imaging. Phasic flow is observed in the common femoral  vein. IMPRESSION:  No evidence of right or left lower extremity vein  thrombosis. ADDITIONAL COMMENTS  Unable to view left posterior tibial and peroneal veins due to patient   habitus. I have personally reviewed the data relevant to the interpretation of  this  study.     TECHNOLOGIST: KIRTI Min, BEN  Signed: 2018 11:10 AM    PHYSICIAN: Sukhi Proctor MD  Signed: 2018 06:57 AM

## 2018-02-19 NOTE — ROUTINE PROCESS
Called to schedule CT Bone Marrow Bx. Pt is unable to consent; therefore,  being contacted for consent per Praveen Gonzales (ZURDO).  Once consent is completed RN will call  to schedule BX

## 2018-02-19 NOTE — PROGRESS NOTES
Spiritual Care Assessment/Progress Notes    Tobe Favre [id number]  [id number]    1960  65 y.o.  female    Patient Telephone Number: [phone] (home)   Congregation Affiliation: [facility]   Language: English   Extended Emergency Contact Information  Primary Emergency Contact: Carmela Hong Phone: [phone]  Relation: Spouse  Secondary Emergency Contact: Harvey Vidal Phone: [phone]  Mobile Phone: [phone]  Relation: Daughter   Patient Active Problem List    Diagnosis Date Noted    Hypokalemia 02/16/2018    Abnormal CT scan, chest 01/30/2018    Abnormal CT of the abdomen 01/30/2018    Colitis 01/27/2018    Lactic acid acidosis 01/27/2018    Elevated troponin 01/27/2018    Protein-calorie malnutrition, severe ([address] 01/27/2018    Hypotension 01/27/2018    Emphysema of lung ([facility] [address] 01/27/2018    Weight loss 01/26/2018    Hypokalemia due to loss of potassium 01/26/2018    Hypomagnesemia 01/26/2018    Debility 01/26/2018    Scoliosis     Benign essential HTN 06/26/2014    Hyperlipidemia 41/64/2474    Folic acid deficiency 91/12/2537    Iron deficiency anemia 02/08/2012        Date: 2/19/2018       Level of Congregation/Spiritual Activity:  []         Involved in emmie tradition/spiritual practice    []         Not involved in emmie tradition/spiritual practice  []         Spiritually oriented    []         Claims no spiritual orientation    []         seeking spiritual identity  []         Feels alienated from Sikh practice/tradition  []         Feels angry about Sikh practice/tradition  []         Spirituality/Sikh tradition is a resource for coping at this time.   [x]         Not able to assess     Services Provided Today:  []         crisis intervention    []         reading Scriptures  []         spiritual assessment    []         prayer  []         empathic listening/emotional support  []         rites and rituals (cite in comments)  []         life review     []         Druze support  []         theological development   []         advocacy  []         ethical dialog     []         blessing  []         bereavement support    []         support to family  []         anticipatory grief support   []         help with AMD  []         spiritual guidance    []         meditation      Spiritual Care Needs  []         Emotional Support  []         Spiritual/Adventism Care  []         Loss/Adjustment  []         Advocacy/Referral                /Ethics  [x]         No needs expressed at               this time  []         Other: (note in               comments)  Spiritual Care Plan  []         Follow up visits with               pt/family  []         Provide materials  []         Schedule sacraments  []         Contact Community               Clergy  [x]         Follow up as needed  []         Other: (note in               comments)     Comments:  is attempting visit with pt in ICU 10. Pt is out of the room at the time. Spiritual care will continue to follow as able and as needed.      [phone] Shannon Mcmanus M.Div, M.S, Brandon Davis0 available at [address] [place] [phone]

## 2018-02-19 NOTE — PROGRESS NOTES
Consult received for SLP dysphagia screening.  nsg dysphagia screen not yet completed. Recommend complete nsg dysphagia screen as appropriate. If formal SLP evaluation is desired, please re-consult with SLP eval and treat order as SLP does not complete \"screenings\" only evaluations or treatments. Thanks. Mateo Madsen M.CD.  CCC-SLP

## 2018-02-19 NOTE — PROGRESS NOTES
Day #3 of Vancomycin - Dosing Update  Indication:  Possible HCAP  Current regimen: On hold, last dose: 750 mg IV on  @ 0338 (1/ dose given)  Abx regimen:  Zosyn + Vancomycin  ID Following ?: NO  Concomitant nephrotoxic drugs (requires more frequent monitoring): None  Frequency of BMP?: x 1 tomorrow    Recent Labs      18   0334  18   0356  18   1541  18   0554   WBC  8.1  7.3   --   6.3   CREA  0.33*  0.36*  0.32*  0.29*   BUN  2*  3*  3*  4*     Est CrCl: >100 ml/min; UO: ~0.3 ml/kg/hr   Temp (24hrs), Av.5 °F (36.9 °C), Min:97.8 °F (36.6 °C), Max:98.8 °F (37.1 °C)    Cultures:    Blood: NGTD   MRSA screen: negative    Goal trough = 15 - 20 mcg/mL    Recent trough history:   @ 0334 = 22.7 mcg/ml (drawn ~9 hrs post-dose) - dose stopped 1/2 way through and maintenance dose d/c'd    Plan: Will adjust the dose to 750 mg IV Q 12 hr.  Pharmacy will continue to monitor patient daily and will make dosage adjustments based upon changing renal function.

## 2018-02-19 NOTE — PROGRESS NOTES
Occupational Therapy Note 1350    Orders acknowledged, chart reviewed. Spoke with PT. Patient has been lethargic and unable to follow commands to participate in OT evaluation today. Will follow up tomorrow. Thank you.     Kerline Feldman, OTR/GAIL

## 2018-02-19 NOTE — PROGRESS NOTES
Pharmacist Note - Vancomycin Dosing  Therapy day 3  Indication:  HCAP  -Failure to thrive: ETOH abuse/Hypoalbuminemia/Weight loss     Current regimen:  750 mg IV q8h    A Trough Level resulted at 22.7 mcg/mL which was obtained ~9 hrs post-dose. The extrapolated \"true\" trough is approximately ~24 mcg/mL based on the patient's known kinetics. Goal trough: 15 - 20 mcg/mL     Plan: Hold current regimen. I spoke with the RN on the floor, slightly more than half of the dose had infused prior to the level resulting. Clinical specialist to follow-up RE new maintenance regimen. Pharmacy will continue to monitor this patient daily for changes in clinical status and renal function.

## 2018-02-19 NOTE — PROGRESS NOTES
Problem: Falls - Risk of  Goal: *Absence of Falls  Document Roselia Fall Risk and appropriate interventions in the flowsheet.    Outcome: Progressing Towards Goal  Fall Risk Interventions:  Mobility Interventions: Communicate number of staff needed for ambulation/transfer, Patient to call before getting OOB, PT Consult for mobility concerns, PT Consult for assist device competence, Strengthening exercises (ROM-active/passive)    Mentation Interventions: Adequate sleep, hydration, pain control, Door open when patient unattended, Evaluate medications/consider consulting pharmacy, Increase mobility, More frequent rounding, Reorient patient, Room close to nurse's station, Toileting rounds, Update white board    Medication Interventions: Evaluate medications/consider consulting pharmacy, Patient to call before getting OOB    Elimination Interventions: Call light in reach, Patient to call for help with toileting needs, Toileting schedule/hourly rounds

## 2018-02-20 ENCOUNTER — APPOINTMENT (OUTPATIENT)
Dept: GENERAL RADIOLOGY | Age: 58
DRG: 640 | End: 2018-02-20
Attending: INTERNAL MEDICINE
Payer: COMMERCIAL

## 2018-02-20 LAB
ALBUMIN SERPL-MCNC: 1.5 G/DL (ref 3.5–5)
ALBUMIN/GLOB SERPL: 0.4 {RATIO} (ref 1.1–2.2)
ALP SERPL-CCNC: 137 U/L (ref 45–117)
ALT SERPL-CCNC: 14 U/L (ref 12–78)
ANION GAP SERPL CALC-SCNC: 3 MMOL/L (ref 5–15)
AST SERPL-CCNC: 28 U/L (ref 15–37)
BASOPHILS # BLD: 0 K/UL (ref 0–0.1)
BASOPHILS NFR BLD: 0 % (ref 0–1)
BILIRUB SERPL-MCNC: 0.6 MG/DL (ref 0.2–1)
BUN SERPL-MCNC: 2 MG/DL (ref 6–20)
BUN/CREAT SERPL: 6 (ref 12–20)
CALCIUM SERPL-MCNC: 7.5 MG/DL (ref 8.5–10.1)
CHLORIDE SERPL-SCNC: 109 MMOL/L (ref 97–108)
CO2 SERPL-SCNC: 27 MMOL/L (ref 21–32)
CREAT SERPL-MCNC: 0.33 MG/DL (ref 0.55–1.02)
DIFFERENTIAL METHOD BLD: ABNORMAL
EOSINOPHIL # BLD: 0.2 K/UL (ref 0–0.4)
EOSINOPHIL NFR BLD: 2 % (ref 0–7)
ERYTHROCYTE [DISTWIDTH] IN BLOOD BY AUTOMATED COUNT: 17.3 % (ref 11.5–14.5)
GLOBULIN SER CALC-MCNC: 3.4 G/DL (ref 2–4)
GLUCOSE SERPL-MCNC: 85 MG/DL (ref 65–100)
HCT VFR BLD AUTO: 26 % (ref 35–47)
HGB BLD-MCNC: 8.5 G/DL (ref 11.5–16)
IMM GRANULOCYTES # BLD: 0 K/UL
IMM GRANULOCYTES NFR BLD AUTO: 0 %
LIPASE SERPL-CCNC: 45 U/L (ref 73–393)
LYMPHOCYTES # BLD: 1.4 K/UL (ref 0.8–3.5)
LYMPHOCYTES NFR BLD: 16 % (ref 12–49)
MAGNESIUM SERPL-MCNC: 1.6 MG/DL (ref 1.6–2.4)
MCH RBC QN AUTO: 27.7 PG (ref 26–34)
MCHC RBC AUTO-ENTMCNC: 32.7 G/DL (ref 30–36.5)
MCV RBC AUTO: 84.7 FL (ref 80–99)
METAMYELOCYTES NFR BLD MANUAL: 1 %
MONOCYTES # BLD: 1.6 K/UL (ref 0–1)
MONOCYTES NFR BLD: 18 % (ref 5–13)
NEUTS SEG # BLD: 5.6 K/UL (ref 1.8–8)
NEUTS SEG NFR BLD: 63 % (ref 32–75)
NRBC # BLD: 0 K/UL (ref 0–0.01)
NRBC BLD-RTO: 0 PER 100 WBC
PHOSPHATE SERPL-MCNC: 2.7 MG/DL (ref 2.6–4.7)
PLATELET # BLD AUTO: 221 K/UL (ref 150–400)
PMV BLD AUTO: 10.6 FL (ref 8.9–12.9)
POTASSIUM SERPL-SCNC: 3.7 MMOL/L (ref 3.5–5.1)
PROT SERPL-MCNC: 4.9 G/DL (ref 6.4–8.2)
RBC # BLD AUTO: 3.07 M/UL (ref 3.8–5.2)
RBC MORPH BLD: ABNORMAL
SODIUM SERPL-SCNC: 139 MMOL/L (ref 136–145)
WBC # BLD AUTO: 8.9 K/UL (ref 3.6–11)

## 2018-02-20 PROCEDURE — 85025 COMPLETE CBC W/AUTO DIFF WBC: CPT | Performed by: INTERNAL MEDICINE

## 2018-02-20 PROCEDURE — 74011000258 HC RX REV CODE- 258: Performed by: INTERNAL MEDICINE

## 2018-02-20 PROCEDURE — 74011000250 HC RX REV CODE- 250: Performed by: INTERNAL MEDICINE

## 2018-02-20 PROCEDURE — 74011250637 HC RX REV CODE- 250/637: Performed by: INTERNAL MEDICINE

## 2018-02-20 PROCEDURE — 74011250637 HC RX REV CODE- 250/637: Performed by: HOSPITALIST

## 2018-02-20 PROCEDURE — 74011250636 HC RX REV CODE- 250/636: Performed by: INTERNAL MEDICINE

## 2018-02-20 PROCEDURE — 3331090002 HH PPS REVENUE DEBIT

## 2018-02-20 PROCEDURE — 74018 RADEX ABDOMEN 1 VIEW: CPT

## 2018-02-20 PROCEDURE — 65660000000 HC RM CCU STEPDOWN

## 2018-02-20 PROCEDURE — 80053 COMPREHEN METABOLIC PANEL: CPT | Performed by: INTERNAL MEDICINE

## 2018-02-20 PROCEDURE — 84100 ASSAY OF PHOSPHORUS: CPT | Performed by: INTERNAL MEDICINE

## 2018-02-20 PROCEDURE — 83735 ASSAY OF MAGNESIUM: CPT | Performed by: INTERNAL MEDICINE

## 2018-02-20 PROCEDURE — 36415 COLL VENOUS BLD VENIPUNCTURE: CPT | Performed by: INTERNAL MEDICINE

## 2018-02-20 PROCEDURE — 73600 X-RAY EXAM OF ANKLE: CPT

## 2018-02-20 PROCEDURE — 3331090001 HH PPS REVENUE CREDIT

## 2018-02-20 PROCEDURE — 83690 ASSAY OF LIPASE: CPT | Performed by: INTERNAL MEDICINE

## 2018-02-20 RX ORDER — BUPROPION HYDROCHLORIDE 75 MG/1
75 TABLET ORAL 2 TIMES DAILY
Status: DISCONTINUED | OUTPATIENT
Start: 2018-02-21 | End: 2018-03-02 | Stop reason: HOSPADM

## 2018-02-20 RX ORDER — DEXTROSE, SODIUM CHLORIDE, AND POTASSIUM CHLORIDE 5; .45; .075 G/100ML; G/100ML; G/100ML
100 INJECTION INTRAVENOUS CONTINUOUS
Status: DISCONTINUED | OUTPATIENT
Start: 2018-02-20 | End: 2018-02-20 | Stop reason: ALTCHOICE

## 2018-02-20 RX ORDER — POTASSIUM CHLORIDE 20MEQ/15ML
40 LIQUID (ML) ORAL
Status: COMPLETED | OUTPATIENT
Start: 2018-02-20 | End: 2018-02-20

## 2018-02-20 RX ORDER — DEXTROSE MONOHYDRATE AND SODIUM CHLORIDE 5; .45 G/100ML; G/100ML
100 INJECTION, SOLUTION INTRAVENOUS CONTINUOUS
Status: DISCONTINUED | OUTPATIENT
Start: 2018-02-20 | End: 2018-02-20

## 2018-02-20 RX ORDER — MAGNESIUM SULFATE HEPTAHYDRATE 40 MG/ML
2 INJECTION, SOLUTION INTRAVENOUS ONCE
Status: COMPLETED | OUTPATIENT
Start: 2018-02-20 | End: 2018-02-21

## 2018-02-20 RX ADMIN — LORAZEPAM 2 MG: 2 INJECTION INTRAMUSCULAR; INTRAVENOUS at 20:16

## 2018-02-20 RX ADMIN — LORAZEPAM 2 MG: 2 INJECTION INTRAMUSCULAR; INTRAVENOUS at 00:17

## 2018-02-20 RX ADMIN — LORAZEPAM 1 MG: 2 INJECTION INTRAMUSCULAR; INTRAVENOUS at 04:54

## 2018-02-20 RX ADMIN — Medication 10 ML: at 16:16

## 2018-02-20 RX ADMIN — ACETAMINOPHEN 650 MG: 325 TABLET, FILM COATED ORAL at 20:17

## 2018-02-20 RX ADMIN — MAGNESIUM SULFATE HEPTAHYDRATE 2 G: 40 INJECTION, SOLUTION INTRAVENOUS at 13:07

## 2018-02-20 RX ADMIN — POTASSIUM CHLORIDE: 2 INJECTION, SOLUTION, CONCENTRATE INTRAVENOUS at 19:24

## 2018-02-20 RX ADMIN — Medication 10 ML: at 22:00

## 2018-02-20 RX ADMIN — LORAZEPAM 1 MG: 2 INJECTION INTRAMUSCULAR; INTRAVENOUS at 14:45

## 2018-02-20 RX ADMIN — CASTOR OIL AND BALSAM, PERU: 788; 87 OINTMENT TOPICAL at 09:21

## 2018-02-20 RX ADMIN — CALCIUM CARBONATE-VITAMIN D TAB 500 MG-200 UNIT 1 TABLET: 500-200 TAB at 17:57

## 2018-02-20 RX ADMIN — DEXTROSE MONOHYDRATE AND SODIUM CHLORIDE 100 ML/HR: 5; .45 INJECTION, SOLUTION INTRAVENOUS at 12:01

## 2018-02-20 RX ADMIN — Medication 10 ML: at 13:26

## 2018-02-20 RX ADMIN — LORAZEPAM 2 MG: 2 INJECTION INTRAMUSCULAR; INTRAVENOUS at 22:21

## 2018-02-20 RX ADMIN — POTASSIUM CHLORIDE 40 MEQ: 20 SOLUTION ORAL at 12:01

## 2018-02-20 RX ADMIN — LORAZEPAM 1 MG: 2 INJECTION INTRAMUSCULAR; INTRAVENOUS at 17:57

## 2018-02-20 RX ADMIN — LORAZEPAM 1 MG: 2 INJECTION INTRAMUSCULAR; INTRAVENOUS at 07:34

## 2018-02-20 RX ADMIN — LORAZEPAM 2 MG: 2 INJECTION INTRAMUSCULAR; INTRAVENOUS at 02:32

## 2018-02-20 RX ADMIN — BUPROPION HYDROCHLORIDE 150 MG: 150 TABLET, EXTENDED RELEASE ORAL at 09:09

## 2018-02-20 RX ADMIN — LACTULOSE 1000 ML: 10 SOLUTION ORAL at 05:50

## 2018-02-20 RX ADMIN — LORAZEPAM 1 MG: 2 INJECTION INTRAMUSCULAR; INTRAVENOUS at 16:15

## 2018-02-20 RX ADMIN — LORAZEPAM 1 MG: 2 INJECTION INTRAMUSCULAR; INTRAVENOUS at 09:59

## 2018-02-20 RX ADMIN — CALCIUM CARBONATE-VITAMIN D TAB 500 MG-200 UNIT 1 TABLET: 500-200 TAB at 09:09

## 2018-02-20 RX ADMIN — Medication 10 ML: at 05:50

## 2018-02-20 RX ADMIN — LACTULOSE 30 G: 20 SOLUTION ORAL at 17:57

## 2018-02-20 NOTE — PROGRESS NOTES
2230: Patients NG tube partially out of nose. Advanced to recorded measurement and received orders from Dr. Tessy Piedra for repeat chest x-ray. Primary Nurse Anna March RN and Mahesh RN performed a dual skin assessment on this patient Impairment noted- see wound doc flow sheet. Deep tissue injury to sacrum. Scattered briusing. Abrasion to right elbow. Hilton score is 10. Problem: Falls - Risk of  Goal: *Absence of Falls  Document Roselia Fall Risk and appropriate interventions in the flowsheet. Outcome: Progressing Towards Goal  Fall Risk Interventions:  Mobility Interventions: Bed/chair exit alarm, Communicate number of staff needed for ambulation/transfer    Mentation Interventions: Bed/chair exit alarm, Family/sitter at bedside, Increase mobility, More frequent rounding, Reorient patient, Room close to nurse's station    Medication Interventions: Patient to call before getting OOB, Teach patient to arise slowly    Elimination Interventions: Bed/chair exit alarm, Call light in reach             Problem: Pressure Injury - Risk of  Goal: *Prevention of pressure ulcer  Outcome: Progressing Towards Goal  Frequent rounding and skin assessment in progress. Patient encouraged to change positions frequently. Problem: General Medical Care Plan  Goal: *Fluid volume balance  Outcome: Progressing Towards Goal  Patient receiving IV fluids. Frequent assessment of patients I&Os in progress. Bedside shift change report given to Tina Easton RN (oncoming nurse) by Milana Hoyt RN (offgoing nurse). Report included the following information SBAR, Kardex, Intake/Output and MAR.

## 2018-02-20 NOTE — PROGRESS NOTES
Chart checked, case discussed with nursing. Pt is not appropriate for therapy at this time. She is not following any commands. PT will continue to follow and see for eval as appropriate.  Thank you, Yanet Shaffer, PT

## 2018-02-20 NOTE — PROGRESS NOTES
Hospitalist Progress Note  Twan Moncada MD  Answering service: [phone] OR [phone] from in house phone         Date of Service:  2018  NAME:  Jessica Richardson  :  1960  MRN:  [id number]    Admission Summary:   66 yo woman with HTN, HLD, DUB, iron deficiency anemia, severe protein-calorie malnutrition, recently diagnosed colitis, EtOH abuse, and h/o GI bleed was sent by her PCP to the ED on 18 for fatigue, generalized weakness, and right arm swelling. She was recently hospitalized at [facility] 18 - 18 for hypokalemia and anemia. She was admitted to the ICU for severe hypokalemia, hypotension, and chronic RUE superficial venous thrombosis.      Interval history / Subjective:       :  I have personally reviewed serial CXR from  and , not consistent with PNA , Abx dc's on this bases  Sodium now in nl range, K and mag borderline, IVF's adjusted accordingly.  Mag and K plus CMP/cbc for am       Assessment & Plan:      Severe hypokalemia (POA) - replete until K+ >=4 and magnesium >=2  - urine/serum studies ordered  - Renal following  - likely due to anorexia and malabsorption and recent use of diuretics; not resumed on admission  - corrected   Lab Results   Component Value Date/Time    Potassium 3.7 2018 03:39 AM    Potassium (POC) <2.0 (LL) 2018 04:08 PM         EtOH withdrawal on chronic alcohol abuse   - now on CIWA protocol and banana bag; prn Ativan     Hepatic encephalopathy due to hyperammonemia   - start lactulose retention enemas and can change to per NGT once placed     Acute on chronic anemia (POA)   - iron WNL, folate low, B12 low-normal  - FOBT negative  - s/p 2 units PRBC transfusion since admission   - Hematology consulted by [facility] due to recent h/o possible occult malignancy  - per  at bedside, EGD, colonoscopy, bone scan all unremarkable recently  - recent PET was abnormal  - stopped SC heparin and change to SCDs  - IR unable to do CT-guided BM bx today due to unstable airway; re-scheduled     Severe protein-calorie malnutrition - prealbumin 4.4  - Nutrition consulted  - s/p 1 dose IV albumin     Chronic RUE VTE  - no indication for [facility] for RUE VTE as pt had IV in that arm during previous hospitalization     Right ankle deformity and RLE>LLE edema - venous duplex b/l LEs 2/19 negative  - check R ankle XR      Generalized weakness/debility with EtOH abuse (POA)   - likely due to anorexia and EtOH abuse  - PT/OT/ST evals, Nutrition eval  - banana bag  - non verbal 2/20/2018      Hypovolemic shock (POA) on chronic HTN - likely due to RLL HCAP on CXR  - continue IVF  - resolved s/p pressor x4h  - TTE 2/17 EF 55-60%, no RWMA, mild-mod TR, small pericardial effusion     Possible RLL HCAP (POA) - BCx 2/16 NGTD  - started empiric Zosyn/vancomycin 2/17  - s/p empiric vancomycin 2/17-2/19   - BCx 2/16 NGTD  - MRSA swab negative  - sputum Cx ordered but no sample sent yet  - urine strep/legionella antigens pending  - flu swab negative  - cxr form 2/17 to 2/19 cleared as to RLL findings, not c/c pna,      Mild transaminitis - elevated AST likely due to EtOH  - RUQ US 2/18 hepatic steatosis  Lab Results   Component Value Date/Time    ALT (SGPT) 14 02/20/2018 03:39 AM    AST (SGOT) 28 02/20/2018 03:39 AM    GGT 83 (H) 10/16/2017 03:55 PM    Alk. phosphatase 137 (H) 02/20/2018 03:39 AM    Bilirubin, direct 0.4 (H) 02/18/2018 09:47 AM    Bilirubin, total 0.6 02/20/2018 03:39 AM          Hypernatremia - IVF adjusted by Renal; resolved  Recent Labs      02/20/18   0339   NA  139       Hypophosphatemia - replete prn  Lab Results   Component Value Date/Time    Calcium 7.5 (L) 02/20/2018 03:39 AM    Phosphorus 2.7 02/20/2018 03:39 AM       Hypomagnesemia - replete prn  Recent Labs      02/20/18   0339   MG  1.6         Code status: full  DVT prophylaxis: SCDs     Care Plan discussed with: Nurse.  No family at bedside today  Disposition: TBD.           Hospital Problems  Date Reviewed: 2/19/2018          Codes Class Noted POA    * (Principal)Hypokalemia ICD-10-CM: E87.6  ICD-9-CM: 276.8  2/16/2018 Yes                Review of Systems:   Review of systems not obtained due to patient factors. Vital Signs:    Last 24hrs VS reviewed since prior progress note. Most recent are:  Visit Vitals    /84 (BP 1 Location: Right arm, BP Patient Position: At rest)    Pulse (!) 112    Temp 98 °F (36.7 °C)    Resp 28    Ht 5' 1\" (1.549 m)    Wt 63 kg (138 lb 14.2 oz)    SpO2 98%    BMI 26.24 kg/m2         Intake/Output Summary (Last 24 hours) at 02/20/18 0736  Last data filed at 02/19/18 1800   Gross per 24 hour   Intake             1350 ml   Output              555 ml   Net              795 ml        Physical Examination:             Constitutional:  + acute distress, withdrawn   ENT:  ng tube inplace. Oral mucous moist, oropharynx benign. Neck supple,    Resp:  CTA bilaterally. No wheezing/rhonchi/rales. No accessory muscle use   CV:  Regular rhythm, normal rate, no murmurs, gallops, rubs    GI:  Soft, non distended, non tender.  normoactive bowel sounds, no hepatosplenomegaly     Musculoskeletal:  No edema, warm, 1+ pulses throughout    Neurologic:  non focal but sedated/withdrawn     Psych:  withdrawn  Skin:  ecchymosis       Data Review:    Review and/or order of clinical lab test      Labs:     Recent Labs      02/20/18 0339 02/19/18   0334   WBC  8.9  8.1   HGB  8.5*  8.6*   HCT  26.0*  26.1*   PLT  221  216     Recent Labs      02/20/18   0339 02/19/18   0334  02/18/18   0356   NA  139  144  147*   K  3.7  3.4*  3.5   CL  109*  110*  113*   CO2  27  26  29   BUN  2*  2*  3*   CREA  0.33*  0.33*  0.36*   GLU  85  90  100   CA  7.5*  7.4*  7.1*   MG  1.6  1.6  1.9   PHOS  2.7  2.8  1.9*     Recent Labs      02/20/18   0339 02/19/18   0334  02/18/18   0947   SGOT  28  31  39*   ALT  14  11*  10*   AP  137*  141*  141* TBILI  0.6  0.7  0.8   TP  4.9*  5.0*  5.0*   ALB  1.5*  1.6*  1.7*   GLOB  3.4  3.4  3.3   LPSE  45*   --    --      Recent Labs      02/19/18   0334   INR  1.1   PTP  11.7*   APTT  35.4*      No results for input(s): FE, TIBC, PSAT, FERR in the last 72 hours. Lab Results   Component Value Date/Time    Folate 4.9 (L) 02/17/2018 05:54 AM      No results for input(s): PH, PCO2, PO2 in the last 72 hours. No results for input(s): CPK, CKNDX, TROIQ in the last 72 hours.     No lab exists for component: CPKMB  Lab Results   Component Value Date/Time    Cholesterol, total 202 (H) 03/27/2017 09:47 AM    HDL Cholesterol 76 03/27/2017 09:47 AM    LDL, calculated 109 (H) 03/27/2017 09:47 AM    Triglyceride 83 03/27/2017 09:47 AM     Lab Results   Component Value Date/Time    Glucose (POC) 114 (H) 02/18/2018 08:30 AM    Glucose (POC) 91 01/26/2018 04:08 PM    Glucose (POC) 107 (H) 01/26/2018 01:08 PM     Lab Results   Component Value Date/Time    Color DARK YELLOW 02/16/2018 08:30 PM    Appearance CLEAR 02/16/2018 08:30 PM    Specific gravity 1.013 02/16/2018 08:30 PM    pH (UA) 7.0 02/16/2018 08:30 PM    Protein TRACE (A) 02/16/2018 08:30 PM    Glucose NEGATIVE  02/16/2018 08:30 PM    Ketone TRACE (A) 02/16/2018 08:30 PM    Bilirubin Negative 09/28/2012 08:58 AM    Urobilinogen 0.2 02/16/2018 08:30 PM    Nitrites NEGATIVE  02/16/2018 08:30 PM    Leukocyte Esterase NEGATIVE  02/16/2018 08:30 PM    Epithelial cells FEW 02/16/2018 08:30 PM    Bacteria 1+ (A) 02/16/2018 08:30 PM    WBC 0-4 02/16/2018 08:30 PM    RBC 0-5 02/16/2018 08:30 PM         Medications Reviewed:     Current Facility-Administered Medications   Medication Dose Route Frequency    dextrose 5% - 0.45% NaCl with KCl 10 mEq/L infusion  100 mL/hr IntraVENous CONTINUOUS    nicotine (NICODERM CQ) 21 mg/24 hr patch 1 Patch  1 Patch TransDERmal DAILY    balsam peru-castor oil (VENELEX)  mg/gram ointment   Topical BID    sodium chloride (NS) flush 10-30 mL  10-30 mL InterCATHeter PRN    sodium chloride (NS) flush 10 mL  10 mL InterCATHeter PRN    sodium chloride (NS) flush 10-40 mL  10-40 mL InterCATHeter Q8H    alteplase (CATHFLO) 1 mg in sterile water (preservative free) 1 mL injection  1 mg InterCATHeter PRN    dextrose 5% 1,000 mL with potassium chloride 20 mEq, folic acid 1 mg, thiamine 200 mg, mvi, adult no. 4 with vit K 10 mL infusion   IntraVENous DAILY    lactulose enema in sterile water  1,000 mL Rectal Q8H    0.9% sodium chloride infusion 250 mL  250 mL IntraVENous PRN    LORazepam (ATIVAN) injection 1 mg  1 mg IntraVENous Q2H PRN    LORazepam (ATIVAN) injection 2 mg  2 mg IntraVENous Q2H PRN    zolpidem (AMBIEN) tablet 5 mg  5 mg Oral QHS PRN    calcium-vitamin D (OS-DELORIS) 500 mg-200 unit tablet  1 Tab Oral BID WITH MEALS    sodium chloride (NS) flush 5-10 mL  5-10 mL IntraVENous PRN    acetaminophen (TYLENOL) tablet 650 mg  650 mg Oral Q4H PRN    ondansetron (ZOFRAN) injection 4 mg  4 mg IntraVENous Q4H PRN    buPROPion SR (WELLBUTRIN SR) tablet 150 mg  150 mg Oral DAILY     ______________________________________________________________________  EXPECTED LENGTH OF STAY: 4d 21h  ACTUAL LENGTH OF STAY:          4                 Matt Lundberg MD

## 2018-02-20 NOTE — PROGRESS NOTES
Patient recently at [facility] from 1/26-2/2/2018. She received a rolling walker via [employer] at discharge and is open to [address] [place] [address]. Patient is  and lives with her  in own home. CM to follow for discharge needs.

## 2018-02-20 NOTE — PROGRESS NOTES
Patient BP fluctuating due to tremors, DT's. CIWA q2h. Ativan given. MD aware of BP's and patient HR in and out of afib. Will continue to monitor.

## 2018-02-20 NOTE — PROGRESS NOTES
NUTRITION       Brief note. Chart reviewed, discussed with RN and team during interdisciplinary rounds. Pt with NGT in place. Per RN, pt is not alert/awake enough to feed orally, so will start enteral feedings today. Would initiate Osmolite 1.5 @ 20 mL/hr and increase 10 mL/hr q 4 hours to goal of 40 mL/hr + 120 mL flush q 4 hours. This will provide 1440 kcal, 60 g protein and 1452 mL total free water (tf + flush)-- meeting 100% of estimated needs. Goodie bag ordered. Check potassium, phosphorus and magnesium with morning labs and replete prn secondary to refeeding risk. Suspect bedscale weight of 63 kg is not accurate (standing weight 2/16 was 46 kg). Estimated Nutrition Needs:   Kcals/day: 1325 Kcals/day (5913-9438 (MSJ x 1.3-1.4)  Protein: 55 g (55-64 (1.2-1.4g/kg)  Fluid:  (1 ml/kcal)     Based On:  [address]  Weight Used: Actual wt (46 kg (standing scale 2/16)    [place]unde[place] Corporal, [address]

## 2018-02-20 NOTE — PROGRESS NOTES
Patient name: Belkys Mcgarry  MRN: [id number]    Nephrology Progress note:    Assessment:  Hypokalemia/Hypophosphatemia: recurrent-> Suspect whole body potassium depletion (ETOH abuse/poor intake/nutritional status) exacerbated by renal wasting via lasix. Improving/stable s/p aggressive replacement     Anemia: Hgb stabilized, BMB->  Hematology consulting     Hypotension: volume depletion. Improved->Off vasopressor support     Failure to thrive: ETOH abuse/Hypoalbuminemia/Weight loss-> worked up for underlying malignancy- negative     Hypernatremia: better with hypotonic fluids    Edema: 3rd spacing    Plan/Recommendations:  Ct Banana bag  Stop IV D5W  IV KCL/IV Mag sulfate already ordered  CIWA  F/u BMB results  Am labs      Subjective: Altered-> withdrawal symptoms. ROS:   UTO    Exam:  Visit Vitals    /88 (BP 1 Location: Right arm, BP Patient Position: At rest)    Pulse (!) 111    Temp 97.4 °F (36.3 °C)    Resp 22    Ht 5' 1\" (1.549 m)    Wt 63 kg (138 lb 14.2 oz)    SpO2 96%    BMI 26.24 kg/m2     Wt Readings from Last 3 Encounters:   02/20/18 63 kg (138 lb 14.2 oz)   02/16/18 46.3 kg (102 lb)   02/07/18 60.3 kg (133 lb)       Intake/Output Summary (Last 24 hours) at 02/20/18 1620  Last data filed at 02/19/18 1800   Gross per 24 hour   Intake              450 ml   Output              130 ml   Net              320 ml       Gen: Resting  HEENT:  AT/NC  Lungs/Chest wall: Clear. Cardiovascular: Tachycardic  Abdomen/: Soft, NT, ND, BS+.    Ext: + peripheral edema  CNS: Altered      Current Facility-Administered Medications   Medication Dose Route Frequency Last Dose    lactulose (CHRONULAC) solution 30 g  30 g Per NG tube BID      [START ON 2/21/2018] buPROPion (WELLBUTRIN) tablet 75 mg  75 mg Per NG tube BID      nicotine (NICODERM CQ) 21 mg/24 hr patch 1 Patch  1 Patch TransDERmal DAILY 1 Patch at 02/20/18 0910    balsam peru-castor oil (VENELEX)  mg/gram ointment   Topical BID      sodium chloride (NS) flush 10-30 mL  10-30 mL InterCATHeter PRN      sodium chloride (NS) flush 10 mL  10 mL InterCATHeter PRN 10 mL at 02/20/18 1616    sodium chloride (NS) flush 10-40 mL  10-40 mL InterCATHeter Q8H 10 mL at 02/20/18 1326    alteplase (CATHFLO) 1 mg in sterile water (preservative free) 1 mL injection  1 mg InterCATHeter PRN      dextrose 5% 1,000 mL with potassium chloride 20 mEq, folic acid 1 mg, thiamine 200 mg, mvi, adult no. 4 with vit K 10 mL infusion   IntraVENous DAILY      0.9% sodium chloride infusion 250 mL  250 mL IntraVENous PRN      LORazepam (ATIVAN) injection 1 mg  1 mg IntraVENous Q2H PRN 1 mg at 02/20/18 1615    LORazepam (ATIVAN) injection 2 mg  2 mg IntraVENous Q2H PRN 2 mg at 02/20/18 0232    zolpidem (AMBIEN) tablet 5 mg  5 mg Oral QHS PRN Stopped at 02/17/18 2003    calcium-vitamin D (OS-DELORIS) 500 mg-200 unit tablet  1 Tab Oral BID WITH MEALS 1 Tab at 02/20/18 5337    sodium chloride (NS) flush 5-10 mL  5-10 mL IntraVENous PRN      acetaminophen (TYLENOL) tablet 650 mg  650 mg Oral Q4H PRN      ondansetron (ZOFRAN) injection 4 mg  4 mg IntraVENous Q4H PRN         Labs/Data:    Lab Results   Component Value Date/Time    WBC 8.9 02/20/2018 03:39 AM    Hemoglobin (POC) 4.9 02/16/2018 02:46 PM    Hemoglobin (POC) 11.2 (L) 01/26/2018 04:08 PM    HGB 8.5 (L) 02/20/2018 03:39 AM    Hematocrit (POC) 33 (L) 01/26/2018 04:08 PM    HCT 26.0 (L) 02/20/2018 03:39 AM    PLATELET 432 78/06/2579 03:39 AM    MCV 84.7 02/20/2018 03:39 AM Lab Results   Component Value Date/Time    Sodium 139 02/20/2018 03:39 AM    Potassium 3.7 02/20/2018 03:39 AM    Chloride 109 (H) 02/20/2018 03:39 AM    CO2 27 02/20/2018 03:39 AM    Anion gap 3 (L) 02/20/2018 03:39 AM    Glucose 85 02/20/2018 03:39 AM    BUN 2 (L) 02/20/2018 03:39 AM    Creatinine 0.33 (L) 02/20/2018 03:39 AM    BUN/Creatinine ratio 6 (L) 02/20/2018 03:39 AM    GFR est AA >60 02/20/2018 03:39 AM    GFR est non-AA >60 02/20/2018 03:39 AM    Calcium 7.5 (L) 02/20/2018 03:39 AM       Patient seen and examined. Chart reviewed. Labs, data and other pertinent notes reviewed in last 24 hrs.     Discussed with RN    Signed by:  Becki Lin MD

## 2018-02-20 NOTE — PROGRESS NOTES
0730: Bedside and Verbal shift change report given to Shahbaz Zhou RN (oncoming nurse) by Mandeep Gross RN (offgoing nurse). Report included the following information SBAR, Kardex, ED Summary, Intake/Output, MAR, Accordion, Recent Results, Med Rec Status, Cardiac Rhythm ST and Alarm Parameters . 0900: Wound care in to see pt, noted DTI on sacrum, new orders for venelex for wound treatment    1100: Pt lower duplex venous dopplers came back negative for DVTs    1430: Pt transported off unit for bone biopsy    1600: Pt back on unit, biopsy could not be performed radiologist stated pt was having tremors and unable to assess airway.   Procedure would be postponed to later date     1800: NG tube inserted per Dr. Paul Moreno: Pt transferred to [facility]

## 2018-02-20 NOTE — PROGRESS NOTES
[employer]  Medical Oncology at [facility]    Hematology / Oncology Progress Note    Reason for Visit:   Sunil Foreman is a 65 y.o. female who is seen in consultation at the request of Dr. Joanne Baeza for evaluation of anemia. History of Present Illness:   Ms. Nargis Woodruff is a 64 y/o female admitted with weakness and hypokalemia (K 1.9). She had a recent hospital admission at [facility] for the same reason on 1/26/18. She has been feeling poorly for the past few days with bodyaches, weakness, and decreased p.o. intake. At PCP's office, i-STAT Hgb was 4, and patient was directed to the ER. Upon arrival in ER, Hgb was 8.2, potassium 1.9, Mg 1.4, lactate 3.4, SBP in 70s-90s. While at [facility], she received 2 units of PRBCs. Given edema, she was treated with Lasix while there. She also has right arm swelling and was found here to have a RUE superficial venous thrombosis. She also notes some mild intermittent diarrhea. No fevers, dysuria, cough, abdom pain. Of note, patient was seen by Hematology while admitted to [facility]. The reason for consultation at that time was abnormal CT scan (mediastinal LAD, liver lesion, diffuse colitis/ bowel changes, bone lesions/ ovary cyst/ fibroids.) Dr. Jason Ward and Dr. Erasmo Sheffield in [facility] [place] evaluated patient at that time and felt her mediastinal LAD was likely due to sarcoidosis rather than malignancy. Patient underwent bronchoscopy and biopsy of LN by Dr. Chacho Harrell. Pathology findings were inconclusive (peripheral blood and scattered ciliated bronchial lining cells. ). For pelvic mass along with weight loss, Gyn was consulted but stated that this was unlikely to be 2/2 malignancy, corroborated by normal CA-125 level. She also underwent EGD by Dr. Merlin Carpio with finding of a gastric antrum ulcer. Biopsy revealed benign mucosa with mild chronic active gastritis and surface erosion, no H. Pylori. Review of labs here reveals drop in Hgb from 8-9 range to now 5.4. Iron sat elevated > 80%. Although VitB12 level was recently checked and normal, there is a low VitB1 (thiamine) level from 9/2017. Home med list includes thiamine tablets 100mg/d. When asked about alcohol intake, she states she drinks a fifth of liquor several times a month, but her  states she drinks this amount daily for the past several years. He states she has had less alcohol in the past 2-3 weeks due to feeling sick. She denies any tremors, seizures or other withdrawal symptoms when she goes without a drink. No recent viral infections or exposures per patient. Her maternal uncle had lymphoma. No personal history of cancer or hemoglobinopathies. Interval History:  Patient remains altered and is not answering any questions. Does not open eyes or communicate in any way. Bone marrow on hold until AMS resolves. HgB has remained stable at 8.5.       Past Medical History:   Diagnosis Date    Alcohol abuse     GI (gastrointestinal bleed)     Insomnia 11/09    Iron deficiency anemia 04/2004    PPD positive     treated w/ INH- tests since have been negative    Pure hypercholesterolemia     Scoliosis 12/28/15    [facility]    Tobacco dependence     Unspecified essential hypertension     Uterine bleeding, dysfunctional 2/12    Uterine fibroid       Past Surgical History:   Procedure Laterality Date    COLONOSCOPY N/A 1/31/2018    COLONOSCOPY performed by Cassidy Pierce MD at [facility] HX CHOLECYSTECTOMY      HX COLONOSCOPY  07/01/2013    MCV, repeat 10 years    HX ORTHOPAEDIC      Shattered bilateral ankles-metal plates & screws    HX OTHER SURGICAL      Benign cyst removal- L. foot    LEG/ANKLE SURGERY PROC UNLISTED        Social History   Substance Use Topics    Smoking status: Current Every Day Smoker     Packs/day: 0.50     Years: 17.00    Smokeless tobacco: Never Used    Alcohol use 1.2 oz/week     1 Cans of beer, 1 Shots of liquor per week      Comment: Socially      Family History   Problem Relation Age of Onset    Hypertension Mother     Diabetes Mother     Cancer Mother      breast    Heart Disease Father      MI 42's    Kidney Disease Father     Hypertension Son      Current Facility-Administered Medications   Medication Dose Route Frequency    dextrose 5 % - 0.45% NaCl infusion  100 mL/hr IntraVENous CONTINUOUS    potassium chloride (KAON 10%) 20 mEq/15 mL oral liquid 40 mEq  40 mEq Per NG tube NOW    lactulose (CHRONULAC) solution 30 g  30 g Per NG tube BID    [START ON 2/21/2018] buPROPion (WELLBUTRIN) tablet 75 mg  75 mg Per NG tube BID    nicotine (NICODERM CQ) 21 mg/24 hr patch 1 Patch  1 Patch TransDERmal DAILY    balsam peru-castor oil (VENELEX)  mg/gram ointment   Topical BID    sodium chloride (NS) flush 10-30 mL  10-30 mL InterCATHeter PRN    sodium chloride (NS) flush 10 mL  10 mL InterCATHeter PRN    sodium chloride (NS) flush 10-40 mL  10-40 mL InterCATHeter Q8H    alteplase (CATHFLO) 1 mg in sterile water (preservative free) 1 mL injection  1 mg InterCATHeter PRN    dextrose 5% 1,000 mL with potassium chloride 20 mEq, folic acid 1 mg, thiamine 200 mg, mvi, adult no. 4 with vit K 10 mL infusion   IntraVENous DAILY    0.9% sodium chloride infusion 250 mL  250 mL IntraVENous PRN    LORazepam (ATIVAN) injection 1 mg  1 mg IntraVENous Q2H PRN    LORazepam (ATIVAN) injection 2 mg  2 mg IntraVENous Q2H PRN    zolpidem (AMBIEN) tablet 5 mg  5 mg Oral QHS PRN    calcium-vitamin D (OS-DELORIS) 500 mg-200 unit tablet  1 Tab Oral BID WITH MEALS    sodium chloride (NS) flush 5-10 mL  5-10 mL IntraVENous PRN    acetaminophen (TYLENOL) tablet 650 mg  650 mg Oral Q4H PRN    ondansetron (ZOFRAN) injection 4 mg  4 mg IntraVENous Q4H PRN      Allergies   Allergen Reactions    Robaxin [Methocarbamol] Hives, Rash and Itching     Red splotches    Statins-Hmg-Coa Reductase Inhibitors Myalgia    Codeine Itching    Neosporin [Neomycin-Bacitracin-Polymyxin] Rash       Review of Systems: A complete review of systems was obtained, negative except as described above. Physical Exam:     Visit Vitals    BP (!) 140/96 (BP 1 Location: Right arm, BP Patient Position: At rest)  Comment: ativan given at 1000, will continue to recheck    Pulse (!) 106    Temp 98 °F (36.7 °C)    Resp 24    Ht 5' 1\" (1.549 m)    Wt 138 lb 14.2 oz (63 kg)    SpO2 95%    BMI 26.24 kg/m2     ECOG PS: 3  General: No acute distress, appears weak and disheveled  Eyes: PERRLA, anicteric sclerae  HENT: Atraumatic with normal appearance of ears and nose; OP clear  Neck: Supple; no thyromegaly   Lymphatic: No cervical, supraclavicular, or inguinal adenopathy  Respiratory: CTAB, normal respiratory effort  CV: Normal rate, regular rhythm, no murmurs. RUE and BLE edema  GI: Soft, nontender, nondistended, no masses, no hepatomegaly, no splenomegaly  MS: Normal gait and station. Digits without clubbing or cyanosis. Skin: No rashes, ecchymoses, or petechiae. Normal temperature, turgor, and texture. Neuro/Psych: Altered mental status, not conversant, does not respond to questions or commands. Generalized weakness      Results:     Lab Results   Component Value Date/Time    WBC 8.9 02/20/2018 03:39 AM    HGB 8.5 (L) 02/20/2018 03:39 AM    HCT 26.0 (L) 02/20/2018 03:39 AM    PLATELET 211 20/92/7244 03:39 AM    MCV 84.7 02/20/2018 03:39 AM    ABS.  NEUTROPHILS 5.6 02/20/2018 03:39 AM    Hemoglobin (POC) 4.9 02/16/2018 02:46 PM    Hemoglobin (POC) 11.2 (L) 01/26/2018 04:08 PM    Hematocrit (POC) 33 (L) 01/26/2018 04:08 PM     Lab Results   Component Value Date/Time    Sodium 139 02/20/2018 03:39 AM    Potassium 3.7 02/20/2018 03:39 AM    Chloride 109 (H) 02/20/2018 03:39 AM    CO2 27 02/20/2018 03:39 AM    Glucose 85 02/20/2018 03:39 AM    BUN 2 (L) 02/20/2018 03:39 AM    Creatinine 0.33 (L) 02/20/2018 03:39 AM    GFR est AA >60 02/20/2018 03:39 AM    GFR est non-AA >60 02/20/2018 03:39 AM    Calcium 7.5 (L) 02/20/2018 03:39 AM    Sodium (POC) 144 01/26/2018 04:08 PM    Potassium (POC) <2.0 (LL) 01/26/2018 04:08 PM    Chloride (POC) 98 01/26/2018 04:08 PM    Glucose (POC) 114 (H) 02/18/2018 08:30 AM    BUN (POC) <3 (L) 01/26/2018 04:08 PM    Creatinine (POC) 0.6 01/26/2018 04:08 PM    Calcium, ionized (POC) 1.06 (L) 01/26/2018 04:08 PM     Lab Results   Component Value Date/Time    Bilirubin, total 0.6 02/20/2018 03:39 AM    ALT (SGPT) 14 02/20/2018 03:39 AM    AST (SGOT) 28 02/20/2018 03:39 AM    Alk. phosphatase 137 (H) 02/20/2018 03:39 AM    Protein, total 4.9 (L) 02/20/2018 03:39 AM    Albumin 1.5 (L) 02/20/2018 03:39 AM    Globulin 3.4 02/20/2018 03:39 AM       Assessment and Recommendations:   Mango Carrillo is a 65 y.o. female admitted with weakness, fatigue, hypokalemia and anemia. 1. Normocytic anemia: Usually a sudden drop in Hgb from 8 to 5 within 1-2 days is due to blood loss. However, patient,  and nursing deny any obvious blood loss. No intense abdominal pain. Patient was found to have a gastric ulcer during admission last month. I suspect she has a combination of blood loss and bone marrow suppression from alcohol abuse. -- Bone marrow biopsy pending resolution of AMS given anemia requiring blood transfusions and lack of adequate reticulocyte response to rule out any abnormal cells. -- Agree with transfusion for goal Hgb > 7.    2. Alcohol abuse: Patient has been drinking liquor daily for the past several years. This could explain her weight loss, hypoalbuminemia, decreased PO intake, thiamine deficiency. Coags mildly elevated likely due to liver dysfunction. Recent CT comments on diffuse hypodensity. -- Needs alcohol cessation. -- On Ativan with [facility] protocol for withdrawal symptoms  -- Liver ultrasound with diffusely hepatic echogenicity compatible with hepatic steatosis. No sonographic evidence of mass.     3. Hypokalemia: Due to recent Lasix as well as decreased PO intake and past diarrhea per Nephrology. Resolved today. 4. Gastric ulcer: Recommend she avoid NSAIDs and take PPI daily. 5. Mediastinal LAD: Possibly related to sarcoidosis given CT report/addendum from last hospital admission. Bronch biopsy was inconclusive. We will continue to follow as needed. Bone marrow biopsy on hold given AMS. HgB has remained stable. Given poor performance and mental status, no plans at present from us. Please call with any questions.   Pt seen today in conjunction with ELIZABETH Jesus    Signed By: Estelita Lyons DO     February 20, 2018

## 2018-02-20 NOTE — PROGRESS NOTES
19:20- Spoke with Dr. Nancy Salmon, informed him of pt condition and results of doppler studies this afternoon, new orders for SCD's.   19:30-TRANSFER - OUT REPORT:    Verbal report given to IMCU charge(name) on Contreras Smith  being transferred to [facility]) for routine progression of care       Report consisted of patients Situation, Background, Assessment and   Recommendations(SBAR). Information from the following report(s) SBAR, Kardex, ED Summary, Procedure Summary, Intake/Output, MAR, Accordion, Recent Results, Med Rec Status, Cardiac Rhythm ST and Alarm Parameters  was reviewed with the receiving nurse. Lines:   Triple Lumen 02/16/18 Right Subclavian (Active)   Central Line Being Utilized Yes 2/19/2018  4:42 PM   Criteria for Appropriate Use Hemodynamically unstable, requiring monitoring lines, vasopressors, or volume resuscitation 2/19/2018  4:42 PM   Site Assessment Clean, dry, & intact 2/19/2018  4:42 PM   Infiltration Assessment 0 2/19/2018  4:42 PM   Affected Extremity/Extremities Color distal to insertion site pink (or appropriate for race) 2/19/2018  4:42 PM   Date of Last Dressing Change 02/16/18 2/19/2018  4:42 PM   Dressing Status Clean, dry, & intact 2/19/2018  4:42 PM   Dressing Type Disk with Chlorhexadine gluconate (CHG); Transparent 2/19/2018  4:42 PM   Action Taken Open ports on tubing capped 2/19/2018  4:42 PM   Proximal Hub Color/Line Status White; Infusing 2/19/2018  4:42 PM   Positive Blood Return (Medial Site) Yes 2/19/2018  4:42 PM   Medial Hub Color/Line Status Blue; Infusing 2/19/2018  4:42 PM   Positive Blood Return (Lateral Site) Yes 2/19/2018  4:42 PM   Distal Hub Color/Line Status Brown;Capped 2/19/2018  4:42 PM   Positive Blood Return (Site #3) Yes 2/19/2018  4:42 PM   Alcohol Cap Used Yes 2/19/2018  4:42 PM       Peripheral IV 02/16/18 Left Forearm (Active)   Site Assessment Clean, dry, & intact 2/19/2018  4:42 PM   Phlebitis Assessment 0 2/19/2018  4:42 PM   Infiltration Assessment 0 2/19/2018  4:42 PM   Dressing Status Clean, dry, & intact 2/19/2018  4:42 PM   Dressing Type Transparent 2/19/2018  4:42 PM   Hub Color/Line Status Green;Capped 2/19/2018  4:42 PM   Action Taken Open ports on tubing capped 2/19/2018  4:42 PM   Alcohol Cap Used Yes 2/19/2018  4:42 PM        Opportunity for questions and clarification was provided.       Patient transported with:   Monitor  O2 @ 2 liters  Registered Nurse

## 2018-02-20 NOTE — PROGRESS NOTES
Occupational Therapy: hold    Orders received, chart reviewed. Consulted with RN/ physical therapist.  Patient is not appropriate for evaluation at this time and she is not following commands. Will f/u as able and appropriate.     Nataliya Ley OTR/L

## 2018-02-21 ENCOUNTER — APPOINTMENT (OUTPATIENT)
Dept: GENERAL RADIOLOGY | Age: 58
DRG: 640 | End: 2018-02-21
Attending: INTERNAL MEDICINE
Payer: COMMERCIAL

## 2018-02-21 LAB
ALBUMIN SERPL-MCNC: 1.5 G/DL (ref 3.5–5)
ALBUMIN/GLOB SERPL: 0.4 {RATIO} (ref 1.1–2.2)
ALP SERPL-CCNC: 156 U/L (ref 45–117)
ALT SERPL-CCNC: 14 U/L (ref 12–78)
ANION GAP SERPL CALC-SCNC: 5 MMOL/L (ref 5–15)
AST SERPL-CCNC: 28 U/L (ref 15–37)
BACTERIA SPEC CULT: NORMAL
BILIRUB SERPL-MCNC: 0.4 MG/DL (ref 0.2–1)
BUN SERPL-MCNC: 2 MG/DL (ref 6–20)
BUN/CREAT SERPL: 5 (ref 12–20)
CALCIUM SERPL-MCNC: 7.7 MG/DL (ref 8.5–10.1)
CHLORIDE SERPL-SCNC: 107 MMOL/L (ref 97–108)
CO2 SERPL-SCNC: 26 MMOL/L (ref 21–32)
CREAT SERPL-MCNC: 0.43 MG/DL (ref 0.55–1.02)
CRP SERPL-MCNC: 5.54 MG/DL (ref 0–0.6)
ERYTHROCYTE [SEDIMENTATION RATE] IN BLOOD: 35 MM/HR (ref 0–30)
GLOBULIN SER CALC-MCNC: 3.5 G/DL (ref 2–4)
GLUCOSE SERPL-MCNC: 104 MG/DL (ref 65–100)
MAGNESIUM SERPL-MCNC: 1.7 MG/DL (ref 1.6–2.4)
POTASSIUM SERPL-SCNC: 3.8 MMOL/L (ref 3.5–5.1)
PROT SERPL-MCNC: 5 G/DL (ref 6.4–8.2)
SERVICE CMNT-IMP: NORMAL
SODIUM SERPL-SCNC: 138 MMOL/L (ref 136–145)

## 2018-02-21 PROCEDURE — 86140 C-REACTIVE PROTEIN: CPT | Performed by: INTERNAL MEDICINE

## 2018-02-21 PROCEDURE — 74011250636 HC RX REV CODE- 250/636: Performed by: HOSPITALIST

## 2018-02-21 PROCEDURE — 74018 RADEX ABDOMEN 1 VIEW: CPT

## 2018-02-21 PROCEDURE — 74011250637 HC RX REV CODE- 250/637: Performed by: HOSPITALIST

## 2018-02-21 PROCEDURE — 3331090002 HH PPS REVENUE DEBIT

## 2018-02-21 PROCEDURE — 74011250636 HC RX REV CODE- 250/636: Performed by: INTERNAL MEDICINE

## 2018-02-21 PROCEDURE — 74011000250 HC RX REV CODE- 250: Performed by: INTERNAL MEDICINE

## 2018-02-21 PROCEDURE — 80053 COMPREHEN METABOLIC PANEL: CPT | Performed by: INTERNAL MEDICINE

## 2018-02-21 PROCEDURE — 65660000000 HC RM CCU STEPDOWN

## 2018-02-21 PROCEDURE — 36415 COLL VENOUS BLD VENIPUNCTURE: CPT | Performed by: INTERNAL MEDICINE

## 2018-02-21 PROCEDURE — 74011250637 HC RX REV CODE- 250/637: Performed by: INTERNAL MEDICINE

## 2018-02-21 PROCEDURE — 83735 ASSAY OF MAGNESIUM: CPT | Performed by: INTERNAL MEDICINE

## 2018-02-21 PROCEDURE — 3331090001 HH PPS REVENUE CREDIT

## 2018-02-21 PROCEDURE — 85652 RBC SED RATE AUTOMATED: CPT | Performed by: INTERNAL MEDICINE

## 2018-02-21 RX ADMIN — CALCIUM CARBONATE-VITAMIN D TAB 500 MG-200 UNIT 1 TABLET: 500-200 TAB at 17:23

## 2018-02-21 RX ADMIN — Medication 10 ML: at 12:11

## 2018-02-21 RX ADMIN — BUPROPION HYDROCHLORIDE 75 MG: 75 TABLET, FILM COATED ORAL at 17:23

## 2018-02-21 RX ADMIN — LACTULOSE 30 G: 20 SOLUTION ORAL at 17:23

## 2018-02-21 RX ADMIN — CALCIUM CARBONATE-VITAMIN D TAB 500 MG-200 UNIT 1 TABLET: 500-200 TAB at 12:09

## 2018-02-21 RX ADMIN — LORAZEPAM 2 MG: 2 INJECTION INTRAMUSCULAR; INTRAVENOUS at 03:15

## 2018-02-21 RX ADMIN — LORAZEPAM 2 MG: 2 INJECTION INTRAMUSCULAR; INTRAVENOUS at 21:22

## 2018-02-21 RX ADMIN — LORAZEPAM 1 MG: 2 INJECTION INTRAMUSCULAR; INTRAVENOUS at 12:09

## 2018-02-21 RX ADMIN — LORAZEPAM 2 MG: 2 INJECTION INTRAMUSCULAR; INTRAVENOUS at 00:31

## 2018-02-21 RX ADMIN — ONDANSETRON 4 MG: 2 INJECTION INTRAMUSCULAR; INTRAVENOUS at 21:40

## 2018-02-21 RX ADMIN — Medication 10 ML: at 21:19

## 2018-02-21 RX ADMIN — POTASSIUM CHLORIDE: 2 INJECTION, SOLUTION, CONCENTRATE INTRAVENOUS at 19:46

## 2018-02-21 RX ADMIN — BUPROPION HYDROCHLORIDE 75 MG: 75 TABLET, FILM COATED ORAL at 12:10

## 2018-02-21 RX ADMIN — LORAZEPAM 2 MG: 2 INJECTION INTRAMUSCULAR; INTRAVENOUS at 19:05

## 2018-02-21 RX ADMIN — CASTOR OIL AND BALSAM, PERU: 788; 87 OINTMENT TOPICAL at 09:00

## 2018-02-21 RX ADMIN — Medication 10 ML: at 06:53

## 2018-02-21 RX ADMIN — LACTULOSE 30 G: 20 SOLUTION ORAL at 12:09

## 2018-02-21 RX ADMIN — ONDANSETRON 4 MG: 2 INJECTION INTRAMUSCULAR; INTRAVENOUS at 03:30

## 2018-02-21 NOTE — PROGRESS NOTES
Problem: Falls - Risk of  Goal: *Absence of Falls  Document Roselia Fall Risk and appropriate interventions in the flowsheet.    Outcome: Progressing Towards Goal  Fall Risk Interventions:  Mobility Interventions: Bed/chair exit alarm, Communicate number of staff needed for ambulation/transfer    Mentation Interventions: Bed/chair exit alarm, More frequent rounding, Door open when patient unattended, Adequate sleep, hydration, pain control    Medication Interventions: Bed/chair exit alarm    Elimination Interventions: Bed/chair exit alarm, Call light in reach    History of Falls Interventions: Door open when patient unattended

## 2018-02-21 NOTE — PROGRESS NOTES
Bedside shift change report given to [address] [place] [address] (oncoming nurse) by Rajendra Doty RN (offgoing nurse). Report included the following information SBAR, Kardex, ED Summary, Procedure Summary, Intake/Output, MAR and Recent Results.

## 2018-02-21 NOTE — PROGRESS NOTES
Problem: General Medical Care Plan  Goal: *Optimize nutritional status  Outcome: Progressing Towards Goal  Patient on Osmolite 1.5 tube feeding via NG tube. Tolerating well  Goal: *Anxiety reduced or absent  Outcome: Not Progressing Towards Goal  Patient on CIWA protocol, receiving Ativan 1mg Q1-2 hours with frequent reassessment.

## 2018-02-21 NOTE — PROGRESS NOTES
Occupational Therapy Note 0920    Orders acknowledged, chart reviewed. Attempted to meet with patient. Per discussion with PT, patient is still not following commands and is unable to fully participate in acute OT evaluation. Will follow up as appropriate. Thank you.     Abhinav Joseph, OT

## 2018-02-21 NOTE — PROGRESS NOTES
NUTRITION       Brief note. Chart reviewed, discussed with RN and team during interdisciplinary rounds. Pt tolerating tube feeding at goal without issues; however, pt's NGT was found to be out to 33 cm this morning during change of shift. Feeds were held, then tube was reinserted and xray confirmed placement at 0945 this morning. Feedings now resumed. Goal Tube Feeding: Osmolite 1.5 @ 40 ml/hr + 120 mL flush q 4 hours  -- this provides 1440 kcal, 60 g protein and 1452 ml total free water (tf + flush)-- meeting 100% of estimated needs. Estimated Nutrition Needs:   Kcals/day: 1325 Kcals/day (1243-5154 (MSJ x 1.3-1.4)  Protein: 55 g (55-64 (1.2-1.4g/kg)  Fluid:  (1 ml/kcal)     Based On: Prema Simmons  Weight Used: Actual wt (46 kg (standing scale 2/16)    RD to follow.     [address]

## 2018-02-21 NOTE — PROGRESS NOTES
Patient name: Fela Tipton  MRN: [id number]    Nephrology Progress note:    Assessment:  Hypokalemia/Hypophosphatemia: recurrent-> Suspect whole body potassium depletion (ETOH abuse/poor intake/nutritional status) exacerbated by renal wasting via lasix. Improving/stable s/p aggressive replacement     Anemia: Hgb stabilized, BMB->  Hematology consulting     Hypotension: volume depletion. Improved->Off vasopressor support     Failure to thrive: ETOH abuse/Hypoalbuminemia/Weight loss-> worked up for underlying malignancy- negative     Hypernatremia: better with hypotonic fluids    Edema: 3rd spacing    Plan/Recommendations:  Ct Banana bag  IV KCL/IV Mag sulfate PRN  CIWA  Tube feeds  F/u BMB results  Am labs      Subjective:  Remains altered. Febrile. ROS:   UTO    Exam:  Visit Vitals    BP (!) 162/98 (BP 1 Location: Right arm, BP Patient Position: At rest)    Pulse (!) 118    Temp 99.1 °F (37.3 °C)    Resp 26    Ht 5' 1\" (1.549 m)    Wt 63 kg (138 lb 14.2 oz)    SpO2 97%    BMI 26.24 kg/m2     Wt Readings from Last 3 Encounters:   02/20/18 63 kg (138 lb 14.2 oz)   02/16/18 46.3 kg (102 lb)   02/07/18 60.3 kg (133 lb)       Intake/Output Summary (Last 24 hours) at 02/21/18 1518  Last data filed at 02/21/18 1239   Gross per 24 hour   Intake           4332.5 ml   Output             1375 ml   Net           2957.5 ml       Gen: Altered  HEENT:  AT/NC, NGT  Lungs/Chest wall: Clear. Cardiovascular: Tachycardic  Abdomen/: Soft, NT, ND, BS+.    Ext: + peripheral edema  CNS: Altered      Current Facility-Administered Medications   Medication Dose Route Frequency Last Dose    lactulose (CHRONULAC) solution 30 g  30 g Per NG tube BID 30 g at 02/21/18 1209    buPROPion [facility]) tablet 75 mg  75 mg Per NG tube BID 75 mg at 02/21/18 1210    nicotine (NICODERM CQ) 21 mg/24 hr patch 1 Patch  1 Patch TransDERmal DAILY 1 Patch at 02/21/18 1211    balsam peru-castor oil (VENELEX)  mg/gram ointment   Topical BID      sodium chloride (NS) flush 10-30 mL  10-30 mL InterCATHeter PRN      sodium chloride (NS) flush 10 mL  10 mL InterCATHeter PRN 10 mL at 02/20/18 1616    sodium chloride (NS) flush 10-40 mL  10-40 mL InterCATHeter Q8H 10 mL at 02/21/18 1211    alteplase (CATHFLO) 1 mg in sterile water (preservative free) 1 mL injection  1 mg InterCATHeter PRN      dextrose 5% 1,000 mL with potassium chloride 20 mEq, folic acid 1 mg, thiamine 200 mg, mvi, adult no. 4 with vit K 10 mL infusion   IntraVENous DAILY      0.9% sodium chloride infusion 250 mL  250 mL IntraVENous PRN      LORazepam (ATIVAN) injection 1 mg  1 mg IntraVENous Q2H PRN 1 mg at 02/21/18 1209    LORazepam (ATIVAN) injection 2 mg  2 mg IntraVENous Q2H PRN 2 mg at 02/21/18 0315    zolpidem (AMBIEN) tablet 5 mg  5 mg Oral QHS PRN Stopped at 02/17/18 2003    calcium-vitamin D (OS-DELORIS) 500 mg-200 unit tablet  1 Tab Oral BID WITH MEALS 1 Tab at 02/21/18 1209    sodium chloride (NS) flush 5-10 mL  5-10 mL IntraVENous PRN      acetaminophen (TYLENOL) tablet 650 mg  650 mg Oral Q4H  mg at 02/20/18 2017    ondansetron (ZOFRAN) injection 4 mg  4 mg IntraVENous Q4H PRN 4 mg at 02/21/18 0330       Labs/Data:    Lab Results   Component Value Date/Time    WBC 8.9 02/20/2018 03:39 AM    Hemoglobin (POC) 4.9 02/16/2018 02:46 PM    Hemoglobin (POC) 11.2 (L) 01/26/2018 04:08 PM    HGB 8.5 (L) 02/20/2018 03:39 AM    Hematocrit (POC) 33 (L) 01/26/2018 04:08 PM    HCT 26.0 (L) 02/20/2018 03:39 AM    PLATELET 649 07/70/2646 03:39 AM    MCV 84.7 02/20/2018 03:39 AM       Lab Results   Component Value Date/Time    Sodium 138 02/21/2018 03:16 AM    Potassium 3.8 02/21/2018 03:16 AM    Chloride 107 02/21/2018 03:16 AM    CO2 26 02/21/2018 03:16 AM    Anion gap 5 02/21/2018 03:16 AM    Glucose 104 (H) 02/21/2018 03:16 AM    BUN 2 (L) 02/21/2018 03:16 AM    Creatinine 0.43 (L) 02/21/2018 03:16 AM    BUN/Creatinine ratio 5 (L) 02/21/2018 03:16 AM    GFR est AA >60 02/21/2018 03:16 AM    GFR est non-AA >60 02/21/2018 03:16 AM    Calcium 7.7 (L) 02/21/2018 03:16 AM       Patient seen and examined. Chart reviewed. Labs, data and other pertinent notes reviewed in last 24 hrs.         Signed by:  Juanita Pinto MD

## 2018-02-21 NOTE — PROGRESS NOTES
Hospitalist Progress Note  Matt Lundberg MD  Answering service: [phone] OR [phone] from in house phone         Date of Service:  2018  NAME:  Kaiden Moore  :  1960  MRN:  [id number]    Admission Summary:   66 yo woman with HTN, HLD, DUB, iron deficiency anemia, severe protein-calorie malnutrition, recently diagnosed colitis, EtOH abuse, and h/o GI bleed was sent by her PCP to the ED on 18 for fatigue, generalized weakness, and right arm swelling. She was recently hospitalized at [address] [place] [address] 18 - 18 for hypokalemia and anemia. She was admitted to the ICU for severe hypokalemia, hypotension, and chronic RUE superficial venous thrombosis.      Interval history / Subjective:       :  I have personally reviewed serial CXR from  and , not consistent with PNA , Abx dc's on this bases  Sodium now in nl range, K and mag borderline, IVF's adjusted accordingly. Mag and K plus CMP/cbc for am     :  Pt a bit more alert and appropriate today but mostly somnolent and poorly interactive with this rounder      Assessment & Plan:      Severe hypokalemia (POA) - replete until K+ >=4 and magnesium >=2  - urine/serum studies ordered  - Renal following  - likely due to anorexia and malabsorption and recent use of diuretics; not resumed on admission  - corrected   Lab Results   Component Value Date/Time    Potassium 3.8 2018 03:16 AM    Potassium (POC) <2.0 (LL) 2018 04:08 PM    cont on replacement.      EtOH withdrawal on chronic alcohol abuse   - now on CIWA protocol and banana bag; prn Ativan     Hepatic encephalopathy due to hyperammonemia   - start lactulose retention enemas and can change to per NGT once placed  Lab Results   Component Value Date/Time    ALT (SGPT) 14 2018 03:16 AM    AST (SGOT) 28 2018 03:16 AM    GGT 83 (H) 10/16/2017 03:55 PM    Alk.  phosphatase 156 (H) 2018 03:16 AM    Bilirubin, direct 0.4 (H) 02/18/2018 09:47 AM    Bilirubin, total 0.4 02/21/2018 03:16 AM   ammonia level expectantly      Acute on chronic anemia (POA)   - iron WNL, folate low, B12 low-normal  - FOBT negative  - s/p 2 units PRBC transfusion since admission   - Hematology consulted by [facility] due to recent h/o possible occult malignancy  - per  at bedside, EGD, colonoscopy, bone scan all unremarkable recently  - recent PET was abnormal  - stopped SC heparin and change to SCDs  - IR unable to do CT-guided BM bx today due to unstable airway; re-scheduled  Lab Results   Component Value Date/Time    WBC 8.9 02/20/2018 03:39 AM    Hemoglobin (POC) 4.9 02/16/2018 02:46 PM    Hemoglobin (POC) 11.2 (L) 01/26/2018 04:08 PM    HGB 8.5 (L) 02/20/2018 03:39 AM    Hematocrit (POC) 33 (L) 01/26/2018 04:08 PM    HCT 26.0 (L) 02/20/2018 03:39 AM    PLATELET 465 26/93/7670 03:39 AM    MCV 84.7 02/20/2018 03:39 AM         Severe protein-calorie malnutrition - prealbumin 4.4  - Nutrition consulted  - s/p 1 dose IV albumin  Lab Results   Component Value Date/Time    Protein, total 5.0 (L) 02/21/2018 03:16 AM    Albumin 1.5 (L) 02/21/2018 03:16 AM         Chronic RUE VTE  - no indication for [facility] for RUE VTE as pt had IV in that arm during previous hospitalization     Right ankle deformity and RLE>LLE edema - venous duplex b/l LEs 2/19 negative  - check R ankle XR      Generalized weakness/debility with EtOH abuse (POA)   - likely due to anorexia and EtOH abuse  - PT/OT/ST evals, Nutrition eval  - banana bag  - non verbal 2/21/2018      Hypovolemic shock (POA) on chronic HTN - likely due to RLL HCAP on CXR  - continue IVF  - resolved s/p pressor x4h  - TTE 2/17 EF 55-60%, no RWMA, mild-mod TR, small pericardial effusion  BP Readings from Last 1 Encounters:   02/21/18 (!) 162/98         Possible RLL HCAP (POA) - BCx 2/16 NGTD  - started empiric Zosyn/vancomycin 2/17  - s/p empiric vancomycin 2/17-2/19   - BCx 2/16 NGTD  - MRSA swab negative  - sputum Cx ordered but no sample sent yet  - urine strep/legionella antigens pending  - flu swab negative  - cxr form 2/17 to 2/19 cleared as to RLL findings, not c/c pna,      Mild transaminitis - elevated AST likely due to EtOH  - RUQ US 2/18 hepatic steatosis  Lab Results   Component Value Date/Time    ALT (SGPT) 14 02/21/2018 03:16 AM    AST (SGOT) 28 02/21/2018 03:16 AM    GGT 83 (H) 10/16/2017 03:55 PM    Alk. phosphatase 156 (H) 02/21/2018 03:16 AM    Bilirubin, direct 0.4 (H) 02/18/2018 09:47 AM    Bilirubin, total 0.4 02/21/2018 03:16 AM          Hypernatremia - IVF adjusted by Renal; resolved  Recent Labs      02/21/18   0316   NA  138       Hypophosphatemia - replete prn  Lab Results   Component Value Date/Time    Calcium 7.7 (L) 02/21/2018 03:16 AM    Phosphorus 2.7 02/20/2018 03:39 AM       Hypomagnesemia - replete prn  Recent Labs      02/21/18   0316   MG  1.7         Code status: full  DVT prophylaxis: SCDs     Care Plan discussed with: Nurse. No family at bedside today  Disposition: TBD.           Hospital Problems  Date Reviewed: 2/19/2018          Codes Class Noted POA    * (Principal)Hypokalemia ICD-10-CM: E87.6  ICD-9-CM: 276.8  2/16/2018 Yes                Review of Systems:   Review of systems not obtained due to patient factors. Vital Signs:    Last 24hrs VS reviewed since prior progress note. Most recent are:  Visit Vitals    BP (!) 162/98 (BP 1 Location: Right arm, BP Patient Position: At rest)    Pulse (!) 118    Temp 99.1 °F (37.3 °C)    Resp 26    Ht 5' 1\" (1.549 m)    Wt 63 kg (138 lb 14.2 oz)    SpO2 97%    BMI 26.24 kg/m2         Intake/Output Summary (Last 24 hours) at 02/21/18 1447  Last data filed at 02/21/18 1239   Gross per 24 hour   Intake           4332.5 ml   Output             1375 ml   Net           2957.5 ml        Physical Examination:             Constitutional:  + acute distress, withdrawn   ENT:  ng tube inplace. Oral mucous moist, oropharynx benign.  Neck supple,    Resp:  CTA bilaterally. No wheezing/rhonchi/rales. No accessory muscle use   CV:  Regular rhythm, normal rate, no murmurs, gallops, rubs    GI:  Soft, non distended, non tender. normoactive bowel sounds, no hepatosplenomegaly     Musculoskeletal:  No edema, warm, 1+ pulses throughout    Neurologic:  non focal but sedated/withdrawn will try to answer, opens eyes to follow      Psych: Withdrawn but less so   Skin:  ecchymosis       Data Review:    Review and/or order of clinical lab test      Labs:     Recent Labs      02/20/18 0339 02/19/18 0334   WBC  8.9  8.1   HGB  8.5*  8.6*   HCT  26.0*  26.1*   PLT  221  216     Recent Labs      02/21/18 0316 02/20/18 0339 02/19/18 0334   NA  138  139  144   K  3.8  3.7  3.4*   CL  107  109*  110*   CO2  26  27  26   BUN  2*  2*  2*   CREA  0.43*  0.33*  0.33*   GLU  104*  85  90   CA  7.7*  7.5*  7.4*   MG  1.7  1.6  1.6   PHOS   --   2.7  2.8     Recent Labs      02/21/18 0316 02/20/18 0339 02/19/18 0334   SGOT  28  28  31   ALT  14  14  11*   AP  156*  137*  141*   TBILI  0.4  0.6  0.7   TP  5.0*  4.9*  5.0*   ALB  1.5*  1.5*  1.6*   GLOB  3.5  3.4  3.4   LPSE   --   45*   --      Recent Labs      02/19/18   0334   INR  1.1   PTP  11.7*   APTT  35.4*      No results for input(s): FE, TIBC, PSAT, FERR in the last 72 hours. Lab Results   Component Value Date/Time    Folate 4.9 (L) 02/17/2018 05:54 AM      No results for input(s): PH, PCO2, PO2 in the last 72 hours. No results for input(s): CPK, CKNDX, TROIQ in the last 72 hours.     No lab exists for component: CPKMB  Lab Results   Component Value Date/Time    Cholesterol, total 202 (H) 03/27/2017 09:47 AM    HDL Cholesterol 76 03/27/2017 09:47 AM    LDL, calculated 109 (H) 03/27/2017 09:47 AM    Triglyceride 83 03/27/2017 09:47 AM     Lab Results   Component Value Date/Time    Glucose (POC) 114 (H) 02/18/2018 08:30 AM    Glucose (POC) 91 01/26/2018 04:08 PM    Glucose (POC) 107 (H) 01/26/2018 01:08 PM     Lab Results   Component Value Date/Time    Color DARK YELLOW 02/16/2018 08:30 PM    Appearance CLEAR 02/16/2018 08:30 PM    Specific gravity 1.013 02/16/2018 08:30 PM    pH (UA) 7.0 02/16/2018 08:30 PM    Protein TRACE (A) 02/16/2018 08:30 PM    Glucose NEGATIVE  02/16/2018 08:30 PM    Ketone TRACE (A) 02/16/2018 08:30 PM    Bilirubin Negative 09/28/2012 08:58 AM    Urobilinogen 0.2 02/16/2018 08:30 PM    Nitrites NEGATIVE  02/16/2018 08:30 PM    Leukocyte Esterase NEGATIVE  02/16/2018 08:30 PM    Epithelial cells FEW 02/16/2018 08:30 PM    Bacteria 1+ (A) 02/16/2018 08:30 PM    WBC 0-4 02/16/2018 08:30 PM    RBC 0-5 02/16/2018 08:30 PM         Medications Reviewed:     Current Facility-Administered Medications   Medication Dose Route Frequency    lactulose (CHRONULAC) solution 30 g  30 g Per NG tube BID    buPROPion (WELLBUTRIN) tablet 75 mg  75 mg Per NG tube BID    nicotine (NICODERM CQ) 21 mg/24 hr patch 1 Patch  1 Patch TransDERmal DAILY    balsam peru-castor oil (VENELEX)  mg/gram ointment   Topical BID    sodium chloride (NS) flush 10-30 mL  10-30 mL InterCATHeter PRN    sodium chloride (NS) flush 10 mL  10 mL InterCATHeter PRN    sodium chloride (NS) flush 10-40 mL  10-40 mL InterCATHeter Q8H    alteplase (CATHFLO) 1 mg in sterile water (preservative free) 1 mL injection  1 mg InterCATHeter PRN    dextrose 5% 1,000 mL with potassium chloride 20 mEq, folic acid 1 mg, thiamine 200 mg, mvi, adult no. 4 with vit K 10 mL infusion   IntraVENous DAILY    0.9% sodium chloride infusion 250 mL  250 mL IntraVENous PRN    LORazepam (ATIVAN) injection 1 mg  1 mg IntraVENous Q2H PRN    LORazepam (ATIVAN) injection 2 mg  2 mg IntraVENous Q2H PRN    zolpidem (AMBIEN) tablet 5 mg  5 mg Oral QHS PRN    calcium-vitamin D (OS-DELORIS) 500 mg-200 unit tablet  1 Tab Oral BID WITH MEALS    sodium chloride (NS) flush 5-10 mL  5-10 mL IntraVENous PRN    acetaminophen (TYLENOL) tablet 650 mg 650 mg Oral Q4H PRN    ondansetron (ZOFRAN) injection 4 mg  4 mg IntraVENous Q4H PRN     ______________________________________________________________________  EXPECTED LENGTH OF STAY: 4d 21h  ACTUAL LENGTH OF STAY:          Price Rosenberg MD

## 2018-02-21 NOTE — PROGRESS NOTES
Problem: Alcohol Withdrawal  Goal: Interventions  Outcome: Progressing Towards Goal  Pt on CIWA monitoring. Receiving Ativan q2 hours. Pt on seizure precautions. I&O monitoring. Problem: Anemia Care Plan (Adult and Pediatric)  Goal: *Labs within defined limits  Outcome: Progressing Towards Goal  Hgb 8.5 after 4 U PRBCs. Lab monitoring. Bedside shift change report given to Paz (oncoming nurse) by Miguel Sheppard (offgoing nurse).  Report included the following information SBAR, Recent Results and Cardiac Rhythm ST.

## 2018-02-22 LAB
ANION GAP SERPL CALC-SCNC: 8 MMOL/L (ref 5–15)
BASOPHILS # BLD: 0 K/UL (ref 0–0.1)
BASOPHILS NFR BLD: 0 % (ref 0–1)
BUN SERPL-MCNC: 2 MG/DL (ref 6–20)
BUN/CREAT SERPL: 5 (ref 12–20)
CALCIUM SERPL-MCNC: 7.6 MG/DL (ref 8.5–10.1)
CHLORIDE SERPL-SCNC: 103 MMOL/L (ref 97–108)
CO2 SERPL-SCNC: 27 MMOL/L (ref 21–32)
CREAT SERPL-MCNC: 0.37 MG/DL (ref 0.55–1.02)
DIFFERENTIAL METHOD BLD: ABNORMAL
EOSINOPHIL # BLD: 0.3 K/UL (ref 0–0.4)
EOSINOPHIL NFR BLD: 3 % (ref 0–7)
ERYTHROCYTE [DISTWIDTH] IN BLOOD BY AUTOMATED COUNT: 17.6 % (ref 11.5–14.5)
GLUCOSE SERPL-MCNC: 108 MG/DL (ref 65–100)
HCT VFR BLD AUTO: 27.4 % (ref 35–47)
HGB BLD-MCNC: 9 G/DL (ref 11.5–16)
IMM GRANULOCYTES # BLD: 0 K/UL
IMM GRANULOCYTES NFR BLD AUTO: 0 %
LYMPHOCYTES # BLD: 0.8 K/UL (ref 0.8–3.5)
LYMPHOCYTES NFR BLD: 10 % (ref 12–49)
MAGNESIUM SERPL-MCNC: 1.5 MG/DL (ref 1.6–2.4)
MCH RBC QN AUTO: 28.2 PG (ref 26–34)
MCHC RBC AUTO-ENTMCNC: 32.8 G/DL (ref 30–36.5)
MCV RBC AUTO: 85.9 FL (ref 80–99)
METAMYELOCYTES NFR BLD MANUAL: 1 %
MONOCYTES # BLD: 1.4 K/UL (ref 0–1)
MONOCYTES NFR BLD: 17 % (ref 5–13)
NEUTS SEG # BLD: 5.8 K/UL (ref 1.8–8)
NEUTS SEG NFR BLD: 69 % (ref 32–75)
NRBC # BLD: 0 K/UL (ref 0–0.01)
NRBC BLD-RTO: 0 PER 100 WBC
PLATELET # BLD AUTO: 241 K/UL (ref 150–400)
PMV BLD AUTO: 10.8 FL (ref 8.9–12.9)
POTASSIUM SERPL-SCNC: 3.9 MMOL/L (ref 3.5–5.1)
RBC # BLD AUTO: 3.19 M/UL (ref 3.8–5.2)
RBC MORPH BLD: ABNORMAL
RBC MORPH BLD: ABNORMAL
SODIUM SERPL-SCNC: 138 MMOL/L (ref 136–145)
WBC # BLD AUTO: 8.4 K/UL (ref 3.6–11)

## 2018-02-22 PROCEDURE — 85025 COMPLETE CBC W/AUTO DIFF WBC: CPT | Performed by: INTERNAL MEDICINE

## 2018-02-22 PROCEDURE — 97530 THERAPEUTIC ACTIVITIES: CPT

## 2018-02-22 PROCEDURE — 3331090001 HH PPS REVENUE CREDIT

## 2018-02-22 PROCEDURE — 74011250637 HC RX REV CODE- 250/637: Performed by: INTERNAL MEDICINE

## 2018-02-22 PROCEDURE — 83735 ASSAY OF MAGNESIUM: CPT | Performed by: INTERNAL MEDICINE

## 2018-02-22 PROCEDURE — 97535 SELF CARE MNGMENT TRAINING: CPT

## 2018-02-22 PROCEDURE — 97161 PT EVAL LOW COMPLEX 20 MIN: CPT

## 2018-02-22 PROCEDURE — 74011250637 HC RX REV CODE- 250/637: Performed by: HOSPITALIST

## 2018-02-22 PROCEDURE — 3331090002 HH PPS REVENUE DEBIT

## 2018-02-22 PROCEDURE — 80048 BASIC METABOLIC PNL TOTAL CA: CPT | Performed by: INTERNAL MEDICINE

## 2018-02-22 PROCEDURE — 74011250636 HC RX REV CODE- 250/636: Performed by: INTERNAL MEDICINE

## 2018-02-22 PROCEDURE — G8979 MOBILITY GOAL STATUS: HCPCS

## 2018-02-22 PROCEDURE — 65660000000 HC RM CCU STEPDOWN

## 2018-02-22 PROCEDURE — 36415 COLL VENOUS BLD VENIPUNCTURE: CPT | Performed by: INTERNAL MEDICINE

## 2018-02-22 PROCEDURE — 97165 OT EVAL LOW COMPLEX 30 MIN: CPT

## 2018-02-22 PROCEDURE — G8978 MOBILITY CURRENT STATUS: HCPCS

## 2018-02-22 RX ORDER — MAGNESIUM SULFATE HEPTAHYDRATE 40 MG/ML
2 INJECTION, SOLUTION INTRAVENOUS ONCE
Status: COMPLETED | OUTPATIENT
Start: 2018-02-22 | End: 2018-02-26

## 2018-02-22 RX ORDER — POTASSIUM CHLORIDE 20MEQ/15ML
40 LIQUID (ML) ORAL
Status: COMPLETED | OUTPATIENT
Start: 2018-02-22 | End: 2018-02-22

## 2018-02-22 RX ORDER — FUROSEMIDE 10 MG/ML
20 INJECTION INTRAMUSCULAR; INTRAVENOUS ONCE
Status: COMPLETED | OUTPATIENT
Start: 2018-02-22 | End: 2018-02-22

## 2018-02-22 RX ADMIN — CALCIUM CARBONATE-VITAMIN D TAB 500 MG-200 UNIT 1 TABLET: 500-200 TAB at 08:36

## 2018-02-22 RX ADMIN — POTASSIUM CHLORIDE 40 MEQ: 20 SOLUTION ORAL at 12:35

## 2018-02-22 RX ADMIN — FUROSEMIDE 20 MG: 10 INJECTION, SOLUTION INTRAMUSCULAR; INTRAVENOUS at 12:42

## 2018-02-22 RX ADMIN — LACTULOSE 30 G: 20 SOLUTION ORAL at 08:36

## 2018-02-22 RX ADMIN — Medication 20 ML: at 14:00

## 2018-02-22 RX ADMIN — Medication 10 ML: at 22:00

## 2018-02-22 RX ADMIN — MAGNESIUM SULFATE HEPTAHYDRATE 2 G: 40 INJECTION, SOLUTION INTRAVENOUS at 12:40

## 2018-02-22 RX ADMIN — BUPROPION HYDROCHLORIDE 75 MG: 75 TABLET, FILM COATED ORAL at 08:36

## 2018-02-22 RX ADMIN — LORAZEPAM 2 MG: 2 INJECTION INTRAMUSCULAR; INTRAVENOUS at 12:42

## 2018-02-22 NOTE — PROGRESS NOTES
Problem: Mobility Impaired (Adult and Pediatric)  Goal: *Acute Goals and Plan of Care (Insert Text)  Physical Therapy Goals  Initiated 2/22/2018  1. Patient will move from supine to sit and sit to supine , scoot up and down and roll side to side in bed with minimal assistance/contact guard assist within 7 day(s). 2.  Patient will transfer from bed to chair and chair to bed with minimal assistance/contact guard assist using the least restrictive device within 7 day(s). 3.  Patient will perform sit to stand with minimal assistance/contact guard assist within 7 day(s). 4.  Patient will ambulate with minimal assistance/contact guard assist for 50 feet with the least restrictive device within 7 day(s). 5.  Patient will ascend/descend 4 stairs with one handrail(s) with minimal assistance/contact guard assist within 7 day(s). physical Therapy EVALUATION  Patient: Alan Monte (12 y.o. female)  Date: 2/22/2018  Primary Diagnosis: Hypokalemia        Precautions: PEG  Fall, Seizure, Bed Alarm    ASSESSMENT :  Based on the objective data described below, the patient presents with confusion oriented to self only, see subjective. She did follow one step commands and participated in bed mobility and sitting balance. At rest her HR was 117 and ge to 128 with act of supine to sit. She has impaired sitting balance and any standing was deferred secondary to elevated HR. PT will see in an effort to progress mobility. Disposition depending on progress, might require rehab. .    Patient will benefit from skilled intervention to address the above impairments.   Patients rehabilitation potential is considered to be Fair  Factors which may influence rehabilitation potential include:   []         None noted  []         Mental ability/status  [x]         Medical condition  []         Home/family situation and support systems  []         Safety awareness  []         Pain tolerance/management  []         Other:      PLAN :  Recommendations and Planned Interventions:  [x]           Bed Mobility Training             []    Neuromuscular Re-Education  [x]           Transfer Training                   []    Orthotic/Prosthetic Training  [x]           Gait Training                         []    Modalities  [x]           Therapeutic Exercises           []    Edema Management/Control  [x]           Therapeutic Activities            [x]    Patient and Family Training/Education  []           Other (comment):    Frequency/Duration: Patient will be followed by physical therapy  3 times a week to address goals. Discharge Recommendations: Rehab, Home Health and To Be Determined  Further Equipment Recommendations for Discharge: to be determined      SUBJECTIVE:   Patient stated If they given me an , I'm going to lynn someone.     OBJECTIVE DATA SUMMARY:   Consult received, chart reviewed, pt cleared by nursing. HISTORY:    Past Medical History:   Diagnosis Date    Alcohol abuse     GI (gastrointestinal bleed)     Insomnia     Iron deficiency anemia 2004    PPD positive     treated w/ INH- tests since have been negative    Pure hypercholesterolemia     Scoliosis 12/28/15    [facility]    Tobacco dependence     Unspecified essential hypertension     Uterine bleeding, dysfunctional     Uterine fibroid      Past Surgical History:   Procedure Laterality Date    COLONOSCOPY N/A 2018    COL[facility] 112 HX CHOLECYSTECTOMY      HX COLONOSCOPY  2013    MCV, repeat 10 years    HX ORTHOPAEDIC      Shattered bilateral ankles-metal plates & screws    HX OTHER SURGICAL      Benign cyst removal- L. foot    LEG/ANKLE SURGERY PROC UNLISTED       Prior Level of Function/Home Situation: Attempted to obtain history from pt who was oriented to self only.  She reports living with her  in a one story home and having a walker and cane. Personal factors and/or comorbidities impacting plan of care: ETOH abuse, chronic RUE DVT, PEG, malnutrition, HTN    Home Situation  Home Environment: Private residence  Support Systems: Spouse/Significant Other/Partner  Current DME Used/Available at Home: Sallye Evans, rolling, Cane, straight    EXAMINATION/PRESENTATION/DECISION MAKING:   Critical Behavior:  Neurologic State: Alert, Confused  Orientation Level: Oriented to person, Disoriented to time, Disoriented to situation, Disoriented to place  Cognition: Decreased attention/concentration     Hearing:     Skin:  Refer to MD and nursing notes  Edema: LEs  Range Of Motion:  AROM: Generally decreased, functional                       Strength:    Strength: Generally decreased, functional                    Tone & Sensation:                  Sensation: Intact               Coordination:     Vision:      Functional Mobility:  Bed Mobility:  Rolling: Moderate assistance  Supine to Sit: Moderate assistance;Assist x2  Sit to Supine: Maximum assistance;Assist x2     Transfers:                     not assessed         Balance:   Sitting: Impaired  Sitting - Static: Fair (occasional)  Sitting - Dynamic: Poor (constant support)  Ambulation/Gait Training:                                                      Not assessed    Stairs: Therapeutic Exercises:       Functional Measure:  Timed up and go:    Timed Get Up And Go Test: 0 (pt unable)     Timed Up and Go and G-code impairment scale:  Percentage of Impairment CH    0%   CI    1-19% CJ    20-39% CK    40-59% CL    60-79% CM    80-99% CN     100%   Timed   Score 0-56 10 11-12 13-14 15-16 17-18 19 20       < than 10 seconds=Normal  Greater then 13.5 seconds (in elderly)=Increased fall risk   Rivera Weston [place] M. Predicting the probability for falls in community dwelling older adults using the Timed Up and Go Test. Phys Ther. 2000;80:896-903. G codes:   In compliance with CMSs Claims Based Outcome Reporting, the following G-code set was chosen for this patient based on their primary functional limitation being treated: The outcome measure chosen to determine the severity of the functional limitation was the TUG with a score of 0 which was correlated with the impairment scale. ? Mobility - Walking and Moving Around:     - CURRENT STATUS: CN - 100% impaired, limited or restricted    - GOAL STATUS: CK - 40%-59% impaired, limited or restricted    - D/C STATUS:  ---------------To be determined---------------      Physical Therapy Evaluation Charge Determination   History Examination Presentation Decision-Making   HIGH Complexity :3+ comorbidities / personal factors will impact the outcome/ POC  LOW Complexity : 1-2 Standardized tests and measures addressing body structure, function, activity limitation and / or participation in recreation  MEDIUM Complexity : Evolving with changing characteristics  LOW Complexity : FOTO score of       Based on the above components, the patient evaluation is determined to be of the following complexity level: LOW     Pain:                    Activity Tolerance:   See assessment above   Please refer to the flowsheet for vital signs taken during this treatment. After treatment:   []         Patient left in no apparent distress sitting up in chair  [x]         Patient left in no apparent distress in bed to modified chair position  [x]         Call bell left within reach  [x]         Nursing notified  []         Caregiver present  [x]         Bed alarm activated    COMMUNICATION/EDUCATION:   The patients plan of care was discussed with: Registered Nurse and . [x]         Fall prevention education was provided and the patient/caregiver indicated understanding. [x]         Patient/family have participated as able in goal setting and plan of care.   [x]         Patient/family agree to work toward stated goals and plan of care. []         Patient understands intent and goals of therapy, but is neutral about his/her participation. []         Patient is unable to participate in goal setting and plan of care.     Thank you for this referral.  Torrie Parker   Time Calculation: 27 mins

## 2018-02-22 NOTE — PROGRESS NOTES
Hospitalist Progress Note  Janessa Patel MD  Answering service: [phone] OR [phone] from in house phone         Date of Service:  2018  NAME:  Jana Cortez  :  1960  MRN:  [id number]    Admission Summary:   64 yo woman with HTN, HLD, DUB, iron deficiency anemia, severe protein-calorie malnutrition, recently diagnosed colitis, EtOH abuse, and h/o GI bleed was sent by her PCP to the ED on 18 for fatigue, generalized weakness, and right arm swelling. She was recently hospitalized at [facility] 18 - 18 for hypokalemia and anemia. She was admitted to the ICU for severe hypokalemia, hypotension, and chronic RUE superficial venous thrombosis.      Interval history / Subjective:       :  I have personally reviewed serial CXR from  and , not consistent with PNA , Abx dc's on this bases  Sodium now in nl range, K and mag borderline, IVF's adjusted accordingly. Mag and K plus CMP/cbc for am     :  Pt a bit more alert and appropriate today but mostly somnolent and poorly interactive with this rounder    : Following verbal command, smiles on command and moves all ext on command.    Lab improved.       Assessment & Plan:      Severe hypokalemia (POA) - replete until K+ >=4 and magnesium >=2  - urine/serum studies ordered  - Renal following  - likely due to anorexia and malabsorption and recent use of diuretics; not resumed on admission  - corrected   Lab Results   Component Value Date/Time    Potassium 3.9 2018 03:21 AM    Potassium (POC) <2.0 (LL) 2018 04:08 PM    cont on replacement.      EtOH withdrawal on chronic alcohol abuse   - now on CIWA protocol and banana bag; prn Ativan     Hepatic encephalopathy due to hyperammonemia   - start lactulose retention enemas and can change to per NGT once placed  Lab Results   Component Value Date/Time    ALT (SGPT) 14 2018 03:16 AM    AST (SGOT) 28 2018 03:16 AM    GGT 83 (H) 10/16/2017 03:55 PM    Alk.  phosphatase 156 (H) 02/21/2018 03:16 AM    Bilirubin, direct 0.4 (H) 02/18/2018 09:47 AM    Bilirubin, total 0.4 02/21/2018 03:16 AM   ammonia level expectantly      Acute on chronic anemia (POA)   - iron WNL, folate low, B12 low-normal  - FOBT negative  - s/p 2 units PRBC transfusion since admission   - Hematology consulted by [facility] due to recent h/o possible occult malignancy  - per  at bedside, EGD, colonoscopy, bone scan all unremarkable recently  - recent PET was abnormal  - stopped SC heparin and change to SCDs  - IR unable to do CT-guided BM bx today due to unstable airway; re-scheduled  Lab Results   Component Value Date/Time    WBC 8.4 02/22/2018 03:21 AM    Hemoglobin (POC) 4.9 02/16/2018 02:46 PM    Hemoglobin (POC) 11.2 (L) 01/26/2018 04:08 PM    HGB 9.0 (L) 02/22/2018 03:21 AM    Hematocrit (POC) 33 (L) 01/26/2018 04:08 PM    HCT 27.4 (L) 02/22/2018 03:21 AM    PLATELET 079 76/92/9748 03:21 AM    MCV 85.9 02/22/2018 03:21 AM         Severe protein-calorie malnutrition - prealbumin 4.4  - Nutrition consulted  - s/p 1 dose IV albumin  Lab Results   Component Value Date/Time    Protein, total 5.0 (L) 02/21/2018 03:16 AM    Albumin 1.5 (L) 02/21/2018 03:16 AM         Chronic RUE VTE  - no indication for [facility] for RUE VTE as pt had IV in that arm during previous hospitalization     Right ankle deformity and RLE>LLE edema - venous duplex b/l LEs 2/19 negative  - check R ankle XR      Generalized weakness/debility with EtOH abuse (POA)   - likely due to anorexia and EtOH abuse  - PT/OT/ST evals, Nutrition eval  - banana bag  - non verbal 2/20/2018 improves by 2/22/2018      Hypovolemic shock (POA) on chronic HTN - likely due to RLL HCAP on CXR  - continue IVF  - resolved s/p pressor x4h  - TTE 2/17 EF 55-60%, no RWMA, mild-mod TR, small pericardial effusion  BP Readings from Last 1 Encounters:   02/22/18 128/82         Possible RLL HCAP (POA) - BCx 2/16 NGTD  - started empiric Zosyn/vancomycin 2/17  - s/p empiric vancomycin 2/17-2/19   - BCx 2/16 NGTD  - MRSA swab negative  - sputum Cx ordered but no sample sent yet  - urine strep/legionella antigens pending  - flu swab negative  - cxr form 2/17 to 2/19 cleared as to RLL findings, not c/c pna,      Mild transaminitis - elevated AST likely due to EtOH  - RUQ US 2/18 hepatic steatosis  Lab Results   Component Value Date/Time    ALT (SGPT) 14 02/21/2018 03:16 AM    AST (SGOT) 28 02/21/2018 03:16 AM    GGT 83 (H) 10/16/2017 03:55 PM    Alk. phosphatase 156 (H) 02/21/2018 03:16 AM    Bilirubin, direct 0.4 (H) 02/18/2018 09:47 AM    Bilirubin, total 0.4 02/21/2018 03:16 AM          Hypernatremia - IVF adjusted by Renal; resolved  Recent Labs      02/22/18   0321   NA  138       Hypophosphatemia - replete prn  Lab Results   Component Value Date/Time    Calcium 7.6 (L) 02/22/2018 03:21 AM    Phosphorus 2.7 02/20/2018 03:39 AM       Hypomagnesemia - replete prn  Recent Labs      02/22/18   0321   MG  1.5*         Code status: full  DVT prophylaxis: SCDs     Care Plan discussed with: Nurse. No family at bedside today  Disposition: TBD.           Hospital Problems  Date Reviewed: 2/19/2018          Codes Class Noted POA    * (Principal)Hypokalemia ICD-10-CM: E87.6  ICD-9-CM: 276.8  2/16/2018 Yes                Review of Systems:   No pain, no n/v,     Vital Signs:    Last 24hrs VS reviewed since prior progress note.  Most recent are:  Visit Vitals    /82 (BP 1 Location: Right arm, BP Patient Position: At rest)    Pulse (!) 122    Temp 97.5 °F (36.4 °C)    Resp 18    Ht 5' 1\" (1.549 m)    Wt 64.5 kg (142 lb 3.2 oz)    SpO2 100%    BMI 26.87 kg/m2         Intake/Output Summary (Last 24 hours) at 02/22/18 1049  Last data filed at 02/22/18 4799   Gross per 24 hour   Intake             1920 ml   Output              450 ml   Net             1470 ml        Physical Examination:             Constitutional:  no distress, alert cooperative. ENT:  ng tube inplace. Oral mucous moist, oropharynx benign. Neck supple,    Resp:  CTA bilaterally. No wheezing/rhonchi/rales. No accessory muscle use   CV:  Regular rhythm, normal rate, no murmurs, gallops, rubs    GI:  Soft, non distended, non tender. normoactive bowel sounds, no hepatosplenomegaly     Musculoskeletal:  No edema, warm, 1+ pulses throughout    Neurologic:  non focal      Psych: not agitated not anxious. Skin:  ecchymosis       Data Review:    Review and/or order of clinical lab test      Labs:     Recent Labs      02/22/18 0321 02/20/18 0339   WBC  8.4  8.9   HGB  9.0*  8.5*   HCT  27.4*  26.0*   PLT  241  221     Recent Labs      02/22/18 0321 02/21/18 0316 02/20/18 0339   NA  138  138  139   K  3.9  3.8  3.7   CL  103  107  109*   CO2  27  26  27   BUN  2*  2*  2*   CREA  0.37*  0.43*  0.33*   GLU  108*  104*  85   CA  7.6*  7.7*  7.5*   MG  1.5*  1.7  1.6   PHOS   --    --   2.7     Recent Labs      02/21/18 0316 02/20/18 0339   SGOT  28  28   ALT  14  14   AP  156*  137*   TBILI  0.4  0.6   TP  5.0*  4.9*   ALB  1.5*  1.5*   GLOB  3.5  3.4   LPSE   --   45*     No results for input(s): INR, PTP, APTT in the last 72 hours. No lab exists for component: INREXT, INREXT   No results for input(s): FE, TIBC, PSAT, FERR in the last 72 hours. Lab Results   Component Value Date/Time    Folate 4.9 (L) 02/17/2018 05:54 AM      No results for input(s): PH, PCO2, PO2 in the last 72 hours. No results for input(s): CPK, CKNDX, TROIQ in the last 72 hours.     No lab exists for component: CPKMB  Lab Results   Component Value Date/Time    Cholesterol, total 202 (H) 03/27/2017 09:47 AM    HDL Cholesterol 76 03/27/2017 09:47 AM    LDL, calculated 109 (H) 03/27/2017 09:47 AM    Triglyceride 83 03/27/2017 09:47 AM     Lab Results   Component Value Date/Time    Glucose (POC) 114 (H) 02/18/2018 08:30 AM    Glucose (POC) 91 01/26/2018 04:08 PM    Glucose (POC) 107 (H) 01/26/2018 01:08 PM     Lab Results   Component Value Date/Time    Color DARK YELLOW 02/16/2018 08:30 PM    Appearance CLEAR 02/16/2018 08:30 PM    Specific gravity 1.013 02/16/2018 08:30 PM    pH (UA) 7.0 02/16/2018 08:30 PM    Protein TRACE (A) 02/16/2018 08:30 PM    Glucose NEGATIVE  02/16/2018 08:30 PM    Ketone TRACE (A) 02/16/2018 08:30 PM    Bilirubin Negative 09/28/2012 08:58 AM    Urobilinogen 0.2 02/16/2018 08:30 PM    Nitrites NEGATIVE  02/16/2018 08:30 PM    Leukocyte Esterase NEGATIVE  02/16/2018 08:30 PM    Epithelial cells FEW 02/16/2018 08:30 PM    Bacteria 1+ (A) 02/16/2018 08:30 PM    WBC 0-4 02/16/2018 08:30 PM    RBC 0-5 02/16/2018 08:30 PM         Medications Reviewed:     Current Facility-Administered Medications   Medication Dose Route Frequency    magnesium sulfate 2 g/50 ml IVPB (premix or compounded)  2 g IntraVENous ONCE    furosemide (LASIX) injection 20 mg  20 mg IntraVENous ONCE    potassium chloride (KAON 10%) 20 mEq/15 mL oral liquid 40 mEq  40 mEq Per NG tube NOW    lactulose (CHRONULAC) solution 30 g  30 g Per NG tube BID    buPROPion (WELLBUTRIN) tablet 75 mg  75 mg Per NG tube BID    nicotine (NICODERM CQ) 21 mg/24 hr patch 1 Patch  1 Patch TransDERmal DAILY    balsam peru-castor oil (VENELEX)  mg/gram ointment   Topical BID    sodium chloride (NS) flush 10-30 mL  10-30 mL InterCATHeter PRN    sodium chloride (NS) flush 10 mL  10 mL InterCATHeter PRN    sodium chloride (NS) flush 10-40 mL  10-40 mL InterCATHeter Q8H    alteplase (CATHFLO) 1 mg in sterile water (preservative free) 1 mL injection  1 mg InterCATHeter PRN    dextrose 5% 1,000 mL with potassium chloride 20 mEq, folic acid 1 mg, thiamine 200 mg, mvi, adult no. 4 with vit K 10 mL infusion   IntraVENous DAILY    0.9% sodium chloride infusion 250 mL  250 mL IntraVENous PRN    LORazepam (ATIVAN) injection 1 mg  1 mg IntraVENous Q2H PRN    LORazepam (ATIVAN) injection 2 mg  2 mg IntraVENous Q2H PRN    zolpidem (AMBIEN) tablet 5 mg  5 mg Oral QHS PRN    calcium-vitamin D (OS-DELORIS) 500 mg-200 unit tablet  1 Tab Oral BID WITH MEALS    sodium chloride (NS) flush 5-10 mL  5-10 mL IntraVENous PRN    acetaminophen (TYLENOL) tablet 650 mg  650 mg Oral Q4H PRN    ondansetron (ZOFRAN) injection 4 mg  4 mg IntraVENous Q4H PRN     ______________________________________________________________________  EXPECTED LENGTH OF STAY: 4d 21h  ACTUAL LENGTH OF STAY:          6                 Meggan Frias MD

## 2018-02-22 NOTE — PROGRESS NOTES
Problem: General Medical Care Plan  Goal: *Anxiety reduced or absent  Outcome: Progressing Towards Goal  Patient receiving PRN Ativan for CIWA protocol

## 2018-02-22 NOTE — PROGRESS NOTES
Problem: General Medical Care Plan  Goal: *Anxiety reduced or absent  Outcome: Progressing Towards Goal  Patient on CIWA protocol, PRN Ativan Q2 hours given for increased agitation and anxiousness

## 2018-02-22 NOTE — WOUND CARE
WOCN Note:     Follow-up visit for buttocks. Chart shows:  Admitted for hypokalemia and weakness; history of smoking and ETOH use  Admitted from home     Assessment:   Patient responds to her name but rests through my assessment. She is lying on her left side. Patient has a Rendon and Flexi Seal.    Bed: total care sport     Bilateral heel skin intact and without erythema.      1. POA, bilateral buttocks and lower sacrum DTI  = ~12 x 12 x 0 cm  100% dark non-blanching field on a darker skin toned individual so margins are not clearly defined. No significant changes since previous visit. Wound Recommendations:    Continue Venelex to buttocks as ordered. Skin Care & Pressure Relief Recommendations:  Minimize layers of linen/pads under patient to optimize support surface.     Turn/reposition approximately every 2 hours and offload heels    Transition of Care: Plan to follow as needed while admitted to hospital.    CARMELINA SanchezN, RN, [facility] [place]  Certified Wound, Ostomy, Continence Nurse  office [phone]  pager [phone] or call  to page

## 2018-02-22 NOTE — PROGRESS NOTES
Problem: Self Care Deficits Care Plan (Adult)  Goal: *Acute Goals and Plan of Care (Insert Text)  Occupational Therapy Goals  Initiated 2/22/2018  1. Patient will perform grooming seated EOB with modified independence within 7 day(s). 2.  Patient will perform upper body dressing with minimal assistance/contact guard assist within 7 day(s). 3.  Patient will perform lower body dressing with moderate assistance  within 7 day(s). 4.  Patient will perform toilet transfers with moderate assistance  within 7 day(s). 5.  Patient will perform all aspects of toileting with moderate assistance  within 7 day(s). Occupational Therapy EVALUATION  Patient: Ifeanyi Castanon (89 y.o. female)  Date: 2/22/2018  Primary Diagnosis: Hypokalemia        Precautions:   Fall, Seizure, Bed Alarm    ASSESSMENT :  Based on the objective data described below, the patient presents with decreased activity tolerance, strength and balance, currently total A for all ADLs, one step command following with patient oriented to self and location only, currently post PT session and fatigued, total A to readjust self in bed in long sitting upon arrival, total A to scoot with B LE edema limiting lifting and scooting up in bed. Patient with lack of insight into deficits, need for assist and insight into situation, generalized weakness limiting as well. Patient will need significant assist with self care at discharge, recommend SNF at this time pending progression with acute therapy services. Patient will benefit from skilled intervention to address the above impairments.   Patients rehabilitation potential is considered to be Good  Factors which may influence rehabilitation potential include:   []             None noted  []             Mental ability/status  [x]             Medical condition  [x]             Home/family situation and support systems  []             Safety awareness  []             Pain tolerance/management  []             Other: PLAN :  Recommendations and Planned Interventions:  [x]               Self Care Training                  [x]        Therapeutic Activities  [x]               Functional Mobility Training    []        Cognitive Retraining  [x]               Therapeutic Exercises           [x]        Endurance Activities  [x]               Balance Training                   []        Neuromuscular Re-Education  []               Visual/Perceptual Training     [x]   Home Safety Training  [x]               Patient Education                 []        Family Training/Education  []               Other (comment):    Frequency/Duration: Patient will be followed by occupational therapy 3 times a week to address goals. Discharge Recommendations: Skilled Nursing Facility  Further Equipment Recommendations for Discharge: TBD at facility     SUBJECTIVE:   Patient stated I don't know where I came from.     OBJECTIVE DATA SUMMARY:   HISTORY:   Past Medical History:   Diagnosis Date    Alcohol abuse     GI (gastrointestinal bleed)     Insomnia 11/09    Iron deficiency anemia 04/2004    PPD positive     treated w/ INH- tests since have been negative    Pure hypercholesterolemia     Scoliosis 12/28/15    [facility]    Tobacco dependence     Unspecified essential hypertension     Uterine bleeding, dysfunctional 2/12    Uterine fibroid      Past Surgical History:   Procedure Laterality Date    COLONOSCOPY N/A 1/31/2018    COLONOSCOPY performed by Bob Colorado MD at [address] HX CHOLECYSTECTOMY      HX COLONOSCOPY  07/01/2013    MCV, repeat 10 years    HX ORTHOPAEDIC      Shattered bilateral ankles-metal plates & screws    HX OTHER SURGICAL      Benign cyst removal- L. foot    LEG/ANKLE SURGERY PROC UNLISTED         Prior Level of Function/Environment/Context: lives with , according to son was independent a few months ago, slowly declining,has RW  Occupations in which the patient is/was successful, what are the barriers preventing that success:   Performance Patterns (routines, roles, habits, and rituals):   Personal Interests and/or values:   Expanded or extensive additional review of patient history:     Home Situation  Home Environment: Private residence  Living Alone: No  Support Systems: Family member(s)  Patient Expects to be Discharged to[de-identified] Unknown  Current DME Used/Available at Home: Willye Chambers, rolling, Cane, straight  [x]  Right hand dominant   []  Left hand dominant    EXAMINATION OF PERFORMANCE DEFICITS:  Cognitive/Behavioral Status:  Neurologic State: Alert;Confused  Orientation Level: Oriented to place  Cognition: Follows commands;Decreased attention/concentration        Safety/Judgement: Decreased awareness of environment;Decreased awareness of need for assistance;Decreased awareness of need for safety;Decreased insight into deficits    Skin: intact    Edema: B LE edema/ankles/feet shiny skin    Hearing: seems intact       Vision/Perceptual:       Difficulty, glasses not present, unable to read board                              Range of Motion:  B UE shoulder flexion >120*  AROM: Generally decreased, functional                         Strength:  B UE 3/5-4/5, generally weak  Strength: Generally decreased, functional                Coordination:     Fine Motor Skills-Upper: Left Impaired;Right Impaired (weakness)         Tone & Sensation:       Sensation: Intact                      Balance:  Sitting: Impaired  Sitting - Static: Fair (occasional)  Sitting - Dynamic: Poor (constant support)    Functional Mobility and Transfers for ADLs:  Bed Mobility:  Rolling: Moderate assistance  Supine to Sit: Moderate assistance;Assist x2  Sit to Supine: Maximum assistance;Assist x2    Transfers:  Bed to Chair: Total assistance (modified bed-chair positioning)  Toilet Transfer : Total assistance (rectal tube)    ADL Assessment:  Feeding:  Total assistance (tube feeds)    Oral Facial Hygiene/Grooming: Maximum assistance (NG tube/lines in way, fair command following)    Bathing: Total assistance    Upper Body Dressing: Maximum assistance (able to thread arms in only, able to lift back off bed)    Lower Body Dressing: Total assistance    Toileting: Total assistance                ADL Intervention and task modifications:                    Completed OT evaluation and ADLs in bed due to poor sitting balance and fatigue  Post PT session, able to sit up in bed, use core strength to sit forward, total A to readjust in bed. Educated on safety and endurance training with encouragement for full participation in ADLs while in [address] to poor understanding noted. Unsure of carryover                 Cognitive Retraining  Safety/Judgement: Decreased awareness of environment;Decreased awareness of need for assistance;Decreased awareness of need for safety;Decreased insight into deficits    Therapeutic Exercise:     Functional Measure:  Barthel Index:    Bathin  Bladder: 0  Bowels: 0  Groomin  Dressin  Feedin  Mobility: 0  Stairs: 0  Toilet Use: 0  Transfer (Bed to Chair and Back): 0  Total: 0       Barthel and G-code impairment scale:  Percentage of impairment CH  0% CI  1-19% CJ  20-39% CK  40-59% CL  60-79% CM  80-99% CN  100%   Barthel Score 0-100 100 99-80 79-60 59-40 20-39 1-19   0   Barthel Score 0-20 20 17-19 13-16 9-12 5-8 1-4 0      The Barthel ADL Index: Guidelines  1. The index should be used as a record of what a patient does, not as a record of what a patient could do. 2. The main aim is to establish degree of independence from any help, physical or verbal, however minor and for whatever reason. 3. The need for supervision renders the patient not independent. 4. A patient's performance should be established using the best available evidence. Asking the patient, friends/relatives and nurses are the usual sources, but direct observation and common sense are also important. However direct testing is not needed. 5. Usually the patient's performance over the preceding 24-48 hours is important, but occasionally longer periods will be relevant. 6. Middle categories imply that the patient supplies over 50 per cent of the effort. 7. Use of aids to be independent is allowed. Romona Sheerer., Barthel, D.W. (0688). Functional evaluation: the Barthel Index. [facility] (14)2. CHUCK Gayle, Alec Valentine., Yosef Mayo., [place], [address] [place] [address] Ave (1999). Measuring the change indisability after inpatient rehabilitation; comparison of the responsiveness of the Barthel Index and Functional [place] Measure. Journal of Neurology, Neurosurgery, and Psychiatry, 66(4), 880-780. ALEM George, MOOSE Wu, & Jarad Pa M.A. (2004.) Assessment of post-stroke quality of life in cost-effectiveness studies: The usefulness of the Barthel Index and the EuroQoL-5D. Quality of Life Research, 13, 955-13         G codes: In compliance with CMSs Claims Based Outcome Reporting, the following G-code set was chosen for this patient based on their primary functional limitation being treated: The outcome measure chosen to determine the severity of the functional limitation was the barthel index with a score of 0/100 which was correlated with the impairment scale. ?  Self Care:     - CURRENT STATUS: CN - 100% impaired, limited or restricted    - GOAL STATUS: CJ - 20%-39% impaired, limited or restricted    - D/C STATUS:  ---------------To be determined---------------     Occupational Therapy Evaluation Charge Determination   History Examination Decision-Making   MEDIUM Complexity : Expanded review of history including physical, cognitive and psychosocial  history  HIGH Complexity : 5 or more performance deficits relating to physical, cognitive , or psychosocial skils that result in activity limitations and / or participation restrictions HIGH Complexity : Patient presents with comorbidities that affect occupational performance. Signifigant modification of tasks or assistance (eg, physical or verbal) with assessment (s) is necessary to enable patient to complete evaluation       Based on the above components, the patient evaluation is determined to be of the following complexity level: HIGH   Pain:                    Activity Tolerance:   poor  Please refer to the flowsheet for vital signs taken during this treatment. After treatment:   [] Patient left in no apparent distress sitting up in chair  [x] Patient left in no apparent distress in bed  [x] Call bell left within reach  [x] Nursing notified  [x] Caregiver present  [] Bed alarm activated    COMMUNICATION/EDUCATION:   The patients plan of care was discussed with: Physical Therapist and Registered Nurse. [x] Home safety education was provided and the patient/caregiver indicated understanding. [x] Patient/family have participated as able in goal setting and plan of care. [x] Patient/family agree to work toward stated goals and plan of care. [] Patient understands intent and goals of therapy, but is neutral about his/her participation. [] Patient is unable to participate in goal setting and plan of care. This patients plan of care is appropriate for delegation to SELMA.     Thank you for this referral.  Jammie Greco, OT  Time Calculation: 16 mins

## 2018-02-22 NOTE — PROGRESS NOTES
Problem: General Medical Care Plan  Goal: *Skin integrity maintained  Outcome: Progressing Towards Goal  Patient turned Q2 hours, skin kept dry and free from moisture, absorbent pads and limited sheets on the bed.

## 2018-02-22 NOTE — PROGRESS NOTES
Problem: Alcohol Withdrawal  Goal: Interventions  Outcome: Progressing Towards Goal  Frequent CIWA monitoring, patient continues to be between 7-8 CIWA score

## 2018-02-22 NOTE — PROGRESS NOTES
Patient name: Andrew Taylor  MRN: [id number]    Nephrology Progress note:    Assessment:  Hypokalemia/Hypophosphatemia: recurrent-> Suspect whole body potassium depletion (ETOH abuse/poor intake/nutritional status) exacerbated by renal wasting via lasix. Improving/stable s/p aggressive replacement     Anemia: Hgb stabilized,      Hypotension: volume depletion. Improved->Off vasopressor support     Failure to thrive: ETOH abuse/Hypoalbuminemia/Weight loss-> worked up for underlying malignancy- negative     Hypernatremia: better/stable with hypotonic fluids    Edema: 3rd spacing    Plan/Recommendations:  Ct Banana bag  KCl 40meq x 1dose via NGT  IV Mag sulfate 2gm x1 dose  IV lasix 20mg x 1 dose  CIWA  Am labs      Subjective:  AMS significantly better today. Conversive. ROS:   No CP  No n/v    Exam:  Visit Vitals    /82 (BP 1 Location: Right arm, BP Patient Position: At rest)    Pulse (!) 122    Temp 97.5 °F (36.4 °C)    Resp 18    Ht 5' 1\" (1.549 m)    Wt 64.5 kg (142 lb 3.2 oz)    SpO2 100%    BMI 26.87 kg/m2     Wt Readings from Last 3 Encounters:   02/22/18 64.5 kg (142 lb 3.2 oz)   02/16/18 46.3 kg (102 lb)   02/07/18 60.3 kg (133 lb)       Intake/Output Summary (Last 24 hours) at 02/22/18 1000  Last data filed at 02/22/18 7965   Gross per 24 hour   Intake             1920 ml   Output              450 ml   Net             1470 ml       Gen: Awake/NAD  HEENT:  AT/NC, NGT  Lungs/Chest wall: Clear. Cardiovascular: RRR  Abdomen/: Soft, NT, ND, BS+.    Ext: ++ peripheral edema  CNS: conversive      Current Facility-Administered Medications   Medication Dose Route Frequency Last Dose    magnesium sulfate 2 g/50 ml IVPB (premix or compounded)  2 g IntraVENous ONCE      furosemide (LASIX) injection 20 mg  20 mg IntraVENous ONCE      potassium chloride (KAON 10%) 20 mEq/15 mL oral liquid 40 mEq  40 mEq Per NG tube NOW      lactulose (CHRONULAC) solution 30 g  30 g Per NG tube BID 30 g at 02/22/18 0836    buPROPion (WELLBUTRIN) tablet 75 mg  75 mg Per NG tube BID 75 mg at 02/22/18 0836    nicotine (NICODERM CQ) 21 mg/24 hr patch 1 Patch  1 Patch TransDERmal DAILY 1 Patch at 02/22/18 7150    balsam peru-castor oil (VENELEX)  mg/gram ointment   Topical BID      sodium chloride (NS) flush 10-30 mL  10-30 mL InterCATHeter PRN      sodium chloride (NS) flush 10 mL  10 mL InterCATHeter PRN 10 mL at 02/20/18 1616    sodium chloride (NS) flush 10-40 mL  10-40 mL InterCATHeter Q8H Stopped at 02/22/18 0600    alteplase (CATHFLO) 1 mg in sterile water (preservative free) 1 mL injection  1 mg InterCATHeter PRN      dextrose 5% 1,000 mL with potassium chloride 20 mEq, folic acid 1 mg, thiamine 200 mg, mvi, adult no. 4 with vit K 10 mL infusion   IntraVENous DAILY      0.9% sodium chloride infusion 250 mL  250 mL IntraVENous PRN      LORazepam (ATIVAN) injection 1 mg  1 mg IntraVENous Q2H PRN 1 mg at 02/21/18 1209    LORazepam (ATIVAN) injection 2 mg  2 mg IntraVENous Q2H PRN 2 mg at 02/21/18 2122    zolpidem (AMBIEN) tablet 5 mg  5 mg Oral QHS PRN Stopped at 02/17/18 2003    calcium-vitamin D (OS-DELORIS) 500 mg-200 unit tablet  1 Tab Oral BID WITH MEALS 1 Tab at 02/22/18 0836    sodium chloride (NS) flush 5-10 mL  5-10 mL IntraVENous PRN      acetaminophen (TYLENOL) tablet 650 mg  650 mg Oral Q4H  mg at 02/20/18 2017    ondansetron (ZOFRAN) injection 4 mg  4 mg IntraVENous Q4H PRN 4 mg at 02/21/18 9745       Labs/Data:    Lab Results   Component Value Date/Time WBC 8.4 02/22/2018 03:21 AM    Hemoglobin (POC) 4.9 02/16/2018 02:46 PM    Hemoglobin (POC) 11.2 (L) 01/26/2018 04:08 PM    HGB 9.0 (L) 02/22/2018 03:21 AM    Hematocrit (POC) 33 (L) 01/26/2018 04:08 PM    HCT 27.4 (L) 02/22/2018 03:21 AM    PLATELET 234 10/20/7467 03:21 AM    MCV 85.9 02/22/2018 03:21 AM       Lab Results   Component Value Date/Time    Sodium 138 02/22/2018 03:21 AM    Potassium 3.9 02/22/2018 03:21 AM    Chloride 103 02/22/2018 03:21 AM    CO2 27 02/22/2018 03:21 AM    Anion gap 8 02/22/2018 03:21 AM    Glucose 108 (H) 02/22/2018 03:21 AM    BUN 2 (L) 02/22/2018 03:21 AM    Creatinine 0.37 (L) 02/22/2018 03:21 AM    BUN/Creatinine ratio 5 (L) 02/22/2018 03:21 AM    GFR est AA >60 02/22/2018 03:21 AM    GFR est non-AA >60 02/22/2018 03:21 AM    Calcium 7.6 (L) 02/22/2018 03:21 AM       Patient seen and examined. Chart reviewed. Labs, data and other pertinent notes reviewed in last 24 hrs.         Signed by:  Elva Walker MD

## 2018-02-22 NOTE — PROGRESS NOTES
Bedside shift change report given to Yoselyn Hurtado 87Esequiel (oncoming nurse) by Charley Kevin RN (offgoing nurse). Report included the following information SBAR, Kardex, ED Summary, Procedure Summary, Intake/Output, MAR and Cardiac Rhythm Sinus Tach.

## 2018-02-23 LAB
ALBUMIN SERPL-MCNC: 1.5 G/DL (ref 3.5–5)
ALBUMIN/GLOB SERPL: 0.4 {RATIO} (ref 1.1–2.2)
ALP SERPL-CCNC: 152 U/L (ref 45–117)
ALT SERPL-CCNC: 10 U/L (ref 12–78)
ANION GAP SERPL CALC-SCNC: 7 MMOL/L (ref 5–15)
AST SERPL-CCNC: 22 U/L (ref 15–37)
BILIRUB SERPL-MCNC: 0.3 MG/DL (ref 0.2–1)
BUN SERPL-MCNC: 4 MG/DL (ref 6–20)
BUN/CREAT SERPL: 9 (ref 12–20)
CALCIUM SERPL-MCNC: 7.8 MG/DL (ref 8.5–10.1)
CHLORIDE SERPL-SCNC: 101 MMOL/L (ref 97–108)
CO2 SERPL-SCNC: 27 MMOL/L (ref 21–32)
CREAT SERPL-MCNC: 0.44 MG/DL (ref 0.55–1.02)
ERYTHROCYTE [DISTWIDTH] IN BLOOD BY AUTOMATED COUNT: 18.1 % (ref 11.5–14.5)
GLOBULIN SER CALC-MCNC: 3.8 G/DL (ref 2–4)
GLUCOSE SERPL-MCNC: 108 MG/DL (ref 65–100)
HCT VFR BLD AUTO: 29.3 % (ref 35–47)
HGB BLD-MCNC: 9.5 G/DL (ref 11.5–16)
MAGNESIUM SERPL-MCNC: 1.6 MG/DL (ref 1.6–2.4)
MCH RBC QN AUTO: 28 PG (ref 26–34)
MCHC RBC AUTO-ENTMCNC: 32.4 G/DL (ref 30–36.5)
MCV RBC AUTO: 86.4 FL (ref 80–99)
NRBC # BLD: 0 K/UL (ref 0–0.01)
NRBC BLD-RTO: 0 PER 100 WBC
PHOSPHATE SERPL-MCNC: 2.8 MG/DL (ref 2.6–4.7)
PLATELET # BLD AUTO: 278 K/UL (ref 150–400)
PMV BLD AUTO: 10.8 FL (ref 8.9–12.9)
POTASSIUM SERPL-SCNC: 3.8 MMOL/L (ref 3.5–5.1)
PROT SERPL-MCNC: 5.3 G/DL (ref 6.4–8.2)
RBC # BLD AUTO: 3.39 M/UL (ref 3.8–5.2)
SODIUM SERPL-SCNC: 135 MMOL/L (ref 136–145)
WBC # BLD AUTO: 9.8 K/UL (ref 3.6–11)

## 2018-02-23 PROCEDURE — 3331090002 HH PPS REVENUE DEBIT

## 2018-02-23 PROCEDURE — 65660000000 HC RM CCU STEPDOWN

## 2018-02-23 PROCEDURE — 84100 ASSAY OF PHOSPHORUS: CPT | Performed by: INTERNAL MEDICINE

## 2018-02-23 PROCEDURE — 74011000250 HC RX REV CODE- 250: Performed by: INTERNAL MEDICINE

## 2018-02-23 PROCEDURE — 74011250636 HC RX REV CODE- 250/636: Performed by: INTERNAL MEDICINE

## 2018-02-23 PROCEDURE — 36415 COLL VENOUS BLD VENIPUNCTURE: CPT | Performed by: INTERNAL MEDICINE

## 2018-02-23 PROCEDURE — 77010033678 HC OXYGEN DAILY

## 2018-02-23 PROCEDURE — 74011250637 HC RX REV CODE- 250/637: Performed by: INTERNAL MEDICINE

## 2018-02-23 PROCEDURE — 3331090001 HH PPS REVENUE CREDIT

## 2018-02-23 PROCEDURE — 83735 ASSAY OF MAGNESIUM: CPT | Performed by: INTERNAL MEDICINE

## 2018-02-23 PROCEDURE — 80053 COMPREHEN METABOLIC PANEL: CPT | Performed by: INTERNAL MEDICINE

## 2018-02-23 PROCEDURE — 85027 COMPLETE CBC AUTOMATED: CPT | Performed by: INTERNAL MEDICINE

## 2018-02-23 PROCEDURE — 74011250637 HC RX REV CODE- 250/637: Performed by: HOSPITALIST

## 2018-02-23 RX ORDER — FUROSEMIDE 10 MG/ML
40 INJECTION INTRAMUSCULAR; INTRAVENOUS ONCE
Status: COMPLETED | OUTPATIENT
Start: 2018-02-23 | End: 2018-02-23

## 2018-02-23 RX ORDER — POTASSIUM CHLORIDE 20MEQ/15ML
40 LIQUID (ML) ORAL 2 TIMES DAILY WITH MEALS
Status: COMPLETED | OUTPATIENT
Start: 2018-02-23 | End: 2018-02-23

## 2018-02-23 RX ORDER — MAGNESIUM SULFATE HEPTAHYDRATE 40 MG/ML
2 INJECTION, SOLUTION INTRAVENOUS ONCE
Status: COMPLETED | OUTPATIENT
Start: 2018-02-23 | End: 2018-02-26

## 2018-02-23 RX ADMIN — BUPROPION HYDROCHLORIDE 75 MG: 75 TABLET, FILM COATED ORAL at 08:28

## 2018-02-23 RX ADMIN — Medication 30 ML: at 22:00

## 2018-02-23 RX ADMIN — LORAZEPAM 2 MG: 2 INJECTION INTRAMUSCULAR; INTRAVENOUS at 21:53

## 2018-02-23 RX ADMIN — LORAZEPAM 2 MG: 2 INJECTION INTRAMUSCULAR; INTRAVENOUS at 19:33

## 2018-02-23 RX ADMIN — CALCIUM CARBONATE-VITAMIN D TAB 500 MG-200 UNIT 1 TABLET: 500-200 TAB at 17:22

## 2018-02-23 RX ADMIN — ZOLPIDEM TARTRATE 5 MG: 5 TABLET ORAL at 23:09

## 2018-02-23 RX ADMIN — POTASSIUM CHLORIDE 40 MEQ: 20 SOLUTION ORAL at 17:22

## 2018-02-23 RX ADMIN — LORAZEPAM 1 MG: 2 INJECTION INTRAMUSCULAR; INTRAVENOUS at 00:19

## 2018-02-23 RX ADMIN — LACTULOSE 30 G: 20 SOLUTION ORAL at 08:28

## 2018-02-23 RX ADMIN — LORAZEPAM 2 MG: 2 INJECTION INTRAMUSCULAR; INTRAVENOUS at 14:14

## 2018-02-23 RX ADMIN — Medication 10 ML: at 05:51

## 2018-02-23 RX ADMIN — BUPROPION HYDROCHLORIDE 75 MG: 75 TABLET, FILM COATED ORAL at 17:22

## 2018-02-23 RX ADMIN — POTASSIUM CHLORIDE: 2 INJECTION, SOLUTION, CONCENTRATE INTRAVENOUS at 19:33

## 2018-02-23 RX ADMIN — CALCIUM CARBONATE-VITAMIN D TAB 500 MG-200 UNIT 1 TABLET: 500-200 TAB at 08:28

## 2018-02-23 RX ADMIN — LORAZEPAM 1 MG: 2 INJECTION INTRAMUSCULAR; INTRAVENOUS at 03:34

## 2018-02-23 RX ADMIN — MAGNESIUM SULFATE HEPTAHYDRATE 2 G: 40 INJECTION, SOLUTION INTRAVENOUS at 07:17

## 2018-02-23 RX ADMIN — FUROSEMIDE 40 MG: 10 INJECTION, SOLUTION INTRAMUSCULAR; INTRAVENOUS at 10:14

## 2018-02-23 RX ADMIN — LORAZEPAM 1 MG: 2 INJECTION INTRAMUSCULAR; INTRAVENOUS at 05:41

## 2018-02-23 RX ADMIN — CASTOR OIL AND BALSAM, PERU: 788; 87 OINTMENT TOPICAL at 11:31

## 2018-02-23 RX ADMIN — POTASSIUM CHLORIDE 40 MEQ: 20 SOLUTION ORAL at 10:16

## 2018-02-23 NOTE — PROGRESS NOTES
Patient name: Alek Arevalo  MRN: [id number]    Nephrology Progress note:    Assessment:  Hypokalemia/Hypophosphatemia: recurrent-> Suspect whole body potassium depletion (ETOH abuse/poor intake/nutritional status) exacerbated by renal wasting via lasix (as outpt). Improving/stable s/p aggressive replacement     Anemia: Hgb stabilized,      Hypotension: volume depletion. Improved->Off vasopressor support     Failure to thrive: ETOH abuse/Hypoalbuminemia/Weight loss-> worked up for underlying malignancy- negative    ETOH withdrawal: CIWA->Symptoms improving     Hypernatremia: better/stable with hypotonic fluids    Edema: 3rd spacing-> intermittent lasix    Plan/Recommendations:  Ct Banana bag-> stoddard base to NS now that Na level has come down  KCl 40meq x 2doses via NGT  IV Mag sulfate 2gm x1 dose  IV lasix 40mg x 1 dose-> use intermittent doses  Strict I/Os  Am labs    Will sign off. Please call us back if needed       Subjective:  Mental status continues to be improved since yesterday.  UOP 1.7L/yesterday s/p IV lasix 20mg x 1 dose    ROS:   No CP  No n/v    Exam:  Visit Vitals    /70 (BP 1 Location: Right arm, BP Patient Position: At rest)    Pulse (!) 114    Temp 98 °F (36.7 °C)    Resp 20    Ht 5' 1\" (1.549 m)    Wt 66.1 kg (145 lb 11.6 oz)    SpO2 91%    BMI 27.53 kg/m2     Wt Readings from Last 3 Encounters:   02/23/18 66.1 kg (145 lb 11.6 oz)   02/16/18 46.3 kg (102 lb)   02/07/18 60.3 kg (133 lb)       Intake/Output Summary (Last 24 hours) at 02/23/18 0856  Last data filed at 02/23/18 0710   Gross per 24 hour   Intake             1560 ml   Output             2500 ml   Net             -940 ml       Gen: Awake/NAD  HEENT:  AT/NC, NGT  Lungs/Chest wall: Clear. Cardiovascular: RRR  Abdomen/: Soft, NT, ND, BS+. Ext: ++ peripheral edema  CNS: conversive      Current Facility-Administered Medications   Medication Dose Route Frequency Last Dose    0.9% sodium chloride 1,000 mL with potassium chloride 20 mEq, folic acid 1 mg, thiamine 200 mg, mvi, adult no. 4 with vit K 10 mL infusion   IntraVENous DAILY      potassium chloride (KAON 10%) 20 mEq/15 mL oral liquid 40 mEq  40 mEq Per NG tube BID WITH MEALS      furosemide (LASIX) injection 40 mg  40 mg IntraVENous ONCE      lactulose (CHRONULAC) solution 30 g  30 g Per NG tube BID 30 g at 02/23/18 0828    buPROPion [facility]) tablet 75 mg  75 mg Per NG tube BID 75 mg at 02/23/18 0828    nicotine (NICODERM CQ) 21 mg/24 hr patch 1 Patch  1 Patch TransDERmal DAILY 1 Patch at 02/23/18 1045    balsam peru-castor oil (VENELEX)  mg/gram ointment   Topical BID      sodium chloride (NS) flush 10-30 mL  10-30 mL InterCATHeter PRN      sodium chloride (NS) flush 10 mL  10 mL InterCATHeter PRN 10 mL at 02/20/18 1616    sodium chloride (NS) flush 10-40 mL  10-40 mL InterCATHeter Q8H 10 mL at 02/23/18 0551    alteplase (CATHFLO) 1 mg in sterile water (preservative free) 1 mL injection  1 mg InterCATHeter PRN      0.9% sodium chloride infusion 250 mL  250 mL IntraVENous PRN      LORazepam (ATIVAN) injection 1 mg  1 mg IntraVENous Q2H PRN 1 mg at 02/23/18 0541    LORazepam (ATIVAN) injection 2 mg  2 mg IntraVENous Q2H PRN 2 mg at 02/22/18 1242    zolpidem (AMBIEN) tablet 5 mg  5 mg Oral QHS PRN Stopped at 02/17/18 2003    calcium-vitamin D (OS-DELORIS) 500 mg-200 unit tablet  1 Tab Oral BID WITH MEALS 1 Tab at 02/23/18 0828    sodium chloride (NS) flush 5-10 mL  5-10 mL IntraVENous PRN      acetaminophen (TYLENOL) tablet 650 mg  650 mg Oral Q4H  mg at 02/20/18 2017    ondansetron (ZOFRAN) injection 4 mg  4 mg IntraVENous Q4H PRN 4 mg at 02/21/18 2140       Labs/Data:    Lab Results   Component Value Date/Time    WBC 9.8 02/23/2018 03:27 AM    Hemoglobin (POC) 4.9 02/16/2018 02:46 PM    Hemoglobin (POC) 11.2 (L) 01/26/2018 04:08 PM    HGB 9.5 (L) 02/23/2018 03:27 AM    Hematocrit (POC) 33 (L) 01/26/2018 04:08 PM    HCT 29.3 (L) 02/23/2018 03:27 AM    PLATELET 626 19/88/9234 03:27 AM    MCV 86.4 02/23/2018 03:27 AM       Lab Results   Component Value Date/Time    Sodium 135 (L) 02/23/2018 03:27 AM    Potassium 3.8 02/23/2018 03:27 AM    Chloride 101 02/23/2018 03:27 AM    CO2 27 02/23/2018 03:27 AM    Anion gap 7 02/23/2018 03:27 AM    Glucose 108 (H) 02/23/2018 03:27 AM    BUN 4 (L) 02/23/2018 03:27 AM    Creatinine 0.44 (L) 02/23/2018 03:27 AM    BUN/Creatinine ratio 9 (L) 02/23/2018 03:27 AM    GFR est AA >60 02/23/2018 03:27 AM    GFR est non-AA >60 02/23/2018 03:27 AM    Calcium 7.8 (L) 02/23/2018 03:27 AM       Patient seen and examined. Chart reviewed. Labs, data and other pertinent notes reviewed in last 24 hrs.       Discussed with RN and Dr. Alona Carbone    Signed by:  Dani Stevens MD

## 2018-02-23 NOTE — PROGRESS NOTES
[facility]  Medical Oncology at [facility]    Hematology / Oncology Progress Note    Reason for Visit:   Loraine Matias is a 63 y.o. female who is seen in consultation at the request of Dr. Viviana Pang for evaluation of anemia. History of Present Illness:   Ms. Chanda Cheng is a 64 y/o female admitted with weakness and hypokalemia (K 1.9). She had a recent hospital admission at [facility] for the same reason on 1/26/18. She has been feeling poorly for the past few days with bodyaches, weakness, and decreased p.o. intake. At PCP's office, i-STAT Hgb was 4, and patient was directed to the ER. Upon arrival in ER, Hgb was 8.2, potassium 1.9, Mg 1.4, lactate 3.4, SBP in 70s-90s. While at [facility], she received 2 units of PRBCs. Given edema, she was treated with Lasix while there. She also has right arm swelling and was found here to have a RUE superficial venous thrombosis. She also notes some mild intermittent diarrhea. No fevers, dysuria, cough, abdom pain. Of note, patient was seen by Hematology while admitted to [facility]. The reason for consultation at that time was abnormal CT scan (mediastinal LAD, liver lesion, diffuse colitis/ bowel changes, bone lesions/ ovary cyst/ fibroids.) Dr. Macarena Singh and Dr. Mikel Bunn in [facility] evaluated patient at that time and felt her mediastinal LAD was likely due to sarcoidosis rather than malignancy. Patient underwent bronchoscopy and biopsy of LN by Dr. Skyler Gonzales. Pathology findings were inconclusive (peripheral blood and scattered ciliated bronchial lining cells. ). For pelvic mass along with weight loss, Gyn was consulted but stated that this was unlikely to be 2/2 malignancy, corroborated by normal CA-125 level. She also underwent EGD by Dr. Cheryl Tineo with finding of a gastric antrum ulcer. Biopsy revealed benign mucosa with mild chronic active gastritis and surface erosion, no H. Pylori. Review of labs here reveals drop in Hgb from 8-9 range to now 5.4. Iron sat elevated > 80%. Although VitB12 level was recently checked and normal, there is a low VitB1 (thiamine) level from 9/2017. Home med list includes thiamine tablets 100mg/d. When asked about alcohol intake, she states she drinks a fifth of liquor several times a month, but her  states she drinks this amount daily for the past several years. He states she has had less alcohol in the past 2-3 weeks due to feeling sick. She denies any tremors, seizures or other withdrawal symptoms when she goes without a drink. No recent viral infections or exposures per patient. Her maternal uncle had lymphoma. No personal history of cancer or hemoglobinopathies. Interval History: Patient is alert and oriented to self and location this morning. Conversant but states she is tired and wants to rest. HgB has remained stable today at 9.5. CT guided bone marrow biopsy scheduled for today.      Past Medical History:   Diagnosis Date    Alcohol abuse     GI (gastrointestinal bleed)     Insomnia 11/09    Iron deficiency anemia 04/2004    PPD positive     treated w/ [facility]- tests since have been negative    Pure hypercholesterolemia     Scoliosis 12/28/15    [facility]    Tobacco dependence     Unspecified essential hypertension     Uterine bleeding, dysfunctional 2/12    Uterine fibroid       Past Surgical History:   Procedure Laterality Date    COLONOSCOPY N/A 1/31/2018    COLONOSCOPY performed by Rajwinder Carrion MD at [facility] HX CHOLECYSTECTOMY      HX COLONOSCOPY  07/01/2013    MCV, repeat 10 years    HX ORTHOPAEDIC      Shattered bilateral ankles-metal plates & screws    HX OTHER SURGICAL      Benign cyst removal- L. foot    LEG/ANKLE SURGERY PROC UNLISTED        Social History   Substance Use Topics    Smoking status: Current Every Day Smoker     Packs/day: 0.50     Years: 17.00    Smokeless tobacco: Never Used    Alcohol use 1.2 oz/week     1 Cans of beer, 1 Shots of liquor per week      Comment: Socially      Family History   Problem Relation Age of Onset    Hypertension Mother     Diabetes Mother     Cancer Mother      breast    Heart Disease Father      MI 42's    Kidney Disease Father     Hypertension Son      Current Facility-Administered Medications   Medication Dose Route Frequency    0.9% sodium chloride 1,000 mL with potassium chloride 20 mEq, folic acid 1 mg, thiamine 200 mg, mvi, adult no. 4 with vit K 10 mL infusion   IntraVENous DAILY    potassium chloride (KAON 10%) 20 mEq/15 mL oral liquid 40 mEq  40 mEq Per NG tube BID WITH MEALS    buPROPion (WELLBUTRIN) tablet 75 mg  75 mg Per NG tube BID    nicotine (NICODERM CQ) 21 mg/24 hr patch 1 Patch  1 Patch TransDERmal DAILY    balsam peru-castor oil (VENELEX)  mg/gram ointment   Topical BID    sodium chloride (NS) flush 10-30 mL  10-30 mL InterCATHeter PRN    sodium chloride (NS) flush 10 mL  10 mL InterCATHeter PRN    sodium chloride (NS) flush 10-40 mL  10-40 mL InterCATHeter Q8H    alteplase (CATHFLO) 1 mg in sterile water (preservative free) 1 mL injection  1 mg InterCATHeter PRN    0.9% sodium chloride infusion 250 mL  250 mL IntraVENous PRN    LORazepam (ATIVAN) injection 1 mg  1 mg IntraVENous Q2H PRN    LORazepam (ATIVAN) injection 2 mg  2 mg IntraVENous Q2H PRN    zolpidem (AMBIEN) tablet 5 mg  5 mg Oral QHS PRN    calcium-vitamin D (OS-DELORIS) 500 mg-200 unit tablet  1 Tab Oral BID WITH MEALS    sodium chloride (NS) flush 5-10 mL  5-10 mL IntraVENous PRN    acetaminophen (TYLENOL) tablet 650 mg  650 mg Oral Q4H PRN    ondansetron (ZOFRAN) injection 4 mg  4 mg IntraVENous Q4H PRN      Allergies   Allergen Reactions    Robaxin [Methocarbamol] Hives, Rash and Itching     Red splotches    Statins-Hmg-Coa Reductase Inhibitors Myalgia    Codeine Itching    Neosporin [Neomycin-Bacitracin-Polymyxin] Rash       Review of Systems: A complete review of systems was obtained, negative except as described above. Physical Exam:     Visit Vitals    /88 (BP 1 Location: Right arm, BP Patient Position: At rest;Supine)    Pulse (!) 113    Temp 97 °F (36.1 °C)    Resp 20    Ht 5' 1\" (1.549 m)    Wt 145 lb 11.6 oz (66.1 kg)    SpO2 100%    BMI 27.53 kg/m2     ECOG PS: 3  General: No acute distress, fatigued  Eyes: PERRL, anicteric sclerae  HENT: Atraumatic with normal appearance of ears and nose; OP clear  Neck: Supple; no thyromegaly   Lymphatic: No cervical, supraclavicular, or inguinal adenopathy  Respiratory: coarse and wheezing noted throughout; normal effort at rest  CV: tachycardia, regular rhythm, no murmurs. RUE and BLE edema  GI: Soft, nontender, nondistended, no masses, no hepatomegaly, no splenomegaly  MS: unable to assess gait. Digits without clubbing or cyanosis. Skin: No rashes, ecchymoses, or petechiae. Normal temperature, turgor, and texture. Neuro/Psych: Altered, conversant. Generalized weakness      Results:     Lab Results   Component Value Date/Time    WBC 9.8 02/23/2018 03:27 AM    HGB 9.5 (L) 02/23/2018 03:27 AM    HCT 29.3 (L) 02/23/2018 03:27 AM    PLATELET 069 51/47/1064 03:27 AM    MCV 86.4 02/23/2018 03:27 AM    ABS.  NEUTROPHILS 5.8 02/22/2018 03:21 AM    Hemoglobin (POC) 4.9 02/16/2018 02:46 PM    Hemoglobin (POC) 11.2 (L) 01/26/2018 04:08 PM    Hematocrit (POC) 33 (L) 01/26/2018 04:08 PM     Lab Results   Component Value Date/Time    Sodium 135 (L) 02/23/2018 03:27 AM    Potassium 3.8 02/23/2018 03:27 AM    Chloride 101 02/23/2018 03:27 AM    CO2 27 02/23/2018 03:27 AM    Glucose 108 (H) 02/23/2018 03:27 AM    BUN 4 (L) 02/23/2018 03:27 AM    Creatinine 0.44 (L) 02/23/2018 03:27 AM    GFR est AA >60 02/23/2018 03:27 AM    GFR est non-AA >60 02/23/2018 03:27 AM    Calcium 7.8 (L) 02/23/2018 03:27 AM    Sodium (POC) 144 01/26/2018 04:08 PM    Potassium (POC) <2.0 (LL) 01/26/2018 04:08 PM    Chloride (POC) 98 01/26/2018 04:08 PM    Glucose (POC) 114 (H) 02/18/2018 08:30 AM    BUN (POC) <3 (L) 01/26/2018 04:08 PM    Creatinine (POC) 0.6 01/26/2018 04:08 PM    Calcium, ionized (POC) 1.06 (L) 01/26/2018 04:08 PM     Lab Results   Component Value Date/Time    Bilirubin, total 0.3 02/23/2018 03:27 AM    ALT (SGPT) 10 (L) 02/23/2018 03:27 AM    AST (SGOT) 22 02/23/2018 03:27 AM    Alk. phosphatase 152 (H) 02/23/2018 03:27 AM    Protein, total 5.3 (L) 02/23/2018 03:27 AM    Albumin 1.5 (L) 02/23/2018 03:27 AM    Globulin 3.8 02/23/2018 03:27 AM       Assessment and Recommendations:   Lela Young is a 63 y.o. female admitted with weakness, fatigue, hypokalemia and anemia. 1. Normocytic anemia: Usually a sudden drop in Hgb from 8 to 5 within 1-2 days is due to blood loss. However, patient,  and nursing deny any obvious blood loss. No intense abdominal pain. Patient was found to have a gastric ulcer during admission last month. I suspect she has a combination of blood loss and bone marrow suppression from alcohol abuse. -- Bone marrow biopsy pending when stable given anemia requiring blood transfusions and lack of adequate reticulocyte response to rule out any abnormal cells. -- Bone marrow tentatively scheduled for today  -- Agree with transfusion for goal Hgb > 7. No transfusions today and HgB stable at 9.5    2. Alcohol abuse: Patient has been drinking liquor daily for the past several years. This could explain her weight loss, hypoalbuminemia, decreased PO intake, thiamine deficiency. Coags mildly elevated likely due to liver dysfunction. Recent CT comments on diffuse hypodensity. -- Needs alcohol cessation. -- On Ativan with [facility] protocol for withdrawal symptoms  -- Liver ultrasound with diffusely hepatic echogenicity compatible with hepatic steatosis. No sonographic evidence of mass. 3. Hypokalemia: Resolved. 4. Gastric ulcer: Recommend she avoid NSAIDs and take PPI daily.     5. Mediastinal LAD: Possibly related to sarcoidosis given CT report/addendum from last hospital admission. Bronch biopsy was inconclusive. We will continue to follow as needed. Bone marrow biopsy tentatively scheduled for today. Please call with any questions.   Pt seen today in conjunction with NP A Sandridge  Call if questions over weekend    Signed By: Manda Oliver DO     February 23, 2018

## 2018-02-23 NOTE — PROGRESS NOTES
Bedside shift change report given to [address] [place], [place] [address]way (oncoming nurse) by Emilia Marks RN (offgoing nurse). Report included the following information SBAR, Kardex, ED Summary, Procedure Summary, Intake/Output, MAR and Recent Results.

## 2018-02-23 NOTE — PROGRESS NOTES
Hospitalist Progress Note  Zoe Vo MD  Answering service: [phone] OR [phone] from in house phone         Date of Service:  2018  NAME:  Jesús Jesus  :  1960  MRN:  [id number]    Admission Summary:   64 yo woman with HTN, HLD, DUB, iron deficiency anemia, severe protein-calorie malnutrition, recently diagnosed colitis, EtOH abuse, and h/o GI bleed was sent by her PCP to the ED on 18 for fatigue, generalized weakness, and right arm swelling. She was recently hospitalized at [facility] 18 - 18 for hypokalemia and anemia. She was admitted to the ICU for severe hypokalemia, hypotension, and chronic RUE superficial venous thrombosis.      Interval history / Subjective:       :  I have personally reviewed serial CXR from  and , not consistent with PNA , Abx dc's on this bases  Sodium now in nl range, K and mag borderline, IVF's adjusted accordingly. Mag and K plus CMP/cbc for am     :  Pt a bit more alert and appropriate today but mostly somnolent and poorly interactive with this rounder    : Following verbal command, smiles on command and moves all ext on command. Lab improved. :  Pt more appropriate today over prior days  Case discussed with nephrology and RN in room  Pt for cont diuresis slowly following sodium/cr closely.    No need for lactulose as now stools loose/diarrhea       Assessment & Plan:      Severe hypokalemia (POA) - replete until K+ >=4 and magnesium >=2  - urine/serum studies ordered  - Renal following  - likely due to anorexia and malabsorption and recent use of diuretics; not resumed on admission  - corrected   Lab Results   Component Value Date/Time    Potassium 3.8 2018 03:27 AM    Potassium (POC) <2.0 (LL) 2018 04:08 PM    cont on replacement.      EtOH withdrawal on chronic alcohol abuse   - now on CIWA protocol and banana bag; prn Ativan     Hepatic encephalopathy due to hyperammonemia   - start lactulose retention enemas and can change to per NGT once placed  Lab Results   Component Value Date/Time    ALT (SGPT) 10 (L) 02/23/2018 03:27 AM    AST (SGOT) 22 02/23/2018 03:27 AM    GGT 83 (H) 10/16/2017 03:55 PM    Alk.  phosphatase 152 (H) 02/23/2018 03:27 AM    Bilirubin, direct 0.4 (H) 02/18/2018 09:47 AM    Bilirubin, total 0.3 02/23/2018 03:27 AM   ammonia level expectantly      Acute on chronic anemia (POA)   - iron WNL, folate low, B12 low-normal  - FOBT negative  - s/p 2 units PRBC transfusion since admission   - Hematology consulted by [facility] due to recent h/o possible occult malignancy  - per  at bedside, EGD, colonoscopy, bone scan all unremarkable recently  - recent PET was abnormal  - stopped SC heparin and change to SCDs  - IR unable to do CT-guided BM bx today due to unstable airway; re-scheduled  Lab Results   Component Value Date/Time    WBC 9.8 02/23/2018 03:27 AM    Hemoglobin (POC) 4.9 02/16/2018 02:46 PM    Hemoglobin (POC) 11.2 (L) 01/26/2018 04:08 PM    HGB 9.5 (L) 02/23/2018 03:27 AM    Hematocrit (POC) 33 (L) 01/26/2018 04:08 PM    HCT 29.3 (L) 02/23/2018 03:27 AM    PLATELET 730 64/34/3449 03:27 AM    MCV 86.4 02/23/2018 03:27 AM         Severe protein-calorie malnutrition - prealbumin 4.4  - Nutrition consulted  - s/p 1 dose IV albumin  Lab Results   Component Value Date/Time    Protein, total 5.3 (L) 02/23/2018 03:27 AM    Albumin 1.5 (L) 02/23/2018 03:27 AM       hyponatremia:     Recent Labs      02/23/18   0327   NA  135*     Cont to monitor      Chronic RUE VTE  - no indication for [facility] for RUE VTE as pt had IV in that arm during previous hospitalization     Right ankle deformity and RLE>LLE edema - venous duplex b/l LEs 2/19 negative  - check R ankle XR      Generalized weakness/debility with EtOH abuse (POA)   - likely due to anorexia and EtOH abuse  - PT/OT/ST evals, Nutrition eval  - banana bag  - non verbal 2/20/2018 improves by 2/22/2018      Hypovolemic shock (POA) on chronic HTN - likely due to RLL HCAP on CXR  - continue IVF  - resolved s/p pressor x4h  - TTE 2/17 EF 55-60%, no RWMA, mild-mod TR, small pericardial effusion  BP Readings from Last 1 Encounters:   02/23/18 129/88         Possible RLL HCAP (POA) - BCx 2/16 NGTD  - started empiric Zosyn/vancomycin 2/17  - s/p empiric vancomycin 2/17-2/19   - BCx 2/16 NGTD  - MRSA swab negative  - sputum Cx ordered but no sample sent yet  - urine strep/legionella antigens pending  - flu swab negative  - cxr form 2/17 to 2/19 cleared as to RLL findings, not c/c pna,      Mild transaminitis - elevated AST likely due to EtOH  - RUQ US 2/18 hepatic steatosis  Lab Results   Component Value Date/Time    ALT (SGPT) 10 (L) 02/23/2018 03:27 AM    AST (SGOT) 22 02/23/2018 03:27 AM    GGT 83 (H) 10/16/2017 03:55 PM    Alk. phosphatase 152 (H) 02/23/2018 03:27 AM    Bilirubin, direct 0.4 (H) 02/18/2018 09:47 AM    Bilirubin, total 0.3 02/23/2018 03:27 AM          Hypernatremia - IVF adjusted by Renal; resolved  Recent Labs      02/23/18   0327   NA  135*       Hypophosphatemia - replete prn  Lab Results   Component Value Date/Time    Calcium 7.8 (L) 02/23/2018 03:27 AM    Phosphorus 2.8 02/23/2018 03:27 AM       Hypomagnesemia - replete prn  Recent Labs      02/23/18   0327   MG  1.6    mag iv 2/23/2018      Code status: full  DVT prophylaxis: SCDs     Care Plan discussed with: Nurse. No family at bedside today  Disposition: TBD.           Hospital Problems  Date Reviewed: 2/19/2018          Codes Class Noted POA    * (Principal)Hypokalemia ICD-10-CM: E87.6  ICD-9-CM: 276.8  2/16/2018 Yes                Review of Systems:   No pain, no n/v, no fever,     Vital Signs:    Last 24hrs VS reviewed since prior progress note.  Most recent are:  Visit Vitals    /88 (BP 1 Location: Right arm, BP Patient Position: At rest;Supine)    Pulse (!) 113    Temp 97 °F (36.1 °C)    Resp 20    Ht 5' 1\" (1.549 m)    Wt 66.1 kg (145 lb 11.6 oz)    SpO2 100%    BMI 27.53 kg/m2         Intake/Output Summary (Last 24 hours) at 02/23/18 1239  Last data filed at 02/23/18 1151   Gross per 24 hour   Intake             1745 ml   Output             3800 ml   Net            -2055 ml        Physical Examination:             Constitutional:  no distress, alert cooperative. ENT:  ng tube inplace. Oral mucous moist, oropharynx benign. Neck supple,    Resp:  CTA bilaterally. No wheezing/rhonchi/rales. No accessory muscle use   CV:  Regular rhythm, normal rate, no murmurs, gallops, rubs    GI:  Soft, non distended, non tender. normoactive bowel sounds, no hepatosplenomegaly     Musculoskeletal:  ++ edema, warm, 1+ pulses throughout    Neurologic:  non focal      Psych: not agitated not anxious. Skin:  ecchymosis       Data Review:    Review and/or order of clinical lab test      Labs:     Recent Labs      02/23/18 0327 02/22/18   0321   WBC  9.8  8.4   HGB  9.5*  9.0*   HCT  29.3*  27.4*   PLT  278  241     Recent Labs      02/23/18 0327 02/22/18   0321  02/21/18   0316   NA  135*  138  138   K  3.8  3.9  3.8   CL  101  103  107   CO2  27  27  26   BUN  4*  2*  2*   CREA  0.44*  0.37*  0.43*   GLU  108*  108*  104*   CA  7.8*  7.6*  7.7*   MG  1.6  1.5*  1.7   PHOS  2.8   --    --      Recent Labs      02/23/18 0327  02/21/18   0316   SGOT  22  28   ALT  10*  14   AP  152*  156*   TBILI  0.3  0.4   TP  5.3*  5.0*   ALB  1.5*  1.5*   GLOB  3.8  3.5     No results for input(s): INR, PTP, APTT in the last 72 hours. No lab exists for component: INREXT, INREXT   No results for input(s): FE, TIBC, PSAT, FERR in the last 72 hours. Lab Results   Component Value Date/Time    Folate 4.9 (L) 02/17/2018 05:54 AM      No results for input(s): PH, PCO2, PO2 in the last 72 hours. No results for input(s): CPK, CKNDX, TROIQ in the last 72 hours.     No lab exists for component: CPKMB  Lab Results   Component Value Date/Time Cholesterol, total 202 (H) 03/27/2017 09:47 AM    HDL Cholesterol 76 03/27/2017 09:47 AM    LDL, calculated 109 (H) 03/27/2017 09:47 AM    Triglyceride 83 03/27/2017 09:47 AM     Lab Results   Component Value Date/Time    Glucose (POC) 114 (H) 02/18/2018 08:30 AM    Glucose (POC) 91 01/26/2018 04:08 PM    Glucose (POC) 107 (H) 01/26/2018 01:08 PM     Lab Results   Component Value Date/Time    Color DARK YELLOW 02/16/2018 08:30 PM    Appearance CLEAR 02/16/2018 08:30 PM    Specific gravity 1.013 02/16/2018 08:30 PM    pH (UA) 7.0 02/16/2018 08:30 PM    Protein TRACE (A) 02/16/2018 08:30 PM    Glucose NEGATIVE  02/16/2018 08:30 PM    Ketone TRACE (A) 02/16/2018 08:30 PM    Bilirubin Negative 09/28/2012 08:58 AM    Urobilinogen 0.2 02/16/2018 08:30 PM    Nitrites NEGATIVE  02/16/2018 08:30 PM    Leukocyte Esterase NEGATIVE  02/16/2018 08:30 PM    Epithelial cells FEW 02/16/2018 08:30 PM    Bacteria 1+ (A) 02/16/2018 08:30 PM    WBC 0-4 02/16/2018 08:30 PM    RBC 0-5 02/16/2018 08:30 PM         Medications Reviewed:     Current Facility-Administered Medications   Medication Dose Route Frequency    0.9% sodium chloride 1,000 mL with potassium chloride 20 mEq, folic acid 1 mg, thiamine 200 mg, mvi, adult no. 4 with vit K 10 mL infusion   IntraVENous DAILY    potassium chloride (KAON 10%) 20 mEq/15 mL oral liquid 40 mEq  40 mEq Per NG tube BID WITH MEALS    buPROPion (WELLBUTRIN) tablet 75 mg  75 mg Per NG tube BID    nicotine (NICODERM CQ) 21 mg/24 hr patch 1 Patch  1 Patch TransDERmal DAILY    balsam peru-castor oil (VENELEX)  mg/gram ointment   Topical BID    sodium chloride (NS) flush 10-30 mL  10-30 mL InterCATHeter PRN    sodium chloride (NS) flush 10 mL  10 mL InterCATHeter PRN    sodium chloride (NS) flush 10-40 mL  10-40 mL InterCATHeter Q8H    alteplase (CATHFLO) 1 mg in sterile water (preservative free) 1 mL injection  1 mg InterCATHeter PRN    0.9% sodium chloride infusion 250 mL  250 mL IntraVENous PRN    LORazepam (ATIVAN) injection 1 mg  1 mg IntraVENous Q2H PRN    LORazepam (ATIVAN) injection 2 mg  2 mg IntraVENous Q2H PRN    zolpidem (AMBIEN) tablet 5 mg  5 mg Oral QHS PRN    calcium-vitamin D (OS-DELORIS) 500 mg-200 unit tablet  1 Tab Oral BID WITH MEALS    sodium chloride (NS) flush 5-10 mL  5-10 mL IntraVENous PRN    acetaminophen (TYLENOL) tablet 650 mg  650 mg Oral Q4H PRN    ondansetron (ZOFRAN) injection 4 mg  4 mg IntraVENous Q4H PRN     ______________________________________________________________________  EXPECTED LENGTH OF STAY: 4d 21h  ACTUAL LENGTH OF STAY:          7                 Janessa Patel MD

## 2018-02-23 NOTE — PROGRESS NOTES
NUTRITION       Recommendations:  1. Continue tube feedings at goal  -- Could consider bolus feedings (240 mL 4x/day + 90 mL flush before and after each feeding) vs nocturnal feedings (80 mL/hr x 12 hours) to allow time off the pump during the day    -- Document all formula volume totals under Feeding Tube: \"Intake\"  -- Document all water flush volume totals under Feeding Tube: \"Water Flush Volume\"  -- Document rate of tube feeding under Feeding Tube: \"Tube Feeding/Verify Rate\"    2. Continue daily weights  -- *need to rezero pt's bedscale (3.6 kg different x 3 days and 20 kg difference x 1 week from standing scale)    Brief follow up. Chart reviewed, discussed with RN and team during interdisciplinary rounds. Pt remains on tube feeding as sole source of nutrition. She is tolerating without issues, but does still have a flexiseal (500 mL documented yesterday). RN to verify whether ammonia needs to be rechecked and if pt still requires lactulose twice/day. Would not change tube feeding formula at this time, but if diarrhea via flexiseal continues, will trial Jevity 1.5 (same rate). Tube Feeding: Osmolite 1.5 @ 40 mL/hr + 120 mL flush q 4 hours  -- This provides 1440 kcal, 60 g protein and 1452 mL total free water (tf + flush)-- meeting 100% of estimated needs. 1-2+ pitting edema documented, but still suspect 20 kg weight gain x 1 week is unlikely. Would rezero bedscale. Last 3 Recorded Weights in this Encounter    02/20/18 0333 02/22/18 0321 02/23/18 0412   Weight: 63 kg (138 lb 14.2 oz) 64.5 kg (142 lb 3.2 oz) 66.1 kg (145 lb 11.6 oz)     Estimated Nutrition Needs:   Kcals/day: 1325 Kcals/day (9497-6419 (MSJ x 1.3-1.4)  Protein: 55 g (55-64 (1.2-1.4g/kg)  Fluid:  (1 ml/kcal)     Based On:  Prema Simmons  Weight Used: Actual wt (46 kg (standing scale 2/16)    [facility] [employer], [address]

## 2018-02-23 NOTE — PROGRESS NOTES
Problem: Anemia Care Plan (Adult and Pediatric)  Goal: *Labs within defined limits  Outcome: Progressing Towards Goal  Labs WDL, monitoring H&H.

## 2018-02-24 ENCOUNTER — APPOINTMENT (OUTPATIENT)
Dept: GENERAL RADIOLOGY | Age: 58
DRG: 640 | End: 2018-02-24
Attending: INTERNAL MEDICINE
Payer: COMMERCIAL

## 2018-02-24 LAB
ALBUMIN SERPL-MCNC: 1.6 G/DL (ref 3.5–5)
ALBUMIN/GLOB SERPL: 0.4 {RATIO} (ref 1.1–2.2)
ALP SERPL-CCNC: 145 U/L (ref 45–117)
ALT SERPL-CCNC: 11 U/L (ref 12–78)
ANION GAP SERPL CALC-SCNC: 6 MMOL/L (ref 5–15)
ARTERIAL PATENCY WRIST A: YES
AST SERPL-CCNC: 19 U/L (ref 15–37)
BASE EXCESS BLD CALC-SCNC: 4 MMOL/L
BDY SITE: ABNORMAL
BILIRUB SERPL-MCNC: 0.4 MG/DL (ref 0.2–1)
BUN SERPL-MCNC: 5 MG/DL (ref 6–20)
BUN/CREAT SERPL: 12 (ref 12–20)
CALCIUM SERPL-MCNC: 8.1 MG/DL (ref 8.5–10.1)
CHLORIDE SERPL-SCNC: 100 MMOL/L (ref 97–108)
CO2 SERPL-SCNC: 28 MMOL/L (ref 21–32)
CREAT SERPL-MCNC: 0.42 MG/DL (ref 0.55–1.02)
ERYTHROCYTE [DISTWIDTH] IN BLOOD BY AUTOMATED COUNT: 18.1 % (ref 11.5–14.5)
GAS FLOW.O2 O2 DELIVERY SYS: ABNORMAL L/MIN
GAS FLOW.O2 SETTING OXYMISER: 3 L/M
GLOBULIN SER CALC-MCNC: 3.6 G/DL (ref 2–4)
GLUCOSE SERPL-MCNC: 95 MG/DL (ref 65–100)
HCO3 BLD-SCNC: 27.1 MMOL/L (ref 22–26)
HCT VFR BLD AUTO: 27.5 % (ref 35–47)
HGB BLD-MCNC: 9 G/DL (ref 11.5–16)
MAGNESIUM SERPL-MCNC: 1.7 MG/DL (ref 1.6–2.4)
MCH RBC QN AUTO: 28.3 PG (ref 26–34)
MCHC RBC AUTO-ENTMCNC: 32.7 G/DL (ref 30–36.5)
MCV RBC AUTO: 86.5 FL (ref 80–99)
NRBC # BLD: 0 K/UL (ref 0–0.01)
NRBC BLD-RTO: 0 PER 100 WBC
PCO2 BLD: 36.6 MMHG (ref 35–45)
PH BLD: 7.48 [PH] (ref 7.35–7.45)
PHOSPHATE SERPL-MCNC: 2.8 MG/DL (ref 2.6–4.7)
PLATELET # BLD AUTO: 333 K/UL (ref 150–400)
PMV BLD AUTO: 10.8 FL (ref 8.9–12.9)
PO2 BLD: 56 MMHG (ref 80–100)
POTASSIUM SERPL-SCNC: 4.8 MMOL/L (ref 3.5–5.1)
PROT SERPL-MCNC: 5.2 G/DL (ref 6.4–8.2)
RBC # BLD AUTO: 3.18 M/UL (ref 3.8–5.2)
SAO2 % BLD: 91 % (ref 92–97)
SODIUM SERPL-SCNC: 134 MMOL/L (ref 136–145)
SPECIMEN TYPE: ABNORMAL
WBC # BLD AUTO: 11.8 K/UL (ref 3.6–11)

## 2018-02-24 PROCEDURE — 74011250637 HC RX REV CODE- 250/637: Performed by: HOSPITALIST

## 2018-02-24 PROCEDURE — 74011250636 HC RX REV CODE- 250/636: Performed by: INTERNAL MEDICINE

## 2018-02-24 PROCEDURE — 36600 WITHDRAWAL OF ARTERIAL BLOOD: CPT

## 2018-02-24 PROCEDURE — 3331090002 HH PPS REVENUE DEBIT

## 2018-02-24 PROCEDURE — 83735 ASSAY OF MAGNESIUM: CPT | Performed by: INTERNAL MEDICINE

## 2018-02-24 PROCEDURE — 36415 COLL VENOUS BLD VENIPUNCTURE: CPT | Performed by: INTERNAL MEDICINE

## 2018-02-24 PROCEDURE — 80053 COMPREHEN METABOLIC PANEL: CPT | Performed by: INTERNAL MEDICINE

## 2018-02-24 PROCEDURE — 71045 X-RAY EXAM CHEST 1 VIEW: CPT

## 2018-02-24 PROCEDURE — 74011250637 HC RX REV CODE- 250/637: Performed by: INTERNAL MEDICINE

## 2018-02-24 PROCEDURE — 3331090001 HH PPS REVENUE CREDIT

## 2018-02-24 PROCEDURE — 85027 COMPLETE CBC AUTOMATED: CPT | Performed by: INTERNAL MEDICINE

## 2018-02-24 PROCEDURE — 82803 BLOOD GASES ANY COMBINATION: CPT

## 2018-02-24 PROCEDURE — 74011000250 HC RX REV CODE- 250: Performed by: INTERNAL MEDICINE

## 2018-02-24 PROCEDURE — 65660000000 HC RM CCU STEPDOWN

## 2018-02-24 PROCEDURE — 84100 ASSAY OF PHOSPHORUS: CPT | Performed by: INTERNAL MEDICINE

## 2018-02-24 RX ORDER — FUROSEMIDE 10 MG/ML
60 INJECTION INTRAMUSCULAR; INTRAVENOUS ONCE
Status: COMPLETED | OUTPATIENT
Start: 2018-02-24 | End: 2018-02-24

## 2018-02-24 RX ADMIN — CASTOR OIL AND BALSAM, PERU: 788; 87 OINTMENT TOPICAL at 18:11

## 2018-02-24 RX ADMIN — LORAZEPAM 1 MG: 2 INJECTION INTRAMUSCULAR; INTRAVENOUS at 18:45

## 2018-02-24 RX ADMIN — BUPROPION HYDROCHLORIDE 75 MG: 75 TABLET, FILM COATED ORAL at 18:04

## 2018-02-24 RX ADMIN — CALCIUM CARBONATE-VITAMIN D TAB 500 MG-200 UNIT 1 TABLET: 500-200 TAB at 10:17

## 2018-02-24 RX ADMIN — BUPROPION HYDROCHLORIDE 75 MG: 75 TABLET, FILM COATED ORAL at 10:16

## 2018-02-24 RX ADMIN — LORAZEPAM 2 MG: 2 INJECTION INTRAMUSCULAR; INTRAVENOUS at 00:01

## 2018-02-24 RX ADMIN — CASTOR OIL AND BALSAM, PERU: 788; 87 OINTMENT TOPICAL at 10:18

## 2018-02-24 RX ADMIN — Medication 30 ML: at 06:00

## 2018-02-24 RX ADMIN — LORAZEPAM 2 MG: 2 INJECTION INTRAMUSCULAR; INTRAVENOUS at 23:30

## 2018-02-24 RX ADMIN — FUROSEMIDE 60 MG: 10 INJECTION, SOLUTION INTRAMUSCULAR; INTRAVENOUS at 10:16

## 2018-02-24 RX ADMIN — Medication 20 ML: at 22:00

## 2018-02-24 RX ADMIN — POTASSIUM CHLORIDE: 2 INJECTION, SOLUTION, CONCENTRATE INTRAVENOUS at 18:04

## 2018-02-24 RX ADMIN — Medication 10 ML: at 15:03

## 2018-02-24 RX ADMIN — CALCIUM CARBONATE-VITAMIN D TAB 500 MG-200 UNIT 1 TABLET: 500-200 TAB at 18:04

## 2018-02-24 RX ADMIN — ACETAMINOPHEN 650 MG: 325 TABLET, FILM COATED ORAL at 12:19

## 2018-02-24 NOTE — PROGRESS NOTES
Problem: Alcohol Withdrawal  Goal: *STG: Complies with medication therapy  Outcome: Progressing Towards Goal  Patient tolerating medications well at this time

## 2018-02-24 NOTE — PROGRESS NOTES
Problem: Falls - Risk of  Goal: *Absence of Falls  Document Roselia Fall Risk and appropriate interventions in the flowsheet.    Outcome: Progressing Towards Goal  Fall Risk Interventions:  Mobility Interventions: Communicate number of staff needed for ambulation/transfer, Patient to call before getting OOB, PT Consult for mobility concerns    Mentation Interventions: Adequate sleep, hydration, pain control, Door open when patient unattended, Evaluate medications/consider consulting pharmacy, Family/sitter at bedside, More frequent rounding, Reorient patient, Toileting rounds    Medication Interventions: Evaluate medications/consider consulting pharmacy, Patient to call before getting OOB    Elimination Interventions: Call light in reach, Patient to call for help with toileting needs, Toileting schedule/hourly rounds    History of Falls Interventions: Door open when patient unattended, Evaluate medications/consider consulting pharmacy        Problem: Alcohol Withdrawal  Goal: *STG: Remains safe in hospital  Outcome: Progressing Towards Goal  Call bell in place  Hourly rounding  CIWA's as ordered    Problem: Anemia Care Plan (Adult and Pediatric)  Goal: *Labs within defined limits  Monitor patients labs  Administer tube feeds as ordered

## 2018-02-24 NOTE — PROGRESS NOTES
Problem: Alcohol Withdrawal  Goal: *STG: Remains safe in hospital  Outcome: Progressing Towards Goal  Patient continues to be safe in hospital care

## 2018-02-24 NOTE — PROGRESS NOTES
Paged MD to discuss results of ABG's and xray    Per Dr. Kashmir Majano patient cannot be downgraded due to increased heart rate. MD is aware of ABG results and xray results        1835-patient is becoming increasingly agitated and trying to pull out NG tube. 1942-Bedside and Verbal shift change report given to Roger Ronquillo RN (oncoming nurse) by Hilton Velazquez RN (offgoing nurse). Report included the following information SBAR, Kardex, Intake/Output, MAR and Recent Results.

## 2018-02-24 NOTE — PROGRESS NOTES
Hospitalist Progress Note  Iqra Akers MD  Answering service: [phone] from in house phone         Date of Service:  2018  NAME:  Yoli Berman  :  1960  MRN:  [id number]    Admission Summary:   64 yo woman with HTN, HLD, DUB, iron deficiency anemia, severe protein-calorie malnutrition, recently diagnosed colitis, EtOH abuse, and h/o GI bleed was sent by her PCP to the ED on 18 for fatigue, generalized weakness, and right arm swelling. She was recently hospitalized at [facility] 18 - 18 for hypokalemia and anemia. She was admitted to the ICU for severe hypokalemia, hypotension, and chronic RUE superficial venous thrombosis.      Interval history / Subjective:       :  I have personally reviewed serial CXR from  and , not consistent with PNA , Abx dc's on this bases  Sodium now in nl range, K and mag borderline, IVF's adjusted accordingly. Mag and K plus CMP/cbc for am     :  Pt a bit more alert and appropriate today but mostly somnolent and poorly interactive with this rounder    : Following verbal command, smiles on command and moves all ext on command. Lab improved. :  Pt more appropriate today over prior days  Case discussed with nephrology and RN in room  Pt for cont diuresis slowly following sodium/cr closely.    No need for lactulose as now stools loose/diarrhea     :  More wheezing this am. More withdrawn than yesterdays rounds,   Edema and pul congestion suggest vol overload, for lasix this am and f/u clinically as day progresses.       Assessment & Plan:      Severe hypokalemia (POA) - replete until K+ >=4 and magnesium >=2  - urine/serum studies ordered  - Renal following  - likely due to anorexia and malabsorption and recent use of diuretics; not resumed on admission  - corrected   Lab Results   Component Value Date/Time    Potassium 4.8 2018 03:55 AM    Potassium (POC) <2.0 (LL) 01/26/2018 04:08 PM    cont on replacement.      EtOH withdrawal on chronic alcohol abuse   - now on CIWA protocol and banana bag; prn Ativan  - cont on [facility]     Hepatic encephalopathy due to hyperammonemia   - start lactulose retention enemas and can change to per NGT once placed  Lab Results   Component Value Date/Time    ALT (SGPT) 11 (L) 02/24/2018 03:55 AM    AST (SGOT) 19 02/24/2018 03:55 AM    GGT 83 (H) 10/16/2017 03:55 PM    Alk.  phosphatase 145 (H) 02/24/2018 03:55 AM    Bilirubin, direct 0.4 (H) 02/18/2018 09:47 AM    Bilirubin, total 0.4 02/24/2018 03:55 AM   ammonia level expectantly      Acute on chronic anemia (POA)   - iron WNL, folate low, B12 low-normal  - FOBT negative  - s/p 2 units PRBC transfusion since admission   - Hematology consulted by PCCM due to recent h/o possible occult malignancy  - per  at bedside, EGD, colonoscopy, bone scan all unremarkable recently  - recent PET was abnormal  - stopped SC heparin and change to SCDs  - IR unable to do CT-guided BM bx today due to unstable airway; re-scheduled  Lab Results   Component Value Date/Time    WBC 11.8 (H) 02/24/2018 03:55 AM    Hemoglobin (POC) 4.9 02/16/2018 02:46 PM    Hemoglobin (POC) 11.2 (L) 01/26/2018 04:08 PM    HGB 9.0 (L) 02/24/2018 03:55 AM    Hematocrit (POC) 33 (L) 01/26/2018 04:08 PM    HCT 27.5 (L) 02/24/2018 03:55 AM    PLATELET 727 10/30/8395 03:55 AM    MCV 86.5 02/24/2018 03:55 AM         Severe protein-calorie malnutrition - prealbumin 4.4  - Nutrition consulted  - s/p 1 dose IV albumin  Lab Results   Component Value Date/Time    Protein, total 5.2 (L) 02/24/2018 03:55 AM    Albumin 1.6 (L) 02/24/2018 03:55 AM       hyponatremia:     Recent Labs      02/24/18   0355   NA  134*     Cont to monitor    EDEAM: FOR LASIX 2/24/2018     Chronic RUE VTE  - no indication for [facility] for RUE VTE as pt had IV in that arm during previous hospitalization     Right ankle deformity and RLE>LLE edema - venous duplex b/l LEs 2/19 negative  - check R ankle XR      Generalized weakness/debility with EtOH abuse (POA)   - likely due to anorexia and EtOH abuse  - PT/OT/ST evals, Nutrition eval  - banana bag  - non verbal 2/20/2018 improves by 2/22/2018      Hypovolemic shock (POA) on chronic HTN - likely due to RLL HCAP on CXR  - continue IVF  - resolved s/p pressor x4h  - TTE 2/17 EF 55-60%, no RWMA, mild-mod TR, small pericardial effusion  BP Readings from Last 1 Encounters:   02/24/18 132/84         Possible RLL HCAP (POA) - BCx 2/16 NGTD  - started empiric Zosyn/vancomycin 2/17  - s/p empiric vancomycin 2/17-2/19   - BCx 2/16 NGTD  - MRSA swab negative  - sputum Cx ordered but no sample sent yet  - urine strep/legionella antigens pending  - flu swab negative  - cxr form 2/17 to 2/19 cleared as to RLL findings, not c/c pna,      Mild transaminitis - elevated AST likely due to EtOH  - RUQ US 2/18 hepatic steatosis  Lab Results   Component Value Date/Time    ALT (SGPT) 11 (L) 02/24/2018 03:55 AM    AST (SGOT) 19 02/24/2018 03:55 AM    GGT 83 (H) 10/16/2017 03:55 PM    Alk. phosphatase 145 (H) 02/24/2018 03:55 AM    Bilirubin, direct 0.4 (H) 02/18/2018 09:47 AM    Bilirubin, total 0.4 02/24/2018 03:55 AM          Hypernatremia - IVF adjusted by Renal; resolved  Recent Labs      02/24/18   0355   NA  134*       Hypophosphatemia - replete prn  Lab Results   Component Value Date/Time    Calcium 8.1 (L) 02/24/2018 03:55 AM    Phosphorus 2.8 02/24/2018 03:55 AM       Hypomagnesemia - replete prn  Recent Labs      02/24/18   0355   MG  1.7    mag iv 2/23/2018      Code status: full  DVT prophylaxis: SCDs     Care Plan discussed with: Nurse.  No family at bedside today  Disposition: TBD.           Hospital Problems  Date Reviewed: 2/19/2018          Codes Class Noted POA    * (Principal)Hypokalemia ICD-10-CM: E87.6  ICD-9-CM: 276.8  2/16/2018 Yes                Review of Systems:   NOT OBTAINABLE 2N2 PT FACTORS     Vital Signs:    Last 24hrs VS reviewed since prior progress note. Most recent are:  Visit Vitals    /84 (BP 1 Location: Right arm, BP Patient Position: At rest)    Pulse (!) 121    Temp 98.3 °F (36.8 °C)    Resp 19    Ht 5' 1\" (1.549 m)    Wt 65.8 kg (145 lb 1 oz)    SpO2 95%    BMI 27.41 kg/m2         Intake/Output Summary (Last 24 hours) at 02/24/18 0837  Last data filed at 02/24/18 0500   Gross per 24 hour   Intake              600 ml   Output             2400 ml   Net            -1800 ml        Physical Examination:             Constitutional:  no distress, MUMBLES ONLY     ENT:  ng tube inplace. Oral mucous moist, oropharynx benign. Neck supple,    Resp:  COARSE WHEEZE bilaterally. NO rhonchi/rales. No accessory muscle use   CV:  Regular rhythm, normal rate, no murmurs, gallops, rubs    GI:  Soft, non distended, non tender. normoactive bowel sounds, no hepatosplenomegaly     Musculoskeletal:  ++ edema, warm, 1+ pulses throughout    Neurologic:  non focal      Psych: MILDLY agitated     Skin:  ecchymosis       Data Review:    Review and/or order of clinical lab test      Labs:     Recent Labs      02/24/18 0355 02/23/18 0327   WBC  11.8*  9.8   HGB  9.0*  9.5*   HCT  27.5*  29.3*   PLT  333  278     Recent Labs      02/24/18   0355 02/23/18 0327  02/22/18   0321   NA  134*  135*  138   K  4.8  3.8  3.9   CL  100  101  103   CO2  28  27  27   BUN  5*  4*  2*   CREA  0.42*  0.44*  0.37*   GLU  95  108*  108*   CA  8.1*  7.8*  7.6*   MG  1.7  1.6  1.5*   PHOS  2.8  2.8   --      Recent Labs      02/24/18   0355  02/23/18   0327   SGOT  19  22   ALT  11*  10*   AP  145*  152*   TBILI  0.4  0.3   TP  5.2*  5.3*   ALB  1.6*  1.5*   GLOB  3.6  3.8     No results for input(s): INR, PTP, APTT in the last 72 hours. No lab exists for component: INREXT, INREXT   No results for input(s): FE, TIBC, PSAT, FERR in the last 72 hours.    Lab Results   Component Value Date/Time    Folate 4.9 (L) 02/17/2018 05:54 AM      No results for input(s): PH, PCO2, PO2 in the last 72 hours. No results for input(s): CPK, CKNDX, TROIQ in the last 72 hours. No lab exists for component: CPKMB  Lab Results   Component Value Date/Time    Cholesterol, total 202 (H) 03/27/2017 09:47 AM    HDL Cholesterol 76 03/27/2017 09:47 AM    LDL, calculated 109 (H) 03/27/2017 09:47 AM    Triglyceride 83 03/27/2017 09:47 AM     Lab Results   Component Value Date/Time    Glucose (POC) 114 (H) 02/18/2018 08:30 AM    Glucose (POC) 91 01/26/2018 04:08 PM    Glucose (POC) 107 (H) 01/26/2018 01:08 PM     Lab Results   Component Value Date/Time    Color DARK YELLOW 02/16/2018 08:30 PM    Appearance CLEAR 02/16/2018 08:30 PM    Specific gravity 1.013 02/16/2018 08:30 PM    pH (UA) 7.0 02/16/2018 08:30 PM    Protein TRACE (A) 02/16/2018 08:30 PM    Glucose NEGATIVE  02/16/2018 08:30 PM    Ketone TRACE (A) 02/16/2018 08:30 PM    Bilirubin Negative 09/28/2012 08:58 AM    Urobilinogen 0.2 02/16/2018 08:30 PM    Nitrites NEGATIVE  02/16/2018 08:30 PM    Leukocyte Esterase NEGATIVE  02/16/2018 08:30 PM    Epithelial cells FEW 02/16/2018 08:30 PM    Bacteria 1+ (A) 02/16/2018 08:30 PM    WBC 0-4 02/16/2018 08:30 PM    RBC 0-5 02/16/2018 08:30 PM         Medications Reviewed:     Current Facility-Administered Medications   Medication Dose Route Frequency    0.9% sodium chloride 1,000 mL with potassium chloride 20 mEq, folic acid 1 mg, thiamine 200 mg, mvi, adult no. 4 with vit K 10 mL infusion   IntraVENous DAILY    buPROPion (WELLBUTRIN) tablet 75 mg  75 mg Per NG tube BID    nicotine (NICODERM CQ) 21 mg/24 hr patch 1 Patch  1 Patch TransDERmal DAILY    balsam peru-castor oil (VENELEX)  mg/gram ointment   Topical BID    sodium chloride (NS) flush 10-30 mL  10-30 mL InterCATHeter PRN    sodium chloride (NS) flush 10 mL  10 mL InterCATHeter PRN    sodium chloride (NS) flush 10-40 mL  10-40 mL InterCATHeter Q8H    alteplase (CATHFLO) 1 mg in sterile water (preservative free) 1 mL injection  1 mg InterCATHeter PRN    0.9% sodium chloride infusion 250 mL  250 mL IntraVENous PRN    LORazepam (ATIVAN) injection 1 mg  1 mg IntraVENous Q2H PRN    LORazepam (ATIVAN) injection 2 mg  2 mg IntraVENous Q2H PRN    zolpidem (AMBIEN) tablet 5 mg  5 mg Oral QHS PRN    calcium-vitamin D (OS-DELORIS) 500 mg-200 unit tablet  1 Tab Oral BID WITH MEALS    sodium chloride (NS) flush 5-10 mL  5-10 mL IntraVENous PRN    acetaminophen (TYLENOL) tablet 650 mg  650 mg Oral Q4H PRN    ondansetron (ZOFRAN) injection 4 mg  4 mg IntraVENous Q4H PRN     ______________________________________________________________________  EXPECTED LENGTH OF STAY: 4d 21h  ACTUAL LENGTH OF STAY:          8                 Guille Hernandez MD

## 2018-02-24 NOTE — PROGRESS NOTES
Paged MD to inquire about therapy for increased heart rate. Would she be appropriate for downgrade to remote tele? Per Dr. Rivera Life obtain ABG's and chest xray. After results he will consider downgrade.

## 2018-02-25 ENCOUNTER — APPOINTMENT (OUTPATIENT)
Dept: GENERAL RADIOLOGY | Age: 58
DRG: 640 | End: 2018-02-25
Attending: INTERNAL MEDICINE
Payer: COMMERCIAL

## 2018-02-25 LAB
ALBUMIN SERPL-MCNC: 1.4 G/DL (ref 3.5–5)
ALBUMIN/GLOB SERPL: 0.4 {RATIO} (ref 1.1–2.2)
ALP SERPL-CCNC: 129 U/L (ref 45–117)
ALT SERPL-CCNC: 8 U/L (ref 12–78)
ANION GAP SERPL CALC-SCNC: 7 MMOL/L (ref 5–15)
AST SERPL-CCNC: 20 U/L (ref 15–37)
BASOPHILS # BLD: 0 K/UL (ref 0–0.1)
BASOPHILS NFR BLD: 0 % (ref 0–1)
BILIRUB SERPL-MCNC: 0.3 MG/DL (ref 0.2–1)
BUN SERPL-MCNC: 6 MG/DL (ref 6–20)
BUN/CREAT SERPL: 16 (ref 12–20)
CALCIUM SERPL-MCNC: 7.8 MG/DL (ref 8.5–10.1)
CHLORIDE SERPL-SCNC: 98 MMOL/L (ref 97–108)
CO2 SERPL-SCNC: 27 MMOL/L (ref 21–32)
CREAT SERPL-MCNC: 0.37 MG/DL (ref 0.55–1.02)
DIFFERENTIAL METHOD BLD: ABNORMAL
EOSINOPHIL # BLD: 0.2 K/UL (ref 0–0.4)
EOSINOPHIL NFR BLD: 2 % (ref 0–7)
ERYTHROCYTE [DISTWIDTH] IN BLOOD BY AUTOMATED COUNT: 18.1 % (ref 11.5–14.5)
GLOBULIN SER CALC-MCNC: 3.6 G/DL (ref 2–4)
GLUCOSE SERPL-MCNC: 97 MG/DL (ref 65–100)
HCT VFR BLD AUTO: 24.5 % (ref 35–47)
HGB BLD-MCNC: 8 G/DL (ref 11.5–16)
IMM GRANULOCYTES # BLD: 0 K/UL
IMM GRANULOCYTES NFR BLD AUTO: 0 %
LYMPHOCYTES # BLD: 1.8 K/UL (ref 0.8–3.5)
LYMPHOCYTES NFR BLD: 17 % (ref 12–49)
MCH RBC QN AUTO: 28.4 PG (ref 26–34)
MCHC RBC AUTO-ENTMCNC: 32.7 G/DL (ref 30–36.5)
MCV RBC AUTO: 86.9 FL (ref 80–99)
MONOCYTES # BLD: 1.2 K/UL (ref 0–1)
MONOCYTES NFR BLD: 12 % (ref 5–13)
NEUTS SEG # BLD: 7.1 K/UL (ref 1.8–8)
NEUTS SEG NFR BLD: 69 % (ref 32–75)
NRBC # BLD: 0 K/UL (ref 0–0.01)
NRBC BLD-RTO: 0 PER 100 WBC
PLATELET # BLD AUTO: 308 K/UL (ref 150–400)
PLATELET COMMENTS,PCOM: ABNORMAL
PMV BLD AUTO: 10.6 FL (ref 8.9–12.9)
POTASSIUM SERPL-SCNC: 4.1 MMOL/L (ref 3.5–5.1)
PROT SERPL-MCNC: 5 G/DL (ref 6.4–8.2)
RBC # BLD AUTO: 2.82 M/UL (ref 3.8–5.2)
RBC MORPH BLD: ABNORMAL
SODIUM SERPL-SCNC: 132 MMOL/L (ref 136–145)
WBC # BLD AUTO: 10.3 K/UL (ref 3.6–11)

## 2018-02-25 PROCEDURE — 36415 COLL VENOUS BLD VENIPUNCTURE: CPT | Performed by: INTERNAL MEDICINE

## 2018-02-25 PROCEDURE — 74011000258 HC RX REV CODE- 258: Performed by: INTERNAL MEDICINE

## 2018-02-25 PROCEDURE — 74011000250 HC RX REV CODE- 250: Performed by: INTERNAL MEDICINE

## 2018-02-25 PROCEDURE — 85025 COMPLETE CBC W/AUTO DIFF WBC: CPT | Performed by: INTERNAL MEDICINE

## 2018-02-25 PROCEDURE — 74011250636 HC RX REV CODE- 250/636: Performed by: INTERNAL MEDICINE

## 2018-02-25 PROCEDURE — 74011250637 HC RX REV CODE- 250/637: Performed by: INTERNAL MEDICINE

## 2018-02-25 PROCEDURE — 3331090002 HH PPS REVENUE DEBIT

## 2018-02-25 PROCEDURE — 65270000029 HC RM PRIVATE

## 2018-02-25 PROCEDURE — 77010033678 HC OXYGEN DAILY

## 2018-02-25 PROCEDURE — 80053 COMPREHEN METABOLIC PANEL: CPT | Performed by: INTERNAL MEDICINE

## 2018-02-25 PROCEDURE — 3331090001 HH PPS REVENUE CREDIT

## 2018-02-25 PROCEDURE — 71045 X-RAY EXAM CHEST 1 VIEW: CPT

## 2018-02-25 PROCEDURE — 74011250637 HC RX REV CODE- 250/637: Performed by: HOSPITALIST

## 2018-02-25 RX ORDER — LANOLIN ALCOHOL/MO/W.PET/CERES
100 CREAM (GRAM) TOPICAL DAILY
Status: DISCONTINUED | OUTPATIENT
Start: 2018-02-25 | End: 2018-03-02 | Stop reason: HOSPADM

## 2018-02-25 RX ADMIN — BUPROPION HYDROCHLORIDE 75 MG: 75 TABLET, FILM COATED ORAL at 09:01

## 2018-02-25 RX ADMIN — CASTOR OIL AND BALSAM, PERU: 788; 87 OINTMENT TOPICAL at 17:56

## 2018-02-25 RX ADMIN — BUPROPION HYDROCHLORIDE 75 MG: 75 TABLET, FILM COATED ORAL at 17:56

## 2018-02-25 RX ADMIN — Medication 10 ML: at 15:20

## 2018-02-25 RX ADMIN — Medication 10 ML: at 22:33

## 2018-02-25 RX ADMIN — Medication 20 ML: at 06:00

## 2018-02-25 RX ADMIN — CALCIUM CARBONATE-VITAMIN D TAB 500 MG-200 UNIT 1 TABLET: 500-200 TAB at 09:01

## 2018-02-25 RX ADMIN — CASTOR OIL AND BALSAM, PERU: 788; 87 OINTMENT TOPICAL at 09:01

## 2018-02-25 RX ADMIN — CALCIUM CARBONATE-VITAMIN D TAB 500 MG-200 UNIT 1 TABLET: 500-200 TAB at 17:56

## 2018-02-25 RX ADMIN — ZOLPIDEM TARTRATE 5 MG: 5 TABLET ORAL at 22:33

## 2018-02-25 RX ADMIN — CALCIUM GLUCONATE: 94 INJECTION, SOLUTION INTRAVENOUS at 18:00

## 2018-02-25 RX ADMIN — Medication 100 MG: at 11:40

## 2018-02-25 NOTE — ROUTINE PROCESS
0600 Nurse entered room and pt was found with her NG tube pulled out. Pt has a increase cough with O2 sats dropping into mid 80's. Suction was performed with no relief. Upon ascultation, lung sounds remain diminished. Notified MD on call. Received orders for chest xray from on-call hospitalist.   0111 Xray tech at bedside. 1746 pt is calmly resting in bed with no complaints of cough.

## 2018-02-25 NOTE — PROGRESS NOTES
TRANSFER - OUT REPORT:    Verbal report given to Kirti Griffin RN (name) on Sunil Foreman  being transferred to [address] [place] [address](unit) for routine progression of care       Report consisted of patients Situation, Background, Assessment and   Recommendations(SBAR). Information from the following report(s) SBAR, Kardex, ED Summary, Intake/Output, MAR and Recent Results was reviewed with the receiving nurse. Lines:   Triple Lumen 02/16/18 Right Subclavian (Active)   Central Line Being Utilized Yes 2/25/2018  3:21 AM   Criteria for Appropriate Use Hemodynamically unstable, requiring monitoring lines, vasopressors, or volume resuscitation 2/25/2018  3:21 AM   Site Assessment Clean, dry, & intact 2/25/2018  3:21 AM   Infiltration Assessment 0 2/25/2018  3:21 AM   Affected Extremity/Extremities Color distal to insertion site pink (or appropriate for race) 2/25/2018  3:21 AM   Date of Last Dressing Change 02/22/18 2/25/2018  3:21 AM   Dressing Status Clean, dry, & intact 2/25/2018  3:21 AM   Dressing Type Disk with Chlorhexadine gluconate (CHG); Transparent 2/25/2018  3:21 AM   Action Taken Open ports on tubing capped 2/25/2018  3:21 AM   Proximal Hub Color/Line Status White; Infusing 2/25/2018  3:21 AM   Positive Blood Return (Medial Site) Yes 2/25/2018  3:21 AM   Medial Hub Color/Line Status Blue;Capped 2/25/2018  3:21 AM   Positive Blood Return (Lateral Site) Yes 2/25/2018  3:21 AM   Distal Hub Color/Line Status Brown;Capped 2/25/2018  3:21 AM   Positive Blood Return (Site #3) Yes 2/25/2018  3:21 AM   Alcohol Cap Used Yes 2/25/2018  3:21 AM        Opportunity for questions and clarification was provided. Patient transported with:   O2 @ 4 liters          0837-Nurse for 611 will call back to get report    0851-report given to Kirti Griffin. Kirti Griffin requested that I give medication before transferring patient. Be advised that patient had to be transferred before shift assessment could be done.

## 2018-02-25 NOTE — PROGRESS NOTES
Primary Nurse Buster Acuna RN and Brandon Perez RN performed a dual skin assessment on this patient Impairment noted- see wound doc flow sheet  Hilton score is 11

## 2018-02-25 NOTE — PROGRESS NOTES
Hospitalist Progress Note  Day Bowden MD  Answering service: [phone] from in house phone         Date of Service:  2018  NAME:  Key Lantigua  :  1960  MRN:  [id number]    Admission Summary:   66 yo woman with HTN, HLD, DUB, iron deficiency anemia, severe protein-calorie malnutrition, recently diagnosed colitis, EtOH abuse, and h/o GI bleed was sent by her PCP to the ED on 18 for fatigue, generalized weakness, and right arm swelling. She was recently hospitalized at [facility] 18 - 18 for hypokalemia and anemia. She was admitted to the ICU for severe hypokalemia, hypotension, and chronic RUE superficial venous thrombosis.      Interval history / Subjective:       :  I have personally reviewed serial CXR from  and , not consistent with PNA , Abx dc's on this bases  Sodium now in nl range, K and mag borderline, IVF's adjusted accordingly. Mag and K plus CMP/cbc for am     :  Pt a bit more alert and appropriate today but mostly somnolent and poorly interactive with this rounder    : Following verbal command, smiles on command and moves all ext on command. Lab improved. :  Pt more appropriate today over prior days  Case discussed with nephrology and RN in room  Pt for cont diuresis slowly following sodium/cr closely. No need for lactulose as now stools loose/diarrhea     :  More wheezing this am. More withdrawn than yesterdays rounds,   Edema and pul congestion suggest vol overload, for lasix this am and f/u clinically as day progresses. :  Have personally reviewed the cxr and compaired it perosnally to the piror, seems the findings have always been 2n2 flud and or fluid shifts,. Also wbc nl range and no shift, tmax 99.7. Tachy continues but less this am at 114.     Pt for trials of eating,  Passed bedside swallow, will not re-insert the NG tube at this time    Assessment & Plan:      Severe hypokalemia (POA) - replete until K+ >=4 and magnesium >=2  - urine/serum studies ordered  - Renal following  - likely due to anorexia and malabsorption and recent use of diuretics; not resumed on admission  - corrected   Lab Results   Component Value Date/Time    Potassium 4.1 02/25/2018 03:34 AM    Potassium (POC) <2.0 (LL) 01/26/2018 04:08 PM    cont on replacement.      EtOH withdrawal on chronic alcohol abuse   - now on CIWA protocol and banana bag; prn Ativan  - cont on CIWA  - more alert 2/25/2018 than yesterday, passed bedside swallow eval      Hepatic encephalopathy due to hyperammonemia   - start lactulose retention enemas and can change to per NGT once placed  Lab Results   Component Value Date/Time    ALT (SGPT) 8 (L) 02/25/2018 03:34 AM    AST (SGOT) 20 02/25/2018 03:34 AM    GGT 83 (H) 10/16/2017 03:55 PM    Alk.  phosphatase 129 (H) 02/25/2018 03:34 AM    Bilirubin, direct 0.4 (H) 02/18/2018 09:47 AM    Bilirubin, total 0.3 02/25/2018 03:34 AM   ammonia level expectantly      Acute on chronic anemia (POA)   - iron WNL, folate low, B12 low-normal  - FOBT negative  - s/p 2 units PRBC transfusion since admission   - Hematology consulted by [facility] due to recent h/o possible occult malignancy  - per  at bedside, EGD, colonoscopy, bone scan all unremarkable recently  - recent PET was abnormal  - stopped SC heparin and change to SCDs  - IR unable to do CT-guided BM bx today due to unstable airway; re-scheduled  Lab Results   Component Value Date/Time    WBC 10.3 02/25/2018 03:34 AM    Hemoglobin (POC) 4.9 02/16/2018 02:46 PM    Hemoglobin (POC) 11.2 (L) 01/26/2018 04:08 PM    HGB 8.0 (L) 02/25/2018 03:34 AM    Hematocrit (POC) 33 (L) 01/26/2018 04:08 PM    HCT 24.5 (L) 02/25/2018 03:34 AM    PLATELET 059 08/75/3341 03:34 AM    MCV 86.9 02/25/2018 03:34 AM         Severe protein-calorie malnutrition - prealbumin 4.4  - Nutrition consulted  - s/p 1 dose IV albumin  Lab Results   Component Value Date/Time    Protein, total 5.0 (L) 02/25/2018 03:34 AM    Albumin 1.4 (L) 02/25/2018 03:34 AM    will add PPN to regimen     Hyponatremia. Recent Labs      02/25/18   0334   NA  132*     Cont to monitor    EDEAM: FOR LASIX 2/24/2018     Chronic RUE VTE  - [facility] as pt had IV in that arm during previous hospitalization     Right ankle deformity and RLE>LLE edema - venous duplex b/l LEs 2/19 negative  - check R ankle XR      Generalized weakness/debility with EtOH abuse (POA)   - likely due to anorexia and EtOH abuse  - PT/OT/ST evals, Nutrition eval  - banana bag  - non verbal 2/20/2018 improves by 2/22/2018      Hypovolemic shock (POA) on chronic HTN - likely due to RLL HCAP on CXR  - continue IVF  - resolved s/p pressor x4h  - TTE 2/17 EF 55-60%, no RWMA, mild-mod TR, small pericardial effusion  BP Readings from Last 1 Encounters:   02/25/18 124/57         Possible RLL HCAP (POA) - BCx 2/16 NGTD  - started empiric Zosyn/vancomycin 2/17  - s/p empiric vancomycin 2/17-2/19   - BCx 2/16 NGTD  - MRSA swab negative  - sputum Cx ordered but no sample sent yet  - urine strep/legionella antigens pending  - flu swab negative  - cxr form 2/17 to 2/19 cleared as to RLL findings, not c/c pna,      Mild transaminitis - elevated AST likely due to EtOH  - RUQ US 2/18 hepatic steatosis  Lab Results   Component Value Date/Time    ALT (SGPT) 8 (L) 02/25/2018 03:34 AM    AST (SGOT) 20 02/25/2018 03:34 AM    GGT 83 (H) 10/16/2017 03:55 PM    Alk.  phosphatase 129 (H) 02/25/2018 03:34 AM    Bilirubin, direct 0.4 (H) 02/18/2018 09:47 AM    Bilirubin, total 0.3 02/25/2018 03:34 AM          Hypernatremia - IVF adjusted by Renal; resolved  Recent Labs      02/25/18   0334   NA  132*       Hypophosphatemia - replete prn  Lab Results   Component Value Date/Time    Calcium 7.8 (L) 02/25/2018 03:34 AM    Phosphorus 2.8 02/24/2018 03:55 AM       Hypomagnesemia - replete prn  Recent Labs 02/24/18   0355   MG  1.7    mag iv 2/23/2018      Code status: full  DVT prophylaxis: SCDs     Care Plan discussed with: Nurse. No family at bedside today  Disposition: TBD.           Hospital Problems  Date Reviewed: 2/19/2018          Codes Class Noted POA    * (Principal)Hypokalemia ICD-10-CM: E87.6  ICD-9-CM: 276.8  2/16/2018 Yes                Review of Systems:   NOT OBTAINABLE 2N2 PT FACTORS     Vital Signs:    Last 24hrs VS reviewed since prior progress note. Most recent are:  Visit Vitals    /57 (BP 1 Location: Right arm, BP Patient Position: At rest)    Pulse (!) 114    Temp 99.7 °F (37.6 °C)    Resp 20    Ht 5' 1\" (1.549 m)    Wt 64.4 kg (141 lb 15.6 oz)    SpO2 99%    BMI 26.83 kg/m2         Intake/Output Summary (Last 24 hours) at 02/25/18 2843  Last data filed at 02/25/18 0500   Gross per 24 hour   Intake             1560 ml   Output             2850 ml   Net            -1290 ml        Physical Examination:             Constitutional:  no distress, verbal cooperative. Moves all ext slightly      ENT:  Oral mucous moist, oropharynx benign. Neck supple,    Resp:  COARSE WHEEZE bilaterally. NO rhonchi/rales. No accessory muscle use   CV:  Regular rhythm, normal rate, no murmurs, gallops, rubs    GI:  Soft, non distended, non tender.  normoactive bowel sounds, no hepatosplenomegaly     Musculoskeletal:  ++ edema, warm, 1+ pulses throughout    Neurologic:  non focal      Psych: pleasant not agitated, not anxious more alert     Skin:  ecchymosis       Data Review:    Review and/or order of clinical lab test      Labs:     Recent Labs      02/25/18 0334 02/24/18 0355   WBC  10.3  11.8*   HGB  8.0*  9.0*   HCT  24.5*  27.5*   PLT  308  333     Recent Labs      02/25/18 0334 02/24/18 0355 02/23/18 0327   NA  132*  134*  135*   K  4.1  4.8  3.8   CL  98  100  101   CO2  27  28  27   BUN  6  5*  4*   CREA  0.37*  0.42*  0.44*   GLU  97  95  108*   CA  7.8*  8.1*  7.8*   MG   --   1.7 1. 6   PHOS   --   2.8  2.8     Recent Labs      02/25/18   0334  02/24/18   0355  02/23/18   0327   SGOT  20  19  22   ALT  8*  11*  10*   AP  129*  145*  152*   TBILI  0.3  0.4  0.3   TP  5.0*  5.2*  5.3*   ALB  1.4*  1.6*  1.5*   GLOB  3.6  3.6  3.8     No results for input(s): INR, PTP, APTT in the last 72 hours. No lab exists for component: INREXT, INREXT   No results for input(s): FE, TIBC, PSAT, FERR in the last 72 hours. Lab Results   Component Value Date/Time    Folate 4.9 (L) 02/17/2018 05:54 AM      No results for input(s): PH, PCO2, PO2 in the last 72 hours. No results for input(s): CPK, CKNDX, TROIQ in the last 72 hours.     No lab exists for component: CPKMB  Lab Results   Component Value Date/Time    Cholesterol, total 202 (H) 03/27/2017 09:47 AM    HDL Cholesterol 76 03/27/2017 09:47 AM    LDL, calculated 109 (H) 03/27/2017 09:47 AM    Triglyceride 83 03/27/2017 09:47 AM     Lab Results   Component Value Date/Time    Glucose (POC) 114 (H) 02/18/2018 08:30 AM    Glucose (POC) 91 01/26/2018 04:08 PM    Glucose (POC) 107 (H) 01/26/2018 01:08 PM     Lab Results   Component Value Date/Time    Color DARK YELLOW 02/16/2018 08:30 PM    Appearance CLEAR 02/16/2018 08:30 PM    Specific gravity 1.013 02/16/2018 08:30 PM    pH (UA) 7.0 02/16/2018 08:30 PM    Protein TRACE (A) 02/16/2018 08:30 PM    Glucose NEGATIVE  02/16/2018 08:30 PM    Ketone TRACE (A) 02/16/2018 08:30 PM    Bilirubin Negative 09/28/2012 08:58 AM    Urobilinogen 0.2 02/16/2018 08:30 PM    Nitrites NEGATIVE  02/16/2018 08:30 PM    Leukocyte Esterase NEGATIVE  02/16/2018 08:30 PM    Epithelial cells FEW 02/16/2018 08:30 PM    Bacteria 1+ (A) 02/16/2018 08:30 PM    WBC 0-4 02/16/2018 08:30 PM    RBC 0-5 02/16/2018 08:30 PM         Medications Reviewed:     Current Facility-Administered Medications   Medication Dose Route Frequency    0.9% sodium chloride 1,000 mL with potassium chloride 20 mEq, folic acid 1 mg, thiamine 200 mg, mvi, adult no.4 with vit K 10 mL infusion   IntraVENous DAILY    buPROPion (WELLBUTRIN) tablet 75 mg  75 mg Per NG tube BID    nicotine (NICODERM CQ) 21 mg/24 hr patch 1 Patch  1 Patch TransDERmal DAILY    balsam peru-castor oil (VENELEX)  mg/gram ointment   Topical BID    sodium chloride (NS) flush 10-30 mL  10-30 mL InterCATHeter PRN    sodium chloride (NS) flush 10 mL  10 mL InterCATHeter PRN    sodium chloride (NS) flush 10-40 mL  10-40 mL InterCATHeter Q8H    alteplase (CATHFLO) 1 mg in sterile water (preservative free) 1 mL injection  1 mg InterCATHeter PRN    0.9% sodium chloride infusion 250 mL  250 mL IntraVENous PRN    LORazepam (ATIVAN) injection 1 mg  1 mg IntraVENous Q2H PRN    LORazepam (ATIVAN) injection 2 mg  2 mg IntraVENous Q2H PRN    zolpidem (AMBIEN) tablet 5 mg  5 mg Oral QHS PRN    calcium-vitamin D (OS-DELORIS) 500 mg-200 unit tablet  1 Tab Oral BID WITH MEALS    sodium chloride (NS) flush 5-10 mL  5-10 mL IntraVENous PRN    acetaminophen (TYLENOL) tablet 650 mg  650 mg Oral Q4H PRN    ondansetron (ZOFRAN) injection 4 mg  4 mg IntraVENous Q4H PRN     ______________________________________________________________________  EXPECTED LENGTH OF STAY: 4d 21h  ACTUAL LENGTH OF STAY:          9                 Tiffany Watkins MD

## 2018-02-25 NOTE — PROGRESS NOTES
Problem: Falls - Risk of  Goal: *Absence of Falls  Document Roselia Fall Risk and appropriate interventions in the flowsheet. Outcome: Progressing Towards Goal  Fall Risk Interventions:  Mobility Interventions: Communicate number of staff needed for ambulation/transfer, Patient to call before getting OOB, PT Consult for mobility concerns, Bed/chair exit alarm    Mentation Interventions: Door open when patient unattended, Evaluate medications/consider consulting pharmacy, Familiar objects from home, More frequent rounding, Reorient patient    Medication Interventions: Evaluate medications/consider consulting pharmacy, Patient to call before getting OOB    Elimination Interventions: Call light in reach, Patient to call for help with toileting needs, Toileting schedule/hourly rounds    History of Falls Interventions: Door open when patient unattended, Evaluate medications/consider consulting pharmacy        Problem: Alcohol Withdrawal  Goal: *STG: Remains safe in hospital  Outcome: Progressing Towards Goal  Hourly rounding. Sitter at bedside. Call be within reach. CIWA's as ordered. Will continue to monitor.

## 2018-02-26 LAB
ALBUMIN SERPL-MCNC: 1.4 G/DL (ref 3.5–5)
ALBUMIN/GLOB SERPL: 0.4 {RATIO} (ref 1.1–2.2)
ALP SERPL-CCNC: 132 U/L (ref 45–117)
ALT SERPL-CCNC: 10 U/L (ref 12–78)
ANION GAP SERPL CALC-SCNC: 6 MMOL/L (ref 5–15)
AST SERPL-CCNC: 22 U/L (ref 15–37)
BASOPHILS # BLD: 0 K/UL (ref 0–0.1)
BASOPHILS NFR BLD: 0 % (ref 0–1)
BILIRUB SERPL-MCNC: 0.4 MG/DL (ref 0.2–1)
BUN SERPL-MCNC: 6 MG/DL (ref 6–20)
BUN/CREAT SERPL: 15 (ref 12–20)
CALCIUM SERPL-MCNC: 7.7 MG/DL (ref 8.5–10.1)
CHLORIDE SERPL-SCNC: 98 MMOL/L (ref 97–108)
CO2 SERPL-SCNC: 27 MMOL/L (ref 21–32)
CREAT SERPL-MCNC: 0.39 MG/DL (ref 0.55–1.02)
DIFFERENTIAL METHOD BLD: ABNORMAL
EOSINOPHIL # BLD: 0.2 K/UL (ref 0–0.4)
EOSINOPHIL NFR BLD: 2 % (ref 0–7)
ERYTHROCYTE [DISTWIDTH] IN BLOOD BY AUTOMATED COUNT: 18 % (ref 11.5–14.5)
GLOBULIN SER CALC-MCNC: 3.4 G/DL (ref 2–4)
GLUCOSE BLD STRIP.AUTO-MCNC: 111 MG/DL (ref 65–100)
GLUCOSE BLD STRIP.AUTO-MCNC: 121 MG/DL (ref 65–100)
GLUCOSE SERPL-MCNC: 99 MG/DL (ref 65–100)
HCT VFR BLD AUTO: 25.7 % (ref 35–47)
HGB BLD-MCNC: 8.4 G/DL (ref 11.5–16)
IMM GRANULOCYTES # BLD: 0 K/UL
IMM GRANULOCYTES NFR BLD AUTO: 0 %
LYMPHOCYTES # BLD: 1.1 K/UL (ref 0.8–3.5)
LYMPHOCYTES NFR BLD: 12 % (ref 12–49)
MCH RBC QN AUTO: 28.5 PG (ref 26–34)
MCHC RBC AUTO-ENTMCNC: 32.7 G/DL (ref 30–36.5)
MCV RBC AUTO: 87.1 FL (ref 80–99)
MONOCYTES # BLD: 0.9 K/UL (ref 0–1)
MONOCYTES NFR BLD: 10 % (ref 5–13)
NEUTS SEG # BLD: 7.2 K/UL (ref 1.8–8)
NEUTS SEG NFR BLD: 76 % (ref 32–75)
NRBC # BLD: 0 K/UL (ref 0–0.01)
NRBC BLD-RTO: 0 PER 100 WBC
PLATELET # BLD AUTO: 340 K/UL (ref 150–400)
PLATELET COMMENTS,PCOM: ABNORMAL
PMV BLD AUTO: 10.7 FL (ref 8.9–12.9)
POTASSIUM SERPL-SCNC: 4 MMOL/L (ref 3.5–5.1)
PROT SERPL-MCNC: 4.8 G/DL (ref 6.4–8.2)
RBC # BLD AUTO: 2.95 M/UL (ref 3.8–5.2)
RBC MORPH BLD: ABNORMAL
RBC MORPH BLD: ABNORMAL
SERVICE CMNT-IMP: ABNORMAL
SERVICE CMNT-IMP: ABNORMAL
SODIUM SERPL-SCNC: 131 MMOL/L (ref 136–145)
WBC # BLD AUTO: 9.4 K/UL (ref 3.6–11)

## 2018-02-26 PROCEDURE — 74011000258 HC RX REV CODE- 258: Performed by: INTERNAL MEDICINE

## 2018-02-26 PROCEDURE — 65270000029 HC RM PRIVATE

## 2018-02-26 PROCEDURE — 85025 COMPLETE CBC W/AUTO DIFF WBC: CPT | Performed by: INTERNAL MEDICINE

## 2018-02-26 PROCEDURE — 97116 GAIT TRAINING THERAPY: CPT

## 2018-02-26 PROCEDURE — 74011250637 HC RX REV CODE- 250/637: Performed by: HOSPITALIST

## 2018-02-26 PROCEDURE — 74011250636 HC RX REV CODE- 250/636: Performed by: INTERNAL MEDICINE

## 2018-02-26 PROCEDURE — 36415 COLL VENOUS BLD VENIPUNCTURE: CPT | Performed by: INTERNAL MEDICINE

## 2018-02-26 PROCEDURE — 74011250637 HC RX REV CODE- 250/637: Performed by: INTERNAL MEDICINE

## 2018-02-26 PROCEDURE — 74011000250 HC RX REV CODE- 250: Performed by: INTERNAL MEDICINE

## 2018-02-26 PROCEDURE — 80053 COMPREHEN METABOLIC PANEL: CPT | Performed by: INTERNAL MEDICINE

## 2018-02-26 PROCEDURE — 3331090002 HH PPS REVENUE DEBIT

## 2018-02-26 PROCEDURE — 82962 GLUCOSE BLOOD TEST: CPT

## 2018-02-26 PROCEDURE — 3331090001 HH PPS REVENUE CREDIT

## 2018-02-26 PROCEDURE — 77010033678 HC OXYGEN DAILY

## 2018-02-26 RX ADMIN — BUPROPION HYDROCHLORIDE 75 MG: 75 TABLET, FILM COATED ORAL at 18:15

## 2018-02-26 RX ADMIN — CASTOR OIL AND BALSAM, PERU: 788; 87 OINTMENT TOPICAL at 09:13

## 2018-02-26 RX ADMIN — CASTOR OIL AND BALSAM, PERU: 788; 87 OINTMENT TOPICAL at 18:23

## 2018-02-26 RX ADMIN — Medication 10 ML: at 07:25

## 2018-02-26 RX ADMIN — ZOLPIDEM TARTRATE 5 MG: 5 TABLET ORAL at 21:18

## 2018-02-26 RX ADMIN — CALCIUM GLUCONATE: 94 INJECTION, SOLUTION INTRAVENOUS at 18:16

## 2018-02-26 RX ADMIN — Medication 10 ML: at 14:08

## 2018-02-26 RX ADMIN — BUPROPION HYDROCHLORIDE 75 MG: 75 TABLET, FILM COATED ORAL at 09:12

## 2018-02-26 RX ADMIN — CALCIUM CARBONATE-VITAMIN D TAB 500 MG-200 UNIT 1 TABLET: 500-200 TAB at 18:15

## 2018-02-26 RX ADMIN — Medication 100 MG: at 09:12

## 2018-02-26 RX ADMIN — CALCIUM CARBONATE-VITAMIN D TAB 500 MG-200 UNIT 1 TABLET: 500-200 TAB at 07:25

## 2018-02-26 NOTE — PROGRESS NOTES
Hospitalist Progress Note  Tyrel Brito MD  Answering service: [phone] OR [phone] from in house phone         Date of Service:  2018  NAME:  Ifeanyi Castanon  :  1960  MRN:  [id number]    Admission Summary:   64 yo woman with HTN, HLD, DUB, iron deficiency anemia, severe protein-calorie malnutrition, recently diagnosed colitis, EtOH abuse, and h/o GI bleed was sent by her PCP to the ED on 18 for fatigue, generalized weakness, and right arm swelling. She was recently hospitalized at [facility] 18 - 18 for hypokalemia and anemia. She was admitted to the ICU for severe hypokalemia, hypotension, and chronic RUE superficial venous thrombosis.      Interval history / Subjective:       :  I have personally reviewed serial CXR from  and , not consistent with PNA , Abx dc's on this bases  Sodium now in nl range, K and mag borderline, IVF's adjusted accordingly. Mag and K plus CMP/cbc for am     :  Pt a bit more alert and appropriate today but mostly somnolent and poorly interactive with this rounder    : Following verbal command, smiles on command and moves all ext on command. Lab improved. :  Pt more appropriate today over prior days  Case discussed with nephrology and RN in room  Pt for cont diuresis slowly following sodium/cr closely. No need for lactulose as now stools loose/diarrhea     :  More wheezing this am. More withdrawn than yesterdays rounds,   Edema and pul congestion suggest vol overload, for lasix this am and f/u clinically as day progresses. :  Have personally reviewed the cxr and compaired it perosnally to the piror, seems the findings have always been 2n2 flud and or fluid shifts,. Also wbc nl range and no shift, tmax 99.7. Tachy continues but less this am at 114.     Pt for trials of eating,  Passed bedside swallow, will not re-insert the NG tube at this time 2/26:  Finally turning the corner nentally and counts but albumin yet low, , cont peripheral alimentation  til comfortable po intake /albumin better. , was for bone marrow IR bx but held as numbers improve,, was considered to have HCAP, but all Xrays c/c fluid shifts when looked at serially.      Assessment & Plan:      Severe hypokalemia (POA) - replete until K+ >=4 and magnesium >=2  - urine/serum studies ordered  - Renal following  - likely due to anorexia and malabsorption and recent use of diuretics; not resumed on admission  - corrected   Lab Results   Component Value Date/Time    Potassium 4.0 02/26/2018 05:29 AM    Potassium (POC) <2.0 (LL) 01/26/2018 04:08 PM    cont on replacement.      EtOH withdrawal on chronic alcohol abuse   - now on CIWA protocol and banana bag; prn Ativan  - cont on CIWA  - more alert 2/26/2018 than yesterday, passed bedside swallow eval      Hepatic encephalopathy due to hyperammonemia   - start lactulose retention enemas and can change to per NGT once placed  Lab Results   Component Value Date/Time    ALT (SGPT) 10 (L) 02/26/2018 05:29 AM    AST (SGOT) 22 02/26/2018 05:29 AM    GGT 83 (H) 10/16/2017 03:55 PM    Alk. phosphatase 132 (H) 02/26/2018 05:29 AM    Bilirubin, direct 0.4 (H) 02/18/2018 09:47 AM    Bilirubin, total 0.4 02/26/2018 05:29 AM   ammonia level expectantly  - resolving nicely 2/26/2018      Abdnomal CXR's on serial eval c/c fluid shifts.        Acute on chronic anemia (POA)   - iron WNL, folate low, B12 low-normal  - FOBT negative  - s/p 2 units PRBC transfusion since admission   - Hematology consulted by PCCM due to recent h/o possible occult malignancy  - per  at bedside, EGD, colonoscopy, bone scan all unremarkable recently  - recent PET was abnormal  - stopped SC heparin and change to SCDs  - IR unable to do CT-guided BM bx today due to unstable airway; re-scheduled  Lab Results   Component Value Date/Time    WBC 9.4 02/26/2018 05:29 AM    Hemoglobin (POC) 4.9 02/16/2018 02:46 PM    Hemoglobin (POC) 11.2 (L) 01/26/2018 04:08 PM    HGB 8.4 (L) 02/26/2018 05:29 AM    Hematocrit (POC) 33 (L) 01/26/2018 04:08 PM    HCT 25.7 (L) 02/26/2018 05:29 AM    PLATELET 815 42/81/8726 05:29 AM    MCV 87.1 02/26/2018 05:29 AM   Out pt bone marrow bx may be considered by hem/onco.     Severe protein-calorie malnutrition - prealbumin 4.4  - Nutrition consulted  - s/p 1 dose IV albumin  Lab Results   Component Value Date/Time    Protein, total 4.8 (L) 02/26/2018 05:29 AM    Albumin 1.4 (L) 02/26/2018 05:29 AM    will add PPN to regimen- cont 2/26/2018      Hyponatremia. Recent Labs      02/26/18   0529   NA  131*     Cont to monitor    EDEAM: FOR LASIX 2/24/2018     Chronic RUE VTE  - no indication for [facility] for RUE VTE as pt had IV in that arm during previous hospitalization     Right ankle deformity and RLE>LLE edema - venous duplex b/l LEs 2/19 negative  - check R ankle XR      Generalized weakness/debility with EtOH abuse (POA)   - likely due to anorexia and EtOH abuse  - PT/OT/ST evals, Nutrition eval  - banana bag  - non verbal 2/20/2018 improves by 2/22/2018      Hypovolemic shock (POA) on chronic HTN - likely due  alcohol and delayed effects.    - continue IVF  - resolved s/p pressor x4h  - TTE 2/17 EF 55-60%, no RWMA, mild-mod TR, small pericardial effusion  BP Readings from Last 1 Encounters:   02/26/18 129/80         Doubt ( no clear evidence of)  RLL HCAP (POA) - BCx 2/16 NGTD  - started empiric Zosyn/vancomycin 2/17  - s/p empiric vancomycin 2/17-2/19   - BCx 2/16 NGTD  - MRSA swab negative  - sputum Cx ordered but no sample sent yet  - urine strep/legionella antigens pending  - flu swab negative  - cxr form 2/17 to 2/19 cleared as to RLL findings, not c/c pna, all abx dc/d on assumption of care 2/22.      Mild transaminitis - elevated AST likely due to EtOH  - RUQ US 2/18 hepatic steatosis  Lab Results   Component Value Date/Time    ALT (SGPT) 10 (L) 02/26/2018 05:29 AM    AST (SGOT) 22 02/26/2018 05:29 AM    GGT 83 (H) 10/16/2017 03:55 PM    Alk. phosphatase 132 (H) 02/26/2018 05:29 AM    Bilirubin, direct 0.4 (H) 02/18/2018 09:47 AM    Bilirubin, total 0.4 02/26/2018 05:29 AM          Hypernatremia - IVF adjusted by Renal; resolved  Recent Labs      02/26/18   0529   NA  131*       Hypophosphatemia - replete prn  Lab Results   Component Value Date/Time    Calcium 7.7 (L) 02/26/2018 05:29 AM    Phosphorus 2.8 02/24/2018 03:55 AM       Hypomagnesemia - replete prn  Recent Labs      02/24/18   0355   MG  1.7   mag iv 2/23/2018      Code status: full  DVT prophylaxis: SCDs     Care Plan discussed with: Nurse. No family at bedside today  Disposition: TBD.           Hospital Problems  Date Reviewed: 2/19/2018          Codes Class Noted POA    * (Principal)Hypokalemia ICD-10-CM: E87.6  ICD-9-CM: 276.8  2/16/2018 Yes                Review of Systems:   No cp, no sob no n/v/d/, feels better,     Vital Signs:    Last 24hrs VS reviewed since prior progress note. Most recent are:  Visit Vitals    /80 (BP Patient Position: At rest)    Pulse (!) 115    Temp 98.2 °F (36.8 °C)    Resp 18    Ht 5' 1\" (1.549 m)    Wt 60.5 kg (133 lb 6.1 oz)    SpO2 95%    BMI 25.2 kg/m2       No intake or output data in the 24 hours ending 02/26/18 0803     Physical Examination:             Constitutional:  no distress, verbal cooperative. Moves all ext slightly      ENT:  Oral mucous moist, oropharynx benign. Neck supple,    Resp:  clear lungs bilaterally. NO rhonchi/rales. No accessory muscle use   CV:  Regular rhythm, normal rate, no murmurs, gallops, rubs    GI:  Soft, non distended, non tender.  normoactive bowel sounds, no hepatosplenomegaly     Musculoskeletal:  + edema, warm, 1+ pulses throughout    Neurologic:  non focal      Psych: pleasant not agitated, not anxious more alert     Skin:  ecchymosis       Data Review:    Review and/or order of clinical lab test      Labs:     Recent Labs 02/26/18 0529 02/25/18 0334   WBC  9.4  10.3   HGB  8.4*  8.0*   HCT  25.7*  24.5*   PLT  340  308     Recent Labs      02/26/18 0529 02/25/18 0334 02/24/18 0355   NA  131*  132*  134*   K  4.0  4.1  4.8   CL  98  98  100   CO2  27  27  28   BUN  6  6  5*   CREA  0.39*  0.37*  0.42*   GLU  99  97  95   CA  7.7*  7.8*  8.1*   MG   --    --   1.7   PHOS   --    --   2.8     Recent Labs      02/26/18 0529 02/25/18 0334 02/24/18 0355   SGOT  22  20  19   ALT  10*  8*  11*   AP  132*  129*  145*   TBILI  0.4  0.3  0.4   TP  4.8*  5.0*  5.2*   ALB  1.4*  1.4*  1.6*   GLOB  3.4  3.6  3.6     No results for input(s): INR, PTP, APTT in the last 72 hours. No lab exists for component: INREXT, INREXT   No results for input(s): FE, TIBC, PSAT, FERR in the last 72 hours. Lab Results   Component Value Date/Time    Folate 4.9 (L) 02/17/2018 05:54 AM      No results for input(s): PH, PCO2, PO2 in the last 72 hours. No results for input(s): CPK, CKNDX, TROIQ in the last 72 hours.     No lab exists for component: CPKMB  Lab Results   Component Value Date/Time    Cholesterol, total 202 (H) 03/27/2017 09:47 AM    HDL Cholesterol 76 03/27/2017 09:47 AM    LDL, calculated 109 (H) 03/27/2017 09:47 AM    Triglyceride 83 03/27/2017 09:47 AM     Lab Results   Component Value Date/Time    Glucose (POC) 114 (H) 02/18/2018 08:30 AM    Glucose (POC) 91 01/26/2018 04:08 PM    Glucose (POC) 107 (H) 01/26/2018 01:08 PM     Lab Results   Component Value Date/Time    Color DARK YELLOW 02/16/2018 08:30 PM    Appearance CLEAR 02/16/2018 08:30 PM    Specific gravity 1.013 02/16/2018 08:30 PM    pH (UA) 7.0 02/16/2018 08:30 PM    Protein TRACE (A) 02/16/2018 08:30 PM    Glucose NEGATIVE  02/16/2018 08:30 PM    Ketone TRACE (A) 02/16/2018 08:30 PM    Bilirubin Negative 09/28/2012 08:58 AM    Urobilinogen 0.2 02/16/2018 08:30 PM    Nitrites NEGATIVE  02/16/2018 08:30 PM    Leukocyte Esterase NEGATIVE  02/16/2018 08:30 PM Epithelial cells FEW 02/16/2018 08:30 PM    Bacteria 1+ (A) 02/16/2018 08:30 PM    WBC 0-4 02/16/2018 08:30 PM    RBC 0-5 02/16/2018 08:30 PM         Medications Reviewed:     Current Facility-Administered Medications   Medication Dose Route Frequency    TPN ADULT - PERIPHERAL   IntraVENous CONTINUOUS    TPN ADULT - PERIPHERAL   IntraVENous CONTINUOUS    thiamine (B-1) tablet 100 mg  100 mg Oral DAILY    buPROPion (WELLBUTRIN) tablet 75 mg  75 mg Per NG tube BID    nicotine (NICODERM CQ) 21 mg/24 hr patch 1 Patch  1 Patch TransDERmal DAILY    balsam peru-castor oil (VENELEX)  mg/gram ointment   Topical BID    sodium chloride (NS) flush 10-30 mL  10-30 mL InterCATHeter PRN    sodium chloride (NS) flush 10 mL  10 mL InterCATHeter PRN    sodium chloride (NS) flush 10-40 mL  10-40 mL InterCATHeter Q8H    alteplase (CATHFLO) 1 mg in sterile water (preservative free) 1 mL injection  1 mg InterCATHeter PRN    0.9% sodium chloride infusion 250 mL  250 mL IntraVENous PRN    LORazepam (ATIVAN) injection 1 mg  1 mg IntraVENous Q2H PRN    zolpidem (AMBIEN) tablet 5 mg  5 mg Oral QHS PRN    calcium-vitamin D (OS-DELORIS) 500 mg-200 unit tablet  1 Tab Oral BID WITH MEALS    sodium chloride (NS) flush 5-10 mL  5-10 mL IntraVENous PRN    acetaminophen (TYLENOL) tablet 650 mg  650 mg Oral Q4H PRN     ______________________________________________________________________  EXPECTED LENGTH OF STAY: 4d 21h  ACTUAL LENGTH OF STAY:          10                 Chayo Lacy MD

## 2018-02-26 NOTE — PROGRESS NOTES
11:39 AM  Patient reviewed in IDR rounds. PT/OT consulted and will assess for needs. Patient is open to [phone] for services. Disposition TBD. Patient not medically stable for discharge at this time. CM will continue to follow and assist with disposition needs as they arise.     LATASHA Pisano/LAILA

## 2018-02-26 NOTE — PROGRESS NOTES
NUTRITION COMPLETE ASSESSMENT    RECOMMENDATIONS:   1. Consider increasing TPN infusion to optimize use of parenteral nutrition support  Goal TPN: 5%AA D20 @ 50 mL/hr + 20% lipids, 250 mL 3x/week    2. Adjust diet order to Dental Soft if she continues to have no issues with swallow    3. Consider GI consult? Pt with long term diarrhea/constipation and current flexiseal output is watery/yellow-clear. Lactulose is no longer ordered. Interventions/Plan:   Food/Nutrient Delivery: TPN    Assessment:   Reason for Assessment:   [x]Reassessment/New TPN     Diet: Dysphagia 3    Nutritionally Significant Medications: [x] Reviewed & Includes: thiamine (100 mg), os-ramses 500mg-200IU    TPN: 4.25%AA D10 @ 42 mL/hr + MVI, trace elements  Meal Intake: Patient Vitals for the past 100 hrs:   % Diet Eaten   02/24/18 1836 0 %   02/24/18 1330 0 %   02/24/18 1006 7 %   02/23/18 1151 0 %     Subjective:  Pt with  at the bedside. She admits that her intake at home was limited.  states she will report to him that she ate \"really well\" on any given day, but sometimes this only includes a few bites of an apple. Pt also admits to having constipation/diarrhea PTA. \"Sometimes she wouldn't go for awhile, then she'd call me to bring tl some imodium. \" Pt states, \"I'm lactose intolerant, but sometimes I just decide to eat dairy and deal with the consequences. \"    Contents of flexiseal appear watery and yellow/clear. Objective:  Chart reviewed, discussed with RN and team during interdisciplinary rounds. Pt's NGT removed yesterday and tube feedings discontinued; however, TPN (PPN mixture) ordered for nutrition support. Pt denies any issues with chew/swallow. She complains of early satiety, but no other issues with eating. Pt started on Dysphagia 3 diet (ordered by MD on 2/25/18) after passing STAND swallow test.  RN to witness po intake does and would recommend adjusting to Dental Soft to allow for more options. Pt is agreeable to ONS.  has purchased these at home, but the pt has never tried. Discussed taking sips with meds in the morning and during commercials while watching TV in the room. Discussed her risk for malnutrition.    TPN (PPN mixture) is providing 514 kcal, 43 g protein and 1008 mL-- meeting 39% and 78% of estimated kcal and protein needs, respectively. If parenteral nutrition to be ongoing, would recommend goal of 5%AA D20 @ 50 mL/hr + 20% lipids, 250 mL 3x/week-- providing a daily average of 170 kcal, 60 g protein in 1200 mL (1450 mL on lipid days)-- meeting 96% and 100% of estimated kcal and protein needs, respectively. Tube feeding was previously providing 1440 kcal, 60 g protein and 1452 mL total free water (tf + flush)-- meeting 100% of estimated needs. Pt still with severe muscle wasting. Patient meets criteria for Severe Protein Calorie Malnutrition as evidenced by:   ASPEN Malnutrition Criteria  Acute Illness, Chronic Illness, or Social/Enviornmental: Chronic illness  Energy Intake: Less than/equal to 75% of est energy req for greater than/equal to 1 month  Weight Loss: Greater than 10% x 6 mos  Body Fat: Mild  Muscle Mass: Severe  Fluid Accumulation: Severe  ASPEN Malnutrition Score - Chronic Illness: 25  Chronic Illness - Malnutrition Diagnosis: Severe malnutrition. Ammonia level to be checked with labs tomorrow morning. Estimated Nutrition Needs:   Kcals/day: 1325 Kcals/day (9615-4160 (MSJ x 1.3-1.4)  Protein: 55 g (55-64 (1.2-1.4g/kg)  Fluid:  (1 ml/kcal)     Based On: [facility]  Weight Used: Actual wt (46 kg (standing scale 2/16)    Pt expected to meet estimated nutrient needs:  []   Yes     [x]  No    Nutrition Diagnosis:   1. Inadequate oral intake related to alcohol withdrawal as evidenced by NPO d/t AMS. 2. Unintended weight loss related to chronic alcohol abuse as evidenced by 30# or 23% wt loss in past 6 months.     Goals:     Pt to meet at least 75% of estimated needs via TPN over the next 5-7 days     Monitoring & Evaluation:    - Enteral/parenteral nutrition intake   - Electrolyte and renal profile, Weight/weight change     Previous Nutrition Goals Met:  N/A  Previous Recommendations:      N/A    Education & Discharge Needs:   [x] None Identified   [] Identified and addressed    [x] Participated in care plan, discharge planning, and/or interdisciplinary rounds        Cultural, Sabianism and ethnic food preferences identified:   None    Skin Integrity: []Intact  [x]Other: see pressure injury documentation  Edema: []None [x]Other: 1-4+ (4+ LLE, RLE)  Last BM: 2/25/18 (flexiseal)  Food Allergies: [x]None []Other    Anthropometrics:    Weight Loss Metrics 2/26/2018 2/16/2018 2/16/2018 2/16/2018 2/7/2018 1/29/2018 1/26/2018   Today's Wt 133 lb 6.1 oz - 102 lb - 133 lb 99 lb -   BMI - 25.2 kg/m2 - 19.27 kg/m2 25.13 kg/m2 - 18.71 kg/m2      Last 3 Recorded Weights in this Encounter    02/24/18 0301 02/25/18 0321 02/26/18 0541   Weight: 65.8 kg (145 lb 1 oz) 64.4 kg (141 lb 15.6 oz) 60.5 kg (133 lb 6.1 oz)      Weight Source: Bed  Height: 5' 1\" (154.9 cm),    Body mass index is 25.2 kg/(m^2).   IBW : 47.6 kg (105 lb), % IBW (Calculated): 131.23 %   ,      Labs:  Lab Results   Component Value Date/Time    Sodium 131 (L) 02/26/2018 05:29 AM    Potassium 4.0 02/26/2018 05:29 AM    Chloride 98 02/26/2018 05:29 AM    CO2 27 02/26/2018 05:29 AM    Glucose 99 02/26/2018 05:29 AM    BUN 6 02/26/2018 05:29 AM    Creatinine 0.39 (L) 02/26/2018 05:29 AM    Calcium 7.7 (L) 02/26/2018 05:29 AM    Magnesium 1.7 02/24/2018 03:55 AM    Phosphorus 2.8 02/24/2018 03:55 AM    Albumin 1.4 (L) 02/26/2018 05:29 AM     Lab Results   Component Value Date/Time    Hemoglobin A1c 4.3 (L) 01/25/2018 01:17 PM     Lab Results   Component Value Date/Time    Glucose 99 02/26/2018 05:29 AM    Glucose (POC) 111 (H) 02/26/2018 11:55 AM      Lab Results   Component Value Date/Time    ALT (SGPT) 10 (L) 02/26/2018 05:29 AM    AST (SGOT) 22 02/26/2018 05:29 AM    GGT 83 (H) 10/16/2017 03:55 PM    Alk.  phosphatase 132 (H) 02/26/2018 05:29 AM    Bilirubin, direct 0.4 (H) 02/18/2018 09:47 AM    Bilirubin, total 0.4 02/26/2018 05:29 AM      [address]

## 2018-02-26 NOTE — PROGRESS NOTES
02/26/18 1450   Vital Signs   Temp 99 °F (37.2 °C)   Temp Source Oral   Pulse (Heart Rate) (!) 115   Heart Rate Source Monitor   Resp Rate 17   O2 Sat (%) 93 %   Level of Consciousness Alert   /77   MAP (Calculated) 96   BP 1 Method Automatic   BP 1 Location Right arm   BP Patient Position At rest   MEWS Score 3   HR elevated, follows trend.   Did not alert MD.

## 2018-02-26 NOTE — PROGRESS NOTES
[employer]  Medical Oncology at [facility]    Hematology / Oncology Progress Note    Reason for Visit:   Elsa Cordova is a 63 y.o. female who is seen in consultation at the request of Dr. Jerlean Fabry for evaluation of anemia. History of Present Illness:   Ms. Jean Claude Garcia is a 64 y/o female admitted with weakness and hypokalemia (K 1.9). She had a recent hospital admission at [address] [place] [address] for the same reason on 1/26/18. She has been feeling poorly for the past few days with bodyaches, weakness, and decreased p.o. intake. At PCP's office, i-STAT Hgb was 4, and patient was directed to the ER. Upon arrival in ER, Hgb was 8.2, potassium 1.9, Mg 1.4, lactate 3.4, SBP in 70s-90s. While at [address] [place] [address], she received 2 units of PRBCs. Given edema, she was treated with Lasix while there. She also has right arm swelling and was found here to have a RUE superficial venous thrombosis. She also notes some mild intermittent diarrhea. No fevers, dysuria, cough, abdom pain. Of note, patient was seen by Hematology while admitted to [address] [place] [address] . The reason for consultation at that time was abnormal CT scan (mediastinal LAD, liver lesion, diffuse colitis/ bowel changes, bone lesions/ ovary cyst/ fibroids.) Dr. Adelso Zuniga and Dr. Lv Jennings in [facility] [place] evaluated patient at that time and felt her mediastinal LAD was likely due to sarcoidosis rather than malignancy. Patient underwent bronchoscopy and biopsy of LN by Dr. Gaviota Rosales. Pathology findings were inconclusive (peripheral blood and scattered ciliated bronchial lining cells. ). For pelvic mass along with weight loss, Gyn was consulted but stated that this was unlikely to be 2/2 malignancy, corroborated by normal CA-125 level. She also underwent EGD by Dr. Yan Trinidad with finding of a gastric antrum ulcer. Biopsy revealed benign mucosa with mild chronic active gastritis and surface erosion, no H. Pylori. Review of labs here reveals drop in Hgb from 8-9 range to now 5.4. Iron sat elevated > 80%. Although VitB12 level was recently checked and normal, there is a low VitB1 (thiamine) level from 9/2017. Home med list includes thiamine tablets 100mg/d. When asked about alcohol intake, she states she drinks a fifth of liquor several times a month, but her  states she drinks this amount daily for the past several years. He states she has had less alcohol in the past 2-3 weeks due to feeling sick. She denies any tremors, seizures or other withdrawal symptoms when she goes without a drink. No recent viral infections or exposures per patient. Her maternal uncle had lymphoma. No personal history of cancer or hemoglobinopathies. Interval History: Patient is alert and oriented this morning. Counts have remained stable. Denies any bleeding to include from her nares, gums, urine, or stool. No new complaints.     Past Medical History:   Diagnosis Date    Alcohol abuse     GI (gastrointestinal bleed)     Insomnia 11/09    Iron deficiency anemia 04/2004    PPD positive     treated w/ INH- tests since have been negative    Pure hypercholesterolemia     Scoliosis 12/28/15    [facility]    Tobacco dependence     Unspecified essential hypertension     Uterine bleeding, dysfunctional 2/12    Uterine fibroid       Past Surgical History:   Procedure Laterality Date    COLONOSCOPY N/A 1/31/2018    COLONOSCOPY performed by Taye Larry MD at [facility] HX CHOLECYSTECTOMY      HX COLONOSCOPY  07/01/2013    [facility], repeat 10 years    HX ORTHOPAEDIC      Shattered bilateral ankles-metal plates & screws    HX OTHER SURGICAL      Benign cyst removal- L. foot    LEG/ANKLE SURGERY PROC UNLISTED        Social History   Substance Use Topics    Smoking status: Current Every Day Smoker     Packs/day: 0.50     Years: 17.00    Smokeless tobacco: Never Used    Alcohol use 1.2 oz/week     1 Cans of beer, 1 Shots of liquor per week Comment: Socially      Family History   Problem Relation Age of Onset    Hypertension Mother     Diabetes Mother     Cancer Mother      breast    Heart Disease Father      MI 42's    Kidney Disease Father     Hypertension Son      Current Facility-Administered Medications   Medication Dose Route Frequency    TPN ADULT - PERIPHERAL   IntraVENous CONTINUOUS    TPN ADULT - PERIPHERAL   IntraVENous CONTINUOUS    thiamine (B-1) tablet 100 mg  100 mg Oral DAILY    buPROPion (WELLBUTRIN) tablet 75 mg  75 mg Per NG tube BID    nicotine (NICODERM CQ) 21 mg/24 hr patch 1 Patch  1 Patch TransDERmal DAILY    balsam peru-castor oil (VENELEX)  mg/gram ointment   Topical BID    sodium chloride (NS) flush 10-30 mL  10-30 mL InterCATHeter PRN    sodium chloride (NS) flush 10 mL  10 mL InterCATHeter PRN    sodium chloride (NS) flush 10-40 mL  10-40 mL InterCATHeter Q8H    alteplase (CATHFLO) 1 mg in sterile water (preservative free) 1 mL injection  1 mg InterCATHeter PRN    0.9% sodium chloride infusion 250 mL  250 mL IntraVENous PRN    LORazepam (ATIVAN) injection 1 mg  1 mg IntraVENous Q2H PRN    zolpidem (AMBIEN) tablet 5 mg  5 mg Oral QHS PRN    calcium-vitamin D (OS-DELORIS) 500 mg-200 unit tablet  1 Tab Oral BID WITH MEALS    sodium chloride (NS) flush 5-10 mL  5-10 mL IntraVENous PRN    acetaminophen (TYLENOL) tablet 650 mg  650 mg Oral Q4H PRN      Allergies   Allergen Reactions    Robaxin [Methocarbamol] Hives, Rash and Itching     Red splotches    Statins-Hmg-Coa Reductase Inhibitors Myalgia    Codeine Itching    Neosporin [Neomycin-Bacitracin-Polymyxin] Rash       Review of Systems: A complete review of systems was obtained, negative except as described above.     Physical Exam:     Visit Vitals    /77 (BP 1 Location: Right arm, BP Patient Position: At rest)    Pulse (!) 115    Temp 99 °F (37.2 °C)    Resp 17    Ht 5' 1\" (1.549 m)    Wt 133 lb 6.1 oz (60.5 kg)    SpO2 93%    BMI 25.2 kg/m2     ECOG PS: 3  General: No acute distress, alert, pleasant  Eyes: PERRL, anicteric sclerae  HENT: Atraumatic with normal appearance of ears and nose; OP clear  Neck: Supple; no thyromegaly   Lymphatic: No cervical, supraclavicular, or inguinal adenopathy  Respiratory: clear anterior lung fields; normal effort at rest  CV: tachycardia, regular rhythm, no murmurs. RUE and BLE edema  GI: Soft, nontender, nondistended, no masses, no hepatomegaly, no splenomegaly  MS: unable to assess gait. Digits without clubbing or cyanosis. Skin: No rashes, ecchymoses, or petechiae. Normal temperature, turgor, and texture. Neuro/Psych: Altered, conversant. Generalized weakness      Results:     Lab Results   Component Value Date/Time    WBC 9.4 02/26/2018 05:29 AM    HGB 8.4 (L) 02/26/2018 05:29 AM    HCT 25.7 (L) 02/26/2018 05:29 AM    PLATELET 848 17/44/5814 05:29 AM    MCV 87.1 02/26/2018 05:29 AM    ABS.  NEUTROPHILS 7.2 02/26/2018 05:29 AM    Hemoglobin (POC) 4.9 02/16/2018 02:46 PM    Hemoglobin (POC) 11.2 (L) 01/26/2018 04:08 PM    Hematocrit (POC) 33 (L) 01/26/2018 04:08 PM     Lab Results   Component Value Date/Time    Sodium 131 (L) 02/26/2018 05:29 AM    Potassium 4.0 02/26/2018 05:29 AM    Chloride 98 02/26/2018 05:29 AM    CO2 27 02/26/2018 05:29 AM    Glucose 99 02/26/2018 05:29 AM    BUN 6 02/26/2018 05:29 AM    Creatinine 0.39 (L) 02/26/2018 05:29 AM    GFR est AA >60 02/26/2018 05:29 AM    GFR est non-AA >60 02/26/2018 05:29 AM    Calcium 7.7 (L) 02/26/2018 05:29 AM    Sodium (POC) 144 01/26/2018 04:08 PM    Potassium (POC) <2.0 (LL) 01/26/2018 04:08 PM    Chloride (POC) 98 01/26/2018 04:08 PM    Glucose (POC) 111 (H) 02/26/2018 11:55 AM    BUN (POC) <3 (L) 01/26/2018 04:08 PM    Creatinine (POC) 0.6 01/26/2018 04:08 PM    Calcium, ionized (POC) 1.06 (L) 01/26/2018 04:08 PM     Lab Results   Component Value Date/Time    Bilirubin, total 0.4 02/26/2018 05:29 AM    ALT (SGPT) 10 (L) 02/26/2018 05:29 AM AST (SGOT) 22 02/26/2018 05:29 AM    Alk. phosphatase 132 (H) 02/26/2018 05:29 AM    Protein, total 4.8 (L) 02/26/2018 05:29 AM    Albumin 1.4 (L) 02/26/2018 05:29 AM    Globulin 3.4 02/26/2018 05:29 AM       Assessment and Recommendations:   Mango Carrillo is a 63 y.o. female admitted with weakness, fatigue, hypokalemia and anemia. 1. Normocytic anemia: Usually a sudden drop in Hgb from 8 to 5 within 1-2 days is due to blood loss. However, patient,  and nursing deny any obvious blood loss. No intense abdominal pain. Patient was found to have a gastric ulcer during admission last month. I suspect she has a combination of blood loss and bone marrow suppression from alcohol abuse. -- Bone marrow biopsy outpatient if clinically indicated as HgB has remained stable  -- Agree with transfusion for goal Hgb > 7  -- Follow up outpatient in a month with repeat labs/further plans as indicated    2. Alcohol abuse: Patient has been drinking liquor daily for the past several years. This could explain her weight loss, hypoalbuminemia, decreased PO intake, thiamine deficiency. Coags mildly elevated likely due to liver dysfunction. Recent CT comments on diffuse hypodensity. -- Needs alcohol cessation. -- On Ativan with CIWA protocol for withdrawal symptoms  -- Liver ultrasound with diffusely hepatic echogenicity compatible with hepatic steatosis. No sonographic evidence of mass. 3. Hypokalemia: Resolved. 4. Gastric ulcer: Recommend she avoid NSAIDs and take PPI daily. 5. Mediastinal LAD: Possibly related to sarcoidosis given CT report/addendum from last hospital admission. Bronch biopsy was inconclusive. Discussed with Dr. Mitchell Briscoe. We will continue to follow as needed. Will hold on plans for bone marrow at this time and will perform outpatient if clinically indicated. Counts have remained stable. Will follow peripherally from here on out. She will follow up in a month to recheck labs/decide on marrow.  Please call with any questions. Pt seen today in conjunction with NP SIMRAN Jesus  Reviewed above with pt and family at bedside today. Can f/u as outpt.        Signed By: Aidan Sarabia DO     February 26, 2018

## 2018-02-27 ENCOUNTER — APPOINTMENT (OUTPATIENT)
Dept: GENERAL RADIOLOGY | Age: 58
DRG: 640 | End: 2018-02-27
Attending: HOSPITALIST
Payer: COMMERCIAL

## 2018-02-27 LAB
ALBUMIN SERPL-MCNC: 1.4 G/DL (ref 3.5–5)
ALBUMIN/GLOB SERPL: 0.4 {RATIO} (ref 1.1–2.2)
ALP SERPL-CCNC: 115 U/L (ref 45–117)
ALT SERPL-CCNC: 10 U/L (ref 12–78)
AMMONIA PLAS-SCNC: 33 UMOL/L
ANION GAP SERPL CALC-SCNC: 6 MMOL/L (ref 5–15)
AST SERPL-CCNC: 18 U/L (ref 15–37)
BASOPHILS # BLD: 0 K/UL (ref 0–0.1)
BASOPHILS NFR BLD: 0 % (ref 0–1)
BILIRUB SERPL-MCNC: 0.3 MG/DL (ref 0.2–1)
BUN SERPL-MCNC: 4 MG/DL (ref 6–20)
BUN/CREAT SERPL: 17 (ref 12–20)
CALCIUM SERPL-MCNC: 7.9 MG/DL (ref 8.5–10.1)
CHLORIDE SERPL-SCNC: 99 MMOL/L (ref 97–108)
CO2 SERPL-SCNC: 27 MMOL/L (ref 21–32)
CREAT SERPL-MCNC: 0.24 MG/DL (ref 0.55–1.02)
DIFFERENTIAL METHOD BLD: ABNORMAL
EOSINOPHIL # BLD: 0.1 K/UL (ref 0–0.4)
EOSINOPHIL NFR BLD: 1 % (ref 0–7)
ERYTHROCYTE [DISTWIDTH] IN BLOOD BY AUTOMATED COUNT: 18.1 % (ref 11.5–14.5)
GLOBULIN SER CALC-MCNC: 3.7 G/DL (ref 2–4)
GLUCOSE SERPL-MCNC: 103 MG/DL (ref 65–100)
HCT VFR BLD AUTO: 24.2 % (ref 35–47)
HGB BLD-MCNC: 8 G/DL (ref 11.5–16)
IMM GRANULOCYTES # BLD: 0 K/UL
IMM GRANULOCYTES NFR BLD AUTO: 0 %
LYMPHOCYTES # BLD: 1.5 K/UL (ref 0.8–3.5)
LYMPHOCYTES NFR BLD: 16 % (ref 12–49)
MAGNESIUM SERPL-MCNC: 1.6 MG/DL (ref 1.6–2.4)
MCH RBC QN AUTO: 28.8 PG (ref 26–34)
MCHC RBC AUTO-ENTMCNC: 33.1 G/DL (ref 30–36.5)
MCV RBC AUTO: 87.1 FL (ref 80–99)
MONOCYTES # BLD: 0.7 K/UL (ref 0–1)
MONOCYTES NFR BLD: 7 % (ref 5–13)
NEUTS SEG # BLD: 7 K/UL (ref 1.8–8)
NEUTS SEG NFR BLD: 76 % (ref 32–75)
NRBC # BLD: 0 K/UL (ref 0–0.01)
NRBC BLD-RTO: 0 PER 100 WBC
PLATELET # BLD AUTO: 350 K/UL (ref 150–400)
PLATELET COMMENTS,PCOM: ABNORMAL
PMV BLD AUTO: 10.6 FL (ref 8.9–12.9)
POTASSIUM SERPL-SCNC: 3.4 MMOL/L (ref 3.5–5.1)
PROT SERPL-MCNC: 5.1 G/DL (ref 6.4–8.2)
RBC # BLD AUTO: 2.78 M/UL (ref 3.8–5.2)
RBC MORPH BLD: ABNORMAL
SODIUM SERPL-SCNC: 132 MMOL/L (ref 136–145)
WBC # BLD AUTO: 9.3 K/UL (ref 3.6–11)

## 2018-02-27 PROCEDURE — 65270000029 HC RM PRIVATE

## 2018-02-27 PROCEDURE — 97110 THERAPEUTIC EXERCISES: CPT

## 2018-02-27 PROCEDURE — 83735 ASSAY OF MAGNESIUM: CPT | Performed by: HOSPITALIST

## 2018-02-27 PROCEDURE — 82140 ASSAY OF AMMONIA: CPT | Performed by: INTERNAL MEDICINE

## 2018-02-27 PROCEDURE — 3331090002 HH PPS REVENUE DEBIT

## 2018-02-27 PROCEDURE — 97530 THERAPEUTIC ACTIVITIES: CPT

## 2018-02-27 PROCEDURE — 85025 COMPLETE CBC W/AUTO DIFF WBC: CPT | Performed by: INTERNAL MEDICINE

## 2018-02-27 PROCEDURE — 36415 COLL VENOUS BLD VENIPUNCTURE: CPT | Performed by: INTERNAL MEDICINE

## 2018-02-27 PROCEDURE — 74011000250 HC RX REV CODE- 250: Performed by: HOSPITALIST

## 2018-02-27 PROCEDURE — 74011250637 HC RX REV CODE- 250/637: Performed by: INTERNAL MEDICINE

## 2018-02-27 PROCEDURE — 80053 COMPREHEN METABOLIC PANEL: CPT | Performed by: INTERNAL MEDICINE

## 2018-02-27 PROCEDURE — 74011000258 HC RX REV CODE- 258: Performed by: HOSPITALIST

## 2018-02-27 PROCEDURE — 74011250636 HC RX REV CODE- 250/636: Performed by: HOSPITALIST

## 2018-02-27 PROCEDURE — 3331090001 HH PPS REVENUE CREDIT

## 2018-02-27 PROCEDURE — 73600 X-RAY EXAM OF ANKLE: CPT

## 2018-02-27 PROCEDURE — 73620 X-RAY EXAM OF FOOT: CPT

## 2018-02-27 PROCEDURE — 74011250637 HC RX REV CODE- 250/637: Performed by: HOSPITALIST

## 2018-02-27 RX ORDER — MAGNESIUM SULFATE HEPTAHYDRATE 40 MG/ML
2 INJECTION, SOLUTION INTRAVENOUS ONCE
Status: COMPLETED | OUTPATIENT
Start: 2018-02-27 | End: 2018-02-27

## 2018-02-27 RX ORDER — NYSTATIN 100000 [USP'U]/G
POWDER TOPICAL
Status: DISCONTINUED | OUTPATIENT
Start: 2018-02-27 | End: 2018-03-02 | Stop reason: HOSPADM

## 2018-02-27 RX ORDER — POTASSIUM CHLORIDE 14.9 MG/ML
10 INJECTION INTRAVENOUS ONCE
Status: COMPLETED | OUTPATIENT
Start: 2018-02-27 | End: 2018-02-27

## 2018-02-27 RX ADMIN — BUPROPION HYDROCHLORIDE 75 MG: 75 TABLET, FILM COATED ORAL at 17:32

## 2018-02-27 RX ADMIN — Medication 10 ML: at 07:00

## 2018-02-27 RX ADMIN — Medication 100 MG: at 09:32

## 2018-02-27 RX ADMIN — CALCIUM GLUCONATE: 94 INJECTION, SOLUTION INTRAVENOUS at 18:38

## 2018-02-27 RX ADMIN — CASTOR OIL AND BALSAM, PERU: 788; 87 OINTMENT TOPICAL at 09:46

## 2018-02-27 RX ADMIN — Medication 10 ML: at 21:00

## 2018-02-27 RX ADMIN — CALCIUM CARBONATE-VITAMIN D TAB 500 MG-200 UNIT 1 TABLET: 500-200 TAB at 17:32

## 2018-02-27 RX ADMIN — BUPROPION HYDROCHLORIDE 75 MG: 75 TABLET, FILM COATED ORAL at 09:32

## 2018-02-27 RX ADMIN — CASTOR OIL AND BALSAM, PERU: 788; 87 OINTMENT TOPICAL at 17:32

## 2018-02-27 RX ADMIN — MAGNESIUM SULFATE HEPTAHYDRATE 2 G: 40 INJECTION, SOLUTION INTRAVENOUS at 19:33

## 2018-02-27 RX ADMIN — POTASSIUM CHLORIDE 10 MEQ: 200 INJECTION, SOLUTION INTRAVENOUS at 09:33

## 2018-02-27 RX ADMIN — CALCIUM CARBONATE-VITAMIN D TAB 500 MG-200 UNIT 1 TABLET: 500-200 TAB at 07:00

## 2018-02-27 RX ADMIN — Medication 10 ML: at 13:59

## 2018-02-27 RX ADMIN — ZOLPIDEM TARTRATE 5 MG: 5 TABLET ORAL at 22:06

## 2018-02-27 NOTE — PROGRESS NOTES
Bedside shift change report given to [address] [place] [address] Drive (oncoming nurse) by Gilmer Carter (offgoing nurse). Report included the following information SBAR, Kardex, Intake/Output, MAR and Recent Results.

## 2018-02-27 NOTE — PROGRESS NOTES
Spiritual Care Partner Volunteer visited patient in Rm 611 on 2/27/2018.   Documented by:  Chaplain Acevedo MDiv, MS, [address] [facility]  [address] ([facility])

## 2018-02-27 NOTE — PROGRESS NOTES
Problem: Mobility Impaired (Adult and Pediatric)  Goal: *Acute Goals and Plan of Care (Insert Text)  Physical Therapy Goals  Initiated 2/22/2018  1. Patient will move from supine to sit and sit to supine , scoot up and down and roll side to side in bed with minimal assistance/contact guard assist within 7 day(s). 2.  Patient will transfer from bed to chair and chair to bed with minimal assistance/contact guard assist using the least restrictive device within 7 day(s). 3.  Patient will perform sit to stand with minimal assistance/contact guard assist within 7 day(s). 4.  Patient will ambulate with minimal assistance/contact guard assist for 50 feet with the least restrictive device within 7 day(s). 5.  Patient will ascend/descend 4 stairs with one handrail(s) with minimal assistance/contact guard assist within 7 day(s). physical Therapy TREATMENT  Patient: Mango Carrillo (63 y.o. female)  Date: 2/27/2018  Diagnosis: Hypokalemia Hypokalemia       Precautions: Fall, Seizure, Bed Alarm  Chart, physical therapy assessment, plan of care and goals were reviewed. ASSESSMENT:  Patient agreeable to treatment but reluctant to stand and mobilize from edge of bed d/t c/o bilateral foot pain with motion and WB. Patient completed seated LE exercises with good participation before attempting to stand. Sit to stand with maximum assistance to RW from edge of bed. She was able to stand for ~20 seconds before c/o severe pain and demanding to sit back to bed. Returned to supine in left sidelying to offload buttocks. Patient reports having had difficult walking prior to admission but is remains grossly below her baseline functional level and demonstrates poor activity tolerance. Recommend discharge to a rehab setting when medically stable.   Progression toward goals:  [x]    Improving appropriately and progressing toward goals  []    Improving slowly and progressing toward goals  []    Not making progress toward goals and plan of care will be adjusted     PLAN:  Patient continues to benefit from skilled intervention to address the above impairments. Continue treatment per established plan of care. Discharge Recommendations:  Rehab  Further Equipment Recommendations for Discharge:  to be determined       SUBJECTIVE:   Patient stated I need to sit down. My feet hurt! Brian Fonseca    OBJECTIVE DATA SUMMARY:   Critical Behavior:  Neurologic State: Alert  Orientation Level: Oriented X4  Cognition: Follows commands, Appropriate decision making, Appropriate for age attention/concentration, Appropriate safety awareness  Safety/Judgement: Decreased awareness of environment, Decreased awareness of need for assistance, Decreased awareness of need for safety, Decreased insight into deficits  Functional Mobility Training:  Bed Mobility:  Rolling: Moderate assistance  Supine to Sit: Moderate assistance              Transfers:  Sit to Stand: Moderate assistance; Additional time  Stand to Sit: Moderate assistance                             Balance:  Sitting: Impaired  Sitting - Static: Fair (occasional)  Sitting - Dynamic: Fair (occasional)  Standing: Impaired  Standing - Static: Poor  Standing - Dynamic : Poor  Ambulation/Gait Training:   Standing at bedside to RW with moderate assist. Side step x2 towards [facility] before reporting pain and returning to sitting.   Stairs:            Functional Measure:    Neuro Re-Education:    Therapeutic Exercises:   Seated ankle pumps, LAQ, marching x10 each  Pain:  Pain Scale 1: Numeric (0 - 10)  Pain Intensity 1: 8  Pain Location 1: Ankle  Pain Orientation 1: Right;Left  Pain Description 1: Aching  Pain Intervention(s) 1: Declines  Activity Tolerance:       After treatment:   [x]    Patient left in no apparent distress sitting up in chair  []    Patient left in no apparent distress in bed  [x]    Call bell left within reach  [x]    Nursing notified  []    Caregiver present  []    Bed alarm activated    COMMUNICATION/COLLABORATION:   The patients plan of care was discussed with: Registered Nurse    Kayla Pinto PT, DPT   Time Calculation: 27 mins

## 2018-02-27 NOTE — PROGRESS NOTES
Hospitalist Progress Note  Jah Herrera MD  Answering service: [phone] from in house phone  Cell: [phone]      Date of Service:  2018  NAME:  Jessica Richardson  :  1960  MRN:  [id number]      Admission Summary:   A 64 yo woman with HTN, HLD, DUB, iron deficiency anemia, severe protein-calorie malnutrition, recently diagnosed colitis, EtOH abuse, and hx of GI bleed was sent by her PCP to the ED on 18 for fatigue, generalized weakness, and right arm swelling. She was recently hospitalized at [facility] 18 - 18 for hypokalemia and anemia. She was admitted to the ICU for severe hypokalemia, hypotension, and chronic RUE superficial venous thrombosis.      Interval history / Subjective:   Right ankle pain, no chest pain      Assessment & Plan:     Severe hypokalemia (POA)   - improving but still low, replete until K+ >=4 and magnesium >=2  - urine/serum studies ordered  - Renal following  - likely due to anorexia and malabsorption and recent use of diuretics; not resumed on admission  - corrected     EtOH withdrawal with chronic alcohol abuse   - now on CIWA protocol and banana bag; prn Ativan  - cont on CIWA  - more alert, passed bedside swallow eval     Hepatic encephalopathy due to hyperammonemia   - start lactulose retention enemas and can change to per NGT once placed  -repeat ammonia in am    Acute on chronic anemia (POA)   - iron WNL, folate low, B12 low-normal  - FOBT negative  - s/p 2 units PRBC transfusion since admission   - Hematology consulted by [facility] due to recent hx of possible occult malignancy  - per  at bedside, EGD, colonoscopy, bone scan all unremarkable recently  - recent PET was abnormal  - stopped SC heparin and change to SCDs  - IR unable to do CT-guided BM bx due to unstable airway; re-scheduled    Right ankle pain unclear etiology  -x ray of right ankle and foot  -venous duplex b/l LEs 2/19 negative    Severe protein-calorie malnutrition  -nutrition consulted  -on TPN    Chronic RUE VTE  - no indication for [facility] for RUE VTE as pt had IV in that arm during previous hospitalization      Generalized weakness/debility with EtOH abuse (POA)   - likely due to anorexia and EtOH abuse  - PT/OT/ST evals, Nutrition eval  - banana bag  - non verbal 2/20/2018 improves by 2/22/2018     Hypovolemic shock (POA)   -hx of HTN  - resolved s/p pressor x4h and IVF  - TTE 2/17 EF 55-60%, no RWMA, mild-mod TR, small pericardial effusion    Ruled out RLL HCAP  - BCx 2/16 NGTD  - started empiric Zosyn/vancomycin 2/17  - s/p empiric vancomycin 2/17-2/19   - BCx 2/16 NGTD  - MRSA swab negative  - sputum Cx ordered but no sample sent yet  - urine strep/legionella antigens pending  - flu swab negative  - cxr from 2/17 to 2/19 cleared as to RLL findings, not c/c pna, all abx dc/d on assumption of care 2/22. Mild transaminitis - elevated AST likely due to EtOH  - RUQ US 2/18 hepatic steatosis    Hypernatremia   -resolved, now hyponatremia    Hypophosphatemia   -resolved    Hypomagnesemia   -iv magnesium 2 gm o 2/27 because of ongoing hypokalemia, to keep mg >2.0    Code status: Full Code  DVT prophylaxis: SCD    Care Plan discussed with: Patient/Family, Nurse and   Disposition: TBD     Hospital Problems  Date Reviewed: 2/19/2018          Codes Class Noted POA    * (Principal)Hypokalemia ICD-10-CM: E87.6  ICD-9-CM: 276.8  2/16/2018 Yes                  Vital Signs:    Last 24hrs VS reviewed since prior progress note.  Most recent are:  Visit Vitals    /83 (BP 1 Location: Right arm, BP Patient Position: At rest;Lying left side)    Pulse 95    Temp 99.2 °F (37.3 °C)    Resp 18    Ht 5' 1\" (1.549 m)    Wt 61.5 kg (135 lb 9.3 oz)    SpO2 94%    BMI 25.62 kg/m2         Intake/Output Summary (Last 24 hours) at 02/27/18 1116  Last data filed at 02/26/18 2202   Gross per 24 hour   Intake                0 ml   Output             1600 ml   Net            -1600 ml        Physical Examination:             Constitutional:  No acute distress, cooperative, pleasant    ENT:  Oral mucous moist, oropharynx benign. Neck supple,    Resp:  CTA bilaterally. No wheezing/rhonchi/rales. No accessory muscle use   CV:  Regular rhythm, normal rate, no murmurs, gallops, rubs    GI:  Soft, non distended, non tender. normoactive bowel sounds, no hepatosplenomegaly     Musculoskeletal:  Right pedal edema    Neurologic:  Moves all extremities. AAOx3, CN II-XII reviewed     Skin:  Good turgor, no rashes or ulcers       Data Review:    Review and/or order of clinical lab test      Labs:     Recent Labs      02/27/18   0346  02/26/18   0529   WBC  9.3  9.4   HGB  8.0*  8.4*   HCT  24.2*  25.7*   PLT  350  340     Recent Labs      02/27/18   0325  02/26/18   0529  02/25/18   0334   NA  132*  131*  132*   K  3.4*  4.0  4.1   CL  99  98  98   CO2  27 27  27   BUN  4*  6  6   CREA  0.24*  0.39*  0.37*   GLU  103*  99  97   CA  7.9*  7.7*  7.8*   MG  1.6   --    --      Recent Labs      02/27/18 0325 02/26/18   0529  02/25/18   0334   SGOT  18  22  20   ALT  10*  10*  8*   AP  115  132*  129*   TBILI  0.3  0.4  0.3   TP  5.1*  4.8*  5.0*   ALB  1.4*  1.4*  1.4*   GLOB  3.7  3.4  3.6     No results for input(s): INR, PTP, APTT in the last 72 hours. No lab exists for component: INREXT   No results for input(s): FE, TIBC, PSAT, FERR in the last 72 hours. Lab Results   Component Value Date/Time    Folate 4.9 (L) 02/17/2018 05:54 AM      No results for input(s): PH, PCO2, PO2 in the last 72 hours. No results for input(s): CPK, CKNDX, TROIQ in the last 72 hours.     No lab exists for component: CPKMB  Lab Results   Component Value Date/Time    Cholesterol, total 202 (H) 03/27/2017 09:47 AM    HDL Cholesterol 76 03/27/2017 09:47 AM    LDL, calculated 109 (H) 03/27/2017 09:47 AM    Triglyceride 83 03/27/2017 09:47 AM     Lab Results   Component Value Date/Time    Glucose (POC) 111 (H) 02/26/2018 11:55 AM    Glucose (POC) 121 (H) 02/26/2018 08:44 AM    Glucose (POC) 114 (H) 02/18/2018 08:30 AM    Glucose (POC) 91 01/26/2018 04:08 PM    Glucose (POC) 107 (H) 01/26/2018 01:08 PM     Lab Results   Component Value Date/Time    Color DARK YELLOW 02/16/2018 08:30 PM    Appearance CLEAR 02/16/2018 08:30 PM    Specific gravity 1.013 02/16/2018 08:30 PM    pH (UA) 7.0 02/16/2018 08:30 PM    Protein TRACE (A) 02/16/2018 08:30 PM    Glucose NEGATIVE  02/16/2018 08:30 PM    Ketone TRACE (A) 02/16/2018 08:30 PM    Bilirubin Negative 09/28/2012 08:58 AM    Urobilinogen 0.2 02/16/2018 08:30 PM    Nitrites NEGATIVE  02/16/2018 08:30 PM    Leukocyte Esterase NEGATIVE  02/16/2018 08:30 PM    Epithelial cells FEW 02/16/2018 08:30 PM    Bacteria 1+ (A) 02/16/2018 08:30 PM    WBC 0-4 02/16/2018 08:30 PM    RBC 0-5 02/16/2018 08:30 PM         Medications Reviewed:     Current Facility-Administered Medications   Medication Dose Route Frequency    TPN ADULT - PERIPHERAL   IntraVENous CONTINUOUS    thiamine (B-1) tablet 100 mg  100 mg Oral DAILY    buPROPion (WELLBUTRIN) tablet 75 mg  75 mg Per NG tube BID    nicotine (NICODERM CQ) 21 mg/24 hr patch 1 Patch  1 Patch TransDERmal DAILY    balsam peru-castor oil (VENELEX)  mg/gram ointment   Topical BID    sodium chloride (NS) flush 10-30 mL  10-30 mL InterCATHeter PRN    sodium chloride (NS) flush 10 mL  10 mL InterCATHeter PRN    sodium chloride (NS) flush 10-40 mL  10-40 mL InterCATHeter Q8H    alteplase (CATHFLO) 1 mg in sterile water (preservative free) 1 mL injection  1 mg InterCATHeter PRN    0.9% sodium chloride infusion 250 mL  250 mL IntraVENous PRN    LORazepam (ATIVAN) injection 1 mg  1 mg IntraVENous Q2H PRN    zolpidem (AMBIEN) tablet 5 mg  5 mg Oral QHS PRN    calcium-vitamin D (OS-DELORIS) 500 mg-200 unit tablet  1 Tab Oral BID WITH MEALS    sodium chloride (NS) flush 5-10 mL  5-10 mL IntraVENous PRN  acetaminophen (TYLENOL) tablet 650 mg  650 mg Oral Q4H PRN     ______________________________________________________________________  EXPECTED LENGTH OF STAY: 4d 21h  ACTUAL LENGTH OF STAY:          11                 Abhilash Roberts MD

## 2018-02-27 NOTE — PROGRESS NOTES
3:53 PM  Patient reviewed in IDR rounds. Patient receiving TPN. GI to be consulted. Plan is for patient to discharge home with home health PT/OT, and RN at discharge when medically stable. Patient will need orders to resume services. Patient is open to [facility] for needs. CM will continue to follow and assist with disposition needs as they arise.     LATASHA Palomino/CRM

## 2018-02-27 NOTE — PROGRESS NOTES
Bedside shift change report given to [address] [place] [address] John (oncoming nurse) by Hilton James (offgoing nurse). Report included the following information SBAR, Kardex, ED Summary, Procedure Summary, Intake/Output, MAR and Recent Results.

## 2018-02-27 NOTE — PROGRESS NOTES
Bedside shift change report given to Savage Albarran (oncoming nurse) by Tiara Pickard (offgoing nurse). Report included the following information SBAR, Kardex and Intake/Output.

## 2018-02-28 LAB
ALBUMIN SERPL-MCNC: 1.4 G/DL (ref 3.5–5)
ALBUMIN/GLOB SERPL: 0.4 {RATIO} (ref 1.1–2.2)
ALP SERPL-CCNC: 106 U/L (ref 45–117)
ALT SERPL-CCNC: 9 U/L (ref 12–78)
AMMONIA PLAS-SCNC: 32 UMOL/L
ANION GAP SERPL CALC-SCNC: 8 MMOL/L (ref 5–15)
AST SERPL-CCNC: 19 U/L (ref 15–37)
BILIRUB SERPL-MCNC: 0.3 MG/DL (ref 0.2–1)
BUN SERPL-MCNC: 4 MG/DL (ref 6–20)
BUN/CREAT SERPL: 18 (ref 12–20)
CALCIUM SERPL-MCNC: 7.8 MG/DL (ref 8.5–10.1)
CHLORIDE SERPL-SCNC: 100 MMOL/L (ref 97–108)
CO2 SERPL-SCNC: 25 MMOL/L (ref 21–32)
CREAT SERPL-MCNC: 0.22 MG/DL (ref 0.55–1.02)
CRYPTOSP AG STL QL: NEGATIVE
ERYTHROCYTE [DISTWIDTH] IN BLOOD BY AUTOMATED COUNT: 18.1 % (ref 11.5–14.5)
G LAMBLIA AG STL QL: NEGATIVE
GLOBULIN SER CALC-MCNC: 3.7 G/DL (ref 2–4)
GLUCOSE SERPL-MCNC: 98 MG/DL (ref 65–100)
HCT VFR BLD AUTO: 23.7 % (ref 35–47)
HGB BLD-MCNC: 7.7 G/DL (ref 11.5–16)
MAGNESIUM SERPL-MCNC: 1.7 MG/DL (ref 1.6–2.4)
MCH RBC QN AUTO: 28.4 PG (ref 26–34)
MCHC RBC AUTO-ENTMCNC: 32.5 G/DL (ref 30–36.5)
MCV RBC AUTO: 87.5 FL (ref 80–99)
NRBC # BLD: 0 K/UL (ref 0–0.01)
NRBC BLD-RTO: 0 PER 100 WBC
PLATELET # BLD AUTO: 330 K/UL (ref 150–400)
PMV BLD AUTO: 10.3 FL (ref 8.9–12.9)
POTASSIUM SERPL-SCNC: 3.3 MMOL/L (ref 3.5–5.1)
POTASSIUM SERPL-SCNC: 3.8 MMOL/L (ref 3.5–5.1)
PROT SERPL-MCNC: 5.1 G/DL (ref 6.4–8.2)
RBC # BLD AUTO: 2.71 M/UL (ref 3.8–5.2)
SODIUM SERPL-SCNC: 133 MMOL/L (ref 136–145)
WBC # BLD AUTO: 9.1 K/UL (ref 3.6–11)

## 2018-02-28 PROCEDURE — 82140 ASSAY OF AMMONIA: CPT | Performed by: HOSPITALIST

## 2018-02-28 PROCEDURE — 74011250637 HC RX REV CODE- 250/637: Performed by: INTERNAL MEDICINE

## 2018-02-28 PROCEDURE — 65270000029 HC RM PRIVATE

## 2018-02-28 PROCEDURE — 87329 GIARDIA AG IA: CPT | Performed by: HOSPITALIST

## 2018-02-28 PROCEDURE — 36415 COLL VENOUS BLD VENIPUNCTURE: CPT | Performed by: HOSPITALIST

## 2018-02-28 PROCEDURE — 74011250636 HC RX REV CODE- 250/636: Performed by: INTERNAL MEDICINE

## 2018-02-28 PROCEDURE — 82784 ASSAY IGA/IGD/IGG/IGM EACH: CPT | Performed by: HOSPITALIST

## 2018-02-28 PROCEDURE — 83520 IMMUNOASSAY QUANT NOS NONAB: CPT | Performed by: NURSE PRACTITIONER

## 2018-02-28 PROCEDURE — 74011000250 HC RX REV CODE- 250: Performed by: INTERNAL MEDICINE

## 2018-02-28 PROCEDURE — 83735 ASSAY OF MAGNESIUM: CPT | Performed by: HOSPITALIST

## 2018-02-28 PROCEDURE — 89055 LEUKOCYTE ASSESSMENT FECAL: CPT | Performed by: HOSPITALIST

## 2018-02-28 PROCEDURE — 82705 FATS/LIPIDS FECES QUAL: CPT | Performed by: HOSPITALIST

## 2018-02-28 PROCEDURE — 3331090002 HH PPS REVENUE DEBIT

## 2018-02-28 PROCEDURE — 84132 ASSAY OF SERUM POTASSIUM: CPT | Performed by: HOSPITALIST

## 2018-02-28 PROCEDURE — 85027 COMPLETE CBC AUTOMATED: CPT | Performed by: HOSPITALIST

## 2018-02-28 PROCEDURE — 80053 COMPREHEN METABOLIC PANEL: CPT | Performed by: HOSPITALIST

## 2018-02-28 PROCEDURE — 3331090001 HH PPS REVENUE CREDIT

## 2018-02-28 PROCEDURE — 74011250636 HC RX REV CODE- 250/636: Performed by: HOSPITALIST

## 2018-02-28 PROCEDURE — 87177 OVA AND PARASITES SMEARS: CPT | Performed by: HOSPITALIST

## 2018-02-28 PROCEDURE — 87899 AGENT NOS ASSAY W/OPTIC: CPT | Performed by: HOSPITALIST

## 2018-02-28 PROCEDURE — 74011250637 HC RX REV CODE- 250/637: Performed by: HOSPITALIST

## 2018-02-28 RX ORDER — POTASSIUM CHLORIDE 14.9 MG/ML
10 INJECTION INTRAVENOUS
Status: COMPLETED | OUTPATIENT
Start: 2018-02-28 | End: 2018-02-28

## 2018-02-28 RX ORDER — MAGNESIUM SULFATE HEPTAHYDRATE 40 MG/ML
2 INJECTION, SOLUTION INTRAVENOUS ONCE
Status: COMPLETED | OUTPATIENT
Start: 2018-02-28 | End: 2018-02-28

## 2018-02-28 RX ADMIN — MAGNESIUM SULFATE HEPTAHYDRATE 2 G: 40 INJECTION, SOLUTION INTRAVENOUS at 08:20

## 2018-02-28 RX ADMIN — CASTOR OIL AND BALSAM, PERU: 788; 87 OINTMENT TOPICAL at 17:53

## 2018-02-28 RX ADMIN — CALCIUM CARBONATE-VITAMIN D TAB 500 MG-200 UNIT 1 TABLET: 500-200 TAB at 17:53

## 2018-02-28 RX ADMIN — Medication 100 MG: at 08:18

## 2018-02-28 RX ADMIN — Medication 10 ML: at 21:00

## 2018-02-28 RX ADMIN — POTASSIUM CHLORIDE 10 MEQ: 200 INJECTION, SOLUTION INTRAVENOUS at 08:19

## 2018-02-28 RX ADMIN — CALCIUM CARBONATE-VITAMIN D TAB 500 MG-200 UNIT 1 TABLET: 500-200 TAB at 07:27

## 2018-02-28 RX ADMIN — Medication 10 ML: at 06:24

## 2018-02-28 RX ADMIN — BUPROPION HYDROCHLORIDE 75 MG: 75 TABLET, FILM COATED ORAL at 17:53

## 2018-02-28 RX ADMIN — WATER 1 MG: 1 INJECTION INTRAMUSCULAR; INTRAVENOUS; SUBCUTANEOUS at 09:58

## 2018-02-28 RX ADMIN — CASTOR OIL AND BALSAM, PERU: 788; 87 OINTMENT TOPICAL at 08:55

## 2018-02-28 RX ADMIN — ZOLPIDEM TARTRATE 5 MG: 5 TABLET ORAL at 20:59

## 2018-02-28 RX ADMIN — NYSTATIN: 100000 POWDER TOPICAL at 08:55

## 2018-02-28 RX ADMIN — POTASSIUM CHLORIDE 10 MEQ: 200 INJECTION, SOLUTION INTRAVENOUS at 10:00

## 2018-02-28 RX ADMIN — BUPROPION HYDROCHLORIDE 75 MG: 75 TABLET, FILM COATED ORAL at 08:18

## 2018-02-28 NOTE — CONSULTS
[facility].  [address] [facility] 140 [address], [address]  [phone]                     GI CONSULTATION NOTE  [facility]ust[facility], [facility]  Work Cell: [phone]      NAME:  Chandan Mccormick   :   1960   MRN:   [id number]       Referring Provider: Dr. Katerine Parrish Date: 2018     Chief Complaint: Weakness    History of Present Illness:  Patient is a 63 y.o. who is seen in consultation at the request of Dr. Simon Mcgarry for diarrhea. Ms. Gerardo Suggs has a PMH as detailed below. She was admitted to the hospital with generalized weakness, hypokalemia and hypotension. She reports having a long-standing history of intermittent diarrhea. This has been present for \"a while. \" There are days where she will go multiple times and days she has no BM. Stools range from soft to loose. If she does not, she will not defecate. She has been taking Imodium PRN with relief. She denies any fever, chills, nausea, vomiting, melena or hematochezia. She follows lactose free diet. She denies any NSAID use. She drinks alcohol daily (over a fifth per week - she estimates maybe 4-5 shots of liquor per day). She has been doing this for close to 2 years. She was seen by our partners at [facility] for similar symptoms at which time EGD/colonoscopy were completed. EGD demonstrated gastritis and gastric ulcers. H. Pylori (-). Colonoscopy demonstrated diverticulosis but was otherwise unremarkable. Random biopsies were negative. She did have previous CT with suggestion of colitis in ascending colon.         PMH:  Past Medical History:   Diagnosis Date    Alcohol abuse     GI (gastrointestinal bleed)     Insomnia     Iron deficiency anemia 2004    PPD positive     treated w/ INH- tests since have been negative    Pure hypercholesterolemia     Scoliosis 12/28/15    [facility]    Tobacco dependence     Unspecified essential hypertension     Uterine bleeding, dysfunctional 2/12    Uterine fibroid        PSH:  Past Surgical History:   Procedure Laterality Date    COLONOSCOPY N/A 1/31/2018    COLONOSCOPY performed by Alfa Damon MD at [address] HX CHOLECYSTECTOMY      HX COLONOSCOPY  07/01/2013    [facility], repeat 10 years    HX ORTHOPAEDIC      Shattered bilateral ankles-metal plates & screws    HX OTHER SURGICAL      Benign cyst removal- L. foot    LEG/ANKLE SURGERY PROC UNLISTED         Allergies: Allergies   Allergen Reactions    Robaxin [Methocarbamol] Hives, Rash and Itching     Red splotches    Statins-Hmg-Coa Reductase Inhibitors Myalgia    Codeine Itching    Neosporin [Neomycin-Bacitracin-Polymyxin] Rash       Home Medications:  Prior to Admission Medications   Prescriptions Last Dose Informant Patient Reported? Taking?   aspirin 81 mg chewable tablet 2/16/2018 Child No Yes   Sig: CHEW ONE TABLET BY MOUTH DAILY   buPROPion XL (WELLBUTRIN XL) 150 mg tablet 2/16/2018 Child No Yes   Sig: TAKE ONE TABLET BY MOUTH EVERY MORNING FOR SMOKING CESSATION   calcium-vitamin D (CALCIUM 600 + D,3,) 600 mg(1,500mg) -200 unit tab 2/16/2018  No Yes   Sig: Take 1 Tab by mouth two (2) times daily (with meals). folic acid (FOLVITE) 1 mg tablet 2/16/2018  Yes Yes   Sig: Take 1 mg by mouth daily. ibuprofen (MOTRIN) 600 mg tablet 2/16/2018  Yes Yes   Sig: Take 600 mg by mouth daily as needed for Pain.   magnesium 200 mg tab 2/16/2018 Child No Yes   Sig: Take 200 mg by mouth daily. pantoprazole (PROTONIX) 40 mg tablet 2/16/2018  No Yes   Sig: Take 1 Tab by mouth Daily (before breakfast). potassium chloride (KLOR-CON) 20 mEq packet 2/16/2018  No Yes   Sig: Take 1 Packet by mouth daily. thiamine (B-1) 100 mg tablet 2/16/2018 Child No Yes   Sig: Take 1 Tab by mouth daily.    zolpidem (AMBIEN) 5 mg tablet 2/15/2018 Child No Yes   Sig: TAKE ONE TABLET BY MOUTH NIGHTLY AS NEEDED FOR SLEEP      Facility-Administered Medications: None       Hospital Medications:  Current Facility-Administered Medications   Medication Dose Route Frequency    TPN ADULT - PERIPHERAL   IntraVENous CONTINUOUS    nystatin (MYCOSTATIN) 100,000 unit/gram powder   Topical DAILY PRN    thiamine (B-1) tablet 100 mg  100 mg Oral DAILY    buPROPion (WELLBUTRIN) tablet 75 mg  75 mg Per NG tube BID    nicotine (NICODERM CQ) 21 mg/24 hr patch 1 Patch  1 Patch TransDERmal DAILY    balsam peru-castor oil (VENELEX)  mg/gram ointment   Topical BID    sodium chloride (NS) flush 10-30 mL  10-30 mL InterCATHeter PRN    sodium chloride (NS) flush 10 mL  10 mL InterCATHeter PRN    sodium chloride (NS) flush 10-40 mL  10-40 mL InterCATHeter Q8H    alteplase (CATHFLO) 1 mg in sterile water (preservative free) 1 mL injection  1 mg InterCATHeter PRN    0.9% sodium chloride infusion 250 mL  250 mL IntraVENous PRN    LORazepam (ATIVAN) injection 1 mg  1 mg IntraVENous Q2H PRN    zolpidem (AMBIEN) tablet 5 mg  5 mg Oral QHS PRN    calcium-vitamin D (OS-DELORIS) 500 mg-200 unit tablet  1 Tab Oral BID WITH MEALS    sodium chloride (NS) flush 5-10 mL  5-10 mL IntraVENous PRN    acetaminophen (TYLENOL) tablet 650 mg  650 mg Oral Q4H PRN       Social History:  Social History   Substance Use Topics    Smoking status: Current Every Day Smoker     Packs/day: 0.50     Years: 17.00    Smokeless tobacco: Never Used    Alcohol use 1.2 oz/week     1 Cans of beer, 1 Shots of liquor per week      Comment: Socially       Family History:  Family History   Problem Relation Age of Onset    Hypertension Mother     Diabetes Mother     Cancer Mother      breast    Heart Disease Father      MI 42's    Kidney Disease Father     Hypertension Son        Review of Systems:    Constitutional: negative fever, negative chills, negative weight loss  Eyes:   negative visual changes  ENT:   negative sore throat, tongue or lip swelling  Respiratory:  negative cough, negative dyspnea  Cards:  negative for chest pain, palpitations, lower extremity edema  GI:   See HPI  :  negative for frequency, dysuria  Integument:  negative for rash and pruritus  Heme:  negative for easy bruising and gum/nose bleeding  Musculoskel: negative for myalgias, back pain and muscle weakness  Neuro: negative for headaches, dizziness, vertigo  Psych:  negative for feelings of anxiety, depression      Objective:   Patient Vitals for the past 8 hrs:   BP Temp Pulse Resp SpO2 Weight   02/28/18 0835 145/87 98.2 °F (36.8 °C) (!) 106 17 96 % -   02/28/18 0507 - - - - - 56.7 kg (125 lb)        02/26 1901 - 02/28 0700  In: 2486 [P.O.:240; I.V.:2246]  Out: 1850 [Urine:1200; Drains:650]      PHYSICAL EXAM:  General: WD, Thin. Alert, cooperative, no acute distress.    HEENT: NC, Atraumatic. PERRLA, EOMI. Anicteric sclerae. Lungs:  CTA Bilaterally. No Wheezing/Rhonchi/Rales. Heart:  Regular rate and rhythm, No murmur, No Rubs, No Gallops  Abdomen: Soft, non-distended, non-tender.  +Bowel sounds, no HSM  Extremities: No c/c/e  Neurologic:  Alert and oriented X 3. No acute neurological distress. Psych:   Good insight. Not anxious nor agitated. Data Review     Recent Labs      02/28/18   0343  02/27/18   0346   WBC  9.1  9.3   HGB  7.7*  8.0*   HCT  23.7*  24.2*   PLT  330  350     Recent Labs      02/28/18   0343  02/27/18   0325   NA  133*  132*   K  3.3*  3.4*   CL  100  99   CO2  25  27   BUN  4*  4*   CREA  0.22*  0.24*   GLU  98  103*   CA  7.8*  7.9*     Recent Labs      02/28/18   0343  02/27/18   0325   SGOT  19  18   AP  106  115   TP  5.1*  5.1*   ALB  1.4*  1.4*   GLOB  3.7  3.7     No results for input(s): INR, PTP, APTT in the last 72 hours. No lab exists for component: INREXT     Imaging studies reviewed      Assessment:   1. Diarrhea - appears to be more chronic in nature. Differentials include malabsorption related to alcohol use and poor dietary habits, ROSALINE, dietary allergy, secretory etiology, etc. Less likely infectious; however r/o occult process.  Recent colonoscopy with random biopsies was (-). 2. Hypokalemia - improved  3. Abnormal CT - previous CT with mediastinal LAD, liver lesion and diffuse colitis  4. Alcohol dependence    Patient Active Problem List   Diagnosis Code    Folic acid deficiency K52.3    Iron deficiency anemia D50.9    Benign essential HTN I10    Hyperlipidemia E78.5    Scoliosis M41.9    Weight loss R63.4    Hypokalemia due to loss of potassium E87.6    Hypomagnesemia E83.42    Debility R53.81    Colitis K52.9    Lactic acid acidosis E87.2    Elevated troponin R74.8    Protein-calorie malnutrition, severe (HCC) E43    Hypotension I95.9    Emphysema of lung (HCC) J43.9    Abnormal CT scan, chest R93.8    Abnormal CT of the abdomen R93.5    Hypokalemia E87.6              Plan:   -Diet as tolerated  -Supportive management per primary team  -Celiac panel  -Agree with stool studies - crypto/giardia, culture, O&P, WBC, fecal fat and fecal elastase  -Consider trial of Creon  -Will discuss potential option of repeating EUS/FNA of mediastinal LAD with Oncology  -Discussed with Dr. Ozzy Soriano  -Will follow along with you  -Thank you kindly for allowing us to participate in the care of this patient    I have personally reviewed the history and independently examined the patient. I have reviewed the chart and agree with the documentation recorded by the Mid Level Provider, including the assessment, treatment plan, and disposition.       Jayy Burns MD

## 2018-02-28 NOTE — PROGRESS NOTES
Hospitalist Progress Note  Luis Hernandez MD  Answering service: [phone] from in house phone  Cell: [phone]      Date of Service:  2018  NAME:  Shanthi Otero  :  1960  MRN:  [id number]      Admission Summary:   A 64 yo woman with HTN, HLD, DUB, iron deficiency anemia, severe protein-calorie malnutrition, recently diagnosed colitis, EtOH abuse, and hx of GI bleed was sent by her PCP to the ED on 18 for fatigue, generalized weakness, and right arm swelling. She was recently hospitalized at [facility] 18 - 18 for hypokalemia and anemia. She was admitted to the ICU for severe hypokalemia, hypotension, and chronic RUE superficial venous thrombosis.      Interval history / Subjective:   Right ankle pain, no chest pain      Assessment & Plan:     Severe hypokalemia (POA)   - improving but still low, replete until K+ >=4 and magnesium >=2  - likely due to anorexia and malabsorption and recent use of diuretics; not resumed on admission    EtOH withdrawal with chronic alcohol abuse   - now on CIWA protocol and banana bag; prn Ativan  - cont on CIWA  - more alert, passed bedside swallow eval     Diarrhea   -has rectal tube, output 100 ml since this morning, concern for malabsorption  -she is afebrile, no leukocytosis,  -Stool test for wbc, fat, crypto, culture   -consult GI    Hepatic encephalopathy due to hyperammonemia   - start lactulose retention enemas and can change to per NGT once placed  -repeat ammonia in am    Acute on chronic anemia (POA)   - iron WNL, folate low, B12 low-normal  - FOBT negative  - s/p 2 units PRBC transfusion since admission   - Hematology consulted by [facility] due to recent hx of possible occult malignancy  - per  at bedside, EGD, colonoscopy, bone scan all unremarkable recently  - recent PET was abnormal  - stopped SC heparin and change to SCDs  - IR unable to do CT-guided BM bx due to unstable airway; re-scheduled    Right ankle pain unclear etiology  -x ray of right ankle and foot nonspecific soft tissue swelling. No evidence of osteomyelitis. -venous duplex b/l LEs 2/19 negative    Severe protein-calorie malnutrition  -nutrition consulted  -eating  -on TPN    Chronic RUE VTE  - no indication for [facility] for RUE VTE as pt had IV in that arm during previous hospitalization      Generalized weakness/debility with EtOH abuse (POA)   - likely due to anorexia and EtOH abuse  - PT/OT/ST evals, Nutrition eval  - banana bag  - non verbal 2/20/2018 improves by 2/22/2018     Hypovolemic shock (POA)   -hx of HTN  - resolved s/p pressor x4h and IVF  - TTE 2/17 EF 55-60%, no RWMA, mild-mod TR, small pericardial effusion    Ruled out RLL HCAP  - BCx 2/16 NGTD  - started empiric Zosyn/vancomycin 2/17  - s/p empiric vancomycin 2/17-2/19   - BCx 2/16 NGTD  - MRSA swab negative  - sputum Cx ordered but no sample sent yet  - urine strep/legionella antigens pending  - flu swab negative  - cxr from 2/17 to 2/19 cleared as to RLL findings, not c/c pna, all abx dc/d on assumption of care 2/22. Mild transaminitis   - elevated AST likely due to EtOH, now improved  - RUQ US 2/18 hepatic steatosis    Hypernatremia   -resolved, now hyponatremia    Hypophosphatemia   -resolved    Hypomagnesemia   -iv magnesium 2 gm o 2/27 because of ongoing hypokalemia, to keep mg >2.0    Code status: Full Code  DVT prophylaxis: SCD    Care Plan discussed with: Patient/Family, Nurse and   Disposition: Home with [id number] Problems  Date Reviewed: 2/19/2018          Codes Class Noted POA    * (Principal)Hypokalemia ICD-10-CM: E87.6  ICD-9-CM: 276.8  2/16/2018 Yes                  Vital Signs:    Last 24hrs VS reviewed since prior progress note.  Most recent are:  Visit Vitals    /87 (BP 1 Location: Right arm, BP Patient Position: At rest)    Pulse (!) 106    Temp 98.2 °F (36.8 °C)    Resp 17    Ht 5' 1\" (1.549 m)    Wt 56.7 kg (125 lb)    SpO2 96%    BMI 23.62 kg/m2         Intake/Output Summary (Last 24 hours) at 02/28/18 0941  Last data filed at 02/27/18 2059   Gross per 24 hour   Intake             2246 ml   Output             1650 ml   Net              596 ml        Physical Examination:             Constitutional:  No acute distress, cooperative, pleasant    ENT:  Oral mucous moist, oropharynx benign. Neck supple,    Resp:  CTA bilaterally. No wheezing/rhonchi/rales. No accessory muscle use   CV:  Regular rhythm, normal rate, no murmurs, gallops, rubs    GI:  Soft, non distended, non tender. normoactive bowel sounds, no hepatosplenomegaly     Musculoskeletal:  Right pedal edema    Neurologic:  Moves all extremities. AAOx3, CN II-XII reviewed     Skin:  Good turgor, no rashes or ulcers       Data Review:    Review and/or order of clinical lab test      Labs:     Recent Labs      02/28/18   0343  02/27/18   0346   WBC  9.1  9.3   HGB  7.7*  8.0*   HCT  23.7*  24.2*   PLT  330  350     Recent Labs      02/28/18   0343  02/27/18   0325  02/26/18   0529   NA  133*  132*  131*   K  3.3*  3.4*  4.0   CL  100  99  98   CO2  25  27  27   BUN  4*  4*  6   CREA  0.22*  0.24*  0.39*   GLU  98  103*  99   CA  7.8*  7.9*  7.7*   MG  1.7  1.6   --      Recent Labs      02/28/18   0343  02/27/18   0325  02/26/18   0529   SGOT  19  18  22   ALT  9*  10*  10*   AP  106  115  132*   TBILI  0.3  0.3  0.4   TP  5.1*  5.1*  4.8*   ALB  1.4*  1.4*  1.4*   GLOB  3.7  3.7  3.4     No results for input(s): INR, PTP, APTT in the last 72 hours. No lab exists for component: INREXT, INREXT   No results for input(s): FE, TIBC, PSAT, FERR in the last 72 hours. Lab Results   Component Value Date/Time    Folate 4.9 (L) 02/17/2018 05:54 AM      No results for input(s): PH, PCO2, PO2 in the last 72 hours. No results for input(s): CPK, CKNDX, TROIQ in the last 72 hours.     No lab exists for component: CPKMB  Lab Results   Component Value Date/Time    Cholesterol, total 202 (H) 03/27/2017 09:47 AM    HDL Cholesterol 76 03/27/2017 09:47 AM    LDL, calculated 109 (H) 03/27/2017 09:47 AM    Triglyceride 83 03/27/2017 09:47 AM     Lab Results   Component Value Date/Time    Glucose (POC) 111 (H) 02/26/2018 11:55 AM    Glucose (POC) 121 (H) 02/26/2018 08:44 AM    Glucose (POC) 114 (H) 02/18/2018 08:30 AM    Glucose (POC) 91 01/26/2018 04:08 PM    Glucose (POC) 107 (H) 01/26/2018 01:08 PM     Lab Results   Component Value Date/Time    Color DARK YELLOW 02/16/2018 08:30 PM    Appearance CLEAR 02/16/2018 08:30 PM    Specific gravity 1.013 02/16/2018 08:30 PM    pH (UA) 7.0 02/16/2018 08:30 PM    Protein TRACE (A) 02/16/2018 08:30 PM    Glucose NEGATIVE  02/16/2018 08:30 PM    Ketone TRACE (A) 02/16/2018 08:30 PM    Bilirubin Negative 09/28/2012 08:58 AM    Urobilinogen 0.2 02/16/2018 08:30 PM    Nitrites NEGATIVE  02/16/2018 08:30 PM    Leukocyte Esterase NEGATIVE  02/16/2018 08:30 PM    Epithelial cells FEW 02/16/2018 08:30 PM    Bacteria 1+ (A) 02/16/2018 08:30 PM    WBC 0-4 02/16/2018 08:30 PM    RBC 0-5 02/16/2018 08:30 PM         Medications Reviewed:     Current Facility-Administered Medications   Medication Dose Route Frequency    potassium chloride 10 mEq in 50 ml IVPB  10 mEq IntraVENous Q1H    TPN ADULT - PERIPHERAL   IntraVENous CONTINUOUS    nystatin (MYCOSTATIN) 100,000 unit/gram powder   Topical DAILY PRN    thiamine (B-1) tablet 100 mg  100 mg Oral DAILY    buPROPion (WELLBUTRIN) tablet 75 mg  75 mg Per NG tube BID    nicotine (NICODERM CQ) 21 mg/24 hr patch 1 Patch  1 Patch TransDERmal DAILY    balsam peru-castor oil (VENELEX)  mg/gram ointment   Topical BID    sodium chloride (NS) flush 10-30 mL  10-30 mL InterCATHeter PRN    sodium chloride (NS) flush 10 mL  10 mL InterCATHeter PRN    sodium chloride (NS) flush 10-40 mL  10-40 mL InterCATHeter Q8H    alteplase (CATHFLO) 1 mg in sterile water (preservative free) 1 mL injection  1 mg InterCATHeter PRN    0.9% sodium chloride infusion 250 mL  250 mL IntraVENous PRN    LORazepam (ATIVAN) injection 1 mg  1 mg IntraVENous Q2H PRN    zolpidem (AMBIEN) tablet 5 mg  5 mg Oral QHS PRN    calcium-vitamin D (OS-DELORIS) 500 mg-200 unit tablet  1 Tab Oral BID WITH MEALS    sodium chloride (NS) flush 5-10 mL  5-10 mL IntraVENous PRN    acetaminophen (TYLENOL) tablet 650 mg  650 mg Oral Q4H PRN     ______________________________________________________________________  EXPECTED LENGTH OF STAY: 4d 21h  ACTUAL LENGTH OF STAY:          12                 Catina Kwong MD

## 2018-02-28 NOTE — PROGRESS NOTES
Bedside shift change report given to [facility] [address] (oncoming nurse) by Miranda Silverman (offgoing nurse). Report included the following information SBAR.

## 2018-02-28 NOTE — PROGRESS NOTES
Problem: Falls - Risk of  Goal: *Absence of Falls  Document Roselia Fall Risk and appropriate interventions in the flowsheet.    Outcome: Progressing Towards Goal  Fall Risk Interventions:  Mobility Interventions: Communicate number of staff needed for ambulation/transfer    Mentation Interventions: Door open when patient unattended, More frequent rounding, Reorient patient, Room close to nurse's station, Toileting rounds, Update white board    Medication Interventions: Evaluate medications/consider consulting pharmacy    Elimination Interventions: Call light in reach, Patient to call for help with toileting needs, Toileting schedule/hourly rounds    History of Falls Interventions: Door open when patient unattended, Evaluate medications/consider consulting pharmacy

## 2018-02-28 NOTE — PROGRESS NOTES
Bedside shift change report given to [address] [place] [address] (oncoming nurse) by Keely Palma RN (offgoing nurse). Report included the following information SBAR, Kardex, Intake/Output, MAR and Recent Results.

## 2018-02-28 NOTE — PROGRESS NOTES
Bedside shift change report given to ZURDO Del Rio (oncoming nurse) by Chaparrita Chandra RN (offgoing nurse). Report included the following information SBAR, Intake/Output and MAR.

## 2018-02-28 NOTE — PROGRESS NOTES
1:38 PM  Patient reviewed in IDR rounds. CM received consult for for resumption of home health for PT/OT and RN. CM contacted [employer] and spoke with hospital liaison, Gregory Villafuerte and informed agency that resumption orders are in [phone]. CM to notify agency when patient is discharged. CM placed follow-up on AVS.  Patient not ready for discharge at this time. GI consulted and to follow. Patient on TPN. Goal to titrate down prior to discharge. RN reported patient with watery stools. CM will continue to follow and assist with disposition needs as they arise. 2:10 PM  CM received updates from PT that they are recommending rehab at discharge. CM to meet with patient to obtain choice. CM will continue to follow and assist with disposition needs as they arise. 2:59 PM  CM met with patient to review and discuss disposition needs. CM provided patient with a list of SNF to review.   CM will meet back with patient to obtain facility choice and submit referral.    LATASHA You/LAILA

## 2018-03-01 LAB
ALBUMIN SERPL-MCNC: 1.5 G/DL (ref 3.5–5)
ALBUMIN/GLOB SERPL: 0.4 {RATIO} (ref 1.1–2.2)
ALP SERPL-CCNC: 105 U/L (ref 45–117)
ALT SERPL-CCNC: 10 U/L (ref 12–78)
ANION GAP SERPL CALC-SCNC: 8 MMOL/L (ref 5–15)
AST SERPL-CCNC: 19 U/L (ref 15–37)
BILIRUB SERPL-MCNC: 0.3 MG/DL (ref 0.2–1)
BUN SERPL-MCNC: 4 MG/DL (ref 6–20)
BUN/CREAT SERPL: 14 (ref 12–20)
CALCIUM SERPL-MCNC: 8 MG/DL (ref 8.5–10.1)
CHLORIDE SERPL-SCNC: 98 MMOL/L (ref 97–108)
CO2 SERPL-SCNC: 26 MMOL/L (ref 21–32)
CREAT SERPL-MCNC: 0.28 MG/DL (ref 0.55–1.02)
ELASTASE PANC STL-MCNT: NORMAL UG ELAST./G
ERYTHROCYTE [DISTWIDTH] IN BLOOD BY AUTOMATED COUNT: 18.4 % (ref 11.5–14.5)
FAT STL QL: NORMAL
GLOBULIN SER CALC-MCNC: 3.6 G/DL (ref 2–4)
GLUCOSE SERPL-MCNC: 95 MG/DL (ref 65–100)
HCT VFR BLD AUTO: 23.5 % (ref 35–47)
HGB BLD-MCNC: 7.9 G/DL (ref 11.5–16)
MCH RBC QN AUTO: 29.2 PG (ref 26–34)
MCHC RBC AUTO-ENTMCNC: 33.6 G/DL (ref 30–36.5)
MCV RBC AUTO: 86.7 FL (ref 80–99)
NEUTRAL FAT STL QL: NORMAL
NRBC # BLD: 0 K/UL (ref 0–0.01)
NRBC BLD-RTO: 0 PER 100 WBC
PLATELET # BLD AUTO: 348 K/UL (ref 150–400)
PMV BLD AUTO: 10 FL (ref 8.9–12.9)
POTASSIUM SERPL-SCNC: 3.5 MMOL/L (ref 3.5–5.1)
PROT SERPL-MCNC: 5.1 G/DL (ref 6.4–8.2)
RBC # BLD AUTO: 2.71 M/UL (ref 3.8–5.2)
SODIUM SERPL-SCNC: 132 MMOL/L (ref 136–145)
WBC # BLD AUTO: 10.5 K/UL (ref 3.6–11)
WBC #/AREA STL HPF: NORMAL /HPF (ref 0–4)

## 2018-03-01 PROCEDURE — 74011250637 HC RX REV CODE- 250/637: Performed by: INTERNAL MEDICINE

## 2018-03-01 PROCEDURE — 36415 COLL VENOUS BLD VENIPUNCTURE: CPT | Performed by: HOSPITALIST

## 2018-03-01 PROCEDURE — 65270000029 HC RM PRIVATE

## 2018-03-01 PROCEDURE — 3331090002 HH PPS REVENUE DEBIT

## 2018-03-01 PROCEDURE — 74011250637 HC RX REV CODE- 250/637: Performed by: HOSPITALIST

## 2018-03-01 PROCEDURE — 3331090001 HH PPS REVENUE CREDIT

## 2018-03-01 PROCEDURE — 85027 COMPLETE CBC AUTOMATED: CPT | Performed by: HOSPITALIST

## 2018-03-01 PROCEDURE — 80053 COMPREHEN METABOLIC PANEL: CPT | Performed by: HOSPITALIST

## 2018-03-01 PROCEDURE — 74011250637 HC RX REV CODE- 250/637: Performed by: NURSE PRACTITIONER

## 2018-03-01 RX ORDER — LANOLIN ALCOHOL/MO/W.PET/CERES
1 CREAM (GRAM) TOPICAL
Status: DISCONTINUED | OUTPATIENT
Start: 2018-03-02 | End: 2018-03-02 | Stop reason: HOSPADM

## 2018-03-01 RX ORDER — POTASSIUM CHLORIDE 750 MG/1
40 TABLET, FILM COATED, EXTENDED RELEASE ORAL DAILY
Status: COMPLETED | OUTPATIENT
Start: 2018-03-01 | End: 2018-03-02

## 2018-03-01 RX ADMIN — CASTOR OIL AND BALSAM, PERU: 788; 87 OINTMENT TOPICAL at 09:23

## 2018-03-01 RX ADMIN — PANCRELIPASE 2 CAPSULE: 24000; 76000; 120000 CAPSULE, DELAYED RELEASE PELLETS ORAL at 14:30

## 2018-03-01 RX ADMIN — ZOLPIDEM TARTRATE 5 MG: 5 TABLET ORAL at 20:45

## 2018-03-01 RX ADMIN — POTASSIUM CHLORIDE 40 MEQ: 750 TABLET, FILM COATED, EXTENDED RELEASE ORAL at 14:30

## 2018-03-01 RX ADMIN — BUPROPION HYDROCHLORIDE 75 MG: 75 TABLET, FILM COATED ORAL at 17:02

## 2018-03-01 RX ADMIN — CASTOR OIL AND BALSAM, PERU: 788; 87 OINTMENT TOPICAL at 17:03

## 2018-03-01 RX ADMIN — BUPROPION HYDROCHLORIDE 75 MG: 75 TABLET, FILM COATED ORAL at 09:20

## 2018-03-01 RX ADMIN — CALCIUM CARBONATE-VITAMIN D TAB 500 MG-200 UNIT 1 TABLET: 500-200 TAB at 07:04

## 2018-03-01 RX ADMIN — CALCIUM CARBONATE-VITAMIN D TAB 500 MG-200 UNIT 1 TABLET: 500-200 TAB at 17:02

## 2018-03-01 RX ADMIN — Medication 100 MG: at 09:20

## 2018-03-01 RX ADMIN — Medication 10 ML: at 07:04

## 2018-03-01 RX ADMIN — Medication 10 ML: at 14:31

## 2018-03-01 RX ADMIN — PANCRELIPASE 2 CAPSULE: 24000; 76000; 120000 CAPSULE, DELAYED RELEASE PELLETS ORAL at 17:03

## 2018-03-01 NOTE — PROGRESS NOTES
NUTRITION COMPLETE ASSESSMENT    RECOMMENDATIONS:   1. Dental Soft Diet  -- Encourage Ensure Enlive twice/day, small, frequent meals and snacks (as ordered)    2. Agree with outpatient GI follow up secondary to severe malnutrition    3. Daily weights via standing scale    4. Consider daily MVI (chewable), daily B12 (500 mcg) and thiamine (100 mg) on discharge  -- May need to consider AQUADEKs vitamins (fat soluble vitamins in water soluble form) if pt continues to exhibit symptoms of malabsorption    Interventions/Plan:   Food/Nutrient Delivery:  Diet order adjustment + Ensure Enlive + Snacks. Assessment:   Reason for Assessment:   [x]Reassessment     Diet: dysphagia 3, Ensure Enlive BID; AM/PM/HS snack    Nutritionally Significant Medications: [x] Reviewed & Includes: Creon (97629 2 caps per meal-- to start today at 1300); os-ramses 500 mg-200IU; potassium chloride 40 mEq; ferrous sulfate 325 mg     Meal Intake: Patient Vitals for the past 100 hrs:   % Diet Eaten   02/27/18 0928 20 %     Subjective:  Pt admitted to refusing breakfast \"because it was the same old thing. \"  Discussed her diet order and will liberalize to Dental Soft (dysphagia 3 originally ordered by MD for \"soft\" foods, not for dysphagia). She has no issues swallowing. She admits she has not yet tried the Ensure supplements (\"I guess I just have to get over the name\"). She also admits to trying only one bite of one of her snacks and states, \"I don't eat canned fruit\" (she previously chose peaches). Objective:  Chart reviewed, discussed with RN and team during interdisciplinary rounds. Pt's intake remains very limited (0-20% of meals). She still has a flexiseal, which today appears to have somewhat more formed stool; whereas, previously it looked very watery and almost yellow/clear. GI consult noted with multiple tests/stool studies to be sent. Creon to start today with meals secondary to questionable malabsorption.   Unsure if she will be compliant with these; however, she plans to be discharged to [facility], so compliance will likely be better. Further GI workup to be completed outpatient. Flexiseal to be removed today. Pt continues to meet criteria for Severe Protein Calorie Malnutrition. PPN has been discontinued (previously meeting 39% and 78% of estimated kcal and protein needs, respectively); however, she may benefit from nutrition support in the future pending oral intake and her ability (or lack thereof) to meet estimated needs by mouth alone. Patient meets criteria for Severe Protein Calorie Malnutrition as evidenced by:   ASPEN Malnutrition Criteria  Acute Illness, Chronic Illness, or Social/Enviornmental: Chronic illness  Energy Intake: Less than/equal to 75% of est energy req for greater than/equal to 1 month  Weight Loss: Greater than 10% x 6 mos  Body Fat: Mild  Muscle Mass: Severe  Fluid Accumulation: Severe  ASPEN Malnutrition Score - Chronic Illness: 25  Chronic Illness - Malnutrition Diagnosis: Severe malnutrition. Estimated Nutrition Needs:   Kcals/day: 1325 Kcals/day (6890-8488 (MSJ x 1.3-1.4)  Protein: 55 g (55-64 (1.2-1.4g/kg)  Fluid:  (1 ml/kcal)     Based On: [place] Jeor  Weight Used: Actual wt (46 kg (standing scale 2/16)    Pt expected to meet estimated nutrient needs:  []   Yes     [x]  No    Nutrition Diagnosis:   1. Inadequate oral intake related to decreased appetite, acute on chronic illness/liver disease and ?malabsorption as evidenced by loose stool with flexiseal (becoming more formed), limited oral intake x 12 day admission    2. Unintended weight loss related to chronic alcohol abuse as evidenced by 30# or 23% wt loss in past 6 months.     Goals:     Pt to consume at least 75% of meals over the next 5-7 days     Monitoring & Evaluation:    - Enteral/parenteral nutrition intake   - Electrolyte and renal profile, Weight/weight change     Previous Nutrition Goals Met:  N/A (No longer on TPN)  Previous Recommendations:      Yes    Education & Discharge Needs:   [] None Identified   [x] Identified and addressed    [x] Participated in care plan, discharge planning, and/or interdisciplinary rounds        Cultural, Scientology and ethnic food preferences identified:   None    Skin Integrity: []Intact  [x]Other: DTI on sacrum  Edema: []None [x]Other: 1-2+ (upper and lower extremities)  Last BM: flexiseal  Food Allergies: [x]None []Other    Anthropometrics:    Weight Loss Metrics 3/1/2018 2/16/2018 2/16/2018 2/16/2018 2/7/2018 1/29/2018 1/26/2018   Today's Wt 124 lb 1.9 oz - 102 lb - 133 lb 99 lb -   BMI - 23.45 kg/m2 - 19.27 kg/m2 25.13 kg/m2 - 18.71 kg/m2      Last 3 Recorded Weights in this Encounter    02/27/18 0405 02/28/18 0507 03/01/18 0703   Weight: 61.5 kg (135 lb 9.3 oz) 56.7 kg (125 lb) 56.3 kg (124 lb 1.9 oz)      Weight Source: Bed  Height: 5' 1\" (154.9 cm),    Body mass index is 23.45 kg/(m^2). IBW : 47.6 kg (105 lb), % IBW (Calculated): 131.23 %   ,      Labs:  Lab Results   Component Value Date/Time    Sodium 132 (L) 03/01/2018 05:45 AM    Potassium 3.5 03/01/2018 05:45 AM    Chloride 98 03/01/2018 05:45 AM    CO2 26 03/01/2018 05:45 AM    Glucose 95 03/01/2018 05:45 AM    BUN 4 (L) 03/01/2018 05:45 AM    Creatinine 0.28 (L) 03/01/2018 05:45 AM    Calcium 8.0 (L) 03/01/2018 05:45 AM    Magnesium 1.7 02/28/2018 03:43 AM    Phosphorus 2.8 02/24/2018 03:55 AM    Albumin 1.5 (L) 03/01/2018 05:45 AM     Lab Results   Component Value Date/Time    Hemoglobin A1c 4.3 (L) 01/25/2018 01:17 PM     Lab Results   Component Value Date/Time    Glucose 95 03/01/2018 05:45 AM    Glucose (POC) 111 (H) 02/26/2018 11:55 AM      Lab Results   Component Value Date/Time    ALT (SGPT) 10 (L) 03/01/2018 05:45 AM    AST (SGOT) 19 03/01/2018 05:45 AM    GGT 83 (H) 10/16/2017 03:55 PM    Alk.  phosphatase 105 03/01/2018 05:45 AM    Bilirubin, direct 0.4 (H) 02/18/2018 09:47 AM    Bilirubin, total 0.3 03/01/2018 05:45 AM      Evelyne Mcgill, [address] [facility]

## 2018-03-01 NOTE — PROGRESS NOTES
Bedside shift change report given to [address] (oncoming nurse) by Elton Hernandez (offgoing nurse). Report included the following information SBAR.

## 2018-03-01 NOTE — PROGRESS NOTES
11:29 AM  Patient reviewed in rounds. Therapy recommending SNF at discharge. CM obtained choice from patient. Patient requested that a referral be submitted to [facility] for review. CM submitted a referral via [facility]. CM currently awaiting receipt of referral and updates on wether or not facility able to accept and accommodate patient for services. CM will continue to follow and assist with disposition needs as they arise. 3:52 PM  CM received notification from [facility] that they are able to accept and accommodate patient for services at discharge. Discharge anticipated for tomorrow. CM notified facility and currently awaiting call report number and room number. CM to submit discharge summary via [facility] once received. Patient's spouse to transport patient by 12:00 pm tomorrow. CM will continue to follow-up and assist with disposition needs as they arise.     LATASHA Rowe/CRM

## 2018-03-01 NOTE — PROGRESS NOTES
Problem: Falls - Risk of  Goal: *Absence of Falls  Document Roselia Fall Risk and appropriate interventions in the flowsheet.    Outcome: Progressing Towards Goal  Fall Risk Interventions:  Mobility Interventions: Communicate number of staff needed for ambulation/transfer    Mentation Interventions: Door open when patient unattended, More frequent rounding, Reorient patient, Room close to nurse's station, Toileting rounds, Update white board    Medication Interventions: Evaluate medications/consider consulting pharmacy, Patient to call before getting OOB, Teach patient to arise slowly    Elimination Interventions: Call light in reach, Patient to call for help with toileting needs, Toileting schedule/hourly rounds    History of Falls Interventions: Door open when patient unattended, Evaluate medications/consider consulting pharmacy

## 2018-03-01 NOTE — WOUND CARE
WOCN Note:     Follow-up visit for buttocks. Chart shows:  Admitted for hypokalemia and weakness; history of smoking and ETOH use  Admitted from home      Assessment:   Patient is interactive today and reports she is going home. Able to turn independently for assessment. Patient has a Flexi Seal and Pure Wick.    Bed: total care sport      Bilateral heel skin intact and without erythema.       1. POA, bilateral buttocks and lower sacrum DTI  = ~12 x 12 x 0 cm  100% dark non-blanching field on a darker skin toned individual so margins are not clearly defined. No significant changes since admisison.      Wound Recommendations:    Continue Venelex to buttocks as ordered. Skin Care & Pressure Relief Recommendations:  Minimize layers of linen/pads under patient to optimize support surface. Turn/reposition approximately every 2 hours and offload heels. Manage incontinence / promote continence  Total care sport: Use only flat sheet and one incontinence pad. Discussed above plan with patient.     Transition of Care: Plan to follow weekly and as needed while admitted to hospital.    CARMELINA ManN, RN, [facility] [place]  Certified Wound, Ostomy, Continence Nurse  office [phone]  pager [phone] or call  to page

## 2018-03-01 NOTE — PROGRESS NOTES
[address]  [address]  [address] [place] [address]   [phone]                GI PROGRESS NOTE  Ysabel Joshi, [facility]  Work Cell: [phone]      NAME:   Kalen Ulloa   :    1960   MRN:    [id number]     Assessment/Plan   1. Diarrhea - appears to be more chronic in nature. Differentials include malabsorption related to alcohol use and poor dietary habits, ROSALINE, dietary allergy, secretory etiology, etc. Less likely infectious; however r/o occult process. Recent colonoscopy with random biopsies was (-).   - Diet as tolerated  - Supportive management per primary team  - Labs and stool studies pending  - Trial Creon - will start today  - Remove rectal tube  - Monitor stool output  2. Hypokalemia - improved  3. Abnormal CT - previous CT with mediastinal LAD, liver lesion and diffuse colitis  - Will discuss potential option of repeating EUS/FNA of mediastinal LAD with Oncology - could be completed as outpatient   4. Alcohol dependence  - Advised regarding need for abstinence      Patient Active Problem List   Diagnosis Code    Folic acid deficiency M75.0    Iron deficiency anemia D50.9    Benign essential HTN I10    Hyperlipidemia E78.5    Scoliosis M41.9    Weight loss R63.4    Hypokalemia due to loss of potassium E87.6    Hypomagnesemia E83.42    Debility R53.81    Colitis K52.9    Lactic acid acidosis E87.2    Elevated troponin R74.8    Protein-calorie malnutrition, severe (HCC) E43    Hypotension I95.9    Emphysema of lung (HCC) J43.9    Abnormal CT scan, chest R93.8    Abnormal CT of the abdomen R93.5    Hypokalemia E87.6       Subjective:     Feeling fine. Denies any stool from rectal tube overnight. Passing \"a bunch of flatus. \" Denies any pain. Tolerating diet. Eager to be discharged to rehab.        Review of Systems    Constitutional: negative fever, negative chills, negative weight loss  Eyes:   negative visual changes  ENT:   negative sore throat, tongue or lip swelling  Respiratory:  negative cough, negative dyspnea  Cards:  negative for chest pain, palpitations, lower extremity edema  GI:   See HPI  :  negative for frequency, dysuria  Integument:  negative for rash and pruritus  Heme:  negative for easy bruising and gum/nose bleeding  Musculoskel: negative for myalgias, back pain and muscle weakness  Neuro: negative for headaches, dizziness, vertigo  Psych:  negative for feelings of anxiety, depression     Objective:     VITALS:   Last 24hrs VS reviewed since prior hospitalist progress note. Most recent are:  Visit Vitals    /86 (BP 1 Location: Right arm, BP Patient Position: At rest)    Pulse 100    Temp 98.5 °F (36.9 °C)    Resp 16    Ht 5' 1\" (1.549 m)    Wt 56.3 kg (124 lb 1.9 oz)    SpO2 96%    BMI 23.45 kg/m2       Intake/Output Summary (Last 24 hours) at 03/01/18 1229  Last data filed at 03/01/18 0703   Gross per 24 hour   Intake             1252 ml   Output              150 ml   Net             1102 ml        PHYSICAL EXAM:  General   well developed, thin, alert, in no acute distress  EENT  Normocephalic, Atraumatic, PERRLA, EOMI, sclera clear  Respiratory   Clear To Auscultation bilaterally - no wheezes, rales, rhonchi, or crackles  Cardiology  Regular Rate and Rythmn  - no murmurs, rubs or gallops  Abdominal  Soft, non-tender, non-distended, positive bowel sounds, no hepatosplenomegaly, no palpable mass  Extremities  No clubbing, cyanosis, or edema. Pulses intact. Neurological  No focal neurological deficits noted  Psychological  Oriented x 3. Normal affect.        Lab Data   Recent Results (from the past 12 hour(s))   CBC W/O DIFF    Collection Time: 03/01/18  5:45 AM   Result Value Ref Range    WBC 10.5 3.6 - 11.0 K/uL    RBC 2.71 (L) 3.80 - 5.20 M/uL    HGB 7.9 (L) 11.5 - 16.0 g/dL    HCT 23.5 (L) 35.0 - 47.0 %    MCV 86.7 80.0 - 99.0 FL    MCH 29.2 26.0 - 34.0 PG    MCHC 33.6 30.0 - 36.5 g/dL    RDW 18.4 (H) 11.5 - 14.5 % PLATELET 398 710 - 319 K/uL    MPV 10.0 8.9 - 12.9 FL    NRBC 0.0 0  WBC    ABSOLUTE NRBC 0.00 0.00 - 8.21 K/uL   METABOLIC PANEL, COMPREHENSIVE    Collection Time: 03/01/18  5:45 AM   Result Value Ref Range    Sodium 132 (L) 136 - 145 mmol/L    Potassium 3.5 3.5 - 5.1 mmol/L    Chloride 98 97 - 108 mmol/L    CO2 26 21 - 32 mmol/L    Anion gap 8 5 - 15 mmol/L    Glucose 95 65 - 100 mg/dL    BUN 4 (L) 6 - 20 MG/DL    Creatinine 0.28 (L) 0.55 - 1.02 MG/DL    BUN/Creatinine ratio 14 12 - 20      GFR est AA >60 >60 ml/min/1.73m2    GFR est non-AA >60 >60 ml/min/1.73m2    Calcium 8.0 (L) 8.5 - 10.1 MG/DL    Bilirubin, total 0.3 0.2 - 1.0 MG/DL    ALT (SGPT) 10 (L) 12 - 78 U/L    AST (SGOT) 19 15 - 37 U/L    Alk. phosphatase 105 45 - 117 U/L    Protein, total 5.1 (L) 6.4 - 8.2 g/dL    Albumin 1.5 (L) 3.5 - 5.0 g/dL    Globulin 3.6 2.0 - 4.0 g/dL    A-G Ratio 0.4 (L) 1.1 - 2.2           Medications: Reviewed    PMH/SH reviewed - no change compared to H&P  Mid-Level Provider: Maciej Wooten NP   Date/Time:  3/1/2018        I have personally reviewed the history and independently examined the patient. I have reviewed the chart and agree with the documentation recorded by the Mid Level Provider, including the assessment, treatment plan, and disposition.       Tavon Garcia MD

## 2018-03-01 NOTE — PROGRESS NOTES
Hospitalist Progress Note  Renetta Kearney MD  Answering service: [phone] from in house phone  Cell: [phone]      Date of Service:  3/1/2018  NAME:  Andrew CarlosB:  1960  MRN:  [id number]      Admission Summary:   A 64 yo woman with HTN, HLD, DUB, iron deficiency anemia, severe protein-calorie malnutrition, recently diagnosed colitis, EtOH abuse, and hx of GI bleed was sent by her PCP to the ED on 18 for fatigue, generalized weakness, and right arm swelling. She was recently hospitalized at [facility] 18 - 18 for hypokalemia and anemia. She was admitted to the ICU for severe hypokalemia, hypotension, and chronic RUE superficial venous thrombosis.      Interval history / Subjective:   Right ankle pain, no chest pain      Assessment & Plan:     Severe hypokalemia (POA)   - improved with multiple IV KCL, will continue KCL 40 meq po q dialy  - likely due to anorexia and malabsorption and recent use of diuretics; not resumed on admission    EtOH withdrawal with chronic alcohol abuse   - on CIWA protocol and banana bag; prn Ativan, now out of the window  -continue folic acid and thiamin  -advised to stop alcohol    Chronic diarrhea   -she is afebrile, no leukocytosis,  -Stool test for occult blood negative, wbc, fat, crypto, culture result pending   -started on Creaon 3/1  -seen by GI and further work up as an outpatient    Hepatic encephalopathy due to hyperammonemia   -ammonia level improving  -Had lactulose enema, patient with chronic diarrhea, will hold lactulose  -improved, conscious and well orineted    Acute on chronic anemia (POA)   - iron WNL, folate low, B12 low-normal  - FOBT negative  - s/p 2 units PRBC transfusion since admission , H/H low but stable  - Hematology consulted by [facility] due to recent hx of possible occult malignancy  - per  at bedside, EGD, colonoscopy, bone scan all unremarkable recently  - recent PET was abnormal  - stopped SC heparin and change to SCDs  - IR unable to do CT-guided BM bx due to unstable airway; re-scheduled    Right ankle pain unclear etiology  -x ray of right ankle and foot nonspecific soft tissue swelling. No evidence of osteomyelitis. -venous duplex b/l LEs 2/19 negative  -Elevated right ankle     Severe protein-calorie malnutrition  -nutrition consulted, encourage more po intake  -off TPN    Chronic RUE VTE  - [facility] as pt had IV in that arm during previous hospitalization      Generalized weakness/debility with EtOH abuse (POA)   - likely due to anorexia and EtOH abuse  - continue PT/OT    Hypovolemic shock (POA)   -hx of HTN  - resolved s/p pressor x4h and IVF  - TTE 2/17 EF 55-60%, no RWMA, mild-mod TR, small pericardial effusion  -BP normal    Ruled out RLL HCAP  - BCx 2/16 NGTD  - started empiric Zosyn/vancomycin on 2/17  - s/p empiric vancomycin 2/17-2/19   - BCx 2/16 NGTD  - MRSA swab negative  - sputum Cx ordered but no sample sent yet  - urine strep/legionella antigens pending  - flu swab negative  - cxr from 2/17 to 2/19 cleared as to RLL findings, not c/c pna, all abx dc/d on assumption of care 2/22. Mild transaminitis   - elevated AST likely due to EtOH, now resolved  - RUQ US 2/18 hepatic steatosis    Hypernatremia   -resolved, now hyponatremia    Hypophosphatemia   -resolved    Hypomagnesemia   -resolved    Code status: Full Code  DVT prophylaxis: SCD    Care Plan discussed with: Patient/Family, Nurse and   Disposition: SNF      Hospital Problems  Date Reviewed: 2/19/2018          Codes Class Noted POA    * (Principal)Hypokalemia ICD-10-CM: E87.6  ICD-9-CM: 276.8  2/16/2018 Yes                  Vital Signs:    Last 24hrs VS reviewed since prior progress note.  Most recent are:  Visit Vitals    /86 (BP 1 Location: Right arm, BP Patient Position: At rest)    Pulse 100    Temp 98.5 °F (36.9 °C)    Resp 16  Ht 5' 1\" (1.549 m)    Wt 56.3 kg (124 lb 1.9 oz)    SpO2 96%    BMI 23.45 kg/m2         Intake/Output Summary (Last 24 hours) at 03/01/18 1236  Last data filed at 03/01/18 0703   Gross per 24 hour   Intake             1252 ml   Output              150 ml   Net             1102 ml        Physical Examination:             Constitutional:  No acute distress, cooperative, pleasant    ENT:  Oral mucous moist, oropharynx benign. Neck supple,    Resp:  CTA bilaterally. No wheezing/rhonchi/rales. No accessory muscle use   CV:  Regular rhythm, normal rate, no murmurs, gallops, rubs    GI:  Soft, non distended, non tender. normoactive bowel sounds, no hepatosplenomegaly     Musculoskeletal:  Right pedal edema    Neurologic:  Moves all extremities. AAOx3, CN II-XII reviewed     Skin:  Good turgor, no rashes or ulcers       Data Review:    Review and/or order of clinical lab test      Labs:     Recent Labs      03/01/18   0545  02/28/18   0343   WBC  10.5  9.1   HGB  7.9*  7.7*   HCT  23.5*  23.7*   PLT  348  330     Recent Labs      03/01/18   0545  02/28/18   1759  02/28/18   0343  02/27/18   0325   NA  132*   --   133*  132*   K  3.5  3.8  3.3*  3.4*   CL  98   --   100  99   CO2  26   --   25  27   BUN  4*   --   4*  4*   CREA  0.28*   --   0.22*  0.24*   GLU  95   --   98  103*   CA  8.0*   --   7.8*  7.9*   MG   --    --   1.7  1.6     Recent Labs      03/01/18   0545  02/28/18   0343  02/27/18   0325   SGOT  19  19  18   ALT  10*  9*  10*   AP  105  106  115   TBILI  0.3  0.3  0.3   TP  5.1*  5.1*  5.1*   ALB  1.5*  1.4*  1.4*   GLOB  3.6  3.7  3.7     No results for input(s): INR, PTP, APTT in the last 72 hours. No lab exists for component: INREXT, INREXT   No results for input(s): FE, TIBC, PSAT, FERR in the last 72 hours. Lab Results   Component Value Date/Time    Folate 4.9 (L) 02/17/2018 05:54 AM      No results for input(s): PH, PCO2, PO2 in the last 72 hours.   No results for input(s): CPK, CKNDX, FERNANDEZ in the last 72 hours.     No lab exists for component: CPKMB  Lab Results   Component Value Date/Time    Cholesterol, total 202 (H) 03/27/2017 09:47 AM    HDL Cholesterol 76 03/27/2017 09:47 AM    LDL, calculated 109 (H) 03/27/2017 09:47 AM    Triglyceride 83 03/27/2017 09:47 AM     Lab Results   Component Value Date/Time    Glucose (POC) 111 (H) 02/26/2018 11:55 AM    Glucose (POC) 121 (H) 02/26/2018 08:44 AM    Glucose (POC) 114 (H) 02/18/2018 08:30 AM    Glucose (POC) 91 01/26/2018 04:08 PM    Glucose (POC) 107 (H) 01/26/2018 01:08 PM     Lab Results   Component Value Date/Time    Color DARK YELLOW 02/16/2018 08:30 PM    Appearance CLEAR 02/16/2018 08:30 PM    Specific gravity 1.013 02/16/2018 08:30 PM    pH (UA) 7.0 02/16/2018 08:30 PM    Protein TRACE (A) 02/16/2018 08:30 PM    Glucose NEGATIVE  02/16/2018 08:30 PM    Ketone TRACE (A) 02/16/2018 08:30 PM    Bilirubin Negative 09/28/2012 08:58 AM    Urobilinogen 0.2 02/16/2018 08:30 PM    Nitrites NEGATIVE  02/16/2018 08:30 PM    Leukocyte Esterase NEGATIVE  02/16/2018 08:30 PM    Epithelial cells FEW 02/16/2018 08:30 PM    Bacteria 1+ (A) 02/16/2018 08:30 PM    WBC 0-4 02/16/2018 08:30 PM    RBC 0-5 02/16/2018 08:30 PM         Medications Reviewed:     Current Facility-Administered Medications   Medication Dose Route Frequency    nystatin (MYCOSTATIN) 100,000 unit/gram powder   Topical DAILY PRN    thiamine (B-1) tablet 100 mg  100 mg Oral DAILY    buPROPion (WELLBUTRIN) tablet 75 mg  75 mg Per NG tube BID    nicotine (NICODERM CQ) 21 mg/24 hr patch 1 Patch  1 Patch TransDERmal DAILY    balsam peru-castor oil (VENELEX)  mg/gram ointment   Topical BID    sodium chloride (NS) flush 10-30 mL  10-30 mL InterCATHeter PRN    sodium chloride (NS) flush 10 mL  10 mL InterCATHeter PRN    sodium chloride (NS) flush 10-40 mL  10-40 mL InterCATHeter Q8H    alteplase (CATHFLO) 1 mg in sterile water (preservative free) 1 mL injection  1 mg InterCATHeter PRN    0.9% sodium chloride infusion 250 mL  250 mL IntraVENous PRN    LORazepam (ATIVAN) injection 1 mg  1 mg IntraVENous Q2H PRN    zolpidem (AMBIEN) tablet 5 mg  5 mg Oral QHS PRN    calcium-vitamin D (OS-DELORIS) 500 mg-200 unit tablet  1 Tab Oral BID WITH MEALS    sodium chloride (NS) flush 5-10 mL  5-10 mL IntraVENous PRN    acetaminophen (TYLENOL) tablet 650 mg  650 mg Oral Q4H PRN     ______________________________________________________________________  EXPECTED LENGTH OF STAY: 4d 21h  ACTUAL LENGTH OF STAY:          13                 Armando Gonzalez MD

## 2018-03-02 ENCOUNTER — APPOINTMENT (OUTPATIENT)
Dept: GENERAL RADIOLOGY | Age: 58
DRG: 640 | End: 2018-03-02
Attending: FAMILY MEDICINE
Payer: COMMERCIAL

## 2018-03-02 VITALS
HEART RATE: 107 BPM | WEIGHT: 124.12 LBS | BODY MASS INDEX: 23.43 KG/M2 | TEMPERATURE: 98.2 F | HEIGHT: 61 IN | DIASTOLIC BLOOD PRESSURE: 93 MMHG | SYSTOLIC BLOOD PRESSURE: 127 MMHG | OXYGEN SATURATION: 97 % | RESPIRATION RATE: 20 BRPM

## 2018-03-02 LAB
BACTERIA SPEC CULT: ABNORMAL
C JEJUNI+C COLI AG STL QL: NEGATIVE
E COLI SXT1+2 STL IA: NEGATIVE
SERVICE CMNT-IMP: ABNORMAL

## 2018-03-02 PROCEDURE — 74011250637 HC RX REV CODE- 250/637: Performed by: INTERNAL MEDICINE

## 2018-03-02 PROCEDURE — 74011250637 HC RX REV CODE- 250/637: Performed by: HOSPITALIST

## 2018-03-02 PROCEDURE — 71045 X-RAY EXAM CHEST 1 VIEW: CPT

## 2018-03-02 PROCEDURE — 3331090001 HH PPS REVENUE CREDIT

## 2018-03-02 PROCEDURE — 3331090002 HH PPS REVENUE DEBIT

## 2018-03-02 PROCEDURE — 74011250637 HC RX REV CODE- 250/637: Performed by: NURSE PRACTITIONER

## 2018-03-02 RX ORDER — LANOLIN ALCOHOL/MO/W.PET/CERES
325 CREAM (GRAM) TOPICAL
Qty: 30 TAB | Refills: 0 | Status: SHIPPED
Start: 2018-03-03 | End: 2018-05-02 | Stop reason: SDUPTHER

## 2018-03-02 RX ORDER — IBUPROFEN 200 MG
1 TABLET ORAL DAILY
Qty: 30 PATCH | Refills: 0 | Status: SHIPPED
Start: 2018-03-03 | End: 2018-04-02

## 2018-03-02 RX ADMIN — PANCRELIPASE 2 CAPSULE: 24000; 76000; 120000 CAPSULE, DELAYED RELEASE PELLETS ORAL at 11:30

## 2018-03-02 RX ADMIN — POTASSIUM CHLORIDE 40 MEQ: 750 TABLET, FILM COATED, EXTENDED RELEASE ORAL at 08:34

## 2018-03-02 RX ADMIN — CALCIUM CARBONATE-VITAMIN D TAB 500 MG-200 UNIT 1 TABLET: 500-200 TAB at 07:07

## 2018-03-02 RX ADMIN — BUPROPION HYDROCHLORIDE 75 MG: 75 TABLET, FILM COATED ORAL at 09:00

## 2018-03-02 RX ADMIN — PANCRELIPASE 2 CAPSULE: 24000; 76000; 120000 CAPSULE, DELAYED RELEASE PELLETS ORAL at 07:08

## 2018-03-02 RX ADMIN — Medication 100 MG: at 08:34

## 2018-03-02 RX ADMIN — Medication 10 ML: at 07:07

## 2018-03-02 RX ADMIN — FERROUS SULFATE TAB 325 MG (65 MG ELEMENTAL FE) 325 MG: 325 (65 FE) TAB at 07:07

## 2018-03-02 RX ADMIN — CASTOR OIL AND BALSAM, PERU: 788; 87 OINTMENT TOPICAL at 08:50

## 2018-03-02 NOTE — DISCHARGE SUMMARY
Discharge Summary       PATIENT ID: Jesús Jesus  MRN: [id number]   YOB: 1960    DATE OF ADMISSION: 2/16/2018  3:00 PM    DATE OF DISCHARGE: 3/2/2018   PRIMARY CARE PROVIDER: Ani Gore NP     ATTENDING PHYSICIAN: Leslie Cameron MD  DISCHARGING PROVIDER: Catina Kwong MD    To contact this individual call [phone] and ask the  to page. If unavailable ask to be transferred the Adult Hospitalist Department. CONSULTATIONS: IP CONSULT TO HEMATOLOGY  IP CONSULT TO NEPHROLOGY  IP CONSULT TO GASTROENTEROLOGY    PROCEDURES/SURGERIES: * No surgery found *    ADMITTING DIAGNOSES & HOSPITAL COURSE:     A 66 yo woman with HTN, HLD, DUB, iron deficiency anemia, severe protein-calorie malnutrition, recently diagnosed colitis, EtOH abuse, and hx of GI bleed was sent by her PCP to the ED on 2/16/18 for fatigue, generalized weakness, and right arm swelling. She was recently hospitalized at [facility] 1/26/18 - 2/2/18 for hypokalemia and anemia. She was admitted to the ICU for severe hypokalemia, hypotension, and chronic RUE superficial venous thrombosis.      Severe hypokalemia (POA)   - likely due to anorexia and malabsorption and recent use of diuretics; not resumed on admission  - resolved with multiple IV KCL,  continue KCL 40 meq po q dialy  EtOH withdrawal with chronic alcohol abuse   -On CIWA protocol and banana bag; prn Ativan, now out of the window, lorazepam is discontinued   -continue folic acid and thiamin  -advised to stop alcohol  Chronic diarrhea   -Afebrile, no leukocytosis,  -Stool test for occult blood negative, wbc, fat, crypto, culture result pending   -started Creaon 3/1 and continue on it  -seen by GI and further work up as an outpatient   Hepatic encephalopathy due to hyperammonemia   -ammonia level improved  -Had lactulose enema, patient with chronic diarrhea, will hold lactulose  -mental status resolved, conscious and well orineted   Acute on chronic anemia (POA)   - iron WNL, folate low, B12 low-normal  - FOBT negative  - s/p 2 units PRBC transfusion since admission , H/H low but stable  - Hematology consulted by [facility] due to recent hx of possible occult malignancy  - per  at bedside, EGD, colonoscopy, bone scan all unremarkable recently  - recent PET was abnormal  - stopped SC heparin and change to SCDs  - IR unable to do CT-guided BM bx due to unstable airway  -outpatient follow up for further work up  Right ankle pain unclear etiology  -x ray of right ankle and foot nonspecific soft tissue swelling. No evidence of osteomyelitis.   -venous duplex b/l LEs 2/19 negative  -Elevated right ankle   Severe protein-calorie malnutrition  -nutrition consulted, encourage more po intake  -off TPN  Chronic RUE VTE  - no indication for [facility] for RUE VTE as pt had IV in that arm during previous hospitalization    Generalized weakness/debility with EtOH abuse (POA)   - likely due to anorexia and EtOH abuse  - continue PT/OT  Hypovolemic shock (POA)   -hx of HTN  -resolved s/p pressor x4h and IVF  - TTE 2/17 EF 55-60%, no RWMA, mild-mod TR, small pericardial effusion  -BP normal  Ruled out RLL HCAP  - BCx 2/16 NGTD  - started empiric Zosyn/vancomycin on 2/17 and discontinued on 2/19  - BCx 2/16 NGTD  - MRSA swab negative  - urine strep/legionella antigens negative  - flu swab negative  - cxr from 2/17 to 2/19 cleared as to RLL findings, not c/c pna, all abx dc/d on assumption of care 2/22.    Mild transaminitis   - elevated AST likely due to EtOH, now resolved  - RUQ US 2/18 hepatic steatosis  Hypernatremia   -resolved, now hyponatremia  Hypophosphatemia   -resolved  Hypomagnesemia   -resolved     Code status: Full Code  DVT prophylaxis: SCD       DISCHARGE DIAGNOSES / PLAN:      Severe hypokalemia (POA)   - resolved, continue KCL 40 meq po q dialy  EtOH withdrawal with chronic alcohol abuse   -continue folic acid and thiamin  -advised to stop alcohol  Chronic diarrhea   -continue Creaon 3/1 and continue on it  -outpatient follow up with GI as an outpatient  Hepatic encephalopathy due to hyperammonemia   -Resolved, conscious and well orineted   Acute on chronic anemia (POA)   -continue folic acid, ferrous sulfat  -outpatient follow up for further work up  Right ankle pain unclear etiology  -Elevated right ankle   Severe protein-calorie malnutrition  -nutrition consulted, encourage more po intake  Chronic RUE VTE  - no indication for [facility] for now  Generalized weakness/debility with EtOH abuse (POA)   - continue PT/OT  Hypovolemic shock (POA)   -BP normal, monitor BP  Ruled out RLL HCAP  -no further intervention  Mild transaminitis   -resolved  -advised to stop alcohol  Hypernatremia   -resolved,  Hypophosphatemia   -resolved  Hypomagnesemia   -resolved    PENDING TEST RESULTS:   At the time of discharge the following test results are still pending: Stool studies    FOLLOW UP APPOINTMENTS:    Follow-up Information     Follow up With Details Comments Contact Info   1. Professor Earnestine Spencer PT/OT and RN (Please follow-up with agency if you have not heard from them by 12:00 pm the following day post discharge.) [address]   2. [address] [address]  [address] [address] [phone]     3. Israel Diallo NP In one week  [address]  [phone]          4. Jacqueline Alvarez MD in 2-3 weeks after discharge    ADDITIONAL CARE RECOMMENDATIONS:     DIET: Regular Diet      ACTIVITY: Activity as tolerated    WOUND CARE: NONE    EQUIPMENT needed: none      DISCHARGE MEDICATIONS:  Current Discharge Medication List      START taking these medications    Details   amylase-lipase-protease (CREON 80577) 24,000-76,000 -120,000 unit capsule Take 2 Caps by mouth three (3) times daily (with meals).   Qty: 180 Cap, Refills: 0      ferrous sulfate 325 mg (65 mg iron) tablet Take 1 Tab by mouth daily (with breakfast). Qty: 30 Tab, Refills: 0      nicotine (NICODERM CQ) 21 mg/24 hr 1 Patch by TransDERmal route daily for 30 days. Qty: 30 Patch, Refills: 0         CONTINUE these medications which have NOT CHANGED    Details   potassium chloride (KLOR-CON) 20 mEq packet Take 1 Packet by mouth daily. Qty: 30 Each, Refills: 0    Associated Diagnoses: Hypokalemia      calcium-vitamin D (CALCIUM 600 + D,3,) 600 mg(1,500mg) -200 unit tab Take 1 Tab by mouth two (2) times daily (with meals). Qty: 60 Tab, Refills: 0    Associated Diagnoses: Hypocalcemia      folic acid (FOLVITE) 1 mg tablet Take 1 mg by mouth daily. pantoprazole (PROTONIX) 40 mg tablet Take 1 Tab by mouth Daily (before breakfast). Qty: 60 Tab, Refills: 0      zolpidem (AMBIEN) 5 mg tablet TAKE ONE TABLET BY MOUTH NIGHTLY AS NEEDED FOR SLEEP  Qty: 30 Tab, Refills: 0    Associated Diagnoses: Primary insomnia      buPROPion XL (WELLBUTRIN XL) 150 mg tablet TAKE ONE TABLET BY MOUTH EVERY MORNING FOR SMOKING CESSATION  Qty: 30 Tab, Refills: 5    Associated Diagnoses: Tobacco use      thiamine (B-1) 100 mg tablet Take 1 Tab by mouth daily. Qty: 30 Tab, Refills: 5    Associated Diagnoses: Thiamin deficiency      magnesium 200 mg tab Take 200 mg by mouth daily. Qty: 30 Tab, Refills: 5    Associated Diagnoses: Hypomagnesemia      aspirin 81 mg chewable tablet CHEW ONE TABLET BY MOUTH DAILY  Qty: 30 Tab, Refills: 5    Associated Diagnoses: Hyperlipidemia, unspecified hyperlipidemia type         STOP taking these medications       ibuprofen (MOTRIN) 600 mg tablet Comments:   Reason for Stopping:                 NOTIFY YOUR PHYSICIAN FOR ANY OF THE FOLLOWING:   Fever over 101 degrees for 24 hours. Chest pain, shortness of breath, fever, chills, nausea, vomiting, diarrhea, change in mentation, falling, weakness, bleeding. Severe pain or pain not relieved by medications.   Or, any other signs or symptoms that you may have questions about.    DISPOSITION:    Home With:   OT  PT  HH  RN      x Long term SNF/Inpatient Rehab    Independent/assisted living    Hospice    Other:       PATIENT CONDITION AT DISCHARGE:     Functional status    Poor    x Deconditioned     Independent      Cognition   x  Lucid     Forgetful     Dementia      Catheters/lines (plus indication)    Rendon     PICC     PEG    x None      Code status    x Full code     DNR      PHYSICAL EXAMINATION AT DISCHARGE:   Refer to Progress Note      CHRONIC MEDICAL DIAGNOSES:  Problem List as of 3/2/2018  Date Reviewed: 2/19/2018          Codes Class Noted - Resolved    * (Principal)Hypokalemia ICD-10-CM: E87.6  ICD-9-CM: 276.8  2/16/2018 - Present        Abnormal CT scan, chest ICD-10-CM: R93.8  ICD-9-CM: 793.2  1/30/2018 - Present        Abnormal CT of the abdomen ICD-10-CM: R93.5  ICD-9-CM: 793.6  1/30/2018 - Present        Colitis ICD-10-CM: K52.9  ICD-9-CM: 558.9  1/27/2018 - Present        Lactic acid acidosis ICD-10-CM: E87.2  ICD-9-CM: 276.2  1/27/2018 - Present        Elevated troponin ICD-10-CM: R74.8  ICD-9-CM: 790.6  1/27/2018 - Present        Protein-calorie malnutrition, severe ([facility] ICD-10-CM: E43  ICD-9-CM: 262  1/27/2018 - Present        Hypotension ICD-10-CM: I95.9  ICD-9-CM: 458.9  1/27/2018 - Present        Emphysema of lung ([facility] ICD-10-CM: J43.9  ICD-9-CM: 492.8  1/27/2018 - Present        Weight loss ICD-10-CM: R63.4  ICD-9-CM: 783.21  1/26/2018 - Present        Hypokalemia due to loss of potassium ICD-10-CM: E87.6  ICD-9-CM: 276.8  1/26/2018 - Present        Hypomagnesemia ICD-10-CM: E83.42  ICD-9-CM: 275.2  1/26/2018 - Present        Debility ICD-10-CM: R53.81  ICD-9-CM: 799.3  1/26/2018 - Present        Scoliosis (Chronic) ICD-10-CM: M41.9  ICD-9-CM: 737.30  Unknown - Present    Overview Signed 4/13/2016  9:50 AM by Juan Mojica LPN Dr.              Benign essential HTN (Chronic) ICD-10-CM: I10  ICD-9-CM: 401.1  6/26/2014 - Present Hyperlipidemia (Chronic) ICD-10-CM: E78.5  ICD-9-CM: 272.4  6/26/2014 - Present        Folic acid deficiency (Chronic) ICD-10-CM: E53.8  ICD-9-CM: 266.2  2/8/2012 - Present        Iron deficiency anemia (Chronic) ICD-10-CM: D50.9  ICD-9-CM: 280.9  2/8/2012 - Present        RESOLVED: Vaginal bleeding (Chronic) ICD-10-CM: N93.9  ICD-9-CM: 623.8  2/7/2012 - 6/26/2014        RESOLVED: Unspecified essential hypertension (Chronic) ICD-10-CM: I10  ICD-9-CM: 401.9  2/7/2012 - 2/8/2012        RESOLVED: HTN (hypertension) ICD-10-CM: I10  ICD-9-CM: 401.9  3/19/2010 - 6/26/2014        RESOLVED: High cholesterol ICD-10-CM: E78.00  ICD-9-CM: 272.0  3/19/2010 - 6/26/2014              Greater than 35 minutes were spent with the patient on counseling and coordination of care    Signed:   Hayde Young MD  3/2/2018  9:20 AM

## 2018-03-02 NOTE — PROGRESS NOTES
[address]  [address]   [phone]                GI PROGRESS NOTE  Rishabh Askew [facility]  Work Cell: [phone]      NAME:   Yoli Berman   :    1960   MRN:    [id number]     Assessment/Plan   1. Diarrhea - appears to be more chronic in nature. Differentials include malabsorption related to alcohol use and poor dietary habits, ROSALINE, dietary allergy, secretory etiology, etc. Less likely infectious; however r/o occult process. Recent colonoscopy with random biopsies was (-).   - Diet as tolerated  - Supportive management per primary team  - Labs and stool studies pending  - Continue Creon   - Monitor stool output  2. Hypokalemia - improved  3. Abnormal CT - previous CT with mediastinal LAD, liver lesion and diffuse colitis  - Discussed with Oncology - will set up EUS/FNA of mediastinal LAD to be completed as outpatient   4. Alcohol dependence  - Advised regarding need for abstinence     Plans for discharge to rehab today - needs outpatient follow-up in 2-4 weeks      Patient Active Problem List   Diagnosis Code    Folic acid deficiency S34.4    Iron deficiency anemia D50.9    Benign essential HTN I10    Hyperlipidemia E78.5    Scoliosis M41.9    Weight loss R63.4    Hypokalemia due to loss of potassium E87.6    Hypomagnesemia E83.42    Debility R53.81    Colitis K52.9    Lactic acid acidosis E87.2    Elevated troponin R74.8    Protein-calorie malnutrition, severe (HCC) E43    Hypotension I95.9    Emphysema of lung (HCC) J43.9    Abnormal CT scan, chest R93.8    Abnormal CT of the abdomen R93.5    Hypokalemia E87.6       Subjective:     Feeling fine. Still with some loose, non-bloody stools. Denies any abdominal pain. Tolerating diet. Eager to be discharged.        Review of Systems    Constitutional: negative fever, negative chills, negative weight loss  Eyes:   negative visual changes  ENT:   negative sore throat, tongue or lip swelling  Respiratory:  negative cough, negative dyspnea  Cards:  negative for chest pain, palpitations, lower extremity edema  GI:   See HPI  :  negative for frequency, dysuria  Integument:  negative for rash and pruritus  Heme:  negative for easy bruising and gum/nose bleeding  Musculoskel: negative for myalgias, back pain and muscle weakness  Neuro: negative for headaches, dizziness, vertigo  Psych:  negative for feelings of anxiety, depression     Objective:     VITALS:   Last 24hrs VS reviewed since prior hospitalist progress note. Most recent are:  Visit Vitals    BP (!) 127/93 (BP 1 Location: Right arm, BP Patient Position: Sitting)    Pulse (!) 107    Temp 98.2 °F (36.8 °C)    Resp 20    Ht 5' 1\" (1.549 m)    Wt 56.3 kg (124 lb 1.9 oz)    SpO2 97%    BMI 23.45 kg/m2       Intake/Output Summary (Last 24 hours) at 03/02/18 0954  Last data filed at 03/02/18 0816   Gross per 24 hour   Intake              120 ml   Output                0 ml   Net              120 ml        PHYSICAL EXAM:  General   well developed, thin, alert, in no acute distress  EENT  Normocephalic, Atraumatic, PERRLA, EOMI, sclera clear  Respiratory   Clear To Auscultation bilaterally - no wheezes, rales, rhonchi, or crackles  Cardiology  Regular Rate and Rythmn  - no murmurs, rubs or gallops  Abdominal  Soft, non-tender, non-distended, positive bowel sounds, no hepatosplenomegaly, no palpable mass  Extremities  No clubbing, cyanosis, or edema. Pulses intact. Neurological  No focal neurological deficits noted  Psychological  Oriented x 3. Normal affect. Lab Data No results found for this or any previous visit (from the past 12 hour(s)).       Medications: Reviewed    PMH/SH reviewed - no change compared to H&P  Mid-Level Provider: Marnie Mcmahan NP   Date/Time:  3/2/2018

## 2018-03-02 NOTE — PROGRESS NOTES
Patient pulled her fecal management out. No trama noted to rectum. Pt denies any pain and discomfort to this area. Dr Justice Sanchez is aware. Patient informed him she pulled it out and he informed nurse.

## 2018-03-02 NOTE — PROGRESS NOTES
0200: Patient pulled out CareinSync (triple lumen subclavian); line was fully out but sutures intact; removed sutures with sterile technique, cleaned the site, and placed guaze and tegaderm on site. Bed alarm placed under patient and informed patient not to get out of the bed. Patient verbalized understanding. 0210: paged hospitalist on call. 0215: Hospitalist returned page; provided SBAR and current patient condition. Orders received: stat portable chest ray     0217: Orders placed. 80: Paged radiology; radiology stated they will be arriving shortly.

## 2018-03-02 NOTE — PROGRESS NOTES
Report called to Ge. Spoke with nurse JADE Osuna whom received report. She is informed, patients  will be bringing her to the facility and the medications used for pt. Excoriation is place in pt personal bag alone with with Creon medication. Enrrique verbalized understanding.

## 2018-03-02 NOTE — PROGRESS NOTES
8: 62 AM  CM notified that patient has orders for discharge. Patient to discharge to [facility] for skilled services. Facility is aware of patient discharge. Patient will be going to Room 405B. RN to call report to  [phone]. [facility] once received. Mode of transport at time of discharge to be provided by patient's spouse, who will be present 12:00 pm.  RN notified of transport time. 9:42 AM  CM notified discharge orders in.  CM submitted discharge summary to [facility] via [facility].     LATASHA August/CRM

## 2018-03-03 PROCEDURE — 3331090002 HH PPS REVENUE DEBIT

## 2018-03-03 PROCEDURE — 3331090001 HH PPS REVENUE CREDIT

## 2018-03-04 LAB
GLIADIN PEPTIDE IGA SER-ACNC: 6 UNITS (ref 0–19)
GLIADIN PEPTIDE IGG SER-ACNC: 2 UNITS (ref 0–19)
IGA SERPL-MCNC: 547 MG/DL (ref 87–352)
TTG IGA SER-ACNC: 2 U/ML (ref 0–3)
TTG IGG SER-ACNC: 4 U/ML (ref 0–5)

## 2018-03-04 PROCEDURE — 3331090002 HH PPS REVENUE DEBIT

## 2018-03-04 PROCEDURE — 3331090001 HH PPS REVENUE CREDIT

## 2018-03-05 PROCEDURE — 3331090002 HH PPS REVENUE DEBIT

## 2018-03-05 PROCEDURE — 3331090001 HH PPS REVENUE CREDIT

## 2018-03-06 PROCEDURE — 3331090001 HH PPS REVENUE CREDIT

## 2018-03-06 PROCEDURE — 3331090002 HH PPS REVENUE DEBIT

## 2018-03-07 LAB
O+P SPEC MICRO: NORMAL
O+P STL CONC: NORMAL
SPECIMEN SOURCE: NORMAL

## 2018-03-07 PROCEDURE — 3331090001 HH PPS REVENUE CREDIT

## 2018-03-07 PROCEDURE — 3331090002 HH PPS REVENUE DEBIT

## 2018-03-08 PROCEDURE — 3331090002 HH PPS REVENUE DEBIT

## 2018-03-08 PROCEDURE — 3331090001 HH PPS REVENUE CREDIT

## 2018-03-09 PROCEDURE — 3331090002 HH PPS REVENUE DEBIT

## 2018-03-09 PROCEDURE — 3331090001 HH PPS REVENUE CREDIT

## 2018-03-10 PROCEDURE — 3331090001 HH PPS REVENUE CREDIT

## 2018-03-10 PROCEDURE — 3331090002 HH PPS REVENUE DEBIT

## 2018-03-11 PROCEDURE — 3331090001 HH PPS REVENUE CREDIT

## 2018-03-11 PROCEDURE — 3331090002 HH PPS REVENUE DEBIT

## 2018-03-12 PROCEDURE — 3331090001 HH PPS REVENUE CREDIT

## 2018-03-12 PROCEDURE — 3331090002 HH PPS REVENUE DEBIT

## 2018-03-13 PROCEDURE — 3331090002 HH PPS REVENUE DEBIT

## 2018-03-13 PROCEDURE — 3331090001 HH PPS REVENUE CREDIT

## 2018-03-14 PROCEDURE — 3331090001 HH PPS REVENUE CREDIT

## 2018-03-14 PROCEDURE — 3331090002 HH PPS REVENUE DEBIT

## 2018-03-15 PROCEDURE — 3331090002 HH PPS REVENUE DEBIT

## 2018-03-15 PROCEDURE — 3331090001 HH PPS REVENUE CREDIT

## 2018-03-16 PROCEDURE — 3331090001 HH PPS REVENUE CREDIT

## 2018-03-16 PROCEDURE — 3331090002 HH PPS REVENUE DEBIT

## 2018-03-17 PROCEDURE — 3331090001 HH PPS REVENUE CREDIT

## 2018-03-17 PROCEDURE — 3331090002 HH PPS REVENUE DEBIT

## 2018-03-18 PROCEDURE — 3331090001 HH PPS REVENUE CREDIT

## 2018-03-18 PROCEDURE — 3331090002 HH PPS REVENUE DEBIT

## 2018-03-19 PROCEDURE — 3331090001 HH PPS REVENUE CREDIT

## 2018-03-19 PROCEDURE — 3331090002 HH PPS REVENUE DEBIT

## 2018-03-20 PROCEDURE — 3331090002 HH PPS REVENUE DEBIT

## 2018-03-20 PROCEDURE — 3331090001 HH PPS REVENUE CREDIT

## 2018-03-21 PROCEDURE — 3331090002 HH PPS REVENUE DEBIT

## 2018-03-21 PROCEDURE — 3331090001 HH PPS REVENUE CREDIT

## 2018-03-22 PROCEDURE — 3331090002 HH PPS REVENUE DEBIT

## 2018-03-22 PROCEDURE — 3331090001 HH PPS REVENUE CREDIT

## 2018-03-23 PROCEDURE — 3331090002 HH PPS REVENUE DEBIT

## 2018-03-23 PROCEDURE — 3331090001 HH PPS REVENUE CREDIT

## 2018-03-24 PROCEDURE — 3331090002 HH PPS REVENUE DEBIT

## 2018-03-24 PROCEDURE — 3331090001 HH PPS REVENUE CREDIT

## 2018-03-25 PROCEDURE — 3331090001 HH PPS REVENUE CREDIT

## 2018-03-25 PROCEDURE — 3331090002 HH PPS REVENUE DEBIT

## 2018-03-26 ENCOUNTER — TELEPHONE (OUTPATIENT)
Dept: ONCOLOGY | Age: 58
End: 2018-03-26

## 2018-03-26 PROCEDURE — 3331090002 HH PPS REVENUE DEBIT

## 2018-03-26 PROCEDURE — 3331090001 HH PPS REVENUE CREDIT

## 2018-03-27 PROCEDURE — 3331090002 HH PPS REVENUE DEBIT

## 2018-03-27 PROCEDURE — 3331090001 HH PPS REVENUE CREDIT

## 2018-03-28 ENCOUNTER — DOCUMENTATION ONLY (OUTPATIENT)
Dept: FAMILY MEDICINE CLINIC | Age: 58
End: 2018-03-28

## 2018-03-28 PROCEDURE — 3331090002 HH PPS REVENUE DEBIT

## 2018-03-28 PROCEDURE — 3331090001 HH PPS REVENUE CREDIT

## 2018-03-29 PROCEDURE — 3331090002 HH PPS REVENUE DEBIT

## 2018-03-29 PROCEDURE — 3331090001 HH PPS REVENUE CREDIT

## 2018-03-30 PROCEDURE — 3331090002 HH PPS REVENUE DEBIT

## 2018-03-30 PROCEDURE — 3331090001 HH PPS REVENUE CREDIT

## 2018-03-31 PROCEDURE — 3331090002 HH PPS REVENUE DEBIT

## 2018-03-31 PROCEDURE — 3331090001 HH PPS REVENUE CREDIT

## 2018-04-01 PROCEDURE — 3331090001 HH PPS REVENUE CREDIT

## 2018-04-01 PROCEDURE — 3331090002 HH PPS REVENUE DEBIT

## 2018-04-02 PROCEDURE — 3331090001 HH PPS REVENUE CREDIT

## 2018-04-02 PROCEDURE — 3331090002 HH PPS REVENUE DEBIT

## 2018-04-10 ENCOUNTER — OFFICE VISIT (OUTPATIENT)
Dept: FAMILY MEDICINE CLINIC | Age: 58
End: 2018-04-10

## 2018-04-10 VITALS
RESPIRATION RATE: 16 BRPM | DIASTOLIC BLOOD PRESSURE: 83 MMHG | WEIGHT: 98.2 LBS | OXYGEN SATURATION: 96 % | TEMPERATURE: 98.3 F | HEART RATE: 98 BPM | HEIGHT: 61 IN | BODY MASS INDEX: 18.54 KG/M2 | SYSTOLIC BLOOD PRESSURE: 129 MMHG

## 2018-04-10 DIAGNOSIS — M79.2 PERIPHERAL NEUROPATHIC PAIN: ICD-10-CM

## 2018-04-10 DIAGNOSIS — D50.0 IRON DEFICIENCY ANEMIA DUE TO CHRONIC BLOOD LOSS: Chronic | ICD-10-CM

## 2018-04-10 DIAGNOSIS — J43.9 PULMONARY EMPHYSEMA, UNSPECIFIED EMPHYSEMA TYPE (HCC): ICD-10-CM

## 2018-04-10 DIAGNOSIS — I10 BENIGN ESSENTIAL HTN: Chronic | ICD-10-CM

## 2018-04-10 DIAGNOSIS — F10.10 ALCOHOL ABUSE: ICD-10-CM

## 2018-04-10 DIAGNOSIS — F10.982 ALCOHOL-INDUCED INSOMNIA (HCC): ICD-10-CM

## 2018-04-10 DIAGNOSIS — E43 PROTEIN-CALORIE MALNUTRITION, SEVERE (HCC): Primary | ICD-10-CM

## 2018-04-10 RX ORDER — DOXEPIN HYDROCHLORIDE 10 MG/1
10 CAPSULE ORAL
Qty: 30 CAP | Refills: 5 | Status: SHIPPED | OUTPATIENT
Start: 2018-04-10 | End: 2018-04-20 | Stop reason: DRUGHIGH

## 2018-04-10 NOTE — MR AVS SNAPSHOT
[address] [facility] 
 
 
 [address] [facility] 
[phone] Patient: Snow Rivas MRN: [id number] :1960 Visit Information Date & Time Provider Department Dept. Phone Encounter #  
 4/10/2018  9:00 AM Barbara Murphy, [address] [facility] [address] [phone] [id number] Follow-up Instructions Return in about 3 months (around 7/10/2018) for disease management. Your Appointments 2018  1:00 PM  
New Patient with Thurnell Manges, DO [facility]ID Oncology at [facility][place]) Appt Note: new pt; hospital fup (anemia); r/s due to transportation and other appts already scheduled [facility] [address]  
[phone]  
  
   
 [address] [address] Upcoming Health Maintenance Date Due Pneumococcal 19-64 Highest Risk (1 of 3 - PCV13) 1979 MEDICARE YEARLY EXAM 3/28/2018 BREAST CANCER SCRN MAMMOGRAM 2018 PAP AKA CERVICAL CYTOLOGY 2019 DTaP/Tdap/Td series (2 - Td) 3/27/2027 COLONOSCOPY 2028 Allergies as of 4/10/2018  Review Complete On: 4/10/2018 By: Amada Alcaraz LPN Severity Noted Reaction Type Reactions Robaxin [Methocarbamol] High 2016    Hives, Rash, Itching Red splotches Statins-hmg-coa Reductase Inhibitors Medium 2017   Side Effect Myalgia Codeine  2012    Itching Neosporin [Neomycin-bacitracin-polymyxin]  2010    Rash Current Immunizations  Reviewed on 2018 No immunizations on file. Not reviewed this visit You Were Diagnosed With   
  
 Codes Comments Protein-calorie malnutrition, severe ([facility] [address]    -  Primary ICD-10-CM: F89 ICD-9-CM: 426 Pulmonary emphysema, unspecified emphysema type ([facility]     ICD-10-CM: J43.9 ICD-9-CM: 492.8 Benign essential HTN     ICD-10-CM: I10 
ICD-9-CM: 401.1 Peripheral neuropathic pain     ICD-10-CM: M79.2 ICD-9-CM: 729.2 Alcohol abuse     ICD-10-CM: F10.10 ICD-9-CM: 305.00 Iron deficiency anemia due to chronic blood loss     ICD-10-CM: D50.0 ICD-9-CM: 280.0 Alcohol-induced insomnia (HCC)     ICD-10-CM: G56.995 ICD-9-CM: 291.82 Vitals BP Pulse Temp Resp Height(growth percentile) Weight(growth percentile) 129/83 (BP 1 Location: Right arm, BP Patient Position: Sitting) 98 98.3 °F (36.8 °C) (Oral) 16 5' 1\" (1.549 m) 98 lb 3.2 oz (44.5 kg) SpO2 BMI OB Status Smoking Status 96% 18.55 kg/m2 Premenopausal Current Every Day Smoker Vitals History BMI and BSA Data Body Mass Index Body Surface Area 18.55 kg/m 2 1.38 m 2 Preferred Pharmacy Pharmacy Name Phone [address] [facility], [address] [place] [address] Lauryn Chuck [phone] Your Updated Medication List  
  
   
This list is accurate as of 4/10/18  9:55 AM.  Always use your most recent med list.  
  
  
  
  
 aspirin 81 mg chewable tablet CHEW ONE TABLET BY MOUTH DAILY  
  
 buPROPion  mg tablet Commonly known as:  WELLBUTRIN XL  
TAKE ONE TABLET BY MOUTH EVERY MORNING FOR SMOKING CESSATION  
  
 calcium-vitamin D 600 mg(1,500mg) -200 unit Tab Commonly known as:  CALCIUM 600 + D(3) Take 1 Tab by mouth two (2) times daily (with meals). doxepin 10 mg capsule Commonly known as:  SINEquan Take 1 Cap by mouth nightly. For sleep  
  
 ferrous sulfate 325 mg (65 mg iron) tablet Take 1 Tab by mouth daily (with breakfast). folic acid 1 mg tablet Commonly known as:  Google Take 1 mg by mouth daily. lipase-protease-amylase 24,000-76,000 -120,000 unit capsule Commonly known as:  CREON 64787 Take 2 Caps by mouth three (3) times daily (with meals). magnesium 200 mg Tab Take 200 mg by mouth daily. pantoprazole 40 mg tablet Commonly known as:  PROTONIX Take 1 Tab by mouth Daily (before breakfast). potassium chloride 20 mEq packet Commonly known as:  KLOR-CON Take 1 Packet by mouth daily. thiamine 100 mg tablet Commonly known as:  B-1 Take 1 Tab by mouth daily. zolpidem 5 mg tablet Commonly known as:  AMBIEN  
TAKE ONE TABLET BY MOUTH NIGHTLY AS NEEDED FOR SLEEP Prescriptions Sent to Pharmacy Refills  
 doxepin (SINEQUAN) 10 mg capsule 5 Sig: Take 1 Cap by mouth nightly. For sleep Class: Normal  
 Pharmacy: [address] [facility], [address] [place] [address]  [address]  #: [phone] Route: Oral  
  
We Performed the Following AMMONIA [id number] CPT(R)] ANEMIA PROFILE B [id number] CPT(R)] MAGNESIUM P571299 CPT(R)] METABOLIC PANEL, COMPREHENSIVE [79476 CPT(R)] VITAMIN B1, WHOLE BLOOD [id number] CPT(R)] Follow-up Instructions Return in about 3 months (around 7/10/2018) for disease management. Patient Instructions Iron Deficiency Anemia: Care Instructions Your Care Instructions Anemia means that you do not have enough red blood cells. Red blood cells carry oxygen around your body. When you have anemia, it can make you pale, weak, and tired. Many things can cause anemia. The most common cause is loss of blood. This can happen if you have heavy menstrual periods. It can also happen if you have bleeding in your stomach or bowel. You can also get anemia if you don't have enough iron in your diet or if it's hard for your body to absorb iron. In some cases, pregnancy causes anemia. That's because a pregnant woman needs more iron. Your doctor may do more tests to find the cause of your anemia. If a disease or other health problem is causing it, your doctor will treat that problem. It's important to follow up with your doctor to make sure that your iron level returns to normal. 
Follow-up care is a key part of your treatment and safety.  Be sure to make and go to all appointments, and call your doctor if you are having problems. It's also a good idea to know your test results and keep a list of the medicines you take. How can you care for yourself at home? · If your doctor recommended iron pills, take them as directed. ¨ Try to take the pills on an empty stomach. You can do this about 1 hour before or 2 hours after meals. But you may need to take iron with food to avoid an upset stomach. ¨ Do not take antacids or drink milk or anything with caffeine within 2 hours of when you take your iron. They can keep your body from absorbing the iron well. ¨ Vitamin C helps your body absorb iron. You may want to take iron pills with a glass of orange juice or some other food high in vitamin C. 
¨ Iron pills may cause stomach problems. These include heartburn, nausea, diarrhea, constipation, and cramps. It can help to drink plenty of fluids and include fruits, vegetables, and fiber in your diet. ¨ It's normal for iron pills to make your stool a greenish or grayish black. But internal bleeding can also cause dark stool. So it's important to tell your doctor about any color changes. ¨ Call your doctor if you think you are having a problem with your iron pills. Even after you start to feel better, it will take several months for your body to build up its supply of iron. ¨ If you miss a pill, don't take a double dose. ¨ Keep iron pills out of the reach of small children. Too much iron can be very dangerous. · Eat foods with a lot of iron. These include red meat, shellfish, poultry, and eggs. They also include beans, raisins, whole-grain bread, and leafy green vegetables. · Steam your vegetables. This is the best way to prepare them if you want to get as much iron as possible. · Be safe with medicines. Do not take nonsteroidal anti-inflammatory pain relievers unless your doctor tells you to. These include aspirin, naproxen (Aleve), and ibuprofen (Advil, Motrin). · Liquid iron can stain your teeth. But you can mix it with water or juice and drink it with a straw. Then it won't get on your teeth. When should you call for help? Call 911 anytime you think you may need emergency care. For example, call if: 
? · You passed out (lost consciousness). ?Call your doctor now or seek immediate medical care if: 
? · You are short of breath. ? · You are dizzy or light-headed, or you feel like you may faint. ? · You have new or worse bleeding. ? Watch closely for changes in your health, and be sure to contact your doctor if: 
? · You feel weaker or more tired than usual.  
? · You do not get better as expected. Where can you learn more? Go to http://erick-jason.info/. Enter J352 in the search box to learn more about \"Iron Deficiency Anemia: Care Instructions. \" Current as of: October 13, 2016 Content Version: 11.4 © 6353-0046 [employer]. Care instructions adapted under license by [employer] (which disclaims liability or warranty for this information). If you have questions about a medical condition or this instruction, always ask your healthcare professional. [place] [address] any warranty or liability for your use of this information. Introducing [facility] [place] [facility]! [facility] introduces [employer] patient portal. Now you can access parts of your medical record, email your doctor's office, and request medication refills online. 1. In your internet browser, go to [email or web address] [email or web address] 2. Click on the First Time User? Click Here link in the Sign In box. You will see the New Member Sign Up page. 3. Enter your [employer] Access Code exactly as it appears below. You will not need to use this code after youve completed the sign-up process. If you do not sign up before the expiration date, you must request a new code. · [employer] Access Code: [email or web address] Expires: 6/20/2018  5:09 AM 
 
 4. Enter the last four digits of your Social Security Number (xxxx) and Date of Birth (mm/dd/yyyy) as indicated and click Submit. You will be taken to the next sign-up page. 5. Create a [employer] ID. This will be your [employer] login ID and cannot be changed, so think of one that is secure and easy to remember. 6. Create a [employer] password. You can change your password at any time. 7. Enter your Password Reset Question and Answer. This can be used at a later time if you forget your password. 8. Enter your e-mail address. You will receive e-mail notification when new information is available in [address] 9. Click Sign Up. You can now view and download portions of your medical record. 10. Click the Download Summary menu link to download a portable copy of your medical information. If you have questions, please visit the Frequently Asked Questions section of the [employer] website. Remember, [employer] is NOT to be used for urgent needs. For medical emergencies, dial 911. Now available from your iPhone and Android! Please provide this summary of care documentation to your next provider. Your primary care clinician is listed as [place]. If you have any questions after today's visit, please call [phone].

## 2018-04-10 NOTE — PROGRESS NOTES
Assessment/Plan:     {There are no diagnoses linked to this encounter. (Refresh or delete this SmartLink)}    Follow-up Disposition: Not on File    Discussed expected course/resolution/complications of diagnosis in detail with patient.    Medication risks/benefits/costs/interactions/alternatives discussed with patient.    Pt was given after visit summary which includes diagnoses, current medications & vitals. Pt expressed understanding with the diagnosis and plan        Subjective:      Oni Min is a 63 y.o. female who presents for had concerns including Transitions Of Care; Medication Evaluation; and Numbness. Current Outpatient Prescriptions   Medication Sig Dispense Refill    zolpidem (AMBIEN) 5 mg tablet TAKE ONE TABLET BY MOUTH NIGHTLY AS NEEDED FOR SLEEP 30 Tab 0    amylase-lipase-protease (CREON 92629) 24,000-76,000 -120,000 unit capsule Take 2 Caps by mouth three (3) times daily (with meals). 180 Cap 0    ferrous sulfate 325 mg (65 mg iron) tablet Take 1 Tab by mouth daily (with breakfast). 30 Tab 0    potassium chloride (KLOR-CON) 20 mEq packet Take 1 Packet by mouth daily. 30 Each 0    calcium-vitamin D (CALCIUM 600 + D,3,) 600 mg(1,500mg) -200 unit tab Take 1 Tab by mouth two (2) times daily (with meals). 60 Tab 0    folic acid (FOLVITE) 1 mg tablet Take 1 mg by mouth daily.  pantoprazole (PROTONIX) 40 mg tablet Take 1 Tab by mouth Daily (before breakfast). 60 Tab 0    buPROPion XL (WELLBUTRIN XL) 150 mg tablet TAKE ONE TABLET BY MOUTH EVERY MORNING FOR SMOKING CESSATION 30 Tab 5    thiamine (B-1) 100 mg tablet Take 1 Tab by mouth daily. 30 Tab 5    magnesium 200 mg tab Take 200 mg by mouth daily.  30 Tab 5    aspirin 81 mg chewable tablet CHEW ONE TABLET BY MOUTH DAILY 30 Tab 5       Allergies   Allergen Reactions    Robaxin [Methocarbamol] Hives, Rash and Itching     Red splotches    Statins-Hmg-Coa Reductase Inhibitors Myalgia    Codeine Itching    Neosporin [Neomycin-Bacitracin-Polymyxin] Rash       ROS:   ROS    Objective:     Visit Vitals    /83 (BP 1 Location: Right arm, BP Patient Position: Sitting)    Pulse 98    Temp 98.3 °F (36.8 °C) (Oral)    Resp 16    Ht 5' 1\" (1.549 m)    Wt 98 lb 3.2 oz (44.5 kg)    SpO2 96%    BMI 18.55 kg/m2       Vitals and Nurse Documentation reviewed.      Physical Exam

## 2018-04-10 NOTE — PROGRESS NOTES
Chief Complaint   Patient presents with   [address] rehab- disharged 3/28/18    Medication Evaluation     patient would like the dosage of her ambien increased back to 10 mg    Numbness     tingling, numbness, and cold sensation in hands and feet- bilateral- started 3 months ago     1. Have you been to the ER, urgent care clinic since your last visit? Hospitalized since your last visit? Yes- 2/16/18- 3/2/18- [facility]- severe hypokalemia    2. Have you seen or consulted any other health care providers outside of the [address] [place] [address] since your last visit? Include any pap smears or colon screening.  No

## 2018-04-10 NOTE — PROGRESS NOTES
[facility] Note    Xavier Abdullahi is a 63 y.o. female who was seen in clinic today (4/10/2018). Subjective:  Hospital Follow up  Patient seen for hospital follow up for hypokalemia, alcohol withdrawal and hepatic encephalopathy. Patient was sent to [facility] ED on  2/16/18 for malnutrition, confusion and arm swelling. She was previously hospitalized at [facility] 1/26/18 - 2/2/18 for hypokalemia and anemia. She was admitted to [facility] ICU for severe hypokalemia, hypotension, and chronic RUE superficial venous thrombosis.      Severe hypokalemia resolved with IV potassium. Ammonia level was treated with lactulose enema. Alcohol withdrawal treated with IFF, lorazepam, folic acid and thiamine. Patient was anemic on admission and received 2 units PRBC. FOBT negative. Hematology and GI consulted. EGD scheduled as outpatient. Patient was transferred to [facility] on 3/2/18 and discharged on 3/28/18. Reports improvement of leg strength and balance. She reports an improved appetite. She does admit to drinking alcohol again 2-3 drinks per night. Patient requests refill of Ambien. Prior to Admission medications    Medication Sig Start Date End Date Taking? Authorizing Provider   doxepin (SINEQUAN) 10 mg capsule Take 1 Cap by mouth nightly. For sleep 4/10/18  Yes Oliva Rosenberg NP   zolpidem (AMBIEN) 5 mg tablet TAKE ONE TABLET BY MOUTH NIGHTLY AS NEEDED FOR SLEEP 3/28/18  Yes Oliva Rosenberg NP   amylase-lipase-protease (CREON 96730) 24,000-76,000 -120,000 unit capsule Take 2 Caps by mouth three (3) times daily (with meals). 3/2/18  Yes Jaida Calhoun MD   ferrous sulfate 325 mg (65 mg iron) tablet Take 1 Tab by mouth daily (with breakfast). 3/3/18  Yes Jaida Calhoun MD   potassium chloride (KLOR-CON) 20 mEq packet Take 1 Packet by mouth daily.  2/9/18  Yes Oliva Rosenberg NP   calcium-vitamin D (CALCIUM 600 + D,3,) 600 mg(1,500mg) -200 unit tab Take 1 Tab by mouth two (2) times daily (with meals). 2/9/18  Yes Urszula Mckeon NP   folic acid (FOLVITE) 1 mg tablet Take 1 mg by mouth daily. Yes Historical Provider   pantoprazole (PROTONIX) 40 mg tablet Take 1 Tab by mouth Daily (before breakfast). 2/3/18  Yes Sirisha Rae MD   buPROPion XL (WELLBUTRIN XL) 150 mg tablet TAKE ONE TABLET BY MOUTH EVERY MORNING FOR SMOKING CESSATION 11/6/17  Yes Urszula Mckeon NP   thiamine (B-1) 100 mg tablet Take 1 Tab by mouth daily. 10/24/17  Yes Urszula Mckeon NP   magnesium 200 mg tab Take 200 mg by mouth daily. 10/24/17  Yes Urszula Mckeon NP   aspirin 81 mg chewable tablet CHEW ONE TABLET BY MOUTH DAILY 9/15/17   Kalina Corbett MD          Allergies   Allergen Reactions    Robaxin [Methocarbamol] Hives, Rash and Itching     Red splotches    Statins-Hmg-Coa Reductase Inhibitors Myalgia    Codeine Itching    Neosporin [Neomycin-Bacitracin-Polymyxin] Rash           ROS  See HPI    Objective:   Physical Exam   Constitutional: She is oriented to person, place, and time. She appears well-developed and well-nourished. Neck: Normal range of motion. Neck supple. No JVD present. Carotid bruit is not present. No thyromegaly present. Cardiovascular: Normal rate, regular rhythm and intact distal pulses. Exam reveals no gallop and no friction rub. No murmur heard. Pulmonary/Chest: Effort normal and breath sounds normal. No respiratory distress. Musculoskeletal: She exhibits no edema. Lymphadenopathy:     She has no cervical adenopathy. Neurological: She is alert and oriented to person, place, and time. Psychiatric: She has a normal mood and affect. Her behavior is normal.   Nursing note and vitals reviewed.         Visit Vitals    /83 (BP 1 Location: Right arm, BP Patient Position: Sitting)    Pulse 98    Temp 98.3 °F (36.8 °C) (Oral)    Resp 16    Ht 5' 1\" (1.549 m)    Wt 98 lb 3.2 oz (44.5 kg)    SpO2 96%    BMI 18.55 kg/m2 Assessment & Plan:  Diagnoses and all orders for this visit:    1. Protein-calorie malnutrition, severe ([address]  Recheck blood levels. Encourage increased caloric intake. Protein shake between meals.   -     ANEMIA PROFILE B  -     VITAMIN B1, WHOLE BLOOD  -     MAGNESIUM  -     METABOLIC PANEL, COMPREHENSIVE  -     AMMONIA    2. Pulmonary emphysema, unspecified emphysema type (HCC)  Stable, no changes to current therapy. Counseled patient on need to quit smoking. 3. Benign essential HTN  Well controlled, no medication changes. 4. Peripheral neuropathic pain  Likely alcohol induced. Check B12 and thiamine deficiencies. 5. Alcohol abuse  Addressed patient directly about her alcohol use and informed her that continuing to drink would kill her. Patient verbalizes understanding.  -     AMMONIA    6. Iron deficiency anemia due to chronic blood loss  EGD as scheduled. Recheck H/H.     7. Alcohol-induced insomnia (HCC)  Will not refill Ambien given alcohol abuse and high risk medication.   -     Begin doxepin (SINEQUAN) 10 mg capsule; Take 1 Cap by mouth nightly. For sleep      I have discussed the diagnosis with the patient and the intended plan as seen in the above orders. The patient has received an after-visit summary along with patient information handout. I have discussed medication side effects and warnings with the patient as well. Follow-up Disposition:  Return in about 3 months (around 7/10/2018) for disease management.         Roel Jaramillo NP

## 2018-04-10 NOTE — PATIENT INSTRUCTIONS
Iron Deficiency Anemia: Care Instructions  Your Care Instructions    Anemia means that you do not have enough red blood cells. Red blood cells carry oxygen around your body. When you have anemia, it can make you pale, weak, and tired. Many things can cause anemia. The most common cause is loss of blood. This can happen if you have heavy menstrual periods. It can also happen if you have bleeding in your stomach or bowel. You can also get anemia if you don't have enough iron in your diet or if it's hard for your body to absorb iron. In some cases, pregnancy causes anemia. That's because a pregnant woman needs more iron. Your doctor may do more tests to find the cause of your anemia. If a disease or other health problem is causing it, your doctor will treat that problem. It's important to follow up with your doctor to make sure that your iron level returns to normal.  Follow-up care is a key part of your treatment and safety. Be sure to make and go to all appointments, and call your doctor if you are having problems. It's also a good idea to know your test results and keep a list of the medicines you take. How can you care for yourself at home? · If your doctor recommended iron pills, take them as directed. ¨ Try to take the pills on an empty stomach. You can do this about 1 hour before or 2 hours after meals. But you may need to take iron with food to avoid an upset stomach. ¨ Do not take antacids or drink milk or anything with caffeine within 2 hours of when you take your iron. They can keep your body from absorbing the iron well. ¨ Vitamin C helps your body absorb iron. You may want to take iron pills with a glass of orange juice or some other food high in vitamin C.  ¨ Iron pills may cause stomach problems. These include heartburn, nausea, diarrhea, constipation, and cramps. It can help to drink plenty of fluids and include fruits, vegetables, and fiber in your diet.   ¨ It's normal for iron pills to make your stool a greenish or grayish black. But internal bleeding can also cause dark stool. So it's important to tell your doctor about any color changes. ¨ Call your doctor if you think you are having a problem with your iron pills. Even after you start to feel better, it will take several months for your body to build up its supply of iron. ¨ If you miss a pill, don't take a double dose. ¨ Keep iron pills out of the reach of small children. Too much iron can be very dangerous. · Eat foods with a lot of iron. These include red meat, shellfish, poultry, and eggs. They also include beans, raisins, whole-grain bread, and leafy green vegetables. · Steam your vegetables. This is the best way to prepare them if you want to get as much iron as possible. · Be safe with medicines. Do not take nonsteroidal anti-inflammatory pain relievers unless your doctor tells you to. These include aspirin, naproxen (Aleve), and ibuprofen (Advil, Motrin). · Liquid iron can stain your teeth. But you can mix it with water or juice and drink it with a straw. Then it won't get on your teeth. When should you call for help? Call 911 anytime you think you may need emergency care. For example, call if:  ? · You passed out (lost consciousness). ?Call your doctor now or seek immediate medical care if:  ? · You are short of breath. ? · You are dizzy or light-headed, or you feel like you may faint. ? · You have new or worse bleeding. ? Watch closely for changes in your health, and be sure to contact your doctor if:  ? · You feel weaker or more tired than usual.   ? · You do not get better as expected. Where can you learn more? Go to http://erick-jason.info/. Enter O638 in the search box to learn more about \"Iron Deficiency Anemia: Care Instructions. \"  Current as of: October 13, 2016  Content Version: 11.4  © 3806-2506 Healthwise, [employer].  Care instructions adapted under license by [employer] (which disclaims liability or warranty for this information). If you have questions about a medical condition or this instruction, always ask your healthcare professional. [address] any warranty or liability for your use of this information.

## 2018-04-11 LAB
ALBUMIN SERPL-MCNC: 3.9 G/DL (ref 3.5–5.5)
ALBUMIN/GLOB SERPL: 1.3 {RATIO} (ref 1.2–2.2)
ALP SERPL-CCNC: 69 IU/L (ref 39–117)
ALT SERPL-CCNC: 6 IU/L (ref 0–32)
AMMONIA PLAS-MCNC: 36 UG/DL (ref 19–87)
AST SERPL-CCNC: 17 IU/L (ref 0–40)
BASOPHILS # BLD AUTO: 0 X10E3/UL (ref 0–0.2)
BASOPHILS NFR BLD AUTO: 0 %
BILIRUB SERPL-MCNC: <0.2 MG/DL (ref 0–1.2)
BUN SERPL-MCNC: 9 MG/DL (ref 6–24)
BUN/CREAT SERPL: 17 (ref 9–23)
CALCIUM SERPL-MCNC: 9.4 MG/DL (ref 8.7–10.2)
CHLORIDE SERPL-SCNC: 100 MMOL/L (ref 96–106)
CO2 SERPL-SCNC: 24 MMOL/L (ref 18–29)
CREAT SERPL-MCNC: 0.52 MG/DL (ref 0.57–1)
EOSINOPHIL # BLD AUTO: 0.1 X10E3/UL (ref 0–0.4)
EOSINOPHIL NFR BLD AUTO: 2 %
ERYTHROCYTE [DISTWIDTH] IN BLOOD BY AUTOMATED COUNT: 17.1 % (ref 12.3–15.4)
FERRITIN SERPL-MCNC: 375 NG/ML (ref 15–150)
FOLATE SERPL-MCNC: 14.6 NG/ML
GFR SERPLBLD CREATININE-BSD FMLA CKD-EPI: 106 ML/MIN/1.73
GFR SERPLBLD CREATININE-BSD FMLA CKD-EPI: 123 ML/MIN/1.73
GLOBULIN SER CALC-MCNC: 3.1 G/DL (ref 1.5–4.5)
GLUCOSE SERPL-MCNC: 81 MG/DL (ref 65–99)
HCT VFR BLD AUTO: 39.8 % (ref 34–46.6)
HGB BLD-MCNC: 12.9 G/DL (ref 11.1–15.9)
IMM GRANULOCYTES # BLD: 0 X10E3/UL (ref 0–0.1)
IMM GRANULOCYTES NFR BLD: 1 %
IRON SATN MFR SERPL: 14 % (ref 15–55)
IRON SERPL-MCNC: 34 UG/DL (ref 27–159)
LYMPHOCYTES # BLD AUTO: 2.1 X10E3/UL (ref 0.7–3.1)
LYMPHOCYTES NFR BLD AUTO: 50 %
MAGNESIUM SERPL-MCNC: 1.8 MG/DL (ref 1.6–2.3)
MCH RBC QN AUTO: 29.5 PG (ref 26.6–33)
MCHC RBC AUTO-ENTMCNC: 32.4 G/DL (ref 31.5–35.7)
MCV RBC AUTO: 91 FL (ref 79–97)
MONOCYTES # BLD AUTO: 0.4 X10E3/UL (ref 0.1–0.9)
MONOCYTES NFR BLD AUTO: 10 %
NEUTROPHILS # BLD AUTO: 1.5 X10E3/UL (ref 1.4–7)
NEUTROPHILS NFR BLD AUTO: 37 %
PLATELET # BLD AUTO: 306 X10E3/UL (ref 150–379)
POTASSIUM SERPL-SCNC: 4.5 MMOL/L (ref 3.5–5.2)
PROT SERPL-MCNC: 7 G/DL (ref 6–8.5)
RBC # BLD AUTO: 4.37 X10E6/UL (ref 3.77–5.28)
RETICS/RBC NFR AUTO: 2.4 % (ref 0.6–2.6)
SODIUM SERPL-SCNC: 141 MMOL/L (ref 134–144)
TIBC SERPL-MCNC: 235 UG/DL (ref 250–450)
UIBC SERPL-MCNC: 201 UG/DL (ref 131–425)
VIT B12 SERPL-MCNC: 319 PG/ML (ref 232–1245)
WBC # BLD AUTO: 4.1 X10E3/UL (ref 3.4–10.8)

## 2018-04-12 ENCOUNTER — OFFICE VISIT (OUTPATIENT)
Dept: ONCOLOGY | Age: 58
End: 2018-04-12

## 2018-04-12 VITALS
SYSTOLIC BLOOD PRESSURE: 157 MMHG | BODY MASS INDEX: 18.5 KG/M2 | HEART RATE: 102 BPM | OXYGEN SATURATION: 96 % | TEMPERATURE: 98.9 F | HEIGHT: 61 IN | RESPIRATION RATE: 16 BRPM | DIASTOLIC BLOOD PRESSURE: 92 MMHG | WEIGHT: 98 LBS

## 2018-04-12 DIAGNOSIS — R53.81 DEBILITY: ICD-10-CM

## 2018-04-12 DIAGNOSIS — D50.0 IRON DEFICIENCY ANEMIA DUE TO CHRONIC BLOOD LOSS: Primary | Chronic | ICD-10-CM

## 2018-04-12 DIAGNOSIS — R59.0 MEDIASTINAL LYMPHADENOPATHY: ICD-10-CM

## 2018-04-12 DIAGNOSIS — F10.10 ALCOHOL ABUSE: ICD-10-CM

## 2018-04-12 NOTE — PROGRESS NOTES
[facility] DISTRICT  Medical Oncology at [facility]    Hematology / Oncology Progress Note    Reason for Visit:   Jabari Livingston is a 63 y.o. female who is seen in consultation at the request of Dr. Jenna Adorno for evaluation of anemia. History of Present Illness:   Ms. Mahi Pa is a 66 y/o female admitted with weakness and hypokalemia (K 1.9). She had a recent hospital admission at [facility] for the same reason on 1/26/18. She has been feeling poorly for the past few days with bodyaches, weakness, and decreased p.o. intake. At PCP's office, i-STAT Hgb was 4, and patient was directed to the ER. Upon arrival in ER, Hgb was 8.2, potassium 1.9, Mg 1.4, lactate 3.4, SBP in 70s-90s. While at [facility], she received 2 units of PRBCs. Given edema, she was treated with Lasix while there. She also has right arm swelling and was found here to have a RUE superficial venous thrombosis. She also notes some mild intermittent diarrhea. No fevers, dysuria, cough, abdom pain. Of note, patient was seen by Hematology while admitted to [facility]. The reason for consultation at that time was abnormal CT scan (mediastinal LAD, liver lesion, diffuse colitis/ bowel changes, bone lesions/ ovary cyst/ fibroids.) Dr. Saulo Ribeiro and Dr. Jose Parker in [facility] evaluated patient at that time and felt her mediastinal LAD was likely due to sarcoidosis rather than malignancy. Patient underwent bronchoscopy and biopsy of LN by Dr. Dorys Addison. Pathology findings were inconclusive (peripheral blood and scattered ciliated bronchial lining cells. ). For pelvic mass along with weight loss, Gyn was consulted but stated that this was unlikely to be 2/2 malignancy, corroborated by normal CA-125 level. She also underwent EGD by Dr. Maria G Blair with finding of a gastric antrum ulcer. Biopsy revealed benign mucosa with mild chronic active gastritis and surface erosion, no H. Pylori. Review of labs here reveals drop in Hgb from 8-9 range to now 5.4. Iron sat elevated > 80%. Although VitB12 level was recently checked and normal, there is a low VitB1 (thiamine) level from 9/2017. Home med list includes thiamine tablets 100mg/d. When asked about alcohol intake, she states she drinks a fifth of liquor several times a month, but her  states she drinks this amount daily for the past several years. He states she has had less alcohol in the past 2-3 weeks due to feeling sick. She denies any tremors, seizures or other withdrawal symptoms when she goes without a drink. No recent viral infections or exposures per patient. Her maternal uncle had lymphoma. No personal history of cancer or hemoglobinopathies. Interval History: pt seen today for office f/u after hospital consult for anemia. Pt is in w/c today but walking with walker at home. Here with family today. Last CBC was normal with hgb 12.3. Pt is feeling better overall. Working with PT to move at home. Pt has had abnormal CTs in past with LAD and pt has appt with GI for EUS on 4/18.        Past Medical History:   Diagnosis Date    Alcohol abuse     GI (gastrointestinal bleed)     Insomnia 11/09    Iron deficiency anemia 04/2004    PPD positive     treated w/ INH- tests since have been negative    Pure hypercholesterolemia     Scoliosis 12/28/15    [facility]    Tobacco dependence     Unspecified essential hypertension     Uterine bleeding, dysfunctional 2/12    Uterine fibroid       Past Surgical History:   Procedure Laterality Date    COLONOSCOPY N/A 1/31/2018    COLONOSCOPY performed by Jyotsna Baez MD at [facility] HX CHOLECYSTECTOMY      HX COLONOSCOPY  07/01/2013    MCV, repeat 10 years    HX ORTHOPAEDIC      Shattered bilateral ankles-metal plates & screws    HX OTHER SURGICAL      Benign cyst removal- L. foot    LEG/ANKLE SURGERY PROC UNLISTED        Social History   Substance Use Topics    Smoking status: Current Every Day Smoker     Packs/day: 0.50     Years: 17.00    Smokeless tobacco: Never Used    Alcohol use 1.2 oz/week     1 Cans of beer, 1 Shots of liquor per week      Comment: Socially      Family History   Problem Relation Age of Onset    Hypertension Mother     Diabetes Mother     Cancer Mother      breast    Heart Disease Father      MI 42's    Kidney Disease Father     Hypertension Son      Current Outpatient Prescriptions   Medication Sig    doxepin (SINEQUAN) 10 mg capsule Take 1 Cap by mouth nightly. For sleep    amylase-lipase-protease (CREON 81377) 24,000-76,000 -120,000 unit capsule Take 2 Caps by mouth three (3) times daily (with meals).  ferrous sulfate 325 mg (65 mg iron) tablet Take 1 Tab by mouth daily (with breakfast).  potassium chloride (KLOR-CON) 20 mEq packet Take 1 Packet by mouth daily.  calcium-vitamin D (CALCIUM 600 + D,3,) 600 mg(1,500mg) -200 unit tab Take 1 Tab by mouth two (2) times daily (with meals).  pantoprazole (PROTONIX) 40 mg tablet Take 1 Tab by mouth Daily (before breakfast).  buPROPion XL (WELLBUTRIN XL) 150 mg tablet TAKE ONE TABLET BY MOUTH EVERY MORNING FOR SMOKING CESSATION    zolpidem (AMBIEN) 5 mg tablet TAKE ONE TABLET BY MOUTH NIGHTLY AS NEEDED FOR SLEEP    folic acid (FOLVITE) 1 mg tablet Take 1 mg by mouth daily.  thiamine (B-1) 100 mg tablet Take 1 Tab by mouth daily.  magnesium 200 mg tab Take 200 mg by mouth daily.  aspirin 81 mg chewable tablet CHEW ONE TABLET BY MOUTH DAILY     No current facility-administered medications for this visit. Allergies   Allergen Reactions    Robaxin [Methocarbamol] Hives, Rash and Itching     Red splotches    Statins-Hmg-Coa Reductase Inhibitors Myalgia    Codeine Itching    Neosporin [Neomycin-Bacitracin-Polymyxin] Rash       Review of Systems: A complete review of systems was obtained, negative except as described above.     Physical Exam:     Visit Vitals    BP (!) 157/92    Pulse (!) 102    Temp 98.9 °F (37.2 °C) (Oral)    Resp 16    Ht 5' 1\" (1.549 m)    SpO2 96%     ECOG PS: 3  General: No acute distress, alert, pleasant  Eyes:  anicteric sclerae  HENT: Atraumatic   Neck: Supple  Respiratory: clear anterior lung fields  CV: tachycardia, regular rhythm  GI: Soft, nontender, nondistended  MS: general weakness in a w/c   Skin: No rashes, ecchymoses, or petechiae. Normal temperature, turgor, and texture. Neuro/Psych: Altered, conversant. Generalized weakness      Results:     Lab Results   Component Value Date/Time    WBC 4.1 04/10/2018 10:04 AM    HGB 12.9 04/10/2018 10:04 AM    HCT 39.8 04/10/2018 10:04 AM    PLATELET 976 43/64/8850 10:04 AM    MCV 91 04/10/2018 10:04 AM    ABS. NEUTROPHILS 1.5 04/10/2018 10:04 AM    Hemoglobin (POC) 4.9 02/16/2018 02:46 PM    Hemoglobin (POC) 11.2 (L) 01/26/2018 04:08 PM    Hematocrit (POC) 33 (L) 01/26/2018 04:08 PM     Lab Results   Component Value Date/Time    Sodium 141 04/10/2018 10:04 AM    Potassium 4.5 04/10/2018 10:04 AM    Chloride 100 04/10/2018 10:04 AM    CO2 24 04/10/2018 10:04 AM    Glucose 81 04/10/2018 10:04 AM    BUN 9 04/10/2018 10:04 AM    Creatinine 0.52 (L) 04/10/2018 10:04 AM    GFR est  04/10/2018 10:04 AM    GFR est non- 04/10/2018 10:04 AM    Calcium 9.4 04/10/2018 10:04 AM    Sodium (POC) 144 01/26/2018 04:08 PM    Potassium (POC) <2.0 (LL) 01/26/2018 04:08 PM    Chloride (POC) 98 01/26/2018 04:08 PM    Glucose (POC) 111 (H) 02/26/2018 11:55 AM    BUN (POC) <3 (L) 01/26/2018 04:08 PM    Creatinine (POC) 0.6 01/26/2018 04:08 PM    Calcium, ionized (POC) 1.06 (L) 01/26/2018 04:08 PM     Lab Results   Component Value Date/Time    Bilirubin, total <0.2 04/10/2018 10:04 AM    ALT (SGPT) 6 04/10/2018 10:04 AM    AST (SGOT) 17 04/10/2018 10:04 AM    Alk.  phosphatase 69 04/10/2018 10:04 AM    Protein, total 7.0 04/10/2018 10:04 AM    Albumin 3.9 04/10/2018 10:04 AM    Globulin 3.6 03/01/2018 05:45 AM       Assessment and Recommendations:   Andra Murphy is a 63 y.o. female admitted with weakness, fatigue, hypokalemia and anemia. 1. Normocytic anemia    Likely secondary to alcohol abuse/ marrow suppression.   hgb is normal now. Pt is drinking much less. Advised to stop. Will follow. 2. Alcohol abuse: drinking less but not stopped yet. Advised to stop. -- Liver ultrasound with diffusely hepatic echogenicity compatible with hepatic steatosis. No sonographic evidence of mass. 4. Gastric ulcer: per GI.  NSAIDs and take PPI daily. 5. Mediastinal LAD on prior CTs 1/18. Multiple reports and addendums on prior imaging studies. Multiple comments about possible cancer but then reports changed. Possibly related to sarcoidosis. Bronch biopsy was inconclusive. Reviewed with pt/ family today. Pt is for EUS and biopsy with GI next week . Advised them to go to this appt.        F/u here in 1 month  Call if questions    Signed By: Coral Allan, DO     April 12, 2018

## 2018-04-12 NOTE — MR AVS SNAPSHOT
[address] [facility] [address] [place] [address] 
[phone] Patient: Torrie Vega MRN: [id number] :1960 Visit Information Date & Time Provider Department Dept. Phone Encounter #  
 2018  1:00 PM Alise Painting Se Oncology at [facility][1] [phone] Follow-up Instructions Return in about 4 weeks (around 5/10/2018). Your Appointments 2018  1:00 PM  
New Patient with Aldair Ayala DO [facility] Oncology at [facility]) Appt Note: new pt; hospital fup (anemia); r/s due to transportation and other appts already scheduled [address] [facility] [address] [place] [address]  
[phone]  
  
   
 [address] [facility] [address]  
  
    
 7/10/2018  9:00 AM  
ROUTINE CARE with Aruna Cortes  [facility] ([address]) Appt Note: 3 months follow up visit disease management [address] [address]  
[phone]  
  
   
 [address] [address] Upcoming Health Maintenance Date Due Pneumococcal 19-64 Highest Risk (1 of 3 - PCV13) 1979 MEDICARE YEARLY EXAM 3/28/2018 BREAST CANCER SCRN MAMMOGRAM 2018 PAP AKA CERVICAL CYTOLOGY 2019 DTaP/Tdap/Td series (2 - Td) 3/27/2027 COLONOSCOPY 2028 Allergies as of 2018  Review Complete On: 2018 By: Aldair Ayala DO Severity Noted Reaction Type Reactions Robaxin [Methocarbamol] High 2016    Hives, Rash, Itching Red splotches Statins-hmg-coa Reductase Inhibitors Medium 2017   Side Effect Myalgia Codeine  2012    Itching Neosporin [Neomycin-bacitracin-polymyxin]  2010    Rash Current Immunizations  Reviewed on 2018 No immunizations on file.   
  
 Reviewed by Favian Leon LPN on  at 11:67 AM  
 Vitals BP Pulse Temp Resp Height(growth percentile) Weight(growth percentile) (!) 157/92 (!) 102 98.9 °F (37.2 °C) (Oral) 16 5' 1\" (1.549 m) 98 lb (44.5 kg) SpO2 BMI OB Status Smoking Status 96% 18.52 kg/m2 Premenopausal Current Every Day Smoker Vitals History BMI and BSA Data Body Mass Index Body Surface Area 18.52 kg/m 2 1.38 m 2 Preferred Pharmacy Pharmacy Name Phone [facility] [address] Clementina Mello [phone] Your Updated Medication List  
  
   
This list is accurate as of 4/12/18 11:53 AM.  Always use your most recent med list.  
  
  
  
  
 aspirin 81 mg chewable tablet CHEW ONE TABLET BY MOUTH DAILY  
  
 buPROPion  mg tablet Commonly known as:  WELLBUTRIN XL  
TAKE ONE TABLET BY MOUTH EVERY MORNING FOR SMOKING CESSATION  
  
 calcium-vitamin D 600 mg(1,500mg) -200 unit Tab Commonly known as:  CALCIUM 600 + D(3) Take 1 Tab by mouth two (2) times daily (with meals). doxepin 10 mg capsule Commonly known as:  SINEquan Take 1 Cap by mouth nightly. For sleep  
  
 ferrous sulfate 325 mg (65 mg iron) tablet Take 1 Tab by mouth daily (with breakfast). folic acid 1 mg tablet Commonly known as:  Google Take 1 mg by mouth daily. lipase-protease-amylase 24,000-76,000 -120,000 unit capsule Commonly known as:  CREON 02331 Take 2 Caps by mouth three (3) times daily (with meals). magnesium 200 mg Tab Take 200 mg by mouth daily. pantoprazole 40 mg tablet Commonly known as:  PROTONIX Take 1 Tab by mouth Daily (before breakfast). potassium chloride 20 mEq packet Commonly known as:  KLOR-CON Take 1 Packet by mouth daily. thiamine 100 mg tablet Commonly known as:  B-1 Take 1 Tab by mouth daily. zolpidem 5 mg tablet Commonly known as:  AMBIEN  
TAKE ONE TABLET BY MOUTH NIGHTLY AS NEEDED FOR SLEEP  
  
  
  
  
 Follow-up Instructions Return in about 4 weeks (around 5/10/2018). Introducing [facility]! [facility] introduces [employer] patient portal. Now you can access parts of your medical record, email your doctor's office, and request medication refills online. 1. In your internet browser, go to [email or web address] 2. Click on the First Time User? Click Here link in the Sign In box. You will see the New Member Sign Up page. 3. Enter your [employer] Access Code exactly as it appears below. You will not need to use this code after youve completed the sign-up process. If you do not sign up before the expiration date, you must request a new code. · [employer] Access Code: [email or web address] Expires: 6/20/2018  5:09 AM 
 
4. Enter the last four digits of your Social Security Number (xxxx) and Date of Birth (mm/dd/yyyy) as indicated and click Submit. You will be taken to the next sign-up page. 5. Create a Force-A ID. This will be your [employer]-A login ID and cannot be changed, so think of one that is secure and easy to remember. 6. Create a [employer] password. You can change your password at any time. 7. Enter your Password Reset Question and Answer. This can be used at a later time if you forget your password. 8. Enter your e-mail address. You will receive e-mail notification when new information is available in [address] 9. Click Sign Up. You can now view and download portions of your medical record. 10. Click the Download Summary menu link to download a portable copy of your medical information. If you have questions, please visit the Frequently Asked Questions section of the [employer] website. Remember, [employer] is NOT to be used for urgent needs. For medical emergencies, dial 911. Now available from your iPhone and Android! Please provide this summary of care documentation to your next provider. Your primary care clinician is listed as Sandhya Casas. If you have any questions after today's visit, please call [phone].

## 2018-04-13 LAB — VIT B1 BLD-SCNC: 124.9 NMOL/L (ref 66.5–200)

## 2018-04-18 ENCOUNTER — ANESTHESIA (OUTPATIENT)
Dept: ENDOSCOPY | Age: 58
End: 2018-04-18
Payer: COMMERCIAL

## 2018-04-18 ENCOUNTER — ANESTHESIA EVENT (OUTPATIENT)
Dept: ENDOSCOPY | Age: 58
End: 2018-04-18
Payer: COMMERCIAL

## 2018-04-18 ENCOUNTER — HOSPITAL ENCOUNTER (OUTPATIENT)
Age: 58
Setting detail: OUTPATIENT SURGERY
Discharge: HOME | End: 2018-04-18
Attending: INTERNAL MEDICINE | Admitting: INTERNAL MEDICINE
Payer: COMMERCIAL

## 2018-04-18 VITALS
BODY MASS INDEX: 18.5 KG/M2 | RESPIRATION RATE: 24 BRPM | DIASTOLIC BLOOD PRESSURE: 75 MMHG | OXYGEN SATURATION: 96 % | SYSTOLIC BLOOD PRESSURE: 101 MMHG | HEART RATE: 96 BPM | HEIGHT: 61 IN | WEIGHT: 98 LBS | TEMPERATURE: 97.5 F

## 2018-04-18 PROCEDURE — 88185 FLOWCYTOMETRY/TC ADD-ON: CPT | Performed by: INTERNAL MEDICINE

## 2018-04-18 PROCEDURE — 77030036673 HC NDL BIOP ENDOSC SHRKCOR PRELD COVD -E: Performed by: INTERNAL MEDICINE

## 2018-04-18 PROCEDURE — 76040000007: Performed by: INTERNAL MEDICINE

## 2018-04-18 PROCEDURE — 76060000032 HC ANESTHESIA 0.5 TO 1 HR: Performed by: INTERNAL MEDICINE

## 2018-04-18 PROCEDURE — 88184 FLOWCYTOMETRY/ TC 1 MARKER: CPT | Performed by: INTERNAL MEDICINE

## 2018-04-18 PROCEDURE — 74011250636 HC RX REV CODE- 250/636

## 2018-04-18 PROCEDURE — 74011000250 HC RX REV CODE- 250

## 2018-04-18 PROCEDURE — 88173 CYTOPATH EVAL FNA REPORT: CPT | Performed by: INTERNAL MEDICINE

## 2018-04-18 RX ORDER — EPINEPHRINE 0.1 MG/ML
1 INJECTION INTRACARDIAC; INTRAVENOUS
Status: DISCONTINUED | OUTPATIENT
Start: 2018-04-18 | End: 2018-04-18 | Stop reason: HOSPADM

## 2018-04-18 RX ORDER — LIDOCAINE HYDROCHLORIDE 20 MG/ML
INJECTION, SOLUTION EPIDURAL; INFILTRATION; INTRACAUDAL; PERINEURAL AS NEEDED
Status: DISCONTINUED | OUTPATIENT
Start: 2018-04-18 | End: 2018-04-18 | Stop reason: HOSPADM

## 2018-04-18 RX ORDER — SODIUM CHLORIDE 9 MG/ML
INJECTION, SOLUTION INTRAVENOUS
Status: DISCONTINUED | OUTPATIENT
Start: 2018-04-18 | End: 2018-04-18 | Stop reason: HOSPADM

## 2018-04-18 RX ORDER — SODIUM CHLORIDE 0.9 % (FLUSH) 0.9 %
5-10 SYRINGE (ML) INJECTION AS NEEDED
Status: DISCONTINUED | OUTPATIENT
Start: 2018-04-18 | End: 2018-04-18 | Stop reason: HOSPADM

## 2018-04-18 RX ORDER — GLYCOPYRROLATE 0.2 MG/ML
INJECTION INTRAMUSCULAR; INTRAVENOUS AS NEEDED
Status: DISCONTINUED | OUTPATIENT
Start: 2018-04-18 | End: 2018-04-18 | Stop reason: HOSPADM

## 2018-04-18 RX ORDER — SODIUM CHLORIDE 0.9 % (FLUSH) 0.9 %
5-10 SYRINGE (ML) INJECTION EVERY 8 HOURS
Status: DISCONTINUED | OUTPATIENT
Start: 2018-04-18 | End: 2018-04-18 | Stop reason: HOSPADM

## 2018-04-18 RX ORDER — MIDAZOLAM HYDROCHLORIDE 1 MG/ML
.25-1 INJECTION, SOLUTION INTRAMUSCULAR; INTRAVENOUS
Status: DISCONTINUED | OUTPATIENT
Start: 2018-04-18 | End: 2018-04-18 | Stop reason: HOSPADM

## 2018-04-18 RX ORDER — PROPOFOL 10 MG/ML
INJECTION, EMULSION INTRAVENOUS AS NEEDED
Status: DISCONTINUED | OUTPATIENT
Start: 2018-04-18 | End: 2018-04-18 | Stop reason: HOSPADM

## 2018-04-18 RX ORDER — DEXTROMETHORPHAN/PSEUDOEPHED 2.5-7.5/.8
1.2 DROPS ORAL
Status: DISCONTINUED | OUTPATIENT
Start: 2018-04-18 | End: 2018-04-18 | Stop reason: HOSPADM

## 2018-04-18 RX ORDER — FLUMAZENIL 0.1 MG/ML
0.2 INJECTION INTRAVENOUS
Status: DISCONTINUED | OUTPATIENT
Start: 2018-04-18 | End: 2018-04-18 | Stop reason: HOSPADM

## 2018-04-18 RX ORDER — SODIUM CHLORIDE 9 MG/ML
50 INJECTION, SOLUTION INTRAVENOUS CONTINUOUS
Status: DISCONTINUED | OUTPATIENT
Start: 2018-04-18 | End: 2018-04-18 | Stop reason: HOSPADM

## 2018-04-18 RX ORDER — ATROPINE SULFATE 0.1 MG/ML
0.5 INJECTION INTRAVENOUS
Status: DISCONTINUED | OUTPATIENT
Start: 2018-04-18 | End: 2018-04-18 | Stop reason: HOSPADM

## 2018-04-18 RX ORDER — NALOXONE HYDROCHLORIDE 0.4 MG/ML
0.4 INJECTION, SOLUTION INTRAMUSCULAR; INTRAVENOUS; SUBCUTANEOUS
Status: DISCONTINUED | OUTPATIENT
Start: 2018-04-18 | End: 2018-04-18 | Stop reason: HOSPADM

## 2018-04-18 RX ORDER — FENTANYL CITRATE 50 UG/ML
100 INJECTION, SOLUTION INTRAMUSCULAR; INTRAVENOUS
Status: DISCONTINUED | OUTPATIENT
Start: 2018-04-18 | End: 2018-04-18 | Stop reason: HOSPADM

## 2018-04-18 RX ADMIN — PROPOFOL 50 MG: 10 INJECTION, EMULSION INTRAVENOUS at 11:22

## 2018-04-18 RX ADMIN — PROPOFOL 50 MG: 10 INJECTION, EMULSION INTRAVENOUS at 11:41

## 2018-04-18 RX ADMIN — LIDOCAINE HYDROCHLORIDE 50 MG: 20 INJECTION, SOLUTION EPIDURAL; INFILTRATION; INTRACAUDAL; PERINEURAL at 11:16

## 2018-04-18 RX ADMIN — PROPOFOL 50 MG: 10 INJECTION, EMULSION INTRAVENOUS at 11:19

## 2018-04-18 RX ADMIN — PROPOFOL 50 MG: 10 INJECTION, EMULSION INTRAVENOUS at 11:26

## 2018-04-18 RX ADMIN — GLYCOPYRROLATE 0.2 MG: 0.2 INJECTION INTRAMUSCULAR; INTRAVENOUS at 12:03

## 2018-04-18 RX ADMIN — PROPOFOL 50 MG: 10 INJECTION, EMULSION INTRAVENOUS at 11:43

## 2018-04-18 RX ADMIN — PROPOFOL 50 MG: 10 INJECTION, EMULSION INTRAVENOUS at 11:35

## 2018-04-18 RX ADMIN — PROPOFOL 50 MG: 10 INJECTION, EMULSION INTRAVENOUS at 11:38

## 2018-04-18 RX ADMIN — PROPOFOL 50 MG: 10 INJECTION, EMULSION INTRAVENOUS at 11:29

## 2018-04-18 RX ADMIN — PROPOFOL 50 MG: 10 INJECTION, EMULSION INTRAVENOUS at 11:17

## 2018-04-18 RX ADMIN — PROPOFOL 50 MG: 10 INJECTION, EMULSION INTRAVENOUS at 11:32

## 2018-04-18 RX ADMIN — SODIUM CHLORIDE: 9 INJECTION, SOLUTION INTRAVENOUS at 10:52

## 2018-04-18 NOTE — IP AVS SNAPSHOT
2700 [facility] [address] [place] [address] 
[phone] Patient: Anjum Rosado MRN: [id number] :1960 About your hospitalization You were admitted on:  2018 You last received care in the:  [facility] ENDOSCOPY You were discharged on:  2018 Why you were hospitalized Your primary diagnosis was:  Not on File Follow-up Information None Your Scheduled Appointments Monday May 14, 2018  8:30 AM EDT Follow Up with Taye Ríos DO [facility] DAVID Oncology at [facility] [address] [address] [address] [place] [address]  
[phone] Discharge Orders None A check judith indicates which time of day the medication should be taken. My Medications CONTINUE taking these medications Instructions Each Dose to Equal  
 Morning Noon Evening Bedtime  
 aspirin 81 mg chewable tablet Your last dose was: Your next dose is:    
   
   
 CHEW ONE TABLET BY MOUTH DAILY  
     
   
   
   
  
 buPROPion  mg tablet Commonly known as:  Theodor Care Your last dose was: Your next dose is: TAKE ONE TABLET BY MOUTH EVERY MORNING FOR SMOKING CESSATION  
     
   
   
   
  
 calcium-vitamin D 600 mg(1,500mg) -200 unit Tab Commonly known as:  CALCIUM 600 + D(3) Your last dose was: Your next dose is: Take 1 Tab by mouth two (2) times daily (with meals). 1 Tab  
    
   
   
   
  
 doxepin 10 mg capsule Commonly known as:  SINEquan Your last dose was: Your next dose is: Take 1 Cap by mouth nightly. For sleep 10 mg  
    
   
   
   
  
 ferrous sulfate 325 mg (65 mg iron) tablet Your last dose was: Your next dose is: Take 1 Tab by mouth daily (with breakfast). 325 mg  
    
   
   
   
  
 folic acid 1 mg tablet Commonly known as:  Google Your last dose was: Your next dose is: Take 1 mg by mouth daily. 1 mg  
    
   
   
   
  
 lipase-protease-amylase 24,000-76,000 -120,000 unit capsule Commonly known as:  CREON 29823 Your last dose was: Your next dose is: Take 2 Caps by mouth three (3) times daily (with meals). 2 Cap  
    
   
   
   
  
 magnesium 200 mg Tab Your last dose was: Your next dose is: Take 200 mg by mouth daily. 200 mg  
    
   
   
   
  
 pantoprazole 40 mg tablet Commonly known as:  PROTONIX Your last dose was: Your next dose is: Take 1 Tab by mouth Daily (before breakfast). 40 mg  
    
   
   
   
  
 potassium chloride 20 mEq packet Commonly known as:  KLOR-CON Your last dose was: Your next dose is: Take 1 Packet by mouth daily. 20 mEq  
    
   
   
   
  
 thiamine 100 mg tablet Commonly known as:  B-1 Your last dose was: Your next dose is: Take 1 Tab by mouth daily. 100 mg  
    
   
   
   
  
 zolpidem 5 mg tablet Commonly known as:  AMBIEN Your last dose was: Your next dose is: TAKE ONE TABLET BY MOUTH NIGHTLY AS NEEDED FOR SLEEP Discharge Instructions [facility]. 
[address] [facility] [address] [place], [address] 
[phone] DISCHARGE INSTRUCTIONS Liane Cordova [phone] 
1960 DISCOMFORT: 
Sore throat- warm salt water gargle 
redness at IV site- apply warm compress to area; if redness or soreness persist- contact your physician Gaseous discomfort- walking, belching will help relieve any discomfort You may not operate a vehicle for 12 hours You may not engage in an occupation involving machinery or appliances for rest of today You may not drink alcoholic beverages for at least 12 hours Avoid making any critical decisions for at least 24 hour DIET You may eat and drink after you leave. You may resume your regular diet  however -  remember your colon is empty and a heavy meal will produce gas. Avoid these foods:  vegetables, fried / greasy foods, carbonated drinks ACTIVITY You may resume your normal daily activities Spend the remainder of the day resting -  avoid any strenuous activity. CALL M.D. ANY SIGN OF Increasing pain, nausea, vomiting Abdominal distension (swelling) New increased bleeding (oral or rectal) Fever (chills) Pain in chest area Shortness of breath Follow-up Instructions: 
 Call Dr. Terence Dean for any questions or problems. we took a biopsy please call the office within 2 weeks to discuss your pathology results. Telephone # [phone] Continue same medications. Introducing [facility] & HEALTH SERVICES! [facility] introduces Quinju.com patient portal. Now you can access parts of your medical record, email your doctor's office, and request medication refills online. 1. In your internet browser, go to [email or web address] 2. Click on the First Time User? Click Here link in the Sign In box. You will see the New Member Sign Up page. 3. Enter your Quinju.com Access Code exactly as it appears below. You will not need to use this code after youve completed the sign-up process. If you do not sign up before the expiration date, you must request a new code. · [email or web address].com Access Code: [email or web address] Expires: 6/20/2018  5:09 AM 
 
4. Enter the last four digits of your Social Security Number (xxxx) and Date of Birth (mm/dd/yyyy) as indicated and click Submit. You will be taken to the next sign-up page. 5. Create a Tech Cocktailt ID. This will be your Quinju.com login ID and cannot be changed, so think of one that is secure and easy to remember. 6. Create a Tech Cocktailt password. You can change your password at any time. 7. Enter your Password Reset Question and Answer. This can be used at a later time if you forget your password. 8. Enter your e-mail address. You will receive e-mail notification when new information is available in [address] 9. Click Sign Up. You can now view and download portions of your medical record. 10. Click the Download Summary menu link to download a portable copy of your medical information. If you have questions, please visit the Frequently Asked Questions section of the Synetiq website. Remember, Synetiq is NOT to be used for urgent needs. For medical emergencies, dial 911. Now available from your iPhone and Android! Introducing Adelso Pascual As a [employer]aishi[employer] patient, I wanted to make you aware of our electronic visit tool called Adelso ArmstrongYouBeauty. Weichaishi.com 24/7 allows you to connect within minutes with a medical provider 24 hours a day, seven days a week via a mobile device or tablet or logging into a secure website from your computer. You can access Adelso Whole Opticsessencefin from anywhere in the [place]. A virtual visit might be right for you when you have a simple condition and feel like you just dont want to get out of bed, or cant get away from work for an appointment, when your regular Duglas Tradescape provider is not available (evenings, weekends or holidays), or when youre out of town and need minor care. Electronic visits cost only $49 and if the Weichaishi.com 24/7 provider determines a prescription is needed to treat your condition, one can be electronically transmitted to a nearby pharmacy*. Please take a moment to enroll today if you have not already done so. The enrollment process is free and takes just a few minutes. To enroll, please download the Duglas Cho 24/7 guillermina to your tablet or phone, or visit [email or web address]: The Brain [email or web address]. org to enroll on your computer.    
And, as an [address] [place] [address] patient with a [employer] account, the results of your visits will be scanned into your electronic medical record and your primary care provider will be able to view the scanned results. We urge you to continue to see your regular Wily Vivar provider for your ongoing medical care. And while your primary care provider may not be the one available when you seek a Adelso Pascual virtual visit, the peace of mind you get from getting a real diagnosis real time can be priceless. For more information on Adelso Armstrongfin, view our Frequently Asked Questions (FAQs) at [email or web address]. Sincerely, 
 
Sigifredo Bethea MD 
Chief Medical Officer [employer] *:  certain medications cannot be prescribed via Adelso Loganessencefin Providers Seen During Your Hospitalization Provider Specialty Primary office phone Ana Laura Osorio MD Gastroenterology [phone] Your Primary Care Physician (PCP) Primary Care Physician Office Phone Office Fax Joselyn Guadarrama [phone] You are allergic to the following Allergen Reactions Robaxin (Methocarbamol) Hives Rash Itching Red splotches Statins-Hmg-Coa Reductase Inhibitors Myalgia Codeine Itching Neosporin (Neomycin-Bacitracin-Polymyxin) Rash Recent Documentation Height Weight Breastfeeding? BMI OB Status Smoking Status 1.549 m 44.5 kg No 18.52 kg/m2 Premenopausal Current Every Day Smoker Emergency Contacts Name Discharge Info Relation Home Work Mobile Jose Alberto Jimenez CAREGIVER [3] Spouse [3] [phone] [facility] DISCHARGE CAREGIVER [3] Daughter [21] [phone] Carmen Milagros [3] [phone] Leon Martin,  Other Relative [6] [phone] Ravindra Klamy  Spouse [3] [phone] Patient Belongings The following personal items are in your possession at time of discharge: Dental Appliances: None  Visual Aid: Glasses, At bedside Please provide this summary of care documentation to your next provider. Signatures-by signing, you are acknowledging that this After Visit Summary has been reviewed with you and you have received a copy. Patient Signature:  ____________________________________________________________ Date:  ____________________________________________________________  
  
Fayrene Retort Provider Signature:  ____________________________________________________________ Date:  ____________________________________________________________

## 2018-04-18 NOTE — ROUTINE PROCESS
Christen Fink  1960  [id number]    Situation:  Verbal report received from: Meme Juárez RN  Procedure: Procedure(s):  ENDOSCOPIC ULTRASOUND (EUS) WITH PATHOLOGY   ESOPHAGOGASTRODUODENOSCOPY (EGD)  FINE NEEDLE ASPIRATION    Background:    Preoperative diagnosis: ENLARGED MEDIASTINAL LYMPH NODES   Postoperative diagnosis: 1.- Hiatal Hernia  2.- Schatzys Ring  3.- Subcarinal Lymph Node    :  Dr. Thierry Helm  Assistant(s): Endoscopy Technician-1: Joan Acosta  Endoscopy RN-1: Angieszka Morales RN    Specimens: * No specimens in log *  H. Pylori  no    Assessment:  Intra-procedure medications   Anesthesia gave intra-procedure sedation and medications, see anesthesia flow sheet yes    Intravenous fluids: NS@ KVO     Vital signs stable     Abdominal assessment: round and soft     Recommendation:  Discharge patient per MD order.     Family or Friend   Permission to share finding with family or friend yes

## 2018-04-18 NOTE — PROCEDURES
[address]  [address] [facility] [address] [place]  [place], [address]  [phone]                                Endoscopic Ultrasound    NAME:  Venkat Gutierrez   :   1960   MRN:   [id number]       Date/Time:  2018   Procedure Type: Linear EUS with FNA      Indications: Abnormal CT scan showing mediastinal lymphadenopathy    Pre-operative Diagnosis: see indication above    Post-operative Diagnosis:  See findings below    : Jamison Hui MD    Referring Provider: -Dima Thompson NP    Anethesia/Sedation:  MAC anesthesia      Procedure Details   After infom consent was obtained for the procedure, with all risks and benefits of procedure explained the patient was taken to the endoscopy suite and placed in the left lateral decubitus position. Following sequential administration of sedation as per above, the linear echoendoscope was inserted into the mouth and advanced under direct vision to esophagus. A careful inspection was made as the gastroscope was withdrawn, including a retroflexed view of the proximal stomach; findings and interventions are described below. Findings:     Endoscopic:   Esophagus: Small sliding hiatal hernia was noted   Stomach: normal    Duodenum/jejunum: normal    Ultrasound:   Esophagus: not examined   Stomach: not examined   Pancreas: not examined              Lymph Node: Multiple irregular and mated lymph nodes with scattered calcifications were seen in the subcarinal space. Largest conglomerate was about 41 X 28 mm. Specimen Removed:  Biopsy of the subcarinal lymph nodes    Complications: None. EBL:  None.     Interventions: Fine needle aspirate for biopsy of  lymph node using a 22 gauge Shark Core needle with 3 of passes with preliminary results suggesting indetermined      Recommendations:   -Await cytology results  -Continue current medications    Jamison Hui MD  2018  12:00 PM

## 2018-04-18 NOTE — DISCHARGE INSTRUCTIONS
[address]  [address] [facility] [address] [facility]  [address]  [id number]  1960    DISCOMFORT:  Sore throat- warm salt water gargle  redness at IV site- apply warm compress to area; if redness or soreness persist- contact your physician  Gaseous discomfort- walking, belching will help relieve any discomfort  You may not operate a vehicle for 12 hours  You may not engage in an occupation involving machinery or appliances for rest of today  You may not drink alcoholic beverages for at least 12 hours  Avoid making any critical decisions for at least 24 hour  DIET  You may eat and drink after you leave. You may resume your regular diet - however -  remember your colon is empty and a heavy meal will produce gas. Avoid these foods:  vegetables, fried / greasy foods, carbonated drinks    ACTIVITY  You may resume your normal daily activities   Spend the remainder of the day resting -  avoid any strenuous activity. CALL M.D. ANY SIGN OF   Increasing pain, nausea, vomiting  Abdominal distension (swelling)  New increased bleeding (oral or rectal)  Fever (chills)  Pain in chest area    Shortness of breath    Follow-up Instructions:   Call Dr. Malia Hernandez for any questions or problems. we took a biopsy please call the office within 2 weeks to discuss your pathology results. Telephone # [phone]        Continue same medications.

## 2018-04-18 NOTE — ANESTHESIA PREPROCEDURE EVALUATION
Anesthetic History   No history of anesthetic complications            Review of Systems / Medical History  Patient summary reviewed, nursing notes reviewed and pertinent labs reviewed    Pulmonary    COPD      Smoker         Neuro/Psych   Within defined limits           Cardiovascular    Hypertension                   GI/Hepatic/Renal  Within defined limits              Endo/Other        Anemia     Other Findings   Comments: EtOH abuse           Physical Exam    Airway  Mallampati: II  TM Distance: > 6 cm  Neck ROM: normal range of motion   Mouth opening: Normal     Cardiovascular  Regular rate and rhythm,  S1 and S2 normal,  no murmur, click, rub, or gallop             Dental  No notable dental hx       Pulmonary  Breath sounds clear to auscultation               Abdominal  GI exam deferred       Other Findings            Anesthetic Plan    ASA: 3  Anesthesia type: MAC          Induction: Intravenous  Anesthetic plan and risks discussed with: Patient

## 2018-04-18 NOTE — PROGRESS NOTES

## 2018-04-19 ENCOUNTER — TELEPHONE (OUTPATIENT)
Dept: FAMILY MEDICINE CLINIC | Age: 58
End: 2018-04-19

## 2018-04-19 NOTE — TELEPHONE ENCOUNTER
----- Message from Morelia Medina sent at 4/18/2018  3:43 PM EDT -----  Regarding: AVERY Meek/ Telephone  Contact: [phone]  Patient requesting to speak with AVERY Casas in regards to her Desoxyn prescription. Patient stated that the following prescription is not currently working.

## 2018-04-19 NOTE — TELEPHONE ENCOUNTER
[phone] spoke to patient to verified the medication Doxepin is not working for patient needs something else I advised will send message to Corrie Moncada and will call her back patient understand

## 2018-04-19 NOTE — ANESTHESIA POSTPROCEDURE EVALUATION
Post-Anesthesia Evaluation and Assessment    Patient: Socrates Schuler MRN: [id number]  SSN: [id number]    YOB: 1960  Age: 63 y.o. Sex: female       Cardiovascular Function/Vital Signs  Visit Vitals    /75    Pulse 96    Temp 36.4 °C (97.5 °F)    Resp 24    Ht 5' 1\" (1.549 m)    Wt 44.5 kg (98 lb)    SpO2 96%    Breastfeeding No    BMI 18.52 kg/m2       Patient is status post MAC anesthesia for Procedure(s):  ENDOSCOPIC ULTRASOUND (EUS) WITH PATHOLOGY   ESOPHAGOGASTRODUODENOSCOPY (EGD)  FINE NEEDLE ASPIRATION. Nausea/Vomiting: None    Postoperative hydration reviewed and adequate. Pain:  Pain Scale 1: Numeric (0 - 10) (04/18/18 1228)  Pain Intensity 1: 0 (04/18/18 1228)   Managed    Neurological Status: At baseline    Mental Status and Level of Consciousness: Arousable    Pulmonary Status:   O2 Device: Room air (04/18/18 1228)   Adequate oxygenation and airway patent    Complications related to anesthesia: None    Post-anesthesia assessment completed.  No concerns    Signed By: Lisbeth Swanson MD     April 19, 2018

## 2018-04-20 RX ORDER — DOXEPIN HYDROCHLORIDE 25 MG/1
25-50 CAPSULE ORAL
Qty: 60 CAP | Refills: 1 | Status: SHIPPED | OUTPATIENT
Start: 2018-04-20 | End: 2018-06-19 | Stop reason: SDUPTHER

## 2018-04-22 NOTE — H&P
[address] [facility]  217 [facility] 140 [facility], [address]  [phone]                                History and Physical     NAME: Valerio Jones   :  1960   MRN:  [id number]     HPI:  The patient was seen and examined. Past Surgical History:   Procedure Laterality Date    COLONOSCOPY N/A 2018    COLONOSCOPY performed by Smith Quick MD at [address] [facility] HX CHOLECYSTECTOMY      HX COLONOSCOPY  2013    MCV, repeat 10 years    HX ORTHOPAEDIC      Shattered bilateral ankles-metal plates & screws    HX OTHER SURGICAL      Benign cyst removal- L. foot    LEG/ANKLE SURGERY PROC UNLISTED       Past Medical History:   Diagnosis Date    Alcohol abuse     GI (gastrointestinal bleed)     Insomnia     Iron deficiency anemia 2004    PPD positive     treated w/ INH- tests since have been negative    Pure hypercholesterolemia     Scoliosis 12/28/15    [facility]    Tobacco dependence     Unspecified essential hypertension     Uterine bleeding, dysfunctional     Uterine fibroid      Social History   Substance Use Topics    Smoking status: Current Every Day Smoker     Packs/day: 0.50     Years: 17.00    Smokeless tobacco: Never Used    Alcohol use 1.2 oz/week     1 Cans of beer, 1 Shots of liquor per week      Comment: Socially     Allergies   Allergen Reactions    Robaxin [Methocarbamol] Hives, Rash and Itching     Red splotches    Statins-Hmg-Coa Reductase Inhibitors Myalgia    Codeine Itching    Neosporin [Neomycin-Bacitracin-Polymyxin] Rash     Family History   Problem Relation Age of Onset    Hypertension Mother     Diabetes Mother     Cancer Mother      breast    Heart Disease Father      MI 42's    Kidney Disease Father     Hypertension Son      No current facility-administered medications for this encounter.       Current Outpatient Prescriptions   Medication Sig    amylase-lipase-protease (CREON 99735) 24,000-76,000 -120,000 unit capsule Take 2 Caps by mouth three (3) times daily (with meals).  ferrous sulfate 325 mg (65 mg iron) tablet Take 1 Tab by mouth daily (with breakfast).  potassium chloride (KLOR-CON) 20 mEq packet Take 1 Packet by mouth daily.  calcium-vitamin D (CALCIUM 600 + D,3,) 600 mg(1,500mg) -200 unit tab Take 1 Tab by mouth two (2) times daily (with meals).  pantoprazole (PROTONIX) 40 mg tablet Take 1 Tab by mouth Daily (before breakfast).  buPROPion XL (WELLBUTRIN XL) 150 mg tablet TAKE ONE TABLET BY MOUTH EVERY MORNING FOR SMOKING CESSATION    aspirin 81 mg chewable tablet CHEW ONE TABLET BY MOUTH DAILY    doxepin (SINEQUAN) 25 mg capsule Take 1-2 Caps by mouth nightly.  zolpidem (AMBIEN) 5 mg tablet TAKE ONE TABLET BY MOUTH NIGHTLY AS NEEDED FOR SLEEP    folic acid (FOLVITE) 1 mg tablet Take 1 mg by mouth daily.  thiamine (B-1) 100 mg tablet Take 1 Tab by mouth daily.  magnesium 200 mg tab Take 200 mg by mouth daily. PHYSICAL EXAM:  General: WD, WN. Alert, cooperative, no acute distress    HEENT: NC, Atraumatic. PERRLA, EOMI. Anicteric sclerae. Lungs:  CTA Bilaterally. No Wheezing/Rhonchi/Rales. Heart:  Regular  rhythm,  No murmur, No Rubs, No Gallops  Abdomen: Soft, Non distended, Non tender.  +Bowel sounds, no HSM  Extremities: No c/c/e  Neurologic:  CN 2-12 gi, Alert and oriented X 3. No acute neurological distress   Psych:   Good insight. Not anxious nor agitated. The heart, lungs and mental status were satisfactory for the administration of MAC sedation and for the procedure.       Mallampati score: 3       Assessment:   · Mediastinal lymphadenopathy    Plan:   · Endoscopic procedure  · MAC sedation   ·

## 2018-05-02 ENCOUNTER — TELEPHONE (OUTPATIENT)
Dept: FAMILY MEDICINE CLINIC | Age: 58
End: 2018-05-02

## 2018-05-02 DIAGNOSIS — Z72.0 TOBACCO USE: ICD-10-CM

## 2018-05-02 DIAGNOSIS — D50.0 IRON DEFICIENCY ANEMIA DUE TO CHRONIC BLOOD LOSS: Primary | Chronic | ICD-10-CM

## 2018-05-02 DIAGNOSIS — K29.21 CHRONIC ALCOHOLIC GASTRITIS WITH HEMORRHAGE: ICD-10-CM

## 2018-05-02 RX ORDER — LANOLIN ALCOHOL/MO/W.PET/CERES
325 CREAM (GRAM) TOPICAL
Qty: 30 TAB | Refills: 5 | Status: CANCELLED | OUTPATIENT
Start: 2018-05-02

## 2018-05-02 RX ORDER — BUPROPION HYDROCHLORIDE 150 MG/1
TABLET ORAL
Qty: 30 TAB | Refills: 5 | Status: SHIPPED | OUTPATIENT
Start: 2018-05-02 | End: 2019-01-17 | Stop reason: SDUPTHER

## 2018-05-02 RX ORDER — PANTOPRAZOLE SODIUM 40 MG/1
40 TABLET, DELAYED RELEASE ORAL
Qty: 60 TAB | Refills: 5 | Status: CANCELLED | OUTPATIENT
Start: 2018-05-02

## 2018-05-02 RX ORDER — BUPROPION HYDROCHLORIDE 150 MG/1
TABLET ORAL
Qty: 30 TAB | Refills: 5 | Status: CANCELLED | OUTPATIENT
Start: 2018-05-02

## 2018-05-02 RX ORDER — PANTOPRAZOLE SODIUM 40 MG/1
40 TABLET, DELAYED RELEASE ORAL
Qty: 30 TAB | Refills: 5 | Status: SHIPPED | OUTPATIENT
Start: 2018-05-02 | End: 2019-02-08 | Stop reason: SDUPTHER

## 2018-05-02 RX ORDER — LANOLIN ALCOHOL/MO/W.PET/CERES
325 CREAM (GRAM) TOPICAL
Qty: 30 TAB | Refills: 5 | Status: SHIPPED | OUTPATIENT
Start: 2018-05-02 | End: 2019-01-17 | Stop reason: SDUPTHER

## 2018-05-02 NOTE — TELEPHONE ENCOUNTER
I recommend patient continue all three medications at present time. We can adjust them at a later date if needed.

## 2018-05-02 NOTE — TELEPHONE ENCOUNTER
Patient is calling, she is requesting a call back from the nurse in regards to three of her medications. She would like to know if she should still be taking the medications,  pantoprazole, ferrous sulfate, and wellbutrin.     Best call back # for patient: [phone]

## 2018-05-02 NOTE — TELEPHONE ENCOUNTER
[phone] notified patient of Ana Part recommendation patient understand she needs refill   LOV 4/12/2018

## 2018-05-14 ENCOUNTER — OFFICE VISIT (OUTPATIENT)
Dept: ONCOLOGY | Age: 58
End: 2018-05-14

## 2018-05-14 VITALS
SYSTOLIC BLOOD PRESSURE: 117 MMHG | RESPIRATION RATE: 16 BRPM | WEIGHT: 101 LBS | HEIGHT: 61 IN | OXYGEN SATURATION: 95 % | HEART RATE: 89 BPM | BODY MASS INDEX: 19.07 KG/M2 | DIASTOLIC BLOOD PRESSURE: 80 MMHG | TEMPERATURE: 97.6 F

## 2018-05-14 DIAGNOSIS — R59.0 MEDIASTINAL LYMPHADENOPATHY: ICD-10-CM

## 2018-05-14 DIAGNOSIS — D64.9 ANEMIA, UNSPECIFIED TYPE: Primary | ICD-10-CM

## 2018-05-14 DIAGNOSIS — F10.10 ALCOHOL ABUSE: ICD-10-CM

## 2018-05-14 DIAGNOSIS — J43.9 PULMONARY EMPHYSEMA, UNSPECIFIED EMPHYSEMA TYPE (HCC): ICD-10-CM

## 2018-05-14 NOTE — MR AVS SNAPSHOT
[address] [place] [address] 
[phone] Patient: Valerio Jones MRN: [id number] :1960 Visit Information Date & Time Provider Department Dept. Phone Encounter #  
 2018  8:30 AM Geri Gonzalez  [facility] Oncology at [facility] [phone] Follow-up Instructions Return in about 6 months (around 2018). Follow-up and Disposition History Your Appointments 7/10/2018  9:00 AM  
ROUTINE CARE with Dana Oleary  [facility] ([facility]) Appt Note: 3 months follow up visit disease management [address] [id number]  
[phone]  
  
   
 [address] [address] Upcoming Health Maintenance Date Due Pneumococcal 19-64 Highest Risk (1 of 3 - PCV13) 1980 MEDICARE YEARLY EXAM 3/28/2018 BREAST CANCER SCRN MAMMOGRAM 2018 Influenza Age 5 to Adult 2018 PAP AKA CERVICAL CYTOLOGY 2019 DTaP/Tdap/Td series (2 - Td) 3/27/2027 COLONOSCOPY 2028 Allergies as of 2018  Review Complete On: 2018 By: Geri Gonzalez DO Severity Noted Reaction Type Reactions Robaxin [Methocarbamol] High 2016    Hives, Rash, Itching Red splotches Statins-hmg-coa Reductase Inhibitors Medium 2017   Side Effect Myalgia Codeine  2012    Itching Neosporin [Neomycin-bacitracin-polymyxin]  2010    Rash Current Immunizations  Reviewed on 2018 No immunizations on file. Reviewed by Ida Persaud LPN on  at  8:51 AM  
 Reviewed by Ida Persaud LPN on 5208 at  8:59 AM  
You Were Diagnosed With   
  
 Codes Comments Anemia, unspecified type    -  Primary ICD-10-CM: D64.9 ICD-9-CM: 786. 9 Alcohol abuse     ICD-10-CM: F10.10 ICD-9-CM: 305.00   
 Mediastinal lymphadenopathy     ICD-10-CM: R59.0 ICD-9-CM: 785.6 Pulmonary emphysema, unspecified emphysema type ([facility].)     ICD-10-CM: J43.9 ICD-9-CM: 492.8 Vitals BP Pulse Temp Resp Height(growth percentile) Weight(growth percentile) 117/80 89 97.6 °F (36.4 °C) (Oral) 16 5' 1\" (1.549 m) 101 lb (45.8 kg) SpO2 BMI OB Status Smoking Status 95% 19.08 kg/m2 Premenopausal Current Every Day Smoker Vitals History BMI and BSA Data Body Mass Index Body Surface Area 19.08 kg/m 2 1.4 m 2 Preferred Pharmacy Pharmacy Name Phone [facility] [address], [address] [place] [address] Jaleesa Barron [phone] Your Updated Medication List  
  
   
This list is accurate as of 5/14/18  9:26 AM.  Always use your most recent med list.  
  
  
  
  
 aspirin 81 mg chewable tablet CHEW ONE TABLET BY MOUTH DAILY  
  
 buPROPion  mg tablet Commonly known as:  WELLBUTRIN XL  
TAKE ONE TABLET BY MOUTH EVERY MORNING FOR SMOKING CESSATION  
  
 calcium-vitamin D 600 mg(1,500mg) -200 unit Tab Commonly known as:  CALCIUM 600 + D(3) Take 1 Tab by mouth two (2) times daily (with meals). doxepin 25 mg capsule Commonly known as:  SINEquan Take 1-2 Caps by mouth nightly. ferrous sulfate 325 mg (65 mg iron) tablet Take 1 Tab by mouth daily (with breakfast). For iron supplement  
  
 folic acid 1 mg tablet Commonly known as:  [employer] Take 1 mg by mouth daily. lipase-protease-amylase 24,000-76,000 -120,000 unit capsule Commonly known as:  CREON 81114 Take 2 Caps by mouth three (3) times daily (with meals). magnesium 200 mg Tab Take 200 mg by mouth daily. pantoprazole 40 mg tablet Commonly known as:  PROTONIX Take 1 Tab by mouth Daily (before breakfast). For gastritis  
  
 potassium chloride 20 mEq packet Commonly known as:  KLOR-CON Take 1 Packet by mouth daily. thiamine 100 mg tablet Commonly known as:  B-1 Take 1 Tab by mouth daily. zolpidem 5 mg tablet Commonly known as:  AMBIEN  
TAKE ONE TABLET BY MOUTH NIGHTLY AS NEEDED FOR SLEEP Follow-up Instructions Return in about 6 months (around 11/14/2018). To-Do List   
 05/14/2018 Imaging:  CT CHEST W WO CONT Introducing [facility]! Damaso Potts introduces [employer] patient portal. Now you can access parts of your medical record, email your doctor's office, and request medication refills online. 1. In your internet browser, go to [email or web address] 2. Click on the First Time User? Click Here link in the Sign In box. You will see the New Member Sign Up page. 3. Enter your [employer] Access Code exactly as it appears below. You will not need to use this code after youve completed the sign-up process. If you do not sign up before the expiration date, you must request a new code. · [employer] Access Code: [email or web address] Expires: 6/20/2018  5:09 AM 
 
4. Enter the last four digits of your Social Security Number (xxxx) and Date of Birth (mm/dd/yyyy) as indicated and click Submit. You will be taken to the next sign-up page. 5. Create a [employer] ID. This will be your [employer] login ID and cannot be changed, so think of one that is secure and easy to remember. 6. Create a [employer] password. You can change your password at any time. 7. Enter your Password Reset Question and Answer. This can be used at a later time if you forget your password. 8. Enter your e-mail address. You will receive e-mail notification when new information is available in [address] 9. Click Sign Up. You can now view and download portions of your medical record. 10. Click the Download Summary menu link to download a portable copy of your medical information. If you have questions, please visit the Frequently Asked Questions section of the [employer] website.  Remember, [employer] is NOT to be used for urgent needs. For medical emergencies, dial 911. Now available from your iPhone and Android! Please provide this summary of care documentation to your next provider. Your primary care clinician is listed as Robyn Dorsey. If you have any questions after today's visit, please call [phone].

## 2018-05-14 NOTE — PROGRESS NOTES
[employer]  Medical Oncology at [facility]    Hematology / Oncology Progress Note    Reason for Visit:   Forrest Villalta is a 63 y.o. female who is seen in consultation at the request of Dr. Vida Haro for evaluation of anemia. History of Present Illness:   Ms. Chun Daniels is a 64 y/o female admitted with weakness and hypokalemia (K 1.9). She had a recent hospital admission at [address] [place] [address] for the same reason on 1/26/18. She has been feeling poorly for the past few days with bodyaches, weakness, and decreased p.o. intake. At PCP's office, i-STAT Hgb was 4, and patient was directed to the ER. Upon arrival in ER, Hgb was 8.2, potassium 1.9, Mg 1.4, lactate 3.4, SBP in 70s-90s. While at [address] [place] [address], she received 2 units of PRBCs. Given edema, she was treated with Lasix while there. She also has right arm swelling and was found here to have a RUE superficial venous thrombosis. She also notes some mild intermittent diarrhea. No fevers, dysuria, cough, abdom pain. Of note, patient was seen by Hematology while admitted to [address] [place] [address]. The reason for consultation at that time was abnormal CT scan (mediastinal LAD, liver lesion, diffuse colitis/ bowel changes, bone lesions/ ovary cyst/ fibroids.) Dr. Christel Yan and Dr. Siria Blas in [facility] evaluated patient at that time and felt her mediastinal LAD was likely due to sarcoidosis rather than malignancy. Patient underwent bronchoscopy and biopsy of LN by Dr. Ines Cox. Pathology findings were inconclusive (peripheral blood and scattered ciliated bronchial lining cells. ). For pelvic mass along with weight loss, Gyn was consulted but stated that this was unlikely to be 2/2 malignancy, corroborated by normal CA-125 level. She also underwent EGD by Dr. Isamar Blanca with finding of a gastric antrum ulcer. Biopsy revealed benign mucosa with mild chronic active gastritis and surface erosion, no H. Pylori. Review of labs here reveals drop in Hgb from 8-9 range to now 5.4. Iron sat elevated > 80%. Although VitB12 level was recently checked and normal, there is a low VitB1 (thiamine) level from 9/2017. Home med list includes thiamine tablets 100mg/d. When asked about alcohol intake, she states she drinks a fifth of liquor several times a month, but her  states she drinks this amount daily for the past several years. He states she has had less alcohol in the past 2-3 weeks due to feeling sick. She denies any tremors, seizures or other withdrawal symptoms when she goes without a drink. No recent viral infections or exposures per patient. Her maternal uncle had lymphoma. No personal history of cancer or hemoglobinopathies. Interval History: pt seen today for office f/u for anemia and mediastinal LNs. Biopsy of LNs negative by GI. Pt is in w/c today but walking with walker at home. Here with family today. Last CBC was normal with hgb 12.3. Pt is feeling better overall. Working with PT to move at home. Pt reports having camera study and this being negative. No new issues today. Last CT chest 2/18.           Past Medical History:   Diagnosis Date    Alcohol abuse     GI (gastrointestinal bleed)     Insomnia 11/09    Iron deficiency anemia 04/2004    PPD positive     treated w/ INH- tests since have been negative    Pure hypercholesterolemia     Scoliosis 12/28/15    [facility]    Tobacco dependence     Unspecified essential hypertension     Uterine bleeding, dysfunctional 2/12    Uterine fibroid       Past Surgical History:   Procedure Laterality Date    COLONOSCOPY N/A 1/31/2018    COLONOSCOPY performed by Musa Dasilva MD at [address] [facility] HX CHOLECYSTECTOMY      HX COLONOSCOPY  07/01/2013    MCV, repeat 10 years    HX ORTHOPAEDIC      Shattered bilateral ankles-metal plates & screws    HX OTHER SURGICAL      Benign cyst removal- L. foot    LEG/ANKLE SURGERY PROC UNLISTED        Social History Substance Use Topics    Smoking status: Current Every Day Smoker     Packs/day: 0.50     Years: 17.00    Smokeless tobacco: Never Used    Alcohol use 1.2 oz/week     1 Cans of beer, 1 Shots of liquor per week      Comment: Socially      Family History   Problem Relation Age of Onset    Hypertension Mother     Diabetes Mother     Cancer Mother      breast    Heart Disease Father      MI 42's    Kidney Disease Father     Hypertension Son      Current Outpatient Prescriptions   Medication Sig    buPROPion XL (WELLBUTRIN XL) 150 mg tablet TAKE ONE TABLET BY MOUTH EVERY MORNING FOR SMOKING CESSATION    pantoprazole (PROTONIX) 40 mg tablet Take 1 Tab by mouth Daily (before breakfast). For gastritis    ferrous sulfate 325 mg (65 mg iron) tablet Take 1 Tab by mouth daily (with breakfast). For iron supplement    doxepin (SINEQUAN) 25 mg capsule Take 1-2 Caps by mouth nightly.  amylase-lipase-protease (CREON 38077) 24,000-76,000 -120,000 unit capsule Take 2 Caps by mouth three (3) times daily (with meals).  potassium chloride (KLOR-CON) 20 mEq packet Take 1 Packet by mouth daily.  calcium-vitamin D (CALCIUM 600 + D,3,) 600 mg(1,500mg) -200 unit tab Take 1 Tab by mouth two (2) times daily (with meals).  folic acid (FOLVITE) 1 mg tablet Take 1 mg by mouth daily.  thiamine (B-1) 100 mg tablet Take 1 Tab by mouth daily.  magnesium 200 mg tab Take 200 mg by mouth daily.  zolpidem (AMBIEN) 5 mg tablet TAKE ONE TABLET BY MOUTH NIGHTLY AS NEEDED FOR SLEEP    aspirin 81 mg chewable tablet CHEW ONE TABLET BY MOUTH DAILY     No current facility-administered medications for this visit.        Allergies   Allergen Reactions    Robaxin [Methocarbamol] Hives, Rash and Itching     Red splotches    Statins-Hmg-Coa Reductase Inhibitors Myalgia    Codeine Itching    Neosporin [Neomycin-Bacitracin-Polymyxin] Rash       Review of Systems: A complete review of systems was obtained, negative except as described above. Physical Exam:     Visit Vitals    /80    Pulse 89    Temp 97.6 °F (36.4 °C) (Oral)    Resp 16    Ht 5' 1\" (1.549 m)    Wt 101 lb (45.8 kg)    SpO2 95%    BMI 19.08 kg/m2     ECOG PS: 3  General: No acute distress, alert, pleasant  Eyes:  anicteric sclerae  HENT: Atraumatic   Neck: Supple  Respiratory: clear with decreased bs in bases b/l  CV:  regular rhythm  GI: Soft, nontender, nondistended  MS: general weakness in a w/c   Skin: No rashes, ecchymoses, or petechiae. Normal temperature, turgor, and texture. Neuro/Psych: Altered, conversant. Generalized weakness      Results:     Lab Results   Component Value Date/Time    WBC 4.1 04/10/2018 10:04 AM    HGB 12.9 04/10/2018 10:04 AM    HCT 39.8 04/10/2018 10:04 AM    PLATELET 335 87/17/9356 10:04 AM    MCV 91 04/10/2018 10:04 AM    ABS. NEUTROPHILS 1.5 04/10/2018 10:04 AM    Hemoglobin (POC) 4.9 02/16/2018 02:46 PM    Hemoglobin (POC) 11.2 (L) 01/26/2018 04:08 PM    Hematocrit (POC) 33 (L) 01/26/2018 04:08 PM     Lab Results   Component Value Date/Time    Sodium 141 04/10/2018 10:04 AM    Potassium 4.5 04/10/2018 10:04 AM    Chloride 100 04/10/2018 10:04 AM    CO2 24 04/10/2018 10:04 AM    Glucose 81 04/10/2018 10:04 AM    BUN 9 04/10/2018 10:04 AM    Creatinine 0.52 (L) 04/10/2018 10:04 AM    GFR est  04/10/2018 10:04 AM    GFR est non- 04/10/2018 10:04 AM    Calcium 9.4 04/10/2018 10:04 AM    Sodium (POC) 144 01/26/2018 04:08 PM    Potassium (POC) <2.0 (LL) 01/26/2018 04:08 PM    Chloride (POC) 98 01/26/2018 04:08 PM    Glucose (POC) 111 (H) 02/26/2018 11:55 AM    BUN (POC) <3 (L) 01/26/2018 04:08 PM    Creatinine (POC) 0.6 01/26/2018 04:08 PM    Calcium, ionized (POC) 1.06 (L) 01/26/2018 04:08 PM     Lab Results   Component Value Date/Time    Bilirubin, total <0.2 04/10/2018 10:04 AM    ALT (SGPT) 6 04/10/2018 10:04 AM    AST (SGOT) 17 04/10/2018 10:04 AM    Alk.  phosphatase 69 04/10/2018 10:04 AM    Protein, total 7.0 04/10/2018 10:04 AM    Albumin 3.9 04/10/2018 10:04 AM    Globulin 3.6 03/01/2018 05:45 AM       Assessment and Recommendations:   Vance Son is a 63 y.o. female admitted with weakness, fatigue, hypokalemia and anemia. 1. Normocytic anemia    Likely secondary to alcohol abuse/ marrow suppression.   hgb is normal now. Pt is drinking much less. Advised to stop. Pt is trying. 2. Alcohol abuse: drinking less but not stopped yet. Advised to stop. -- Liver ultrasound with diffusely hepatic echogenicity compatible with hepatic steatosis. No sonographic evidence of mass. 4. Gastric ulcer: per GI.  NSAIDs and take PPI daily. 5. Mediastinal LAD on prior CTs 1/18. Possibly related to sarcoidosis. Bronch biopsy was inconclusive. Pt had EUS and biopsy negative. Can order re check CT/ pt wants to do this. F/u here in 6 months or sooner if needed.    Call if questions    Signed By: Chapis Huynh,      May 14, 2018

## 2018-05-24 ENCOUNTER — TELEPHONE (OUTPATIENT)
Dept: ONCOLOGY | Age: 58
End: 2018-05-24

## 2018-05-24 NOTE — TELEPHONE ENCOUNTER
CT scan needs P2P  Scheduled 5/28/18  CPT [id number]  Case # [id number]  Contact #:  [phone]  To provider

## 2018-05-24 NOTE — TELEPHONE ENCOUNTER
Called for [id number] for CT chest.     Case was approved earlier today, no need for peer to peer at this time. Authorization # will be faxed to the office.

## 2018-05-29 ENCOUNTER — HOSPITAL ENCOUNTER (OUTPATIENT)
Dept: CT IMAGING | Age: 58
Discharge: HOME | End: 2018-05-29
Attending: INTERNAL MEDICINE
Payer: COMMERCIAL

## 2018-05-29 DIAGNOSIS — R59.0 MEDIASTINAL LYMPHADENOPATHY: ICD-10-CM

## 2018-05-29 DIAGNOSIS — J43.9 PULMONARY EMPHYSEMA, UNSPECIFIED EMPHYSEMA TYPE (HCC): ICD-10-CM

## 2018-05-29 DIAGNOSIS — F10.10 ALCOHOL ABUSE: ICD-10-CM

## 2018-05-29 DIAGNOSIS — D64.9 ANEMIA, UNSPECIFIED TYPE: ICD-10-CM

## 2018-05-29 PROCEDURE — 71260 CT THORAX DX C+: CPT

## 2018-05-29 PROCEDURE — 74011000258 HC RX REV CODE- 258: Performed by: INTERNAL MEDICINE

## 2018-05-29 PROCEDURE — 74011636320 HC RX REV CODE- 636/320: Performed by: INTERNAL MEDICINE

## 2018-05-29 RX ORDER — SODIUM CHLORIDE 0.9 % (FLUSH) 0.9 %
10 SYRINGE (ML) INJECTION
Status: COMPLETED | OUTPATIENT
Start: 2018-05-29 | End: 2018-05-29

## 2018-05-29 RX ADMIN — SODIUM CHLORIDE 100 ML: 900 INJECTION, SOLUTION INTRAVENOUS at 12:06

## 2018-05-29 RX ADMIN — IOPAMIDOL 100 ML: 612 INJECTION, SOLUTION INTRAVENOUS at 12:06

## 2018-05-29 RX ADMIN — Medication 10 ML: at 12:06

## 2018-05-29 NOTE — PROGRESS NOTES
HIPAA verified. Advised of provider note. Verbalized understanding. Wanted to know cause of lymph node enlargement. Advised would forward to provider.

## 2018-05-31 ENCOUNTER — TELEPHONE (OUTPATIENT)
Dept: ONCOLOGY | Age: 58
End: 2018-05-31

## 2018-05-31 NOTE — PROGRESS NOTES
HIPAA verified. Advised of provider note. Patient stated was not aware previously that lymph nodes were enlarged. Also stated did not know anything about sarcoid. Attempted to review sarcoid/lung per [employer]. Patient stated, \"I'm just scared. \"  Support given. Advised would make pulmonary appt and return call. Best day for appt is Monday. Patient verbalized understanding and thanked for call.

## 2018-05-31 NOTE — TELEPHONE ENCOUNTER
Spoke with Peyton. She will check with [facility] for urgent appt and return call. Will fax CT to [phone]/complete. Office to return call after CT scan review.     (11 min)

## 2018-06-06 ENCOUNTER — TELEPHONE (OUTPATIENT)
Dept: ONCOLOGY | Age: 58
End: 2018-06-06

## 2018-06-26 DIAGNOSIS — E83.51 HYPOCALCEMIA: ICD-10-CM

## 2018-06-26 RX ORDER — CALCIUM CARBONATE/VITAMIN D3 600MG-5MCG
1 TABLET ORAL 2 TIMES DAILY WITH MEALS
Qty: 60 TAB | Refills: 5 | Status: SHIPPED | OUTPATIENT
Start: 2018-06-26 | End: 2019-04-11 | Stop reason: ALTCHOICE

## 2018-06-26 NOTE — TELEPHONE ENCOUNTER
Pharmacy is requesting a refill    . Requested Prescriptions     Pending Prescriptions Disp Refills    calcium-vitamin D (CALCIUM 600 + D,3,) 600 mg(1,500mg) -200 unit tab 60 Tab 0     Sig: Take 1 Tab by mouth two (2) times daily (with meals).      Last fill : 3/28/18  Last seen :Julia 15, 2018

## 2018-07-02 ENCOUNTER — TELEPHONE (OUTPATIENT)
Dept: FAMILY MEDICINE CLINIC | Age: 58
End: 2018-07-02

## 2018-07-02 NOTE — TELEPHONE ENCOUNTER
----- Message from Kacey Mukherjee sent at 7/2/2018  8:24 AM EDT -----  Regarding: Jenaro Meek/ Telephone  Patient is out of her Calcium medication and wants to know if she needs to keep taking it. If so, needs a refill called to [facility], [phone]. Contact is .

## 2018-07-03 NOTE — TELEPHONE ENCOUNTER
[phone] attempted to call patient no answer left message to call me back in regards to her message for Calcium

## 2018-07-05 NOTE — TELEPHONE ENCOUNTER
[phone] attempted to call patient no answer left message to call me back in regards to her medication calcium

## 2018-07-06 NOTE — TELEPHONE ENCOUNTER
[phone] notified patient of Amador's recommendation    Patient can discontinue calcium supplement.     Patient understand

## 2018-08-12 DIAGNOSIS — E53.8 FOLIC ACID DEFICIENCY: Primary | ICD-10-CM

## 2018-08-13 RX ORDER — FOLIC ACID 1 MG/1
TABLET ORAL
Qty: 30 TAB | Refills: 0 | Status: SHIPPED | OUTPATIENT
Start: 2018-08-13 | End: 2018-08-14 | Stop reason: SDUPTHER

## 2018-08-14 RX ORDER — FOLIC ACID 1 MG/1
1 TABLET ORAL DAILY
Qty: 30 TAB | Refills: 0 | Status: SHIPPED | OUTPATIENT
Start: 2018-08-14 | End: 2018-10-02 | Stop reason: SDUPTHER

## 2018-10-08 ENCOUNTER — OFFICE VISIT (OUTPATIENT)
Dept: FAMILY MEDICINE CLINIC | Age: 58
End: 2018-10-08

## 2018-10-08 VITALS
HEART RATE: 88 BPM | BODY MASS INDEX: 21.14 KG/M2 | WEIGHT: 112 LBS | HEIGHT: 61 IN | OXYGEN SATURATION: 96 % | TEMPERATURE: 98.2 F | SYSTOLIC BLOOD PRESSURE: 116 MMHG | DIASTOLIC BLOOD PRESSURE: 78 MMHG | RESPIRATION RATE: 16 BRPM

## 2018-10-08 DIAGNOSIS — D50.8 OTHER IRON DEFICIENCY ANEMIA: Primary | Chronic | ICD-10-CM

## 2018-10-08 DIAGNOSIS — E87.6 HYPOKALEMIA: ICD-10-CM

## 2018-10-08 DIAGNOSIS — E51.9 THIAMIN DEFICIENCY: ICD-10-CM

## 2018-10-08 RX ORDER — POTASSIUM CHLORIDE 1.5 G/1.77G
20 POWDER, FOR SOLUTION ORAL DAILY
Qty: 30 EACH | Refills: 5 | Status: SHIPPED | OUTPATIENT
Start: 2018-10-08 | End: 2019-10-21 | Stop reason: SDUPTHER

## 2018-10-08 RX ORDER — LANOLIN ALCOHOL/MO/W.PET/CERES
100 CREAM (GRAM) TOPICAL DAILY
Qty: 30 TAB | Refills: 5 | Status: SHIPPED | OUTPATIENT
Start: 2018-10-08 | End: 2019-06-26 | Stop reason: SDUPTHER

## 2018-10-08 NOTE — PROGRESS NOTES
Chief Complaint   Patient presents with    Labs     Recheck Hypokalemia.  Anemia     Follow up. 1. Have you been to the ER, urgent care clinic since your last visit? Hospitalized since your last visit? No    2. Have you seen or consulted any other health care providers outside of the [address] [place] [address] since your last visit? Include any pap smears or colon screening. Yes to her Pulmonologist about 1 month ago.

## 2018-10-08 NOTE — PROGRESS NOTES
HISTORY OF PRESENT ILLNESS  Mic Sanabira is a 63 y.o. female. HPI: Following up on chronic conditions, she has histroy of alcohol abuse, GI bleed, iron deficiency anemia , and hypokalemia. She is here to refill medication and check blood work. She reports that she is no longer drinking. Past Medical History:   Diagnosis Date    Alcohol abuse     GI (gastrointestinal bleed)     Insomnia 11/09    Iron deficiency anemia 04/2004    PPD positive     treated w/ INH- tests since have been negative    Pure hypercholesterolemia     Scoliosis 12/28/15    [facility]    Tobacco dependence     Unspecified essential hypertension     Uterine bleeding, dysfunctional 2/12    Uterine fibroid      Past Surgical History:   Procedure Laterality Date    COLONOSCOPY N/A 1/31/2018    COLONOSCOPY performed by Jeanne Loza MD at [address] [facility] HX CHOLECYSTECTOMY      HX COLONOSCOPY  07/01/2013    MCV, repeat 10 years    HX ORTHOPAEDIC      Shattered bilateral ankles-metal plates & screws    HX OTHER SURGICAL      Benign cyst removal- L. foot    LEG/ANKLE SURGERY PROC UNLISTED       Allergies   Allergen Reactions    Robaxin [Methocarbamol] Hives, Rash and Itching     Red splotches    Statins-Hmg-Coa Reductase Inhibitors Myalgia    Codeine Itching    Neosporin [Neomycin-Bacitracin-Polymyxin] Rash     Current Outpatient Prescriptions:     thiamine HCL (B-1) 100 mg tablet, Take 1 Tab by mouth daily. Indications: THIAMINE DEFICIENCY, Disp: 30 Tab, Rfl: 5    potassium chloride (KLOR-CON) 20 mEq packet, Take 1 Packet by mouth daily. , Disp: 30 Each, Rfl: 5    folic acid (FOLVITE) 1 mg tablet, Take 1 Tab by mouth daily. , Disp: 30 Tab, Rfl: 5    doxepin (SINEQUAN) 25 mg capsule, TAKE ONE TO TWO CAPSULES BY MOUTH EVERY NIGHT, Disp: 60 Cap, Rfl: 5    buPROPion XL (WELLBUTRIN XL) 150 mg tablet, TAKE ONE TABLET BY MOUTH EVERY MORNING FOR SMOKING CESSATION, Disp: 30 Tab, Rfl: 5    pantoprazole (PROTONIX) 40 mg tablet, Take 1 Tab by mouth Daily (before breakfast). For gastritis, Disp: 30 Tab, Rfl: 5    ferrous sulfate 325 mg (65 mg iron) tablet, Take 1 Tab by mouth daily (with breakfast). For iron supplement, Disp: 30 Tab, Rfl: 5    amylase-lipase-protease (CREON 50389) 24,000-76,000 -120,000 unit capsule, Take 2 Caps by mouth three (3) times daily (with meals). , Disp: 180 Cap, Rfl: 0    magnesium 200 mg tab, Take 200 mg by mouth daily. , Disp: 30 Tab, Rfl: 5    aspirin 81 mg chewable tablet, CHEW ONE TABLET BY MOUTH DAILY, Disp: 30 Tab, Rfl: 5    calcium-vitamin D (CALCIUM 600 + D,3,) 600 mg(1,500mg) -200 unit tab, Take 1 Tab by mouth two (2) times daily (with meals). , Disp: 60 Tab, Rfl: 5    zolpidem (AMBIEN) 5 mg tablet, TAKE ONE TABLET BY MOUTH NIGHTLY AS NEEDED FOR SLEEP, Disp: 30 Tab, Rfl: 0  Review of Systems   Constitutional: Negative. Respiratory: Negative. Cardiovascular: Negative. Gastrointestinal: Negative. Blood pressure 116/78, pulse 88, temperature 98.2 °F (36.8 °C), temperature source Oral, resp. rate 16, height 5' 1\" (1.549 m), weight 112 lb (50.8 kg), SpO2 96 %. Physical Exam   Constitutional: No distress. HENT:   Mouth/Throat: Oropharynx is clear and moist.   Neck: Neck supple. Cardiovascular: Normal rate and regular rhythm. No murmur heard. Pulmonary/Chest: Effort normal and breath sounds normal.   Abdominal: Soft. Bowel sounds are normal.   Nursing note and vitals reviewed. ASSESSMENT and PLAN  Diagnoses and all orders for this visit:    1. Other iron deficiency anemia  -     CBC WITH AUTOMATED DIFF    2. Thiamin deficiency  -     thiamine HCL (B-1) 100 mg tablet; Take 1 Tab by mouth daily. Indications: THIAMINE DEFICIENCY    3. Hypokalemia  -     potassium chloride (KLOR-CON) 20 mEq packet;  Take 1 Packet by mouth daily.  -     METABOLIC PANEL, COMPREHENSIVE  Pt was given an after visit summary which includes diagnosis, current medicines and vital and voiced understanding of treatment plan

## 2018-10-09 DIAGNOSIS — R74.8 ELEVATED LIVER ENZYMES: Primary | ICD-10-CM

## 2018-10-09 LAB
ALBUMIN SERPL-MCNC: 3.9 G/DL (ref 3.5–5.5)
ALBUMIN/GLOB SERPL: 1.4 {RATIO} (ref 1.2–2.2)
ALP SERPL-CCNC: 101 IU/L (ref 39–117)
ALT SERPL-CCNC: 18 IU/L (ref 0–32)
AST SERPL-CCNC: 64 IU/L (ref 0–40)
BASOPHILS # BLD AUTO: 0 X10E3/UL (ref 0–0.2)
BASOPHILS NFR BLD AUTO: 0 %
BILIRUB SERPL-MCNC: <0.2 MG/DL (ref 0–1.2)
BUN SERPL-MCNC: 10 MG/DL (ref 6–24)
BUN/CREAT SERPL: 19 (ref 9–23)
CALCIUM SERPL-MCNC: 8.6 MG/DL (ref 8.7–10.2)
CHLORIDE SERPL-SCNC: 96 MMOL/L (ref 96–106)
CO2 SERPL-SCNC: 26 MMOL/L (ref 20–29)
CREAT SERPL-MCNC: 0.53 MG/DL (ref 0.57–1)
EOSINOPHIL # BLD AUTO: 0 X10E3/UL (ref 0–0.4)
EOSINOPHIL NFR BLD AUTO: 1 %
ERYTHROCYTE [DISTWIDTH] IN BLOOD BY AUTOMATED COUNT: 15.7 % (ref 12.3–15.4)
GLOBULIN SER CALC-MCNC: 2.8 G/DL (ref 1.5–4.5)
GLUCOSE SERPL-MCNC: 87 MG/DL (ref 65–99)
HCT VFR BLD AUTO: 32.9 % (ref 34–46.6)
HGB BLD-MCNC: 10.7 G/DL (ref 11.1–15.9)
IMM GRANULOCYTES # BLD: 0 X10E3/UL (ref 0–0.1)
IMM GRANULOCYTES NFR BLD: 0 %
LYMPHOCYTES # BLD AUTO: 1.4 X10E3/UL (ref 0.7–3.1)
LYMPHOCYTES NFR BLD AUTO: 34 %
MCH RBC QN AUTO: 31.8 PG (ref 26.6–33)
MCHC RBC AUTO-ENTMCNC: 32.5 G/DL (ref 31.5–35.7)
MCV RBC AUTO: 98 FL (ref 79–97)
MONOCYTES # BLD AUTO: 0.8 X10E3/UL (ref 0.1–0.9)
MONOCYTES NFR BLD AUTO: 18 %
NEUTROPHILS # BLD AUTO: 2 X10E3/UL (ref 1.4–7)
NEUTROPHILS NFR BLD AUTO: 47 %
PLATELET # BLD AUTO: 182 X10E3/UL (ref 150–379)
POTASSIUM SERPL-SCNC: 3.3 MMOL/L (ref 3.5–5.2)
PROT SERPL-MCNC: 6.7 G/DL (ref 6–8.5)
RBC # BLD AUTO: 3.36 X10E6/UL (ref 3.77–5.28)
SODIUM SERPL-SCNC: 141 MMOL/L (ref 134–144)
WBC # BLD AUTO: 4.2 X10E3/UL (ref 3.4–10.8)

## 2018-10-30 ENCOUNTER — TELEPHONE (OUTPATIENT)
Dept: FAMILY MEDICINE CLINIC | Age: 58
End: 2018-10-30

## 2018-11-12 ENCOUNTER — OFFICE VISIT (OUTPATIENT)
Dept: ONCOLOGY | Age: 58
End: 2018-11-12

## 2018-11-12 VITALS
WEIGHT: 118 LBS | SYSTOLIC BLOOD PRESSURE: 117 MMHG | DIASTOLIC BLOOD PRESSURE: 87 MMHG | HEART RATE: 100 BPM | BODY MASS INDEX: 22.28 KG/M2 | OXYGEN SATURATION: 97 % | TEMPERATURE: 97.7 F | RESPIRATION RATE: 16 BRPM | HEIGHT: 61 IN

## 2018-11-12 DIAGNOSIS — F10.10 ALCOHOL ABUSE: ICD-10-CM

## 2018-11-12 DIAGNOSIS — G62.9 NEUROPATHY: ICD-10-CM

## 2018-11-12 DIAGNOSIS — R59.0 MEDIASTINAL LYMPHADENOPATHY: ICD-10-CM

## 2018-11-12 DIAGNOSIS — J43.9 PULMONARY EMPHYSEMA, UNSPECIFIED EMPHYSEMA TYPE (HCC): ICD-10-CM

## 2018-11-12 DIAGNOSIS — F17.200 SMOKER: ICD-10-CM

## 2018-11-12 DIAGNOSIS — D50.9 IRON DEFICIENCY ANEMIA, UNSPECIFIED IRON DEFICIENCY ANEMIA TYPE: Primary | ICD-10-CM

## 2018-11-12 NOTE — PROGRESS NOTES
[address]  Medical Oncology at [facility]    Hematology / Oncology Progress Note    Reason for Visit:   Luis Angel Odom is a 63 y.o. female who is seen today for f/u of anemia. History of Present Illness:     Pt seen today for office f/u for anemia and mediastinal LNs. CBC 10/18 hgb 10.7. Taking vitamins/ B and iron/ mag. Still drinks occ. Smoker. Trying to cut back. Pt has a biopsy with pulmonary 1/18. CT f/u 5/18 stable. C/o balance issues chronically. Has neuropathy. No new issues today. Past Medical History:   Diagnosis Date    Alcohol abuse     GI (gastrointestinal bleed)     Insomnia 11/09    Iron deficiency anemia 04/2004    PPD positive     treated w/ INH- tests since have been negative    Pure hypercholesterolemia     Scoliosis 12/28/15    [facility]    Tobacco dependence     Unspecified essential hypertension     Uterine bleeding, dysfunctional 2/12    Uterine fibroid       Past Surgical History:   Procedure Laterality Date    HX CHOLECYSTECTOMY      HX COLONOSCOPY  07/01/2013    MCV, repeat 10 years    HX ORTHOPAEDIC      Shattered bilateral ankles-metal plates & screws    HX OTHER SURGICAL      Benign cyst removal- L. foot    LEG/ANKLE SURGERY PROC UNLISTED        Social History     Tobacco Use    Smoking status: Current Every Day Smoker     Packs/day: 0.50     Years: 17.00     Pack years: 8.50    Smokeless tobacco: Never Used   Substance Use Topics    Alcohol use: Yes     Alcohol/week: 1.2 oz     Types: 1 Cans of beer, 1 Shots of liquor per week     Comment: Socially      Family History   Problem Relation Age of Onset    Hypertension Mother     Diabetes Mother     Cancer Mother         breast    Heart Disease Father         MI 42's    Kidney Disease Father     Hypertension Son      Current Outpatient Medications   Medication Sig    thiamine HCL (B-1) 100 mg tablet Take 1 Tab by mouth daily. Indications: THIAMINE DEFICIENCY    potassium chloride (KLOR-CON) 20 mEq packet Take 1 Packet by mouth daily.  folic acid (FOLVITE) 1 mg tablet Take 1 Tab by mouth daily.  doxepin (SINEQUAN) 25 mg capsule TAKE ONE TO TWO CAPSULES BY MOUTH EVERY NIGHT    buPROPion XL (WELLBUTRIN XL) 150 mg tablet TAKE ONE TABLET BY MOUTH EVERY MORNING FOR SMOKING CESSATION    pantoprazole (PROTONIX) 40 mg tablet Take 1 Tab by mouth Daily (before breakfast). For gastritis    ferrous sulfate 325 mg (65 mg iron) tablet Take 1 Tab by mouth daily (with breakfast). For iron supplement    magnesium 200 mg tab Take 200 mg by mouth daily.  aspirin 81 mg chewable tablet CHEW ONE TABLET BY MOUTH DAILY    calcium-vitamin D (CALCIUM 600 + D,3,) 600 mg(1,500mg) -200 unit tab Take 1 Tab by mouth two (2) times daily (with meals).  zolpidem (AMBIEN) 5 mg tablet TAKE ONE TABLET BY MOUTH NIGHTLY AS NEEDED FOR SLEEP    amylase-lipase-protease (CREON 54065) 24,000-76,000 -120,000 unit capsule Take 2 Caps by mouth three (3) times daily (with meals). No current facility-administered medications for this visit. Allergies   Allergen Reactions    Robaxin [Methocarbamol] Hives, Rash and Itching     Red splotches    Statins-Hmg-Coa Reductase Inhibitors Myalgia    Codeine Itching    Neosporin [Neomycin-Bacitracin-Polymyxin] Rash       Review of Systems: A complete review of systems was obtained, negative except as described above. Physical Exam:     Visit Vitals  /87   Pulse 100   Temp 97.7 °F (36.5 °C) (Oral)   Resp 16   Ht 5' 1\" (1.549 m)   Wt 118 lb (53.5 kg)   SpO2 97%   BMI 22.30 kg/m²     ECOG PS: 1  General: No acute distress, alert, pleasant  Eyes:  anicteric sclerae  HENT: Atraumatic   Neck: Supple  Respiratory: clear with decreased bs in bases b/l  CV:  regular rhythm  GI: Soft, nontender, nondistended  MS: ambulatory   Skin: No rashes, ecchymoses, or petechiae.  Normal temperature, turgor, and texture. Neuro/Psych: Altered, conversant. Generalized weakness. With neuropathy. Results:     Lab Results   Component Value Date/Time    WBC 4.2 10/08/2018 10:54 AM    HGB 10.7 (L) 10/08/2018 10:54 AM    HCT 32.9 (L) 10/08/2018 10:54 AM    PLATELET 145 38/02/4572 10:54 AM    MCV 98 (H) 10/08/2018 10:54 AM    ABS. NEUTROPHILS 2.0 10/08/2018 10:54 AM    Hemoglobin (POC) 4.9 02/16/2018 02:46 PM    Hemoglobin (POC) 11.2 (L) 01/26/2018 04:08 PM    Hematocrit (POC) 33 (L) 01/26/2018 04:08 PM     Lab Results   Component Value Date/Time    Sodium 141 10/08/2018 10:54 AM    Potassium 3.3 (L) 10/08/2018 10:54 AM    Chloride 96 10/08/2018 10:54 AM    CO2 26 10/08/2018 10:54 AM    Glucose 87 10/08/2018 10:54 AM    BUN 10 10/08/2018 10:54 AM    Creatinine 0.53 (L) 10/08/2018 10:54 AM    GFR est  10/08/2018 10:54 AM    GFR est non- 10/08/2018 10:54 AM    Calcium 8.6 (L) 10/08/2018 10:54 AM    Sodium (POC) 144 01/26/2018 04:08 PM    Potassium (POC) <2.0 (LL) 01/26/2018 04:08 PM    Chloride (POC) 98 01/26/2018 04:08 PM    Glucose (POC) 111 (H) 02/26/2018 11:55 AM    BUN (POC) <3 (L) 01/26/2018 04:08 PM    Creatinine (POC) 0.6 01/26/2018 04:08 PM    Calcium, ionized (POC) 1.06 (L) 01/26/2018 04:08 PM     Lab Results   Component Value Date/Time    Bilirubin, total <0.2 10/08/2018 10:54 AM    ALT (SGPT) 18 10/08/2018 10:54 AM    AST (SGOT) 64 (H) 10/08/2018 10:54 AM    Alk. phosphatase 101 10/08/2018 10:54 AM    Protein, total 6.7 10/08/2018 10:54 AM    Albumin 3.9 10/08/2018 10:54 AM    Globulin 3.6 03/01/2018 05:45 AM       Assessment and Recommendations:   Adrian Amezquita is a 63 y.o. female admitted with weakness, fatigue, hypokalemia and anemia. 1. Normocytic anemia    Likely secondary to alcohol abuse/ marrow suppression. Hx of iron def. Pt still drinking. 2. Alcohol abuse: drinking less but not stopped yet. Advised to stop.    -- Liver ultrasound with diffusely hepatic echogenicity compatible with hepatic steatosis. No sonographic evidence of mass. 4. Gastric ulcer: per GI.  NSAIDs and take PPI daily. 5. Mediastinal LAD on prior CTs 1/18. Possibly related to sarcoidosis. Bronch biopsy was inconclusive. Pt had EUS and biopsy negative. F/u CT 5/18 stable. Biopsy by pulmonary 1/18. 6. Neuropathy feet. Likely due to alcohol. Refer to neuro if needed. F/u here in 6 months or sooner if needed.    Call if questions    Signed By: Socrates Webb DO     November 12, 2018

## 2019-01-17 DIAGNOSIS — D50.0 IRON DEFICIENCY ANEMIA DUE TO CHRONIC BLOOD LOSS: Chronic | ICD-10-CM

## 2019-01-17 DIAGNOSIS — Z72.0 TOBACCO USE: ICD-10-CM

## 2019-01-18 DIAGNOSIS — D50.0 IRON DEFICIENCY ANEMIA DUE TO CHRONIC BLOOD LOSS: Chronic | ICD-10-CM

## 2019-01-18 DIAGNOSIS — E83.42 HYPOMAGNESEMIA: ICD-10-CM

## 2019-01-18 NOTE — TELEPHONE ENCOUNTER
Pt. called in today requesting a refill on the following meds, Pharm on file verified. LOV 10/18/18    Requested Prescriptions     Pending Prescriptions Disp Refills    magnesium 200 mg tab 30 Tab 5     Sig: Take 200 mg by mouth daily.  ferrous sulfate 325 mg (65 mg iron) tablet 30 Tab 5     Sig: Take 1 Tab by mouth daily (with breakfast).  For iron supplement

## 2019-01-21 ENCOUNTER — HOSPITAL ENCOUNTER (OUTPATIENT)
Dept: CT IMAGING | Age: 59
Discharge: HOME | End: 2019-01-21
Attending: INTERNAL MEDICINE
Payer: COMMERCIAL

## 2019-01-21 DIAGNOSIS — J98.59 OTHER DISEASES OF MEDIASTINUM, NOT ELSEWHERE CLASSIFIED: ICD-10-CM

## 2019-01-21 PROCEDURE — 74011000258 HC RX REV CODE- 258: Performed by: RADIOLOGY

## 2019-01-21 PROCEDURE — 71260 CT THORAX DX C+: CPT

## 2019-01-21 PROCEDURE — 74011636320 HC RX REV CODE- 636/320: Performed by: RADIOLOGY

## 2019-01-21 RX ORDER — LANOLIN ALCOHOL/MO/W.PET/CERES
CREAM (GRAM) TOPICAL
Qty: 30 TAB | Refills: 4 | Status: SHIPPED | OUTPATIENT
Start: 2019-01-21 | End: 2020-06-02

## 2019-01-21 RX ORDER — MAGNESIUM 200 MG
200 TABLET ORAL DAILY
Qty: 30 TAB | Refills: 5 | Status: SHIPPED | OUTPATIENT
Start: 2019-01-21 | End: 2019-04-09 | Stop reason: SDUPTHER

## 2019-01-21 RX ORDER — BUPROPION HYDROCHLORIDE 150 MG/1
TABLET ORAL
Qty: 30 TAB | Refills: 4 | Status: SHIPPED | OUTPATIENT
Start: 2019-01-21 | End: 2019-10-21 | Stop reason: SDUPTHER

## 2019-01-21 RX ORDER — LANOLIN ALCOHOL/MO/W.PET/CERES
325 CREAM (GRAM) TOPICAL
Qty: 30 TAB | Refills: 5 | OUTPATIENT
Start: 2019-01-21

## 2019-01-21 RX ORDER — SODIUM CHLORIDE 0.9 % (FLUSH) 0.9 %
10 SYRINGE (ML) INJECTION
Status: COMPLETED | OUTPATIENT
Start: 2019-01-21 | End: 2019-01-21

## 2019-01-21 RX ADMIN — IOPAMIDOL 100 ML: 612 INJECTION, SOLUTION INTRAVENOUS at 09:36

## 2019-01-21 RX ADMIN — SODIUM CHLORIDE 100 ML: 900 INJECTION, SOLUTION INTRAVENOUS at 09:36

## 2019-01-21 RX ADMIN — Medication 10 ML: at 09:36

## 2019-02-08 ENCOUNTER — OFFICE VISIT (OUTPATIENT)
Dept: FAMILY MEDICINE CLINIC | Age: 59
End: 2019-02-08

## 2019-02-08 VITALS
BODY MASS INDEX: 25.11 KG/M2 | OXYGEN SATURATION: 96 % | DIASTOLIC BLOOD PRESSURE: 67 MMHG | SYSTOLIC BLOOD PRESSURE: 118 MMHG | HEART RATE: 104 BPM | WEIGHT: 133 LBS | RESPIRATION RATE: 16 BRPM | HEIGHT: 61 IN | TEMPERATURE: 98.4 F

## 2019-02-08 DIAGNOSIS — R60.9 DEPENDENT EDEMA: Primary | ICD-10-CM

## 2019-02-08 DIAGNOSIS — G47.9 SLEEP DIFFICULTIES: ICD-10-CM

## 2019-02-08 DIAGNOSIS — M79.89 FOOT SWELLING: ICD-10-CM

## 2019-02-08 DIAGNOSIS — K29.21 CHRONIC ALCOHOLIC GASTRITIS WITH HEMORRHAGE: ICD-10-CM

## 2019-02-08 RX ORDER — PANTOPRAZOLE SODIUM 40 MG/1
40 TABLET, DELAYED RELEASE ORAL
Qty: 30 TAB | Refills: 0 | Status: SHIPPED | OUTPATIENT
Start: 2019-02-08 | End: 2019-04-09 | Stop reason: SDUPTHER

## 2019-02-08 RX ORDER — DOXEPIN HYDROCHLORIDE 25 MG/1
25 CAPSULE ORAL
Qty: 30 CAP | Refills: 0 | Status: SHIPPED | OUTPATIENT
Start: 2019-02-08 | End: 2019-04-09 | Stop reason: SDUPTHER

## 2019-02-08 NOTE — PROGRESS NOTES
[facility] Note Subjective: Chief Complaint Patient presents with  Foot Pain Pt c/o swelling in both feet starting 21 January 2019. Oni Min is a 63y.o. year old female who presents for evaluation of the following: Foot Swelling:  
Chronic, worse in past 1 month Improved when she is resting at night No shortness of breath Previous Tx: furosemide History of Ulcer/ Heartburn Improved with medication 
- requests refill Tx: Pantoprazole Sleep Difficulty/ Alcohol Induced Insomnia:  
Sleeps 6 hours on doxepin Tx: doxepin Previous Tx: [place] Counseling: None [place]:4, form incomplete PHQ over the last two weeks 2/8/2019 Little interest or pleasure in doing things Several days Feeling down, depressed, irritable, or hopeless More than half the days Total Score PHQ 2 3 Trouble falling or staying asleep, or sleeping too much Nearly every day Feeling tired or having little energy Several days Poor appetite, weight loss, or overeating Several days Feeling bad about yourself - or that you are a failure or have let yourself or your family down Not at all Trouble concentrating on things such as school, work, reading, or watching TV Not at all Moving or speaking so slowly that other people could have noticed; or the opposite being so fidgety that others notice Not at all Thoughts of being better off dead, or hurting yourself in some way Not at all PHQ 9 Score 8 Review of Systems Pertinent positives and negative per HPI. All other systems  reviewed are negative for a Comprehensive ROS (10+). Past Medical History:  
Diagnosis Date  Alcohol abuse  GI (gastrointestinal bleed)  Insomnia 11/09  Iron deficiency anemia 04/2004  PPD positive   
 treated w/ INH- tests since have been negative  Pure hypercholesterolemia  Scoliosis 12/28/15  
 [facility]  
  Tobacco dependence  Unspecified essential hypertension  Uterine bleeding, dysfunctional 2/12  Uterine fibroid Social History Socioeconomic History  Marital status:  Spouse name: Not on file  Number of children: Not on file  Years of education: Not on file  Highest education level: Not on file Social Needs  Financial resource strain: Not on file  Food insecurity - worry: Not on file  Food insecurity - inability: Not on file  Transportation needs - medical: Not on file  Transportation needs - non-medical: Not on file Occupational History  Occupation: part time Tobacco Use  Smoking status: Current Every Day Smoker Packs/day: 0.50 Years: 17.00 Pack years: 8.50  Smokeless tobacco: Never Used Substance and Sexual Activity  Alcohol use: Yes Alcohol/week: 1.2 oz Types: 1 Cans of beer, 1 Shots of liquor per week Comment: Socially  Drug use: No  
 Sexual activity: Yes  
  Partners: Male Birth control/protection: None Other Topics Concern  Not on file Social History Narrative  Not on file Current Outpatient Medications Medication Sig  
 buPROPion XL (WELLBUTRIN XL) 150 mg tablet TAKE ONE TABLET BY MOUTH EVERY MORNING FOR SMOKING CESSATION  ferrous sulfate 325 mg (65 mg iron) tablet TAKE ONE TABLET BY MOUTH DAILY WITH BREAKFAST  magnesium 200 mg tab Take 200 mg by mouth daily.  potassium chloride (KLOR-CON) 20 mEq packet Take 1 Packet by mouth daily.  folic acid (FOLVITE) 1 mg tablet Take 1 Tab by mouth daily.  doxepin (SINEQUAN) 25 mg capsule TAKE ONE TO TWO CAPSULES BY MOUTH EVERY NIGHT  pantoprazole (PROTONIX) 40 mg tablet Take 1 Tab by mouth Daily (before breakfast). For gastritis  aspirin 81 mg chewable tablet CHEW ONE TABLET BY MOUTH DAILY  thiamine HCL (B-1) 100 mg tablet Take 1 Tab by mouth daily.  Indications: THIAMINE DEFICIENCY  
  calcium-vitamin D (CALCIUM 600 + D,3,) 600 mg(1,500mg) -200 unit tab Take 1 Tab by mouth two (2) times daily (with meals).  zolpidem (AMBIEN) 5 mg tablet TAKE ONE TABLET BY MOUTH NIGHTLY AS NEEDED FOR SLEEP  
 amylase-lipase-protease (CREON 37267) 24,000-76,000 -120,000 unit capsule Take 2 Caps by mouth three (3) times daily (with meals). No current facility-administered medications for this visit. Objective:  
 
Vitals:  
 02/08/19 1139 BP: 118/67 Pulse: (!) 104 Resp: 16 Temp: 98.4 °F (36.9 °C) TempSrc: Oral  
SpO2: 96% Weight: 133 lb (60.3 kg) Height: 5' 1\" (1.549 m) Physical Examination: 
General: Alert, cooperative, no distress, appears stated age. Eyes: Conjunctivae clear. Ears: Normal external ear canals both ears. Nose: Nares normal. 
Mouth/Throat: Lips, mucosa, and tongue normal.  
Neck: Supple, symmetrical, trachea midline, no adenopathy. No thyroid enlargement/tenderness/nodules Back: Symmetric, no curvature. Lungs: Clear to auscultation bilaterally. Normal inspiratory and expiratory ratio. Heart: Regular rate and rhythm, S1, S2 normal, no murmur, click, rub or gallop. Abdomen: Soft, non-tender. Extremities: Extremities normal, atraumatic, no cyanosis - 1+ pitting edema to ankle bilateral symmetric Pulses: 2+ and symmetric all extremities. Skin: Skin color, texture, turgor normal. No rashes or lesions on exposed skin. Neurologic: CNII-XII intact. No visits with results within 3 Month(s) from this visit. Latest known visit with results is:  
Office Visit on 10/08/2018 Component Date Value Ref Range Status  WBC 10/08/2018 4.2  3.4 - 10.8 x10E3/uL Final  
 RBC 10/08/2018 3.36* 3.77 - 5.28 x10E6/uL Final  
 HGB 10/08/2018 10.7* 11.1 - 15.9 g/dL Final  
 HCT 10/08/2018 32.9* 34.0 - 46.6 % Final  
 MCV 10/08/2018 98* 79 - 97 fL Final  
 MCH 10/08/2018 31.8  26.6 - 33.0 pg Final  
 MCHC 10/08/2018 32.5  31.5 - 35.7 g/dL Final  
  RDW 10/08/2018 15.7* 12.3 - 15.4 % Final  
 PLATELET 96/65/6347 146  150 - 379 x10E3/uL Final  
 NEUTROPHILS 10/08/2018 47  Not Estab. % Final  
 Lymphocytes 10/08/2018 34  Not Estab. % Final  
 MONOCYTES 10/08/2018 18  Not Estab. % Final  
 EOSINOPHILS 10/08/2018 1  Not Estab. % Final  
 BASOPHILS 10/08/2018 0  Not Estab. % Final  
 ABS. NEUTROPHILS 10/08/2018 2.0  1.4 - 7.0 x10E3/uL Final  
 Abs Lymphocytes 10/08/2018 1.4  0.7 - 3.1 x10E3/uL Final  
 ABS. MONOCYTES 10/08/2018 0.8  0.1 - 0.9 x10E3/uL Final  
 ABS. EOSINOPHILS 10/08/2018 0.0  0.0 - 0.4 x10E3/uL Final  
 ABS. BASOPHILS 10/08/2018 0.0  0.0 - 0.2 x10E3/uL Final  
 IMMATURE GRANULOCYTES 10/08/2018 0  Not Estab. % Final  
 ABS. IMM. GRANS. 10/08/2018 0.0  0.0 - 0.1 x10E3/uL Final  
 Glucose 10/08/2018 87  65 - 99 mg/dL Final  
 Specimen received hemolyzed. Clinical correlation indicated.  BUN 10/08/2018 10  6 - 24 mg/dL Final  
 Creatinine 10/08/2018 0.53* 0.57 - 1.00 mg/dL Final  
 GFR est non-AA 10/08/2018 106  >59 mL/min/1.73 Final  
 GFR est AA 10/08/2018 122  >59 mL/min/1.73 Final  
 BUN/Creatinine ratio 10/08/2018 19  9 - 23 Final  
 Sodium 10/08/2018 141  134 - 144 mmol/L Final  
 Potassium 10/08/2018 3.3* 3.5 - 5.2 mmol/L Final  
 Specimen received hemolyzed. Clinical correlation indicated.  Chloride 10/08/2018 96  96 - 106 mmol/L Final  
 CO2 10/08/2018 26  20 - 29 mmol/L Final  
 Calcium 10/08/2018 8.6* 8.7 - 10.2 mg/dL Final  
 Protein, total 10/08/2018 6.7  6.0 - 8.5 g/dL Final  
 Albumin 10/08/2018 3.9  3.5 - 5.5 g/dL Final  
 GLOBULIN, TOTAL 10/08/2018 2.8  1.5 - 4.5 g/dL Final  
 A-G Ratio 10/08/2018 1.4  1.2 - 2.2 Final  
 Bilirubin, total 10/08/2018 <0.2  0.0 - 1.2 mg/dL Final  
 Alk.  phosphatase 10/08/2018 101  39 - 117 IU/L Final  
 AST (SGOT) 10/08/2018 64* 0 - 40 IU/L Final  
 ALT (SGPT) 10/08/2018 18  0 - 32 IU/L Final  
 
 
 
 
Assessment/ Plan:  
Diagnoses and all orders for this visit: 
 
 1. Dependent edema 2. Foot swelling 3. Chronic alcoholic gastritis with hemorrhage -     pantoprazole (PROTONIX) 40 mg tablet; Take 1 Tab by mouth Daily (before breakfast). For gastritis 4. Sleep difficulties 
-     doxepin (SINEQUAN) 25 mg capsule; Take 1 Cap by mouth nightly as needed. Dependent edema, mild, limited to bilateral ankles. Trial compression stockings, low-salt diet, foot elevation. Chronic gastritis/heartburn stable. Refilled pantoprazole as requested. Sleep concerns likely related to uncontrolled mood disorder. Encouraged counseling, provided list of local counselors. Will provide short refill of sleeping doxepin as requested,   Noting valentin sleep and antidepressant effect, however feel patient would benefit from SSRI treatment which she declines at this time. Follow-up in 1 month for medication review and reconsideration mood management. Educated patient on red flag symptoms to warrant return to clinic or emergency room visit. I have discussed the diagnosis with the patient and the intended plan as seen in the above orders. The patient has been offered or received an after-visit summary and questions were answered concerning future plans. I have discussed medication side effects and warnings with the patient as well. Follow-up Disposition: 
Return in about 4 weeks (around 3/8/2019) for Follow Up with PCP sleep  difficulty. Signed, Rukhsana Stephenson MD 
2/8/2019

## 2019-02-08 NOTE — PROGRESS NOTES
Natty Torres is a 63 y.o. female Chief Complaint Patient presents with  Foot Pain Pt c/o swelling in both feet starting 21 January 2019.   
 
 
1. Have you been to the ER, urgent care clinic since your last visit? Hospitalized since your last visit? No  
 
2. Have you seen or consulted any other health care providers outside of the [address] [place] [address] since your last visit? Include any pap smears or colon screening. No 
 
Health Maintenance Due Topic Date Due  Shingrix Vaccine Age 50> (1 of 2) 02/21/2011  MEDICARE YEARLY EXAM  10/29/2018 Visit Vitals /67 (BP 1 Location: Left arm, BP Patient Position: Sitting) Pulse (!) 104 Temp 98.4 °F (36.9 °C) (Oral) Resp 16 Ht 5' 1\" (1.549 m) Wt 133 lb (60.3 kg) SpO2 96% BMI 25.13 kg/m²

## 2019-02-08 NOTE — PATIENT INSTRUCTIONS
Leg and Ankle Edema: Care Instructions Your Care Instructions Swelling in the legs, ankles, and feet is called edema. It is common after you sit or stand for a while. Long plane flights or car rides often cause swelling in the legs and feet. You may also have swelling if you have to stand for long periods of time at your job. Problems with the veins in the legs (varicose veins) and changes in hormones can also cause swelling. Sometimes the swelling in the ankles and feet is caused by a more serious problem, such as heart failure, infection, blood clots, or liver or kidney disease. Follow-up care is a key part of your treatment and safety. Be sure to make and go to all appointments, and call your doctor if you are having problems. It's also a good idea to know your test results and keep a list of the medicines you take. How can you care for yourself at home? · If your doctor gave you medicine, take it as prescribed. Call your doctor if you think you are having a problem with your medicine. · Whenever you are resting, raise your legs up. Try to keep the swollen area higher than the level of your heart. · Take breaks from standing or sitting in one position. ? Walk around to increase the blood flow in your lower legs. ? Move your feet and ankles often while you stand, or tighten and relax your leg muscles. · Wear support stockings. Put them on in the morning, before swelling gets worse. · Eat a balanced diet. Lose weight if you need to. · Limit the amount of salt (sodium) in your diet. Salt holds fluid in the body and may increase swelling. When should you call for help? Call 911 anytime you think you may need emergency care. For example, call if: 
  · You have symptoms of a blood clot in your lung (called a pulmonary embolism). These may include: 
? Sudden chest pain. ? Trouble breathing. ? Coughing up blood.  
 Call your doctor now or seek immediate medical care if:   · You have signs of a blood clot, such as: 
? Pain in your calf, back of the knee, thigh, or groin. ? Redness and swelling in your leg or groin.  
  · You have symptoms of infection, such as: 
? Increased pain, swelling, warmth, or redness. ? Red streaks or pus. ? A fever.  
 Watch closely for changes in your health, and be sure to contact your doctor if: 
  · Your swelling is getting worse.  
  · You have new or worsening pain in your legs.  
  · You do not get better as expected. Where can you learn more? Go to http://erick-jason.info/. Enter G728 in the search box to learn more about \"Leg and Ankle Edema: Care Instructions. \" Current as of: September 23, 2018 Content Version: 11.9 © 1235-2602 [employer]. Care instructions adapted under license by [employer] (which disclaims liability or warranty for this information). If you have questions about a medical condition or this instruction, always ask your healthcare professional. [place] [address] any warranty or liability for your use of this information. Learning About Using Compression Stockings What are compression stockings? Compression stockings may help ease symptoms and prevent problems caused by things like varicose veins, skin ulcers, and deep vein thrombosis. But there are different types of stockings, and they need to fit right. So your doctor will recommend what you need. These stockings are the most common treatment for varicose veins. They help the blood circulate in your legs. This can prevent skin ulcers and keep blood from building up in the legs. Prescription stockings are tightest at the foot. They get less and less tight farther up on your legs. The kind you buy without a prescription have lighter elastic. So the pressure is even all the way up the leg. These don't cost as much.  But they don't provide the compression you need to treat serious symptoms or prevent skin ulcers. How do you use compression stockings? · If your stockings are new, you may want to wash them before you put them on. This can make them more flexible and easier to put on. It's a good idea to wash them by hand. · Most of the time it's best to put on your stockings early in the morning. This is when you have the least swelling in your legs. · Put silicone lotion (such as ALPS) or talcum powder on your legs to help the stockings slide on. If your stockings contain latex, or you aren't sure if they contain latex, do not use other types of lotions or creams on your legs when you wear the stockings. You may use other lotions or creams when you are not wearing the stockings. · Sit in a chair with a back while you put on the stockings. This gives you something to lean against as you pull them up. · How to put on stockings: ? Turn your stocking inside out. Then put your toe in as far as it will go. 
? Readjust the stocking by folding it back onto itself at the ankle. Then hold both sides of the folded stocking. ? Pull toward your body as far as you can. 
? Fold back the stocking again farther up on your leg. Then pull the stocking up to that point. ? Repeat folding back and pulling until the stocking is in the right place. · You may want to wear rubber gloves. They can help you hold the stockings better. · If your stockings don't have toes, use a silk \"slip sock. \" (You can get one from your medical supplier.) This will help the stocking slide over your foot better. When you're done, pull off the sock through the open toe. · If you still can't get your stockings on, you may want to use a \"stocking cotter. \" This is a metal device that holds the stocking open while you step into it. It is often recommended for people who have problems grasping, leaning, or pulling. Before you buy one, try it first. Some people find them hard to use. What else should you know about compression stockings? · You will probably need to buy a new pair every 4 to 6 months. · They can be uncomfortable if you wear them all day. They are hot and may be hard to put on. · It is important to think about the pros and cons of stockings. You may not like them. But they may help relieve symptoms and prevent future problems. Where can you learn more? Go to http://erick-jason.info/. Enter J166 in the search box to learn more about \"Learning About Using Compression Stockings. \" Current as of: September 26, 2018 Content Version: 11.9 © 0789-5462 [employer]. Care instructions adapted under license by [employer] (which disclaims liability or warranty for this information). If you have questions about a medical condition or this instruction, always ask your healthcare professional. [address] any warranty or liability for your use of this information.

## 2019-04-09 ENCOUNTER — OFFICE VISIT (OUTPATIENT)
Dept: FAMILY MEDICINE CLINIC | Age: 59
End: 2019-04-09

## 2019-04-09 VITALS
WEIGHT: 139.6 LBS | RESPIRATION RATE: 18 BRPM | TEMPERATURE: 97.9 F | BODY MASS INDEX: 26.36 KG/M2 | SYSTOLIC BLOOD PRESSURE: 125 MMHG | DIASTOLIC BLOOD PRESSURE: 75 MMHG | HEIGHT: 61 IN | HEART RATE: 86 BPM | OXYGEN SATURATION: 96 %

## 2019-04-09 DIAGNOSIS — M54.50 LUMBAR BACK PAIN: ICD-10-CM

## 2019-04-09 DIAGNOSIS — I10 BENIGN ESSENTIAL HTN: Primary | ICD-10-CM

## 2019-04-09 DIAGNOSIS — L65.9 HAIR LOSS: ICD-10-CM

## 2019-04-09 DIAGNOSIS — E83.42 HYPOMAGNESEMIA: ICD-10-CM

## 2019-04-09 DIAGNOSIS — E51.9 THIAMIN DEFICIENCY: ICD-10-CM

## 2019-04-09 DIAGNOSIS — D50.8 OTHER IRON DEFICIENCY ANEMIA: ICD-10-CM

## 2019-04-09 DIAGNOSIS — M79.2 PERIPHERAL NEUROPATHIC PAIN: ICD-10-CM

## 2019-04-09 DIAGNOSIS — K29.21 CHRONIC ALCOHOLIC GASTRITIS WITH HEMORRHAGE: ICD-10-CM

## 2019-04-09 DIAGNOSIS — E53.8 FOLIC ACID DEFICIENCY: ICD-10-CM

## 2019-04-09 DIAGNOSIS — F51.01 PRIMARY INSOMNIA: ICD-10-CM

## 2019-04-09 DIAGNOSIS — J43.9 PULMONARY EMPHYSEMA, UNSPECIFIED EMPHYSEMA TYPE (HCC): ICD-10-CM

## 2019-04-09 RX ORDER — PANTOPRAZOLE SODIUM 40 MG/1
40 TABLET, DELAYED RELEASE ORAL
Qty: 30 TAB | Refills: 5 | Status: SHIPPED | OUTPATIENT
Start: 2019-04-09 | End: 2019-11-15 | Stop reason: SDUPTHER

## 2019-04-09 RX ORDER — MAGNESIUM 200 MG
200 TABLET ORAL DAILY
Qty: 30 TAB | Refills: 5 | Status: SHIPPED | OUTPATIENT
Start: 2019-04-09 | End: 2020-06-02 | Stop reason: SDUPTHER

## 2019-04-09 RX ORDER — DOXEPIN HYDROCHLORIDE 25 MG/1
25 CAPSULE ORAL
Qty: 30 CAP | Refills: 5 | Status: SHIPPED | OUTPATIENT
Start: 2019-04-09 | End: 2019-09-09 | Stop reason: SDUPTHER

## 2019-04-09 NOTE — PROGRESS NOTES
[facility] Note    Conrad Thomas is a 63 y.o. female who was seen in clinic today (4/9/2019). Subjective:  Cardiovascular Review:  The patient has hypertension, hyperlipidemia and PVD (Bilateral <50% stenosis of the internal carotid arteries). Diet and Lifestyle: generally follows a low fat low cholesterol diet, generally follows a low sodium diet, smoker 1/2 ppd  Home BP Monitoring: not monitoring home readings     Pertinent ROS: taking medications as instructed, no TIA's, no chest pain on exertion, no dyspnea on exertion. Noting swelling of ankles. Patient has a h/o alcohol abuse and reports significantly decreasing her frequency - last drink was 3 months ago. Patient has h/o COPD and current tobacco use. Mediastinal lymphadenopathy noted on prior CT 1/2018. Currently seen by pulmonology, Dr. Dom Sweeney. Possibly related to sarcoidosis. Bronch biopsy was inconclusive. Pt had EUS and biopsy negative. Follow up CT have been stable. Anemia  Patient has a history of  anemia. It has been present for several years. Associated signs & symptoms: feeling cold. Stable at present, not currently taking supplement. Colonoscopy in 2013 was normal. Denies abnormal uterine bleeding but reports h/o uterine fibroids. Patient seen by hematology in 11/2018. Back Pain  Patient presents for evaluation of low back problems. Symptoms have been present for several years and include pain in lumbar back (aching, burning in character; 8/10 in severity). Alleviating factors identifiable by patient are sitting, recumbency. Exacerbating factors identifiable by patient are standing, walking, bending forwards. Previous lower back problems: scoliosis . Previous workup: lumbar X-ray. Prior to Admission medications    Medication Sig Start Date End Date Taking? Authorizing Provider   pantoprazole (PROTONIX) 40 mg tablet Take 1 Tab by mouth Daily (before breakfast).  For gastritis 4/9/19  Yes Musa Hall NP   doxepin (SINEQUAN) 25 mg capsule Take 1 Cap by mouth nightly as needed (sleep). 4/9/19  Yes Musa Hall NP   magnesium 200 mg tab Take 200 mg by mouth daily. 4/9/19  Yes Stefany Meek NP   buPROPion XL (WELLBUTRIN XL) 150 mg tablet TAKE ONE TABLET BY MOUTH EVERY MORNING FOR SMOKING CESSATION 1/21/19  Yes Stefany Meek NP   ferrous sulfate 325 mg (65 mg iron) tablet TAKE ONE TABLET BY MOUTH DAILY WITH BREAKFAST 1/21/19  Yes Stefany Meek NP   potassium chloride (KLOR-CON) 20 mEq packet Take 1 Packet by mouth daily. 10/8/18  Yes Santos Stuart NP   folic acid (FOLVITE) 1 mg tablet Take 1 Tab by mouth daily. 10/2/18  Yes Stefany Meek NP   thiamine HCL (B-1) 100 mg tablet Take 1 Tab by mouth daily. Indications: THIAMINE DEFICIENCY 10/8/18   Israel Reddy NP   calcium-vitamin D (CALCIUM 600 + D,3,) 600 mg(1,500mg) -200 unit tab Take 1 Tab by mouth two (2) times daily (with meals). 6/26/18   Musa Hall NP   amylase-lipase-protease (CREON 94497) 24,000-76,000 -120,000 unit capsule Take 2 Caps by mouth three (3) times daily (with meals). 3/2/18   Devon Vega MD   aspirin 81 mg chewable tablet CHEW ONE TABLET BY MOUTH DAILY 9/15/17   Dhaval Cartwright MD          Allergies   Allergen Reactions    Robaxin [Methocarbamol] Hives, Rash and Itching     Red splotches    Statins-Hmg-Coa Reductase Inhibitors Myalgia    Codeine Itching    Neosporin [Neomycin-Bacitracin-Polymyxin] Rash           ROS  See HPI    Objective:   Physical Exam   Constitutional: She is oriented to person, place, and time. She appears well-developed and well-nourished. Neck: Normal range of motion. Neck supple. No JVD present. Carotid bruit is not present. No thyromegaly present. Cardiovascular: Normal rate, regular rhythm and intact distal pulses. Exam reveals no gallop and no friction rub. No murmur heard.   Pulmonary/Chest: Effort normal and breath sounds normal. No respiratory distress. Musculoskeletal: She exhibits edema (trace pitting edema of ankles). Lumbar back: She exhibits decreased range of motion (with flexion) and tenderness (paravertebral muscles). She exhibits no spasm. Scoliosis noted. Negative SLR bilaterally. Leg strength equal bilaterally 5/5 with extension and flexion. Lymphadenopathy:     She has no cervical adenopathy. Neurological: She is alert and oriented to person, place, and time. Gait normal.   Psychiatric: She has a normal mood and affect. Her behavior is normal.   Nursing note and vitals reviewed. Visit Vitals  /75 (BP 1 Location: Left arm, BP Patient Position: Sitting)   Pulse 86   Temp 97.9 °F (36.6 °C) (Oral)   Resp 18   Ht 5' 1\" (1.549 m)   Wt 139 lb 9.6 oz (63.3 kg)   SpO2 96%   BMI 26.38 kg/m²       Assessment & Plan:  Diagnoses and all orders for this visit:    1. Benign essential HTN  Well controlled, no medication changes.  -     METABOLIC PANEL, COMPREHENSIVE  -     LIPID PANEL  -     TSH AND FREE T4    2. Folic acid deficiency  Recheck labs. -     ANEMIA PROFILE B    3. Thiamin deficiency  Recheck labs. -     VITAMIN B1, WHOLE BLOOD    4. Other iron deficiency anemia  Recheck labs. Seen by hematolgoy.   -     ANEMIA PROFILE B    5. Lumbar back pain  X-ray today. Referral to orthopedics for continued symptoms. -     XR SPINE LUMB 2 OR 3 V; Future    6. Pulmonary emphysema, unspecified emphysema type ([address]  Stable, managed by pulmonology. 7. Peripheral neuropathic pain  Consider B12 deficiency vs diabetes. Add Gabapentin as needed. -     VITAMIN D, 25 HYDROXY    8. Primary insomnia  Stable, no changes to current therapy  -     Refill doxepin (SINEQUAN) 25 mg capsule; Take 1 Cap by mouth nightly as needed (sleep). 9. Hair loss  Likely s/t vitamin deficiency. -     REFERRAL TO DERMATOLOGY    10.  Chronic alcoholic gastritis with hemorrhage  -     Refill pantoprazole (PROTONIX) 40 mg tablet; Take 1 Tab by mouth Daily (before breakfast). For gastritis    11. Hypomagnesemia  -     magnesium 200 mg tab; Take 200 mg by mouth daily. -     MAGNESIUM      I have discussed the diagnosis with the patient and the intended plan as seen in the above orders. The patient has received an after-visit summary along with patient information handout. I have discussed medication side effects and warnings with the patient as well. Follow-up and Dispositions    · Return in about 6 months (around 10/9/2019) for disease management.            James Jiménez NP

## 2019-04-09 NOTE — PATIENT INSTRUCTIONS

## 2019-04-09 NOTE — PROGRESS NOTES
Chief Complaint   Patient presents with    Swelling     c/o cont. edema in feet, had some in legs.  Sleep Problem     not sleeping well still using Doxepin , helps little       Reviewed Record in preparation for visit and have obtained necessary documentation. Identified pt with two pt identifiers (Name @ )    Health Maintenance Due   Topic    Pneumococcal 0-64 years (1 of 1 - PPSV23)    Shingrix Vaccine Age 50> (1 of 2)    MEDICARE YEARLY EXAM     PAP AKA CERVICAL CYTOLOGY          1. Have you been to the ER, urgent care clinic since your last visit? Hospitalized since your last visit? no    2. Have you seen or consulted any other health care providers outside of the [address] [place] [address] since your last visit? Include any pap smears or colon screening.  no

## 2019-04-11 DIAGNOSIS — E55.9 VITAMIN D INSUFFICIENCY: ICD-10-CM

## 2019-04-11 DIAGNOSIS — R79.89 ELEVATED LIVER FUNCTION TESTS: Primary | ICD-10-CM

## 2019-04-11 LAB
25(OH)D3+25(OH)D2 SERPL-MCNC: 10.2 NG/ML (ref 30–100)
ALBUMIN SERPL-MCNC: 3.5 G/DL (ref 3.5–5.5)
ALBUMIN/GLOB SERPL: 0.9 {RATIO} (ref 1.2–2.2)
ALP SERPL-CCNC: 230 IU/L (ref 39–117)
ALT SERPL-CCNC: 34 IU/L (ref 0–32)
AST SERPL-CCNC: 102 IU/L (ref 0–40)
BASOPHILS # BLD AUTO: 0 X10E3/UL (ref 0–0.2)
BASOPHILS NFR BLD AUTO: 0 %
BILIRUB SERPL-MCNC: 0.8 MG/DL (ref 0–1.2)
BUN SERPL-MCNC: 9 MG/DL (ref 6–24)
BUN/CREAT SERPL: 16 (ref 9–23)
CALCIUM SERPL-MCNC: 8.2 MG/DL (ref 8.7–10.2)
CHLORIDE SERPL-SCNC: 104 MMOL/L (ref 96–106)
CHOLEST SERPL-MCNC: 237 MG/DL (ref 100–199)
CO2 SERPL-SCNC: 27 MMOL/L (ref 20–29)
CREAT SERPL-MCNC: 0.58 MG/DL (ref 0.57–1)
EOSINOPHIL # BLD AUTO: 0.1 X10E3/UL (ref 0–0.4)
EOSINOPHIL NFR BLD AUTO: 1 %
ERYTHROCYTE [DISTWIDTH] IN BLOOD BY AUTOMATED COUNT: 17.4 % (ref 12.3–15.4)
FERRITIN SERPL-MCNC: 2834 NG/ML (ref 15–150)
FOLATE SERPL-MCNC: 5.9 NG/ML
GLOBULIN SER CALC-MCNC: 3.9 G/DL (ref 1.5–4.5)
GLUCOSE SERPL-MCNC: 101 MG/DL (ref 65–99)
HCT VFR BLD AUTO: 32.6 % (ref 34–46.6)
HDLC SERPL-MCNC: 36 MG/DL
HGB BLD-MCNC: 10.6 G/DL (ref 11.1–15.9)
IMM GRANULOCYTES # BLD AUTO: 0 X10E3/UL (ref 0–0.1)
IMM GRANULOCYTES NFR BLD AUTO: 0 %
INTERPRETATION, 910389: NORMAL
IRON SATN MFR SERPL: >91 % (ref 15–55)
IRON SERPL-MCNC: 162 UG/DL (ref 27–159)
LDLC SERPL CALC-MCNC: 141 MG/DL (ref 0–99)
LYMPHOCYTES # BLD AUTO: 1.9 X10E3/UL (ref 0.7–3.1)
LYMPHOCYTES NFR BLD AUTO: 40 %
MAGNESIUM SERPL-MCNC: 1.6 MG/DL (ref 1.6–2.3)
MCH RBC QN AUTO: 34.4 PG (ref 26.6–33)
MCHC RBC AUTO-ENTMCNC: 32.5 G/DL (ref 31.5–35.7)
MCV RBC AUTO: 106 FL (ref 79–97)
MONOCYTES # BLD AUTO: 0.5 X10E3/UL (ref 0.1–0.9)
MONOCYTES NFR BLD AUTO: 11 %
NEUTROPHILS # BLD AUTO: 2.3 X10E3/UL (ref 1.4–7)
NEUTROPHILS NFR BLD AUTO: 48 %
PLATELET # BLD AUTO: 108 X10E3/UL (ref 150–379)
POTASSIUM SERPL-SCNC: 3.9 MMOL/L (ref 3.5–5.2)
PROT SERPL-MCNC: 7.4 G/DL (ref 6–8.5)
RBC # BLD AUTO: 3.08 X10E6/UL (ref 3.77–5.28)
RETICS/RBC NFR AUTO: 3.9 % (ref 0.6–2.6)
SODIUM SERPL-SCNC: 145 MMOL/L (ref 134–144)
T4 FREE SERPL-MCNC: 0.9 NG/DL (ref 0.82–1.77)
TIBC SERPL-MCNC: <179 UG/DL (ref 250–450)
TRIGL SERPL-MCNC: 300 MG/DL (ref 0–149)
TSH SERPL DL<=0.005 MIU/L-ACNC: 1.59 UIU/ML (ref 0.45–4.5)
UIBC SERPL-MCNC: <17 UG/DL (ref 131–425)
VIT B1 BLD-SCNC: 157 NMOL/L (ref 66.5–200)
VIT B12 SERPL-MCNC: 397 PG/ML (ref 232–1245)
VLDLC SERPL CALC-MCNC: 60 MG/DL (ref 5–40)
WBC # BLD AUTO: 4.8 X10E3/UL (ref 3.4–10.8)

## 2019-04-11 RX ORDER — ERGOCALCIFEROL 1.25 MG/1
50000 CAPSULE ORAL
Qty: 12 CAP | Refills: 1 | Status: SHIPPED | OUTPATIENT
Start: 2019-04-11 | End: 2020-06-02 | Stop reason: SDUPTHER

## 2019-04-11 NOTE — PROGRESS NOTES
Patient's labs shows she is in iron overload. Please tell her to stop any iron supplements or multivitamins containing iron. Follow up with hematology needed. Liver function also elevated. Evalution needed by gastroenterology. Referral placed to see Dr. Trevor Jauregui. Remind patient to avoid all alcohol use. Vitamin D level was low. I recommend an supplement of vitamin D2 50,000 units once weekly.

## 2019-04-12 ENCOUNTER — TELEPHONE (OUTPATIENT)
Dept: FAMILY MEDICINE CLINIC | Age: 59
End: 2019-04-12

## 2019-04-12 DIAGNOSIS — E83.19 IRON OVERLOAD: ICD-10-CM

## 2019-04-12 DIAGNOSIS — R74.8 ELEVATED LIVER ENZYMES: ICD-10-CM

## 2019-04-12 DIAGNOSIS — R59.0 HILAR ADENOPATHY: Primary | ICD-10-CM

## 2019-04-12 NOTE — TELEPHONE ENCOUNTER
Pt. is calling requesting a call back regarding to a phone call she received from [employer] telling her to have a CT Scan done again. Pt. Said she already had two so far ordered by [facility]. Pt. Wants to know why they want her to have a third one done.      Best call #[phone]

## 2019-04-12 NOTE — PROGRESS NOTES
[phone] ([place]) spoke to patient notified that it was ordered by ELIZABETH Meek because it was recommended by her hematology patient understand   [phone] Dr. Rosario Hedrick

## 2019-04-12 NOTE — TELEPHONE ENCOUNTER
[phone] ([place]) spoke to patient notified that it was ordered by NP Vladislav Norris because it was recommended by her hematology patient understand

## 2019-04-12 NOTE — PROGRESS NOTES
[phone] (home) spoke to patient notified of her lab results and understand  Gave her Dr Ifeanyi Tomlinson information and she will call him to make appointment

## 2019-06-09 NOTE — PROGRESS NOTES
02/26/18 0908   Vital Signs   Temp 97.9 °F (36.6 °C)   Temp Source Oral   Pulse (Heart Rate) (!) 114   Heart Rate Source Monitor   Resp Rate 16   O2 Sat (%) 97 %   Level of Consciousness Alert   /80   MAP (Calculated) 93   MEWS Score 3   Pain 1   Pain Scale 1 Numeric (0 - 10)   Pain Intensity 1 0   Patient Stated Pain Goal 0   POSS Scale   Opioid Sedation Scale 1   Oxygen Therapy   O2 Device Room air Statement Selected

## 2019-06-12 NOTE — PROGRESS NOTES
[employer]  Medical Oncology at [address] [place] [address]  [phone]    Hematology / Oncology Follow-up         Raul Meza is a 65 y.o. 1960 female and presents with Abnormal Lab Results    CC  abnormal CTs    HPI  Pt seen today in hospital consult at request of hospitalist for abnormal CTs. CTs show mediastinal LAD, liver lesion, diffuse colitis/ bowel changes, bone lesions/ ovary cyst/ fibroids. Pt is a poor historian today. C/o diarrhea. Smoker but no lung mass on CT. Ms. Jacob Castillo presented to the Emergency Department complaining of weakness: for the past 3 months, now severe, steadily progressive, associated with weight loss. She was seen by her PCP and told to come to the ED as her potassium was 1.6      Interval History:   States feeling a little better after transfusion. Denies any pain at present. Does not have much of an appetite. Reports an approx 40 # weight loss over the last year. Reports occasional diarrhea. Smokes < 1/2 PPD x 30 + years. Last colonoscopy approx 10 years ago. No family at bedside with initial conversation. Later daughter, Curry arrived and reviewed new reading of CT scan and new plan to further eval. Verbalized understanding and all questions answered at this time.        Past Medical History:   Diagnosis Date    Anemia 4/04    Anemia NEC     Insomnia 11/09    PPD positive     treated w/ INH- tests since have been negative    Pure hypercholesterolemia     Scoliosis 12/28/15    [facility]    Tobacco dependence     Unspecified essential hypertension     Uterine bleeding, dysfunctional 2/12    Uterine fibroid      Past Surgical History:   Procedure Laterality Date    HX CHOLECYSTECTOMY      HX COLONOSCOPY  07/01/2013    MCV, repeat 10 years    HX ORTHOPAEDIC      Shattered bilateral ankles-metal plates & screws    HX OTHER SURGICAL      Benign cyst removal- L. foot    LEG/ANKLE SURGERY PROC UNLISTED Social History     Social History    Marital status:      Spouse name: N/A    Number of children: N/A    Years of education: N/A     Occupational History    part time      Social History Main Topics    Smoking status: Current Every Day Smoker     Packs/day: 0.50     Years: 17.00    Smokeless tobacco: Never Used    Alcohol use 1.2 oz/week     1 Cans of beer, 1 Shots of liquor per week      Comment: Socially    Drug use: No    Sexual activity: Yes     Partners: Male     Birth control/ protection: None     Other Topics Concern    Not on file     Social History Narrative     Family History   Problem Relation Age of Onset    Hypertension Mother     Diabetes Mother     Cancer Mother      breast    Heart Disease Father      MI 42's    Kidney Disease Father     Hypertension Son        Current Facility-Administered Medications   Medication Dose Route Frequency Provider Last Rate Last Dose    potassium chloride SR (KLOR-CON 10) tablet 40 mEq  40 mEq Oral Q4H Ioana Richardson MD   40 mEq at 01/29/18 0927    dextrose 5% - 0.9% NaCl with KCl 20 mEq/L infusion  100 mL/hr IntraVENous CONTINUOUS Ioana Richardson  mL/hr at 01/29/18 0928 100 mL/hr at 01/29/18 0928    0.9% sodium chloride infusion 250 mL  250 mL IntraVENous PRN Ioana Richardson MD        metroNIDAZOLE (FLAGYL) IVPB premix 500 mg  500 mg IntraVENous Q8H Ioana Richardson  mL/hr at 01/29/18 0927 500 mg at 01/29/18 0927    levoFLOXacin (LEVAQUIN) 750 mg in D5W IVPB  750 mg IntraVENous Q24H Ioana Richardson  mL/hr at 01/29/18 0927 750 mg at 01/29/18 0927    sodium chloride (NS) flush 5-10 mL  5-10 mL IntraVENous Q8H Raul Stack MD   Stopped at 01/28/18 1400    sodium chloride (NS) flush 5-10 mL  5-10 mL IntraVENous PRN Raul Stack MD        acetaminophen (TYLENOL) tablet 650 mg  650 mg Oral Q4H PRN Raul Stack MD        oxyCODONE-acetaminophen (PERCOCET) 5-325 mg per tablet 1 Tab  1 Tab Oral Q4H PRN Mari Fritz Medhat Blood MD   1 Tab at 01/28/18 2208    HYDROmorphone (DILAUDID) injection 0.5 mg  0.5 mg IntraVENous Q4H PRN Fidel Aase, MD   0.5 mg at 01/27/18 1130    prochlorperazine (COMPAZINE) injection 10 mg  10 mg IntraVENous Q6H PRN Fidel Aase, MD   10 mg at 01/27/18 1116    zolpidem (AMBIEN) tablet 5 mg  5 mg Oral QHS PRN Fidel Aase, MD        enoxaparin (LOVENOX) injection 30 mg  30 mg SubCUTAneous DAILY Fidel Aase, MD   30 mg at 01/28/18 2207    diphenhydrAMINE (BENADRYL) capsule 25 mg  25 mg Oral Q6H PRN Fidel Aase, MD   25 mg at 01/27/18 2349    buPROPion XL (WELLBUTRIN XL) tablet 150 mg  150 mg Oral 7am Fidel Aase, MD   150 mg at 01/29/18 0630    magnesium oxide (MAG-OX) tablet 400 mg  400 mg Oral BID Fidel Aase, MD   400 mg at 01/29/18 7510    Thiamine Mononitrate (B-1) tablet 100 mg  100 mg Oral DAILY Fidel Aase, MD   100 mg at 01/29/18 7233    ezetimibe (ZETIA) tablet 10 mg  10 mg Oral DAILY Fidel Aase, MD   10 mg at 98/16/20 0606    folic acid (FOLVITE) tablet 1 mg  1 mg Oral DAILY Fidel Aase, MD   1 mg at 01/29/18 9043       Allergies   Allergen Reactions    Robaxin [Methocarbamol] Hives, Rash and Itching     Red splotches    Statins-Hmg-Coa Reductase Inhibitors Myalgia    Codeine Itching    Neosporin [Neomycin-Bacitracin-Polymyxin] Rash       Review of Systems    A comprehensive review of systems was negative except for: per HPI     Objective:  Visit Vitals    /79 (BP 1 Location: Right arm, BP Patient Position: At rest)    Pulse 95    Temp 98.6 °F (37 °C)    Resp 24    Ht 5' 1\" (1.549 m)    Wt 44.9 kg (98 lb 15.8 oz)    SpO2 99%    BMI 18.7 kg/m2       Physical Exam:   General: No distress  Eyes: PERRLA, anicteric sclerae  HENT: Atraumatic, OP clear  Neck: Supple  Lymphatic: No cervical, supraclavicular, or inguinal adenopathy  Respiratory: diminished bases, normal respiratory effort  CV: Normal rate, regular rhythm, no murmurs, left arm ecchymosis and edema noted  GI: Soft, nontender, nondistended, no masses, no hepatomegaly, no splenomegaly  Skin: No rashes, ecchymoses, or petechiae. Normal temperature, turgor, and texture. Psych: Alert, oriented, appropriate affect, normal judgment/insight    Diagnostic Imaging   reviewed  Results for orders placed during the hospital encounter of 09/03/15   XR CHEST PA LAT    Narrative **Final Report**       ICD Codes / Adm. Diagnosis: V70.0   / Routine general medical examin    Examination:  CR CHEST PA AND LATERAL  - [id number] - Sep  3 2015 11:25AM  Accession No:  [id number]  Reason:  smoker screener      REPORT:  INDICATION: Routine general medical examin . smoker screener  COMPARISON: Previous chest xray, 2/7/2012. Sonny Jorge FINDINGS: PA and lateral view of the chest.   .  Lines/tubes/surgical: None. Heart/mediastinum: Calcifications in the aortic arch. Lungs/pleura:  No focal consolidation or mass. No visualized pleural   effusion or pneumothorax. Additional Comments: Surgical clips in the right upper quadrant suggesting   previous cholecystectomy. Degenerative changes in the spine. .      IMPRESSION:  1. No radiographic evidence of acute cardiopulmonary disease. 2. Atherosclerosis. Signing/Reading Doctor: Alex Topete ([id number])    Teodoro Hill ([id number])  Sep  3 2015  3:46PM                                   Results for orders placed during the hospital encounter of 01/26/18   CT CHEST ABD PELV W CONT    Narrative EXAM:  CT CHEST ABD PELV W CONT    INDICATION: weakness, back pain, weight loss, anorexia, low magnesium level;  concerned for malignancy     COMPARISON: None    CONTRAST:  95 mL of Isovue-370. TECHNIQUE:   Following the uneventful intravenous administration of contrast, thin axial  images were obtained through the chest, abdomen and pelvis. Coronal and sagittal  reconstructions were generated. Oral contrast was not administered.  CT dose  reduction was achieved through use of a standardized protocol tailored for this  examination and automatic exposure control for dose modulation. FINDINGS:     THYROID: Mild heterogeneity. MEDIASTINUM: Right paratracheal node measures 10.5 x 16.9 mm (image 7). Additional right paratracheal node measures 2.3 x 1.2 cm (image 14) and 1.6 x  1.5 cm (image 17). Prevascular nodes measure 1.0 x 1.3 cm and 1.8 x 1.1 cm  (image 18). Precarinal nodes measure 2.1 x 1.3 cm and 1.6 x 1.2 cm (image 20). Right hilar node measures 1.8 x 2.3 cm (image 24). Second right infrahilar node  measures 1.3 x 1.0 cm (image 30). Subcarinal node measures 1.7 x 3.3 cm (image  26). Left infrahilar node measures 1.3 x 1.2 cm (image 26). THORACIC AORTA: No dissection or aneurysm. MAIN PULMONARY ARTERY: Normal in caliber. TRACHEA/BRONCHI: Patent. ESOPHAGUS: No wall thickening or dilatation. HEART: Normal in size. Small pericardial effusion. PLEURA: No effusion or pneumothorax. LUNGS: No nodule, mass, or airspace disease. LIVER: No biliary dilatation. Diffuse hypodensity. Part low-density partly  enhancing 2.8 x 2.3 x 3 point for cm mass (axial image 62 and coronal image 25). GALLBLADDER: Unremarkable. SPLEEN: No mass. PANCREAS: No mass or ductal dilatation. ADRENALS: Unremarkable. KIDNEYS: No mass, calculus, or hydronephrosis. STOMACH: Unremarkable. SMALL BOWEL: No dilatation or wall thickening. COLON: Marked circumferential thickening of a 14 cm segment of sigmoid (axial  image 89). Marked circumferential thickening of the ascending colon and  transverse colon. APPENDIX: Not well seen. PERITONEUM: Free fluid in the right hemipelvis with a density measurement of 12. RETROPERITONEUM: No lymphadenopathy or aortic aneurysm. REPRODUCTIVE ORGANS: Multiple fibroids, some of which are calcified. Possible  right variant of 5.3 x 2.1 x 4.7 cm cystic structure (axial image 95 and coronal  image 73 with a Hounsfield unit measurement of 521).   URINARY BLADDER: No mass or calculus. BONES: Sclerotic T2 lesion (sagittal image 80). Sclerotic appearing 1 cm upper  sacral lesion (sagittal image 87). ADDITIONAL COMMENTS: N/A      Impression IMPRESSION:    Mediastinal and hilar adenopathy, liver lesion, and sclerotic osseous lesions,  compatible with metastatic disease  Possible right ovarian cystic mass for which pelvic ultrasound is recommended. Marked emphysema  Fibroid uterus  Colitis of the ascending colon, transverse colon and sigmoid colon. Trace pericardial effusion and small amount of free fluid in the right pelvis,  nonspecific       Lab Results  reviewed  Lab Results   Component Value Date/Time    WBC 6.6 01/28/2018 01:54 AM    HGB 8.5 01/28/2018 01:54 AM    HCT 27.3 01/28/2018 01:54 AM    PLATELET 421 94/97/9981 01:54 AM    MCV 87.5 01/28/2018 01:54 AM       Lab Results   Component Value Date/Time    Sodium 148 01/29/2018 06:10 AM    Potassium 3.0 01/29/2018 06:10 AM    Chloride 119 01/29/2018 06:10 AM    CO2 20 01/29/2018 06:10 AM    Anion gap 9 01/29/2018 06:10 AM    Glucose 120 01/29/2018 06:10 AM    BUN 1 01/29/2018 06:10 AM    Creatinine 0.63 01/29/2018 06:10 AM    BUN/Creatinine ratio 2 01/29/2018 06:10 AM    GFR est AA >60 01/29/2018 06:10 AM    GFR est non-AA >60 01/29/2018 06:10 AM    Calcium 6.9 01/29/2018 06:10 AM    AST (SGOT) 61 01/26/2018 01:02 PM    Alk. phosphatase 131 01/26/2018 01:02 PM    Protein, total 5.7 01/26/2018 01:02 PM    Albumin 2.1 01/26/2018 01:02 PM    Globulin 3.6 01/26/2018 01:02 PM    A-G Ratio 0.6 01/26/2018 01:02 PM    ALT (SGPT) 29 01/26/2018 01:02 PM       .    Assessment/Plan:    1. Abnormal CT scan  Mediastinal LAD, liver lesion, diffuse colitis/ diarrhea, bone lesions with no tissue dx of cancer  GI consult: concern for colitis ;holding on eval due to hypokalemia; possible colonoscopy on Wed.      Scheduled for Liver bx for today; radiologist reviewed scan; liver appears normal; adenopathy in chest nodules calcified ? Sarcoid vs old TB; will consult pulmonary to eval for bx of adenopathy  Eval of bone lesions with bone scan once pulmonary has weighed in  Addendum to CT scan to be issued. Reviewed with Dr Erika Godinez with Dr Jamarcus Valencia  Discussed above findings with patient and daughter, Curry. 2  Anemia , normocytic (s/p 2 units PRBC)  Unclear etiology  Responded to transfusion  Possibly secondary to  Colitis vs  chronic disease. Continue to monitor and transfuse for Hgb < 7    3. Diarrhea due to colitis. GI to eval possibly Wed    4.  Hypokalemia  Improving   Receiving repletion      Plan reviewed with Dr Romie Maldonado (3) no apparent problem

## 2019-06-13 DIAGNOSIS — E53.8 FOLIC ACID DEFICIENCY: ICD-10-CM

## 2019-06-13 RX ORDER — FOLIC ACID 1 MG/1
TABLET ORAL
Qty: 30 TAB | Refills: 4 | Status: SHIPPED | OUTPATIENT
Start: 2019-06-13 | End: 2020-04-22 | Stop reason: SDUPTHER

## 2019-06-26 DIAGNOSIS — E51.9 THIAMIN DEFICIENCY: ICD-10-CM

## 2019-06-28 RX ORDER — ASPIRIN 325 MG/1
TABLET, FILM COATED ORAL
Qty: 30 TAB | Refills: 4 | Status: SHIPPED | OUTPATIENT
Start: 2019-06-28 | End: 2019-11-15 | Stop reason: SDUPTHER

## 2019-08-12 ENCOUNTER — OFFICE VISIT (OUTPATIENT)
Dept: ONCOLOGY | Age: 59
End: 2019-08-12

## 2019-08-12 VITALS
WEIGHT: 151.5 LBS | BODY MASS INDEX: 28.6 KG/M2 | DIASTOLIC BLOOD PRESSURE: 85 MMHG | TEMPERATURE: 98.2 F | SYSTOLIC BLOOD PRESSURE: 133 MMHG | OXYGEN SATURATION: 96 % | HEIGHT: 61 IN | HEART RATE: 98 BPM

## 2019-08-12 DIAGNOSIS — D64.9 ANEMIA, UNSPECIFIED TYPE: ICD-10-CM

## 2019-08-12 DIAGNOSIS — R59.0 MEDIASTINAL LYMPHADENOPATHY: Primary | ICD-10-CM

## 2019-08-12 DIAGNOSIS — F17.200 SMOKER: ICD-10-CM

## 2019-08-12 NOTE — PROGRESS NOTES
Lamin Brown is a 63 y.o. female  Chief Complaint   Patient presents with    Follow-up     Anemia     1. Have you been to the ER, urgent care clinic since your last visit? Hospitalized since your last visit? No.    2. Have you seen or consulted any other health care providers outside of the [address] [place] [address] since your last visit? Include any pap smears or colon screening. Yes, just her PCP.

## 2019-08-12 NOTE — PROGRESS NOTES
[address]  Medical Oncology at [facility]    Reason for Visit:   Reyes Derry is a 63 y.o. female who is seen today for f/u of anemia  and mediastinal LNs. History of Present Illness: This is a 63 y.o. female seen today for office f/u for anemia and mediastinal LNs. She had a biopsy 1/18 which was negative. Her repeat CT 4/19 was stable. She reports she is doing well today. She sees Dr. Alphonse Briseno with [facility] and has an appointment in September. She reports a 10-15 lb weight gain. She denies peripheral edema. She is trying to cut back on smoking. She has talked to her Dr. Alphonse Briseno about starting Chantix if she is unable to stop smoking on her own. She has used Chantix in the past and it worked for her. She has no new complaints today. Past Medical History:   Diagnosis Date    Alcohol abuse     GI (gastrointestinal bleed)     Insomnia 11/09    Iron deficiency anemia 04/2004    PPD positive     treated w/ INH- tests since have been negative    Pure hypercholesterolemia     Scoliosis 12/28/15    [facility]    Tobacco dependence     Unspecified essential hypertension     Uterine bleeding, dysfunctional 2/12    Uterine fibroid       Past Surgical History:   Procedure Laterality Date    COLONOSCOPY N/A 1/31/2018    COLONOSCOPY performed by Edilberto Herrera MD at [facility] HX CHOLECYSTECTOMY      HX COLONOSCOPY  07/01/2013    [facility], repeat 10 years    HX ORTHOPAEDIC      Shattered bilateral ankles-metal plates & screws    HX OTHER SURGICAL      Benign cyst removal- L. foot    LEG/ANKLE SURGERY PROC UNLISTED        Social History     Tobacco Use    Smoking status: Current Every Day Smoker     Packs/day: 0.50     Years: 17.00     Pack years: 8.50    Smokeless tobacco: Never Used   Substance Use Topics    Alcohol use:  Yes     Alcohol/week: 2.0 standard drinks     Types: 1 Cans of beer, 1 Shots of liquor per week Comment: Socially      Family History   Problem Relation Age of Onset    Hypertension Mother     Diabetes Mother     Cancer Mother         breast    Heart Disease Father         MI 42's    Kidney Disease Father     Hypertension Son      Current Outpatient Medications   Medication Sig    thiamine mononitrate (B-1) 100 mg tablet TAKE ONE TABLET BY MOUTH DAILY    folic acid (FOLVITE) 1 mg tablet TAKE ONE TABLET BY MOUTH DAILY    ergocalciferol (ERGOCALCIFEROL) 50,000 unit capsule Take 1 Cap by mouth every seven (7) days.  pantoprazole (PROTONIX) 40 mg tablet Take 1 Tab by mouth Daily (before breakfast). For gastritis    doxepin (SINEQUAN) 25 mg capsule Take 1 Cap by mouth nightly as needed (sleep).  magnesium 200 mg tab Take 200 mg by mouth daily.  buPROPion XL (WELLBUTRIN XL) 150 mg tablet TAKE ONE TABLET BY MOUTH EVERY MORNING FOR SMOKING CESSATION    ferrous sulfate 325 mg (65 mg iron) tablet TAKE ONE TABLET BY MOUTH DAILY WITH BREAKFAST    potassium chloride (KLOR-CON) 20 mEq packet Take 1 Packet by mouth daily.  amylase-lipase-protease (CREON 74866) 24,000-76,000 -120,000 unit capsule Take 2 Caps by mouth three (3) times daily (with meals).  aspirin 81 mg chewable tablet CHEW ONE TABLET BY MOUTH DAILY     No current facility-administered medications for this visit. Allergies   Allergen Reactions    Robaxin [Methocarbamol] Hives, Rash and Itching     Red splotches    Statins-Hmg-Coa Reductase Inhibitors Myalgia    Codeine Itching    Neosporin [Neomycin-Bacitracin-Polymyxin] Rash     Review of Systems: A complete review of systems was obtained, negative except as described above.     Physical Exam:     Visit Vitals  /85 (BP 1 Location: Right arm, BP Patient Position: Sitting)   Pulse 98   Temp 98.2 °F (36.8 °C) (Oral)   Ht 5' 1\" (1.549 m)   Wt 151 lb 8 oz (68.7 kg)   SpO2 96%   BMI 28.63 kg/m²     ECOG PS: 1  General: No acute distress, alert, pleasant  Eyes:  Conjugate gaze, anicteric sclerae  HENT: Atraumatic   Neck: Supple  Respiratory: Clear with decreased bs in bases b/l  CV:  Regular rhythm. No peripheral edema  GI: Soft, nontender, nondistended  MS: Normal gait and station  Skin: Warm and dry  Neuro/Psych: Altered, conversant. Generalized weakness. With neuropathy. Results:     Lab Results   Component Value Date/Time    WBC 4.8 04/09/2019 09:42 AM    HGB 10.6 (L) 04/09/2019 09:42 AM    HCT 32.6 (L) 04/09/2019 09:42 AM    PLATELET 939 (L) 27/44/7823 09:42 AM     (H) 04/09/2019 09:42 AM    ABS. NEUTROPHILS 2.3 04/09/2019 09:42 AM    Hemoglobin (POC) 4.9 02/16/2018 02:46 PM    Hemoglobin (POC) 11.2 (L) 01/26/2018 04:08 PM    Hematocrit (POC) 33 (L) 01/26/2018 04:08 PM     Lab Results   Component Value Date/Time    Sodium 145 (H) 04/09/2019 09:42 AM    Potassium 3.9 04/09/2019 09:42 AM    Chloride 104 04/09/2019 09:42 AM    CO2 27 04/09/2019 09:42 AM    Glucose 101 (H) 04/09/2019 09:42 AM    BUN 9 04/09/2019 09:42 AM    Creatinine 0.58 04/09/2019 09:42 AM    GFR est  04/09/2019 09:42 AM    GFR est non- 04/09/2019 09:42 AM    Calcium 8.2 (L) 04/09/2019 09:42 AM    Sodium (POC) 144 01/26/2018 04:08 PM    Potassium (POC) <2.0 (LL) 01/26/2018 04:08 PM    Chloride (POC) 98 01/26/2018 04:08 PM    Glucose (POC) 111 (H) 02/26/2018 11:55 AM    BUN (POC) <3 (L) 01/26/2018 04:08 PM    Creatinine (POC) 0.6 01/26/2018 04:08 PM    Calcium, ionized (POC) 1.06 (L) 01/26/2018 04:08 PM     Lab Results   Component Value Date/Time    Bilirubin, total 0.8 04/09/2019 09:42 AM    ALT (SGPT) 34 (H) 04/09/2019 09:42 AM    AST (SGOT) 102 (H) 04/09/2019 09:42 AM    Alk. phosphatase 230 (H) 04/09/2019 09:42 AM    Protein, total 7.4 04/09/2019 09:42 AM    Albumin 3.5 04/09/2019 09:42 AM    Globulin 3.6 03/01/2018 05:45 AM     Assessment and Recommendations:   1. Normocytic Anemia    Likely secondary to alcohol abuse/ marrow suppression  HGB 4/9/19 was 10.6 g/dL  Hx of iron def.  Her iron saturation on 4/9/19 was >91%. Her PCP stopped her PO iron    2. Alcohol Abuse  Drinking less but not stopped yet  Liver ultrasound 2/18/19 with diffusely hepatic echogenicity compatible with hepatic steatosis. No sonographic evidence of mass. 3. Gastric Ulcer  Management per GI  She takes Protonix daily    4. Mediastinal LAD   On prior CTs 1/18    Possibly related to sarcoidosis. Patient had EUS and biopsy negative. Biopsy by pulmonary 1/18. F/u CT 4/19 stable  I have ordered a f/u CT scan for 6 months    5. Neuropathy Feet  Likely due to alcohol. Refer to Neuro if needed. Call if questions. F/u here with Dr. Carmelita Godwin in 6 months or sooner if needed.      Signed By: Dr. Collie Gitelman    August 12, 2019

## 2019-09-09 DIAGNOSIS — F51.01 PRIMARY INSOMNIA: ICD-10-CM

## 2019-09-10 RX ORDER — DOXEPIN HYDROCHLORIDE 25 MG/1
CAPSULE ORAL
Qty: 30 CAP | Refills: 4 | Status: SHIPPED | OUTPATIENT
Start: 2019-09-10 | End: 2020-04-22 | Stop reason: SDUPTHER

## 2019-10-21 DIAGNOSIS — Z72.0 TOBACCO USE: ICD-10-CM

## 2019-10-21 DIAGNOSIS — E87.6 HYPOKALEMIA: ICD-10-CM

## 2019-10-21 RX ORDER — POTASSIUM CHLORIDE 1.5 G/1.77G
POWDER, FOR SOLUTION ORAL
Qty: 30 PACKET | Refills: 4 | Status: SHIPPED | OUTPATIENT
Start: 2019-10-21 | End: 2020-06-02 | Stop reason: SDUPTHER

## 2019-10-22 RX ORDER — BUPROPION HYDROCHLORIDE 150 MG/1
TABLET ORAL
Qty: 30 TAB | Refills: 3 | Status: SHIPPED | OUTPATIENT
Start: 2019-10-22 | End: 2020-04-22 | Stop reason: SDUPTHER

## 2019-11-15 DIAGNOSIS — K29.21 CHRONIC ALCOHOLIC GASTRITIS WITH HEMORRHAGE: ICD-10-CM

## 2019-11-15 DIAGNOSIS — E51.9 THIAMIN DEFICIENCY: ICD-10-CM

## 2019-11-19 ENCOUNTER — TELEPHONE (OUTPATIENT)
Dept: FAMILY MEDICINE CLINIC | Age: 59
End: 2019-11-19

## 2019-11-19 RX ORDER — PANTOPRAZOLE SODIUM 40 MG/1
TABLET, DELAYED RELEASE ORAL
Qty: 30 TAB | Refills: 4 | Status: SHIPPED | OUTPATIENT
Start: 2019-11-19 | End: 2020-06-02 | Stop reason: SDUPTHER

## 2019-11-19 RX ORDER — ASPIRIN 325 MG/1
TABLET, FILM COATED ORAL
Qty: 30 TAB | Refills: 3 | Status: SHIPPED | OUTPATIENT
Start: 2019-11-19 | End: 2020-06-02 | Stop reason: SDUPTHER

## 2019-11-19 NOTE — TELEPHONE ENCOUNTER
Received PA form for Pantoprazole from [facility] and [facility] [place] [facility] and [facility]    Questionnaire submitted. PA Case [id number] Status: Approved. Authorization and Notifications Completed.     Randal Galdamez [phone] to notified them that PA was approved

## 2020-02-03 ENCOUNTER — HOSPITAL ENCOUNTER (OUTPATIENT)
Dept: CT IMAGING | Age: 60
Discharge: HOME | End: 2020-02-03
Attending: INTERNAL MEDICINE
Payer: COMMERCIAL

## 2020-02-03 DIAGNOSIS — R59.0 MEDIASTINAL LYMPHADENOPATHY: ICD-10-CM

## 2020-02-03 PROCEDURE — 74011000258 HC RX REV CODE- 258: Performed by: RADIOLOGY

## 2020-02-03 PROCEDURE — 71260 CT THORAX DX C+: CPT

## 2020-02-03 PROCEDURE — 74011636320 HC RX REV CODE- 636/320: Performed by: RADIOLOGY

## 2020-02-03 RX ORDER — SODIUM CHLORIDE 0.9 % (FLUSH) 0.9 %
10 SYRINGE (ML) INJECTION
Status: COMPLETED | OUTPATIENT
Start: 2020-02-03 | End: 2020-02-03

## 2020-02-03 RX ADMIN — IOPAMIDOL 100 ML: 612 INJECTION, SOLUTION INTRAVENOUS at 10:22

## 2020-02-03 RX ADMIN — SODIUM CHLORIDE 100 ML: 900 INJECTION, SOLUTION INTRAVENOUS at 10:22

## 2020-02-03 RX ADMIN — Medication 10 ML: at 10:22

## 2020-02-04 NOTE — PROGRESS NOTES
BETO Verified. Called pt on mobile phone number listed. Pt was made aware of CT scans. Pt was appreciative over call and expressed no further question.   Austen Abrams

## 2020-02-04 NOTE — PROGRESS NOTES
Called pt on mobile phone number listed. No answer. Left a voicemail to give the office a call back.   Angel Frankel

## 2020-02-17 ENCOUNTER — OFFICE VISIT (OUTPATIENT)
Dept: ONCOLOGY | Age: 60
End: 2020-02-17

## 2020-02-17 VITALS
TEMPERATURE: 98 F | OXYGEN SATURATION: 96 % | DIASTOLIC BLOOD PRESSURE: 82 MMHG | SYSTOLIC BLOOD PRESSURE: 122 MMHG | WEIGHT: 163.3 LBS | BODY MASS INDEX: 30.83 KG/M2 | HEIGHT: 61 IN | HEART RATE: 97 BPM

## 2020-02-17 DIAGNOSIS — D64.9 ANEMIA, UNSPECIFIED TYPE: ICD-10-CM

## 2020-02-17 DIAGNOSIS — D50.9 IRON DEFICIENCY ANEMIA, UNSPECIFIED IRON DEFICIENCY ANEMIA TYPE: ICD-10-CM

## 2020-02-17 DIAGNOSIS — E55.9 VITAMIN D DEFICIENCY: ICD-10-CM

## 2020-02-17 DIAGNOSIS — F10.10 ALCOHOL ABUSE: ICD-10-CM

## 2020-02-17 DIAGNOSIS — R59.0 MEDIASTINAL LYMPHADENOPATHY: Primary | ICD-10-CM

## 2020-02-17 DIAGNOSIS — F17.200 SMOKER: ICD-10-CM

## 2020-02-17 NOTE — LETTER
2/17/20 Patient: Mona Castro YOB: 1960 Date of Visit: 2/17/2020 aMry Smith NP 
[address] [id number] VIA In Basket Dear Mary Smith NP, Thank you for referring Ms. Shaan Sanchez to [address] [place] [address] for evaluation. My notes for this consultation are attached. If you have questions, please do not hesitate to call me. I look forward to following your patient along with you. Sincerely, Oli Stoll, DO

## 2020-02-17 NOTE — PROGRESS NOTES
[employer]  Medical Oncology at [facility]    Reason for Visit:   Nahed Palacios is a 63 y.o. female who is seen today for f/u of anemia  and mediastinal LNs. History of Present Illness: This is a 63 y.o. female seen today for office f/u for anemia and mediastinal LNs. CT stable 2/20 c/w sarcoidosis. Still has not seen pulmonary. Taking B vit and folate. No recent labs. Still drinking and smoking. Last note:  She had a biopsy 1/18 which was negative. Her repeat CT 4/19 was stable. She reports she is doing well today. She sees Dr[facility] D[facility] with [facility] and has an appointment in September. She reports a 10-15 lb weight gain. She denies peripheral edema. She is trying to cut back on smoking. She has talked to her Dr. Darshana Springer about starting Chantix if she is unable to stop smoking on her own. She has used Chantix in the past and it worked for her. She has no new complaints today.     Past Medical History:   Diagnosis Date    Alcohol abuse     GI (gastrointestinal bleed)     Insomnia 11/09    Iron deficiency anemia 04/2004    PPD positive     treated w/ INH- tests since have been negative    Pure hypercholesterolemia     Scoliosis 12/28/15    [facility]    Tobacco dependence     Unspecified essential hypertension     Uterine bleeding, dysfunctional 2/12    Uterine fibroid       Past Surgical History:   Procedure Laterality Date    COLONOSCOPY N/A 1/31/2018    COLONOSCOPY performed by Brittany Carney MD at [facility] HX CHOLECYSTECTOMY      HX COLONOSCOPY  07/01/2013    [facility], repeat 10 years    HX ORTHOPAEDIC      Shattered bilateral ankles-metal plates & screws    HX OTHER SURGICAL      Benign cyst removal- L. foot    LEG/ANKLE SURGERY PROC UNLISTED        Social History     Tobacco Use    Smoking status: Current Every Day Smoker     Packs/day: 0.50     Years: 17.00     Pack years: 8.50    Smokeless tobacco: Never Used   Substance Use Topics    Alcohol use: Yes     Alcohol/week: 2.0 standard drinks     Types: 1 Cans of beer, 1 Shots of liquor per week     Comment: Socially      Family History   Problem Relation Age of Onset    Hypertension Mother     Diabetes Mother     Cancer Mother         breast    Heart Disease Father         MI 42's    Kidney Disease Father     Hypertension Son      Current Outpatient Medications   Medication Sig    thiamine mononitrate (B-1) 100 mg tablet TAKE ONE TABLET BY MOUTH DAILY    pantoprazole (PROTONIX) 40 mg tablet TAKE ONE TABLET BY MOUTH DAILY BEFORE BREAKFAST    buPROPion XL (WELLBUTRIN XL) 150 mg tablet TAKE ONE TABLET BY MOUTH EVERY MORNING FOR SMOKING CESSATION    potassium chloride (KLOR-CON) 20 mEq pack TAKE ONE PACKET BY MOUTH DAILY    folic acid (FOLVITE) 1 mg tablet TAKE ONE TABLET BY MOUTH DAILY    ergocalciferol (ERGOCALCIFEROL) 50,000 unit capsule Take 1 Cap by mouth every seven (7) days.  magnesium 200 mg tab Take 200 mg by mouth daily.  aspirin 81 mg chewable tablet CHEW ONE TABLET BY MOUTH DAILY    doxepin (SINEQUAN) 25 mg capsule TAKE ONE CAPSULE BY MOUTH ONCE NIGHTLY AS NEEDED FOR SLEEP    ferrous sulfate 325 mg (65 mg iron) tablet TAKE ONE TABLET BY MOUTH DAILY WITH BREAKFAST    amylase-lipase-protease (CREON 69526) 24,000-76,000 -120,000 unit capsule Take 2 Caps by mouth three (3) times daily (with meals). No current facility-administered medications for this visit. Allergies   Allergen Reactions    Robaxin [Methocarbamol] Hives, Rash and Itching     Red splotches    Statins-Hmg-Coa Reductase Inhibitors Myalgia    Codeine Itching    Neosporin [Neomycin-Bacitracin-Polymyxin] Rash     Review of Systems: A complete review of systems was obtained, negative except as described above.     Physical Exam:     Visit Vitals  /82 (BP 1 Location: Right arm, BP Patient Position: Sitting)   Pulse 97   Temp 98 °F (36.7 °C) (Oral) Ht 5' 1\" (1.549 m)   Wt 163 lb 4.8 oz (74.1 kg)   SpO2 96%   BMI 30.86 kg/m²     ECOG PS: 1  General: No acute distress, alert, pleasant  Eyes:  Conjugate gaze, anicteric sclerae  HENT: Atraumatic   Neck: Supple  Respiratory: Clear with decreased bs in bases b/l  CV:  Regular rhythm. No peripheral edema  GI: Soft, nontender, nondistended  MS: Normal gait and station  Skin: Warm and dry  Neuro/Psych: Altered, conversant. Generalized weakness. With neuropathy. Results:   Per pcp    Lab Results   Component Value Date/Time    WBC 4.8 04/09/2019 09:42 AM    HGB 10.6 (L) 04/09/2019 09:42 AM    HCT 32.6 (L) 04/09/2019 09:42 AM    PLATELET 525 (L) 05/09/6808 09:42 AM     (H) 04/09/2019 09:42 AM    ABS. NEUTROPHILS 2.3 04/09/2019 09:42 AM    Hemoglobin (POC) 4.9 02/16/2018 02:46 PM    Hemoglobin (POC) 11.2 (L) 01/26/2018 04:08 PM    Hematocrit (POC) 33 (L) 01/26/2018 04:08 PM     Lab Results   Component Value Date/Time    Sodium 145 (H) 04/09/2019 09:42 AM    Potassium 3.9 04/09/2019 09:42 AM    Chloride 104 04/09/2019 09:42 AM    CO2 27 04/09/2019 09:42 AM    Glucose 101 (H) 04/09/2019 09:42 AM    BUN 9 04/09/2019 09:42 AM    Creatinine 0.58 04/09/2019 09:42 AM    GFR est  04/09/2019 09:42 AM    GFR est non- 04/09/2019 09:42 AM    Calcium 8.2 (L) 04/09/2019 09:42 AM    Sodium (POC) 144 01/26/2018 04:08 PM    Potassium (POC) <2.0 (LL) 01/26/2018 04:08 PM    Chloride (POC) 98 01/26/2018 04:08 PM    Glucose (POC) 111 (H) 02/26/2018 11:55 AM    BUN (POC) <3 (L) 01/26/2018 04:08 PM    Creatinine (POC) 0.6 01/26/2018 04:08 PM    Calcium, ionized (POC) 1.06 (L) 01/26/2018 04:08 PM     Lab Results   Component Value Date/Time    Bilirubin, total 0.8 04/09/2019 09:42 AM    ALT (SGPT) 34 (H) 04/09/2019 09:42 AM    AST (SGOT) 102 (H) 04/09/2019 09:42 AM    Alk.  phosphatase 230 (H) 04/09/2019 09:42 AM    Protein, total 7.4 04/09/2019 09:42 AM    Albumin 3.5 04/09/2019 09:42 AM    Globulin 3.6 03/01/2018 05:45 AM     Assessment and Recommendations:   1. Chronic Normocytic Anemia    Likely secondary to alcohol abuse/ marrow suppression  Last labs 4/20. Will check labs today. On B vit. No iron. 2. Alcohol Abuse  Drinking less but not stopped yet  Liver ultrasound 2/18/19 with diffusely hepatic echogenicity compatible with hepatic steatosis. No sonographic evidence of mass. 3. Gastric Ulcer  Management per GI  She takes Protonix daily    4. Mediastinal LAD likely sarcoidosis. Pt needs to see pulmonary. Referred again. F/u CT 4/19 stable  CT 2/20 stable. Reviewed with pt today. 5. Neuropathy Feet  Likely due to alcohol. Refer to Neuro if needed. Call if questions. F/u here in 6 months if needed.      Signed By: Dr. Ozzie Gomez    February 17, 2020

## 2020-02-17 NOTE — PROGRESS NOTES
Seda Mackay is a 63 y.o. female  Chief Complaint   Patient presents with    Follow-up     anemia  and mediastinal LNs     1. Have you been to the ER, urgent care clinic since your last visit? Hospitalized since your last visit? No.  2. Have you seen or consulted any other health care providers outside of the [address] [place] [address] since your last visit? Include any pap smears or colon screening.  No.

## 2020-04-16 ENCOUNTER — DOCUMENTATION ONLY (OUTPATIENT)
Dept: ONCOLOGY | Age: 60
End: 2020-04-16

## 2020-04-16 ENCOUNTER — TELEPHONE (OUTPATIENT)
Dept: ONCOLOGY | Age: 60
End: 2020-04-16

## 2020-04-16 LAB
25(OH)D3+25(OH)D2 SERPL-MCNC: 40.9 NG/ML (ref 30–100)
ALBUMIN SERPL-MCNC: 3.7 G/DL (ref 3.8–4.9)
ALBUMIN/GLOB SERPL: 1.3 {RATIO} (ref 1.2–2.2)
ALP SERPL-CCNC: 149 IU/L (ref 39–117)
ALT SERPL-CCNC: 30 IU/L (ref 0–32)
AST SERPL-CCNC: 115 IU/L (ref 0–40)
BASOPHILS # BLD AUTO: 0 X10E3/UL (ref 0–0.2)
BASOPHILS NFR BLD AUTO: 1 %
BILIRUB SERPL-MCNC: 0.7 MG/DL (ref 0–1.2)
BUN SERPL-MCNC: 4 MG/DL (ref 6–24)
BUN/CREAT SERPL: 6 (ref 9–23)
CALCIUM SERPL-MCNC: 8.9 MG/DL (ref 8.7–10.2)
CHLORIDE SERPL-SCNC: 91 MMOL/L (ref 96–106)
CO2 SERPL-SCNC: 20 MMOL/L (ref 20–29)
CREAT SERPL-MCNC: 0.62 MG/DL (ref 0.57–1)
EOSINOPHIL # BLD AUTO: 0 X10E3/UL (ref 0–0.4)
EOSINOPHIL NFR BLD AUTO: 1 %
ERYTHROCYTE [DISTWIDTH] IN BLOOD BY AUTOMATED COUNT: 15.3 % (ref 11.7–15.4)
FERRITIN SERPL-MCNC: 1874 NG/ML (ref 15–150)
FOLATE SERPL-MCNC: 3.7 NG/ML
GLOBULIN SER CALC-MCNC: 2.8 G/DL (ref 1.5–4.5)
GLUCOSE SERPL-MCNC: 69 MG/DL (ref 65–99)
HCT VFR BLD AUTO: 28.1 % (ref 34–46.6)
HGB BLD-MCNC: 9.9 G/DL (ref 11.1–15.9)
IMM GRANULOCYTES # BLD AUTO: 0 X10E3/UL (ref 0–0.1)
IMM GRANULOCYTES NFR BLD AUTO: 1 %
IRON SATN MFR SERPL: 90 % (ref 15–55)
IRON SERPL-MCNC: 166 UG/DL (ref 27–159)
LYMPHOCYTES # BLD AUTO: 1.9 X10E3/UL (ref 0.7–3.1)
LYMPHOCYTES NFR BLD AUTO: 38 %
MAGNESIUM SERPL-MCNC: 1.5 MG/DL (ref 1.6–2.3)
MCH RBC QN AUTO: 32.1 PG (ref 26.6–33)
MCHC RBC AUTO-ENTMCNC: 35.2 G/DL (ref 31.5–35.7)
MCV RBC AUTO: 91 FL (ref 79–97)
MONOCYTES # BLD AUTO: 0.5 X10E3/UL (ref 0.1–0.9)
MONOCYTES NFR BLD AUTO: 11 %
NEUTROPHILS # BLD AUTO: 2.4 X10E3/UL (ref 1.4–7)
NEUTROPHILS NFR BLD AUTO: 48 %
PLATELET # BLD AUTO: 149 X10E3/UL (ref 150–450)
POTASSIUM SERPL-SCNC: 4.1 MMOL/L (ref 3.5–5.2)
PROT SERPL-MCNC: 6.5 G/DL (ref 6–8.5)
RBC # BLD AUTO: 3.08 X10E6/UL (ref 3.77–5.28)
RETICS/RBC NFR AUTO: 2 % (ref 0.6–2.6)
SODIUM SERPL-SCNC: 136 MMOL/L (ref 134–144)
TIBC SERPL-MCNC: 184 UG/DL (ref 250–450)
UIBC SERPL-MCNC: 18 UG/DL (ref 131–425)
VIT B12 SERPL-MCNC: 383 PG/ML (ref 232–1245)
WBC # BLD AUTO: 4.8 X10E3/UL (ref 3.4–10.8)

## 2020-04-16 NOTE — PROGRESS NOTES
Tell pt anemia labs about the same  Is pt taking oral iron? If yes, stop as iron is high  Is pt still drinking? If yes, stop  Had CTs 2/20 and stable sarcoid  Has pt f/u with pulmonary?   Get their last note

## 2020-04-16 NOTE — PROGRESS NOTES
Call to patient at [phone], left  requesting patient contact RN at MD office. Contact information supplied.

## 2020-04-16 NOTE — TELEPHONE ENCOUNTER
Patient returned RN call,  HIPAA verified.  has not seen pulmonary yet, was offered VV d/t CoVid by [facility] office, but declined. Assessed phone, patient has smart phone, instructed on ease of doing V V with MD, patient expressed that she felt better about process and how it worked. Will call to update after her appt. Is going to stop taking iron, understands Provider message that iron is elevated and her anemia labs remain about the same. States \"I fell off the wagon\" in regards to assessing ETOH consumption, states \"most days I don't even want a drink, but if I don't, I get the jitters. \"   plan to contact her PCP for further guidance on quitting both ETOH and cigarettes. Has decreased cigarettes, had been smoking 1- 1.5 PPD, but is down to 10 per day. Affirmed difficulty in quitting, Encouraged continued attempts to reduce smoking/drinking. Thanked RN for call.

## 2020-04-21 DIAGNOSIS — F51.01 PRIMARY INSOMNIA: ICD-10-CM

## 2020-04-21 DIAGNOSIS — Z72.0 TOBACCO USE: ICD-10-CM

## 2020-04-21 DIAGNOSIS — E53.8 FOLIC ACID DEFICIENCY: ICD-10-CM

## 2020-04-21 NOTE — TELEPHONE ENCOUNTER
----- Message from Kvng Palacios sent at 4/21/2020 10:38 AM EDT -----  Regarding: ELIZABETH Meek/Refill  Medication Refill    Caller (if not patient):      Relationship of caller (if not patient):      Best contact number(s):  [phone]    Name of medication and dosage if known:  \"Bupropion\",\"Doxetin\", \"Folic Acid \"    Is patient out of this medication (yes/no):  Yes    Pharmacy name: [address] listed in chart? (yes/no): Yes  Pharmacy phone number: [phone]      Details to clarify the request:      Kvng Palacios

## 2020-04-22 RX ORDER — DOXEPIN HYDROCHLORIDE 25 MG/1
CAPSULE ORAL
Qty: 30 CAP | Refills: 0 | Status: SHIPPED | OUTPATIENT
Start: 2020-04-22 | End: 2020-06-02 | Stop reason: SDUPTHER

## 2020-04-22 RX ORDER — BUPROPION HYDROCHLORIDE 150 MG/1
TABLET ORAL
Qty: 30 TAB | Refills: 0 | Status: SHIPPED | OUTPATIENT
Start: 2020-04-22 | End: 2020-06-02

## 2020-04-22 RX ORDER — FOLIC ACID 1 MG/1
TABLET ORAL
Qty: 30 TAB | Refills: 0 | Status: SHIPPED | OUTPATIENT
Start: 2020-04-22 | End: 2020-06-02 | Stop reason: SDUPTHER

## 2020-05-28 ENCOUNTER — TELEPHONE (OUTPATIENT)
Dept: FAMILY MEDICINE CLINIC | Age: 60
End: 2020-05-28

## 2020-05-28 NOTE — TELEPHONE ENCOUNTER
Call placed to patient. No answer left message for patient to return call to office regarding upcoming appt.  Patient has appt scheduled for 6/2/20 needs to be changed to VV

## 2020-06-02 ENCOUNTER — VIRTUAL VISIT (OUTPATIENT)
Dept: FAMILY MEDICINE CLINIC | Age: 60
End: 2020-06-02

## 2020-06-02 DIAGNOSIS — E87.6 HYPOKALEMIA: ICD-10-CM

## 2020-06-02 DIAGNOSIS — R60.9 PERIPHERAL EDEMA: ICD-10-CM

## 2020-06-02 DIAGNOSIS — G89.29 CHRONIC PAIN OF RIGHT KNEE: Primary | ICD-10-CM

## 2020-06-02 DIAGNOSIS — E53.8 FOLIC ACID DEFICIENCY: ICD-10-CM

## 2020-06-02 DIAGNOSIS — E55.9 VITAMIN D INSUFFICIENCY: ICD-10-CM

## 2020-06-02 DIAGNOSIS — K29.21 CHRONIC ALCOHOLIC GASTRITIS WITH HEMORRHAGE: ICD-10-CM

## 2020-06-02 DIAGNOSIS — E51.9 THIAMIN DEFICIENCY: ICD-10-CM

## 2020-06-02 DIAGNOSIS — E83.42 HYPOMAGNESEMIA: ICD-10-CM

## 2020-06-02 DIAGNOSIS — M25.561 CHRONIC PAIN OF RIGHT KNEE: Primary | ICD-10-CM

## 2020-06-02 DIAGNOSIS — F51.01 PRIMARY INSOMNIA: ICD-10-CM

## 2020-06-02 RX ORDER — ERGOCALCIFEROL 1.25 MG/1
50000 CAPSULE ORAL
Qty: 12 CAP | Refills: 1 | Status: SHIPPED | OUTPATIENT
Start: 2020-06-02 | End: 2021-06-07

## 2020-06-02 RX ORDER — FOLIC ACID 1 MG/1
TABLET ORAL
Qty: 90 TAB | Refills: 1 | Status: SHIPPED | OUTPATIENT
Start: 2020-06-02 | End: 2021-01-14

## 2020-06-02 RX ORDER — ASPIRIN 325 MG/1
TABLET, FILM COATED ORAL
Qty: 90 TAB | Refills: 1 | Status: SHIPPED | OUTPATIENT
Start: 2020-06-02 | End: 2021-02-01

## 2020-06-02 RX ORDER — MAGNESIUM 200 MG
200 TABLET ORAL DAILY
Qty: 90 TAB | Refills: 1 | Status: SHIPPED | OUTPATIENT
Start: 2020-06-02 | End: 2021-01-14

## 2020-06-02 RX ORDER — PANTOPRAZOLE SODIUM 40 MG/1
TABLET, DELAYED RELEASE ORAL
Qty: 90 TAB | Refills: 1 | Status: SHIPPED | OUTPATIENT
Start: 2020-06-02 | End: 2021-02-15

## 2020-06-02 RX ORDER — DICLOFENAC SODIUM 10 MG/G
GEL TOPICAL 4 TIMES DAILY
Qty: 100 G | Refills: 2 | Status: SHIPPED | OUTPATIENT
Start: 2020-06-02 | End: 2021-06-07

## 2020-06-02 RX ORDER — FUROSEMIDE 20 MG/1
20 TABLET ORAL
Qty: 30 TAB | Refills: 0 | Status: SHIPPED | OUTPATIENT
Start: 2020-06-02 | End: 2021-02-25 | Stop reason: SDUPTHER

## 2020-06-02 RX ORDER — POTASSIUM CHLORIDE 1.5 G/1.77G
POWDER, FOR SOLUTION ORAL
Qty: 90 PACKET | Refills: 1 | Status: SHIPPED | OUTPATIENT
Start: 2020-06-02 | End: 2021-04-29

## 2020-06-02 RX ORDER — DOXEPIN HYDROCHLORIDE 25 MG/1
CAPSULE ORAL
Qty: 90 CAP | Refills: 1 | Status: SHIPPED | OUTPATIENT
Start: 2020-06-02 | End: 2021-06-25 | Stop reason: SDUPTHER

## 2020-06-02 NOTE — PROGRESS NOTES
Zaria High is a 62 y.o. female evaluated via audio only technology on 6/2/2020. Consent: She and/or her health care decision maker is aware that she may receive a bill for this audio only encounter, depending on her insurance coverage, and has provided verbal consent to proceed: Yes    I communicated with the patient and/or health care decision maker about the nature and details of the following:  Assessment & Plan:   Diagnoses and all orders for this visit:    1. Chronic pain of right knee  Likely OA. Recommend ice, compression and rest. Begin Diclofenac gel. Referral to orthopedics for continued symptoms. -     diclofenac (VOLTAREN) 1 % gel; Apply  to affected area four (4) times daily. For joint pain    2. Peripheral edema  Reviewed low sodium diet. Begin diuretic PRN only. -     furosemide (LASIX) 20 mg tablet; Take 1 Tab by mouth daily as needed (leg swelling). 3. Thiamin deficiency  -     thiamine mononitrate (B-1) 100 mg tablet; TAKE ONE TABLET BY MOUTH DAILY    4. Hypokalemia  -     potassium chloride (KLOR-CON) 20 mEq pack; TAKE ONE PACKET BY MOUTH DAILY    5. Chronic alcoholic gastritis with hemorrhage  -     pantoprazole (PROTONIX) 40 mg tablet; TAKE ONE TABLET BY MOUTH DAILY BEFORE BREAKFAST    6. Hypomagnesemia  -     magnesium 200 mg tab; Take 200 mg by mouth daily. 7. Folic acid deficiency  -     folic acid (FOLVITE) 1 mg tablet; TAKE ONE TABLET BY MOUTH DAILY    8. Vitamin D insufficiency  -     ergocalciferol (ERGOCALCIFEROL) 1,250 mcg (50,000 unit) capsule; Take 1 Cap by mouth every seven (7) days. 9. Primary insomnia  -     doxepin (SINEquan) 25 mg capsule; TAKE ONE CAPSULE BY MOUTH ONCE NIGHTLY AS NEEDED FOR SLEEP        12  Subjective:   Zaria High is a 62 y.o. female who was seen for No chief complaint on file. Knee Pain  Patient complains of right knee swelling. Onset of the symptoms was several weeks ago. Inciting event: none known.  Current symptoms include pain location: right: anterior, severity of pain = moderate and swelling: moderate. Associated symptoms: knee giving out and crepitus sensation. Aggravating symptoms: going up and down stairs, standing, walking, rising after sitting. Patient's overall course: intermittent. Evaluation to date: none. Treatment to date: none. Edema  Patient complains of edema. The location of the edema is bilateral lower legs. The edema has been moderate. Onset of symptoms was a few days ago, unchanged since that time. The edema is present all day. The swelling has been aggravated by dependency of involved area, hot weather, increased salt intake, relieved by elevation of involved area. Denies shortness of breath, weight gain, diagnosis of heart failure, risk for DVT. Prior to Admission medications    Medication Sig Start Date End Date Taking? Authorizing Provider   furosemide (LASIX) 20 mg tablet Take 1 Tab by mouth daily as needed (leg swelling). 6/2/20  Yes Shlomo Meek NP   diclofenac (VOLTAREN) 1 % gel Apply  to affected area four (4) times daily. For joint pain 6/2/20  Yes Shlomo Meek NP   thiamine mononitrate (B-1) 100 mg tablet TAKE ONE TABLET BY MOUTH DAILY 6/2/20  Yes Shlomo Meek NP   potassium chloride (KLOR-CON) 20 mEq pack TAKE ONE PACKET BY MOUTH DAILY 6/2/20  Yes Moe Skelton NP   pantoprazole (PROTONIX) 40 mg tablet TAKE ONE TABLET BY MOUTH DAILY BEFORE BREAKFAST 6/2/20  Yes Shlomo Meek NP   magnesium 200 mg tab Take 200 mg by mouth daily. 6/2/20  Yes Shlomo Meek NP   folic acid (FOLVITE) 1 mg tablet TAKE ONE TABLET BY MOUTH DAILY 6/2/20  Yes Shlomo Meek NP   ergocalciferol (ERGOCALCIFEROL) 1,250 mcg (50,000 unit) capsule Take 1 Cap by mouth every seven (7) days.  6/2/20  Yes Moe Skelton NP   doxepin (SINEquan) 25 mg capsule TAKE ONE CAPSULE BY MOUTH ONCE NIGHTLY AS NEEDED FOR SLEEP 6/2/20  Yes Shlomo Meek NP   folic acid (FOLVITE) 1 mg tablet TAKE ONE TABLET BY MOUTH DAILY 4/22/20 6/2/20  Tameka Merino NP   buPROPion XL (WELLBUTRIN XL) 150 mg tablet TAKE ONE TABLET BY MOUTH EVERY MORNING FOR SMOKING CESSATION 4/22/20 6/2/20  Tameka Merino NP   doxepin (SINEquan) 25 mg capsule TAKE ONE CAPSULE BY MOUTH ONCE NIGHTLY AS NEEDED FOR SLEEP 4/22/20 6/2/20  Tameka Merino NP   thiamine mononitrate (B-1) 100 mg tablet TAKE ONE TABLET BY MOUTH DAILY 11/19/19 6/2/20  Tameka Merino, ELIZABETH   pantoprazole (PROTONIX) 40 mg tablet TAKE ONE TABLET BY MOUTH DAILY BEFORE BREAKFAST 11/19/19 6/2/20  Tameka Merino NP   potassium chloride (KLOR-CON) 20 mEq pack TAKE ONE PACKET BY MOUTH DAILY 10/21/19 6/2/20  Jaelyn Stuart, NP   ergocalciferol (ERGOCALCIFEROL) 50,000 unit capsule Take 1 Cap by mouth every seven (7) days. 4/11/19 6/2/20  Tameka Merino NP   magnesium 200 mg tab Take 200 mg by mouth daily. 4/9/19 6/2/20  Tameka Merino NP   ferrous sulfate 325 mg (65 mg iron) tablet TAKE ONE TABLET BY MOUTH DAILY WITH BREAKFAST 1/21/19 6/2/20  Tameka Merino NP   amylase-lipase-protease (CREON 48955) 24,000-76,000 -120,000 unit capsule Take 2 Caps by mouth three (3) times daily (with meals).  3/2/18 6/2/20  Jessica Sutton MD   aspirin 81 mg chewable tablet CHEW ONE TABLET BY MOUTH DAILY 9/15/17 6/2/20  Veena Flores MD     Allergies   Allergen Reactions    Robaxin [Methocarbamol] Hives, Rash and Itching     Red splotches    Statins-Hmg-Coa Reductase Inhibitors Myalgia    Codeine Itching    Neosporin [Neomycin-Bacitracin-Polymyxin] Rash       Past Medical History:   Diagnosis Date    Alcohol abuse     GI (gastrointestinal bleed)     Insomnia 11/09    Iron deficiency anemia 04/2004    PPD positive     treated w/ INH- tests since have been negative    Pure hypercholesterolemia     Scoliosis 12/28/15    [facility]   [address] dependence     Unspecified essential hypertension  Uterine bleeding, dysfunctional 2/12    Uterine fibroid      Past Surgical History:   Procedure Laterality Date    COLONOSCOPY N/A 1/31/2018    COLONOSCOPY performed by Johnie Johnston MD at [facility] HX CHOLECYSTECTOMY      HX COLONOSCOPY  07/01/2013    [facility], repeat 10 years    HX ORTHOPAEDIC      Shattered bilateral ankles-metal plates & screws    HX OTHER SURGICAL      Benign cyst removal- L. foot    LEG/ANKLE SURGERY PROC UNLISTED         ROS  See HPI    I affirm this is a Patient-Initiated Episode with a Patient who has not had a related appointment within my department in the past 7 days or scheduled within the next 24 hours.     Total Time: minutes: 11-20 minutes    Note: not billable if this call serves to triage the patient into an appointment for the relevant concern      Sharla Cordova NP

## 2020-06-25 NOTE — PROGRESS NOTES
C/o pain in chago le rates 10/10, given percocet, ciwa 12 given 2 ativan, very agitated. Requesting to speak with Dr Watkins.   Problem: Mobility Impaired (Adult and Pediatric)  Goal: *Acute Goals and Plan of Care (Insert Text)  Physical Therapy Goals  Initiated 2/22/2018  1. Patient will move from supine to sit and sit to supine , scoot up and down and roll side to side in bed with minimal assistance/contact guard assist within 7 day(s). 2.  Patient will transfer from bed to chair and chair to bed with minimal assistance/contact guard assist using the least restrictive device within 7 day(s). 3.  Patient will perform sit to stand with minimal assistance/contact guard assist within 7 day(s). 4.  Patient will ambulate with minimal assistance/contact guard assist for 50 feet with the least restrictive device within 7 day(s). 5.  Patient will ascend/descend 4 stairs with one handrail(s) with minimal assistance/contact guard assist within 7 day(s). physical Therapy TREATMENT  Patient: Contreras Smith (61 y.o. female)  Date: 2/26/2018  Diagnosis: Hypokalemia Hypokalemia       Precautions: Fall, Seizure, Bed Alarm  Chart, physical therapy assessment, plan of care and goals were reviewed. ASSESSMENT:  Chart reviewed, RN cleared patient for mobility, and patient received in bed. Initially patient refused therapy, however upon complaints of BLE swelling patient agreed to bed ther ex. Patient performed ankle pumps, quad sets, and glute sets x 10 each with recommendation to continue with exercises gently x10/hr. Patient ended session in bed with all needs placed within reach and RN notified of patient's status. Patient agreed to participate tomorrow as patient is expecting to be discharged home but has yet to have OOB mobility assessed.   Progression toward goals:  [x]    Improving appropriately and progressing toward goals  []    Improving slowly and progressing toward goals  []    Not making progress toward goals and plan of care will be adjusted     PLAN:  Patient continues to benefit from skilled intervention to address the above impairments. Continue treatment per established plan of care. Discharge Recommendations: To Be Determined  Further Equipment Recommendations for Discharge:  TBD     SUBJECTIVE:   Patient stated I really just dont feel good and dont want to do anything. ..but my ankles are so swollen!     OBJECTIVE DATA SUMMARY:   Critical Behavior:  Neurologic State: Alert  Orientation Level: Oriented X4  Cognition: Appropriate decision making  Safety/Judgement: Decreased awareness of environment, Decreased awareness of need for assistance, Decreased awareness of need for safety, Decreased insight into deficits  Functional Mobility Training:  Bed Mobility:                    Transfers:                                   Balance:     Ambulation/Gait Training:                                                        Stairs:            Functional Measure:    Neuro Re-Education:    Therapeutic Exercises: Ankle pumps, quad sets, and glute sets x 10 each  Pain:  Pain Scale 1: Numeric (0 - 10)  Pain Intensity 1: 0              Activity Tolerance:   Fair, self limiting but agreeable to participate tomorrow  Please refer to the flowsheet for vital signs taken during this treatment.   After treatment:   []    Patient left in no apparent distress sitting up in chair  [x]    Patient left in no apparent distress in bed  [x]    Call bell left within reach  [x]    Nursing notified  []    Caregiver present  []    Bed alarm activated    COMMUNICATION/COLLABORATION:   The patients plan of care was discussed with: Registered Nurse    Jose F Rajput PT, DPT   Time Calculation: 10 mins

## 2021-02-16 ENCOUNTER — TELEPHONE (OUTPATIENT)
Dept: FAMILY MEDICINE CLINIC | Age: 61
End: 2021-02-16

## 2021-02-16 NOTE — TELEPHONE ENCOUNTER
Returned call to patients  who is on PHI. Patients name and  verified. He stated years ago patient was hospitalized for low potassium and he is seeing some of the same signs. Advised him that patient would need to be seen in office. appt scheduled.

## 2021-02-16 NOTE — TELEPHONE ENCOUNTER
Patient  called wants the nurse to give him a call, has some concerns about his wife. Requesting a call back.     Best call back # [phone]

## 2021-02-23 ENCOUNTER — HOSPITAL ENCOUNTER (EMERGENCY)
Age: 61
Discharge: HOME | End: 2021-02-23
Attending: STUDENT IN AN ORGANIZED HEALTH CARE EDUCATION/TRAINING PROGRAM | Admitting: STUDENT IN AN ORGANIZED HEALTH CARE EDUCATION/TRAINING PROGRAM
Payer: COMMERCIAL

## 2021-02-23 VITALS
RESPIRATION RATE: 18 BRPM | HEART RATE: 94 BPM | DIASTOLIC BLOOD PRESSURE: 93 MMHG | OXYGEN SATURATION: 100 % | TEMPERATURE: 96.9 F | SYSTOLIC BLOOD PRESSURE: 125 MMHG

## 2021-02-23 DIAGNOSIS — R60.9 PERIPHERAL EDEMA: ICD-10-CM

## 2021-02-23 DIAGNOSIS — G62.9 PERIPHERAL POLYNEUROPATHY: ICD-10-CM

## 2021-02-23 DIAGNOSIS — R53.1 GENERALIZED WEAKNESS: Primary | ICD-10-CM

## 2021-02-23 DIAGNOSIS — N30.00 ACUTE CYSTITIS WITHOUT HEMATURIA: ICD-10-CM

## 2021-02-23 LAB
ALBUMIN SERPL-MCNC: 2.8 G/DL (ref 3.5–5)
ALBUMIN/GLOB SERPL: 0.7 {RATIO} (ref 1.1–2.2)
ALP SERPL-CCNC: 138 U/L (ref 45–117)
ALT SERPL-CCNC: 31 U/L (ref 12–78)
ANION GAP SERPL CALC-SCNC: 8 MMOL/L (ref 5–15)
APPEARANCE UR: ABNORMAL
AST SERPL-CCNC: 70 U/L (ref 15–37)
ATRIAL RATE: 93 BPM
BACTERIA URNS QL MICRO: ABNORMAL /HPF
BASOPHILS # BLD: 0 K/UL (ref 0–0.1)
BASOPHILS NFR BLD: 0 % (ref 0–1)
BILIRUB SERPL-MCNC: 1 MG/DL (ref 0.2–1)
BILIRUB UR QL: NEGATIVE
BNP SERPL-MCNC: 1161 PG/ML
BUN SERPL-MCNC: 8 MG/DL (ref 6–20)
BUN/CREAT SERPL: 13 (ref 12–20)
CALCIUM SERPL-MCNC: 8.6 MG/DL (ref 8.5–10.1)
CALCULATED R AXIS, ECG10: 8 DEGREES
CALCULATED T AXIS, ECG11: 66 DEGREES
CHLORIDE SERPL-SCNC: 105 MMOL/L (ref 97–108)
CO2 SERPL-SCNC: 25 MMOL/L (ref 21–32)
COLOR UR: ABNORMAL
COMMENT, HOLDF: NORMAL
CREAT SERPL-MCNC: 0.63 MG/DL (ref 0.55–1.02)
DIAGNOSIS, 93000: NORMAL
DIFFERENTIAL METHOD BLD: ABNORMAL
EOSINOPHIL # BLD: 0 K/UL (ref 0–0.4)
EOSINOPHIL NFR BLD: 1 % (ref 0–7)
EPITH CASTS URNS QL MICRO: ABNORMAL /LPF
ERYTHROCYTE [DISTWIDTH] IN BLOOD BY AUTOMATED COUNT: 18.2 % (ref 11.5–14.5)
ETHANOL SERPL-MCNC: <10 MG/DL
GLOBULIN SER CALC-MCNC: 4 G/DL (ref 2–4)
GLUCOSE SERPL-MCNC: 70 MG/DL (ref 65–100)
GLUCOSE UR STRIP.AUTO-MCNC: NEGATIVE MG/DL
HCT VFR BLD AUTO: 32.6 % (ref 35–47)
HGB BLD-MCNC: 10.5 G/DL (ref 11.5–16)
HGB UR QL STRIP: ABNORMAL
IMM GRANULOCYTES # BLD AUTO: 0 K/UL
IMM GRANULOCYTES NFR BLD AUTO: 0 %
KETONES UR QL STRIP.AUTO: ABNORMAL MG/DL
LEUKOCYTE ESTERASE UR QL STRIP.AUTO: ABNORMAL
LYMPHOCYTES # BLD: 1.3 K/UL (ref 0.8–3.5)
LYMPHOCYTES NFR BLD: 31 % (ref 12–49)
MCH RBC QN AUTO: 33.7 PG (ref 26–34)
MCHC RBC AUTO-ENTMCNC: 32.2 G/DL (ref 30–36.5)
MCV RBC AUTO: 104.5 FL (ref 80–99)
MONOCYTES # BLD: 0.3 K/UL (ref 0–1)
MONOCYTES NFR BLD: 8 % (ref 5–13)
NEUTS SEG # BLD: 2.5 K/UL (ref 1.8–8)
NEUTS SEG NFR BLD: 60 % (ref 32–75)
NITRITE UR QL STRIP.AUTO: POSITIVE
NRBC # BLD: 0 K/UL (ref 0–0.01)
NRBC BLD-RTO: 0 PER 100 WBC
PH UR STRIP: 6 [PH] (ref 5–8)
PLATELET # BLD AUTO: 111 K/UL (ref 150–400)
PMV BLD AUTO: 11.1 FL (ref 8.9–12.9)
POTASSIUM SERPL-SCNC: 4.4 MMOL/L (ref 3.5–5.1)
PROT SERPL-MCNC: 6.8 G/DL (ref 6.4–8.2)
PROT UR STRIP-MCNC: NEGATIVE MG/DL
Q-T INTERVAL, ECG07: 344 MS
QRS DURATION, ECG06: 46 MS
QTC CALCULATION (BEZET), ECG08: 446 MS
RBC # BLD AUTO: 3.12 M/UL (ref 3.8–5.2)
RBC #/AREA URNS HPF: ABNORMAL /HPF (ref 0–5)
RBC MORPH BLD: ABNORMAL
SAMPLES BEING HELD,HOLD: NORMAL
SODIUM SERPL-SCNC: 138 MMOL/L (ref 136–145)
SP GR UR REFRACTOMETRY: 1.02 (ref 1–1.03)
TROPONIN I SERPL-MCNC: <0.05 NG/ML
UR CULT HOLD, URHOLD: NORMAL
UROBILINOGEN UR QL STRIP.AUTO: 1 EU/DL (ref 0.2–1)
VENTRICULAR RATE, ECG03: 101 BPM
WBC # BLD AUTO: 4.1 K/UL (ref 3.6–11)
WBC URNS QL MICRO: ABNORMAL /HPF (ref 0–4)

## 2021-02-23 PROCEDURE — 83880 ASSAY OF NATRIURETIC PEPTIDE: CPT

## 2021-02-23 PROCEDURE — 99285 EMERGENCY DEPT VISIT HI MDM: CPT

## 2021-02-23 PROCEDURE — 85025 COMPLETE CBC W/AUTO DIFF WBC: CPT

## 2021-02-23 PROCEDURE — 87086 URINE CULTURE/COLONY COUNT: CPT

## 2021-02-23 PROCEDURE — 74011250636 HC RX REV CODE- 250/636: Performed by: STUDENT IN AN ORGANIZED HEALTH CARE EDUCATION/TRAINING PROGRAM

## 2021-02-23 PROCEDURE — 84484 ASSAY OF TROPONIN QUANT: CPT

## 2021-02-23 PROCEDURE — 80053 COMPREHEN METABOLIC PANEL: CPT

## 2021-02-23 PROCEDURE — 87186 SC STD MICRODIL/AGAR DIL: CPT

## 2021-02-23 PROCEDURE — 81001 URINALYSIS AUTO W/SCOPE: CPT

## 2021-02-23 PROCEDURE — 36415 COLL VENOUS BLD VENIPUNCTURE: CPT

## 2021-02-23 PROCEDURE — 82077 ASSAY SPEC XCP UR&BREATH IA: CPT

## 2021-02-23 PROCEDURE — 93005 ELECTROCARDIOGRAM TRACING: CPT

## 2021-02-23 PROCEDURE — 87077 CULTURE AEROBIC IDENTIFY: CPT

## 2021-02-23 RX ORDER — CEPHALEXIN 500 MG/1
500 CAPSULE ORAL 3 TIMES DAILY
Qty: 21 CAP | Refills: 0 | Status: SHIPPED | OUTPATIENT
Start: 2021-02-23 | End: 2021-03-02

## 2021-02-23 RX ADMIN — SODIUM CHLORIDE 500 ML: 9 INJECTION, SOLUTION INTRAVENOUS at 14:07

## 2021-02-23 NOTE — ED PROVIDER NOTES
Joselyn Regan is a 62 y.o. female with past medical history notable for  has a past medical history of Alcohol abuse, GI (gastrointestinal bleed), Iron deficiency anemia (04/2004), PPD positive, Pure hypercholesterolemia, Scoliosis (12/28/15), Tobacco dependence, Unspecified essential hypertension, Uterine bleeding, dysfunctional (2/12), and Uterine fibroids presenting with generalized weakness, unsteadienss in her gait, increasing lower extremity discomfort and neuropathic pain. She has had chronic swelling for some time and this has been previously investigated, her  states that this has been worsening in the last few weeks. No measured fevers or chills. She has not had lateralizing weakness. No head injury such. No focal neurological complaints. Specifically denies dysarthria, dysphagia. She has not had focal infectious symptoms. She does endorse dyspnea with exertion, overall fatigue. No chest discomfort.            Past Medical History:   Diagnosis Date    Alcohol abuse     GI (gastrointestinal bleed)     Insomnia 11/09    Iron deficiency anemia 04/2004    PPD positive     treated w/ [facility]- tests since have been negative    Pure hypercholesterolemia     Scoliosis 12/28/15    [facility]    Tobacco dependence     Unspecified essential hypertension     Uterine bleeding, dysfunctional 2/12    Uterine fibroid        Past Surgical History:   Procedure Laterality Date    COLONOSCOPY N/A 1/31/2018    COLONOSCOPY performed by Agustin [facility]a [facility] at [facility] 59 HX CHOLECYSTECTOMY      HX COLONOSCOPY  07/01/2013    [facility], repeat 10 years   Arpan Regan HX ORTHOPAEDIC      Shattered bilateral ankles-metal plates & screws    HX OTHER SURGICAL      Benign cyst removal- L. foot    MI LEG/ANKLE SURGERY PROC UNLISTED           Family History:   Problem Relation Age of Onset    Hypertension Mother     Diabetes Mother     Cancer Mother         breast    Heart Disease Father         MI 42's    Kidney Disease Father     Hypertension Son        Social History     Socioeconomic History    Marital status:      Spouse name: Not on file    Number of children: Not on file    Years of education: Not on file    Highest education level: Not on file   Occupational History    Occupation: part time   Social Needs    Financial resource strain: Not on file    Food insecurity     Worry: Not on file     Inability: Not on file   [employer] needs     Medical: Not on file     Non-medical: Not on file   Tobacco Use    Smoking status: Current Every Day Smoker     Packs/day: 0.50     Years: 17.00     Pack years: 8.50    Smokeless tobacco: Never Used   Substance and Sexual Activity    Alcohol use: Yes     Alcohol/week: 2.0 standard drinks     Types: 1 Cans of beer, 1 Shots of liquor per week     Comment: Socially    Drug use: No    Sexual activity: Yes     Partners: Male     Birth control/protection: None   Lifestyle    Physical activity     Days per week: Not on file     Minutes per session: Not on file    Stress: Not on file   Relationships    Social connections     Talks on phone: Not on file     Gets together: Not on file     Attends Mandaen service: Not on file     Active member of club or organization: Not on file     Attends meetings of clubs or organizations: Not on file     Relationship status: Not on file    Intimate partner violence     Fear of current or ex partner: Not on file     Emotionally abused: Not on file     Physically abused: Not on file     Forced sexual activity: Not on file   Other Topics Concern    Not on file   Social History Narrative    Not on file         ALLERGIES: Robaxin [methocarbamol], Statins-hmg-coa reductase inhibitors, Codeine, and Neosporin [neomycin-bacitracin-polymyxin]    Review of Systems   Constitutional: Positive for chills and fatigue. Respiratory: Positive for shortness of breath.     Cardiovascular: Positive for leg swelling. Negative for chest pain. Gastrointestinal: Negative for abdominal pain. Neurological: Negative for light-headedness and headaches. Psychiatric/Behavioral: Negative for confusion. Vitals:    02/23/21 1412 02/23/21 1500 02/23/21 1600 02/23/21 1700   BP: (!) 147/95 (!) 142/87 (!) 134/116 (!) 125/93   Pulse: 94      Resp: 18      Temp:       SpO2: 98% 98% 98% 100%            Physical Exam  Vitals signs reviewed. Constitutional:       General: She is not in acute distress. HENT:      Head: Normocephalic and atraumatic. Mouth/Throat:      Mouth: Mucous membranes are moist.      Pharynx: Oropharynx is clear. Cardiovascular:      Rate and Rhythm: Normal rate and regular rhythm. Heart sounds: Normal heart sounds. Pulmonary:      Effort: Pulmonary effort is normal.      Breath sounds: Normal breath sounds. Abdominal:      Tenderness: There is no abdominal tenderness. There is no guarding or rebound. Musculoskeletal: Normal range of motion. Right lower leg: Edema present. Left lower leg: Edema present. Comments: Symmetric pitting 1+ lower extremity edema. Normal peripheral perfusion   Skin:     General: Skin is warm and dry. Capillary Refill: Capillary refill takes less than 2 seconds. Neurological:      General: No focal deficit present. Mental Status: She is alert and oriented to person, place, and time. Psychiatric:         Mood and Affect: Mood normal.          MDM  Number of Diagnoses or Management Options  Acute cystitis without hematuria  Generalized weakness  Peripheral edema  Peripheral polyneuropathy  Diagnosis management comments:   Patient presenting with worsening generalized weakness. Considered protein calorie malnutrition, low oncotic pressure resulting in lower extremity edema, less likely DVT given the chronic nature of her symptoms. She appears to have chronic debility but this has been worsening over the last week. She has not had urinary symptoms as such but I suspect that given that she has nitrite positive urine that she does not fact have a urinary tract infection. Her urine is cloudy on gross visualization. She does not have significant pyuria however she does have 3+ bacteriuria. Therefore will treat empirically with Keflex, send urine culture, follow-up with primary care in a few days as already scheduled. She does not have focal neurological deficits then her ataxia seems to be more related to peripheral neuropathy and proprioceptive issues than primary central ataxia.        Amount and/or Complexity of Data Reviewed  Clinical lab tests: ordered and reviewed  Tests in the radiology section of CPT®: ordered and reviewed           Procedures

## 2021-02-23 NOTE — ED NOTES
I have reviewed discharge instructions with the patient. The patient verbalized understanding. Pt leaves ED via WC accompanied by spouse.   NAD noted

## 2021-02-25 ENCOUNTER — TELEPHONE (OUTPATIENT)
Dept: FAMILY MEDICINE CLINIC | Age: 61
End: 2021-02-25

## 2021-02-25 ENCOUNTER — OFFICE VISIT (OUTPATIENT)
Dept: FAMILY MEDICINE CLINIC | Age: 61
End: 2021-02-25
Payer: COMMERCIAL

## 2021-02-25 VITALS
WEIGHT: 119.4 LBS | SYSTOLIC BLOOD PRESSURE: 142 MMHG | TEMPERATURE: 97 F | BODY MASS INDEX: 22.54 KG/M2 | HEART RATE: 93 BPM | OXYGEN SATURATION: 97 % | DIASTOLIC BLOOD PRESSURE: 79 MMHG | HEIGHT: 61 IN

## 2021-02-25 DIAGNOSIS — R59.0 MEDIASTINAL LYMPHADENOPATHY: ICD-10-CM

## 2021-02-25 DIAGNOSIS — E43 PROTEIN-CALORIE MALNUTRITION, SEVERE (HCC): ICD-10-CM

## 2021-02-25 DIAGNOSIS — R79.89 ELEVATED LIVER FUNCTION TESTS: ICD-10-CM

## 2021-02-25 DIAGNOSIS — E51.9 THIAMIN DEFICIENCY: ICD-10-CM

## 2021-02-25 DIAGNOSIS — E55.9 VITAMIN D INSUFFICIENCY: ICD-10-CM

## 2021-02-25 DIAGNOSIS — E83.42 HYPOMAGNESEMIA: ICD-10-CM

## 2021-02-25 DIAGNOSIS — M79.2 PERIPHERAL NEUROPATHIC PAIN: ICD-10-CM

## 2021-02-25 DIAGNOSIS — J43.2 CENTRILOBULAR EMPHYSEMA (HCC): Primary | ICD-10-CM

## 2021-02-25 DIAGNOSIS — E53.8 FOLIC ACID DEFICIENCY: ICD-10-CM

## 2021-02-25 DIAGNOSIS — M25.50 POLYARTHRALGIA: ICD-10-CM

## 2021-02-25 DIAGNOSIS — M17.0 PRIMARY OSTEOARTHRITIS OF BOTH KNEES: ICD-10-CM

## 2021-02-25 DIAGNOSIS — F10.10 ALCOHOL ABUSE: ICD-10-CM

## 2021-02-25 DIAGNOSIS — I10 BENIGN ESSENTIAL HTN: ICD-10-CM

## 2021-02-25 DIAGNOSIS — R60.9 PERIPHERAL EDEMA: ICD-10-CM

## 2021-02-25 DIAGNOSIS — R26.81 GAIT INSTABILITY: ICD-10-CM

## 2021-02-25 LAB
25(OH)D3 SERPL-MCNC: 49.8 NG/ML (ref 30–100)
ALBUMIN SERPL-MCNC: 3.4 G/DL (ref 3.5–5)
ALBUMIN/GLOB SERPL: 0.9 {RATIO} (ref 1.1–2.2)
ALP SERPL-CCNC: 146 U/L (ref 45–117)
ALT SERPL-CCNC: 29 U/L (ref 12–78)
AMMONIA PLAS-SCNC: 33 UMOL/L
ANION GAP SERPL CALC-SCNC: 12 MMOL/L (ref 5–15)
AST SERPL-CCNC: 55 U/L (ref 15–37)
BASOPHILS # BLD: 0 K/UL (ref 0–0.1)
BASOPHILS NFR BLD: 0 % (ref 0–1)
BILIRUB SERPL-MCNC: 0.8 MG/DL (ref 0.2–1)
BUN SERPL-MCNC: 8 MG/DL (ref 6–20)
BUN/CREAT SERPL: 13 (ref 12–20)
CALCIUM SERPL-MCNC: 9 MG/DL (ref 8.5–10.1)
CHLORIDE SERPL-SCNC: 104 MMOL/L (ref 97–108)
CO2 SERPL-SCNC: 23 MMOL/L (ref 21–32)
CREAT SERPL-MCNC: 0.6 MG/DL (ref 0.55–1.02)
CRP SERPL-MCNC: 2.46 MG/DL (ref 0–0.6)
DIFFERENTIAL METHOD BLD: ABNORMAL
EOSINOPHIL # BLD: 0 K/UL (ref 0–0.4)
EOSINOPHIL NFR BLD: 0 % (ref 0–7)
ERYTHROCYTE [DISTWIDTH] IN BLOOD BY AUTOMATED COUNT: 18.1 % (ref 11.5–14.5)
ERYTHROCYTE [SEDIMENTATION RATE] IN BLOOD: 46 MM/HR (ref 0–30)
EST. AVERAGE GLUCOSE BLD GHB EST-MCNC: ABNORMAL MG/DL
FERRITIN SERPL-MCNC: 822 NG/ML (ref 8–252)
FOLATE SERPL-MCNC: 39.5 NG/ML (ref 5–21)
GLOBULIN SER CALC-MCNC: 3.8 G/DL (ref 2–4)
GLUCOSE SERPL-MCNC: 46 MG/DL (ref 65–100)
HBA1C MFR BLD: <3.8 % (ref 4–5.6)
HCT VFR BLD AUTO: 34.5 % (ref 35–47)
HGB BLD-MCNC: 10.7 G/DL (ref 11.5–16)
IMM GRANULOCYTES # BLD AUTO: 0 K/UL
IMM GRANULOCYTES NFR BLD AUTO: 0 %
IRON SATN MFR SERPL: 33 % (ref 20–50)
IRON SERPL-MCNC: 69 UG/DL (ref 35–150)
LYMPHOCYTES # BLD: 1.8 K/UL (ref 0.8–3.5)
LYMPHOCYTES NFR BLD: 42 % (ref 12–49)
MCH RBC QN AUTO: 33 PG (ref 26–34)
MCHC RBC AUTO-ENTMCNC: 31 G/DL (ref 30–36.5)
MCV RBC AUTO: 106.5 FL (ref 80–99)
MONOCYTES # BLD: 0.3 K/UL (ref 0–1)
MONOCYTES NFR BLD: 7 % (ref 5–13)
NEUTS SEG # BLD: 2.1 K/UL (ref 1.8–8)
NEUTS SEG NFR BLD: 51 % (ref 32–75)
NRBC # BLD: 0 K/UL (ref 0–0.01)
NRBC BLD-RTO: 0 PER 100 WBC
PLATELET # BLD AUTO: 103 K/UL (ref 150–400)
PMV BLD AUTO: 10.6 FL (ref 8.9–12.9)
POTASSIUM SERPL-SCNC: 3.5 MMOL/L (ref 3.5–5.1)
PROT SERPL-MCNC: 7.2 G/DL (ref 6.4–8.2)
RBC # BLD AUTO: 3.24 M/UL (ref 3.8–5.2)
RBC MORPH BLD: ABNORMAL
RBC MORPH BLD: ABNORMAL
SODIUM SERPL-SCNC: 139 MMOL/L (ref 136–145)
TIBC SERPL-MCNC: 210 UG/DL (ref 250–450)
TSH SERPL DL<=0.05 MIU/L-ACNC: 1.68 UIU/ML (ref 0.36–3.74)
VIT B12 SERPL-MCNC: 307 PG/ML (ref 193–986)
WBC # BLD AUTO: 4.2 K/UL (ref 3.6–11)
WBC MORPH BLD: ABNORMAL

## 2021-02-25 PROCEDURE — 99214 OFFICE O/P EST MOD 30 MIN: CPT | Performed by: NURSE PRACTITIONER

## 2021-02-25 RX ORDER — FUROSEMIDE 20 MG/1
20 TABLET ORAL
Qty: 30 TAB | Refills: 1 | Status: SHIPPED | OUTPATIENT
Start: 2021-02-25 | End: 2021-05-07 | Stop reason: SDUPTHER

## 2021-02-25 NOTE — PROGRESS NOTES
[facility] Note    Christy Chirinos is a 62 y.o. female who was seen in clinic today (2/25/2021). Subjective:  Cardiovascular Review:  The patient has hypertension, hyperlipidemia and PVD (Bilateral <50% stenosis of the internal carotid arteries). Diet and Lifestyle: generally follows a low fat low cholesterol diet, generally follows a low sodium diet, smoker 1/2 ppd  Home BP Monitoring: not monitoring home readings     Pertinent ROS: taking medications as instructed, no TIA's, no chest pain on exertion, no dyspnea on exertion. Noting swelling of ankles. Patient has a h/o alcohol abuse. Drink 6-7 shots of gin daily. Patient has h/o COPD and current tobacco use. Mediastinal lymphadenopathy noted on prior CT 1/2018. Previously seen by pulmonology, Dr. Gideon Esparza. Possibly related to sarcoidosis. Biopsy was inconclusive. Follow up CT have been stable. Osteoarthritis and Chronic Pain:  Patient has osteoarthritis, primarily affecting the back, knees and joints. Symptoms onset: problem is longstanding. Rheumatological ROS: increasing significant pain in knees. Response to treatment plan: gradually worsening.      Patient currently seen by a peripheral neuropathy clinic in [place]. It is unclear if the providers in are neurologists and what therapies are being performed. Patient reports worsening leg weakness, instability and falls.  also reports some cognitive decline in patient. Prior to Admission medications    Medication Sig Start Date End Date Taking? Authorizing Provider   furosemide (LASIX) 20 mg tablet Take 1 Tab by mouth daily as needed (leg swelling).  2/25/21  Yes Mahesh Baker NP   buPROPion XL (WELLBUTRIN XL) 150 mg tablet TAKE ONE TABLET BY MOUTH EVERY MORNING FOR SMOKING CESSATION 2/15/21  Yes Mahesh Baker NP   pantoprazole (PROTONIX) 40 mg tablet TAKE ONE TABLET BY MOUTH DAILY BEFORE BREAKFAST 2/15/21  Yes Gaviota Abraham Valles NP   thiamine HCL (B-1) 100 mg tablet TAKE ONE TABLET BY MOUTH DAILY 2/1/21  Yes Abraham Meek NP   folic acid (FOLVITE) 1 mg tablet TAKE ONE TABLET BY MOUTH DAILY 1/14/21  Yes Abraham Meek NP   magnesium 200 mg tab TAKE ONE TABLET BY MOUTH DAILY 1/14/21  Yes Abraham Meek NP   potassium chloride (KLOR-CON) 20 mEq pack TAKE ONE PACKET BY MOUTH DAILY 6/2/20  Yes Abraham Meek NP   ergocalciferol (ERGOCALCIFEROL) 1,250 mcg (50,000 unit) capsule Take 1 Cap by mouth every seven (7) days. 6/2/20  Yes Rejeana Scheuermann, NP   doxepin (SINEquan) 25 mg capsule TAKE ONE CAPSULE BY MOUTH ONCE NIGHTLY AS NEEDED FOR SLEEP 6/2/20  Yes Abraham Meek NP   cephALEXin (Keflex) 500 mg capsule Take 1 Cap by mouth three (3) times daily for 7 days. 2/23/21 3/2/21  Brittani Harrison MD   diclofenac (VOLTAREN) 1 % gel Apply  to affected area four (4) times daily. For joint pain 6/2/20   Rejeana Scheuermann, NP   furosemide (LASIX) 20 mg tablet Take 1 Tab by mouth daily as needed (leg swelling). 6/2/20 2/25/21  Rejeana Scheuermann, NP          Allergies   Allergen Reactions    Robaxin [Methocarbamol] Hives, Rash and Itching     Red splotches    Statins-Hmg-Coa Reductase Inhibitors Myalgia    Codeine Itching    Neosporin [Neomycin-Bacitracin-Polymyxin] Rash           ROS  See HPI    Objective:   Physical Exam  Vitals signs and nursing note reviewed. Constitutional:       Appearance: She is well-developed. Neck:      Musculoskeletal: Normal range of motion and neck supple. Thyroid: No thyromegaly. Vascular: No carotid bruit or JVD. Cardiovascular:      Rate and Rhythm: Normal rate and regular rhythm. Heart sounds: No murmur. No friction rub. No gallop. Pulmonary:      Effort: Pulmonary effort is normal. No respiratory distress. Breath sounds: Normal breath sounds. Musculoskeletal:      Right lower leg: Edema (2+ pitting edema) present.       Left lower leg: Edema (2+ pitting edema) present. Comments: Valgus of bilateral knees noted. Lymphadenopathy:      Cervical: No cervical adenopathy. Neurological:      Mental Status: She is alert and oriented to person, place, and time. Psychiatric:         Behavior: Behavior normal.           Visit Vitals  BP (!) 142/79 (BP 1 Location: Right arm, BP Patient Position: Sitting, BP Cuff Size: Small adult)   Pulse 93   Temp 97 °F (36.1 °C) (Temporal)   Ht 5' 1\" (1.549 m)   Wt 119 lb 6.4 oz (54.2 kg)   SpO2 97%   BMI 22.56 kg/m²       Assessment & Plan:  Diagnoses and all orders for this visit:    1. Centrilobular emphysema ([address]  Counseled patient on need to quit smoking. Follow up with pulmonology recommended. 2. Mediastinal lymphadenopathy  Possibly secondary to sarcoidosis. Follow-up with pulmonology recommended. 3. Alcohol abuse  Counseled patient on need to quit drinking alcohol. Will check liver function, iron levels and ammonia. Referral to hepatology for evaluation for cirrhosis. -     AMMONIA; Future  -     METABOLIC PANEL, COMPREHENSIVE; Future  -     IRON PROFILE; Future  -     FERRITIN; Future  -     REFERRAL TO LIVER HEPATOLOGY    4. Peripheral neuropathic pain  Likely secondary to alcohol use. Will request neurology evaluation.  -     HEMOGLOBIN A1C WITH EAG; Future  -     REFERRAL TO HOME HEALTH  -     REFERRAL TO NEUROLOGY    5. Primary osteoarthritis of both knees  Likely contributing to her instability and generalized leg weakness. Request orthopedic evaluation.  -     REFERRAL TO ORTHOPEDIC SURGERY    6. Gait instability  Multifactorial from the peripheral neuropathy, osteoarthritis and edema. [facility] and occupational evaluation and treatment.  -     REFERRAL TO HOME HEALTH    7. Peripheral edema  Multifactorial from decreased ambulation, poor protein intake, alcohol abuse and neuropathy. Patient has no history of CHF. Will use Lasix as needed.   Patient may also use compression stockings.  -     TSH 3RD GENERATION; Future  -     CBC WITH AUTOMATED DIFF; Future  -     REFERRAL TO HOME HEALTH  -     furosemide (LASIX) 20 mg tablet; Take 1 Tab by mouth daily as needed (leg swelling). -     ANKLE BRACHIAL INDEX; Future    8. Protein-calorie malnutrition, severe ([address]  Recommended protein supplement in between meals. 9. Elevated liver function tests  -     REFERRAL TO LIVER HEPATOLOGY    10. Thiamin deficiency  Recheck labs, adjust dosing as needed  -     VITAMIN B12 & FOLATE; Future  -     VITAMIN B1, WHOLE BLOOD; Future    11. Folic acid deficiency  Recheck labs, adjust dosing as needed  -     VITAMIN B12 & FOLATE; Future    12. Hypomagnesemia    13. Benign essential HTN  Stable, no changes to current therapy    14. Vitamin D insufficiency  -     VITAMIN D, 25 HYDROXY; Future    15. Polyarthralgia  Likely osteoarthritis, however will rule out inflammatory joint disease.  -     C REACTIVE PROTEIN, QT; Future  -     SED RATE (ESR); Future  -     RA + CCP ABS; Future        I have discussed the diagnosis with the patient and the intended plan as seen in the above orders. The patient has received an after-visit summary along with patient information handout. I have discussed medication side effects and warnings with the patient as well.             Isabel Pacheco NP

## 2021-02-25 NOTE — TELEPHONE ENCOUNTER
Called patient name and  verified. Advised patient her BG was low when her blood was drawn, just called to check on patient. Patient states that she has eaten and feeling fine. Denies any sx of hypoglycemia.

## 2021-02-25 NOTE — PROGRESS NOTES
1. Have you been to the ER, urgent care clinic since your last visit? Hospitalized since your last visit? Yes When: 2/23/2021 Where: ER Reason for visit: swelling inn feet and legs. Lack of balance    2. Have you seen or consulted any other health care providers outside of the [address] [place] [address] since your last visit? Include any pap smears or colon screening. No    Patient states she has not any vaccines.   Patient states she has not had PAP in years

## 2021-02-25 NOTE — TELEPHONE ENCOUNTER
Marysol from [address] [place] [address] called to inform provider that at this time they are unable to accept the pt just do to not having any availability.

## 2021-02-25 NOTE — TELEPHONE ENCOUNTER
[employer] with [facility] lab called with a critical lab for pt    [employer] was transferred to [place]

## 2021-02-25 NOTE — TELEPHONE ENCOUNTER
Received call from Lyubov Gloria at [facility] lab. Patients name and  verified. Patient had labs drawn this AM. Critical glucose level of 46. Provider notified.

## 2021-02-26 LAB
BACTERIA SPEC CULT: ABNORMAL
BACTERIA SPEC CULT: ABNORMAL
CC UR VC: ABNORMAL
SERVICE CMNT-IMP: ABNORMAL

## 2021-02-27 LAB
CCP IGA+IGG SERPL IA-ACNC: 6 UNITS (ref 0–19)
RHEUMATOID FACT SERPL-ACNC: 11.7 IU/ML (ref 0–13.9)

## 2021-03-02 LAB — VIT B1 BLD-SCNC: 244.2 NMOL/L (ref 66.5–200)

## 2021-03-08 ENCOUNTER — OFFICE VISIT (OUTPATIENT)
Dept: ONCOLOGY | Age: 61
End: 2021-03-08
Payer: COMMERCIAL

## 2021-03-08 VITALS
WEIGHT: 112 LBS | BODY MASS INDEX: 21.16 KG/M2 | HEART RATE: 98 BPM | OXYGEN SATURATION: 99 % | TEMPERATURE: 97.6 F | DIASTOLIC BLOOD PRESSURE: 83 MMHG | SYSTOLIC BLOOD PRESSURE: 130 MMHG

## 2021-03-08 DIAGNOSIS — Z87.11 HISTORY OF GASTRIC ULCER: ICD-10-CM

## 2021-03-08 DIAGNOSIS — R59.0 MEDIASTINAL LYMPHADENOPATHY: ICD-10-CM

## 2021-03-08 DIAGNOSIS — M25.562 LEFT KNEE PAIN, UNSPECIFIED CHRONICITY: Primary | ICD-10-CM

## 2021-03-08 DIAGNOSIS — M25.561 RIGHT KNEE PAIN, UNSPECIFIED CHRONICITY: ICD-10-CM

## 2021-03-08 DIAGNOSIS — G62.9 NEUROPATHY: ICD-10-CM

## 2021-03-08 DIAGNOSIS — F17.200 SMOKER: ICD-10-CM

## 2021-03-08 DIAGNOSIS — F10.10 ALCOHOL ABUSE: ICD-10-CM

## 2021-03-08 DIAGNOSIS — D64.9 CHRONIC ANEMIA: Primary | ICD-10-CM

## 2021-03-08 PROCEDURE — 99213 OFFICE O/P EST LOW 20 MIN: CPT | Performed by: INTERNAL MEDICINE

## 2021-03-08 NOTE — PROGRESS NOTES
[facility] [place] [facility]  [address], [place]: [phone]  F: [phone]    Reason for Visit:   Aldo Basilio is a 62 y.o. female who is seen today in office for follow up of anemia     History of Present Illness:   Aldo Basilio is a 62 y.o. female seen today for office for follow up of anemia and mediastinal LNs. She was last seen here in 2/20. Last CT Chest in 2/20 showed chronic, generalized mediastinal and bilateral hilar lymphadenopathy with calcifications, consistent with sarcoidosis. She reports that she has seen Pulmonary twice - no records here. She is taking still Vitamin B and Folate. She continues to drink alcohol and smoke cigarettes. She reports that she drinks \"too much\" but is unable to tell me how much. She had labs on 2/25/21 with her PCP. Hgb was stable at 10.7 g/dL. Vitamin B12, Folate, and Iron Studies stable. Her  is here today.      Past Medical History:   Diagnosis Date    Alcohol abuse     GI (gastrointestinal bleed)     Insomnia 11/09    Iron deficiency anemia 04/2004    PPD positive     treated w/ INH- tests since have been negative    Pure hypercholesterolemia     Scoliosis 12/28/15    [facility]    Tobacco dependence     Unspecified essential hypertension     Uterine bleeding, dysfunctional 2/12    Uterine fibroid       Past Surgical History:   Procedure Laterality Date    COLONOSCOPY N/A 1/31/2018    [facility] HX CHOLECYSTECTOMY      HX COLONOSCOPY  07/01/2013    MCV, repeat 10 years    HX ORTHOPAEDIC      Shattered bilateral ankles-metal plates & screws    HX OTHER SURGICAL      Benign cyst removal- L. foot    NY LEG/ANKLE SURGERY PROC UNLISTED        Social History     Tobacco Use    Smoking status: Current Every Day Smoker     Packs/day: 0.50     Years: 17.00     Pack years: 8.50     Types: Cigarettes    Smokeless tobacco: Never Used   Substance Use Topics    Alcohol use: Yes     Alcohol/week: 2.0 standard drinks     Types: 1 Cans of beer, 1 Shots of liquor per week     Frequency: 4 or more times a week     Drinks per session: 5 or 6     Binge frequency: Never     Comment: Socially      Family History   Problem Relation Age of Onset    Hypertension Mother     Diabetes Mother     Cancer Mother         breast    Heart Disease Father         MI 42's    Kidney Disease Father     Hypertension Son      Current Outpatient Medications   Medication Sig    furosemide (LASIX) 20 mg tablet Take 1 Tab by mouth daily as needed (leg swelling).  buPROPion XL (WELLBUTRIN XL) 150 mg tablet TAKE ONE TABLET BY MOUTH EVERY MORNING FOR SMOKING CESSATION    pantoprazole (PROTONIX) 40 mg tablet TAKE ONE TABLET BY MOUTH DAILY BEFORE BREAKFAST    thiamine HCL (B-1) 100 mg tablet TAKE ONE TABLET BY MOUTH DAILY    folic acid (FOLVITE) 1 mg tablet TAKE ONE TABLET BY MOUTH DAILY    magnesium 200 mg tab TAKE ONE TABLET BY MOUTH DAILY    diclofenac (VOLTAREN) 1 % gel Apply  to affected area four (4) times daily. For joint pain    potassium chloride (KLOR-CON) 20 mEq pack TAKE ONE PACKET BY MOUTH DAILY    ergocalciferol (ERGOCALCIFEROL) 1,250 mcg (50,000 unit) capsule Take 1 Cap by mouth every seven (7) days.  doxepin (SINEquan) 25 mg capsule TAKE ONE CAPSULE BY MOUTH ONCE NIGHTLY AS NEEDED FOR SLEEP     No current facility-administered medications for this visit. Allergies   Allergen Reactions    Robaxin [Methocarbamol] Hives, Rash and Itching     Red splotches    Statins-Hmg-Coa Reductase Inhibitors Myalgia    Codeine Itching    Neosporin [Neomycin-Bacitracin-Polymyxin] Rash     Review of Systems:  A complete review of systems was obtained, negative except as described above and as reported on ROS sheet scanned into system.      Physical Exam:     Visit Vitals  /83 (BP 1 Location: Left upper arm, BP Patient Position: Sitting)   Pulse 98   Temp 97.6 °F (36.4 °C) (Temporal)   Wt 112 lb (50.8 kg)   SpO2 99%   BMI 21.16 kg/m²     ECOG PS: 1  General: No acute distress, alert, pleasant  Eyes:  Conjugate gaze, anicteric sclerae  HENT: Atraumatic, wearing a mask   Neck: Supple  Respiratory: Clear with decreased bs in bases b/l  CV:  Regular rhythm. No peripheral edema  GI: Soft, nontender, nondistended  MS: In wheelchair   Skin: Warm and dry  Neuro/Psych: Altered, conversant. Generalized weakness. With neuropathy. Results:   Labs per PCP    Lab Results   Component Value Date/Time    WBC 4.2 02/25/2021 09:43 AM    HGB 10.7 (L) 02/25/2021 09:43 AM    HCT 34.5 (L) 02/25/2021 09:43 AM    PLATELET 887 (L) 98/60/9619 09:43 AM    .5 (H) 02/25/2021 09:43 AM    ABS. NEUTROPHILS 2.1 02/25/2021 09:43 AM    Hemoglobin (POC) 4.9 02/16/2018 02:46 PM    Hemoglobin (POC) 11.2 (L) 01/26/2018 04:08 PM    Hematocrit (POC) 33 (L) 01/26/2018 04:08 PM     Lab Results   Component Value Date/Time    Sodium 139 02/25/2021 09:43 AM    Potassium 3.5 02/25/2021 09:43 AM    Chloride 104 02/25/2021 09:43 AM    CO2 23 02/25/2021 09:43 AM    Glucose 46 (LL) 02/25/2021 09:43 AM    BUN 8 02/25/2021 09:43 AM    Creatinine 0.60 02/25/2021 09:43 AM    GFR est AA >60 02/25/2021 09:43 AM    GFR est non-AA >60 02/25/2021 09:43 AM    Calcium 9.0 02/25/2021 09:43 AM    Sodium (POC) 144 01/26/2018 04:08 PM    Potassium (POC) <2.0 (LL) 01/26/2018 04:08 PM    Chloride (POC) 98 01/26/2018 04:08 PM    Glucose (POC) 111 (H) 02/26/2018 11:55 AM    BUN (POC) <3 (L) 01/26/2018 04:08 PM    Creatinine (POC) 0.6 01/26/2018 04:08 PM    Calcium, ionized (POC) 1.06 (L) 01/26/2018 04:08 PM     Lab Results   Component Value Date/Time    Bilirubin, total 0.8 02/25/2021 09:43 AM    ALT (SGPT) 29 02/25/2021 09:43 AM    Alk.  phosphatase 146 (H) 02/25/2021 09:43 AM    Protein, total 7.2 02/25/2021 09:43 AM    Albumin 3.4 (L) 02/25/2021 09:43 AM    Globulin 3.8 02/25/2021 09:43 AM     Assessment and Recommendations:   1. Chronic Normocytic Anemia    Likely secondary to alcohol abuse/marrow suppression  Last labs with PCP on 2/25/21 were stable. Hgb was 10.7 g/dL. Recommend continue to have labs every 6 months with PCP. She has many chronic medical problems/sees many specialists. Anemia chronic disease most likely. No treatment needed from here. Can follow up here as needed. 2. Alcohol Abuse  Liver ultrasound 2/18/19 with diffusely hepatic echogenicity compatible with hepatic steatosis. No sonographic evidence of mass. She continues to drink alcohol - cannot tell me how much. Management per PCP - has been referred to Hepatology per PCP. 3. Hx of Gastric Ulcer  Management per GI    4. Mediastinal LAD   Likely sarcoidosis per CT report. Last CT Chest 2/20 stable. Personally reviewed CT. Patient reports that she has seen Pulmonary twice - no records here. 5. Neuropathy in Feet   Likely due to alcohol. Management per PCP - she has been referred to Neurology per PCP. Call if questions. Follow up here as needed. This patient was seen in conjunction with Lorelei العراقي NP. I personally performed a face to face diagnostic evaluation on this patient. I personally reviewed the history and performed the key points on the exam.   I personally reviewed all points in the assessment and created treatment plan with the patient. Specifically, met with pt and  today. Pt is in w/c with limited mobility. Seen here for chronic anemia/ due to chronic disease. Records CC reviewed and hgb has been stable. Not on any treatment from us. Recommend continued surveillance with PCP. Fu with us prn. Pt and family agreeable to this plan.      Signed: Karina Noguera DO

## 2021-03-08 NOTE — PROGRESS NOTES
Julieth Wheeler is a 62 y.o. female  Chief Complaint   Patient presents with    Follow-up     anemia  and mediastinal LNs     1. Have you been to the ER, urgent care clinic since your last visit? Hospitalized since your last visit? Yes, patient went to the ER for having swollen hands but was told she had a UTI. 2. Have you seen or consulted any other health care providers outside of the [address] [place] [address] since your last visit? Include any pap smears or colon screening.  No.

## 2021-03-09 ENCOUNTER — APPOINTMENT (OUTPATIENT)
Dept: CT IMAGING | Age: 61
End: 2021-03-09
Attending: EMERGENCY MEDICINE
Payer: COMMERCIAL

## 2021-03-09 ENCOUNTER — HOSPITAL ENCOUNTER (EMERGENCY)
Age: 61
Discharge: HOME | End: 2021-03-09
Attending: EMERGENCY MEDICINE
Payer: COMMERCIAL

## 2021-03-09 VITALS
HEART RATE: 102 BPM | OXYGEN SATURATION: 100 % | BODY MASS INDEX: 20.61 KG/M2 | HEIGHT: 62 IN | WEIGHT: 112 LBS | RESPIRATION RATE: 18 BRPM | TEMPERATURE: 97.5 F | SYSTOLIC BLOOD PRESSURE: 110 MMHG | DIASTOLIC BLOOD PRESSURE: 72 MMHG

## 2021-03-09 DIAGNOSIS — F10.10 ALCOHOL ABUSE: Primary | ICD-10-CM

## 2021-03-09 LAB
ALBUMIN SERPL-MCNC: 2.9 G/DL (ref 3.5–5)
ALBUMIN/GLOB SERPL: 0.7 {RATIO} (ref 1.1–2.2)
ALP SERPL-CCNC: 124 U/L (ref 45–117)
ALT SERPL-CCNC: 24 U/L (ref 12–78)
ANION GAP SERPL CALC-SCNC: 10 MMOL/L (ref 5–15)
APPEARANCE UR: CLEAR
AST SERPL-CCNC: 50 U/L (ref 15–37)
BACTERIA URNS QL MICRO: NEGATIVE /HPF
BASOPHILS # BLD: 0 K/UL (ref 0–0.1)
BASOPHILS NFR BLD: 0 % (ref 0–1)
BILIRUB SERPL-MCNC: 0.7 MG/DL (ref 0.2–1)
BILIRUB UR QL: NEGATIVE
BUN SERPL-MCNC: 13 MG/DL (ref 6–20)
BUN/CREAT SERPL: 22 (ref 12–20)
CALCIUM SERPL-MCNC: 8.7 MG/DL (ref 8.5–10.1)
CHLORIDE SERPL-SCNC: 103 MMOL/L (ref 97–108)
CO2 SERPL-SCNC: 26 MMOL/L (ref 21–32)
COLOR UR: ABNORMAL
CREAT SERPL-MCNC: 0.6 MG/DL (ref 0.55–1.02)
DIFFERENTIAL METHOD BLD: ABNORMAL
EOSINOPHIL # BLD: 0.1 K/UL (ref 0–0.4)
EOSINOPHIL NFR BLD: 1 % (ref 0–7)
EPITH CASTS URNS QL MICRO: ABNORMAL /LPF
ERYTHROCYTE [DISTWIDTH] IN BLOOD BY AUTOMATED COUNT: 17.2 % (ref 11.5–14.5)
ETHANOL SERPL-MCNC: 109 MG/DL
GLOBULIN SER CALC-MCNC: 4.1 G/DL (ref 2–4)
GLUCOSE SERPL-MCNC: 72 MG/DL (ref 65–100)
GLUCOSE UR STRIP.AUTO-MCNC: NEGATIVE MG/DL
HCT VFR BLD AUTO: 31.4 % (ref 35–47)
HGB BLD-MCNC: 9.9 G/DL (ref 11.5–16)
HGB UR QL STRIP: NEGATIVE
IMM GRANULOCYTES # BLD AUTO: 0 K/UL (ref 0–0.04)
IMM GRANULOCYTES NFR BLD AUTO: 0 % (ref 0–0.5)
KETONES UR QL STRIP.AUTO: NEGATIVE MG/DL
LEUKOCYTE ESTERASE UR QL STRIP.AUTO: ABNORMAL
LYMPHOCYTES # BLD: 1.7 K/UL (ref 0.8–3.5)
LYMPHOCYTES NFR BLD: 30 % (ref 12–49)
MAGNESIUM SERPL-MCNC: 1.8 MG/DL (ref 1.6–2.4)
MCH RBC QN AUTO: 33.3 PG (ref 26–34)
MCHC RBC AUTO-ENTMCNC: 31.5 G/DL (ref 30–36.5)
MCV RBC AUTO: 105.7 FL (ref 80–99)
MONOCYTES # BLD: 0.4 K/UL (ref 0–1)
MONOCYTES NFR BLD: 7 % (ref 5–13)
NEUTS SEG # BLD: 3.3 K/UL (ref 1.8–8)
NEUTS SEG NFR BLD: 62 % (ref 32–75)
NITRITE UR QL STRIP.AUTO: POSITIVE
NRBC # BLD: 0 K/UL (ref 0–0.01)
NRBC BLD-RTO: 0 PER 100 WBC
PH UR STRIP: 6 [PH] (ref 5–8)
PLATELET # BLD AUTO: 139 K/UL (ref 150–400)
PMV BLD AUTO: 10.3 FL (ref 8.9–12.9)
POTASSIUM SERPL-SCNC: 3.6 MMOL/L (ref 3.5–5.1)
PROT SERPL-MCNC: 7 G/DL (ref 6.4–8.2)
PROT UR STRIP-MCNC: NEGATIVE MG/DL
RBC # BLD AUTO: 2.97 M/UL (ref 3.8–5.2)
RBC #/AREA URNS HPF: ABNORMAL /HPF (ref 0–5)
RBC MORPH BLD: ABNORMAL
RBC MORPH BLD: ABNORMAL
SODIUM SERPL-SCNC: 139 MMOL/L (ref 136–145)
SP GR UR REFRACTOMETRY: 1.01 (ref 1–1.03)
UA: UC IF INDICATED,UAUC: ABNORMAL
UROBILINOGEN UR QL STRIP.AUTO: 0.2 EU/DL (ref 0.2–1)
WBC # BLD AUTO: 5.5 K/UL (ref 3.6–11)
WBC URNS QL MICRO: ABNORMAL /HPF (ref 0–4)

## 2021-03-09 PROCEDURE — 36415 COLL VENOUS BLD VENIPUNCTURE: CPT

## 2021-03-09 PROCEDURE — 70450 CT HEAD/BRAIN W/O DYE: CPT

## 2021-03-09 PROCEDURE — 81001 URINALYSIS AUTO W/SCOPE: CPT

## 2021-03-09 PROCEDURE — 74011250637 HC RX REV CODE- 250/637: Performed by: EMERGENCY MEDICINE

## 2021-03-09 PROCEDURE — 80053 COMPREHEN METABOLIC PANEL: CPT

## 2021-03-09 PROCEDURE — 85025 COMPLETE CBC W/AUTO DIFF WBC: CPT

## 2021-03-09 PROCEDURE — 82077 ASSAY SPEC XCP UR&BREATH IA: CPT

## 2021-03-09 PROCEDURE — 99284 EMERGENCY DEPT VISIT MOD MDM: CPT

## 2021-03-09 PROCEDURE — 83735 ASSAY OF MAGNESIUM: CPT

## 2021-03-09 RX ORDER — CHLORDIAZEPOXIDE HYDROCHLORIDE 25 MG/1
25 CAPSULE, GELATIN COATED ORAL ONCE
Status: COMPLETED | OUTPATIENT
Start: 2021-03-09 | End: 2021-03-09

## 2021-03-09 RX ADMIN — CHLORDIAZEPOXIDE HYDROCHLORIDE 25 MG: 25 CAPSULE ORAL at 11:04

## 2021-03-09 NOTE — DISCHARGE INSTRUCTIONS
Substance Abuse and Addiction Resources    [facility]  [phone]  Closed meeting- family members that have been affected bysomeone alcohol abuse  Open meeting everyone can attend. Alcoholic's Anonymous [phone]  Non professional (alcoholics in recovery helping others)  Hold Meetings (talk about sobriety, have desire)  No charge    Lidia Khan is the Treatment Consultant in the [facility] area  Free one-on-one phone consultation and insurance verification  12 step based meetings and philosophy  Family programs and sessions    [facility] [phone]  Free individual assessment  Intensive outpatient outpatient program  Ambulatory withdrawal management/detoxification  Insurance required    [employer]  [phone] [facility] location), [phone] ([place] location)  An Office Based Opioid Treatment Program  Individual and Group therapy, Peer Recovery Services and Care Coordination  Accepting Medicare, Medicaid and Commercial/private insurance  $200 a month self-pay program (does not include medicine or outside labs)  [address] [phone] (Monday - Friday, 9a-5p. Standing [facility] ED referral appointment 10:00-11:00 Monday)  [address] [place] [address] (Tuesday - Friday, 9a-5p Standing [facility] ED referral appointment 10:00-11:00 Tuesday - Friday)    [facility] [phone] ext [phone] or [phone]  Good for patients with no insurance, Medicaid, Medicare  Sliding fee scale available for self pay  Patients go Monday-Friday from 8-4:30 to central intake for a shelter and then to [facility] for services  Offers a variety of services I.e.  Laundry, shower, eye clinic, medical clinic, dental, substance abuse services, case management, etc.    [facility] [phone]  Make appointment with counselor  $96 fee for initial hour intake    [address] [address] [phone]  Offers Detox and Inpatient Treatment for men only. Social detox  Is a homeless shelter with longterm 12 step program. Can choose just access to the hshelter component  Must be able to walk <1miles one way to attend classes, be highly motivated and willing to complete all 12 steps. 8 months- 1 year is the typical length of stay if accepted    [facility] [place] [phone]  For residents of [facility]  They offer outpatient treatment and can make referrals for inpatient careBased on income. Will [employer]. Accept Medicaid. They have a walk in clinic that patients can go to be screened for services. Two on the [facility] [place] side of [facility]:   [address] [address], [place] [address] (near [facility]). Walk in hours: Mon or Wed       12:30pm - Gaby Leahy. Walk in hours: Tuesday 12:30pm 3pm Wed       8:30am- [address] [place] [phone] [facility] location)   [address] [place], [place] [address] [place] [address]  Alcohol detox and rehabilitation  Opioid and other drug rehabilitation, Medication assisted treatment  Accepts commercial/private insurance  Call or email Elsacorina Lemus: [phone], [email or web address] to schedule an appointment    [facility] [phone]  $3500 entry fee  12 step meeting plan  No sex offenders accepted. Must get a job within 30 days after admission  After the 12 week, additional $125/week to stay    Narcotic Anonymous [phone], [phone]  Will refer to [facility] into Triage (takes 10-15 minutes)  Proof of [facility] [address] residence with Picture ID  Medicaid and Self Pay. NO Private insurance    [place] [phone]  Referrals come from organizations like [employer], [employer], [facility]. Must be self pay. No insurance allowed. No patients on prescribed narcotics. No sex offenders.   Need all medical mental health information and medication list sent prior to admit. Brenda Hernandez [phone] ext [phone] between 21-65  Need social security card and picture ID  Must pass breath test and 10 panel Urine Drug Screen  No Sex Offenders or Psychiatric patients  Must not have been a 3x repeat at this facility  6 month in house- has to participate in work therapy 40 hours/week  Participates in spiritual classes, bible study and Jehovah's witness    Citizen of [place] Alcoholics Anonymous [address] [employer], sent by [employer]  No Sex [address] (by [address])    [address] [facility]  [phone]  Monday through Friday 8:30am to 5:00pm  Brief Telephone Interview. Then will be scheduled for next substance abuse services orientation group and then scheduled for intake interview. [facility]  [phone]  Walk in Monday through Friday 9:00AM to 3:00PM  Bring Picture ID, Proof of residence (piece of mail), Proof of Insurance ([address] scale), Proof of income (any)    Melanie B [phone]  Walk in then Assessment by licensed professional   [place] office ([address] [facility]) Monday, Tuesday, Thursday  9AM to [facility] office ([address] [place], [address]) [facility] 81  [phone]  Walk In Monday to Friday starting at [address] [facility] [address], Proof of residence (piece of mail), Proof of insurance (Medicaid/sliding scale)  At 9AM is the Substance Abuse Services Orientation Group and then scheduled for Intake Evaluation.

## 2021-03-09 NOTE — ED NOTES
Patient discharged by Dr. Nani Ryan. Patient provided with discharge instructions Rx and instructions on follow up care. Patient out of ED via wheelchair accompanied by spouse.

## 2021-03-09 NOTE — ED NOTES
Pt received from triage with c/o \"wanting to detox\" from alcohol abuse. Pt reports taking approximately \"50 shots of gin a day\". Last drink was right before she came into ED. Pt reports going to detox 4 years ago when her mother  and was sober for 3 months and then relapsed. Pt presently is complaining of generalized body aches and states \"her bones hurt\". Pt is A&Ox4 and on cardiac monitor x3.

## 2021-03-10 ENCOUNTER — HOSPITAL ENCOUNTER (OUTPATIENT)
Dept: VASCULAR SURGERY | Age: 61
Discharge: HOME | End: 2021-03-10
Payer: COMMERCIAL

## 2021-03-10 DIAGNOSIS — R60.9 PERIPHERAL EDEMA: ICD-10-CM

## 2021-03-10 LAB
LEFT ABI: 1
LEFT ANTERIOR TIBIAL: 112 MMHG
LEFT ARM BP: 110 MMHG
LEFT ATA BP LEVEL: NORMAL
LEFT POSTERIOR TIBIAL: 120 MMHG
LEFT TBI: 0.68
LEFT TOE PRESSURE: 81 MMHG
RIGHT ABI: 1.01
RIGHT ANTERIOR TIBIAL: 118 MMHG
RIGHT ARM BP: 120 MMHG
RIGHT ATA BP LEVEL: NORMAL
RIGHT POSTERIOR TIBIAL: 121 MMHG
RIGHT TBI: 0.7
RIGHT TOE PRESSURE: 84 MMHG

## 2021-03-10 PROCEDURE — 93922 UPR/L XTREMITY ART 2 LEVELS: CPT

## 2021-03-11 ENCOUNTER — HOSPITAL ENCOUNTER (OUTPATIENT)
Dept: GENERAL RADIOLOGY | Age: 61
Discharge: HOME | End: 2021-03-11
Payer: COMMERCIAL

## 2021-03-11 ENCOUNTER — OFFICE VISIT (OUTPATIENT)
Dept: ORTHOPEDIC SURGERY | Age: 61
End: 2021-03-11
Payer: COMMERCIAL

## 2021-03-11 VITALS
SYSTOLIC BLOOD PRESSURE: 113 MMHG | HEART RATE: 101 BPM | RESPIRATION RATE: 18 BRPM | OXYGEN SATURATION: 100 % | DIASTOLIC BLOOD PRESSURE: 75 MMHG | TEMPERATURE: 97.7 F | BODY MASS INDEX: 20.82 KG/M2 | HEIGHT: 62 IN

## 2021-03-11 DIAGNOSIS — M17.0 BILATERAL PRIMARY OSTEOARTHRITIS OF KNEE: Primary | ICD-10-CM

## 2021-03-11 DIAGNOSIS — M25.561 RIGHT KNEE PAIN, UNSPECIFIED CHRONICITY: ICD-10-CM

## 2021-03-11 DIAGNOSIS — M25.562 LEFT KNEE PAIN, UNSPECIFIED CHRONICITY: ICD-10-CM

## 2021-03-11 PROCEDURE — 99203 OFFICE O/P NEW LOW 30 MIN: CPT | Performed by: ORTHOPAEDIC SURGERY

## 2021-03-11 PROCEDURE — 20610 DRAIN/INJ JOINT/BURSA W/O US: CPT | Performed by: ORTHOPAEDIC SURGERY

## 2021-03-11 PROCEDURE — 73562 X-RAY EXAM OF KNEE 3: CPT

## 2021-03-11 RX ORDER — BETAMETHASONE SODIUM PHOSPHATE AND BETAMETHASONE ACETATE 3; 3 MG/ML; MG/ML
6 INJECTION, SUSPENSION INTRA-ARTICULAR; INTRALESIONAL; INTRAMUSCULAR; SOFT TISSUE ONCE
Qty: 1 ML | Refills: 0
Start: 2021-03-11 | End: 2021-03-11

## 2021-03-11 NOTE — PROGRESS NOTES
Aldo Basilio is a 62 y.o. female  Chief Complaint   Patient presents with    Knee Pain     Bilateral     Health Maintenance Due   Topic Date Due    Pneumococcal 0-64 years (1 of 1 - PPSV23) Never done    COVID-19 Vaccine (1 of 2) Never done    Shingrix Vaccine Age 50> (1 of 2) Never done    PAP AKA CERVICAL CYTOLOGY  06/21/2019    Flu Vaccine (1) Never done    Breast Cancer Screen Mammogram  01/14/2021     Visit Vitals  /75   Pulse (!) 101   Temp 97.7 °F (36.5 °C) (Oral)   Resp 18   Ht 5' 1.5\" (1.562 m)   SpO2 100%   BMI 20.82 kg/m²

## 2021-03-11 NOTE — PROGRESS NOTES
Call place to patient no answer left message for patient to return call to office regarding recent test results

## 2021-03-16 ENCOUNTER — TELEPHONE (OUTPATIENT)
Dept: FAMILY MEDICINE CLINIC | Age: 61
End: 2021-03-16

## 2021-03-16 DIAGNOSIS — E43 PROTEIN-CALORIE MALNUTRITION, SEVERE (HCC): ICD-10-CM

## 2021-03-16 DIAGNOSIS — F10.10 ALCOHOL ABUSE: ICD-10-CM

## 2021-03-16 DIAGNOSIS — R26.81 GAIT INSTABILITY: Primary | ICD-10-CM

## 2021-03-16 NOTE — TELEPHONE ENCOUNTER
Returned call to patients . Patients name and  verified.  would like to get patient HH due to generalized weakness. He stated he spoke with Medicare and they will cover it. He just needed to speak with the provider. Please advise.

## 2021-03-16 NOTE — TELEPHONE ENCOUNTER
Ottoniel Ludmila  () called wants nurse to call. He's  trying to get home health for his wife. Was told he had to go through the PCP.     Requesting a call back    Best call back # [phone]

## 2021-03-16 NOTE — PROGRESS NOTES
Outbound call to patients . Name and  verified. reviewed recent test results with  he expressed understanding.

## 2021-03-17 ENCOUNTER — HOME HEALTH ADMISSION (OUTPATIENT)
Dept: HOME HEALTH SERVICES | Facility: HOME HEALTH | Age: 61
End: 2021-03-17
Payer: COMMERCIAL

## 2021-03-17 NOTE — TELEPHONE ENCOUNTER
Spoke with Marysol at [facility]. She received referral and will contact  once it is approved.

## 2021-03-18 ENCOUNTER — HOME CARE VISIT (OUTPATIENT)
Dept: HOME HEALTH SERVICES | Facility: HOME HEALTH | Age: 61
End: 2021-03-18

## 2021-03-18 ENCOUNTER — APPOINTMENT (OUTPATIENT)
Dept: CT IMAGING | Age: 61
End: 2021-03-18
Attending: EMERGENCY MEDICINE
Payer: COMMERCIAL

## 2021-03-18 ENCOUNTER — HOSPITAL ENCOUNTER (EMERGENCY)
Age: 61
Discharge: HOME | End: 2021-03-18
Attending: EMERGENCY MEDICINE
Payer: COMMERCIAL

## 2021-03-18 VITALS
HEART RATE: 87 BPM | DIASTOLIC BLOOD PRESSURE: 82 MMHG | OXYGEN SATURATION: 98 % | RESPIRATION RATE: 20 BRPM | SYSTOLIC BLOOD PRESSURE: 150 MMHG

## 2021-03-18 DIAGNOSIS — F10.930 ALCOHOL WITHDRAWAL SYNDROME WITHOUT COMPLICATION (HCC): ICD-10-CM

## 2021-03-18 DIAGNOSIS — R44.3 HALLUCINATIONS: Primary | ICD-10-CM

## 2021-03-18 LAB
ALBUMIN SERPL-MCNC: 2.8 G/DL (ref 3.5–5)
ALBUMIN/GLOB SERPL: 0.8 {RATIO} (ref 1.1–2.2)
ALP SERPL-CCNC: 99 U/L (ref 45–117)
ALT SERPL-CCNC: 21 U/L (ref 12–78)
AMPHET UR QL SCN: NEGATIVE
ANION GAP SERPL CALC-SCNC: 5 MMOL/L (ref 5–15)
APPEARANCE UR: CLEAR
AST SERPL-CCNC: 37 U/L (ref 15–37)
BACTERIA URNS QL MICRO: NEGATIVE /HPF
BARBITURATES UR QL SCN: NEGATIVE
BASOPHILS # BLD: 0.1 K/UL (ref 0–0.1)
BASOPHILS NFR BLD: 1 % (ref 0–1)
BENZODIAZ UR QL: POSITIVE
BILIRUB SERPL-MCNC: 0.5 MG/DL (ref 0.2–1)
BILIRUB UR QL: NEGATIVE
BUN SERPL-MCNC: 15 MG/DL (ref 6–20)
BUN/CREAT SERPL: 17 (ref 12–20)
CALCIUM SERPL-MCNC: 8.7 MG/DL (ref 8.5–10.1)
CANNABINOIDS UR QL SCN: NEGATIVE
CHLORIDE SERPL-SCNC: 110 MMOL/L (ref 97–108)
CO2 SERPL-SCNC: 30 MMOL/L (ref 21–32)
COCAINE UR QL SCN: NEGATIVE
COLOR UR: ABNORMAL
CREAT SERPL-MCNC: 0.87 MG/DL (ref 0.55–1.02)
DIFFERENTIAL METHOD BLD: ABNORMAL
DRUG SCRN COMMENT,DRGCM: ABNORMAL
EOSINOPHIL # BLD: 0.1 K/UL (ref 0–0.4)
EOSINOPHIL NFR BLD: 1 % (ref 0–7)
EPITH CASTS URNS QL MICRO: ABNORMAL /LPF
ERYTHROCYTE [DISTWIDTH] IN BLOOD BY AUTOMATED COUNT: 17.9 % (ref 11.5–14.5)
ETHANOL SERPL-MCNC: <10 MG/DL
GLOBULIN SER CALC-MCNC: 3.6 G/DL (ref 2–4)
GLUCOSE SERPL-MCNC: 92 MG/DL (ref 65–100)
GLUCOSE UR STRIP.AUTO-MCNC: NEGATIVE MG/DL
HCT VFR BLD AUTO: 34.9 % (ref 35–47)
HGB BLD-MCNC: 10.9 G/DL (ref 11.5–16)
HGB UR QL STRIP: NEGATIVE
HYALINE CASTS URNS QL MICRO: ABNORMAL /LPF (ref 0–5)
IMM GRANULOCYTES # BLD AUTO: 0 K/UL (ref 0–0.04)
IMM GRANULOCYTES NFR BLD AUTO: 0 % (ref 0–0.5)
KETONES UR QL STRIP.AUTO: NEGATIVE MG/DL
LEUKOCYTE ESTERASE UR QL STRIP.AUTO: NEGATIVE
LYMPHOCYTES # BLD: 2 K/UL (ref 0.8–3.5)
LYMPHOCYTES NFR BLD: 27 % (ref 12–49)
MAGNESIUM SERPL-MCNC: 2 MG/DL (ref 1.6–2.4)
MCH RBC QN AUTO: 33.6 PG (ref 26–34)
MCHC RBC AUTO-ENTMCNC: 31.2 G/DL (ref 30–36.5)
MCV RBC AUTO: 107.7 FL (ref 80–99)
METHADONE UR QL: NEGATIVE
MONOCYTES # BLD: 0.8 K/UL (ref 0–1)
MONOCYTES NFR BLD: 11 % (ref 5–13)
NEUTS SEG # BLD: 4.5 K/UL (ref 1.8–8)
NEUTS SEG NFR BLD: 60 % (ref 32–75)
NITRITE UR QL STRIP.AUTO: NEGATIVE
NRBC # BLD: 0 K/UL (ref 0–0.01)
NRBC BLD-RTO: 0 PER 100 WBC
OPIATES UR QL: NEGATIVE
PCP UR QL: NEGATIVE
PH UR STRIP: 8.5 [PH] (ref 5–8)
PLATELET # BLD AUTO: 127 K/UL (ref 150–400)
PMV BLD AUTO: 11.1 FL (ref 8.9–12.9)
POTASSIUM SERPL-SCNC: 3.5 MMOL/L (ref 3.5–5.1)
PROT SERPL-MCNC: 6.4 G/DL (ref 6.4–8.2)
PROT UR STRIP-MCNC: ABNORMAL MG/DL
RBC # BLD AUTO: 3.24 M/UL (ref 3.8–5.2)
RBC #/AREA URNS HPF: ABNORMAL /HPF (ref 0–5)
RBC MORPH BLD: ABNORMAL
RBC MORPH BLD: ABNORMAL
SODIUM SERPL-SCNC: 145 MMOL/L (ref 136–145)
SP GR UR REFRACTOMETRY: 1.02 (ref 1–1.03)
UROBILINOGEN UR QL STRIP.AUTO: 1 EU/DL (ref 0.2–1)
WBC # BLD AUTO: 7.5 K/UL (ref 3.6–11)
WBC URNS QL MICRO: ABNORMAL /HPF (ref 0–4)

## 2021-03-18 PROCEDURE — 81001 URINALYSIS AUTO W/SCOPE: CPT

## 2021-03-18 PROCEDURE — 74011000250 HC RX REV CODE- 250: Performed by: EMERGENCY MEDICINE

## 2021-03-18 PROCEDURE — 96360 HYDRATION IV INFUSION INIT: CPT

## 2021-03-18 PROCEDURE — 36415 COLL VENOUS BLD VENIPUNCTURE: CPT

## 2021-03-18 PROCEDURE — 80053 COMPREHEN METABOLIC PANEL: CPT

## 2021-03-18 PROCEDURE — 96366 THER/PROPH/DIAG IV INF ADDON: CPT

## 2021-03-18 PROCEDURE — 96361 HYDRATE IV INFUSION ADD-ON: CPT

## 2021-03-18 PROCEDURE — 74011250636 HC RX REV CODE- 250/636: Performed by: EMERGENCY MEDICINE

## 2021-03-18 PROCEDURE — 85025 COMPLETE CBC W/AUTO DIFF WBC: CPT

## 2021-03-18 PROCEDURE — 70450 CT HEAD/BRAIN W/O DYE: CPT

## 2021-03-18 PROCEDURE — 83735 ASSAY OF MAGNESIUM: CPT

## 2021-03-18 PROCEDURE — 82077 ASSAY SPEC XCP UR&BREATH IA: CPT

## 2021-03-18 PROCEDURE — 80307 DRUG TEST PRSMV CHEM ANLYZR: CPT

## 2021-03-18 PROCEDURE — 96365 THER/PROPH/DIAG IV INF INIT: CPT

## 2021-03-18 PROCEDURE — 99284 EMERGENCY DEPT VISIT MOD MDM: CPT

## 2021-03-18 RX ORDER — LORAZEPAM 0.5 MG/1
0.5 TABLET ORAL
Qty: 20 TAB | Refills: 0 | Status: SHIPPED | OUTPATIENT
Start: 2021-03-18 | End: 2021-04-16 | Stop reason: ALTCHOICE

## 2021-03-18 RX ADMIN — THIAMINE HYDROCHLORIDE: 100 INJECTION, SOLUTION INTRAMUSCULAR; INTRAVENOUS at 13:20

## 2021-03-18 RX ADMIN — SODIUM CHLORIDE 1000 ML: 9 INJECTION, SOLUTION INTRAVENOUS at 12:39

## 2021-03-18 NOTE — ED NOTES
Pt sent over by [facility] for alcohol withdrawal.  Pt's last drink was last night, and she was unable to obtain more because her  would not assist her to walk to her alcohol. For the past 3 weeks or so, she has become unable to walk very far without assistance. Pt is calm and cooperative at this time.

## 2021-03-18 NOTE — ED PROVIDER NOTES
EMERGENCY DEPARTMENT HISTORY AND PHYSICAL EXAM      Date: 3/18/2021  Patient Name: Mariella Briceño    History of Presenting Illness     Chief Complaint   Patient presents with    Alcohol intoxication     pt sent here from the [facility] because doctor was concerned she was in withdraw, reports she drinks all day long and last had a drink last night. Pt denies n/v/d, headaches. reports some dizziness       History Provided By: Patient, Patient's  and Provider at [facility]    HPI: Mariella Briceño, 62 y.o. female presents to the ED with cc of alcohol withdrawal.  Patient drinks large amounts of gin regularly. For the last month she has had increasing weakness, requires assistance to ambulate. She last had alcohol last night. She did not decide to quit, but she could not get up to get the alcohol on her own. She went to [facility] today for evaluation. They noted that she was tremulous and that she complained of having hallucinations. Her CIWA score there was 15. She denies chest pain, cough, shortness of breath, fever, headache or abdominal pain. She has some lightheadedness. Her hallucinations consisted her seeing snakes that  try to go up her body and insects that are crawling around. She states the hallucinations started about 3 days ago. There are no other complaints, changes, or physical findings at this time. PCP: Floridalma Chandler NP    No current facility-administered medications on file prior to encounter. Current Outpatient Medications on File Prior to Encounter   Medication Sig Dispense Refill    furosemide (LASIX) 20 mg tablet Take 1 Tab by mouth daily as needed (leg swelling).  30 Tab 1    buPROPion XL (WELLBUTRIN XL) 150 mg tablet TAKE ONE TABLET BY MOUTH EVERY MORNING FOR SMOKING CESSATION 90 Tab 1    pantoprazole (PROTONIX) 40 mg tablet TAKE ONE TABLET BY MOUTH DAILY BEFORE BREAKFAST 90 Tab 1    thiamine HCL (B-1) 100 mg tablet TAKE ONE TABLET BY MOUTH DAILY 90 Tab 3    folic acid (FOLVITE) 1 mg tablet TAKE ONE TABLET BY MOUTH DAILY 90 Tab 1    magnesium 200 mg tab TAKE ONE TABLET BY MOUTH DAILY 90 Tab 1    diclofenac (VOLTAREN) 1 % gel Apply  to affected area four (4) times daily. For joint pain 100 g 2    potassium chloride (KLOR-CON) 20 mEq pack TAKE ONE PACKET BY MOUTH DAILY 90 Packet 1    ergocalciferol (ERGOCALCIFEROL) 1,250 mcg (50,000 unit) capsule Take 1 Cap by mouth every seven (7) days.  12 Cap 1    doxepin (SINEquan) 25 mg capsule TAKE ONE CAPSULE BY MOUTH ONCE NIGHTLY AS NEEDED FOR SLEEP 90 Cap 1       Past History     Past Medical History:  Past Medical History:   Diagnosis Date    Alcohol abuse     GI (gastrointestinal bleed)     Insomnia 11/09    Iron deficiency anemia 04/2004    PPD positive     treated w/ INH- tests since have been negative    Pure hypercholesterolemia     Scoliosis 12/28/15    [facility]    Tobacco dependence     Unspecified essential hypertension     Uterine bleeding, dysfunctional 2/12    Uterine fibroid        Past Surgical History:  Past Surgical History:   Procedure Laterality Date    COLONOSCOPY N/A 1/31/2018    COLONOSCOPY performed by Dustin El MD at [facility] HX CHOLECYSTECTOMY      HX COLONOSCOPY  07/01/2013    [facility], repeat 10 years    HX ORTHOPAEDIC      Shattered bilateral ankles-metal plates & screws    HX OTHER SURGICAL      Benign cyst removal- L. foot    CO LEG/ANKLE SURGERY PROC UNLISTED         Family History:  Family History   Problem Relation Age of Onset    Hypertension Mother     Diabetes Mother     Cancer Mother         breast    Heart Disease Father         MI 42's    Kidney Disease Father     Hypertension Son        Social History:  Social History     Tobacco Use    Smoking status: Current Every Day Smoker     Packs/day: 0.50     Years: 17.00     Pack years: 8.50     Types: Cigarettes    Smokeless tobacco: Never Used   Substance Use Topics    Alcohol use: Yes     Alcohol/week: 51.0 standard drinks     Types: 1 Cans of beer, 50 Shots of liquor per week     Frequency: 4 or more times a week     Drinks per session: 5 or 6     Binge frequency: Never     Comment: daily    Drug use: No       Allergies: Allergies   Allergen Reactions    Robaxin [Methocarbamol] Hives, Rash and Itching     Red splotches    Statins-Hmg-Coa Reductase Inhibitors Myalgia    Codeine Itching    Neosporin [Neomycin-Bacitracin-Polymyxin] Rash         Review of Systems   Review of Systems   Constitutional: Negative for fever. HENT: Negative for congestion. Eyes: Negative. Respiratory: Negative for shortness of breath. Cardiovascular: Negative for chest pain. Gastrointestinal: Negative for abdominal pain. Endocrine: Negative for heat intolerance. Genitourinary: Negative. Musculoskeletal: Negative for back pain. Skin: Negative for rash. Allergic/Immunologic: Negative for immunocompromised state. Neurological: Positive for dizziness and weakness. Hematological: Does not bruise/bleed easily. Psychiatric/Behavioral: Positive for hallucinations. All other systems reviewed and are negative. Physical Exam   Physical Exam  Vitals signs and nursing note reviewed. Constitutional:       General: She is not in acute distress. Appearance: She is well-developed. HENT:      Head: Normocephalic. Neck:      Musculoskeletal: Normal range of motion and neck supple. Cardiovascular:      Rate and Rhythm: Normal rate and regular rhythm. Heart sounds: Normal heart sounds. Pulmonary:      Effort: Pulmonary effort is normal.      Breath sounds: Normal breath sounds. Abdominal:      General: Bowel sounds are normal.      Palpations: Abdomen is soft. Tenderness: There is no abdominal tenderness. Musculoskeletal: Normal range of motion. Skin:     General: Skin is warm and dry. Neurological:      General: No focal deficit present. Mental Status: She is alert and oriented to person, place, and time. Comments: Slight tremulousness   Psychiatric:         Mood and Affect: Mood normal.         Behavior: Behavior normal.         Diagnostic Study Results     Labs -     Recent Results (from the past 12 hour(s))   CBC WITH AUTOMATED DIFF    Collection Time: 03/18/21 12:40 PM   Result Value Ref Range    WBC 7.5 3.6 - 11.0 K/uL    RBC 3.24 (L) 3.80 - 5.20 M/uL    HGB 10.9 (L) 11.5 - 16.0 g/dL    HCT 34.9 (L) 35.0 - 47.0 %    .7 (H) 80.0 - 99.0 FL    MCH 33.6 26.0 - 34.0 PG    MCHC 31.2 30.0 - 36.5 g/dL    RDW 17.9 (H) 11.5 - 14.5 %    PLATELET 238 (L) 639 - 400 K/uL    MPV 11.1 8.9 - 12.9 FL    NRBC 0.0 0  WBC    ABSOLUTE NRBC 0.00 0.00 - 0.01 K/uL    NEUTROPHILS 60 32 - 75 %    LYMPHOCYTES 27 12 - 49 %    MONOCYTES 11 5 - 13 %    EOSINOPHILS 1 0 - 7 %    BASOPHILS 1 0 - 1 %    IMMATURE GRANULOCYTES 0 0.0 - 0.5 %    ABS. NEUTROPHILS 4.5 1.8 - 8.0 K/UL    ABS. LYMPHOCYTES 2.0 0.8 - 3.5 K/UL    ABS. MONOCYTES 0.8 0.0 - 1.0 K/UL    ABS. EOSINOPHILS 0.1 0.0 - 0.4 K/UL    ABS. BASOPHILS 0.1 0.0 - 0.1 K/UL    ABS. IMM. GRANS. 0.0 0.00 - 0.04 K/UL    DF MANUAL      RBC COMMENTS ANISOCYTOSIS  1+        RBC COMMENTS MACROCYTOSIS  1+       METABOLIC PANEL, COMPREHENSIVE    Collection Time: 03/18/21 12:40 PM   Result Value Ref Range    Sodium 145 136 - 145 mmol/L    Potassium 3.5 3.5 - 5.1 mmol/L    Chloride 110 (H) 97 - 108 mmol/L    CO2 30 21 - 32 mmol/L    Anion gap 5 5 - 15 mmol/L    Glucose 92 65 - 100 mg/dL    BUN 15 6 - 20 MG/DL    Creatinine 0.87 0.55 - 1.02 MG/DL    BUN/Creatinine ratio 17 12 - 20      GFR est AA >60 >60 ml/min/1.73m2    GFR est non-AA >60 >60 ml/min/1.73m2    Calcium 8.7 8.5 - 10.1 MG/DL    Bilirubin, total 0.5 0.2 - 1.0 MG/DL    ALT (SGPT) 21 12 - 78 U/L    AST (SGOT) 37 15 - 37 U/L    Alk.  phosphatase 99 45 - 117 U/L    Protein, total 6.4 6.4 - 8.2 g/dL    Albumin 2.8 (L) 3.5 - 5.0 g/dL    Globulin 3.6 2.0 - 4.0 g/dL    A-G Ratio 0.8 (L) 1.1 - 2.2     MAGNESIUM    Collection Time: 03/18/21 12:40 PM   Result Value Ref Range    Magnesium 2.0 1.6 - 2.4 mg/dL   ETHYL ALCOHOL    Collection Time: 03/18/21 12:40 PM   Result Value Ref Range    ALCOHOL(ETHYL),SERUM <10 <10 MG/DL   URINALYSIS W/ RFLX MICROSCOPIC    Collection Time: 03/18/21 12:49 PM   Result Value Ref Range    Color DARK YELLOW      Appearance CLEAR CLEAR      Specific gravity 1.024 1.003 - 1.030      pH (UA) 8.5 (H) 5.0 - 8.0      Protein TRACE (A) NEG mg/dL    Glucose Negative NEG mg/dL    Ketone Negative NEG mg/dL    Bilirubin Negative NEG      Blood Negative NEG      Urobilinogen 1.0 0.2 - 1.0 EU/dL    Nitrites Negative NEG      Leukocyte Esterase Negative NEG      WBC 0-4 0 - 4 /hpf    RBC 0-5 0 - 5 /hpf    Epithelial cells FEW FEW /lpf    Bacteria Negative NEG /hpf    Hyaline cast 0-2 0 - 5 /lpf   DRUG SCREEN, URINE    Collection Time: 03/18/21 12:49 PM   Result Value Ref Range    AMPHETAMINES Negative NEG      BARBITURATES Negative NEG      BENZODIAZEPINES Positive (A) NEG      COCAINE Negative NEG      METHADONE Negative NEG      OPIATES Negative NEG      PCP(PHENCYCLIDINE) Negative NEG      THC (TH-CANNABINOL) Negative NEG      Drug screen comment (NOTE)        Radiologic Studies -   CT HEAD WO CONT   Final Result   No acute intracranial hemorrhage, mass or infarct. CT Results  (Last 48 hours)               03/18/21 1309  CT HEAD WO CONT Final result    Impression:  No acute intracranial hemorrhage, mass or infarct. Narrative:  INDICATION: Hallucinations        Exam: Noncontrast CT of the brain is performed with 5 mm collimation. CT dose reduction was achieved with the use of the standardized protocol   tailored for this examination and automatic exposure control for dose   modulation. Direct comparison is made prior CT dated March 9, 2021. FINDINGS:There is mild diffuse cortical atrophy.  There is no acute intracranial   hemorrhage, mass, mass effect or herniation. Ventricular system is normal. The   gray-white matter differentiation is well-preserved. The mastoid air cells are   well pneumatized. The visualized paranasal sinuses are normal.               CXR Results  (Last 48 hours)    None          Medical Decision Making   I am the first provider for this patient. I reviewed the vital signs, available nursing notes, past medical history, past surgical history, family history and social history. Vital Signs-Reviewed the patient's vital signs. Patient Vitals for the past 12 hrs:   Pulse Resp BP SpO2   03/18/21 1400 87 20 (!) 150/82 98 %   03/18/21 1345 88 22 (!) 155/85 99 %   03/18/21 1330 89 20 134/86 97 %   03/18/21 1315 88 18  99 %   03/18/21 1245 95 17  99 %   03/18/21 1231    100 %   03/18/21 1230 93 18 133/71 99 %   03/18/21 1208 78 15 120/71 100 %         Records Reviewed: Nursing Notes and Old Medical Records    Provider Notes (Medical Decision Making):   Alcohol withdrawal, electrolyte abnormality, dehydration    ED Course:   Initial assessment performed. The patients presenting problems have been discussed, and they are in agreement with the care plan formulated and outlined with them. I have encouraged them to ask questions as they arise throughout their visit. Progress note:    Patient is feeling better. Results were reviewed. She is not significantly tremulous at this time and has no tachycardia. She is alert and oriented x3 and neither she nor her  wants her to be admitted. She is advised to follow-up and return to ER for  }    Critical Care Time:   0      Disposition:  home    DISCHARGE PLAN:  1. Discharge Medication List as of 3/18/2021  3:22 PM      START taking these medications    Details   LORazepam (Ativan) 0.5 mg tablet Take 1 Tab by mouth every eight (8) hours as needed (Withdrawal symptoms).  Max Daily Amount: 1.5 mg., Normal, Disp-20 Tab, R-0         CONTINUE these medications which have NOT CHANGED    Details   furosemide (LASIX) 20 mg tablet Take 1 Tab by mouth daily as needed (leg swelling). , Normal, Disp-30 Tab, R-1      buPROPion XL (WELLBUTRIN XL) 150 mg tablet TAKE ONE TABLET BY MOUTH EVERY MORNING FOR SMOKING CESSATION, Normal, Disp-90 Tab, R-1      pantoprazole (PROTONIX) 40 mg tablet TAKE ONE TABLET BY MOUTH DAILY BEFORE BREAKFAST, Normal, Disp-90 Tab, R-1      thiamine HCL (B-1) 100 mg tablet TAKE ONE TABLET BY MOUTH DAILY, Normal, Disp-90 Tab, R-3      folic acid (FOLVITE) 1 mg tablet TAKE ONE TABLET BY MOUTH DAILY, Normal, Disp-90 Tab, R-1      magnesium 200 mg tab TAKE ONE TABLET BY MOUTH DAILY, Normal, Disp-90 Tab, R-1      diclofenac (VOLTAREN) 1 % gel Apply  to affected area four (4) times daily. For joint pain, Normal, Disp-100 g, R-2      potassium chloride (KLOR-CON) 20 mEq pack TAKE ONE PACKET BY MOUTH DAILY, Normal, Disp-90 Packet, R-1      ergocalciferol (ERGOCALCIFEROL) 1,250 mcg (50,000 unit) capsule Take 1 Cap by mouth every seven (7) days. , Normal, Disp-12 Cap, R-1      doxepin (SINEquan) 25 mg capsule TAKE ONE CAPSULE BY MOUTH ONCE NIGHTLY AS NEEDED FOR SLEEP, Normal, Disp-90 Cap, R-1           2. Follow-up Information     Follow up With Specialties Details Why Contact Info    Francisco Javier Alvarez NP Nurse Practitioner  As needed [id number] [phone]         If symptoms worsen [facility] EMERGENCY DEPT Emergency Medicine   [address]  [address]   [address] [address] [phone]        3. Return to ED if worse     Diagnosis     Clinical Impression:   1. Hallucinations    2. Alcohol withdrawal syndrome without complication (HCC)        Attestations:    Jake Pinto MD    Please note that this dictation was completed with [employer] the [employer] voice recognition software.   Quite often unanticipated grammatical, syntax, homophones, and other interpretive errors are inadvertently transcribed by the computer software. Please disregard these errors. Please excuse any errors that have escaped final proofreading. Thank you.

## 2021-03-19 ENCOUNTER — HOME CARE VISIT (OUTPATIENT)
Dept: HOME HEALTH SERVICES | Facility: HOME HEALTH | Age: 61
End: 2021-03-19

## 2021-03-19 ENCOUNTER — HOME CARE VISIT (OUTPATIENT)
Dept: SCHEDULING | Facility: HOME HEALTH | Age: 61
End: 2021-03-19
Payer: COMMERCIAL

## 2021-03-19 PROCEDURE — 400013 HH SOC

## 2021-03-19 PROCEDURE — G0151 HHCP-SERV OF PT,EA 15 MIN: HCPCS

## 2021-03-20 VITALS
BODY MASS INDEX: 30.73 KG/M2 | HEIGHT: 64 IN | HEART RATE: 89 BPM | OXYGEN SATURATION: 98 % | RESPIRATION RATE: 18 BRPM | TEMPERATURE: 98.4 F | DIASTOLIC BLOOD PRESSURE: 79 MMHG | WEIGHT: 180 LBS | SYSTOLIC BLOOD PRESSURE: 138 MMHG

## 2021-03-22 ENCOUNTER — VIRTUAL VISIT (OUTPATIENT)
Dept: ORTHOPEDIC SURGERY | Age: 61
End: 2021-03-22

## 2021-03-23 ENCOUNTER — HOME CARE VISIT (OUTPATIENT)
Dept: SCHEDULING | Facility: HOME HEALTH | Age: 61
End: 2021-03-23
Payer: COMMERCIAL

## 2021-03-23 VITALS
OXYGEN SATURATION: 98 % | DIASTOLIC BLOOD PRESSURE: 72 MMHG | SYSTOLIC BLOOD PRESSURE: 138 MMHG | TEMPERATURE: 98.1 F | HEART RATE: 86 BPM

## 2021-03-23 PROCEDURE — G0152 HHCP-SERV OF OT,EA 15 MIN: HCPCS

## 2021-03-23 PROCEDURE — G0151 HHCP-SERV OF PT,EA 15 MIN: HCPCS

## 2021-03-24 VITALS
HEART RATE: 79 BPM | DIASTOLIC BLOOD PRESSURE: 69 MMHG | OXYGEN SATURATION: 96 % | SYSTOLIC BLOOD PRESSURE: 131 MMHG | TEMPERATURE: 97.6 F | RESPIRATION RATE: 16 BRPM

## 2021-03-25 ENCOUNTER — HOME CARE VISIT (OUTPATIENT)
Dept: SCHEDULING | Facility: HOME HEALTH | Age: 61
End: 2021-03-25
Payer: COMMERCIAL

## 2021-03-25 VITALS
OXYGEN SATURATION: 96 % | TEMPERATURE: 97.5 F | SYSTOLIC BLOOD PRESSURE: 124 MMHG | DIASTOLIC BLOOD PRESSURE: 82 MMHG | HEART RATE: 93 BPM

## 2021-03-25 PROCEDURE — G0151 HHCP-SERV OF PT,EA 15 MIN: HCPCS

## 2021-03-25 PROCEDURE — G0158 HHC OT ASSISTANT EA 15: HCPCS

## 2021-03-26 ENCOUNTER — HOME CARE VISIT (OUTPATIENT)
Dept: HOME HEALTH SERVICES | Facility: HOME HEALTH | Age: 61
End: 2021-03-26
Payer: COMMERCIAL

## 2021-03-26 VITALS
SYSTOLIC BLOOD PRESSURE: 138 MMHG | DIASTOLIC BLOOD PRESSURE: 85 MMHG | OXYGEN SATURATION: 96 % | HEART RATE: 103 BPM | TEMPERATURE: 98.4 F | RESPIRATION RATE: 16 BRPM

## 2021-03-26 PROCEDURE — G0155 HHCP-SVS OF CSW,EA 15 MIN: HCPCS

## 2021-03-29 ENCOUNTER — HOME CARE VISIT (OUTPATIENT)
Dept: SCHEDULING | Facility: HOME HEALTH | Age: 61
End: 2021-03-29
Payer: COMMERCIAL

## 2021-03-29 VITALS
HEART RATE: 96 BPM | DIASTOLIC BLOOD PRESSURE: 90 MMHG | SYSTOLIC BLOOD PRESSURE: 130 MMHG | OXYGEN SATURATION: 96 % | TEMPERATURE: 98 F

## 2021-03-29 PROCEDURE — G0158 HHC OT ASSISTANT EA 15: HCPCS

## 2021-03-30 ENCOUNTER — HOME CARE VISIT (OUTPATIENT)
Dept: SCHEDULING | Facility: HOME HEALTH | Age: 61
End: 2021-03-30
Payer: COMMERCIAL

## 2021-03-30 PROCEDURE — G0151 HHCP-SERV OF PT,EA 15 MIN: HCPCS

## 2021-03-31 VITALS
DIASTOLIC BLOOD PRESSURE: 75 MMHG | SYSTOLIC BLOOD PRESSURE: 125 MMHG | HEART RATE: 78 BPM | TEMPERATURE: 98.1 F | OXYGEN SATURATION: 98 % | RESPIRATION RATE: 16 BRPM

## 2021-04-01 ENCOUNTER — HOME CARE VISIT (OUTPATIENT)
Dept: SCHEDULING | Facility: HOME HEALTH | Age: 61
End: 2021-04-01
Payer: COMMERCIAL

## 2021-04-01 VITALS — TEMPERATURE: 97.7 F | HEART RATE: 94 BPM | OXYGEN SATURATION: 99 %

## 2021-04-01 PROCEDURE — G0158 HHC OT ASSISTANT EA 15: HCPCS

## 2021-04-01 PROCEDURE — G0151 HHCP-SERV OF PT,EA 15 MIN: HCPCS

## 2021-04-02 VITALS
SYSTOLIC BLOOD PRESSURE: 155 MMHG | HEART RATE: 89 BPM | OXYGEN SATURATION: 98 % | RESPIRATION RATE: 16 BRPM | DIASTOLIC BLOOD PRESSURE: 78 MMHG | TEMPERATURE: 97.4 F

## 2021-04-06 ENCOUNTER — HOME CARE VISIT (OUTPATIENT)
Dept: SCHEDULING | Facility: HOME HEALTH | Age: 61
End: 2021-04-06
Payer: COMMERCIAL

## 2021-04-06 VITALS
DIASTOLIC BLOOD PRESSURE: 82 MMHG | OXYGEN SATURATION: 99 % | SYSTOLIC BLOOD PRESSURE: 148 MMHG | HEART RATE: 101 BPM | TEMPERATURE: 98.3 F

## 2021-04-06 PROCEDURE — G0158 HHC OT ASSISTANT EA 15: HCPCS

## 2021-04-06 PROCEDURE — G0151 HHCP-SERV OF PT,EA 15 MIN: HCPCS

## 2021-04-07 VITALS
TEMPERATURE: 97.7 F | DIASTOLIC BLOOD PRESSURE: 78 MMHG | SYSTOLIC BLOOD PRESSURE: 128 MMHG | HEART RATE: 100 BPM | RESPIRATION RATE: 16 BRPM | OXYGEN SATURATION: 99 %

## 2021-04-08 ENCOUNTER — HOME CARE VISIT (OUTPATIENT)
Dept: SCHEDULING | Facility: HOME HEALTH | Age: 61
End: 2021-04-08
Payer: COMMERCIAL

## 2021-04-08 VITALS
HEART RATE: 96 BPM | OXYGEN SATURATION: 99 % | SYSTOLIC BLOOD PRESSURE: 138 MMHG | DIASTOLIC BLOOD PRESSURE: 82 MMHG | TEMPERATURE: 97.6 F

## 2021-04-08 PROCEDURE — G0158 HHC OT ASSISTANT EA 15: HCPCS

## 2021-04-08 PROCEDURE — G0151 HHCP-SERV OF PT,EA 15 MIN: HCPCS

## 2021-04-10 VITALS
OXYGEN SATURATION: 98 % | HEART RATE: 99 BPM | SYSTOLIC BLOOD PRESSURE: 135 MMHG | RESPIRATION RATE: 16 BRPM | DIASTOLIC BLOOD PRESSURE: 78 MMHG | TEMPERATURE: 98.5 F

## 2021-04-13 ENCOUNTER — HOME CARE VISIT (OUTPATIENT)
Dept: HOME HEALTH SERVICES | Facility: HOME HEALTH | Age: 61
End: 2021-04-13
Payer: COMMERCIAL

## 2021-04-13 ENCOUNTER — HOME CARE VISIT (OUTPATIENT)
Dept: SCHEDULING | Facility: HOME HEALTH | Age: 61
End: 2021-04-13
Payer: COMMERCIAL

## 2021-04-13 PROCEDURE — G0151 HHCP-SERV OF PT,EA 15 MIN: HCPCS

## 2021-04-14 VITALS
HEART RATE: 97 BPM | TEMPERATURE: 97.6 F | OXYGEN SATURATION: 97 % | DIASTOLIC BLOOD PRESSURE: 76 MMHG | SYSTOLIC BLOOD PRESSURE: 125 MMHG | RESPIRATION RATE: 16 BRPM

## 2021-04-15 ENCOUNTER — HOME CARE VISIT (OUTPATIENT)
Dept: HOME HEALTH SERVICES | Facility: HOME HEALTH | Age: 61
End: 2021-04-15
Payer: COMMERCIAL

## 2021-04-15 ENCOUNTER — HOME CARE VISIT (OUTPATIENT)
Dept: SCHEDULING | Facility: HOME HEALTH | Age: 61
End: 2021-04-15
Payer: COMMERCIAL

## 2021-04-15 VITALS
DIASTOLIC BLOOD PRESSURE: 75 MMHG | TEMPERATURE: 97.5 F | OXYGEN SATURATION: 97 % | WEIGHT: 132 LBS | RESPIRATION RATE: 16 BRPM | HEART RATE: 105 BPM | BODY MASS INDEX: 22.66 KG/M2 | SYSTOLIC BLOOD PRESSURE: 138 MMHG

## 2021-04-15 VITALS
SYSTOLIC BLOOD PRESSURE: 138 MMHG | OXYGEN SATURATION: 97 % | TEMPERATURE: 98.5 F | DIASTOLIC BLOOD PRESSURE: 76 MMHG | HEART RATE: 72 BPM

## 2021-04-15 PROCEDURE — G0152 HHCP-SERV OF OT,EA 15 MIN: HCPCS

## 2021-04-15 PROCEDURE — G0151 HHCP-SERV OF PT,EA 15 MIN: HCPCS

## 2021-04-16 ENCOUNTER — OFFICE VISIT (OUTPATIENT)
Dept: NEUROLOGY | Age: 61
End: 2021-04-16
Payer: COMMERCIAL

## 2021-04-16 VITALS
TEMPERATURE: 97 F | DIASTOLIC BLOOD PRESSURE: 76 MMHG | OXYGEN SATURATION: 95 % | WEIGHT: 129 LBS | BODY MASS INDEX: 22.02 KG/M2 | HEIGHT: 64 IN | HEART RATE: 97 BPM | SYSTOLIC BLOOD PRESSURE: 118 MMHG

## 2021-04-16 DIAGNOSIS — G60.8 IDIOPATHIC SMALL AND LARGE FIBER SENSORY NEUROPATHY: Primary | ICD-10-CM

## 2021-04-16 DIAGNOSIS — E11.42 DIABETIC PERIPHERAL NEUROPATHY ASSOCIATED WITH TYPE 2 DIABETES MELLITUS (HCC): ICD-10-CM

## 2021-04-16 DIAGNOSIS — E03.4 HYPOTHYROIDISM DUE TO ACQUIRED ATROPHY OF THYROID: ICD-10-CM

## 2021-04-16 DIAGNOSIS — E53.8 B12 DEFICIENCY: ICD-10-CM

## 2021-04-16 DIAGNOSIS — I89.0 LYMPHEDEMA: ICD-10-CM

## 2021-04-16 DIAGNOSIS — E55.9 VITAMIN D DEFICIENCY: ICD-10-CM

## 2021-04-16 DIAGNOSIS — R41.3 DISTURBANCE OF MEMORY: ICD-10-CM

## 2021-04-16 DIAGNOSIS — R26.0 SENSORY ATAXIC GAIT: ICD-10-CM

## 2021-04-16 PROCEDURE — 99245 OFF/OP CONSLTJ NEW/EST HI 55: CPT | Performed by: PSYCHIATRY & NEUROLOGY

## 2021-04-16 RX ORDER — MULTIVIT WITH MINERALS/HERBS
1 TABLET ORAL DAILY
COMMUNITY
End: 2021-06-07

## 2021-04-16 RX ORDER — CHOLECALCIFEROL (VITAMIN D3) 125 MCG
2000 CAPSULE ORAL
COMMUNITY
End: 2021-06-07

## 2021-04-16 NOTE — LETTER
4/16/2021 8:51 PM 
 
Patient:  Ta Chicas YOB: 1960 Date of Visit: 4/16/2021 Dear No Recipients: Thank you for referring Ms. Sharlet Cowden to me for evaluation/treatment. Below are the relevant portions of my assessment and plan of care. Consult REFERRED BY: 
Carlitos Quintana NP 
 
CHIEF COMPLAINT: Weakness and loss of balance and unsteady gait Subjective:  
 
Ta Chicas is a 62 y.o. right-handed -American female seen as a new patient to me, at the request of Dr. Daniel Gregg for evaluation of new problem of progressive weakness and difficulty walking that the patient has had for several months, and increasing numbness in her feet. She is a chronic alcoholic who still drinks 1-1/2 pints a day still, maybe even more than that, and had previously been sent to [facility] for rehab but they sent her to the ER for detoxification, she was kept there for 1 day and then sent home but never sent back to [facility] for alcohol treatment. We advised the  that she needs to do that because that the only way she is able to get better she quit drinking and quit causing progressive nerve damage in her feet and brain and balance from her alcohol abuse. She still hallucinates at times and still suffers from withdrawal symptoms when she quits drinking. 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 She has been getting home therapy with home health, and her  says she is done much better now, and where she could not walk at all, she can now walk around the house at times with no assistive devices, and can walk outside the house with a cane or a walker. They are not taking her outside to ambulate because she is so much better and no longer needs to stay in the house. She has no family history of similar problems. She does not complain of any major neck pain or back pain no major headache and apparently is never had a seizure.   Does complain of numbness and burning pain in her feet but has normal bowel and bladder function and no trouble with her hands. Her metabolic studies apparently has shown no other cause for her symptoms. She does not think she is diabetic. She clearly has a neuropathy on exam, most likely alcohol related, but recent loss complete neuropathy panel to rule out any other treatable cause of her symptoms. 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 She is advised to make sure she takes a vitamin every day and vitamin D every day and eat a good healthy diet, and try to taper off the alcohol and get back with her [facility] rehab people for alcohol rehab. Past Medical History:  
Diagnosis Date  Alcohol abuse  GI (gastrointestinal bleed)  Insomnia 11/09  Iron deficiency anemia 04/2004  PPD positive   
 treated w/ [facility]- tests since have been negative  Pure hypercholesterolemia  Scoliosis 12/28/15  
 [facility]  
 Tobacco dependence  Unspecified essential hypertension  Uterine bleeding, dysfunctional 2/12  Uterine fibroid Past Surgical History:  
Procedure Laterality Date  COLONOSCOPY N/A 1/31/2018 COLONOSCOPY performed by Naima Mojica MD at [address]  HX COLONOSCOPY  07/01/2013 [facility], repeat 10 years  HX ORTHOPAEDIC Shattered bilateral ankles-metal plates & screws  HX OTHER SURGICAL Benign cyst removal- L. foot  [place] LEG/ANKLE SURGERY PROC UNLISTED Family History Problem Relation Age of Onset  Hypertension Mother  Diabetes Mother  Cancer Mother   
     breast  
 Heart Disease Mother  Heart Disease Father MI 40's  Kidney Disease Father  Hypertension Son   
  
Social History Tobacco Use  Smoking status: Current Every Day Smoker Packs/day: 0.50 Years: 17.00 Pack years: 8.50 Types: Cigarettes  Smokeless tobacco: Never Used Substance Use Topics  Alcohol use: Yes Alcohol/week: 51.0 standard drinks Types: 1 Cans of beer, 50 Shots of liquor per week Frequency: 4 or more times a week Drinks per session: 5 or 6 Binge frequency: Never Comment: daily Current Outpatient Medications:  
  b complex vitamins tablet, Take 1 Tab by mouth daily. , Disp: , Rfl:  
  cholecalciferol, vitamin D3, (Vitamin D3) 50 mcg (2,000 unit) tab, Take 2,000 Units by mouth., Disp: , Rfl:  
  furosemide (LASIX) 20 mg tablet, Take 1 Tab by mouth daily as needed (leg swelling). , Disp: 30 Tab, Rfl: 1 
  buPROPion XL (WELLBUTRIN XL) 150 mg tablet, TAKE ONE TABLET BY MOUTH EVERY MORNING FOR SMOKING CESSATION, Disp: 90 Tab, Rfl: 1 
  pantoprazole (PROTONIX) 40 mg tablet, TAKE ONE TABLET BY MOUTH DAILY BEFORE BREAKFAST, Disp: 90 Tab, Rfl: 1 
  thiamine HCL (B-1) 100 mg tablet, TAKE ONE TABLET BY MOUTH DAILY, Disp: 90 Tab, Rfl: 3 
  folic acid (FOLVITE) 1 mg tablet, TAKE ONE TABLET BY MOUTH DAILY, Disp: 90 Tab, Rfl: 1 
  magnesium 200 mg tab, TAKE ONE TABLET BY MOUTH DAILY, Disp: 90 Tab, Rfl: 1 
  diclofenac (VOLTAREN) 1 % gel, Apply  to affected area four (4) times daily. For joint pain, Disp: 100 g, Rfl: 2 
  potassium chloride (KLOR-CON) 20 mEq pack, TAKE ONE PACKET BY MOUTH DAILY, Disp: 90 Packet, Rfl: 1   ergocalciferol (ERGOCALCIFEROL) 1,250 mcg (50,000 unit) capsule, Take 1 Cap by mouth every seven (7) days. , Disp: 12 Cap, Rfl: 1 
  doxepin (SINEquan) 25 mg capsule, TAKE ONE CAPSULE BY MOUTH ONCE NIGHTLY AS NEEDED FOR SLEEP, Disp: 90 Cap, Rfl: 1 Allergies Allergen Reactions  Robaxin [Methocarbamol] Hives, Rash and Itching Red splotches  Statins-Hmg-Coa Reductase Inhibitors Myalgia  Codeine Itching  Neosporin [Neomycin-Bacitracin-Polymyxin] Rash MRI Results (most recent): No results found for this or any previous visit. No results found for this or any previous visit. Review of Systems: A comprehensive review of systems was negative except for: Constitutional: positive for fatigue, malaise and anorexia Ears, nose, mouth, throat, and face: positive for hearing loss Musculoskeletal: positive for myalgias, arthralgias, stiff joints and muscle weakness Neurological: positive for memory problems, paresthesia, coordination problems, gait problems and weakness Behvioral/Psych: positive for Alcohol abuse, anxiety and depression Vitals:  
 04/16/21 0459 BP: 118/76 Pulse: 97 Temp: 97 °F (36.1 °C) SpO2: 95% Weight: 129 lb (58.5 kg) Height: 5' 4\" (1.626 m) Objective: I 
 
 
NEUROLOGICAL EXAM: 
 
Appearance: The patient is fairly developed and nourished, provides a poor history and is in no acute distress. Mental Status: Oriented to time, place and person, and the president, patient slow mentally and cognitive function is mildly abnormal and speech is fluent and no aphasia or dysarthria. Mood and affect appropriate, but anxious and depressed. Cranial Nerves:   Intact visual fields. Fundi are benign, disc are flat, no lesions seen on funduscopy. KALE, EOM's full, no nystagmus, no ptosis. Facial sensation is normal. Corneal reflexes are not tested. Facial movement is symmetric. Hearing is abnormal bilaterally. Palate is midline with normal sternocleidomastoid and trapezius muscles are normal. Tongue is midline. Neck without meningismus or bruits Temporal arteries are not tender or enlarged TMJ areas are not tender on palpation Motor:  3-4/5 strength in upper and lower proximal and distal muscles. Normal bulk and tone. No fasciculations. Rapid alternating movement is symmetric and slow bilaterally Reflexes:   Deep tendon reflexes 0-1+/4 and symmetrical. No babinski or clonus present Sensory:   Abnormal to touch, pinprick and vibration and temperature in both legs to midcalf level. DSS is intact Gait:  Abnormal gait for patient's age, with patient walking with wide-based and unsteady gait. Tremor:   No tremor noted. Cerebellar: Moderately abnormal cerebellar signs present on Romberg and tandem testing and finger-nose-finger exam.  
Neurovascular:  Normal heart sounds and regular rhythm, peripheral pulses decreased, and no carotid bruits. Assessment: ICD-10-CM ICD-9-CM 1. Idiopathic small and large fiber sensory neuropathy  G60.8 356.4 REN COMPREHENSIVE PLUS PANEL  
   CK  
   CBC WITH AUTOMATED DIFF  
   SED RATE (ESR) MYELIN ASSOC. GLYCOPROTEIN AB,IGM  
   GM1 IGG AUTOANTIBODY IMMUNOELECTROPHORESIS (IMMUNOFIX.) VITAMIN B12 & FOLATE  
   TSH 3RD GENERATION  
   VITAMIN D, 25 HYDROXY  
   ANTINEURONAL CELL AB  
   MAGNESIUM  
   REFERRAL TO VASCULAR SURGERY  
   EMG LIMITED  
   MRI BRAIN W WO CONT 2. Diabetic peripheral neuropathy associated with type 2 diabetes mellitus (HCC)  E11.42 250.60 REN COMPREHENSIVE PLUS PANEL  
  357.2 CK  
   CBC WITH AUTOMATED DIFF  
   SED RATE (ESR) MYELIN ASSOC. GLYCOPROTEIN AB,IGM  
   GM1 IGG AUTOANTIBODY IMMUNOELECTROPHORESIS (IMMUNOFIX.) VITAMIN B12 & FOLATE  
   TSH 3RD GENERATION  
   VITAMIN D, 25 HYDROXY  
   ANTINEURONAL CELL AB  
   MAGNESIUM  
   REFERRAL TO VASCULAR SURGERY  
   EMG LIMITED  
   MRI BRAIN W WO CONT 3. Sensory ataxic gait  R26.0 781. 2 REN COMPREHENSIVE PLUS PANEL  
   CK  
   CBC WITH AUTOMATED DIFF  
   SED RATE (ESR) MYELIN ASSOC. GLYCOPROTEIN AB,IGM  
   GM1 IGG AUTOANTIBODY IMMUNOELECTROPHORESIS (IMMUNOFIX.) VITAMIN B12 & FOLATE  
   TSH 3RD GENERATION  
   VITAMIN D, 25 HYDROXY  
   ANTINEURONAL CELL AB  
   MAGNESIUM  
   REFERRAL TO VASCULAR SURGERY  
   EMG LIMITED  
   MRI BRAIN W WO CONT 4.  Lymphedema  I89.0 457.1 REN COMPREHENSIVE PLUS PANEL  
   CK  
   CBC WITH AUTOMATED DIFF  
   SED RATE (ESR) MYELIN ASSOC. GLYCOPROTEIN AB,IGM  
   GM1 IGG AUTOANTIBODY IMMUNOELECTROPHORESIS (IMMUNOFIX.) VITAMIN B12 & FOLATE  
   TSH 3RD GENERATION  
   VITAMIN D, 25 HYDROXY  
   ANTINEURONAL CELL AB  
   MAGNESIUM  
   REFERRAL TO VASCULAR SURGERY  
   EMG LIMITED  
   MRI BRAIN W WO CONT 5. Disturbance of memory  R41.3 780.93 REN COMPREHENSIVE PLUS PANEL  
   CK  
   CBC WITH AUTOMATED DIFF  
   SED RATE (ESR) MYELIN ASSOC. GLYCOPROTEIN AB,IGM  
   GM1 IGG AUTOANTIBODY IMMUNOELECTROPHORESIS (IMMUNOFIX.) VITAMIN B12 & FOLATE  
   TSH 3RD GENERATION  
   VITAMIN D, 25 HYDROXY  
   ANTINEURONAL CELL AB  
   MAGNESIUM  
   REFERRAL TO VASCULAR SURGERY  
   EMG LIMITED  
   MRI BRAIN W WO CONT 6. B12 deficiency  E53.8 266.2 REN COMPREHENSIVE PLUS PANEL  
   CK  
   CBC WITH AUTOMATED DIFF  
   SED RATE (ESR) MYELIN ASSOC. GLYCOPROTEIN AB,IGM  
   GM1 IGG AUTOANTIBODY IMMUNOELECTROPHORESIS (IMMUNOFIX.) VITAMIN B12 & FOLATE  
   TSH 3RD GENERATION  
   VITAMIN D, 25 HYDROXY  
   ANTINEURONAL CELL AB  
   MAGNESIUM  
   REFERRAL TO VASCULAR SURGERY  
   EMG LIMITED  
   MRI BRAIN W WO CONT 7. Vitamin D deficiency  E55.9 268.9 REN COMPREHENSIVE PLUS PANEL  
   CK  
   CBC WITH AUTOMATED DIFF  
   SED RATE (ESR) MYELIN ASSOC. GLYCOPROTEIN AB,IGM  
   GM1 IGG AUTOANTIBODY IMMUNOELECTROPHORESIS (IMMUNOFIX.) VITAMIN B12 & FOLATE  
   TSH 3RD GENERATION  
   VITAMIN D, 25 HYDROXY  
   ANTINEURONAL CELL AB  
   MAGNESIUM  
   REFERRAL TO VASCULAR SURGERY  
   EMG LIMITED  
   MRI BRAIN W WO CONT 8. Hypothyroidism due to acquired atrophy of thyroid  E03.4 244.8 REN COMPREHENSIVE PLUS PANEL  
  246.8 CK  
   CBC WITH AUTOMATED DIFF  
   SED RATE (ESR) MYELIN ASSOC. GLYCOPROTEIN AB,IGM  
   GM1 IGG AUTOANTIBODY IMMUNOELECTROPHORESIS (IMMUNOFIX.) VITAMIN B12 & FOLATE TSH 3RD GENERATION  
   VITAMIN D, 25 HYDROXY  
   ANTINEURONAL CELL AB  
   MAGNESIUM  
   REFERRAL TO VASCULAR SURGERY  
   EMG LIMITED  
   MRI BRAIN W WO CONT Active Problems: * No active hospital problems. * 
 
 
Plan:  
 
Patient with clear neuropathy, in both legs, and marked swelling in her calf muscles that is very unusual, and I wonder whether she has lymphedema or even some cellulitis, and have sent her to the vascular surgery to evaluate the lower extremity because of the peculiar swelling she has in her legs. Patient is encouraged to get back to her alcohol treatment program at [facility], and to try to gradually reduce her alcohol intake, but do it gradually so she will avoid withdrawal symptoms, hallucinations, and continue her vitamins and vitamin D and vitamin B1 and folic acid and other vitamins as before. She is also try to eat a good diet. We will also get an EMG and nerve conduction study of both legs to evaluate her neuropathy, make sure she has no other cause of her neuropathy or other neuromuscular disease. Patient very poor historian he gave a very wandering history it was hard to obtain, and pieced together with the help of the  and the patient I think we did have a fairly clear history of what happened to her. She clearly has nutritional problems from her alcohol abuse also. 62 minutes spent with the patient and her  today, going over her problems, and she has a seriously debilitating progressive neurologic disease if she keeps drinking, which will only lead to prolonged disability and even death if she keeps drinking. She is to continue her rehab at the house and she is getting better, and she can walk with minimal contact guarding at times. She is progressively getting better on her vitamins and therapy.  
We discussed all this with the patient and her  in detail discussed her plans, diagnosis, treatment needed, medications needed, and further evaluations. Follow-up in 3 months time or earlier if needed. Signed By: Shelli Cheadle, MD   
 April 16, 2021 CC: Martinez Herring NP 
FAX: [phone] If you have questions, please do not hesitate to call me. I look forward to following Ms. Esme Hardin along with you. Sincerely, Shelli Cheadle, MD

## 2021-04-17 NOTE — PROGRESS NOTES
Consult  REFERRED BY:  Sumeet Phelps NP    CHIEF COMPLAINT: Weakness and loss of balance and unsteady gait      Subjective:     Lauren Andrews is a 62 y.o. right-handed -American female seen as a new patient to me, at the request of Dr. Indira Lynch for evaluation of new problem of progressive weakness and difficulty walking that the patient has had for several months, and increasing numbness in her feet. She is a chronic alcoholic who still drinks 1-1/2 pints a day still, maybe even more than that, and had previously been sent to [facility] for rehab but they sent her to the ER for detoxification, she was kept there for 1 day and then sent home but never sent back to [facility] for alcohol treatment. We advised the  that she needs to do that because that the only way she is able to get better she quit drinking and quit causing progressive nerve damage in her feet and brain and balance from her alcohol abuse. She still hallucinates at times and still suffers from withdrawal symptoms when she quits drinking. 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 She has been getting home therapy with home health, and her  says she is done much better now, and where she could not walk at all, she can now walk around the house at times with no assistive devices, and can walk outside the house with a cane or a walker. They are not taking her outside to ambulate because she is so much better and no longer needs to stay in the house. She has no family history of similar problems. She does not complain of any major neck pain or back pain no major headache and apparently is never had a seizure. Does complain of numbness and burning pain in her feet but has normal bowel and bladder function and no trouble with her hands. Her metabolic studies apparently has shown no other cause for her symptoms. She does not think she is diabetic.   She clearly has a neuropathy on exam, most likely alcohol related, but recent loss complete neuropathy panel to rule out any other treatable cause of her symptoms. 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 She is advised to make sure she takes a vitamin every day and vitamin D every day and eat a good healthy diet, and try to taper off the alcohol and get back with her [facility] rehab people for alcohol rehab. Past Medical History:   Diagnosis Date    Alcohol abuse     GI (gastrointestinal bleed)     Insomnia 11/09    Iron deficiency anemia 04/2004    PPD positive     treated w/ INH- tests since have been negative    Pure hypercholesterolemia     Scoliosis 12/28/15    [facility]    Tobacco dependence     Unspecified essential hypertension     Uterine bleeding, dysfunctional 2/12    Uterine fibroid       Past Surgical History:   Procedure Laterality Date    COLONOSCOPY N/A 1/31/2018    COLONOSCOPY performed by Valdemar Paulino MD at [address] [facility] HX CHOLECYSTECTOMY      HX COLONOSCOPY  07/01/2013    [facility], repeat 10 years   [facility] ORTHOPAEDIC      Shattered bilateral ankles-metal plates & screws    HX OTHER SURGICAL      Benign cyst removal- L. foot    MA LEG/ANKLE SURGERY PROC UNLISTED       Family History   Problem Relation Age of Onset    Hypertension Mother     Diabetes Mother     Cancer Mother         breast    Heart Disease Mother     Heart Disease Father         MI 42's    Kidney Disease Father     Hypertension Son       Social History     Tobacco Use    Smoking status: Current Every Day Smoker     Packs/day: 0.50     Years: 17.00     Pack years: 8.50     Types: Cigarettes    Smokeless tobacco: Never Used   Substance Use Topics    Alcohol use:  Yes Alcohol/week: 51.0 standard drinks     Types: 1 Cans of beer, 50 Shots of liquor per week     Frequency: 4 or more times a week     Drinks per session: 5 or 6     Binge frequency: Never     Comment: daily         Current Outpatient Medications:     b complex vitamins tablet, Take 1 Tab by mouth daily. , Disp: , Rfl:     cholecalciferol, vitamin D3, (Vitamin D3) 50 mcg (2,000 unit) tab, Take 2,000 Units by mouth., Disp: , Rfl:     furosemide (LASIX) 20 mg tablet, Take 1 Tab by mouth daily as needed (leg swelling). , Disp: 30 Tab, Rfl: 1    buPROPion XL (WELLBUTRIN XL) 150 mg tablet, TAKE ONE TABLET BY MOUTH EVERY MORNING FOR SMOKING CESSATION, Disp: 90 Tab, Rfl: 1    pantoprazole (PROTONIX) 40 mg tablet, TAKE ONE TABLET BY MOUTH DAILY BEFORE BREAKFAST, Disp: 90 Tab, Rfl: 1    thiamine HCL (B-1) 100 mg tablet, TAKE ONE TABLET BY MOUTH DAILY, Disp: 90 Tab, Rfl: 3    folic acid (FOLVITE) 1 mg tablet, TAKE ONE TABLET BY MOUTH DAILY, Disp: 90 Tab, Rfl: 1    magnesium 200 mg tab, TAKE ONE TABLET BY MOUTH DAILY, Disp: 90 Tab, Rfl: 1    diclofenac (VOLTAREN) 1 % gel, Apply  to affected area four (4) times daily. For joint pain, Disp: 100 g, Rfl: 2    potassium chloride (KLOR-CON) 20 mEq pack, TAKE ONE PACKET BY MOUTH DAILY, Disp: 90 Packet, Rfl: 1    ergocalciferol (ERGOCALCIFEROL) 1,250 mcg (50,000 unit) capsule, Take 1 Cap by mouth every seven (7) days. , Disp: 12 Cap, Rfl: 1    doxepin (SINEquan) 25 mg capsule, TAKE ONE CAPSULE BY MOUTH ONCE NIGHTLY AS NEEDED FOR SLEEP, Disp: 90 Cap, Rfl: 1        Allergies   Allergen Reactions    Robaxin [Methocarbamol] Hives, Rash and Itching     Red splotches    Statins-Hmg-Coa Reductase Inhibitors Myalgia    Codeine Itching    Neosporin [Neomycin-Bacitracin-Polymyxin] Rash      MRI Results (most recent):  No results found for this or any previous visit. No results found for this or any previous visit.   Review of Systems:  A comprehensive review of systems was negative except for: Constitutional: positive for fatigue, malaise and anorexia  Ears, nose, mouth, throat, and face: positive for hearing loss  Musculoskeletal: positive for myalgias, arthralgias, stiff joints and muscle weakness  Neurological: positive for memory problems, paresthesia, coordination problems, gait problems and weakness  Behvioral/Psych: positive for Alcohol abuse, anxiety and depression   Vitals:    04/16/21 0855   BP: 118/76   Pulse: 97   Temp: 97 °F (36.1 °C)   SpO2: 95%   Weight: 129 lb (58.5 kg)   Height: 5' 4\" (1.626 m)     Objective:     I      NEUROLOGICAL EXAM:    Appearance: The patient is fairly developed and nourished, provides a poor history and is in no acute distress. Mental Status: Oriented to time, place and person, and the president, patient slow mentally and cognitive function is mildly abnormal and speech is fluent and no aphasia or dysarthria. Mood and affect appropriate, but anxious and depressed. Cranial Nerves:   Intact visual fields. Fundi are benign, disc are flat, no lesions seen on funduscopy. KALE, EOM's full, no nystagmus, no ptosis. Facial sensation is normal. Corneal reflexes are not tested. Facial movement is symmetric. Hearing is abnormal bilaterally. Palate is midline with normal sternocleidomastoid and trapezius muscles are normal. Tongue is midline. Neck without meningismus or bruits  Temporal arteries are not tender or enlarged  TMJ areas are not tender on palpation   Motor:  3-4/5 strength in upper and lower proximal and distal muscles. Normal bulk and tone. No fasciculations. Rapid alternating movement is symmetric and slow bilaterally   Reflexes:   Deep tendon reflexes 0-1+/4 and symmetrical.  No babinski or clonus present   Sensory:   Abnormal to touch, pinprick and vibration and temperature in both legs to midcalf level. DSS is intact   Gait:  Abnormal gait for patient's age, with patient walking with wide-based and unsteady gait.    Tremor:   No tremor noted.   Cerebellar: Moderately abnormal cerebellar signs present on Romberg and tandem testing and finger-nose-finger exam.   Neurovascular:  Normal heart sounds and regular rhythm, peripheral pulses decreased, and no carotid bruits. Assessment:       ICD-10-CM ICD-9-CM    1. Idiopathic small and large fiber sensory neuropathy  G60.8 356.4 REN COMPREHENSIVE PLUS PANEL      CK      CBC WITH AUTOMATED DIFF      SED RATE (ESR)      MYELIN ASSOC. GLYCOPROTEIN AB,IGM      GM1 IGG AUTOANTIBODY      IMMUNOELECTROPHORESIS (IMMUNOFIX.)      VITAMIN B12 & FOLATE      TSH 3RD GENERATION      VITAMIN D, 25 HYDROXY      ANTINEURONAL CELL AB      MAGNESIUM      REFERRAL TO VASCULAR SURGERY      EMG LIMITED      MRI BRAIN W WO CONT   2. Diabetic peripheral neuropathy associated with type 2 diabetes mellitus (HCC)  E11.42 250.60 REN COMPREHENSIVE PLUS PANEL     357.2 CK      CBC WITH AUTOMATED DIFF      SED RATE (ESR)      MYELIN ASSOC. GLYCOPROTEIN AB,IGM      GM1 IGG AUTOANTIBODY      IMMUNOELECTROPHORESIS (IMMUNOFIX.)      VITAMIN B12 & FOLATE      TSH 3RD GENERATION      VITAMIN D, 25 HYDROXY      ANTINEURONAL CELL AB      MAGNESIUM      REFERRAL TO VASCULAR SURGERY      EMG LIMITED      MRI BRAIN W WO CONT   3. Sensory ataxic gait  R26.0 781. 2 REN COMPREHENSIVE PLUS PANEL      CK      CBC WITH AUTOMATED DIFF      SED RATE (ESR)      MYELIN ASSOC. GLYCOPROTEIN AB,IGM      GM1 IGG AUTOANTIBODY      IMMUNOELECTROPHORESIS (IMMUNOFIX.)      VITAMIN B12 & FOLATE      TSH 3RD GENERATION      VITAMIN D, 25 HYDROXY      ANTINEURONAL CELL AB      MAGNESIUM      REFERRAL TO VASCULAR SURGERY      EMG LIMITED      MRI BRAIN W WO CONT   4. Lymphedema  I89.0 457.1 REN COMPREHENSIVE PLUS PANEL      CK      CBC WITH AUTOMATED DIFF      SED RATE (ESR)      MYELIN ASSOC.  GLYCOPROTEIN AB,IGM      GM1 IGG AUTOANTIBODY      IMMUNOELECTROPHORESIS (IMMUNOFIX.)      VITAMIN B12 & FOLATE      TSH 3RD GENERATION      VITAMIN D, 25 HYDROXY ANTINEURONAL CELL AB      MAGNESIUM      REFERRAL TO VASCULAR SURGERY      EMG LIMITED      MRI BRAIN W WO CONT   5. Disturbance of memory  R41.3 780.93 REN COMPREHENSIVE PLUS PANEL      CK      CBC WITH AUTOMATED DIFF      SED RATE (ESR)      MYELIN ASSOC. GLYCOPROTEIN AB,IGM      GM1 IGG AUTOANTIBODY      IMMUNOELECTROPHORESIS (IMMUNOFIX.)      VITAMIN B12 & FOLATE      TSH 3RD GENERATION      VITAMIN D, 25 HYDROXY      ANTINEURONAL CELL AB      MAGNESIUM      REFERRAL TO VASCULAR SURGERY      EMG LIMITED      MRI BRAIN W WO CONT   6. B12 deficiency  E53.8 266.2 REN COMPREHENSIVE PLUS PANEL      CK      CBC WITH AUTOMATED DIFF      SED RATE (ESR)      MYELIN ASSOC. GLYCOPROTEIN AB,IGM      GM1 IGG AUTOANTIBODY      IMMUNOELECTROPHORESIS (IMMUNOFIX.)      VITAMIN B12 & FOLATE      TSH 3RD GENERATION      VITAMIN D, 25 HYDROXY      ANTINEURONAL CELL AB      MAGNESIUM      REFERRAL TO VASCULAR SURGERY      EMG LIMITED      MRI BRAIN W WO CONT   7. Vitamin D deficiency  E55.9 268.9 REN COMPREHENSIVE PLUS PANEL      CK      CBC WITH AUTOMATED DIFF      SED RATE (ESR)      MYELIN ASSOC. GLYCOPROTEIN AB,IGM      GM1 IGG AUTOANTIBODY      IMMUNOELECTROPHORESIS (IMMUNOFIX.)      VITAMIN B12 & FOLATE      TSH 3RD GENERATION      VITAMIN D, 25 HYDROXY      ANTINEURONAL CELL AB      MAGNESIUM      REFERRAL TO VASCULAR SURGERY      EMG LIMITED      MRI BRAIN W WO CONT   8. Hypothyroidism due to acquired atrophy of thyroid  E03.4 244.8 REN COMPREHENSIVE PLUS PANEL     246.8 CK      CBC WITH AUTOMATED DIFF      SED RATE (ESR)      MYELIN ASSOC. GLYCOPROTEIN AB,IGM      GM1 IGG AUTOANTIBODY      IMMUNOELECTROPHORESIS (IMMUNOFIX.)      VITAMIN B12 & FOLATE      TSH 3RD GENERATION      VITAMIN D, 25 HYDROXY      ANTINEURONAL CELL AB      MAGNESIUM      REFERRAL TO VASCULAR SURGERY      EMG LIMITED      MRI BRAIN W WO CONT     Active Problems:    * No active hospital problems.  *      Plan:     Patient with clear neuropathy, in both legs, and marked swelling in her calf muscles that is very unusual, and I wonder whether she has lymphedema or even some cellulitis, and have sent her to the vascular surgery to evaluate the lower extremity because of the peculiar swelling she has in her legs. Patient is encouraged to get back to her alcohol treatment program at [facility], and to try to gradually reduce her alcohol intake, but do it gradually so she will avoid withdrawal symptoms, hallucinations, and continue her vitamins and vitamin D and vitamin B1 and folic acid and other vitamins as before. She is also try to eat a good diet. We will also get an EMG and nerve conduction study of both legs to evaluate her neuropathy, make sure she has no other cause of her neuropathy or other neuromuscular disease. Patient very poor historian he gave a very wandering history it was hard to obtain, and pieced together with the help of the  and the patient I think we did have a fairly clear history of what happened to her. She clearly has nutritional problems from her alcohol abuse also. 62 minutes spent with the patient and her  today, going over her problems, and she has a seriously debilitating progressive neurologic disease if she keeps drinking, which will only lead to prolonged disability and even death if she keeps drinking. She is to continue her rehab at the house and she is getting better, and she can walk with minimal contact guarding at times. She is progressively getting better on her vitamins and therapy. We discussed all this with the patient and her  in detail discussed her plans, diagnosis, treatment needed, medications needed, and further evaluations. Follow-up in 3 months time or earlier if needed. Signed By: Kerry Jaramillo MD     April 16, 2021       CC:  Stefanie Larkin NP  FAX: [phone]

## 2021-04-20 ENCOUNTER — HOME CARE VISIT (OUTPATIENT)
Dept: SCHEDULING | Facility: HOME HEALTH | Age: 61
End: 2021-04-20
Payer: COMMERCIAL

## 2021-04-20 VITALS
HEART RATE: 92 BPM | SYSTOLIC BLOOD PRESSURE: 140 MMHG | OXYGEN SATURATION: 99 % | DIASTOLIC BLOOD PRESSURE: 90 MMHG | TEMPERATURE: 97.6 F

## 2021-04-20 PROCEDURE — G0151 HHCP-SERV OF PT,EA 15 MIN: HCPCS

## 2021-04-20 PROCEDURE — 400013 HH SOC

## 2021-04-20 PROCEDURE — G0158 HHC OT ASSISTANT EA 15: HCPCS

## 2021-04-21 VITALS
OXYGEN SATURATION: 99 % | WEIGHT: 130 LBS | TEMPERATURE: 97.6 F | SYSTOLIC BLOOD PRESSURE: 140 MMHG | BODY MASS INDEX: 22.31 KG/M2 | RESPIRATION RATE: 16 BRPM | DIASTOLIC BLOOD PRESSURE: 90 MMHG | HEART RATE: 92 BPM

## 2021-04-22 ENCOUNTER — HOME CARE VISIT (OUTPATIENT)
Dept: SCHEDULING | Facility: HOME HEALTH | Age: 61
End: 2021-04-22
Payer: COMMERCIAL

## 2021-04-22 VITALS
TEMPERATURE: 97.5 F | HEART RATE: 101 BPM | WEIGHT: 126 LBS | BODY MASS INDEX: 21.63 KG/M2 | DIASTOLIC BLOOD PRESSURE: 78 MMHG | SYSTOLIC BLOOD PRESSURE: 138 MMHG | OXYGEN SATURATION: 99 %

## 2021-04-22 PROCEDURE — G0151 HHCP-SERV OF PT,EA 15 MIN: HCPCS

## 2021-04-22 PROCEDURE — G0158 HHC OT ASSISTANT EA 15: HCPCS

## 2021-04-24 VITALS
TEMPERATURE: 97.5 F | RESPIRATION RATE: 16 BRPM | OXYGEN SATURATION: 99 % | DIASTOLIC BLOOD PRESSURE: 78 MMHG | HEART RATE: 101 BPM | SYSTOLIC BLOOD PRESSURE: 138 MMHG | WEIGHT: 126 LBS | BODY MASS INDEX: 21.63 KG/M2

## 2021-04-27 ENCOUNTER — HOME CARE VISIT (OUTPATIENT)
Dept: SCHEDULING | Facility: HOME HEALTH | Age: 61
End: 2021-04-27
Payer: COMMERCIAL

## 2021-04-27 VITALS
DIASTOLIC BLOOD PRESSURE: 84 MMHG | HEART RATE: 99 BPM | TEMPERATURE: 97.6 F | OXYGEN SATURATION: 98 % | SYSTOLIC BLOOD PRESSURE: 126 MMHG

## 2021-04-27 PROCEDURE — G0151 HHCP-SERV OF PT,EA 15 MIN: HCPCS

## 2021-04-27 PROCEDURE — G0158 HHC OT ASSISTANT EA 15: HCPCS

## 2021-04-28 ENCOUNTER — HOME CARE VISIT (OUTPATIENT)
Dept: HOME HEALTH SERVICES | Facility: HOME HEALTH | Age: 61
End: 2021-04-28
Payer: COMMERCIAL

## 2021-04-28 ENCOUNTER — HOME CARE VISIT (OUTPATIENT)
Dept: SCHEDULING | Facility: HOME HEALTH | Age: 61
End: 2021-04-28
Payer: COMMERCIAL

## 2021-04-28 VITALS
DIASTOLIC BLOOD PRESSURE: 72 MMHG | TEMPERATURE: 96.9 F | HEART RATE: 70 BPM | OXYGEN SATURATION: 97 % | SYSTOLIC BLOOD PRESSURE: 137 MMHG

## 2021-04-28 PROCEDURE — G0152 HHCP-SERV OF OT,EA 15 MIN: HCPCS

## 2021-04-29 ENCOUNTER — HOME CARE VISIT (OUTPATIENT)
Dept: SCHEDULING | Facility: HOME HEALTH | Age: 61
End: 2021-04-29
Payer: COMMERCIAL

## 2021-04-29 VITALS
OXYGEN SATURATION: 98 % | HEART RATE: 102 BPM | DIASTOLIC BLOOD PRESSURE: 74 MMHG | TEMPERATURE: 97.5 F | SYSTOLIC BLOOD PRESSURE: 125 MMHG | RESPIRATION RATE: 16 BRPM

## 2021-04-29 PROCEDURE — G0151 HHCP-SERV OF PT,EA 15 MIN: HCPCS

## 2021-04-30 ENCOUNTER — VIRTUAL VISIT (OUTPATIENT)
Dept: ORTHOPEDIC SURGERY | Age: 61
End: 2021-04-30
Payer: COMMERCIAL

## 2021-04-30 ENCOUNTER — HOSPITAL ENCOUNTER (OUTPATIENT)
Dept: MRI IMAGING | Age: 61
Discharge: HOME | End: 2021-04-30
Attending: PSYCHIATRY & NEUROLOGY
Payer: COMMERCIAL

## 2021-04-30 ENCOUNTER — DOCUMENTATION ONLY (OUTPATIENT)
Dept: NEUROLOGY | Age: 61
End: 2021-04-30

## 2021-04-30 VITALS
DIASTOLIC BLOOD PRESSURE: 78 MMHG | OXYGEN SATURATION: 95 % | SYSTOLIC BLOOD PRESSURE: 140 MMHG | RESPIRATION RATE: 16 BRPM | HEART RATE: 94 BPM | BODY MASS INDEX: 21.97 KG/M2 | WEIGHT: 128 LBS | TEMPERATURE: 97 F

## 2021-04-30 VITALS — WEIGHT: 128 LBS | BODY MASS INDEX: 21.97 KG/M2

## 2021-04-30 DIAGNOSIS — E55.9 VITAMIN D DEFICIENCY: ICD-10-CM

## 2021-04-30 DIAGNOSIS — M17.0 BILATERAL PRIMARY OSTEOARTHRITIS OF KNEE: ICD-10-CM

## 2021-04-30 DIAGNOSIS — R26.0 SENSORY ATAXIC GAIT: ICD-10-CM

## 2021-04-30 DIAGNOSIS — M25.562 LEFT KNEE PAIN, UNSPECIFIED CHRONICITY: Primary | ICD-10-CM

## 2021-04-30 DIAGNOSIS — R41.3 DISTURBANCE OF MEMORY: ICD-10-CM

## 2021-04-30 DIAGNOSIS — E53.8 B12 DEFICIENCY: ICD-10-CM

## 2021-04-30 DIAGNOSIS — E11.42 DIABETIC PERIPHERAL NEUROPATHY ASSOCIATED WITH TYPE 2 DIABETES MELLITUS (HCC): ICD-10-CM

## 2021-04-30 DIAGNOSIS — I89.0 LYMPHEDEMA: ICD-10-CM

## 2021-04-30 DIAGNOSIS — G60.8 IDIOPATHIC SMALL AND LARGE FIBER SENSORY NEUROPATHY: ICD-10-CM

## 2021-04-30 DIAGNOSIS — E03.4 HYPOTHYROIDISM DUE TO ACQUIRED ATROPHY OF THYROID: ICD-10-CM

## 2021-04-30 DIAGNOSIS — M25.561 RIGHT KNEE PAIN, UNSPECIFIED CHRONICITY: ICD-10-CM

## 2021-04-30 PROCEDURE — 70553 MRI BRAIN STEM W/O & W/DYE: CPT

## 2021-04-30 PROCEDURE — 99441 PR PHYS/QHP TELEPHONE EVALUATION 5-10 MIN: CPT | Performed by: ORTHOPAEDIC SURGERY

## 2021-04-30 PROCEDURE — A9575 INJ GADOTERATE MEGLUMI 0.1ML: HCPCS | Performed by: PSYCHIATRY & NEUROLOGY

## 2021-04-30 PROCEDURE — 74011250636 HC RX REV CODE- 250/636: Performed by: PSYCHIATRY & NEUROLOGY

## 2021-04-30 RX ORDER — GADOTERATE MEGLUMINE 376.9 MG/ML
10 INJECTION INTRAVENOUS
Status: COMPLETED | OUTPATIENT
Start: 2021-04-30 | End: 2021-04-30

## 2021-04-30 RX ADMIN — GADOTERATE MEGLUMINE 10 ML: 376.9 INJECTION INTRAVENOUS at 09:00

## 2021-04-30 NOTE — PROGRESS NOTES
I called the patient and let her know MRI scan was normal, and she said thank you, she feels much better, we will see her in follow-up as arranged

## 2021-05-01 NOTE — PROGRESS NOTES
5/1/2021      CC: bilateral knee pain    HPI:      This is a 62y.o. year old female who did have injections to the knees for osteoarthritis, she presents after a 6 week interval, she was a no show previously. She continues to have pain and difficulty walking. She is currently not on any medications or treatments. PMH:  Past Medical History:   Diagnosis Date    Alcohol abuse     GI (gastrointestinal bleed)     Insomnia 11/09    Iron deficiency anemia 04/2004    PPD positive     treated w/ INH- tests since have been negative    Pure hypercholesterolemia     Scoliosis 12/28/15    [facility]    Tobacco dependence     Unspecified essential hypertension     Uterine bleeding, dysfunctional 2/12    Uterine fibroid        PSxHx:  Past Surgical History:   Procedure Laterality Date    COLONOSCOPY N/A 1/31/2018    COLONOSCOPY performed by Junior Riggs MD at [address] [facility] HX CHOLECYSTECTOMY      HX COLONOSCOPY  07/01/2013    [facility], repeat 10 years    HX ORTHOPAEDIC      Shattered bilateral ankles-metal plates & screws    HX OTHER SURGICAL      Benign cyst removal- L. foot    NM LEG/ANKLE SURGERY PROC UNLISTED         Meds:    Current Outpatient Medications:     potassium chloride (KLOR-CON) 20 mEq pack, TAKE ONE PACKET BY MOUTH DAILY, Disp: 30 Packet, Rfl: 5    b complex vitamins tablet, Take 1 Tab by mouth daily. , Disp: , Rfl:     cholecalciferol, vitamin D3, (Vitamin D3) 50 mcg (2,000 unit) tab, Take 2,000 Units by mouth., Disp: , Rfl:     furosemide (LASIX) 20 mg tablet, Take 1 Tab by mouth daily as needed (leg swelling). , Disp: 30 Tab, Rfl: 1    buPROPion XL (WELLBUTRIN XL) 150 mg tablet, TAKE ONE TABLET BY MOUTH EVERY MORNING FOR SMOKING CESSATION, Disp: 90 Tab, Rfl: 1    pantoprazole (PROTONIX) 40 mg tablet, TAKE ONE TABLET BY MOUTH DAILY BEFORE BREAKFAST, Disp: 90 Tab, Rfl: 1    thiamine HCL (B-1) 100 mg tablet, TAKE ONE TABLET BY MOUTH DAILY, Disp: 90 Tab, Rfl: 3    folic acid (FOLVITE) 1 mg tablet, TAKE ONE TABLET BY MOUTH DAILY, Disp: 90 Tab, Rfl: 1    magnesium 200 mg tab, TAKE ONE TABLET BY MOUTH DAILY, Disp: 90 Tab, Rfl: 1    diclofenac (VOLTAREN) 1 % gel, Apply  to affected area four (4) times daily. For joint pain, Disp: 100 g, Rfl: 2    ergocalciferol (ERGOCALCIFEROL) 1,250 mcg (50,000 unit) capsule, Take 1 Cap by mouth every seven (7) days. , Disp: 12 Cap, Rfl: 1    doxepin (SINEquan) 25 mg capsule, TAKE ONE CAPSULE BY MOUTH ONCE NIGHTLY AS NEEDED FOR SLEEP, Disp: 90 Cap, Rfl: 1  No current facility-administered medications for this visit. All:  Allergies   Allergen Reactions    Robaxin [Methocarbamol] Hives, Rash and Itching     Red splotches    Statins-Hmg-Coa Reductase Inhibitors Myalgia    Codeine Itching    Neosporin [Neomycin-Bacitracin-Polymyxin] Rash       Social Hx:  Social History     Socioeconomic History    Marital status:      Spouse name: Not on file    Number of children: Not on file    Years of education: Not on file    Highest education level: Not on file   Occupational History    Occupation: part time   Tobacco Use    Smoking status: Current Every Day Smoker     Packs/day: 0.50     Years: 17.00     Pack years: 8.50     Types: Cigarettes    Smokeless tobacco: Never Used   Substance and Sexual Activity    Alcohol use:  Yes     Alcohol/week: 51.0 standard drinks     Types: 1 Cans of beer, 50 Shots of liquor per week     Frequency: 4 or more times a week     Drinks per session: 5 or 6     Binge frequency: Never     Comment: daily    Drug use: No    Sexual activity: Yes     Partners: Male     Birth control/protection: None       Family Hx:  Family History   Problem Relation Age of Onset    Hypertension Mother     Diabetes Mother     Cancer Mother         breast    Heart Disease Mother     Heart Disease Father         MI 42's    Kidney Disease Father     Hypertension Son          Review of Systems:       General: Denies headache, lethargy, fever, weight loss  Ears/Nose/Throat: Denies ear discharge, drainage, nosebleeds, hoarse voice, dental problems  Cardiovascular: Denies chest pain, shortness of breath  Lungs: Denies chest pain, breathing problems, wheezing, pneumonia  Stomach: Denies stomach pain, heartburn, constipation, irritable bowel  Skin: Denies rash, sores, open wounds  Musculoskeletal: bilateral knee pain  Genitourinary: Denies dysuria, hematuria, polyuria  Gastrointestinal: Denies constipation, obstipation, diarrhea  Neurological: Denies changes in sight, smell, hearing, taste, seizures. Denies loss of consciousness. Psychiatric: Denies depression, sleep pattern changes, anxiety, change in personality  Endocrine: Denies mood swings, heat or cold intolerance  Hematologic/Lymphatic: Denies anemia, purpura, petechia  Allergic/Immunologic: Denies swelling of throat, pain or swelling at lymph nodes      Physical Examination:    There were no vitals taken for this visit. General: AOX3, no apparent distress  Psychiatric: mood and affect appropriate        Diagnostics:    Pertinent Diagnostics: none today    Assessment: bilateral primary OA of the knee  Plan: This patient and I discussed her current situation, she does have a need for further care, but does have some medical conditions that prohibit medical and surgical management. We resolved on viscosupplementation injections, which we will obtain approval for and perform at her convenience. I was in the office while conducting this encounter. Consent:  She and/or her healthcare decision maker is aware that this patient-initiated Telehealth encounter is a billable service, with coverage as determined by her insurance carrier. She is aware that she may receive a bill and has provided verbal consent to proceed: Yes    This virtual visit was conducted telephone encounter only.  -  I affirm this is a Patient Initiated Episode with an Established Patient who has not had a related appointment within my department in the past 7 days or scheduled within the next 24 hours. Note: this encounter is not billable if this call serves to triage the patient into an appointment for the relevant concern. Total Time: minutes: 5-10 minutes. Ms. Danna Galloway has a reminder for a \"due or due soon\" health maintenance. I have asked that she contact her primary care provider for follow-up on this health maintenance.

## 2021-05-04 ENCOUNTER — HOME CARE VISIT (OUTPATIENT)
Dept: SCHEDULING | Facility: HOME HEALTH | Age: 61
End: 2021-05-04
Payer: COMMERCIAL

## 2021-05-04 PROCEDURE — G0151 HHCP-SERV OF PT,EA 15 MIN: HCPCS

## 2021-05-05 VITALS
HEART RATE: 102 BPM | OXYGEN SATURATION: 98 % | DIASTOLIC BLOOD PRESSURE: 75 MMHG | WEIGHT: 130 LBS | SYSTOLIC BLOOD PRESSURE: 136 MMHG | TEMPERATURE: 97.2 F | BODY MASS INDEX: 22.31 KG/M2 | RESPIRATION RATE: 16 BRPM

## 2021-05-06 ENCOUNTER — HOME CARE VISIT (OUTPATIENT)
Dept: SCHEDULING | Facility: HOME HEALTH | Age: 61
End: 2021-05-06
Payer: COMMERCIAL

## 2021-05-06 VITALS
RESPIRATION RATE: 16 BRPM | TEMPERATURE: 97.6 F | DIASTOLIC BLOOD PRESSURE: 68 MMHG | SYSTOLIC BLOOD PRESSURE: 135 MMHG | OXYGEN SATURATION: 99 % | HEART RATE: 91 BPM

## 2021-05-06 PROCEDURE — G0151 HHCP-SERV OF PT,EA 15 MIN: HCPCS

## 2021-05-07 ENCOUNTER — OFFICE VISIT (OUTPATIENT)
Dept: HEMATOLOGY | Age: 61
End: 2021-05-07
Payer: COMMERCIAL

## 2021-05-07 VITALS
BODY MASS INDEX: 22.02 KG/M2 | HEART RATE: 86 BPM | RESPIRATION RATE: 20 BRPM | SYSTOLIC BLOOD PRESSURE: 140 MMHG | OXYGEN SATURATION: 97 % | TEMPERATURE: 98.4 F | WEIGHT: 129 LBS | HEIGHT: 64 IN | DIASTOLIC BLOOD PRESSURE: 89 MMHG

## 2021-05-07 DIAGNOSIS — K70.30 ALCOHOLIC CIRRHOSIS OF LIVER WITHOUT ASCITES (HCC): ICD-10-CM

## 2021-05-07 DIAGNOSIS — R60.9 PERIPHERAL EDEMA: ICD-10-CM

## 2021-05-07 DIAGNOSIS — F10.939 ALCOHOL WITHDRAWAL SYNDROME WITH COMPLICATION (HCC): Primary | ICD-10-CM

## 2021-05-07 PROBLEM — R53.81 DEBILITY: Status: RESOLVED | Noted: 2018-01-26 | Resolved: 2021-05-07

## 2021-05-07 PROBLEM — R41.3 DISTURBANCE OF MEMORY: Status: RESOLVED | Noted: 2021-04-16 | Resolved: 2021-05-07

## 2021-05-07 PROBLEM — I89.0 LYMPHEDEMA: Status: RESOLVED | Noted: 2021-04-16 | Resolved: 2021-05-07

## 2021-05-07 PROBLEM — R77.8 ELEVATED TROPONIN: Status: RESOLVED | Noted: 2018-01-27 | Resolved: 2021-05-07

## 2021-05-07 PROBLEM — E11.42 DIABETIC PERIPHERAL NEUROPATHY ASSOCIATED WITH TYPE 2 DIABETES MELLITUS (HCC): Status: RESOLVED | Noted: 2021-04-16 | Resolved: 2021-05-07

## 2021-05-07 PROBLEM — R93.89 ABNORMAL CT SCAN, CHEST: Status: RESOLVED | Noted: 2018-01-30 | Resolved: 2021-05-07

## 2021-05-07 PROBLEM — R93.5 ABNORMAL CT OF THE ABDOMEN: Status: RESOLVED | Noted: 2018-01-30 | Resolved: 2021-05-07

## 2021-05-07 PROBLEM — E87.20 LACTIC ACID ACIDOSIS: Status: RESOLVED | Noted: 2018-01-27 | Resolved: 2021-05-07

## 2021-05-07 PROBLEM — R63.4 WEIGHT LOSS: Status: RESOLVED | Noted: 2018-01-26 | Resolved: 2021-05-07

## 2021-05-07 PROBLEM — K52.9 COLITIS: Status: RESOLVED | Noted: 2018-01-27 | Resolved: 2021-05-07

## 2021-05-07 PROBLEM — D86.9 SARCOIDOSIS: Status: ACTIVE | Noted: 2021-05-07

## 2021-05-07 PROBLEM — E87.6 HYPOKALEMIA DUE TO LOSS OF POTASSIUM: Status: RESOLVED | Noted: 2018-01-26 | Resolved: 2021-05-07

## 2021-05-07 PROBLEM — E87.6 HYPOKALEMIA: Status: RESOLVED | Noted: 2018-02-16 | Resolved: 2021-05-07

## 2021-05-07 PROBLEM — E83.42 HYPOMAGNESEMIA: Status: RESOLVED | Noted: 2018-01-26 | Resolved: 2021-05-07

## 2021-05-07 PROBLEM — I95.9 HYPOTENSION: Status: RESOLVED | Noted: 2018-01-27 | Resolved: 2021-05-07

## 2021-05-07 PROCEDURE — 99204 OFFICE O/P NEW MOD 45 MIN: CPT | Performed by: INTERNAL MEDICINE

## 2021-05-07 RX ORDER — SPIRONOLACTONE 100 MG/1
100 TABLET, FILM COATED ORAL DAILY
Qty: 90 TAB | Refills: 3 | Status: SHIPPED | OUTPATIENT
Start: 2021-05-07 | End: 2021-06-07

## 2021-05-07 RX ORDER — CHLORDIAZEPOXIDE HYDROCHLORIDE 5 MG/1
5 CAPSULE, GELATIN COATED ORAL
Qty: 90 CAP | Refills: 0 | Status: SHIPPED | OUTPATIENT
Start: 2021-05-07 | End: 2022-02-02 | Stop reason: SDUPTHER

## 2021-05-07 RX ORDER — FUROSEMIDE 20 MG/1
40 TABLET ORAL DAILY
Qty: 90 TAB | Refills: 3 | Status: SHIPPED | OUTPATIENT
Start: 2021-05-07 | End: 2021-06-07

## 2021-05-07 NOTE — PROGRESS NOTES
[address] [facility]      Rocio Caban MD, Chanell Adhikari, Dacia Walker MD, MPH      Fern Ayala, PA-C    Margaret Bear, ACNP-BC     Brooke Cazares, [facility]   Rene Ribeiro, FNP-C    Kera Eldridge, [facility]       Yoselyn Morrison [facility] 136    at [address], [facility] Nir Abdi  22.    [phone]    FAX: [address] [place] [address], [address]    [phone]    FAX: [phone]       Patient Care Team:  Carlitos Quintana NP as PCP - General (Nurse Practitioner)  Carlitos Quintana NP as PCP - [facility] Sadia Mayes Provider      Problem List  Date Reviewed: 5/16/2021          Codes Class Noted    Sarcoidosis ICD-10-CM: D86.9  ICD-9-CM: 135  5/7/2021        Idiopathic small and large fiber sensory neuropathy ICD-10-CM: G60.8  ICD-9-CM: 356.4  4/16/2021        Sensory ataxic gait ICD-10-CM: R26.0  ICD-9-CM: 781.2  4/16/2021        B12 deficiency ICD-10-CM: E53.8  ICD-9-CM: 266.2  4/16/2021        Vitamin D deficiency ICD-10-CM: E55.9  ICD-9-CM: 268.9  4/16/2021        Hypothyroidism due to acquired atrophy of thyroid ICD-10-CM: E03.4  ICD-9-CM: 244.8, 246.8  4/16/2021        Bilateral primary osteoarthritis of knee ICD-10-CM: M17.0  ICD-9-CM: 715.16  3/11/2021        Chronic anemia ICD-10-CM: D64.9  ICD-9-CM: 285.9  3/8/2021        Smoker ICD-10-CM: F17.200  ICD-9-CM: 305.1  3/8/2021        History of gastric ulcer ICD-10-CM: Z87.11  ICD-9-CM: V12.79  3/8/2021        Neuropathy ICD-10-CM: G62.9  ICD-9-CM: 355.9  3/8/2021        Anemia ICD-10-CM: D64.9  ICD-9-CM: 285.9  5/14/2018        Mediastinal lymphadenopathy ICD-10-CM: R59.0  ICD-9-CM: 785.6  4/12/2018        Alcohol abuse ICD-10-CM: F10.10  ICD-9-CM: 305.00  4/10/2018        Protein-calorie malnutrition, severe ([facility] ICD-10-CM: E43  ICD-9-CM: 262  1/27/2018        Emphysema of lung ([facility].) ICD-10-CM: J43.9  ICD-9-CM: 492.8  1/27/2018        Scoliosis (Chronic) ICD-10-CM: M41.9  ICD-9-CM: 737.30  Unknown    Overview Signed 4/13/2016  9:50 AM by Shelby Astudillo LPN Dr.              Hyperlipidemia (Chronic) ICD-10-CM: E78.5  ICD-9-CM: 272.4  7/33/2293        Folic acid deficiency (Chronic) ICD-10-CM: E53.8  ICD-9-CM: 266.2  2/8/2012              The clinicians listed above have asked me to see Jenna Manzanares in consultation regarding elevated liver enzymes and its management. All medical records sent by the referring physicians were reviewed including imaging studies     The patient is a 62 y.o. Black female who was found to have elevated liver transaminases and alkaline phosphatase in 4/2019. Serologic evaluation for markers of chronic liver disease was negative for HCV, HBV,     Imaging of the liver was not performed. An assessment of liver fibrosis with biopsy, Fibroscan or elastography has not been performed. The patient has the following symptoms which could be attributed to the liver disorder:    swelling of the lower extremities,   Poor balance from neuropathy  Numbness and tingling in legs and feet. The patient is not experiencing the following symptoms which are commonly seen in this liver disorder:   fatigue,   pain in the right side over the liver,   problems concentrating,   swelling of the abdomen,   swelling of the lower extremities,   hematemesis,   hematochezia. The patient has Severe limitations in functional activities which can be attributed to the liver disease       ASSESSMENT AND PLAN:  Elevated liver enzymes  Intermittent elevation in liver transaminases alkaline phosphatase that is most likely due to alcohol induced liver injury. Liver function is normal.  The platelet count is depressed.        Based upon laboratory studies and imaging the patient may have advanced liver disease or cirrhosis. Serologic testing for causes of chronic liver disease was ordered. The need to perform an assessment of liver fibrosis was discussed with the patient. The Fibroscan can assess liver fibrosis and determine if a patient has advanced fibrosis or cirrhosis without the need for liver biopsy. This will be performed at the next office visit. If the Fibroscan suggests advanced fibrosis then a liver biopsy should be considered. The Fibroscan can be repeated annually or as often as clinically indicated to assess for fibrosis progression and/or regression. Will perform laboratory testing to monitor liver function and degree of liver injury. This included BMP, hepatic panel, CBC with platelet count, INR. 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 The patient consumes >1 pint of gin daily. Discussed the need to stop using alcohol     Elevation in Ferritin  There is an elevation in ferritin with Elevated iron saturation. It is unlikely that the patient has hemochromatosis or is a carrier for an HFE gene. Will order HFE genetic testing. Suspect the elevation in ferritin is secondary to alcohol use and will come down to normal with abstinence. Screening for Hepatocellular Carcinoma  HCC screening has recently been performed and does not suggest [facility] The next liver imaging study will be performed in 11/2021. Treatment of other medical problems in patients with chronic liver disease  There are no contraindications for the patient to take most medications that are necessary for treatment of other medical issues.     The patient has cirrhrosis and should avoid taking NSAIDs which are associated with a higher rate of developing NASEEM. The patient can take any medications utilized for treatment of DM, statins to treat hypercholesterolemia    The patient consumes alcohol on a daily basis or has recently stopped consuming alcohol. Regular alcohol use increases the risk of toxicity from acetaminophen. This analgesic should be avoided until the patient has been abstinent from alcohol for 6 months. Lower extremity edema  Edema has improved but has not resolved despite current dose of diuretics. Will continue the current dose of diuretics at step 1 until we seee labs. Will increase doses of diuretics to step 2 if renal function and Sna allow    Counseling for alcohol in patients with chronic liver disease  The patient was counseled regarding alcohol consumption and the effect of alcohol on chronic liver disease. The patient has continued to consume alcohol daily. The patient consumes too much alcohol and is at risk to develop alcohol induced liver injury. It was recommended that all alcohol consumption be stopped and the patient be abstinent from alcohol    The patient has an alcohol abuse disorder and it was suggested that they enter alcohol counseling and/or attend AA. Vaccinations   The need for vaccination against viral hepatitis A and B will be assessed with serologic and instituted as appropriate. Routine vaccinations against other bacterial and viral agents can be performed as indicated. Annual flu vaccination should be administered if indicated. ALLERGIES  Allergies   Allergen Reactions    Robaxin [Methocarbamol] Hives, Rash and Itching     Red splotches    Statins-Hmg-Coa Reductase Inhibitors Myalgia    Codeine Itching    Neosporin [Neomycin-Bacitracin-Polymyxin] Rash       MEDICATIONS  Current Outpatient Medications   Medication Sig    spironolactone (Aldactone) 100 mg tablet Take 1 Tab by mouth daily.  furosemide (LASIX) 20 mg tablet Take 2 Tabs by mouth daily.     chlordiazePOXIDE (LIBRIUM) 5 mg capsule Take 1 Cap by mouth three (3) times daily as needed for Anxiety. Max Daily Amount: 15 mg.  potassium chloride (KLOR-CON) 20 mEq pack TAKE ONE PACKET BY MOUTH DAILY    buPROPion XL (WELLBUTRIN XL) 150 mg tablet TAKE ONE TABLET BY MOUTH EVERY MORNING FOR SMOKING CESSATION    pantoprazole (PROTONIX) 40 mg tablet TAKE ONE TABLET BY MOUTH DAILY BEFORE BREAKFAST    thiamine HCL (B-1) 100 mg tablet TAKE ONE TABLET BY MOUTH DAILY    folic acid (FOLVITE) 1 mg tablet TAKE ONE TABLET BY MOUTH DAILY    magnesium 200 mg tab TAKE ONE TABLET BY MOUTH DAILY    doxepin (SINEquan) 25 mg capsule TAKE ONE CAPSULE BY MOUTH ONCE NIGHTLY AS NEEDED FOR SLEEP    b complex vitamins tablet Take 1 Tab by mouth daily.  cholecalciferol, vitamin D3, (Vitamin D3) 50 mcg (2,000 unit) tab Take 2,000 Units by mouth.  diclofenac (VOLTAREN) 1 % gel Apply  to affected area four (4) times daily. For joint pain    ergocalciferol (ERGOCALCIFEROL) 1,250 mcg (50,000 unit) capsule Take 1 Cap by mouth every seven (7) days. No current facility-administered medications for this visit. SYSTEM REVIEW NOT RELATED TO LIVER DISEASE OR REVIEWED ABOVE:  Constitution systems: Negative for fever, chills, weight gain, weight loss. Eyes: Negative for visual changes. ENT: Negative for sore throat, painful swallowing. Respiratory: Negative for cough, hemoptysis, SOB. Cardiology: Negative for chest pain, palpitations. GI:  Negative for constipation or diarrhea. : Negative for urinary frequency, dysuria, hematuria, nocturia. Skin: Negative for rash. Hematology: Negative for easy bruising, blood clots. Musculo-skelatal: Negative for back pain, muscle pain, weakness. Neurologic: Negative for headaches, dizziness, vertigo, memory problems not related to HE. Psychology: Negative for anxiety, depression. FAMILY HISTORY:  The father  of heart disease.     The mother  of CHF, lung cancer. There is no family history of liver disease. SOCIAL HISTORY:  The patient is . The patient has 3 children, and 12 grandchildren. The patient currently smokes 1/2 pack of tobacco daily. The patient consumes 1.5 pint of gin per day   The patient used to work as . The patient has not worked since 2015. PHYSICAL EXAMINATION:  Visit Vitals  BP (!) 140/89 (BP 1 Location: Left upper arm, BP Patient Position: Sitting, BP Cuff Size: Adult)   Pulse 86   Temp 98.4 °F (36.9 °C) (Temporal)   Resp 20   Ht 5' 4\" (1.626 m)   Wt 129 lb (58.5 kg)   SpO2 97%   BMI 22.14 kg/m²     General: No acute distress. Eyes: Sclera anicteric. ENT: No oral lesions. Thyroid normal.  Nodes: No adenopathy. Skin: No spider angiomata. No jaundice. No palmar erythema. Respiratory: Lungs clear to auscultation. Cardiovascular: Regular heart rate. No murmurs. No JVD. Abdomen: Soft non-tender. Liver size normal to percussion/palpation. Spleen not palpable. No obvious ascites. Extremities: 3+ lower edema. Neurologic: Alert and oriented. Cranial nerves grossly intact. No asterixis. LABORATORY STUDIES:  Liver [place] of [address] [place] [address] & Units 3/18/2021 3/9/2021   WBC 3.6 - 11.0 K/uL 7.5 5.5   ANC 1.8 - 8.0 K/UL 4.5 3.3   HGB 11.5 - 16.0 g/dL 10.9 (L) 9.9 (L)   HGB      HGB (iSTAT)       - 400 K/uL 127 (L) 139 (L)   INR 0.9 - 1.1       AST 15 - 37 U/L 37 50 (H)   ALT 12 - 78 U/L 21 24   Alk Phos 45 - 117 U/L 99 124 (H)   Bili, Total 0.2 - 1.0 MG/DL 0.5 0.7   Bili, Direct 0.0 - 0.2 MG/DL     Albumin 3.5 - 5.0 g/dL 2.8 (L) 2.9 (L)   BUN 6 - 20 MG/DL 15 13   Creat 0.55 - 1.02 MG/DL 0.87 0.60   Na 136 - 145 mmol/L 145 139   K 3.5 - 5.1 mmol/L 3.5 3.6   Cl 97 - 108 mmol/L 110 (H) 103   CO2 21 - 32 mmol/L 30 26   Glucose 65 - 100 mg/dL 92 72   Magnesium 1.6 - 2.4 mg/dL 2.0 1.8     SEROLOGIES:  10/2017.   HBsurface antigen negative, anti-HCV negative    Serologies Latest Ref Rng & Units 2/25/2021 4/15/2020 4/9/2019   Ferritin 8 - 252 NG/ (H) 1,874 (H) 2,834 (H)   Iron % Saturation 20 - 50 % 33 90 (HH) >91 (HH)     LIVER HISTOLOGY:  Not available or performed    ENDOSCOPIC PROCEDURES:  Not available or performed    RADIOLOGY:  5/2021. Ultrasound of liver. Echogenic consistent with fatty liver. No liver mass lesions. No dilated bile ducts. No ascites. OTHER TESTING:  Not available or performed    FOLLOW-UP:  All of the issues listed above in the Assessment and Plan were discussed with the patient. All questions were answered. The patient expressed a clear understanding of the above. [address] [place] [address] in 4 weeks for Fibroscan to review all data and determine the treatment plan.       MD Hazel RodriguezSt. [facility] 13 of [address], [address] [place] [address] 22.  [phone]  [address] [place] [address]

## 2021-05-07 NOTE — PROGRESS NOTES
Identified pt with two pt identifiers(name and ). Reviewed record in preparation for visit and have obtained necessary documentation. Chief Complaint   Patient presents with    New Patient      Vitals:    21 0905   BP: (!) 140/89   Pulse: 86   Resp: 20   Temp: 98.4 °F (36.9 °C)   TempSrc: Temporal   SpO2: 97%   Weight: 129 lb (58.5 kg)   Height: 5' 4\" (1.626 m)   PainSc:   0 - No pain       Health Maintenance Review: Patient reminded of \"due or due soon\" health maintenance. I have asked the patient to contact his/her primary care provider (PCP) for follow-up on his/her health maintenance. Coordination of Care Questionnaire:  :   1) Have you been to an emergency room, urgent care, or hospitalized since your last visit? If yes, where when, and reason for visit? yes     [address] [facility]; Alcohol Withdrawals    2. Have seen or consulted any other health care provider since your last visit? If yes, where when, and reason for visit? NO      Patient is accompanied by self, patient,  and N/A I have received verbal consent from Ascencion Gallego to discuss any/all medical information while they are present in the room.

## 2021-05-07 NOTE — Clinical Note
5/16/2021 Patient: Sindi Akbar YOB: 1960 Date of Visit: 5/7/2021 Gabe Ford NP 
[address] [id number] [address] [employer] Dear Gabe Ford NP, Thank you for referring Ms. Antelmo Lucero to [address] [facility] for evaluation. My notes for this consultation are attached. If you have questions, please do not hesitate to call me. I look forward to following your patient along with you. Sincerely, Demarco Pozo MD

## 2021-05-13 ENCOUNTER — HOSPITAL ENCOUNTER (OUTPATIENT)
Dept: ULTRASOUND IMAGING | Age: 61
Discharge: HOME | End: 2021-05-13
Attending: INTERNAL MEDICINE
Payer: COMMERCIAL

## 2021-05-13 DIAGNOSIS — K70.30 ALCOHOLIC CIRRHOSIS OF LIVER WITHOUT ASCITES (HCC): ICD-10-CM

## 2021-05-13 PROCEDURE — 76700 US EXAM ABDOM COMPLETE: CPT

## 2021-05-19 ENCOUNTER — OFFICE VISIT (OUTPATIENT)
Dept: NEUROLOGY | Age: 61
End: 2021-05-19
Payer: COMMERCIAL

## 2021-05-19 ENCOUNTER — DOCUMENTATION ONLY (OUTPATIENT)
Dept: NEUROLOGY | Age: 61
End: 2021-05-19

## 2021-05-19 DIAGNOSIS — E53.8 B12 DEFICIENCY: ICD-10-CM

## 2021-05-19 DIAGNOSIS — G62.1 ALCOHOLIC PERIPHERAL NEUROPATHY (HCC): ICD-10-CM

## 2021-05-19 DIAGNOSIS — G62.89 NUTRITIONAL ATAXIC NEUROPATHY: ICD-10-CM

## 2021-05-19 DIAGNOSIS — F10.10 ALCOHOL ABUSE: ICD-10-CM

## 2021-05-19 DIAGNOSIS — G60.8 IDIOPATHIC SMALL AND LARGE FIBER SENSORY NEUROPATHY: Primary | ICD-10-CM

## 2021-05-19 PROCEDURE — 95913 NRV CNDJ TEST 13/> STUDIES: CPT | Performed by: PSYCHIATRY & NEUROLOGY

## 2021-05-19 PROCEDURE — 95886 MUSC TEST DONE W/N TEST COMP: CPT | Performed by: PSYCHIATRY & NEUROLOGY

## 2021-05-19 NOTE — LETTER
5/19/2021 11:54 AM 
 
Patient:  Paulino Myers YOB: 1960 Date of Visit: 5/19/2021 Dear No Recipients: Thank you for referring MsBertin Bailey Garland to me for evaluation/treatment. Below are the relevant portions of my assessment and plan of care. 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 Patient still has not stopped drinking, and we told her she had to stop drinking, she is losing her sensory nerves in her feet, and she says she will call the rehab people for that, and she has not started her vitamins either, so we told her she has to start a B complex vitamin and vitamin B12 every day because she has a low B12 level that may be contributing some to the neuropathy in addition to the alcohol. Her  actually says she is getting better and walking better slowly and she has reduced her alcohol intake at least.  She is not doing physical therapy but can exercise at home. We told her to get the vitamins, and we will see her back in follow-up in a month or 2, and hopefully she will go to rehab as directed numerous times. If you have questions, please do not hesitate to call me. I look forward to following Ms. Esme Hardin along with you. Sincerely, [facility]

## 2021-05-19 NOTE — PROGRESS NOTES
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 Patient still has not stopped drinking, and we told her she had to stop drinking, she is losing her sensory nerves in her feet, and she says she will call the rehab people for that, and she has not started her vitamins either, so we told her she has to start a B complex vitamin and vitamin B12 every day because she has a low B12 level that may be contributing some to the neuropathy in addition to the alcohol. Her  actually says she is getting better and walking better slowly and she has reduced her alcohol intake at least.  She is not doing physical therapy but can exercise at home. We told her to get the vitamins, and we will see her back in follow-up in a month or 2, and hopefully she will go to rehab as directed numerous times.

## 2021-05-19 NOTE — PROCEDURES
Electrodiagnostic Study Report  Test Date:  2021    Patient: Preeti Pascal : 1960 Physician:    Sex: Male Tech:  Ref Phys:      Technician: Ida Ruelas    Clinical Indication: Patient is a 45-year-old female with a history of longstanding alcohol abuse, with unsteady gait and weakness, and B12 deficiency, for EMG study to evaluate for neuropathy, rule out alcoholic induced neuropathy, nutritional induced neuropathy, or other causes of neuropathy. Patient slowly getting better as she reduces her alcohol intake but still has a quit drinking and not taking vitamins yet. Neuro exam: Patient has hypoactive but symmetric reflexes throughout. Patient has decreased sensation in her legs to about mid calf level to pin temperature and vibration but double simultaneous stimulation is intact. Patient does not have clear atrophy of her muscles noted, and seems to have normal muscle bulk and tone and strength is actually relatively normal throughout. Patient has unsteady gait and a cerebellar or sensory ataxia on Romberg and tandem, but finger-nose-finger examination is unremarkable. Cranial nerves II through XII otherwise intact. No Babinski or clonus present. 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   Clinical correlation recommended, and on the basis of this study there was no clear evidence of cervical or lumbar radiculopathy, or other myopathy or other neuromuscular disease. EMG & NCV Findings:  Evaluation of the Left Fibular motor, the Left Fibular TA motor, and the Right radial sensory nerves showed reduced amplitude (L0.9, L2.0, R11.2 µV). The Right median motor nerve showed prolonged distal onset latency (4.6 ms) and decreased conduction velocity (Elbow-Wrist, 46 m/s). The Right ulnar motor nerve showed decreased conduction velocity (Wrist-Abd Dig Minimi, 26 m/s), decreased conduction velocity (B Elbow-Wrist, 42 m/s), and decreased conduction velocity (A Elbow-B Elbow, 42 m/s). The Right median sensory nerve showed prolonged distal peak latency (5.3 ms). The Left Sup Fibular sensory nerve showed decreased conduction velocity (Lower leg-Lat ankle, 34 m/s). The Right Sup Fibular sensory nerve showed no response (Lower leg). The Left sural sensory and the Right sural sensory nerves showed no response (Calf). The Right median/ulnar (palm) comparison nerve showed prolonged distal peak latency (Median Palm, 2.4 ms), reduced amplitude (Median Palm, 18.9 µV), prolonged distal peak latency (Ulnar Palm, 3.5 ms), and abnormal onset latency difference (Median Palm-Ulnar Palm, 0.5 ms). All remaining nerves (as indicated in the following tables) were within normal limits.         Nerve Conduction Studies  Anti Sensory Summary Table     Site NR Peak (ms) Norm Peak (ms) O-P Amp (µV) Norm O-P Amp Site1 Site2 Dist (cm)   Right Median Anti Sensory (2nd Digit)   Wrist    5.3 <4 25.0 >11 Wrist 2nd Digit 14.0   Elbow    3.3  6.2  Elbow Wrist 0.0   Right Radial Anti Sensory (Base 1st Digit)   Wrist    2.6 <2.9 11.2 >15 Wrist Base 1st Digit 10.0   Left Sup Fibular Anti Sensory (Lat ankle)    pitting edema   Lower leg    3.4 <4.6 5.4 >4 Lower leg Lat ankle 10.0   Right Sup Fibular Anti Sensory (Lat ankle)    pitting edema   Lower leg NR  <4.6  >4 Lower leg Lat ankle 10.0   Left Sural Anti Sensory (Lat Mall)   Calf NR  <4.4  >6 Calf Lat Mall 14.0   Right Sural Anti Sensory (Lat Mall)   Calf NR  <4.4  >6 Calf Lat Mall 14.0     Motor Summary Table     Site NR Onset (ms) Norm Onset (ms) O-P* Amp (mV) Norm O-P Amp P-T Amp (mV) Site1 Site2 Dist (cm) Benja (m/s)   Left Fibular Motor (Ext Dig Brev)   Ankle    5.8 <6.5 0.9 >1.1  Ankle Ext Dig Brev 8.0 14   B Fib    12.2  1.3   B Fib Ankle 27.0 42   Poplt    14.5  0.8   Poplt B Fib 10.0 43   Right Fibular Motor (Ext Dig Brev)   Ankle    4.3 <6.5 1.5 >1.1  Ankle Ext Dig Brev 8.0 19   B Fib    10.5  1.1   B Fib Ankle 26.0 42   Poplt    12.9  1.4   Poplt B Fib 10.0 42   Left Fibular TA Motor (Tib Ant)   Fib Head    3.9 <4.5 2.0 >3.0  Fib Head Tib Ant 10.0 26   Poplit    6.3  1.0   Poplit Fib Head 10.0 42   Right Median Motor (Abd Poll Brev)   Wrist    4.6 <4.5 6.3 >4.1  Wrist Abd Poll Brev 8.0 17   Elbow    8.5  5.7   Elbow Wrist 18.0 46   Left Tibial Motor (Abd Murillo Brev)   Ankle    4.2 <6.1 5.9 >1.1  Ankle Abd Murillo Brev 8.0 19   Knee    12.7  4.7   Knee Ankle 36.5 43   Right Tibial Motor (Abd Murillo Brev)   Ankle    4.4 <6.1 3.2 >1.1  Ankle Abd Murillo Brev 8.0 18   Knee    12.3  3.2   Knee Ankle 33.0 42   Right Ulnar Motor (Abd Dig Minimi)   Wrist    3.1 <3.1 7.9 >7.0  Wrist Abd Dig Minimi 8.0 26   B Elbow    7.3  6.4   B Elbow Wrist 17.5 42   A Elbow    9.7  6.4   A Elbow B Elbow 10.0 42     Comparison Summary Table     Site NR Peak (ms) P-T Amp (µV) Site1 Site2 Dist (cm) Delta-0 (ms)   Right Median/Ulnar Palm Comparison (Wrist)   Median Palm    2.4 16.3 Median Palm Ulnar Palm 8.0 0.5   Ulnar Palm    3.5 34.2         EMG     Side Muscle Nerve Root Ins Act Fibs Psw Recrt Duration Amp Poly Comment   Right Abd Poll Brev Median C8-T1 Incr 1+ 1+ Reduced Nml Nml 1+    Right 1stDorInt Ulnar C8-T1 Nml Nml Nml Nml Nml Nml Nml    Right Biceps Musculocut C5-6 Nml Nml Nml Nml Nml Nml Nml    Right Triceps Radial C6-7-8 Nml Nml Nml Nml Nml Nml Nml    Right Deltoid Axillary C5-6 Nml Nml Nml Nml Nml Nml Nml    Right FlexPolLong Median (Ant Int) C7-8 Nml Nml Nml Nml Nml Nml Nml    Right BrachioRad Radial C5-6 Nml Nml Nml Nml Nml Nml Nml    Right Lower Cerv Parasp Rami C7,T1 Nml Nml Nml Nml Nml Nml Nml    Right AntTibialis Dp Br Peron L4-5 Nml Nml Nml Nml Nml Nml Nml    Right MedGastroc Tibial S1-2 Nml Nml Nml Nml Nml Nml Nml    Right VastusLat Femoral L2-4 Nml Nml Nml Nml Nml Nml Nml    Right Ext Dig Brev Dp Br Peron L5, S1 Nml Nml Nml Nml Incr Incr 1+    Right AbdHallucis MedPlantar S1-2 Nml Nml Nml Nml Incr Incr 1+    Right Lower Lumb Parasp Rami L5,S1 Nml Nml Nml Nml Nml Nml Nml    Left AntTibialis Dp Br Peron L4-5 Nml Nml Nml Nml Nml Nml Nml    Left MedGastroc Tibial S1-2 Nml Nml Nml Nml Nml Nml Nml    Left VastusLat Femoral L2-4 Nml Nml Nml Nml Nml Nml Nml    Left Lower Lumb Parasp Rami L5,S1 Nml Nml Nml Nml Nml Nml Nml    Left Ext Dig Brev Dp Br Peron L5, S1 Nml Nml Nml Nml Incr Incr 1+    Left AbdHallucis MedPlantar S1-2 Nml Nml Nml Nml Incr Incr 1+        Waveforms:                                        __________________  Senthil Avila M.D.

## 2021-05-21 ENCOUNTER — OFFICE VISIT (OUTPATIENT)
Dept: HEMATOLOGY | Age: 61
End: 2021-05-21
Payer: COMMERCIAL

## 2021-05-21 ENCOUNTER — HOSPITAL ENCOUNTER (OUTPATIENT)
Dept: LAB | Age: 61
Discharge: HOME | End: 2021-05-21
Payer: COMMERCIAL

## 2021-05-21 VITALS
DIASTOLIC BLOOD PRESSURE: 76 MMHG | SYSTOLIC BLOOD PRESSURE: 110 MMHG | BODY MASS INDEX: 20.49 KG/M2 | HEIGHT: 64 IN | HEART RATE: 92 BPM | RESPIRATION RATE: 17 BRPM | TEMPERATURE: 96.9 F | OXYGEN SATURATION: 98 % | WEIGHT: 120 LBS

## 2021-05-21 DIAGNOSIS — K70.9 ALCOHOLIC LIVER DISEASE (HCC): Primary | ICD-10-CM

## 2021-05-21 PROCEDURE — 80048 BASIC METABOLIC PNL TOTAL CA: CPT

## 2021-05-21 PROCEDURE — 82103 ALPHA-1-ANTITRYPSIN TOTAL: CPT

## 2021-05-21 PROCEDURE — 82728 ASSAY OF FERRITIN: CPT

## 2021-05-21 PROCEDURE — 86706 HEP B SURFACE ANTIBODY: CPT

## 2021-05-21 PROCEDURE — 85025 COMPLETE CBC W/AUTO DIFF WBC: CPT

## 2021-05-21 PROCEDURE — 99214 OFFICE O/P EST MOD 30 MIN: CPT | Performed by: INTERNAL MEDICINE

## 2021-05-21 PROCEDURE — 87340 HEPATITIS B SURFACE AG IA: CPT

## 2021-05-21 PROCEDURE — 86708 HEPATITIS A ANTIBODY: CPT

## 2021-05-21 PROCEDURE — 80076 HEPATIC FUNCTION PANEL: CPT

## 2021-05-21 PROCEDURE — 86038 ANTINUCLEAR ANTIBODIES: CPT

## 2021-05-21 PROCEDURE — 86803 HEPATITIS C AB TEST: CPT

## 2021-05-21 PROCEDURE — 86704 HEP B CORE ANTIBODY TOTAL: CPT

## 2021-05-21 PROCEDURE — 83516 IMMUNOASSAY NONANTIBODY: CPT

## 2021-05-21 PROCEDURE — 80320 DRUG SCREEN QUANTALCOHOLS: CPT

## 2021-05-21 PROCEDURE — 83550 IRON BINDING TEST: CPT

## 2021-05-21 PROCEDURE — 85610 PROTHROMBIN TIME: CPT

## 2021-05-21 PROCEDURE — 36415 COLL VENOUS BLD VENIPUNCTURE: CPT

## 2021-05-21 NOTE — Clinical Note
6/7/2021 Patient: Leo Sin YOB: 1960 Date of Visit: 5/21/2021 Radha Briceño NP 
[address] [phone] [address] [employer] Dear Radha Briceño NP, Thank you for referring Ms. Micheline Fuentes to [address] [facility] for evaluation. My notes for this consultation are attached. If you have questions, please do not hesitate to call me. I look forward to following your patient along with you. Sincerely, Araceli Thompson MD

## 2021-05-21 NOTE — PROGRESS NOTES
[address] [facility], MD, Priya Hawkins MD, MPH      Adriana Guthrie, CAROL ANN Nguyen, ACNP-BC     Brooke Cazares, AGPCNP-BC   JUAREZ VillagranP-JANET Jaramillo [facility]-BC       Yoselyn Guerrero 136    at [address], [address] [place] [address], [facility].    [phone]    FAX: [phone]    at [address], [address]    [phone]    FAX: [phone]       Patient Care Team:  Nikko Cabral NP as PCP - General (Nurse Practitioner)  Nikko Cabral NP as PCP - [facility], [facility]Juan Mayes Provider      Problem List  Date Reviewed: 5/21/2021        Codes Class Noted    Alcoholic peripheral neuropathy ([facility] [address] ICD-10-CM: G62.1  ICD-9-CM: 357.5  5/19/2021        Nutritional ataxic neuropathy ICD-10-CM: G62.89  ICD-9-CM: 355.9  5/19/2021        Sarcoidosis ICD-10-CM: D86.9  ICD-9-CM: 135  5/7/2021        Idiopathic small and large fiber sensory neuropathy ICD-10-CM: G60.8  ICD-9-CM: 356.4  4/16/2021        Sensory ataxic gait ICD-10-CM: R26.0  ICD-9-CM: 781.2  4/16/2021        B12 deficiency ICD-10-CM: E53.8  ICD-9-CM: 266.2  4/16/2021        Vitamin D deficiency ICD-10-CM: E55.9  ICD-9-CM: 268.9  4/16/2021        Hypothyroidism due to acquired atrophy of thyroid ICD-10-CM: E03.4  ICD-9-CM: 244.8, 246.8  4/16/2021        Bilateral primary osteoarthritis of knee ICD-10-CM: M17.0  ICD-9-CM: 715.16  3/11/2021        Chronic anemia ICD-10-CM: D64.9  ICD-9-CM: 285.9  3/8/2021        Smoker ICD-10-CM: F17.200  ICD-9-CM: 305.1  3/8/2021        History of gastric ulcer ICD-10-CM: Z87.11  ICD-9-CM: V12.79  3/8/2021        Neuropathy ICD-10-CM: G62.9  ICD-9-CM: 355.9  3/8/2021        Anemia ICD-10-CM: D64.9  ICD-9-CM: 285.9  5/14/2018 Mediastinal lymphadenopathy ICD-10-CM: R59.0  ICD-9-CM: 785.6  4/12/2018        Alcohol abuse ICD-10-CM: F10.10  ICD-9-CM: 305.00  4/10/2018        Protein-calorie malnutrition, severe ([facility] ICD-10-CM: E43  ICD-9-CM: 262  1/27/2018        Emphysema of lung ([facility] ICD-10-CM: J43.9  ICD-9-CM: 492.8  1/27/2018        Scoliosis (Chronic) ICD-10-CM: M41.9  ICD-9-CM: 737.30  Unknown    Overview Signed 4/13/2016  9:50 AM by Kim Henry LPN Dr.              Hyperlipidemia (Chronic) ICD-10-CM: E78.5  ICD-9-CM: 272.4  4/63/3040        Folic acid deficiency (Chronic) ICD-10-CM: E53.8  ICD-9-CM: 266.2  2/8/2012              Sindi Akbar is being seen at [address] for management of cirrhosis secondary to alcoholic liver disease. The active problem list, all pertinent past medical history, medications, radiologic findings and laboratory findings related to the liver disorder were reviewed and discussed with the patient. The patient is a 62 y.o. Black female who was found to have elevated liver transaminases and alkaline phosphatase in 4/2019. Serologic evaluation for markers of chronic liver disease was negative     The most recent imaging of the liver was Ultrasound performed in 5/2021. Results suggest fatty liver disease. The patient has the following symptoms which could be attributed to the liver disorder:    swelling of the lower extremities,   Poor balance from neuropathy  Numbness and tingling in legs and feet. The patient is not experiencing the following symptoms which are commonly seen in this liver disorder:   fatigue,   pain in the right side over the liver,   problems concentrating,   swelling of the abdomen,   swelling of the lower extremities,   hematemesis,   hematochezia.     The patient has Severe limitations in functional activities which can be attributed to the liver disease       ASSESSMENT AND PLAN:  Cirrhosis  The diagnosis of cirrhosis is based upon laboratory studies  Cirrhosis is presumed secondary to alcohol. The patient has normal liver function. The patient has never developed any complications of cirrhosis to date. The CTP is 5. Child class A. The MELD score is 8. The need to perform an assessment of liver fibrosis was discussed with the patient. The Fibroscan can assess liver fibrosis and determine if a patient has advanced fibrosis or cirrhosis without the need for liver biopsy. This will be performed at the next office visit. 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 The patient consumes >1 pint of gin daily. Discussed the need to stop using alcohol     Elevation in Ferritin  There is an elevation in ferritin with Elevated iron saturation. It is unlikely that the patient has hemochromatosis or is a carrier for an HFE gene. Will order HFE genetic testing. Suspect the elevation in ferritin is secondary to alcohol. The Ferritin has come down from 2800 to 671 with abstinence from alcohol. The FE saturation has come down from 91% to 46% since abstinence. Screening for Hepatocellular Carcinoma  HCC screening has recently been performed and does not suggest [facility] [address] The next liver imaging study will be performed in 11/2021. Treatment of other medical problems in patients with chronic liver disease  There are no contraindications for the patient to take most medications that are necessary for treatment of other medical issues. The patient has cirrhrosis and should avoid taking NSAIDs which are associated with a higher rate of developing NASEEM.         The patient can take any medications utilized for treatment of DM, statins to treat hypercholesterolemia    The patient consumes alcohol on a daily basis or has recently stopped consuming alcohol. Regular alcohol use increases the risk of toxicity from acetaminophen. This analgesic should be avoided until the patient has been abstinent from alcohol for 6 months. Counseling for alcohol in patients with chronic liver disease  The patient was counseled regarding alcohol consumption and the effect of alcohol on chronic liver disease. The patient has continued to consume alcohol daily. The patient consumes too much alcohol and is at risk to develop alcohol induced liver injury. It was recommended that all alcohol consumption be stopped and the patient be abstinent from alcohol    The patient has an alcohol abuse disorder and it was suggested that they enter alcohol counseling and/or attend AA. Vaccinations   Vaccination for viral hepatitis A and B is recommended since the patient has no serologic evidence of previous exposure or vaccination with immunity. Routine vaccinations against other bacterial and viral agents can be performed as indicated. Annual flu vaccination should be administered if indicated. ALLERGIES  Allergies   Allergen Reactions    Robaxin [Methocarbamol] Hives, Rash and Itching     Red splotches    Statins-Hmg-Coa Reductase Inhibitors Myalgia    Codeine Itching    Neosporin [Neomycin-Bacitracin-Polymyxin] Rash       MEDICATIONS  Current Outpatient Medications   Medication Sig    chlordiazePOXIDE (LIBRIUM) 5 mg capsule Take 1 Cap by mouth three (3) times daily as needed for Anxiety. Max Daily Amount: 15 mg.     potassium chloride (KLOR-CON) 20 mEq pack TAKE ONE PACKET BY MOUTH DAILY    buPROPion XL (WELLBUTRIN XL) 150 mg tablet TAKE ONE TABLET BY MOUTH EVERY MORNING FOR SMOKING CESSATION    pantoprazole (PROTONIX) 40 mg tablet TAKE ONE TABLET BY MOUTH DAILY BEFORE BREAKFAST    thiamine HCL (B-1) 100 mg tablet TAKE ONE TABLET BY MOUTH DAILY    folic acid (FOLVITE) 1 mg tablet TAKE ONE TABLET BY MOUTH DAILY    magnesium 200 mg tab TAKE ONE TABLET BY MOUTH DAILY    doxepin (SINEquan) 25 mg capsule TAKE ONE CAPSULE BY MOUTH ONCE NIGHTLY AS NEEDED FOR SLEEP     No current facility-administered medications for this visit. SYSTEM REVIEW NOT RELATED TO LIVER DISEASE OR REVIEWED ABOVE:  Constitution systems: Negative for fever, chills, weight gain, weight loss. Eyes: Negative for visual changes. ENT: Negative for sore throat, painful swallowing. Respiratory: Negative for cough, hemoptysis, SOB. Cardiology: Negative for chest pain, palpitations. GI:  Negative for constipation or diarrhea. : Negative for urinary frequency, dysuria, hematuria, nocturia. Skin: Negative for rash. Hematology: Negative for easy bruising, blood clots. Musculo-skelatal: Negative for back pain, muscle pain, weakness. Neurologic: Negative for headaches, dizziness, vertigo, memory problems not related to HE. Psychology: Negative for anxiety, depression. FAMILY HISTORY:  The father  of heart disease. The mother  of CHF, lung cancer. There is no family history of liver disease. SOCIAL HISTORY:  The patient is . The patient has 3 children, and 12 grandchildren. The patient currently smokes 1/2 pack of tobacco daily. The patient consumes 1.5 pint of gin per day   The patient used to work as . The patient has not worked since . PHYSICAL EXAMINATION:  Visit Vitals  /76 (BP 1 Location: Right upper arm, BP Patient Position: Sitting, BP Cuff Size: Adult)   Pulse 92   Temp 96.9 °F (36.1 °C) (Temporal)   Resp 17   Ht 5' 4\" (1.626 m)   Wt 120 lb (54.4 kg)   SpO2 98%   BMI 20.60 kg/m²     General: No acute distress. Eyes: Sclera anicteric. ENT: No oral lesions. Thyroid normal.  Nodes: No adenopathy. Skin: No spider angiomata. No jaundice. No palmar erythema. Respiratory: Lungs clear to auscultation.    Cardiovascular: Regular heart rate. No murmurs. No JVD. Abdomen: Soft non-tender. Liver size normal to percussion/palpation. Spleen not palpable. No obvious ascites. Extremities: No lower edema. Neurologic: Alert and oriented. Cranial nerves grossly intact. No asterixis. LABORATORY STUDIES:  Liver [place] of [address] [place] [address] & Units 2021   WBC 3.4 - 10.8 x10E3/uL 2.7 (L)   ANC 1.4 - 7.0 x10E3/uL 1.0 (L)   HGB 11.1 - 15.9 g/dL 12.5   HGB     HGB (iSTAT)      - 450 x10E3/uL 117 (L)   INR 0.9 - 1.2 1.1   AST 0 - 40 IU/L 73 (H)   ALT 0 - 32 IU/L 19   Alk Phos 48 - 121 IU/L 145 (H)   Bili, Total 0.0 - 1.2 mg/dL 0.4   Bili, Direct 0.00 - 0.40 mg/dL 0.20   Albumin 3.8 - 4.9 g/dL 4.1   BUN 8 - 27 mg/dL 8   Creat 0.57 - 1.00 mg/dL 0.56 (L)   Na 134 - 144 mmol/L 142   K 3.5 - 5.2 mmol/L 4.4   Cl 96 - 106 mmol/L 102   CO2 20 - 29 mmol/L 26   Glucose 65 - 99 mg/dL 94     SEROLOGIES:  Serologies Latest Ref Rng & Units 2021   Hep A Ab, Total Negative Negative   Hep B Surface Ag Negative Negative   Hep B Core Ab, Total Negative Negative   Hep B Surface AB QL  Non Reactive   Hep C Ab 0.0 - 0.9 s/co ratio <0.1   Ferritin 15 - 150 ng/mL 671 (H)   Iron % Saturation 15 - 55 % 42   REN, IFA  Negative   ASMCA 0 - 19 Units 8   M2 Ab 0.0 - 20.0 Units <20.0   Alpha-1 antitrypsin level 101 - 187 mg/dL 153     LIVER HISTOLOGY:  Not available or performed    ENDOSCOPIC PROCEDURES:  Not available or performed    RADIOLOGY:  2021. Ultrasound of liver. Echogenic consistent with fatty liver. No liver mass lesions. No dilated bile ducts. No ascites. OTHER TESTIN2021. ETOH negative    FOLLOW-UP:  All of the issues listed above in the Assessment and Plan were discussed with the patient. All questions were answered. The patient expressed a clear understanding of the above. [address] [address] in 6 weeks for Fibroscan to review all data and determine the treatment plan.       Bartolo Flores, MD [place] [address], [address] [facility] 22.  [address] [facility]

## 2021-05-21 NOTE — PROGRESS NOTES
Identified pt with two pt identifiers(name and ). Reviewed record in preparation for visit and have obtained necessary documentation. No chief complaint on file. Vitals:    21 0923   BP: 110/76   Pulse: 92   Resp: 17   Temp: 96.9 °F (36.1 °C)   TempSrc: Temporal   SpO2: 98%   Weight: 120 lb (54.4 kg)   Height: 5' 4\" (1.626 m)   PainSc:   8   PainLoc: Leg       Health Maintenance Review: Patient reminded of \"due or due soon\" health maintenance. I have asked the patient to contact his/her primary care provider (PCP) for follow-up on his/her health maintenance. Coordination of Care Questionnaire:  :   1) Have you been to an emergency room, urgent care, or hospitalized since your last visit? If yes, where when, and reason for visit? no       2. Have seen or consulted any other health care provider since your last visit? If yes, where when, and reason for visit? YES, regular care team f/u      Patient is accompanied by husbandI have received verbal consent from Omar Bhat to discuss any/all medical information while they are present in the room.

## 2021-05-22 LAB
A1AT SERPL-MCNC: 153 MG/DL (ref 101–187)
ACTIN IGG SERPL-ACNC: 8 UNITS (ref 0–19)
ALBUMIN SERPL-MCNC: 4.1 G/DL (ref 3.8–4.9)
ALP SERPL-CCNC: 145 IU/L (ref 48–121)
ALT SERPL-CCNC: 19 IU/L (ref 0–32)
AST SERPL-CCNC: 73 IU/L (ref 0–40)
BASOPHILS # BLD AUTO: 0 X10E3/UL (ref 0–0.2)
BASOPHILS NFR BLD AUTO: 1 %
BILIRUB DIRECT SERPL-MCNC: 0.2 MG/DL (ref 0–0.4)
BILIRUB SERPL-MCNC: 0.4 MG/DL (ref 0–1.2)
BUN SERPL-MCNC: 8 MG/DL (ref 8–27)
BUN/CREAT SERPL: 14 (ref 12–28)
CALCIUM SERPL-MCNC: 9.5 MG/DL (ref 8.7–10.3)
CHLORIDE SERPL-SCNC: 102 MMOL/L (ref 96–106)
CO2 SERPL-SCNC: 26 MMOL/L (ref 20–29)
CREAT SERPL-MCNC: 0.56 MG/DL (ref 0.57–1)
EOSINOPHIL # BLD AUTO: 0.1 X10E3/UL (ref 0–0.4)
EOSINOPHIL NFR BLD AUTO: 2 %
ERYTHROCYTE [DISTWIDTH] IN BLOOD BY AUTOMATED COUNT: 12.7 % (ref 11.7–15.4)
FERRITIN SERPL-MCNC: 671 NG/ML (ref 15–150)
GLUCOSE SERPL-MCNC: 94 MG/DL (ref 65–99)
HAV AB SER QL IA: NEGATIVE
HBV CORE AB SERPL QL IA: NEGATIVE
HBV SURFACE AB SER QL: NON REACTIVE
HBV SURFACE AG SERPL QL IA: NEGATIVE
HCT VFR BLD AUTO: 39 % (ref 34–46.6)
HCV AB S/CO SERPL IA: <0.1 S/CO RATIO (ref 0–0.9)
HCV AB SERPL QL IA: NORMAL
HGB BLD-MCNC: 12.5 G/DL (ref 11.1–15.9)
IMM GRANULOCYTES # BLD AUTO: 0 X10E3/UL (ref 0–0.1)
IMM GRANULOCYTES NFR BLD AUTO: 0 %
INR PPP: 1.1 (ref 0.9–1.2)
IRON SATN MFR SERPL: 42 % (ref 15–55)
IRON SERPL-MCNC: 104 UG/DL (ref 27–159)
LYMPHOCYTES # BLD AUTO: 1.3 X10E3/UL (ref 0.7–3.1)
LYMPHOCYTES NFR BLD AUTO: 48 %
MCH RBC QN AUTO: 32.6 PG (ref 26.6–33)
MCHC RBC AUTO-ENTMCNC: 32.1 G/DL (ref 31.5–35.7)
MCV RBC AUTO: 102 FL (ref 79–97)
MITOCHONDRIA M2 IGG SER-ACNC: <20 UNITS (ref 0–20)
MONOCYTES # BLD AUTO: 0.4 X10E3/UL (ref 0.1–0.9)
MONOCYTES NFR BLD AUTO: 13 %
MORPHOLOGY BLD-IMP: ABNORMAL
NEUTROPHILS # BLD AUTO: 1 X10E3/UL (ref 1.4–7)
NEUTROPHILS NFR BLD AUTO: 36 %
PLATELET # BLD AUTO: 117 X10E3/UL (ref 150–450)
POTASSIUM SERPL-SCNC: 4.4 MMOL/L (ref 3.5–5.2)
PROT SERPL-MCNC: 7.1 G/DL (ref 6–8.5)
PROTHROMBIN TIME: 11.3 SEC (ref 9.1–12)
RBC # BLD AUTO: 3.83 X10E6/UL (ref 3.77–5.28)
SODIUM SERPL-SCNC: 142 MMOL/L (ref 134–144)
TIBC SERPL-MCNC: 246 UG/DL (ref 250–450)
UIBC SERPL-MCNC: 142 UG/DL (ref 131–425)
WBC # BLD AUTO: 2.7 X10E3/UL (ref 3.4–10.8)

## 2021-05-24 LAB
ANA TITR SER IF: NEGATIVE {TITER}
ETHANOL BLD GC-MCNC: <.01 %

## 2021-06-10 ENCOUNTER — TELEPHONE (OUTPATIENT)
Dept: HEMATOLOGY | Age: 61
End: 2021-06-10

## 2021-06-10 NOTE — TELEPHONE ENCOUNTER
Called pt, l/m regarding having a fibroscan at next upcoming appointment per Dr. Sally Rice. Advised the patient in the message to not eat or drink anything that morning until after the visit.

## 2021-06-10 NOTE — TELEPHONE ENCOUNTER
----- Message from Angeles Jacobo MD sent at 6/7/2021  5:24 AM EDT -----  Regarding: Change FU with MLS to with FS

## 2021-06-25 DIAGNOSIS — F51.01 PRIMARY INSOMNIA: ICD-10-CM

## 2021-06-25 DIAGNOSIS — E53.8 FOLIC ACID DEFICIENCY: ICD-10-CM

## 2021-06-25 DIAGNOSIS — K29.21 CHRONIC ALCOHOLIC GASTRITIS WITH HEMORRHAGE: ICD-10-CM

## 2021-06-25 NOTE — TELEPHONE ENCOUNTER
Pt is currently out of her medication and would like to know if she can get it filled before her New to Provider appointment on 7/22. Please advise. Pt is a previous NP Gaviota Martell.

## 2021-06-25 NOTE — TELEPHONE ENCOUNTER
Pt would like to have a short supply until seen on 07/22/2021. Please review. Last visit 02/25/2021 ELIZABETH Alfaro   Next appointment 07/22/2021 NP [place]   Previous refill encounter(s)   02/15/2021 Protonix #90 with 1 refill,  01/14/2021 Folvite #90 with 1 refill,   06/02/2020 Sinequan #90 with 1 refill     Requested Prescriptions     Pending Prescriptions Disp Refills    doxepin (SINEquan) 25 mg capsule 30 Capsule 0     Sig: Take 1 Capsule by mouth nightly as needed (sleep).  folic acid (FOLVITE) 1 mg tablet 30 Tablet 0     Sig: Take 1 Tablet by mouth daily.  pantoprazole (PROTONIX) 40 mg tablet 30 Tablet 0     Sig: Take 1 Tablet by mouth Daily (before breakfast).

## 2021-06-28 RX ORDER — PANTOPRAZOLE SODIUM 40 MG/1
40 TABLET, DELAYED RELEASE ORAL
Qty: 30 TABLET | Refills: 0 | Status: SHIPPED | OUTPATIENT
Start: 2021-06-28 | End: 2022-03-04 | Stop reason: SDUPTHER

## 2021-06-28 RX ORDER — FOLIC ACID 1 MG/1
1 TABLET ORAL DAILY
Qty: 30 TABLET | Refills: 0 | Status: SHIPPED | OUTPATIENT
Start: 2021-06-28 | End: 2022-02-02 | Stop reason: SDUPTHER

## 2021-06-28 RX ORDER — DOXEPIN HYDROCHLORIDE 25 MG/1
25 CAPSULE ORAL
Qty: 30 CAPSULE | Refills: 0 | Status: SHIPPED | OUTPATIENT
Start: 2021-06-28 | End: 2022-02-02 | Stop reason: SDUPTHER

## 2021-07-04 DIAGNOSIS — E83.42 HYPOMAGNESEMIA: ICD-10-CM

## 2021-07-07 RX ORDER — MAGNESIUM 200 MG
TABLET ORAL
Qty: 90 TABLET | Refills: 0 | Status: SHIPPED | OUTPATIENT
Start: 2021-07-07

## 2021-07-08 ENCOUNTER — OFFICE VISIT (OUTPATIENT)
Dept: HEMATOLOGY | Age: 61
End: 2021-07-08
Payer: COMMERCIAL

## 2021-07-08 VITALS
TEMPERATURE: 97.3 F | HEIGHT: 61 IN | RESPIRATION RATE: 20 BRPM | SYSTOLIC BLOOD PRESSURE: 122 MMHG | DIASTOLIC BLOOD PRESSURE: 62 MMHG | WEIGHT: 114.2 LBS | HEART RATE: 110 BPM | BODY MASS INDEX: 21.56 KG/M2 | OXYGEN SATURATION: 99 %

## 2021-07-08 DIAGNOSIS — K70.30 ALCOHOLIC CIRRHOSIS OF LIVER WITHOUT ASCITES (HCC): Primary | ICD-10-CM

## 2021-07-08 PROBLEM — F10.11 ALCOHOL ABUSE, IN REMISSION: Status: ACTIVE | Noted: 2018-04-10

## 2021-07-08 PROBLEM — E43 PROTEIN-CALORIE MALNUTRITION, SEVERE (HCC): Status: RESOLVED | Noted: 2018-01-27 | Resolved: 2021-07-08

## 2021-07-08 PROBLEM — D64.9 ANEMIA: Status: RESOLVED | Noted: 2018-05-14 | Resolved: 2021-07-08

## 2021-07-08 PROBLEM — D64.9 CHRONIC ANEMIA: Status: RESOLVED | Noted: 2021-03-08 | Resolved: 2021-07-08

## 2021-07-08 PROCEDURE — 99214 OFFICE O/P EST MOD 30 MIN: CPT | Performed by: INTERNAL MEDICINE

## 2021-07-08 PROCEDURE — 91200 LIVER ELASTOGRAPHY: CPT | Performed by: INTERNAL MEDICINE

## 2021-07-08 NOTE — PROGRESS NOTES
Louann Mendez is a 62 y.o. female    Chief Complaint   Patient presents with    Follow-up     Fibroscan      1. Have you been to the ER, urgent care clinic since your last visit? Hospitalized since your last visit? No       2. Have you seen or consulted any other health care providers outside of the [address] [place] [address] since your last visit? Include any pap smears or colon screening.   No     Visit Vitals  /62 (BP 1 Location: Left arm, BP Patient Position: Sitting)   Pulse (!) 110   Temp 97.3 °F (36.3 °C)   Resp 20   Ht 5' 1\" (1.549 m)   Wt 114 lb 3.2 oz (51.8 kg)   SpO2 99%   BMI 21.58 kg/m²

## 2021-07-08 NOTE — Clinical Note
7/8/2021    Patient: Sherlyn Szymanski   YOB: 1960   Date of Visit: 7/8/2021     Areli Enamorado NP  [address] [address]  Via In Basket    Dear Areli Enamorado NP,      Thank you for referring Ms. Luis Paz to [address] for evaluation. My notes for this consultation are attached. If you have questions, please do not hesitate to call me. I look forward to following your patient along with you.       Sincerely,    Luis Alberto Dickerson MD

## 2021-07-08 NOTE — PROGRESS NOTES
[address] [place] [address], MD, Kvng Graham MD, MPH      Muriel Holland, PA-C    José Manuel Cr, ACNP-BC     Brooke Cazares, AGPCNP-BC   Gwyn Anne, FNP-C    Astrid Restrepo PCNP-BC       Yoselyn Gomezho 136    at [address], [address] [place] [address], [facility] 22.    [phone]    FAX: [address], [address]    [phone]    FAX: [phone]       Patient Care Team:  Nilda Dwyer NP as PCP - General (Nurse Practitioner)  Nilda Dwyer NP [facility] PCP [facility]      Problem List  Date Reviewed: 5/21/2021        Codes Class Noted    Alcoholic peripheral neuropathy ([facility] ICD-10-CM: G62.1  ICD-9-CM: 357.5  5/19/2021        Nutritional ataxic neuropathy ICD-10-CM: G62.89  ICD-9-CM: 355.9  5/19/2021        Sarcoidosis ICD-10-CM: D86.9  ICD-9-CM: 135  5/7/2021        Idiopathic small and large fiber sensory neuropathy ICD-10-CM: G60.8  ICD-9-CM: 356.4  4/16/2021        Sensory ataxic gait ICD-10-CM: R26.0  ICD-9-CM: 781.2  4/16/2021        B12 deficiency ICD-10-CM: E53.8  ICD-9-CM: 266.2  4/16/2021        Vitamin D deficiency ICD-10-CM: E55.9  ICD-9-CM: 268.9  4/16/2021        Hypothyroidism due to acquired atrophy of thyroid ICD-10-CM: E03.4  ICD-9-CM: 244.8, 246.8  4/16/2021        Bilateral primary osteoarthritis of knee ICD-10-CM: M17.0  ICD-9-CM: 715.16  3/11/2021        Smoker ICD-10-CM: F17.200  ICD-9-CM: 305.1  3/8/2021        History of gastric ulcer ICD-10-CM: Z87.11  ICD-9-CM: V12.79  3/8/2021        Neuropathy ICD-10-CM: G62.9  ICD-9-CM: 355.9  3/8/2021        Mediastinal lymphadenopathy ICD-10-CM: R59.0  ICD-9-CM: 785.6  4/12/2018        Alcohol abuse, in remission ICD-10-CM: F10.11  ICD-9-CM: 305.03  4/10/2018        Emphysema of lung ([facility] ICD-10-CM: J43.9  ICD-9-CM: 492.8  1/27/2018        Scoliosis (Chronic) ICD-10-CM: M41.9  ICD-9-CM: 737.30  Unknown    Overview Signed 4/13/2016  9:50 AM by Louise Dsouza LPN Dr.              Hyperlipidemia (Chronic) ICD-10-CM: E78.5  ICD-9-CM: 272.4  0/95/4652        Folic acid deficiency (Chronic) ICD-10-CM: E53.8  ICD-9-CM: 266.2  2/8/2012              Margie Ayala is being seen at [address] for management of cirrhosis secondary to alcoholic liver disease. The active problem list, all pertinent past medical history, medications, radiologic findings and laboratory findings related to the liver disorder were reviewed and discussed with the patient. The patient is a 62 y.o. Black female who was found to have elevated liver transaminases and alkaline phosphatase in 4/2019. Serologic evaluation for markers of chronic liver disease was negative     Assessment of liver fibrosis with Fibroscan was performed in the office today. The result was 19.0 kPa which correlates with cirrhosis. The patient has the following symptoms which could be attributed to the liver disorder:    swelling of the lower extremities,   Poor balance from neuropathy  Numbness and tingling in legs and feet. The patient is not experiencing the following symptoms which are commonly seen in this liver disorder:   fatigue,   pain in the right side over the liver,   problems concentrating,   swelling of the abdomen,   swelling of the lower extremities,   hematemesis,   hematochezia. The patient has Severe limitations in functional activities which can be attributed to the liver disease       ASSESSMENT AND PLAN:  Cirrhosis  The diagnosis of cirrhosis is based upon laboratory studies  Cirrhosis is secondary to alcohol. A liver biopsy has not been performed.     Fibroscan in 7/2021 demonstrated  19.0 kPa and  suggesting cirrhosis. The patient has normal liver function. The patient has never developed any complications of cirrhosis to date. The CTP is 5. Child class A. The MELD score is 8. Alcohol liver disease  Suspect the patient has alcohol induced liver disease based upon a history of consuming significant alcohol on a daily basis for many years, imaging, pattern of AST>ALT, an elevation in ferritin with normal FE saturation,     The patient used to consume >1 pint of gin daily. She has been abstinent from alcohol since 4/2021. Elevation in Ferritin  There is an elevation in ferritin with Elevated iron saturation. It is unlikely that the patient has hemochromatosis or is a carrier for an HFE gene. Will order HFE genetic testing. Suspect the elevation in ferritin is secondary to alcohol. The Ferritin has come down from 2800 to 671 with abstinence from alcohol. The FE saturation has come down from 91% to 46% since abstinence. Screening for Esophageal varices   The patient has not had an EGD to screen for varices. The PLT count is less than 150. Will schedule for EGD to assess for varices     Screening for Hepatocellular Carcinoma  HCC screening has recently been performed and does not suggest [facility].. The next liver imaging study will be performed in 11/2021. Treatment of other medical problems in patients with chronic liver disease  There are no contraindications for the patient to take most medications that are necessary for treatment of other medical issues. The patient has cirrhrosis and should avoid taking NSAIDs which are associated with a higher rate of developing NASEEM. The patient can take any medications utilized for treatment of DM, statins to treat hypercholesterolemia    The patient consumes alcohol on a daily basis or has recently stopped consuming alcohol. Regular alcohol use increases the risk of toxicity from acetaminophen.   This analgesic should be avoided until the patient has been abstinent from alcohol for 6 months. Vaccinations   Vaccination for viral hepatitis A and B is recommended since the patient has no serologic evidence of previous exposure or vaccination with immunity. Routine vaccinations against other bacterial and viral agents can be performed as indicated. Annual flu vaccination should be administered if indicated. ALLERGIES  Allergies   Allergen Reactions    Robaxin [Methocarbamol] Hives, Rash and Itching     Red splotches    Statins-Hmg-Coa Reductase Inhibitors Myalgia    Codeine Itching    Neosporin [Neomycin-Bacitracin-Polymyxin] Rash       MEDICATIONS  Current Outpatient Medications   Medication Sig    magnesium 200 mg tab TAKE ONE TABLET BY MOUTH DAILY    doxepin (SINEquan) 25 mg capsule Take 1 Capsule by mouth nightly as needed (sleep).  folic acid (FOLVITE) 1 mg tablet Take 1 Tablet by mouth daily.  pantoprazole (PROTONIX) 40 mg tablet Take 1 Tablet by mouth Daily (before breakfast).  chlordiazePOXIDE (LIBRIUM) 5 mg capsule Take 1 Cap by mouth three (3) times daily as needed for Anxiety. Max Daily Amount: 15 mg.  potassium chloride (KLOR-CON) 20 mEq pack TAKE ONE PACKET BY MOUTH DAILY    buPROPion XL (WELLBUTRIN XL) 150 mg tablet TAKE ONE TABLET BY MOUTH EVERY MORNING FOR SMOKING CESSATION    thiamine HCL (B-1) 100 mg tablet TAKE ONE TABLET BY MOUTH DAILY     No current facility-administered medications for this visit. SYSTEM REVIEW NOT RELATED TO LIVER DISEASE OR REVIEWED ABOVE:  Constitution systems: Negative for fever, chills, weight gain, weight loss. Eyes: Negative for visual changes. ENT: Negative for sore throat, painful swallowing. Respiratory: Negative for cough, hemoptysis, SOB. Cardiology: Negative for chest pain, palpitations. GI:  Negative for constipation or diarrhea. : Negative for urinary frequency, dysuria, hematuria, nocturia. Skin: Negative for rash.   Hematology: Negative for easy bruising, blood clots. Musculo-skelatal: Negative for back pain, muscle pain, weakness. Neurologic: Negative for headaches, dizziness, vertigo, memory problems not related to HE. Psychology: Negative for anxiety, depression. FAMILY HISTORY:  The father  of heart disease. The mother  of CHF, lung cancer. There is no family history of liver disease. SOCIAL HISTORY:  The patient is . The patient has 3 children, and 12 grandchildren. The patient currently smokes 1/2 pack of tobacco daily. The patient consumes 1.5 pint of gin per day   The patient used to work as . The patient has not worked since . PHYSICAL EXAMINATION:  Visit Vitals  /62 (BP 1 Location: Left arm, BP Patient Position: Sitting)   Pulse (!) 110   Temp 97.3 °F (36.3 °C)   Resp 20   Ht 5' 1\" (1.549 m)   Wt 114 lb 3.2 oz (51.8 kg)   SpO2 99%   BMI 21.58 kg/m²     General: No acute distress. Eyes: Sclera anicteric. ENT: No oral lesions. Thyroid normal.  Nodes: No adenopathy. Skin: No spider angiomata. No jaundice. No palmar erythema. Respiratory: Lungs clear to auscultation. Cardiovascular: Regular heart rate. No murmurs. No JVD. Abdomen: Soft non-tender. Liver size normal to percussion/palpation. Spleen not palpable. No obvious ascites. Extremities: No lower edema. Neurologic: Alert and oriented. Cranial nerves grossly intact. No asterixis.     LABORATORY STUDIES:  [facility] of [address] [place] [address] & Units 2021   WBC 3.4 - 10.8 x10E3/uL 2.7 (L)   ANC 1.4 - 7.0 x10E3/uL 1.0 (L)   HGB 11.1 - 15.9 g/dL 12.5   HGB     HGB (iSTAT)      - 450 x10E3/uL 117 (L)   INR 0.9 - 1.2 1.1   AST 0 - 40 IU/L 73 (H)   ALT 0 - 32 IU/L 19   Alk Phos 48 - 121 IU/L 145 (H)   Bili, Total 0.0 - 1.2 mg/dL 0.4   Bili, Direct 0.00 - 0.40 mg/dL 0.20   Albumin 3.8 - 4.9 g/dL 4.1   BUN 8 - 27 mg/dL 8   Creat 0.57 - 1.00 mg/dL 0.56 (L)   Na 134 - 144 mmol/L 142   K 3.5 - 5.2 mmol/L 4.4   Cl 96 - 106 mmol/L 102   CO2 20 - 29 mmol/L 26   Glucose 65 - 99 mg/dL 94     SEROLOGIES:  Serologies Latest Ref Rng & Units 2021   Hep A Ab, Total Negative Negative   Hep B Surface Ag Negative Negative   Hep B Core Ab, Total Negative Negative   Hep B Surface AB QL  Non Reactive   Hep C Ab 0.0 - 0.9 s/co ratio <0.1   Ferritin 15 - 150 ng/mL 671 (H)   Iron % Saturation 15 - 55 % 42   REN, IFA  Negative   ASMCA 0 - 19 Units 8   M2 Ab 0.0 - 20.0 Units <20.0   Alpha-1 antitrypsin level 101 - 187 mg/dL 153     LIVER HISTOLOGY:  2021. [facility]. EkPa was 19.0. IQR/med 17%. . The results suggested a fibrosis level of F4. The CAP score suggests there is no significant hepatic steatosis. ENDOSCOPIC PROCEDURES:  Not available or performed    RADIOLOGY:  2021. Ultrasound of liver. Echogenic consistent with fatty liver. No liver mass lesions. No dilated bile ducts. No ascites. OTHER TESTIN2021. ETOH negative    FOLLOW-UP:  All of the issues listed above in the Assessment and Plan were discussed with the patient. All questions were answered. The patient expressed a clear understanding of the above. 1901 Located within [facility] 87 in 3 months for monitoring.         MD Danial [facility] 13 of [address], [address] [facility] 22.  [phone]  [address] [place] [address]

## 2021-07-09 NOTE — ANESTHESIA POSTPROCEDURE EVALUATION
Post-Anesthesia Evaluation and Assessment    Patient: Casie Almanzar MRN: [id number]  SSN: [id number]    YOB: 1960  Age: 65 y.o. Sex: female       Cardiovascular Function/Vital Signs  Visit Vitals    BP (!) 122/105    Pulse 99    Temp 36.8 °C (98.3 °F)    Resp 19    Ht 5' 1\" (1.549 m)    Wt 44.9 kg (99 lb)    SpO2 99%    BMI 18.71 kg/m2       Patient is status post general anesthesia for Procedure(s):  ENDOSCOPIC BRONCHOSCOPY ULTRASOUND (EBUS)  BRONCHOSCOPY ULTRASOUND W FINE NEEDLE ASPIRATION. Nausea/Vomiting: None    Postoperative hydration reviewed and adequate. Pain:  Pain Scale 1: Numeric (0 - 10) (01/29/18 1549)  Pain Intensity 1: 0 (01/29/18 1549)   Managed    Neurological Status:   Neuro  Neurologic State: Alert (01/29/18 0800)  Orientation Level: Oriented X4 (01/29/18 0800)  Cognition: Follows commands (01/29/18 0800)   At baseline    Mental Status and Level of Consciousness: Arousable    Pulmonary Status:   O2 Device: Nasal cannula (01/29/18 1549)   Adequate oxygenation and airway patent    Complications related to anesthesia: None    Post-anesthesia assessment completed.  No concerns    Signed By: Nicolasa Kong MD     January 29, 2018 Spoke with the patient regarding her anticoagulation.  She is wanting to come off of her warfarin and be switched to Eliquis.  I spoke with Dr. Gregg assistant who has also spoken with the patient regarding this and he states that Dr. Gregg would generally like for patients to stay on warfarin for the first 90 days postoperatively.  I explained this to the patient, she agrees to stay on warfarin until she follows up with Dr. Canales in September.  All questions answered at this time.

## 2021-08-30 ENCOUNTER — TRANSCRIBE ORDER (OUTPATIENT)
Dept: REGISTRATION | Age: 61
End: 2021-08-30

## 2021-08-30 ENCOUNTER — HOSPITAL ENCOUNTER (OUTPATIENT)
Dept: PREADMISSION TESTING | Age: 61
Discharge: HOME | End: 2021-08-30
Payer: COMMERCIAL

## 2021-08-30 DIAGNOSIS — Z01.812 PRE-PROCEDURE LAB EXAM: Primary | ICD-10-CM

## 2021-08-30 DIAGNOSIS — Z01.812 PRE-PROCEDURE LAB EXAM: ICD-10-CM

## 2021-08-30 LAB
SARS-COV-2, XPLCVT: NOT DETECTED
SOURCE, COVRS: NORMAL

## 2021-08-30 PROCEDURE — U0005 INFEC AGEN DETEC AMPLI PROBE: HCPCS

## 2021-09-01 ENCOUNTER — TELEPHONE (OUTPATIENT)
Dept: HEMATOLOGY | Age: 61
End: 2021-09-01

## 2021-09-01 NOTE — TELEPHONE ENCOUNTER
Called patient to see if she wanted to come in 1-2 hours earlier for procedure due to provider schedule cancellations.   L/M

## 2021-09-02 ENCOUNTER — ANESTHESIA EVENT (OUTPATIENT)
Dept: ENDOSCOPY | Age: 61
End: 2021-09-02
Payer: COMMERCIAL

## 2021-09-02 ENCOUNTER — HOSPITAL ENCOUNTER (OUTPATIENT)
Age: 61
Setting detail: OUTPATIENT SURGERY
Discharge: HOME | End: 2021-09-02
Attending: INTERNAL MEDICINE | Admitting: INTERNAL MEDICINE
Payer: COMMERCIAL

## 2021-09-02 ENCOUNTER — ANESTHESIA (OUTPATIENT)
Dept: ENDOSCOPY | Age: 61
End: 2021-09-02
Payer: COMMERCIAL

## 2021-09-02 VITALS
WEIGHT: 120 LBS | RESPIRATION RATE: 11 BRPM | TEMPERATURE: 98 F | DIASTOLIC BLOOD PRESSURE: 69 MMHG | OXYGEN SATURATION: 98 % | HEIGHT: 61 IN | HEART RATE: 75 BPM | BODY MASS INDEX: 22.66 KG/M2 | SYSTOLIC BLOOD PRESSURE: 113 MMHG

## 2021-09-02 DIAGNOSIS — K70.30 ALCOHOLIC CIRRHOSIS OF LIVER WITHOUT ASCITES (HCC): ICD-10-CM

## 2021-09-02 PROCEDURE — 2709999900 HC NON-CHARGEABLE SUPPLY: Performed by: INTERNAL MEDICINE

## 2021-09-02 PROCEDURE — 74011250636 HC RX REV CODE- 250/636: Performed by: NURSE ANESTHETIST, CERTIFIED REGISTERED

## 2021-09-02 PROCEDURE — 74011000250 HC RX REV CODE- 250: Performed by: NURSE ANESTHETIST, CERTIFIED REGISTERED

## 2021-09-02 PROCEDURE — 74011000258 HC RX REV CODE- 258: Performed by: INTERNAL MEDICINE

## 2021-09-02 PROCEDURE — 76060000031 HC ANESTHESIA FIRST 0.5 HR: Performed by: INTERNAL MEDICINE

## 2021-09-02 PROCEDURE — 76040000019: Performed by: INTERNAL MEDICINE

## 2021-09-02 PROCEDURE — 43235 EGD DIAGNOSTIC BRUSH WASH: CPT | Performed by: INTERNAL MEDICINE

## 2021-09-02 RX ORDER — SODIUM CHLORIDE 9 MG/ML
50 INJECTION, SOLUTION INTRAVENOUS CONTINUOUS
Status: DISCONTINUED | OUTPATIENT
Start: 2021-09-02 | End: 2021-09-02 | Stop reason: HOSPADM

## 2021-09-02 RX ORDER — PROPOFOL 10 MG/ML
INJECTION, EMULSION INTRAVENOUS AS NEEDED
Status: DISCONTINUED | OUTPATIENT
Start: 2021-09-02 | End: 2021-09-02 | Stop reason: HOSPADM

## 2021-09-02 RX ORDER — EPINEPHRINE 0.1 MG/ML
1 INJECTION INTRACARDIAC; INTRAVENOUS
Status: DISCONTINUED | OUTPATIENT
Start: 2021-09-02 | End: 2021-09-02 | Stop reason: HOSPADM

## 2021-09-02 RX ORDER — DEXTROMETHORPHAN/PSEUDOEPHED 2.5-7.5/.8
1.2 DROPS ORAL
Status: DISCONTINUED | OUTPATIENT
Start: 2021-09-02 | End: 2021-09-02 | Stop reason: HOSPADM

## 2021-09-02 RX ORDER — NALOXONE HYDROCHLORIDE 0.4 MG/ML
0.4 INJECTION, SOLUTION INTRAMUSCULAR; INTRAVENOUS; SUBCUTANEOUS
Status: DISCONTINUED | OUTPATIENT
Start: 2021-09-02 | End: 2021-09-02 | Stop reason: HOSPADM

## 2021-09-02 RX ORDER — MIDAZOLAM HYDROCHLORIDE 1 MG/ML
5-10 INJECTION, SOLUTION INTRAMUSCULAR; INTRAVENOUS
Status: DISCONTINUED | OUTPATIENT
Start: 2021-09-02 | End: 2021-09-02 | Stop reason: HOSPADM

## 2021-09-02 RX ORDER — FENTANYL CITRATE 50 UG/ML
50-200 INJECTION, SOLUTION INTRAMUSCULAR; INTRAVENOUS
Status: DISCONTINUED | OUTPATIENT
Start: 2021-09-02 | End: 2021-09-02 | Stop reason: HOSPADM

## 2021-09-02 RX ORDER — SODIUM CHLORIDE 9 MG/ML
INJECTION, SOLUTION INTRAVENOUS
Status: DISCONTINUED | OUTPATIENT
Start: 2021-09-02 | End: 2021-09-02 | Stop reason: HOSPADM

## 2021-09-02 RX ORDER — SODIUM CHLORIDE 0.9 % (FLUSH) 0.9 %
5-40 SYRINGE (ML) INJECTION AS NEEDED
Status: DISCONTINUED | OUTPATIENT
Start: 2021-09-02 | End: 2021-09-02 | Stop reason: HOSPADM

## 2021-09-02 RX ORDER — SODIUM CHLORIDE 0.9 % (FLUSH) 0.9 %
5-40 SYRINGE (ML) INJECTION EVERY 8 HOURS
Status: DISCONTINUED | OUTPATIENT
Start: 2021-09-02 | End: 2021-09-02 | Stop reason: HOSPADM

## 2021-09-02 RX ORDER — FLUMAZENIL 0.1 MG/ML
0.2 INJECTION INTRAVENOUS
Status: DISCONTINUED | OUTPATIENT
Start: 2021-09-02 | End: 2021-09-02 | Stop reason: HOSPADM

## 2021-09-02 RX ORDER — ATROPINE SULFATE 0.1 MG/ML
0.5 INJECTION INTRAVENOUS
Status: DISCONTINUED | OUTPATIENT
Start: 2021-09-02 | End: 2021-09-02 | Stop reason: HOSPADM

## 2021-09-02 RX ORDER — LIDOCAINE HYDROCHLORIDE 20 MG/ML
INJECTION, SOLUTION EPIDURAL; INFILTRATION; INTRACAUDAL; PERINEURAL AS NEEDED
Status: DISCONTINUED | OUTPATIENT
Start: 2021-09-02 | End: 2021-09-02 | Stop reason: HOSPADM

## 2021-09-02 RX ADMIN — LIDOCAINE HYDROCHLORIDE 60 MG: 20 INJECTION, SOLUTION EPIDURAL; INFILTRATION; INTRACAUDAL; PERINEURAL at 14:51

## 2021-09-02 RX ADMIN — PROPOFOL 100 MG: 10 INJECTION, EMULSION INTRAVENOUS at 14:51

## 2021-09-02 RX ADMIN — SODIUM CHLORIDE: 900 INJECTION, SOLUTION INTRAVENOUS at 14:43

## 2021-09-02 NOTE — PROCEDURES
Maryam Prakash MD, Jose Martínez MD, MPH      Lester Gamino, PA-C Hermine Spatz, [facility]-BC     Brooke Cazares, [facility]   Hui Alfaro, P-C    Damaso Ordonez, [facility]       [facility]elyn [facility]    at [address], [facility] Nir Abdi  22.    [phone]    FAX: [address] [place] [address], [address] [place] [address] - [address]    [phone]    FAX: [phone]         UPPER ENDOSCOPY PROCEDURE NOTE    Margie Ayala  1960    INDICATION: Cirrhosis. Screening for esophageal varices with variceal ligation if  indicated. : Calvin Cabral MD    SURGICAL ASSISTANT:  None    PROSTHETIC DEVISES, TISSUE GRAFTS, ORGAN TRANSPLANTS:  Not applicable     ANESTHESIA/SEDATION: Sedation per anesthesiology      PROCEDURE DESCRIPTION:  [employer] consent was obtained from the patient for the procedure. All risks and benefits of the procedure explained. The procedure was performed in the endoscopy suite. The patient was laying on a stretcher and moved to the left lateral decubitus position prior to administration of sedation. Sedation was administered by anesthesiology. See their note for details. The endoscope was inserted into the mouth and advanced under direct vision to the second portion of the duodenum. Careful inspection of upper gastrointestinal tract was made as the endoscope was inserted and withdrawn. Retroflexion of the endoscope to view of the cardia of the stomach was performed. The findings and interventions are described below. FINDINGS:  Esophagus:    Normal.      Stomach:   Mild portal hypertensive gastropathy of the body of the stomach  No gastric varices identified.     Duodenum: Normal bulb and second portion    SPECIMENS COLLECTED:   None    INTERVENTIONS:  None    COMPLICATIONS: None. The patient tolerated the procedure well. EBL: Negligible. RECOMMENDATIONS:  Observe until discharge parameters are achieved. May resume previous diet. Repeat endoscopy to reassess varices and need for banding in 3 years. Follow-up [facility] office as scheduled.       MD Danial Paigevä [address] [place] [address] 22.  [phone]  [address] [place] [address]

## 2021-09-02 NOTE — ROUTINE PROCESS
Desmond Weiss  1960  [id number]    Situation:  Verbal report received from: MAT Burger RN  Procedure: Procedure(s):  UPPER ENDOSCOPY/EGD  :-    Background:    Preoperative diagnosis: ALCOHOLIC LIVER DISEASE  Postoperative diagnosis: 1)Mild portal gastropathy    :  Dr. Aubrie Gómez  Assistant(s): Endoscopy RN-1: Gladys Majano RN  Endoscopy RN-2: Mati Haddad RN    Specimens: * No specimens in log *  H. Pylori  no    Assessment:  Intra-procedure medications   Anesthesia gave intra-procedure sedation and medications, see anesthesia flow sheet yes    Intravenous fluids: NS@ KVO     Vital signs stable     Abdominal assessment: round and soft     Recommendation:  Discharge patient per MD order.     Family or Friend   Permission to share finding with family or friend yes

## 2021-09-02 NOTE — ANESTHESIA PREPROCEDURE EVALUATION
Relevant Problems   No relevant active problems       Anesthetic History               Review of Systems / Medical History  Patient summary reviewed, nursing notes reviewed and pertinent labs reviewed    Pulmonary    COPD            Comments: sarcoidosis   Neuro/Psych   Within defined limits           Cardiovascular    Hypertension                   GI/Hepatic/Renal                Endo/Other      Hypothyroidism  Arthritis     Other Findings   Comments: Kirktznanette Ring           Physical Exam    Airway  Mallampati: II  TM Distance: > 6 cm  Neck ROM: normal range of motion   Mouth opening: Normal     Cardiovascular    Rhythm: regular           Dental  No notable dental hx       Pulmonary  Breath sounds clear to auscultation               Abdominal         Other Findings            Anesthetic Plan    ASA: 3  Anesthesia type: MAC          Induction: Intravenous  Anesthetic plan and risks discussed with: Patient

## 2021-09-02 NOTE — DISCHARGE INSTRUCTIONS
Belinda Springer MD, Galen Gao MD, MPH      Mariana Browne, CAROL ANN Hutton Husbands, [facility]     Brooke Cazares, [facility]   Rashi York, P-C    Elaine Santos, [facility]       Yoselyn Morrison [facility] 136    at [address]    [address] [place] [address], [address] [place] [address]    1400 [facility] 22.    [phone]    FAX: [address] [place] [address], [address]    [phone]    FAX: [phone]         ENDOSCOPY DISCHARGE INSTRUCTIONS      Matilde Diamond  1960  Date: 9/2/2021    DISCOMFORT:  Use lozenges or warm salt water gargle for sore thoat  Apply warm compress to IV site if red. If redness or soreness persists call the office. You may experience gas and bloating. Walking and belching will help relieve this. You may experience chest discomfort or pressure especially if banding of esophageal varices was performed. DIET:  You may resume your normal diet. ACTIVITY:  Spend the remainder of the day resting. Avoid any strenuous activity. You may not operate a vehicle for 12 hours. You may not engage in an occupation involving machinery or appliances for rest of today. Avoid making any critical or financial decisions for at least 24 hour. Call the [address] [facility] if you have any of the following:  Increasing chest or abdominal pain, nausea, vomiting, vomiting blood, abdominal distension or swelling, fever or chills, bloody discharge from nose or mouth or shortness of breath. Follow-up Instructions:  Call Dr. Santosh Suarez for any questions or problems at the phone number listed above. If a biopsy was performed, you will be contacted by the office staff or Dr Santosh Suarez within 1 week.    If you have not heard from us by then you may call the office at the phone number listed above to inquire about the results. ENDOSCOPY FINDINGS:  Your endoscopy demonstrated mild swelling of stomach. Will repeat EGD to look for development of varices in 3 years. Keep follow-up appointment with Florida Habermann on 10/11/2021. DISCHARGE SUMMARY from the Nurse: The following personal items collected during your admission are returned to you:   Dental Appliance: Dental Appliances: At home, Lowers, Uppers  Vision: Visual Aid: None  Hearing Aid:    Jewelry:    Clothing:    Other Valuables:    Valuables sent to safe:                          Learning About Coronavirus (COVID-19)  Coronavirus (COVID-19): Overview  What is coronavirus (COVID-19)? The coronavirus disease (COVID-19) is caused by a virus. It is an illness that was first found in [place], [place], in December 2019. It has since spread worldwide. The virus can cause fever, cough, and trouble breathing. In severe cases, it can cause pneumonia and make it hard to breathe without help. It can cause death. Coronaviruses are a large group of viruses. They cause the common cold. They also cause more serious illnesses like Middle East respiratory syndrome (MERS) and severe acute respiratory syndrome (SARS). COVID-19 is caused by a novel coronavirus. That means it's a new type that has not been seen in people before. This virus spreads person-to-person through droplets from coughing and sneezing. It can also spread when you are close to someone who is infected. And it can spread when you touch something that has the virus on it, such as a doorknob or a tabletop. What can you do to protect yourself from coronavirus (COVID-19)? The best way to protect yourself from getting sick is to:  · Avoid areas where there is an outbreak. · Avoid contact with people who may be infected. · Wash your hands often with soap or alcohol-based hand sanitizers.   · Avoid crowds and try to stay at least 6 feet away from other people. · Wash your hands often, especially after you cough or sneeze. Use soap and water, and scrub for at least 20 seconds. If soap and water aren't available, use an alcohol-based hand . · Avoid touching your mouth, nose, and eyes. What can you do to avoid spreading the virus to others? To help avoid spreading the virus to others:  · Cover your mouth with a tissue when you cough or sneeze. Then throw the tissue in the trash. · Use a disinfectant to clean things that you touch often. · Stay home if you are sick or have been exposed to the virus. Don't go to school, work, or public areas. And don't use public transportation. · If you are sick:  ? Leave your home only if you need to get medical care. But call the doctor's office first so they know you're coming. And wear a face mask, if you have one.  ? If you have a face mask, wear it whenever you're around other people. It can help stop the spread of the virus when you cough or sneeze. ? Clean and disinfect your home every day. Use household  and disinfectant wipes or sprays. Take special care to clean things that you grab with your hands. These include doorknobs, remote controls, phones, and handles on your refrigerator and microwave. And don't forget countertops, tabletops, bathrooms, and computer keyboards. When to call for help  Call 911 anytime you think you may need emergency care. For example, call if:  · You have severe trouble breathing. (You can't talk at all.)  · You have constant chest pain or pressure. · You are severely dizzy or lightheaded. · You are confused or can't think clearly. · Your face and lips have a blue color. · You pass out (lose consciousness) or are very hard to wake up. Call your doctor now if you develop symptoms such as:  · Shortness of breath. · Fever. · Cough. If you need to get care, call ahead to the doctor's office for instructions before you go.  Make sure you wear a face mask, if you have one, to prevent exposing other people to the virus. Where can you get the latest information? The following health organizations are tracking and studying this virus. Their websites contain the most up-to-date information. Chandan Bender also learn what to do if you think you may have been exposed to the virus. · U.S. Centers for Disease Control and Prevention (CDC): The CDC provides updated news about the disease and travel advice. The website also tells you how to prevent the spread of infection. www.cdc.gov  · World Health Organization [facility]): WHO offers information about the virus outbreaks. WHO also has travel advice. www.who.int  Current as of: April 1, 2020               Content Version: 12.4  © 2006-2020 [employer]. Care instructions adapted under license by your healthcare professional. If you have questions about a medical condition or this instruction, always ask your healthcare professional. [address] any warranty or liability for your use of this information.

## 2021-09-02 NOTE — ANESTHESIA POSTPROCEDURE EVALUATION
Post-Anesthesia Evaluation and Assessment    Patient: Sherlyn Szymanski MRN: [id number]  SSN: [id number]    YOB: 1960  Age: 62 y.o. Sex: female      I have evaluated the patient and they are stable and ready for discharge from the PACU. Cardiovascular Function/Vital Signs  Visit Vitals  BP 96/67   Pulse 76   Temp 36.7 °C (98 °F)   Resp 16   Ht 5' 1\" (1.549 m)   Wt 54.4 kg (120 lb)   SpO2 97%   Breastfeeding No   BMI 22.67 kg/m²       Patient is status post MAC anesthesia for Procedure(s):  UPPER ENDOSCOPY/EGD  :-.    Nausea/Vomiting: None    Postoperative hydration reviewed and adequate. Pain:  Pain Scale 1: Numeric (0 - 10) (09/02/21 1502)  Pain Intensity 1: 0 (09/02/21 1502)   Managed    Neurological Status: At baseline    Mental Status, Level of Consciousness: Alert and  oriented to person, place, and time    Pulmonary Status:   O2 Device: None (Room air) (09/02/21 1502)   Adequate oxygenation and airway patent    Complications related to anesthesia: None    Post-anesthesia assessment completed. No concerns    Signed By: Thomas rTan MD     September 2, 2021              Procedure(s):  UPPER ENDOSCOPY/EGD  :-. MAC    <BSHSIANPOST>    INITIAL Post-op Vital signs:   Vitals Value Taken Time   /68 09/02/21 1510   Temp 36.7 °C (98 °F) 09/02/21 1502   Pulse 77 09/02/21 1511   Resp 19 09/02/21 1511   SpO2 95 % 09/02/21 1511   Vitals shown include unvalidated device data.

## 2021-09-02 NOTE — PERIOP NOTES

## 2021-09-02 NOTE — H&P
Cinda Amador MD, Yogi Bauer MD, MPH      Cori Holt, CAROL ANN Martinez, [facility]     Brooke Cazares, [facility]   Tay Ferguson RONEL-JANET Tapia, [facility]       Yoselyn Morrison [facility] 136    at [address]    [address] [place] [address], [facility] Nir Abdi  22.    [phone]    FAX: [address] [place] [address], [address]    [phone]    FAX: [phone]         PRE-PROCEDURE NOTE - EGD    H and P from last office visit reviewed. Allergies reviewed. Out-patient medicaton list reviewed. 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      Current Outpatient Medications on File Prior to Encounter   Medication Sig Dispense Refill    magnesium 200 mg tab TAKE ONE TABLET BY MOUTH DAILY 90 Tablet 0  doxepin (SINEquan) 25 mg capsule Take 1 Capsule by mouth nightly as needed (sleep). 30 Capsule 0    folic acid (FOLVITE) 1 mg tablet Take 1 Tablet by mouth daily. 30 Tablet 0    pantoprazole (PROTONIX) 40 mg tablet Take 1 Tablet by mouth Daily (before breakfast). 30 Tablet 0    chlordiazePOXIDE (LIBRIUM) 5 mg capsule Take 1 Cap by mouth three (3) times daily as needed for Anxiety. Max Daily Amount: 15 mg. 90 Cap 0    potassium chloride (KLOR-CON) 20 mEq pack TAKE ONE PACKET BY MOUTH DAILY 30 Packet 5    buPROPion XL (WELLBUTRIN XL) 150 mg tablet TAKE ONE TABLET BY MOUTH EVERY MORNING FOR SMOKING CESSATION 90 Tab 1    thiamine HCL (B-1) 100 mg tablet TAKE ONE TABLET BY MOUTH DAILY 90 Tab 3       For EGD to assess for esophageal and gastric varices. Plan to perform banding if indicated based upon variceal size and appearance. The risks of the procedure were discussed with the patient. These included reaction to anesthesia, pain, perforation and bleeding. All questions were answered. The patient wishes to proceed with the procedure. The patient was counseled at length about the risks of linda Covid-19 in the reese-operative and post-operative states including the recovery window of their procedure. The patient was made aware that linda Covid-19 after a surgical procedure may worsen their prognosis for recovering from the virus and lend to a higher morbidity and or mortality risk. The patient was given the options of postponing their procedure. All of the risks, benefits, and alternatives were discussed. The patient does  wish to proceed with the procedure. PHYSICAL EXAMINATION:  Visit Vitals  /75   Pulse 88   Temp 98 °F (36.7 °C)   Resp 16   Ht 5' 1\" (1.549 m)   Wt 120 lb (54.4 kg)   SpO2 99%   Breastfeeding No   BMI 22.67 kg/m²       General: No acute distress. Eyes: Sclera anicteric. ENT: No oral lesions. Thyroid normal.  Nodes: No adenopathy.    Skin: No spider angiomata. No jaundice. No palmar erythema. Respiratory: Lungs clear to auscultation. Cardiovascular: Regular heart rate. No murmurs. No JVD. Abdomen: Soft non-tender, liver size normal to percussion/palpation. Spleen not palpable. No obvious ascites. Extremities: No edema. No muscle wasting. No gross arthritic changes. Neurologic: Alert and oriented. Cranial nerves grossly intact. No asterixis. MOST RECENT LABORATORY STUDIES:  Lab Results   Component Value Date/Time    WBC 2.7 (L) 05/21/2021 10:47 AM    Hemoglobin (POC) 4.9 02/16/2018 02:46 PM    Hemoglobin (POC) 11.2 (L) 01/26/2018 04:08 PM    HGB 12.5 05/21/2021 10:47 AM    Hematocrit (POC) 33 (L) 01/26/2018 04:08 PM    HCT 39.0 05/21/2021 10:47 AM    PLATELET 934 (L) 23/63/9198 10:47 AM     (H) 05/21/2021 10:47 AM     Lab Results   Component Value Date/Time    INR 1.1 05/21/2021 10:47 AM    INR 1.1 02/19/2018 03:34 AM    INR 1.2 (H) 02/16/2018 03:20 PM    Prothrombin time 11.3 05/21/2021 10:47 AM    Prothrombin time 11.7 (H) 02/19/2018 03:34 AM    Prothrombin time 12.1 (H) 02/16/2018 03:20 PM       ASSESSMENT AND PLAN:  EGD to assess for esophageal and/or gastric varices.   Sedation per anesthesiology      MD [facility] [place] [facility]  [address], [address] [place] [address] 22.  [phone]  [address] [place] [address]

## 2021-10-13 ENCOUNTER — PATIENT OUTREACH (OUTPATIENT)
Dept: CASE MANAGEMENT | Age: 61
End: 2021-10-13

## 2021-10-19 ENCOUNTER — APPOINTMENT (OUTPATIENT)
Dept: GENERAL RADIOLOGY | Age: 61
DRG: 494 | End: 2021-10-19
Attending: EMERGENCY MEDICINE
Payer: COMMERCIAL

## 2021-10-19 ENCOUNTER — APPOINTMENT (OUTPATIENT)
Dept: CT IMAGING | Age: 61
DRG: 494 | End: 2021-10-19
Attending: EMERGENCY MEDICINE
Payer: COMMERCIAL

## 2021-10-19 ENCOUNTER — APPOINTMENT (OUTPATIENT)
Dept: GENERAL RADIOLOGY | Age: 61
DRG: 494 | End: 2021-10-19
Attending: PHYSICIAN ASSISTANT
Payer: COMMERCIAL

## 2021-10-19 ENCOUNTER — ANESTHESIA EVENT (OUTPATIENT)
Dept: SURGERY | Age: 61
DRG: 494 | End: 2021-10-19
Payer: COMMERCIAL

## 2021-10-19 ENCOUNTER — HOSPITAL ENCOUNTER (INPATIENT)
Age: 61
LOS: 6 days | Discharge: HOME | DRG: 494 | End: 2021-10-25
Attending: EMERGENCY MEDICINE | Admitting: ORTHOPAEDIC SURGERY
Payer: COMMERCIAL

## 2021-10-19 DIAGNOSIS — S82.142A CLOSED FRACTURE OF LEFT TIBIAL PLATEAU, INITIAL ENCOUNTER: Primary | ICD-10-CM

## 2021-10-19 PROBLEM — S82.122A CLOSED FRACTURE OF LATERAL PORTION OF LEFT TIBIAL PLATEAU: Status: ACTIVE | Noted: 2021-10-19

## 2021-10-19 LAB
ABO + RH BLD: NORMAL
ALBUMIN SERPL-MCNC: 3.5 G/DL (ref 3.5–5)
ALBUMIN/GLOB SERPL: 0.8 {RATIO} (ref 1.1–2.2)
ALP SERPL-CCNC: 101 U/L (ref 45–117)
ALT SERPL-CCNC: 30 U/L (ref 12–78)
ANION GAP SERPL CALC-SCNC: 8 MMOL/L (ref 5–15)
AST SERPL-CCNC: 25 U/L (ref 15–37)
BASOPHILS # BLD: 0 K/UL (ref 0–0.1)
BASOPHILS NFR BLD: 0 % (ref 0–1)
BILIRUB SERPL-MCNC: 0.7 MG/DL (ref 0.2–1)
BLOOD GROUP ANTIBODIES SERPL: NORMAL
BUN SERPL-MCNC: 10 MG/DL (ref 6–20)
BUN/CREAT SERPL: 19 (ref 12–20)
CALCIUM SERPL-MCNC: 9.6 MG/DL (ref 8.5–10.1)
CHLORIDE SERPL-SCNC: 101 MMOL/L (ref 97–108)
CO2 SERPL-SCNC: 28 MMOL/L (ref 21–32)
COMMENT, HOLDF: NORMAL
CREAT SERPL-MCNC: 0.53 MG/DL (ref 0.55–1.02)
DIFFERENTIAL METHOD BLD: ABNORMAL
EOSINOPHIL # BLD: 0.1 K/UL (ref 0–0.4)
EOSINOPHIL NFR BLD: 1 % (ref 0–7)
ERYTHROCYTE [DISTWIDTH] IN BLOOD BY AUTOMATED COUNT: 14.8 % (ref 11.5–14.5)
GLOBULIN SER CALC-MCNC: 4.2 G/DL (ref 2–4)
GLUCOSE SERPL-MCNC: 110 MG/DL (ref 65–100)
HCT VFR BLD AUTO: 40 % (ref 35–47)
HGB BLD-MCNC: 13.7 G/DL (ref 11.5–16)
IMM GRANULOCYTES # BLD AUTO: 0 K/UL (ref 0–0.04)
IMM GRANULOCYTES NFR BLD AUTO: 0 % (ref 0–0.5)
INR PPP: 1.1 (ref 0.9–1.1)
LYMPHOCYTES # BLD: 1.8 K/UL (ref 0.8–3.5)
LYMPHOCYTES NFR BLD: 24 % (ref 12–49)
MCH RBC QN AUTO: 31.3 PG (ref 26–34)
MCHC RBC AUTO-ENTMCNC: 34.3 G/DL (ref 30–36.5)
MCV RBC AUTO: 91.3 FL (ref 80–99)
MONOCYTES # BLD: 0.7 K/UL (ref 0–1)
MONOCYTES NFR BLD: 9 % (ref 5–13)
NEUTS SEG # BLD: 4.8 K/UL (ref 1.8–8)
NEUTS SEG NFR BLD: 66 % (ref 32–75)
NRBC # BLD: 0 K/UL (ref 0–0.01)
NRBC BLD-RTO: 0 PER 100 WBC
PLATELET # BLD AUTO: 165 K/UL (ref 150–400)
PMV BLD AUTO: 10.5 FL (ref 8.9–12.9)
POTASSIUM SERPL-SCNC: 4 MMOL/L (ref 3.5–5.1)
PROT SERPL-MCNC: 7.7 G/DL (ref 6.4–8.2)
PROTHROMBIN TIME: 11.1 SEC (ref 9–11.1)
RBC # BLD AUTO: 4.38 M/UL (ref 3.8–5.2)
SAMPLES BEING HELD,HOLD: NORMAL
SODIUM SERPL-SCNC: 137 MMOL/L (ref 136–145)
SPECIMEN EXP DATE BLD: NORMAL
WBC # BLD AUTO: 7.3 K/UL (ref 3.6–11)

## 2021-10-19 PROCEDURE — 80053 COMPREHEN METABOLIC PANEL: CPT

## 2021-10-19 PROCEDURE — 96372 THER/PROPH/DIAG INJ SC/IM: CPT

## 2021-10-19 PROCEDURE — 65270000029 HC RM PRIVATE

## 2021-10-19 PROCEDURE — 73562 X-RAY EXAM OF KNEE 3: CPT

## 2021-10-19 PROCEDURE — 74011250637 HC RX REV CODE- 250/637: Performed by: PHYSICIAN ASSISTANT

## 2021-10-19 PROCEDURE — 99285 EMERGENCY DEPT VISIT HI MDM: CPT

## 2021-10-19 PROCEDURE — 85025 COMPLETE CBC W/AUTO DIFF WBC: CPT

## 2021-10-19 PROCEDURE — 86901 BLOOD TYPING SEROLOGIC RH(D): CPT

## 2021-10-19 PROCEDURE — 74011250636 HC RX REV CODE- 250/636: Performed by: PHYSICIAN ASSISTANT

## 2021-10-19 PROCEDURE — 36415 COLL VENOUS BLD VENIPUNCTURE: CPT

## 2021-10-19 PROCEDURE — 74011250636 HC RX REV CODE- 250/636: Performed by: EMERGENCY MEDICINE

## 2021-10-19 PROCEDURE — 71045 X-RAY EXAM CHEST 1 VIEW: CPT

## 2021-10-19 PROCEDURE — 73700 CT LOWER EXTREMITY W/O DYE: CPT

## 2021-10-19 PROCEDURE — 93005 ELECTROCARDIOGRAM TRACING: CPT

## 2021-10-19 PROCEDURE — 74011250637 HC RX REV CODE- 250/637: Performed by: EMERGENCY MEDICINE

## 2021-10-19 PROCEDURE — 77030038269 HC DRN EXT URIN PURWCK BARD -A

## 2021-10-19 PROCEDURE — 85610 PROTHROMBIN TIME: CPT

## 2021-10-19 PROCEDURE — 2709999900 HC NON-CHARGEABLE SUPPLY

## 2021-10-19 RX ORDER — SODIUM CHLORIDE 0.9 % (FLUSH) 0.9 %
5-40 SYRINGE (ML) INJECTION AS NEEDED
Status: DISCONTINUED | OUTPATIENT
Start: 2021-10-19 | End: 2021-10-25 | Stop reason: HOSPADM

## 2021-10-19 RX ORDER — SODIUM CHLORIDE 9 MG/ML
75 INJECTION, SOLUTION INTRAVENOUS CONTINUOUS
Status: DISCONTINUED | OUTPATIENT
Start: 2021-10-19 | End: 2021-10-25

## 2021-10-19 RX ORDER — OXYCODONE HYDROCHLORIDE 5 MG/1
2.5 TABLET ORAL
Status: DISCONTINUED | OUTPATIENT
Start: 2021-10-19 | End: 2021-10-21

## 2021-10-19 RX ORDER — OXYCODONE HYDROCHLORIDE 5 MG/1
5 TABLET ORAL
Status: DISCONTINUED | OUTPATIENT
Start: 2021-10-19 | End: 2021-10-21

## 2021-10-19 RX ORDER — OXYCODONE AND ACETAMINOPHEN 5; 325 MG/1; MG/1
1 TABLET ORAL
Qty: 12 TABLET | Refills: 0 | Status: SHIPPED
Start: 2021-10-19 | End: 2021-10-19

## 2021-10-19 RX ORDER — ONDANSETRON 2 MG/ML
4 INJECTION INTRAMUSCULAR; INTRAVENOUS
Status: DISCONTINUED | OUTPATIENT
Start: 2021-10-19 | End: 2021-10-25 | Stop reason: HOSPADM

## 2021-10-19 RX ORDER — CHLORDIAZEPOXIDE HYDROCHLORIDE 5 MG/1
5 CAPSULE, GELATIN COATED ORAL
Status: DISCONTINUED | OUTPATIENT
Start: 2021-10-19 | End: 2021-10-25 | Stop reason: HOSPADM

## 2021-10-19 RX ORDER — BUPROPION HYDROCHLORIDE 150 MG/1
150 TABLET ORAL DAILY
Status: DISCONTINUED | OUTPATIENT
Start: 2021-10-20 | End: 2021-10-25 | Stop reason: HOSPADM

## 2021-10-19 RX ORDER — NALOXONE HYDROCHLORIDE 0.4 MG/ML
0.4 INJECTION, SOLUTION INTRAMUSCULAR; INTRAVENOUS; SUBCUTANEOUS AS NEEDED
Status: DISCONTINUED | OUTPATIENT
Start: 2021-10-19 | End: 2021-10-25 | Stop reason: HOSPADM

## 2021-10-19 RX ORDER — OXYCODONE AND ACETAMINOPHEN 5; 325 MG/1; MG/1
1 TABLET ORAL
Status: COMPLETED | OUTPATIENT
Start: 2021-10-19 | End: 2021-10-19

## 2021-10-19 RX ORDER — SODIUM CHLORIDE 0.9 % (FLUSH) 0.9 %
5-40 SYRINGE (ML) INJECTION EVERY 8 HOURS
Status: DISCONTINUED | OUTPATIENT
Start: 2021-10-19 | End: 2021-10-25 | Stop reason: HOSPADM

## 2021-10-19 RX ORDER — FOLIC ACID 1 MG/1
1 TABLET ORAL DAILY
Status: DISCONTINUED | OUTPATIENT
Start: 2021-10-20 | End: 2021-10-25 | Stop reason: HOSPADM

## 2021-10-19 RX ORDER — IBUPROFEN 200 MG
1 TABLET ORAL EVERY 24 HOURS
Status: DISCONTINUED | OUTPATIENT
Start: 2021-10-20 | End: 2021-10-25 | Stop reason: HOSPADM

## 2021-10-19 RX ORDER — ASPIRIN 325 MG/1
100 TABLET, FILM COATED ORAL DAILY
Status: DISCONTINUED | OUTPATIENT
Start: 2021-10-20 | End: 2021-10-25 | Stop reason: HOSPADM

## 2021-10-19 RX ORDER — LANOLIN ALCOHOL/MO/W.PET/CERES
200 CREAM (GRAM) TOPICAL DAILY
Status: DISCONTINUED | OUTPATIENT
Start: 2021-10-20 | End: 2021-10-25 | Stop reason: HOSPADM

## 2021-10-19 RX ORDER — PANTOPRAZOLE SODIUM 40 MG/1
40 TABLET, DELAYED RELEASE ORAL
Status: DISCONTINUED | OUTPATIENT
Start: 2021-10-20 | End: 2021-10-25 | Stop reason: HOSPADM

## 2021-10-19 RX ORDER — KETOROLAC TROMETHAMINE 30 MG/ML
15 INJECTION, SOLUTION INTRAMUSCULAR; INTRAVENOUS
Status: COMPLETED | OUTPATIENT
Start: 2021-10-19 | End: 2021-10-19

## 2021-10-19 RX ORDER — AMOXICILLIN 250 MG
2 CAPSULE ORAL 2 TIMES DAILY
Status: DISCONTINUED | OUTPATIENT
Start: 2021-10-19 | End: 2021-10-20

## 2021-10-19 RX ORDER — MORPHINE SULFATE 2 MG/ML
2 INJECTION, SOLUTION INTRAMUSCULAR; INTRAVENOUS
Status: DISPENSED | OUTPATIENT
Start: 2021-10-19 | End: 2021-10-20

## 2021-10-19 RX ORDER — DOXEPIN HYDROCHLORIDE 25 MG/1
25 CAPSULE ORAL
Status: DISCONTINUED | OUTPATIENT
Start: 2021-10-19 | End: 2021-10-25 | Stop reason: HOSPADM

## 2021-10-19 RX ORDER — ACETAMINOPHEN 325 MG/1
650 TABLET ORAL EVERY 6 HOURS
Status: DISCONTINUED | OUTPATIENT
Start: 2021-10-19 | End: 2021-10-20

## 2021-10-19 RX ADMIN — ACETAMINOPHEN 650 MG: 325 TABLET ORAL at 11:10

## 2021-10-19 RX ADMIN — DOCUSATE SODIUM 50MG AND SENNOSIDES 8.6MG 2 TABLET: 8.6; 5 TABLET, FILM COATED ORAL at 17:57

## 2021-10-19 RX ADMIN — Medication 10 ML: at 21:25

## 2021-10-19 RX ADMIN — Medication 10 ML: at 14:00

## 2021-10-19 RX ADMIN — ACETAMINOPHEN 650 MG: 325 TABLET ORAL at 23:07

## 2021-10-19 RX ADMIN — KETOROLAC TROMETHAMINE 15 MG: 30 INJECTION, SOLUTION INTRAMUSCULAR at 10:03

## 2021-10-19 RX ADMIN — MORPHINE SULFATE 2 MG: 2 INJECTION, SOLUTION INTRAMUSCULAR; INTRAVENOUS at 16:30

## 2021-10-19 RX ADMIN — ACETAMINOPHEN 650 MG: 325 TABLET ORAL at 17:58

## 2021-10-19 RX ADMIN — SODIUM CHLORIDE 75 ML/HR: 9 INJECTION, SOLUTION INTRAVENOUS at 11:12

## 2021-10-19 RX ADMIN — MORPHINE SULFATE 2 MG: 2 INJECTION, SOLUTION INTRAMUSCULAR; INTRAVENOUS at 11:11

## 2021-10-19 RX ADMIN — OXYCODONE AND ACETAMINOPHEN 1 TABLET: 5; 325 TABLET ORAL at 07:23

## 2021-10-19 RX ADMIN — ONDANSETRON 4 MG: 2 INJECTION INTRAMUSCULAR; INTRAVENOUS at 11:10

## 2021-10-19 NOTE — CONSULTS
Ortho:    Left lateral tibial plateau fracture  Knee immobilizer  Apply IVC  Bedrest  Admit  NPO after midnight  Plan for ORIF 10/20  Antolin Rendon if unable to utilize [facility] consult for pre-op eval and med mgt    Full note/H&P by Dr Cameron Arshad, CAROL ANN

## 2021-10-19 NOTE — DISCHARGE INSTRUCTIONS
Discharge Instructions: How to [address] [facility]  Surgery: left tibial plateau Fracture Repair  Surgeon:   Cathy Yates DO  Surgery Date:  10/20/2021    Abhay Man To relieve pain:   Use ice/gel packs.  Put the ice pack directly over the wound, or anywhere you are hurting or swollen.  To control pain and swelling, keep ice on regularly, especially after physical activity.  The packs should stay cold for 3-4 hours. When it is not cold anymore, rotate with the packs in the freezer.  Elevate your leg. This will also keep swelling down.  Rest for at least 20 minutes between activity or exercises.  To keep track of your pain medications, write down what you take and when you take it.  The last dose of pain medication you got in the hospital was:     Medication    Dose    Date & Time           Choose your medications based on the pain scale below:     To keep your pain under control, take Tylenol every 6 hours for 14 days - even if you feel like you dont need it.  For mild to moderate pain (1-6 on pain scale), take one pain pill every 3-4 hours as needed.  For severe pain (7-10 on pain scale), take two pain pills every 3-4 hours as needed.  To prevent nausea, take your pain medications with food. Pain Scale                 As your pain lessens:     Slowly start taking less pain medication. You may do this by waiting longer between doses or by taking smaller doses.  Stop using the pain medications as soon as you no longer need it, usually in 2-3 weeks.  Aspirin   To prevent blood clots, you will need to take Aspirin 325 mg twice a day for 30 days.               To prevent stomach upset or bleeding:   Take Pepcid 20 mg twice a day, or a similar home medication, while you are taking a blood thinner.    Do not take non-steroidal anti-inflammatory (NSAID) medications (Ibuprofen, Advil, Motrin, Naproxen, etc.)                                                 OPSITE (Honeycomb dressing)     Keep your clear, waterproof dressing in place for 5-7 days after your leave the hospital.      If you are still having drainage, you will need to change your dressing in 5-7 days. You will be given one extra dressing to use at home.  If there is no more drainage from the wound, you may leave it open to the air. OPSITE DRESSING INSTRUCTIONS               DO NOTs:   Do not rub your surgical wound   Do not put lotion or oils on your wound.  Do not take a tub bath or go swimming until your doctor says it is ok.  You may shower with this dressing over your wound.  After showering pat the dressing dry.  If you have staples a home health nurse will remove them in about 10 days.  To increase and promote healing:     Stop Smoking (or at least cut back on Smoking).  Eat a well-balanced diet (high in protein       and vitamin C).  If you have a poor appetite, drink Ensure, Glucerna, or CarnationInstant Breakfast for the next 30 days.  If you are diabetic, you should control your blood         sugars to prevent infection and help your wound         to heal.       To prevent constipation, stay active & drink plenty of fluid.  While using pain medications, you should also take stool softeners and laxatives, such as Pericolace and Miralax.  If you are having too many bowel movements, then you may need  to stop taking the laxatives.  You should have a bowel movement 3-4 days after surgery and then at least every other day while on pain medication.  To improve your recovery, you must be active!  WEIGHT BEARING   Toe-Touch or Partial = You can let your toes touch the ground but may not put all of your weight on that leg.          THERAPY   If you go to a rehab facility, physical therapy (PT) will need to work with you daily, and sometimes twice a day to teach you how to:   Wear the knee immobilizer     Get in and out of the bed   Walk (gait training) and climb stairs   Do exercises and gain strength   Use a walker, crutches or cane     You may also need to have occupational therapy (OT) work with you to help you practice:     Getting on and off the toilet   Self-care (brushing teeth, eating, bathing, etc)     If you go directly home, home health physical therapy will come the day after you leave the hospital.  They will visit about 4 times over the next couple of weeks to teach you how to get out of bed, to safely walk in your home, and to do your exercises.  NO DRIVING until your surgeon tells you it is ok.  You can return to work when cleared by a physician.  Please call your physician immediately if you have:     Constant bleeding from your wound.  Increasing redness or swelling around your wound (Some warmth, bruising and swelling is normal).  Change in wound drainage (increase in amount, color, or bad smell).  Change in mental status (unusual behavior)     Temperature over 101.5 degrees Fahrenheit     Increased pain, swelling, or redness in the calf (back of your lower leg), thigh, ankle or foot.  Emergency: CALL 911 if you have:   Shortness of breath   Chest pain when you cough or taking a deep breath     Please call your surgeons office at Dr [facility] for a follow up appointment.  You should call as soon as you get home or the next day after discharge. Ask to make an appointment in 2 weeks.

## 2021-10-19 NOTE — ED PROVIDER NOTES
EMERGENCY DEPARTMENT HISTORY AND PHYSICAL EXAM      Date: 10/19/2021  Patient Name: Arcelia Stanley    History of Presenting Illness     Chief Complaint   Patient presents with    Knee Pain     Pt wheeled to triage with c/o L knee pain after she stood up and heard a pop and sat back down; no injury, fall, or trauma; no obvious deformity; no hx of knee surgery to L knee        History Provided By: Patient    HPI: Arcelia Stanley, 62 y.o. female presents to the ED with cc of left knee pain. 28-year-old female with the below past medical history presents emergency department with left knee pain. Patient reports she fell asleep on the couch. She reports she thought the fire alarm was going off, and stood up suddenly. She reports that she may have \"twisted\" her knee. She reports she felt a \"pop\" and had the sudden onset of pain in her left knee. She reports it is \"all over\" but points to her lateral knee. No deformity noted. No history of surgeries. Otherwise in normal state of health. Worse with bearing weight. Improved with nothing. There are no other complaints, changes, or physical findings at this time. PCP: Army Abhishek NP    No current facility-administered medications on file prior to encounter. Current Outpatient Medications on File Prior to Encounter   Medication Sig Dispense Refill    magnesium 200 mg tab TAKE ONE TABLET BY MOUTH DAILY 90 Tablet 0    doxepin (SINEquan) 25 mg capsule Take 1 Capsule by mouth nightly as needed (sleep). 30 Capsule 0    folic acid (FOLVITE) 1 mg tablet Take 1 Tablet by mouth daily. 30 Tablet 0    pantoprazole (PROTONIX) 40 mg tablet Take 1 Tablet by mouth Daily (before breakfast). 30 Tablet 0    chlordiazePOXIDE (LIBRIUM) 5 mg capsule Take 1 Cap by mouth three (3) times daily as needed for Anxiety.  Max Daily Amount: 15 mg. 90 Cap 0    potassium chloride (KLOR-CON) 20 mEq pack TAKE ONE PACKET BY MOUTH DAILY 30 Packet 5    buPROPion XL [facility] XL) 150 mg tablet TAKE ONE TABLET BY MOUTH EVERY MORNING FOR SMOKING CESSATION 90 Tab 1    thiamine HCL (B-1) 100 mg tablet TAKE ONE TABLET BY MOUTH DAILY 90 Tab 3       Past History     Past Medical History:  Past Medical History:   Diagnosis Date    GI (gastrointestinal bleed)     Insomnia 11/09    Iron deficiency anemia 04/2004    PPD positive     treated w/ INH- tests since have been negative    Pure hypercholesterolemia     Scoliosis 12/28/15    [facility]    Tobacco dependence     Unspecified essential hypertension     Uterine bleeding, dysfunctional 2/12    Uterine fibroid        Past Surgical History:  Past Surgical History:   Procedure Laterality Date    COLONOSCOPY N/A 1/31/2018    COLONOSCOPY performed by Analilia Baptiste MD at [facility] HX CHOLECYSTECTOMY      HX COLONOSCOPY  07/01/2013    MCV, repeat 10 years    HX ORTHOPAEDIC      Shattered bilateral ankles-metal plates & screws    HX OTHER SURGICAL      Benign cyst removal- L. foot    [place] LEG/ANKLE SURGERY PROC UNLISTED         Family History:  Family History   Problem Relation Age of Onset    Hypertension Mother     Diabetes Mother     Cancer Mother         breast    Heart Disease Mother     Heart Disease Father         MI 42's    Kidney Disease Father     Hypertension Son        Social History:  Social History     Tobacco Use    Smoking status: Current Every Day Smoker     Packs/day: 0.50     Years: 17.00     Pack years: 8.50     Types: Cigarettes    Smokeless tobacco: Never Used   Vaping Use    Vaping Use: Never used   Substance Use Topics    Alcohol use: Yes     Alcohol/week: 51.0 standard drinks     Types: 1 Cans of beer, 50 Shots of liquor per week     Comment: one pint daily liquor    Drug use: No       Allergies:   Allergies   Allergen Reactions    Robaxin [Methocarbamol] Hives, Rash and Itching     Red splotches    Statins-Hmg-Coa Reductase Inhibitors Myalgia  Codeine Itching    Neosporin [Neomycin-Bacitracin-Polymyxin] Rash         Review of Systems   Review of Systems   Constitutional: Negative for activity change, chills and fever. HENT: Negative for facial swelling and voice change. Eyes: Negative for redness. Respiratory: Negative for cough, shortness of breath and wheezing. Cardiovascular: Negative for chest pain and leg swelling. Gastrointestinal: Negative for abdominal pain, diarrhea, nausea and vomiting. Genitourinary: Negative for decreased urine volume. Musculoskeletal: Positive for arthralgias, gait problem and joint swelling. Skin: Negative for pallor and rash. Neurological: Negative for tremors and facial asymmetry. Psychiatric/Behavioral: Negative for agitation. All other systems reviewed and are negative. Physical Exam   Physical Exam  Vitals and nursing note reviewed. Constitutional:       Comments: 52-year-old female, resting in chair, no acute distress   HENT:      Head: Normocephalic and atraumatic. Cardiovascular:      Rate and Rhythm: Normal rate and regular rhythm. Heart sounds: No murmur heard. No friction rub. No gallop. Pulmonary:      Effort: Pulmonary effort is normal.      Breath sounds: Normal breath sounds. Abdominal:      Palpations: Abdomen is soft. Tenderness: There is no abdominal tenderness. Musculoskeletal:         General: Swelling and tenderness present. No deformity or signs of injury. Cervical back: Normal range of motion. Comments: There is a effusion around the left knee. Tender to palpation along the left lateral knee. Range of motion limited by pain but no micromotion tenderness. Good distal pulses. Skin:     General: Skin is warm. Capillary Refill: Capillary refill takes less than 2 seconds. Neurological:      General: No focal deficit present. Mental Status: She is alert. Comments: Baseline neuropathy noted.    Psychiatric:         Mood and Affect: Mood normal.         Diagnostic Study Results     Labs -     Recent Results (from the past 12 hour(s))   CBC WITH AUTOMATED DIFF    Collection Time: 10/19/21 11:03 AM   Result Value Ref Range    WBC 7.3 3.6 - 11.0 K/uL    RBC 4.38 3.80 - 5.20 M/uL    HGB 13.7 11.5 - 16.0 g/dL    HCT 40.0 35.0 - 47.0 %    MCV 91.3 80.0 - 99.0 FL    MCH 31.3 26.0 - 34.0 PG    MCHC 34.3 30.0 - 36.5 g/dL    RDW 14.8 (H) 11.5 - 14.5 %    PLATELET 124 282 - 397 K/uL    MPV 10.5 8.9 - 12.9 FL    NRBC 0.0 0  WBC    ABSOLUTE NRBC 0.00 0.00 - 0.01 K/uL    NEUTROPHILS 66 32 - 75 %    LYMPHOCYTES 24 12 - 49 %    MONOCYTES 9 5 - 13 %    EOSINOPHILS 1 0 - 7 %    BASOPHILS 0 0 - 1 %    IMMATURE GRANULOCYTES 0 0.0 - 0.5 %    ABS. NEUTROPHILS 4.8 1.8 - 8.0 K/UL    ABS. LYMPHOCYTES 1.8 0.8 - 3.5 K/UL    ABS. MONOCYTES 0.7 0.0 - 1.0 K/UL    ABS. EOSINOPHILS 0.1 0.0 - 0.4 K/UL    ABS. BASOPHILS 0.0 0.0 - 0.1 K/UL    ABS. IMM. GRANS. 0.0 0.00 - 0.04 K/UL    DF AUTOMATED     METABOLIC PANEL, COMPREHENSIVE    Collection Time: 10/19/21 11:03 AM   Result Value Ref Range    Sodium 137 136 - 145 mmol/L    Potassium 4.0 3.5 - 5.1 mmol/L    Chloride 101 97 - 108 mmol/L    CO2 28 21 - 32 mmol/L    Anion gap 8 5 - 15 mmol/L    Glucose 110 (H) 65 - 100 mg/dL    BUN 10 6 - 20 MG/DL    Creatinine 0.53 (L) 0.55 - 1.02 MG/DL    BUN/Creatinine ratio 19 12 - 20      GFR est AA >60 >60 ml/min/1.73m2    GFR est non-AA >60 >60 ml/min/1.73m2    Calcium 9.6 8.5 - 10.1 MG/DL    Bilirubin, total 0.7 0.2 - 1.0 MG/DL    ALT (SGPT) 30 12 - 78 U/L    AST (SGOT) 25 15 - 37 U/L    Alk.  phosphatase 101 45 - 117 U/L    Protein, total 7.7 6.4 - 8.2 g/dL    Albumin 3.5 3.5 - 5.0 g/dL    Globulin 4.2 (H) 2.0 - 4.0 g/dL    A-G Ratio 0.8 (L) 1.1 - 2.2     PROTHROMBIN TIME + INR    Collection Time: 10/19/21 11:03 AM   Result Value Ref Range    INR 1.1 0.9 - 1.1      Prothrombin time 11.1 9.0 - 11.1 sec   TYPE & SCREEN    Collection Time: 10/19/21 11:03 AM   Result Value Ref Range    Crossmatch Expiration 10/22/2021,2359     ABO/Rh(D) A POSITIVE     Antibody screen NEG    SAMPLES BEING HELD    Collection Time: 10/19/21 11:18 AM   Result Value Ref Range    SAMPLES BEING HELD PST     COMMENT        Add-on orders for these samples will be processed based on acceptable specimen integrity and analyte stability, which may vary by analyte. EKG, 12 LEAD, INITIAL    Collection Time: 10/19/21 12:11 PM   Result Value Ref Range    Ventricular Rate 83 BPM    Atrial Rate 83 BPM    P-R Interval 148 ms    QRS Duration 62 ms    Q-T Interval 378 ms    QTC Calculation (Bezet) 444 ms    Calculated P Axis 31 degrees    Calculated R Axis 18 degrees    Calculated T Axis 44 degrees    Diagnosis       Normal sinus rhythm  Low voltage QRS  Septal infarct , age undetermined  When compared with ECG of 23-FEB-2021 09:36,  Non-specific change in ST segment in Lateral leads         Radiologic Studies -   XR CHEST SNGL V   Final Result   No acute cardiopulmonary abnormality. CT LOW EXT LT WO CONT   Final Result      1. Acute fracture of the lateral tibial plateau with significant depression. 2. Moderate lipohemarthrosis. XR KNEE LT 3 V   Final Result   1. Acute impaction fracture of the lateral tibial plateau. 2. Moderate lipohemarthrosis. CT Results  (Last 48 hours)               10/19/21 0820  CT LOW EXT LT WO CONT Final result    Impression:      1. Acute fracture of the lateral tibial plateau with significant depression. 2. Moderate lipohemarthrosis. Narrative:  EXAM:  CT LOW EXT LT WO CONT       HISTORY: Knee pain       COMPARISON: None (downtime)       TECHNIQUE: Multiple contiguous axial, sagittal, and coronal sections were   obtained from a spiral volume acquisition of the left knee. No IV contrast was   administered. CT dose reduction was achieved through use of a standardized   protocol tailored for this examination and automatic exposure control for dose   modulation. FINDINGS: The bones are osteopenic. There is an impaction fracture of the   lateral tibial plateau with depression measuring approximately 1.8 cm. There is   fragmentation at the articular surface. A vertically oriented fracture line   extends down to the metaphysis. There is mild shifting of osseous fragments   laterally. There is no horizontal split component. There is no involvement of   the medial tibial plateau. Small areas of patchy hypodensity in the metaphysis   likely represent intraosseous hematoma. There is a moderate lipohemarthrosis. There is a chronic ossicle adjacent to the anterior tibial tuberosity likely   related to prior Osgood-Schlatter's disease. CXR Results  (Last 48 hours)               10/19/21 1050  XR CHEST SNGL V Final result    Impression:  No acute cardiopulmonary abnormality. Narrative:  EXAM:  XR CHEST SNGL V       INDICATION:  Preoperative exam       COMPARISON: February 3, 2020       TECHNIQUE: AP portable upright chest view       FINDINGS: The lungs are clear. Heart size and mediastinal contours are normal.   No pleural effusion or pneumothorax. Osseous structures are within normal   limits. Medical Decision Making   I am the first provider for this patient. I reviewed the vital signs, available nursing notes, past medical history, past surgical history, family history and social history. Vital Signs-Reviewed the patient's vital signs.   Patient Vitals for the past 12 hrs:   Temp Pulse Resp BP SpO2   10/19/21 1215  82 16 (!) 141/68 98 %   10/19/21 1200  80 11 126/71 100 %   10/19/21 1145  90 16 120/68 98 %   10/19/21 1135     99 %   10/19/21 1131  87 13 128/73 98 %   10/19/21 1030    106/86 100 %   10/19/21 1000    132/68 99 %   10/19/21 0931    (!) 140/78 99 %   10/19/21 0633 98.1 °F (36.7 °C) 88 18 (!) 143/72 98 %     Records Reviewed: Nursing Notes and Old Medical Records    Provider Notes (Medical Decision Making):     26-year-old female presents emergency department with sudden onset left knee pain after standing up. Patient reports she heard a \"pop. \"  With immediate onset of pain. Differential includes fracture, less likely dislocation, ligamentous injury. Check plain film, medicate for pain. ED Course:   Initial assessment performed. The patients presenting problems have been discussed, and they are in agreement with the care plan formulated and outlined with them. I have encouraged them to ask questions as they arise throughout their visit. ED Course as of Oct 19 1419   Tue Oct 19, 2021   6499 Per my interpretation pain films, appears to be a tibial plateau fracture. [MB]   1875 CT reviewed confirming noted on x-ray of knee. Discussed with Dr. Yuliana Sims, will follow up with Dr. Krystle Garg. Will place a knee immobilizer and crutches. Discharged with pain medications. [MB]   1010 D/w Jordan Barnes, given degree of depression on CT, agreeable to orthopedic admission for surgical repair. Patient agreeable. [MB]      ED Course User Index  [MB] MD Willi Hammond MD      Disposition:    Admitted    Diagnosis     Clinical Impression:   1. Closed fracture of left tibial plateau, initial encounter        Attestations:    Willi Berman MD    Please note that this dictation was completed with [employer], the computer voice recognition software. Quite often unanticipated grammatical, syntax, homophones, and other interpretive errors are inadvertently transcribed by the computer software. Please disregard these errors. Please excuse any errors that have escaped final proofreading. Thank you.

## 2021-10-19 NOTE — PROGRESS NOTES
Pharmacy Medication Reconciliation     The patient was interviewed regarding current PTA medication list, use and drug allergies;  present in room and obtained permission from patient to discuss drug regimen with visitor(s) present. The patient was questioned regarding use of any other inhalers, topical products, over the counter medications, herbal medications, vitamin products or ophthalmic/nasal/otic medication use. Allergy Update: Robaxin [methocarbamol], Statins-hmg-coa reductase inhibitors, Codeine, and Neosporin [neomycin-bacitracin-polymyxin]    Recommendations/Findings: The following amendments were made to the patient's active medication list on file at [address]:   1) Additions: none    2) Deletions: none    3) Changes: none      Pertinent Findings: none    Clarified PTA med list with patient. PTA medication list was corrected to the following:     Prior to Admission Medications   Prescriptions Last Dose Informant Taking? buPROPion XL (WELLBUTRIN XL) 150 mg tablet 10/18/2021 at Unknown time Self Yes   Sig: TAKE ONE TABLET BY MOUTH EVERY MORNING FOR SMOKING CESSATION   chlordiazePOXIDE (LIBRIUM) 5 mg capsule 10/18/2021 at Unknown time Self Yes   Sig: Take 1 Cap by mouth three (3) times daily as needed for Anxiety. Max Daily Amount: 15 mg.   doxepin (SINEquan) 25 mg capsule 10/18/2021 at Unknown time Self Yes   Sig: Take 1 Capsule by mouth nightly as needed (sleep). folic acid (FOLVITE) 1 mg tablet 10/18/2021 at Unknown time Self Yes   Sig: Take 1 Tablet by mouth daily. magnesium 200 mg tab 10/18/2021 at Unknown time Self Yes   Sig: TAKE ONE TABLET BY MOUTH DAILY   pantoprazole (PROTONIX) 40 mg tablet 10/18/2021 at Unknown time Self Yes   Sig: Take 1 Tablet by mouth Daily (before breakfast).    potassium chloride (KLOR-CON) 20 mEq pack 10/18/2021 at Unknown time Self Yes   Sig: TAKE ONE PACKET BY MOUTH DAILY   thiamine HCL (B-1) 100 mg tablet 10/18/2021 at Unknown time Self Yes   Sig: TAKE ONE TABLET BY MOUTH DAILY      Facility-Administered Medications: None        Thank you,  Valentina PadronD.  Candidate 2022

## 2021-10-19 NOTE — PROGRESS NOTES
TRANSFER - IN REPORT:    Verbal report received from Eleanor Rodriguez (name) on [facility]  being received from the ED (unit) for routine progression of care      Report consisted of patients Situation, Background, Assessment and   Recommendations(SBAR). Information from the following report(s) SBAR, Kardex, ED Summary, Intake/Output, MAR and Recent Results was reviewed with the receiving nurse. Opportunity for questions and clarification was provided. Assessment completed upon patients arrival to unit and care assumed.

## 2021-10-19 NOTE — PROGRESS NOTES
Transition of Care Plan:    RUR:  8% low risk for readmission   Disposition: Goal is home with spouse    Follow up appointments: PCP, Ortho  DME needed: Pt has a cane and walker  Transportation at Discharge: Pt's spouse  Percell Eugenia or means to access home: Spouse will have        IM Medicare Letter: N/A - commercial insurance   Is patient a BCPI-A Bundle:  No         If yes, was Bundle Letter given?:     Caregiver Contact: Pt's spouse, Leo navas [phone]  Discharge Caregiver contacted prior to discharge? Unit CM to follow. Reason for Admission: Closed fracture of left tibial plateau, initial encounter                       RUR Score:  8% low risk for readmission                     Plan for utilizing home health: Pending recommendations. Previous HH hx with [address]        PCP: First and Last name:  Marta Zamudio NP     [facility] of [facility]   Are you a current patient: Yes/No: Yes   Approximate date of last visit: Has new appt with NP on 10/21. Kendrick Enamorado previously seeing another doctor at this practice who is no longer there   Can you participate in a virtual visit with your PCP: No                    Current Advanced Directive/Advance Care Plan: Prior   Samira 13 (ACP) Conversation      Date of Conversation: 10/19/2021  Conducted with: Patient with Decision Making Capacity    Healthcare Decision Maker:     Primary Decision Maker: [place] Kareem - Spouse - [phone]  Click here to complete [address] including selection of the [address] Relationship (ie \"Primary\")      Today we documented Decision Maker(s) consistent with Legal Next of Kin hierarchy.     Content/Action Overview:   DECLINED ACP conversation - will revisit periodically     Length of Voluntary ACP Conversation in minutes:  <16 minutes (Non-Billable)    Ella Christopher 95 Decision Maker: Primary Decision Maker: Watson Burn - Spouse - [phone]                    Transition of Care Plan:  Goal is home with spouse, likely need for [facility], pending recommendations and hospital course     CM reviewed chart. CM completed assessment with pt at bedside. CM introduced self/role, verified demographics, and discussed discharge planning. Pt resides in 1 level home with her spouse with 4 VALENTINE. She reports using her walker as needed in the home and her cane in the community. Pt does drive, but hasn't been recently due to neuropathy in her feet. She states that her spouse transports her to all medical appointments, and that she needs morning follow up appts in order for him to be able to transport her prior to work at noon. Pt has previous SNF hx at [facility] in 2018. Pt's pharmacy preference is [facility] [address]. Awaiting further hospital course and recommendations from ortho/therapy. Unit care management will continue to follow for transition of care planning needs. Care Management Interventions  PCP Verified by CM: Yes  Palliative Care Criteria Met (RRAT>21 & CHF Dx)?: No  Mode of Transport at Discharge: Other (see comment) (Pt's spouse)  Transition of Care Consult (CM Consult): Discharge Planning  Discharge Durable Medical Equipment: No (Pt has a walker and cane)  Physical Therapy Consult: No  Occupational Therapy Consult: No  Speech Therapy Consult: No  Support Systems: Spouse/Significant Other  Confirm Follow Up Transport: Family (Spouse)  Discharge Location  Discharge Placement: Home with home health (Home with spouse and HH vs SNF/IPR?  Awaiting therapy evaluations when appropriate)    Janessa Smith  Care Manager, [address] [place] [address]

## 2021-10-20 ENCOUNTER — TELEPHONE (OUTPATIENT)
Dept: FAMILY MEDICINE CLINIC | Age: 61
End: 2021-10-20

## 2021-10-20 ENCOUNTER — ANESTHESIA (OUTPATIENT)
Dept: SURGERY | Age: 61
DRG: 494 | End: 2021-10-20
Payer: COMMERCIAL

## 2021-10-20 ENCOUNTER — APPOINTMENT (OUTPATIENT)
Dept: GENERAL RADIOLOGY | Age: 61
DRG: 494 | End: 2021-10-20
Attending: ORTHOPAEDIC SURGERY
Payer: COMMERCIAL

## 2021-10-20 LAB
APPEARANCE UR: CLEAR
ATRIAL RATE: 83 BPM
BACTERIA URNS QL MICRO: NEGATIVE /HPF
BILIRUB UR QL CFM: NEGATIVE
CALCULATED P AXIS, ECG09: 31 DEGREES
CALCULATED R AXIS, ECG10: 18 DEGREES
CALCULATED T AXIS, ECG11: 44 DEGREES
CHOLEST SERPL-MCNC: 171 MG/DL
COLOR UR: ABNORMAL
DIAGNOSIS, 93000: NORMAL
EPITH CASTS URNS QL MICRO: ABNORMAL /LPF
GLUCOSE UR STRIP.AUTO-MCNC: NEGATIVE MG/DL
HDLC SERPL-MCNC: 69 MG/DL
HDLC SERPL: 2.5 {RATIO} (ref 0–5)
HGB UR QL STRIP: NEGATIVE
HYALINE CASTS URNS QL MICRO: ABNORMAL /LPF (ref 0–5)
KETONES UR QL STRIP.AUTO: ABNORMAL MG/DL
LDLC SERPL CALC-MCNC: 87 MG/DL (ref 0–100)
LEUKOCYTE ESTERASE UR QL STRIP.AUTO: NEGATIVE
MUCOUS THREADS URNS QL MICRO: ABNORMAL /LPF
NITRITE UR QL STRIP.AUTO: NEGATIVE
P-R INTERVAL, ECG05: 148 MS
PH UR STRIP: 6 [PH] (ref 5–8)
PROT UR STRIP-MCNC: NEGATIVE MG/DL
Q-T INTERVAL, ECG07: 378 MS
QRS DURATION, ECG06: 62 MS
QTC CALCULATION (BEZET), ECG08: 444 MS
RBC #/AREA URNS HPF: ABNORMAL /HPF (ref 0–5)
SP GR UR REFRACTOMETRY: 1.02 (ref 1–1.03)
TRIGL SERPL-MCNC: 75 MG/DL (ref ?–150)
UA: UC IF INDICATED,UAUC: ABNORMAL
UROBILINOGEN UR QL STRIP.AUTO: 1 EU/DL (ref 0.2–1)
VENTRICULAR RATE, ECG03: 83 BPM
VLDLC SERPL CALC-MCNC: 15 MG/DL
WBC URNS QL MICRO: ABNORMAL /HPF (ref 0–4)

## 2021-10-20 PROCEDURE — 76210000016 HC OR PH I REC 1 TO 1.5 HR: Performed by: ORTHOPAEDIC SURGERY

## 2021-10-20 PROCEDURE — 74011250637 HC RX REV CODE- 250/637: Performed by: ORTHOPAEDIC SURGERY

## 2021-10-20 PROCEDURE — 77030013079 HC BLNKT BAIR HGGR 3M -A: Performed by: ANESTHESIOLOGY

## 2021-10-20 PROCEDURE — 0QSH04Z REPOSITION LEFT TIBIA WITH INTERNAL FIXATION DEVICE, OPEN APPROACH: ICD-10-PCS | Performed by: ORTHOPAEDIC SURGERY

## 2021-10-20 PROCEDURE — 74011250637 HC RX REV CODE- 250/637: Performed by: PHYSICIAN ASSISTANT

## 2021-10-20 PROCEDURE — C1769 GUIDE WIRE: HCPCS | Performed by: ORTHOPAEDIC SURGERY

## 2021-10-20 PROCEDURE — 77030018673: Performed by: ORTHOPAEDIC SURGERY

## 2021-10-20 PROCEDURE — 51798 US URINE CAPACITY MEASURE: CPT

## 2021-10-20 PROCEDURE — 65270000029 HC RM PRIVATE

## 2021-10-20 PROCEDURE — 74011250636 HC RX REV CODE- 250/636: Performed by: PHYSICIAN ASSISTANT

## 2021-10-20 PROCEDURE — 94760 N-INVAS EAR/PLS OXIMETRY 1: CPT

## 2021-10-20 PROCEDURE — 2709999900 HC NON-CHARGEABLE SUPPLY: Performed by: ORTHOPAEDIC SURGERY

## 2021-10-20 PROCEDURE — 77030019905 HC CATH URETH INTMIT MDII -A

## 2021-10-20 PROCEDURE — 77030003862 HC BIT DRL SYNT -B: Performed by: ORTHOPAEDIC SURGERY

## 2021-10-20 PROCEDURE — 74011000250 HC RX REV CODE- 250: Performed by: ORTHOPAEDIC SURGERY

## 2021-10-20 PROCEDURE — 77030008684 HC TU ET CUF COVD -B: Performed by: ANESTHESIOLOGY

## 2021-10-20 PROCEDURE — C1713 ANCHOR/SCREW BN/BN,TIS/BN: HCPCS | Performed by: ORTHOPAEDIC SURGERY

## 2021-10-20 PROCEDURE — 74011250636 HC RX REV CODE- 250/636: Performed by: ANESTHESIOLOGY

## 2021-10-20 PROCEDURE — 73560 X-RAY EXAM OF KNEE 1 OR 2: CPT

## 2021-10-20 PROCEDURE — 76010000162 HC OR TIME 1.5 TO 2 HR INTENSV-TIER 1: Performed by: ORTHOPAEDIC SURGERY

## 2021-10-20 PROCEDURE — 77030026132 HC BN CANC CHP LIFV -C: Performed by: ORTHOPAEDIC SURGERY

## 2021-10-20 PROCEDURE — 74011000250 HC RX REV CODE- 250: Performed by: NURSE ANESTHETIST, CERTIFIED REGISTERED

## 2021-10-20 PROCEDURE — 74011250636 HC RX REV CODE- 250/636

## 2021-10-20 PROCEDURE — 77010033678 HC OXYGEN DAILY

## 2021-10-20 PROCEDURE — 77030016474 HC BIT DRL QC3 SYNT -C: Performed by: ORTHOPAEDIC SURGERY

## 2021-10-20 PROCEDURE — 77030008463 HC STPLR SKN PROX J&J -B: Performed by: ORTHOPAEDIC SURGERY

## 2021-10-20 PROCEDURE — 74011250636 HC RX REV CODE- 250/636: Performed by: ORTHOPAEDIC SURGERY

## 2021-10-20 PROCEDURE — 99253 IP/OBS CNSLTJ NEW/EST LOW 45: CPT | Performed by: ORTHOPAEDIC SURGERY

## 2021-10-20 PROCEDURE — 77030000032 HC CUF TRNQT ZIMM -B: Performed by: ORTHOPAEDIC SURGERY

## 2021-10-20 PROCEDURE — 36415 COLL VENOUS BLD VENIPUNCTURE: CPT

## 2021-10-20 PROCEDURE — 76000 FLUOROSCOPY <1 HR PHYS/QHP: CPT

## 2021-10-20 PROCEDURE — 77030019908 HC STETH ESOPH SIMS -A: Performed by: ANESTHESIOLOGY

## 2021-10-20 PROCEDURE — 80061 LIPID PANEL: CPT

## 2021-10-20 PROCEDURE — 76060000034 HC ANESTHESIA 1.5 TO 2 HR: Performed by: ORTHOPAEDIC SURGERY

## 2021-10-20 PROCEDURE — 77030002933 HC SUT MCRYL J&J -A: Performed by: ORTHOPAEDIC SURGERY

## 2021-10-20 PROCEDURE — 77030018723 HC ELCTRD BLD COVD -A: Performed by: ORTHOPAEDIC SURGERY

## 2021-10-20 PROCEDURE — 74011250637 HC RX REV CODE- 250/637: Performed by: NURSE PRACTITIONER

## 2021-10-20 PROCEDURE — 74011250636 HC RX REV CODE- 250/636: Performed by: NURSE ANESTHETIST, CERTIFIED REGISTERED

## 2021-10-20 PROCEDURE — 77030031139 HC SUT VCRL2 J&J -A: Performed by: ORTHOPAEDIC SURGERY

## 2021-10-20 PROCEDURE — 77030026438 HC STYL ET INTUB CARD -A: Performed by: ANESTHESIOLOGY

## 2021-10-20 PROCEDURE — 27535 TREAT KNEE FRACTURE: CPT | Performed by: ORTHOPAEDIC SURGERY

## 2021-10-20 PROCEDURE — 81001 URINALYSIS AUTO W/SCOPE: CPT

## 2021-10-20 DEVICE — SCREW BNE L54MM DIA3.5MM PROX TIB S STL ST FULL THRD T15: Type: IMPLANTABLE DEVICE | Site: KNEE | Status: FUNCTIONAL

## 2021-10-20 DEVICE — SCREW BNE L30MM DIA3.5MM PROX TIB S STL ST FULL THRD T15: Type: IMPLANTABLE DEVICE | Site: KNEE | Status: FUNCTIONAL

## 2021-10-20 DEVICE — SCREW BNE L34MM DIA3.5MM PROX TIB S STL ST FULL THRD W/ T15: Type: IMPLANTABLE DEVICE | Site: KNEE | Status: FUNCTIONAL

## 2021-10-20 DEVICE — SCREW BNE L44MM DIA3.5MM PROX TIB S STL ST FULL THRD W/ T15: Type: IMPLANTABLE DEVICE | Site: KNEE | Status: FUNCTIONAL

## 2021-10-20 DEVICE — SCREW BNE L60MM DIA4MM CANC S STL ST CANN NONLOCKING FULL: Type: IMPLANTABLE DEVICE | Site: KNEE | Status: FUNCTIONAL

## 2021-10-20 DEVICE — SCREW BNE L26MM DIA3.5MM HD DIA6MM CORT S STL NONCANNULATED: Type: IMPLANTABLE DEVICE | Site: KNEE | Status: FUNCTIONAL

## 2021-10-20 DEVICE — BONE CHIP CANC CRSH 1-8MM 20ML -- PCAN1/4: Type: IMPLANTABLE DEVICE | Site: LEG | Status: FUNCTIONAL

## 2021-10-20 DEVICE — IMPLANTABLE DEVICE: Type: IMPLANTABLE DEVICE | Site: KNEE | Status: FUNCTIONAL

## 2021-10-20 RX ORDER — ASPIRIN 325 MG
325 TABLET, DELAYED RELEASE (ENTERIC COATED) ORAL 2 TIMES DAILY
Status: DISCONTINUED | OUTPATIENT
Start: 2021-10-20 | End: 2021-10-25 | Stop reason: HOSPADM

## 2021-10-20 RX ORDER — SODIUM CHLORIDE 0.9 % (FLUSH) 0.9 %
5-40 SYRINGE (ML) INJECTION AS NEEDED
Status: DISCONTINUED | OUTPATIENT
Start: 2021-10-20 | End: 2021-10-20 | Stop reason: HOSPADM

## 2021-10-20 RX ORDER — FENTANYL CITRATE 50 UG/ML
25 INJECTION, SOLUTION INTRAMUSCULAR; INTRAVENOUS
Status: COMPLETED | OUTPATIENT
Start: 2021-10-20 | End: 2021-10-20

## 2021-10-20 RX ORDER — ONDANSETRON 2 MG/ML
4 INJECTION INTRAMUSCULAR; INTRAVENOUS AS NEEDED
Status: DISCONTINUED | OUTPATIENT
Start: 2021-10-20 | End: 2021-10-20 | Stop reason: HOSPADM

## 2021-10-20 RX ORDER — SODIUM CHLORIDE 0.9 % (FLUSH) 0.9 %
5-40 SYRINGE (ML) INJECTION EVERY 8 HOURS
Status: DISCONTINUED | OUTPATIENT
Start: 2021-10-20 | End: 2021-10-25 | Stop reason: HOSPADM

## 2021-10-20 RX ORDER — MORPHINE SULFATE 2 MG/ML
2 INJECTION, SOLUTION INTRAMUSCULAR; INTRAVENOUS
Status: DISCONTINUED | OUTPATIENT
Start: 2021-10-20 | End: 2021-10-20 | Stop reason: HOSPADM

## 2021-10-20 RX ORDER — DIPHENHYDRAMINE HYDROCHLORIDE 50 MG/ML
12.5 INJECTION, SOLUTION INTRAMUSCULAR; INTRAVENOUS AS NEEDED
Status: DISCONTINUED | OUTPATIENT
Start: 2021-10-20 | End: 2021-10-20 | Stop reason: HOSPADM

## 2021-10-20 RX ORDER — DEXAMETHASONE SODIUM PHOSPHATE 100 MG/10ML
INJECTION INTRAMUSCULAR; INTRAVENOUS AS NEEDED
Status: DISCONTINUED | OUTPATIENT
Start: 2021-10-20 | End: 2021-10-20 | Stop reason: HOSPADM

## 2021-10-20 RX ORDER — KETAMINE HYDROCHLORIDE 10 MG/ML
INJECTION, SOLUTION INTRAMUSCULAR; INTRAVENOUS AS NEEDED
Status: DISCONTINUED | OUTPATIENT
Start: 2021-10-20 | End: 2021-10-20 | Stop reason: HOSPADM

## 2021-10-20 RX ORDER — LIDOCAINE HYDROCHLORIDE 20 MG/ML
INJECTION, SOLUTION EPIDURAL; INFILTRATION; INTRACAUDAL; PERINEURAL AS NEEDED
Status: DISCONTINUED | OUTPATIENT
Start: 2021-10-20 | End: 2021-10-20 | Stop reason: HOSPADM

## 2021-10-20 RX ORDER — SODIUM CHLORIDE 0.9 % (FLUSH) 0.9 %
5-40 SYRINGE (ML) INJECTION EVERY 8 HOURS
Status: DISCONTINUED | OUTPATIENT
Start: 2021-10-20 | End: 2021-10-20 | Stop reason: HOSPADM

## 2021-10-20 RX ORDER — LORAZEPAM 2 MG/ML
INJECTION INTRAMUSCULAR
Status: COMPLETED
Start: 2021-10-20 | End: 2021-10-20

## 2021-10-20 RX ORDER — ACETAMINOPHEN 325 MG/1
650 TABLET ORAL
Status: DISCONTINUED | OUTPATIENT
Start: 2021-10-20 | End: 2021-10-25 | Stop reason: HOSPADM

## 2021-10-20 RX ORDER — PROPOFOL 10 MG/ML
INJECTION, EMULSION INTRAVENOUS AS NEEDED
Status: DISCONTINUED | OUTPATIENT
Start: 2021-10-20 | End: 2021-10-20 | Stop reason: HOSPADM

## 2021-10-20 RX ORDER — ACETAMINOPHEN 500 MG
1000 TABLET ORAL EVERY 6 HOURS
Status: DISCONTINUED | OUTPATIENT
Start: 2021-10-20 | End: 2021-10-25 | Stop reason: HOSPADM

## 2021-10-20 RX ORDER — FAMOTIDINE 20 MG/1
20 TABLET, FILM COATED ORAL 2 TIMES DAILY
Status: DISCONTINUED | OUTPATIENT
Start: 2021-10-20 | End: 2021-10-25 | Stop reason: HOSPADM

## 2021-10-20 RX ORDER — SUCCINYLCHOLINE CHLORIDE 20 MG/ML
INJECTION INTRAMUSCULAR; INTRAVENOUS AS NEEDED
Status: DISCONTINUED | OUTPATIENT
Start: 2021-10-20 | End: 2021-10-20 | Stop reason: HOSPADM

## 2021-10-20 RX ORDER — HYDROMORPHONE HYDROCHLORIDE 1 MG/ML
0.5 INJECTION, SOLUTION INTRAMUSCULAR; INTRAVENOUS; SUBCUTANEOUS
Status: ACTIVE | OUTPATIENT
Start: 2021-10-20 | End: 2021-10-21

## 2021-10-20 RX ORDER — LIDOCAINE HYDROCHLORIDE 10 MG/ML
0.1 INJECTION, SOLUTION EPIDURAL; INFILTRATION; INTRACAUDAL; PERINEURAL AS NEEDED
Status: DISCONTINUED | OUTPATIENT
Start: 2021-10-20 | End: 2021-10-20 | Stop reason: HOSPADM

## 2021-10-20 RX ORDER — FACIAL-BODY WIPES
10 EACH TOPICAL DAILY PRN
Status: DISCONTINUED | OUTPATIENT
Start: 2021-10-22 | End: 2021-10-25 | Stop reason: HOSPADM

## 2021-10-20 RX ORDER — ROCURONIUM BROMIDE 10 MG/ML
INJECTION, SOLUTION INTRAVENOUS AS NEEDED
Status: DISCONTINUED | OUTPATIENT
Start: 2021-10-20 | End: 2021-10-20 | Stop reason: HOSPADM

## 2021-10-20 RX ORDER — CEFAZOLIN SODIUM 1 G/3ML
INJECTION, POWDER, FOR SOLUTION INTRAMUSCULAR; INTRAVENOUS AS NEEDED
Status: DISCONTINUED | OUTPATIENT
Start: 2021-10-20 | End: 2021-10-20 | Stop reason: HOSPADM

## 2021-10-20 RX ORDER — SODIUM CHLORIDE 9 MG/ML
125 INJECTION, SOLUTION INTRAVENOUS CONTINUOUS
Status: DISPENSED | OUTPATIENT
Start: 2021-10-20 | End: 2021-10-21

## 2021-10-20 RX ORDER — NALOXONE HYDROCHLORIDE 0.4 MG/ML
0.4 INJECTION, SOLUTION INTRAMUSCULAR; INTRAVENOUS; SUBCUTANEOUS AS NEEDED
Status: DISCONTINUED | OUTPATIENT
Start: 2021-10-20 | End: 2021-10-25 | Stop reason: HOSPADM

## 2021-10-20 RX ORDER — HYDROMORPHONE HYDROCHLORIDE 1 MG/ML
INJECTION, SOLUTION INTRAMUSCULAR; INTRAVENOUS; SUBCUTANEOUS AS NEEDED
Status: DISCONTINUED | OUTPATIENT
Start: 2021-10-20 | End: 2021-10-20 | Stop reason: HOSPADM

## 2021-10-20 RX ORDER — SODIUM CHLORIDE, SODIUM LACTATE, POTASSIUM CHLORIDE, CALCIUM CHLORIDE 600; 310; 30; 20 MG/100ML; MG/100ML; MG/100ML; MG/100ML
25 INJECTION, SOLUTION INTRAVENOUS CONTINUOUS
Status: DISCONTINUED | OUTPATIENT
Start: 2021-10-20 | End: 2021-10-20 | Stop reason: HOSPADM

## 2021-10-20 RX ORDER — ONDANSETRON 2 MG/ML
INJECTION INTRAMUSCULAR; INTRAVENOUS AS NEEDED
Status: DISCONTINUED | OUTPATIENT
Start: 2021-10-20 | End: 2021-10-20 | Stop reason: HOSPADM

## 2021-10-20 RX ORDER — GLYCOPYRROLATE 0.2 MG/ML
INJECTION INTRAMUSCULAR; INTRAVENOUS AS NEEDED
Status: DISCONTINUED | OUTPATIENT
Start: 2021-10-20 | End: 2021-10-20 | Stop reason: HOSPADM

## 2021-10-20 RX ORDER — LORAZEPAM 2 MG/ML
1 INJECTION INTRAMUSCULAR ONCE
Status: COMPLETED | OUTPATIENT
Start: 2021-10-20 | End: 2021-10-20

## 2021-10-20 RX ORDER — OXYCODONE HYDROCHLORIDE 5 MG/1
5 TABLET ORAL
Status: DISCONTINUED | OUTPATIENT
Start: 2021-10-20 | End: 2021-10-21

## 2021-10-20 RX ORDER — SODIUM CHLORIDE 0.9 % (FLUSH) 0.9 %
5-40 SYRINGE (ML) INJECTION AS NEEDED
Status: DISCONTINUED | OUTPATIENT
Start: 2021-10-20 | End: 2021-10-25 | Stop reason: HOSPADM

## 2021-10-20 RX ORDER — HYDROMORPHONE HYDROCHLORIDE 1 MG/ML
.2-.5 INJECTION, SOLUTION INTRAMUSCULAR; INTRAVENOUS; SUBCUTANEOUS
Status: DISCONTINUED | OUTPATIENT
Start: 2021-10-20 | End: 2021-10-20 | Stop reason: HOSPADM

## 2021-10-20 RX ORDER — AMOXICILLIN 250 MG
1 CAPSULE ORAL 2 TIMES DAILY
Status: DISCONTINUED | OUTPATIENT
Start: 2021-10-20 | End: 2021-10-25 | Stop reason: HOSPADM

## 2021-10-20 RX ORDER — METOPROLOL TARTRATE 5 MG/5ML
INJECTION INTRAVENOUS AS NEEDED
Status: DISCONTINUED | OUTPATIENT
Start: 2021-10-20 | End: 2021-10-20 | Stop reason: HOSPADM

## 2021-10-20 RX ORDER — KETOROLAC TROMETHAMINE 30 MG/ML
INJECTION, SOLUTION INTRAMUSCULAR; INTRAVENOUS AS NEEDED
Status: DISCONTINUED | OUTPATIENT
Start: 2021-10-20 | End: 2021-10-20 | Stop reason: HOSPADM

## 2021-10-20 RX ORDER — NEOSTIGMINE METHYLSULFATE 1 MG/ML
INJECTION, SOLUTION INTRAVENOUS AS NEEDED
Status: DISCONTINUED | OUTPATIENT
Start: 2021-10-20 | End: 2021-10-20 | Stop reason: HOSPADM

## 2021-10-20 RX ORDER — SODIUM CHLORIDE, SODIUM LACTATE, POTASSIUM CHLORIDE, CALCIUM CHLORIDE 600; 310; 30; 20 MG/100ML; MG/100ML; MG/100ML; MG/100ML
INJECTION, SOLUTION INTRAVENOUS
Status: DISCONTINUED | OUTPATIENT
Start: 2021-10-20 | End: 2021-10-20 | Stop reason: HOSPADM

## 2021-10-20 RX ORDER — MIDAZOLAM HYDROCHLORIDE 1 MG/ML
INJECTION, SOLUTION INTRAMUSCULAR; INTRAVENOUS AS NEEDED
Status: DISCONTINUED | OUTPATIENT
Start: 2021-10-20 | End: 2021-10-20 | Stop reason: HOSPADM

## 2021-10-20 RX ORDER — ONDANSETRON 4 MG/1
4 TABLET, ORALLY DISINTEGRATING ORAL
Status: DISCONTINUED | OUTPATIENT
Start: 2021-10-20 | End: 2021-10-25 | Stop reason: HOSPADM

## 2021-10-20 RX ORDER — FENTANYL CITRATE 50 UG/ML
INJECTION, SOLUTION INTRAMUSCULAR; INTRAVENOUS AS NEEDED
Status: DISCONTINUED | OUTPATIENT
Start: 2021-10-20 | End: 2021-10-20 | Stop reason: HOSPADM

## 2021-10-20 RX ORDER — ROPIVACAINE HYDROCHLORIDE 5 MG/ML
INJECTION, SOLUTION EPIDURAL; INFILTRATION; PERINEURAL AS NEEDED
Status: DISCONTINUED | OUTPATIENT
Start: 2021-10-20 | End: 2021-10-20 | Stop reason: HOSPADM

## 2021-10-20 RX ORDER — POLYETHYLENE GLYCOL 3350 17 G/17G
17 POWDER, FOR SOLUTION ORAL DAILY
Status: DISCONTINUED | OUTPATIENT
Start: 2021-10-21 | End: 2021-10-25 | Stop reason: HOSPADM

## 2021-10-20 RX ORDER — OXYCODONE HYDROCHLORIDE 5 MG/1
10 TABLET ORAL
Status: DISCONTINUED | OUTPATIENT
Start: 2021-10-20 | End: 2021-10-21

## 2021-10-20 RX ADMIN — Medication 10 ML: at 06:09

## 2021-10-20 RX ADMIN — LIDOCAINE HYDROCHLORIDE 100 MG: 20 INJECTION, SOLUTION INTRAVENOUS at 11:38

## 2021-10-20 RX ADMIN — LORAZEPAM 1 MG: 2 INJECTION INTRAMUSCULAR at 13:45

## 2021-10-20 RX ADMIN — HYDROMORPHONE HYDROCHLORIDE 0.2 MG: 1 INJECTION, SOLUTION INTRAMUSCULAR; INTRAVENOUS; SUBCUTANEOUS at 11:35

## 2021-10-20 RX ADMIN — GLYCOPYRROLATE 0.5 MG: 0.2 INJECTION, SOLUTION INTRAMUSCULAR; INTRAVENOUS at 12:55

## 2021-10-20 RX ADMIN — FENTANYL CITRATE 25 MCG: 50 INJECTION, SOLUTION INTRAMUSCULAR; INTRAVENOUS at 13:43

## 2021-10-20 RX ADMIN — HYDROMORPHONE HYDROCHLORIDE 0.4 MG: 1 INJECTION, SOLUTION INTRAMUSCULAR; INTRAVENOUS; SUBCUTANEOUS at 12:01

## 2021-10-20 RX ADMIN — FENTANYL CITRATE 25 MCG: 50 INJECTION, SOLUTION INTRAMUSCULAR; INTRAVENOUS at 13:36

## 2021-10-20 RX ADMIN — Medication 200 MG: at 10:14

## 2021-10-20 RX ADMIN — HYDROMORPHONE HYDROCHLORIDE 0.5 MG: 1 INJECTION, SOLUTION INTRAMUSCULAR; INTRAVENOUS; SUBCUTANEOUS at 14:05

## 2021-10-20 RX ADMIN — FENTANYL CITRATE 25 MCG: 50 INJECTION, SOLUTION INTRAMUSCULAR; INTRAVENOUS at 13:50

## 2021-10-20 RX ADMIN — OXYCODONE 5 MG: 5 TABLET ORAL at 20:58

## 2021-10-20 RX ADMIN — HYDROMORPHONE HYDROCHLORIDE 0.5 MG: 1 INJECTION, SOLUTION INTRAMUSCULAR; INTRAVENOUS; SUBCUTANEOUS at 13:40

## 2021-10-20 RX ADMIN — KETOROLAC TROMETHAMINE 30 MG: 30 INJECTION, SOLUTION INTRAMUSCULAR; INTRAVENOUS at 13:05

## 2021-10-20 RX ADMIN — ACETAMINOPHEN 1000 MG: 500 TABLET, FILM COATED ORAL at 17:21

## 2021-10-20 RX ADMIN — THIAMINE HCL TAB 100 MG 100 MG: 100 TAB at 10:14

## 2021-10-20 RX ADMIN — ROCURONIUM BROMIDE 10 MG: 10 INJECTION INTRAVENOUS at 11:38

## 2021-10-20 RX ADMIN — KETAMINE HYDROCHLORIDE 50 MG: 10 INJECTION, SOLUTION INTRAMUSCULAR; INTRAVENOUS at 11:56

## 2021-10-20 RX ADMIN — HYDROMORPHONE HYDROCHLORIDE 0.6 MG: 1 INJECTION, SOLUTION INTRAMUSCULAR; INTRAVENOUS; SUBCUTANEOUS at 13:17

## 2021-10-20 RX ADMIN — ACETAMINOPHEN 650 MG: 325 TABLET ORAL at 06:09

## 2021-10-20 RX ADMIN — LORAZEPAM 1 MG: 2 INJECTION INTRAMUSCULAR; INTRAVENOUS at 13:45

## 2021-10-20 RX ADMIN — PANTOPRAZOLE SODIUM 40 MG: 40 TABLET, DELAYED RELEASE ORAL at 10:14

## 2021-10-20 RX ADMIN — ROCURONIUM BROMIDE 20 MG: 10 INJECTION INTRAVENOUS at 11:53

## 2021-10-20 RX ADMIN — OXYCODONE 2.5 MG: 5 TABLET ORAL at 00:05

## 2021-10-20 RX ADMIN — DOCUSATE SODIUM 50MG AND SENNOSIDES 8.6MG 1 TABLET: 8.6; 5 TABLET, FILM COATED ORAL at 17:20

## 2021-10-20 RX ADMIN — FENTANYL CITRATE 25 MCG: 50 INJECTION, SOLUTION INTRAMUSCULAR; INTRAVENOUS at 13:55

## 2021-10-20 RX ADMIN — HYDROMORPHONE HYDROCHLORIDE 0.5 MG: 1 INJECTION, SOLUTION INTRAMUSCULAR; INTRAVENOUS; SUBCUTANEOUS at 14:20

## 2021-10-20 RX ADMIN — FENTANYL CITRATE 25 MCG: 50 INJECTION, SOLUTION INTRAMUSCULAR; INTRAVENOUS at 13:33

## 2021-10-20 RX ADMIN — FOLIC ACID 1 MG: 1 TABLET ORAL at 10:14

## 2021-10-20 RX ADMIN — FENTANYL CITRATE 25 MCG: 50 INJECTION, SOLUTION INTRAMUSCULAR; INTRAVENOUS at 13:19

## 2021-10-20 RX ADMIN — FENTANYL CITRATE 25 MCG: 50 INJECTION, SOLUTION INTRAMUSCULAR; INTRAVENOUS at 11:58

## 2021-10-20 RX ADMIN — PROPOFOL 200 MG: 10 INJECTION, EMULSION INTRAVENOUS at 11:38

## 2021-10-20 RX ADMIN — FENTANYL CITRATE 25 MCG: 50 INJECTION, SOLUTION INTRAMUSCULAR; INTRAVENOUS at 13:39

## 2021-10-20 RX ADMIN — FENTANYL CITRATE 25 MCG: 50 INJECTION, SOLUTION INTRAMUSCULAR; INTRAVENOUS at 12:11

## 2021-10-20 RX ADMIN — NEOSTIGMINE METHYLSULFATE 3 MG: 1 INJECTION, SOLUTION INTRAVENOUS at 12:55

## 2021-10-20 RX ADMIN — SODIUM CHLORIDE 75 ML/HR: 9 INJECTION, SOLUTION INTRAVENOUS at 02:00

## 2021-10-20 RX ADMIN — HYDROMORPHONE HYDROCHLORIDE 0.4 MG: 1 INJECTION, SOLUTION INTRAMUSCULAR; INTRAVENOUS; SUBCUTANEOUS at 13:01

## 2021-10-20 RX ADMIN — FENTANYL CITRATE 25 MCG: 50 INJECTION, SOLUTION INTRAMUSCULAR; INTRAVENOUS at 12:43

## 2021-10-20 RX ADMIN — HYDROMORPHONE HYDROCHLORIDE 0.5 MG: 1 INJECTION, SOLUTION INTRAMUSCULAR; INTRAVENOUS; SUBCUTANEOUS at 13:50

## 2021-10-20 RX ADMIN — FENTANYL CITRATE 25 MCG: 50 INJECTION, SOLUTION INTRAMUSCULAR; INTRAVENOUS at 13:46

## 2021-10-20 RX ADMIN — MIDAZOLAM HYDROCHLORIDE 2 MG: 1 INJECTION, SOLUTION INTRAMUSCULAR; INTRAVENOUS at 11:35

## 2021-10-20 RX ADMIN — METOPROLOL TARTRATE 1 MG: 5 INJECTION, SOLUTION INTRAVENOUS at 12:06

## 2021-10-20 RX ADMIN — ONDANSETRON HYDROCHLORIDE 4 MG: 2 INJECTION, SOLUTION INTRAMUSCULAR; INTRAVENOUS at 12:56

## 2021-10-20 RX ADMIN — SODIUM CHLORIDE, POTASSIUM CHLORIDE, SODIUM LACTATE AND CALCIUM CHLORIDE: 600; 310; 30; 20 INJECTION, SOLUTION INTRAVENOUS at 11:35

## 2021-10-20 RX ADMIN — BUPROPION HYDROCHLORIDE 150 MG: 150 TABLET, EXTENDED RELEASE ORAL at 10:14

## 2021-10-20 RX ADMIN — CEFAZOLIN SODIUM 2 G: 1 INJECTION, POWDER, FOR SOLUTION INTRAMUSCULAR; INTRAVENOUS at 20:58

## 2021-10-20 RX ADMIN — SUCCINYLCHOLINE CHLORIDE 120 MG: 20 INJECTION, SOLUTION INTRAMUSCULAR; INTRAVENOUS at 11:40

## 2021-10-20 RX ADMIN — Medication 10 ML: at 17:21

## 2021-10-20 RX ADMIN — DOXEPIN HYDROCHLORIDE 25 MG: 25 CAPSULE ORAL at 00:05

## 2021-10-20 RX ADMIN — FENTANYL CITRATE 75 MCG: 50 INJECTION, SOLUTION INTRAMUSCULAR; INTRAVENOUS at 11:38

## 2021-10-20 RX ADMIN — ACETAMINOPHEN 1000 MG: 500 TABLET, FILM COATED ORAL at 23:18

## 2021-10-20 RX ADMIN — MEPERIDINE HYDROCHLORIDE 12.5 MG: 25 INJECTION INTRAMUSCULAR; INTRAVENOUS; SUBCUTANEOUS at 13:30

## 2021-10-20 RX ADMIN — SODIUM CHLORIDE 125 ML/HR: 900 INJECTION, SOLUTION INTRAVENOUS at 13:32

## 2021-10-20 RX ADMIN — HYDROMORPHONE HYDROCHLORIDE 0.4 MG: 1 INJECTION, SOLUTION INTRAMUSCULAR; INTRAVENOUS; SUBCUTANEOUS at 11:54

## 2021-10-20 RX ADMIN — FENTANYL CITRATE 25 MCG: 50 INJECTION, SOLUTION INTRAMUSCULAR; INTRAVENOUS at 13:01

## 2021-10-20 RX ADMIN — CEFAZOLIN 2 G: 1 INJECTION, POWDER, FOR SOLUTION INTRAMUSCULAR; INTRAVENOUS; PARENTERAL at 11:49

## 2021-10-20 RX ADMIN — FAMOTIDINE 20 MG: 20 TABLET ORAL at 17:21

## 2021-10-20 RX ADMIN — MEPERIDINE HYDROCHLORIDE 12.5 MG: 25 INJECTION INTRAMUSCULAR; INTRAVENOUS; SUBCUTANEOUS at 13:35

## 2021-10-20 RX ADMIN — ASPIRIN 325 MG: 325 TABLET, COATED ORAL at 17:20

## 2021-10-20 RX ADMIN — FENTANYL CITRATE 25 MCG: 50 INJECTION, SOLUTION INTRAMUSCULAR; INTRAVENOUS at 13:30

## 2021-10-20 RX ADMIN — DEXAMETHASONE SODIUM PHOSPHATE 8 MG: 10 INJECTION INTRAMUSCULAR; INTRAVENOUS at 11:53

## 2021-10-20 NOTE — PROGRESS NOTES
Hospitalist Progress Note    NAME: Tyler Nix   :  1960   MRN:  [id number]       Assessment / Plan:  Left lateral tibial plateau fracture  -Postoperative care defer to orthopedics    Chronic alcoholism with concern for alcohol withdrawal  -Continue CIWA protocol. On Librium.  -Continue multivitamins    Dyslipidemia  COPD  Chronic smoking  History of gastric ulcer  Hypertension  History of uterine fibroids  -Continue home PPI, Wellbutrin, nicotine patch      Body mass index is 22.48 kg/m². Code status: Full  Prophylaxis: per ortho  Recommended Disposition: Home w/Family     Subjective:     Chief Complaint / Reason for Physician Visit  Left leg pain is controlled    Review of Systems:  Symptom Y/N Comments  Symptom Y/N Comments   Fever/Chills    Chest Pain     Poor Appetite    Edema     Cough    Abdominal Pain     Sputum    Joint Pain     SOB/GRIER    Pruritis/Rash     Nausea/vomit    Tolerating PT/OT     Diarrhea    Tolerating Diet     Constipation    Other       Could NOT obtain due to:      Objective:     VITALS:   Last 24hrs VS reviewed since prior progress note.  Most recent are:  Patient Vitals for the past 24 hrs:   Temp Pulse Resp BP SpO2   10/20/21 1335  73 18 (!) 144/73 100 %   10/20/21 1330  78 21 (!) 143/80 100 %   10/20/21 1325  79 16 (!) 147/86 100 %   10/20/21 1320 98.4 °F (36.9 °C) 86 20 (!) 149/78 95 %   10/20/21 1107 98.3 °F (36.8 °C) 88 16 (!) 135/92 99 %   10/20/21 0822 98.4 °F (36.9 °C) 84 20 (!) 142/85 97 %   10/20/21 0433 98.5 °F (36.9 °C) 86 17 135/79 96 %   10/20/21 0014 98.5 °F (36.9 °C) 71 18 130/75 99 %   10/19/21 2029 98.4 °F (36.9 °C) 82 18 116/70 96 %   10/19/21 1643 98.9 °F (37.2 °C) 85 18 134/71 97 %   10/19/21 1630  87 21 116/63 95 %   10/19/21 1615  91 15 125/62 97 %   10/19/21 1600  88 16 (!) 110/56 97 %   10/19/21 1530  91 9 (!) 109/43 96 %   10/19/21 1500  93 19 115/64 97 %   10/19/21 1430  91 14 118/67 98 %   10/19/21 1415  99 18 (!) 98/55 98 % 10/19/21 1400  93 12 110/69 98 %       Intake/Output Summary (Last 24 hours) at 10/20/2021 1347  Last data filed at 10/20/2021 1253  Gross per 24 hour   Intake 500 ml   Output 25 ml   Net 475 ml        PHYSICAL EXAM:  General: WD, WN. Alert, cooperative, no acute distress    EENT:  EOMI. Anicteric sclerae. MMM  Resp:  CTA bilaterally, no wheezing or rales. No accessory muscle use  CV:  Regular  rhythm,  No edema  GI:  Soft, Non distended, Non tender.  +Bowel sounds  Neurologic:  Alert and oriented X 3, normal speech,   Psych:   not anxious nor agitated  Skin:  No rashes.   No jaundice    Reviewed most current lab test results and cultures  YES  Reviewed most current radiology test results   YES  Review and summation of old records today    NO  Reviewed patient's current orders and MAR    YES  PMH/SH reviewed - no change compared to H&P          Current Facility-Administered Medications:     lactated Ringers infusion, 25 mL/hr, IntraVENous, CONTINUOUS, Lia Escobar MD    sodium chloride (NS) flush 5-40 mL, 5-40 mL, IntraVENous, Q8H, Lia Escobar MD    sodium chloride (NS) flush 5-40 mL, 5-40 mL, IntraVENous, PRN, Lia Escobar MD    fentaNYL citrate (PF) injection 25 mcg, 25 mcg, IntraVENous, Prema, Lia Escobar MD, 25 mcg at 10/20/21 1343    morphine injection 2 mg, 2 mg, IntraVENous, Lia Lloyd MD    HYDROmorphone (DILAUDID) injection 0.2-0.5 mg, 0.2-0.5 mg, IntraVENous, Lia Lloyd MD, 0.5 mg at 10/20/21 1340    ondansetron (ZOFRAN) injection 4 mg, 4 mg, IntraVENous, PRN, Lia Escobar MD    diphenhydrAMINE (BENADRYL) injection 12.5 mg, 12.5 mg, IntraVENous, PRN, Lia Escobar MD    0.9% sodium chloride infusion, 125 mL/hr, IntraVENous, CONTINUOUS, Elder DO Junaid, Last Rate: 125 mL/hr at 10/20/21 1332, 125 mL/hr at 10/20/21 1332    oxyCODONE IR (ROXICODONE) tablet 5 mg, 5 mg, Oral, Q3H PRN, Chris Eric DO    oxyCODONE IR (Terence Swartz) tablet 10 mg, 10 mg, Oral, Q3H PRN, Chris Eric DO    0.9% sodium chloride infusion, 75 mL/hr, IntraVENous, CONTINUOUS, Skip Campanile, PA-C, Last Rate: 75 mL/hr at 10/20/21 0200, 75 mL/hr at 10/20/21 0200    sodium chloride (NS) flush 5-40 mL, 5-40 mL, IntraVENous, Q8H, Skip Campanile, PA-C, 10 mL at 10/20/21 0609    sodium chloride (NS) flush 5-40 mL, 5-40 mL, IntraVENous, PRN, Skip Campanile, PA-C    acetaminophen (TYLENOL) tablet 650 mg, 650 mg, Oral, Q6H, Skip Campanile, PA-C, 650 mg at 10/20/21 0609    oxyCODONE IR (ROXICODONE) tablet 2.5 mg, 2.5 mg, Oral, Q4H PRN, Skip Campanile, PA-C, 2.5 mg at 10/20/21 0005    oxyCODONE IR (ROXICODONE) tablet 5 mg, 5 mg, Oral, Q4H PRN, Skip Campanile, PA-C    naloxone [facility]) injection 0.4 mg, 0.4 mg, IntraVENous, PRN, Skip Campanile, PA-C    ondansetron [facility]) injection 4 mg, 4 mg, IntraVENous, Q4H PRN, Skip Campanile, PA-C, 4 mg at 10/19/21 1110    senna-docusate (PERICOLACE) 8.6-50 mg per tablet 2 Tablet, 2 Tablet, Oral, BID, Skip Campanile, PA-C, 2 Tablet at 10/19/21 1757    buPROPion XL (WELLBUTRIN XL) tablet 150 mg, 150 mg, Oral, DAILY, Purveyor, Atoosa, ACNP, 150 mg at 10/20/21 1014    chlordiazePOXIDE (LIBRIUM) capsule 5 mg, 5 mg, Oral, TID PRN, Purveyor, Atoosa, ACNP    doxepin (SINEquan) capsule 25 mg, 25 mg, Oral, QHS PRN, Purveyor, Atoosa, ACNP, 25 mg at 98/57/71 2132    folic acid (FOLVITE) tablet 1 mg, 1 mg, Oral, DAILY, Purveyor, Atoosa, ACNP, 1 mg at 10/20/21 1014    magnesium oxide (MAG-OX) tablet 200 mg, 200 mg, Oral, DAILY, Purveyor, Atoosa, ACNP, 200 mg at 10/20/21 1014    pantoprazole (PROTONIX) tablet 40 mg, 40 mg, Oral, ACB, Purveyor, Atoosa, ACNP, 40 mg at 10/20/21 1014    thiamine mononitrate (B-1) tablet 100 mg, 100 mg, Oral, DAILY, Purveyor, Atoosa, ACNP, 100 mg at 10/20/21 1014    nicotine (NICODERM CQ) 21 mg/24 hr patch 1 Patch, 1 Patch, TransDERmal, Q24H, Purveyor, Atoosa, ACNP, 1 Patch at 10/20/21 0005  ________________________________________________________________________  Care Plan discussed with:    Comments   Patient y    Family      RN y    Care Manager     Consultant                        Multidiciplinary team rounds were held today with , nursing, pharmacist and clinical coordinator. Patient's plan of care was discussed; medications were reviewed and discharge planning was addressed. ________________________________________________________________________  Total NON critical care TIME:  35    Minutes    Total CRITICAL CARE TIME Spent:   Minutes non procedure based      Comments   >50% of visit spent in counseling and coordination of care     ________________________________________________________________________  Joseph Noland MD     Procedures: see electronic medical records for all procedures/Xrays and details which were not copied into this note but were reviewed prior to creation of Plan. LABS:  I reviewed today's most current labs and imaging studies.   Pertinent labs include:  Recent Labs     10/19/21  1103   WBC 7.3   HGB 13.7   HCT 40.0        Recent Labs     10/19/21  1103      K 4.0      CO2 28   *   BUN 10   CREA 0.53*   CA 9.6   ALB 3.5   TBILI 0.7   ALT 30   INR 1.1       Signed: Joseph Noland MD

## 2021-10-20 NOTE — ROUTINE PROCESS
TRANSFER - IN REPORT:    Verbal report received from ZURDO Lizama(name) on Ananth Bowles  being received from [employer]) for routine post - op      Report consisted of patients Situation, Background, Assessment and   Recommendations(SBAR). Information from the following report(s) SBAR, Kardex, Intake/Output, MAR and Accordion was reviewed with the receiving nurse. Opportunity for questions and clarification was provided.

## 2021-10-20 NOTE — TELEPHONE ENCOUNTER
----- Message from Sally De Leon sent at 10/19/2021 11:50 AM EDT -----  Subject: Message to Provider    QUESTIONS  Information for Provider? Pt had to cancel appt with Angel Arriaga as she   has been hospitalized. Pt will call back to schedule appt when she knows   how long she'll be in the hospital for.  ---------------------------------------------------------------------------  --------------  [address]  What is the best way for the office to contact you? OK to leave message on   voicemail  Preferred Call Back Phone Number? [phone]  ---------------------------------------------------------------------------  --------------  SCRIPT ANSWERS  Relationship to Patient?  Self

## 2021-10-20 NOTE — PROGRESS NOTES
Bedside shift change report given to [employer] (oncoming nurse) by Wilfrid Riedel, RN (offgoing nurse). Report included the following information SBAR, Kardex, Intake/Output, MAR and Recent Results    Shift worked: 7am-7pm     Shift summary and any significant changes:    No significant changes. Patient is to be NPO after midnight.       Concerns for physician to address:  N/A     Zone phone for oncoming shift:   N/A

## 2021-10-20 NOTE — CONSULTS
Hospitalist Consultation Note    NAME:  Bridgett Cuellar   :   1960   MRN:   [id number]     ATTENDING: Elis Jay DO  PCP:  José Raymond NP    Date/Time:  10/19/2021 9:07 PM      Recommendations/Plan:         Hyperlipidemia (2014)  · Currently not on lipid-lowering agent  · Check lipid panel      Emphysema of lung ([address] (2018)  · Currently without exacerbation. · Chest x-ray done today reports no acute cardiopulmonary abnormality        Smoker (3/8/2021)  · Current smoker  · Nicotine patch ordered daily while in the hospital      History of gastric ulcer (3/8/2021)  · Carefully monitor for bleeding as well as daily CBC if decision is made to place patient on anticoagulation      Closed fracture of lateral portion of left tibial plateau (4504)  · Surgery scheduled in a.m. for repair by orthopedist        Code Status: Full code  Surrogate medical decision maker: Naty Kulkarni [phone]  DVT Prophylaxis: per ortho  GI Prophylaxis: Protonix  Baseline activity: uses cane outside all the time but in the house at times  Lives with:         Subjective:     REQUESTING PHYSICIAN: Flash Young PA-C     REASON FOR CONSULT:      Kim Reyes is a 62 y.o. BLACK/ female who I was asked to see for preoperative clearance and medical management. Patient has sustained a fall which resulted in left tibia fracture and is scheduled to undergo ORIF in a.m.       Pre-Operative Risk Assessment Using 2014 ACC/AHA Guidelines     Emergent procedure: No  Active Cardiac Condition including decompensated HF, Arrhythmia, MI <3 weeks, severe valve disease: No  Risk Level of Procedure: Intermediate Risk (intraperitoneal, intrathoracic, HENT, orthopedic, or carotid endarterectomy, etc.)  Revised Cardiac Risk Index Risk factors: None  Measurement of Exercise Tolerance before Surgery >4 METS (climbing > 1 flight of stairs without stopping, walking up hill > 1-2 blocks, scrubbing floors, moving furniture, golf, bowling, dancing or tennis, or running short distance): Unknown    According to the 2014 ACC/AHA pre-operative risk assessment guidelines Chaparro Bernard is at low risk for major cardiac complications during a Intermediate Risk procedure, exercise tolerance is unknown  and procedure may proceed as planned. Specific medication recommendations are listed below. Request further recommendations from consultants: None    Medication Recommendations (taken with a sip of water even when NPO):  NONE           Past Medical History:   Diagnosis Date    GI (gastrointestinal bleed)     Insomnia 11/09    Iron deficiency anemia 04/2004    PPD positive     treated w/ INH- tests since have been negative    Pure hypercholesterolemia     Scoliosis 12/28/15    [facility]    Tobacco dependence     Unspecified essential hypertension     Uterine bleeding, dysfunctional 2/12    Uterine fibroid       Past Surgical History:   Procedure Laterality Date    COLONOSCOPY N/A 1/31/2018    COLONOSCOPY performed by Demetrio Baeza MD at [facility] HX CHOLECYSTECTOMY      HX COLONOSCOPY  07/01/2013    [facility], repeat 10 years    HX ORTHOPAEDIC      Shattered bilateral ankles-metal plates & screws    HX OTHER SURGICAL      Benign cyst removal- L. foot    NH LEG/ANKLE SURGERY PROC UNLISTED       Social History     Tobacco Use    Smoking status: Current Every Day Smoker     Packs/day: 0.50     Years: 17.00     Pack years: 8.50     Types: Cigarettes    Smokeless tobacco: Never Used   Substance Use Topics    Alcohol use:  Yes     Alcohol/week: 51.0 standard drinks     Types: 1 Cans of beer, 50 Shots of liquor per week     Comment: one pint daily liquor      Family History   Problem Relation Age of Onset    Hypertension Mother     Diabetes Mother     Cancer Mother         breast    Heart Disease Mother     Heart Disease Father         MI 42's   Jasper Self Kidney Disease Father     Hypertension Son        Allergies   Allergen Reactions    Robaxin [Methocarbamol] Hives, Rash and Itching     Red splotches    Statins-Hmg-Coa Reductase Inhibitors Myalgia    Codeine Itching    Neosporin [Neomycin-Bacitracin-Polymyxin] Rash      Prior to Admission medications    Medication Sig Start Date End Date Taking? Authorizing Provider   magnesium 200 mg tab TAKE ONE TABLET BY MOUTH DAILY 7/7/21  Yes Vinita Maldonado MD   doxepin (SINEquan) 25 mg capsule Take 1 Capsule by mouth nightly as needed (sleep). 6/28/21  Yes Nathaniel Castillo MD   folic acid (FOLVITE) 1 mg tablet Take 1 Tablet by mouth daily. 6/28/21  Yes Nathaniel Castillo MD   pantoprazole (PROTONIX) 40 mg tablet Take 1 Tablet by mouth Daily (before breakfast). 6/28/21  Yes Nathaniel Castillo MD   chlordiazePOXIDE (LIBRIUM) 5 mg capsule Take 1 Cap by mouth three (3) times daily as needed for Anxiety. Max Daily Amount: 15 mg. 5/7/21  Yes Zuri Bates MD   potassium chloride (KLOR-CON) 20 mEq pack TAKE ONE PACKET BY MOUTH DAILY 4/29/21  Yes Natalya Meek NP   buPROPion XL (WELLBUTRIN XL) 150 mg tablet TAKE ONE TABLET BY MOUTH EVERY MORNING FOR SMOKING CESSATION 2/15/21  Yes Natalya Meek NP   thiamine HCL (B-1) 100 mg tablet TAKE ONE TABLET BY MOUTH DAILY 2/1/21  Yes Natalya Meek NP       REVIEW OF SYSTEMS:       Review of Systems   Constitutional: Positive for activity change. Negative for appetite change, chills, diaphoresis, fatigue, fever and unexpected weight change. HENT: Negative for rhinorrhea and sore throat. Eyes: Negative for pain and visual disturbance. Respiratory: Negative for cough, chest tightness, shortness of breath and wheezing. Cardiovascular: Negative for chest pain, palpitations and leg swelling. Gastrointestinal: Negative for abdominal pain, constipation, diarrhea, nausea and vomiting. Genitourinary: Negative for difficulty urinating, dysuria, frequency and hematuria. Musculoskeletal: Positive for arthralgias, back pain and gait problem. Negative for myalgias. Neurological: Negative for dizziness, syncope, speech difficulty, weakness and headaches. Hematological: Does not bruise/bleed easily. Psychiatric/Behavioral: Negative for agitation, confusion, decreased concentration and hallucinations. The patient is not nervous/anxious. Objective:   VITALS:    Visit Vitals  /70 (BP 1 Location: Left arm, BP Patient Position: At rest)   Pulse 82   Temp 98.4 °F (36.9 °C)   Resp 18   Ht 5' 1\" (1.549 m)   Wt 54 kg (119 lb)   SpO2 96%   BMI 22.48 kg/m²     Temp (24hrs), Av.5 °F (36.9 °C), Min:98.1 °F (36.7 °C), Max:98.9 °F (37.2 °C)        Physical Exam  HENT:      Head: Normocephalic and atraumatic. Right Ear: External ear normal.      Left Ear: External ear normal.      Nose: Nose normal.      Mouth/Throat:      Mouth: Mucous membranes are moist.   Eyes:      Conjunctiva/sclera: Conjunctivae normal.   Cardiovascular:      Rate and Rhythm: Normal rate and regular rhythm. Pulmonary:      Effort: Pulmonary effort is normal. No respiratory distress. Breath sounds: Normal breath sounds. No wheezing, rhonchi or rales. Abdominal:      General: Bowel sounds are normal.      Palpations: Abdomen is soft. Tenderness: There is no abdominal tenderness. Musculoskeletal:         General: Tenderness (left leg) and deformity present. Right lower leg: No edema. Left lower leg: No edema. Skin:     General: Skin is warm and dry. Capillary Refill: Capillary refill takes less than 2 seconds. Neurological:      Mental Status: She is alert and oriented to person, place, and time. Cranial Nerves: No dysarthria or facial asymmetry. Gait: Gait abnormal.   Psychiatric:         Mood and Affect: Mood normal.         Behavior: Behavior normal. Behavior is cooperative.               Procedures: see electronic medical records for all procedures/Xrays and details which were not copied into this note but were reviewed prior to creation of Plan. Recent Imaging studies(If Any)    CXR Results  (Last 48 hours)               10/19/21 1050  XR CHEST SNGL V Final result    Impression:  No acute cardiopulmonary abnormality. Narrative:  EXAM:  XR CHEST SNGL V       INDICATION:  Preoperative exam       COMPARISON: February 3, 2020       TECHNIQUE: AP portable upright chest view       FINDINGS: The lungs are clear. Heart size and mediastinal contours are normal.   No pleural effusion or pneumothorax. Osseous structures are within normal   limits. Echo Results  (Last 48 hours)    None           CT Results  (Last 48 hours)               10/19/21 0820  CT LOW EXT LT WO CONT Final result    Impression:      1. Acute fracture of the lateral tibial plateau with significant depression. 2. Moderate lipohemarthrosis. Narrative:  EXAM:  CT LOW EXT LT WO CONT       HISTORY: Knee pain       COMPARISON: None (downtime)       TECHNIQUE: Multiple contiguous axial, sagittal, and coronal sections were   obtained from a spiral volume acquisition of the left knee. No IV contrast was   administered. CT dose reduction was achieved through use of a standardized   protocol tailored for this examination and automatic exposure control for dose   modulation. FINDINGS: The bones are osteopenic. There is an impaction fracture of the   lateral tibial plateau with depression measuring approximately 1.8 cm. There is   fragmentation at the articular surface. A vertically oriented fracture line   extends down to the metaphysis. There is mild shifting of osseous fragments   laterally. There is no horizontal split component. There is no involvement of   the medial tibial plateau. Small areas of patchy hypodensity in the metaphysis   likely represent intraosseous hematoma. There is a moderate lipohemarthrosis.    There is a chronic ossicle adjacent to the anterior tibial tuberosity likely   related to prior Osgood-Schlatter's disease. EKG RESULTS     Procedure Component Value Units Date/Time    EKG, 12 LEAD, INITIAL [[id number]] Collected: 10/19/21 1211    Order Status: Completed Updated: 10/19/21 1217     Ventricular Rate 83 BPM      Atrial Rate 83 BPM      P-R Interval 148 ms      QRS Duration 62 ms      Q-T Interval 378 ms      QTC Calculation (Bezet) 444 ms      Calculated P Axis 31 degrees      Calculated R Axis 18 degrees      Calculated T Axis 44 degrees      Diagnosis --     Normal sinus rhythm  Low voltage QRS  Septal infarct , age undetermined  When compared with ECG of 23-FEB-2021 09:36,  Non-specific change in ST segment in Lateral leads                 Recent Microbiology Data(If Any)  . All Micro Results     None             LAB DATA REVIEWED:    Recent Results (from the past 24 hour(s))   CBC WITH AUTOMATED DIFF    Collection Time: 10/19/21 11:03 AM   Result Value Ref Range    WBC 7.3 3.6 - 11.0 K/uL    RBC 4.38 3.80 - 5.20 M/uL    HGB 13.7 11.5 - 16.0 g/dL    HCT 40.0 35.0 - 47.0 %    MCV 91.3 80.0 - 99.0 FL    MCH 31.3 26.0 - 34.0 PG    MCHC 34.3 30.0 - 36.5 g/dL    RDW 14.8 (H) 11.5 - 14.5 %    PLATELET 386 344 - 182 K/uL    MPV 10.5 8.9 - 12.9 FL    NRBC 0.0 0  WBC    ABSOLUTE NRBC 0.00 0.00 - 0.01 K/uL    NEUTROPHILS 66 32 - 75 %    LYMPHOCYTES 24 12 - 49 %    MONOCYTES 9 5 - 13 %    EOSINOPHILS 1 0 - 7 %    BASOPHILS 0 0 - 1 %    IMMATURE GRANULOCYTES 0 0.0 - 0.5 %    ABS. NEUTROPHILS 4.8 1.8 - 8.0 K/UL    ABS. LYMPHOCYTES 1.8 0.8 - 3.5 K/UL    ABS. MONOCYTES 0.7 0.0 - 1.0 K/UL    ABS. EOSINOPHILS 0.1 0.0 - 0.4 K/UL    ABS. BASOPHILS 0.0 0.0 - 0.1 K/UL    ABS. IMM.  GRANS. 0.0 0.00 - 0.04 K/UL    DF AUTOMATED     METABOLIC PANEL, COMPREHENSIVE    Collection Time: 10/19/21 11:03 AM   Result Value Ref Range    Sodium 137 136 - 145 mmol/L    Potassium 4.0 3.5 - 5.1 mmol/L    Chloride 101 97 - 108 mmol/L    CO2 28 21 - 32 mmol/L    Anion gap 8 5 - 15 mmol/L    Glucose 110 (H) 65 - 100 mg/dL    BUN 10 6 - 20 MG/DL    Creatinine 0.53 (L) 0.55 - 1.02 MG/DL    BUN/Creatinine ratio 19 12 - 20      GFR est AA >60 >60 ml/min/1.73m2    GFR est non-AA >60 >60 ml/min/1.73m2    Calcium 9.6 8.5 - 10.1 MG/DL    Bilirubin, total 0.7 0.2 - 1.0 MG/DL    ALT (SGPT) 30 12 - 78 U/L    AST (SGOT) 25 15 - 37 U/L    Alk. phosphatase 101 45 - 117 U/L    Protein, total 7.7 6.4 - 8.2 g/dL    Albumin 3.5 3.5 - 5.0 g/dL    Globulin 4.2 (H) 2.0 - 4.0 g/dL    A-G Ratio 0.8 (L) 1.1 - 2.2     PROTHROMBIN TIME + INR    Collection Time: 10/19/21 11:03 AM   Result Value Ref Range    INR 1.1 0.9 - 1.1      Prothrombin time 11.1 9.0 - 11.1 sec   TYPE & SCREEN    Collection Time: 10/19/21 11:03 AM   Result Value Ref Range    Crossmatch Expiration 10/22/2021,2359     ABO/Rh(D) A POSITIVE     Antibody screen NEG    SAMPLES BEING HELD    Collection Time: 10/19/21 11:18 AM   Result Value Ref Range    SAMPLES BEING HELD PST     COMMENT        Add-on orders for these samples will be processed based on acceptable specimen integrity and analyte stability, which may vary by analyte.    EKG, 12 LEAD, INITIAL    Collection Time: 10/19/21 12:11 PM   Result Value Ref Range    Ventricular Rate 83 BPM    Atrial Rate 83 BPM    P-R Interval 148 ms    QRS Duration 62 ms    Q-T Interval 378 ms    QTC Calculation (Bezet) 444 ms    Calculated P Axis 31 degrees    Calculated R Axis 18 degrees    Calculated T Axis 44 degrees    Diagnosis       Normal sinus rhythm  Low voltage QRS  Septal infarct , age undetermined  When compared with ECG of 23-FEB-2021 09:36,  Non-specific change in ST segment in Lateral leads                _______________________________________________________________________  Care Plan discussed with:    Comments   Patient x    Family      RN     Care Manager                    Consultant:      ____________________________________________________________________  TOTAL TIME:    45 mins    Comments    X Reviewed previous records   >50% of visit spent in counseling and coordination of care X Discussion with patient and/or family and questions answered       Critical Care Provided     Minutes non procedure based  ________________________________________________________________________  Signed: Kristel Quinn DNP, ACNP-BC    Please note that this note was dictated using Dragon computer voice recognition software. Quite often unanticipated grammatical, syntax, homophones, and other interpretive errors are inadvertently transcribed by the computer software. Please disregard these errors. Please excuse any errors that have escaped final proofreading.

## 2021-10-20 NOTE — PROGRESS NOTES
Problem: Falls - Risk of  Goal: *Absence of Falls  Description: Document Emmie Mena Fall Risk and appropriate interventions in the flowsheet. Outcome: Progressing Towards Goal  Note: Fall Risk Interventions:  Mobility Interventions: Communicate number of staff needed for ambulation/transfer         Medication Interventions: Patient to call before getting OOB, Teach patient to arise slowly, Bed/chair exit alarm    Elimination Interventions: Elevated toilet seat, Call light in reach, Toileting schedule/hourly rounds    History of Falls Interventions: Bed/chair exit alarm, Door open when patient unattended         Problem: Pressure Injury - Risk of  Goal: *Prevention of pressure injury  Description: Document Hilton Scale and appropriate interventions in the flowsheet.   Outcome: Progressing Towards Goal  Note: Pressure Injury Interventions:  Sensory Interventions: Maintain/enhance activity level    Moisture Interventions: Maintain skin hydration (lotion/cream)    Activity Interventions: Increase time out of bed, Pressure redistribution bed/mattress(bed type), PT/OT evaluation    Mobility Interventions: HOB 30 degrees or less, Pressure redistribution bed/mattress (bed type), PT/OT evaluation    Nutrition Interventions: Document food/fluid/supplement intake    Friction and Shear Interventions: HOB 30 degrees or less

## 2021-10-20 NOTE — ROUTINE PROCESS
End of Shift Note    Bedside shift change report given to Mack Briceño (oncoming nurse) by Tasha Caceres RN (offgoing nurse). Report included the following information SBAR, Kardex, Intake/Output, MAR and Accordion    Shift worked: 7-7     Shift summary and any significant changes:          Concerns for physician to address:       Zone phone for oncoming shift:   [phone]       Activity:  Activity Level: Up with Assistance  Number times ambulated in hallways past shift: 0  Number of times OOB to chair past shift: 0    Cardiac:   Cardiac Monitoring: No      Cardiac Rhythm: Sinus Rhythm    Access:   Current line(s): PIV     Genitourinary:   Urinary status: due to void    Respiratory:   O2 Device: Nasal cannula  Chronic home O2 use?: NO  Incentive spirometer at bedside: NO     GI:  Last Bowel Movement Date: 10/18/21  Current diet:  ADULT DIET Regular; 4 carb choices (60 gm/meal)  Passing flatus: YES  Tolerating current diet: YES       Pain Management:   Patient states pain is manageable on current regimen: YES    Skin:  Hilton Score: 20  Interventions: increase time out of bed, foam dressing and PT/OT consult    Patient Safety:  Fall Score:  Total Score: 4  Interventions: bed/chair alarm, assistive device (walker, cane, etc), gripper socks and pt to call before getting OOB  High Fall Risk: Yes    Length of Stay:  Expected LOS: 2d 16h  Actual LOS: 1      Tasha Caceres RN

## 2021-10-20 NOTE — PERIOP NOTES
TRANSFER - IN REPORT:    Verbal report received from STEVEN Estrada RN & CHANDAN Mane CRNA(name) on Sanna Damon  being received from OR(unit) for routine post - op      Report consisted of patients Situation, Background, Assessment and   Recommendations(SBAR). Information from the following report(s) OR Summary was reviewed with the receiving nurse. Opportunity for questions and clarification was provided. Assessment completed upon patients arrival to unit and care assumed.

## 2021-10-20 NOTE — OP NOTES
Date of Procedure: 10/20/2021  Preoperative Diagnosis: Left depression lateral tibial plateau fracture  Postoperative Diagnosis: Same  Procedure Performed: Open reduction internal fixation left tibial plateau fracture, lateral  Surgeon: Gayathri Cagle DO  Assistant: None  Anesthesia: General  Estimated Blood Loss: 25 cc  Specimens: None  Drains: None  Complications: None  Implants: Synthes locking plate, lateral with 3 locking screws proximally and 1 cancellous screw. 1 cortical screw distally and 2 locking screws distally. 5 cc cancellous bone chips      Operative Indications: This is a 62 y.o. female who sustained a significantly displaced lateral tibial plateau fracture with depression type deformity. We did discuss the risks of surgery which include but are not limited to infection, nerve or blood vessel damage, failure of fixation, failure of any possible implant, need for reoperation, postoperative pain and swelling, intra-or postoperative fracture, postoperative mechanical failure, need for reoperation, implant failure, death, disability, organ dysfunction, wound healing issues, DVT, PE, and the need for further procedures. The patient did freely state their understanding and satisfaction with our discussion. Appropriate medical clearances have been obtained. Description of Procedure:  After the correct site and side were identified by myself in the holding area, the patient was transported to the surgical suite, where, after induction of general anesthesia, the patient was positioned in the supine position. The left leg was then prepped and draped in the usual sterile orthopedic fashion. After appropriate timeout, and confirmation of 2 g of Ancef have been infused prior to incision, curvilinear incision was made over the lateral tibial plateau. Sharp dissection was carried down to fascia. Strict hemostasis was maintained with use electrocautery.   Tibialis anterior was elevated off of the tibial plateau, iliotibial band was elevated off of the capsule. Capsule was then incised at its coronary ligaments, hematoma was evacuated. Lateral tibial plateau was noted to be significantly depressed into the joint with no evidence of tear, this was tagged with 2-0 PDS sutures. I then used a 3.2 mm drill bit to create a cortical window distally. There is a significant amount of comminution as well as a significant mount of osteoporotic change. Once I had access to the metaphyseal proximal tibia, I used a curved bone tamp to reduce the lateral depression. This was significantly comminuted. Using gentle technique, I was able to reduce this well, I utilized an impaction grafting technique as well to create an area of healing with the cancellous bone chips. Once this was performed, I sized and chose a lateral tibial plate. This was pinned in place, x-rays indicated excellent reduction as well as good plate placement. I then placed a cortical screw distally and then a cancellous screw proximally, pins were removed. X-rays were taken indicating good plate placement as well as excellent reduction. Once this was performed, locking screws were placed proximally as well as distally. Final x-rays were taken demonstrating anatomic reduction, I then sewed at the lateral meniscus to the plate. Once this was performed, wounds were copiously irrigated with normal saline. 1/2% ropivacaine was used to infuse the soft tissues. Fascia was closed with #1 Vicryl. Deep stitches were placed with #1 Vicryl. 2-0 Vicryl, staples were applied. Sterile dressing was applied. She was placed into a knee immobilizer. She was awoken and taken to PACU in stable condition without complication. Postoperative plan: Patient will be toe-touch weightbearing, she will stay in her knee immobilizer. Follow-up will be in 2 weeks for repeat x-rays of the left knee and staple removal..

## 2021-10-20 NOTE — H&P
10/20/2021    Chief Complaint: Left knee pain    HPI: This is a(n) 62 y.o. female  who complains of left knee pain. Onset was sudden yesterday. The patient has had pain for one day. The pain is in the lateral knee, it is severe in intensity. Past Medical History:   Diagnosis Date    GI (gastrointestinal bleed)     Insomnia 11/09    Iron deficiency anemia 04/2004    PPD positive     treated w/ INH- tests since have been negative    Pure hypercholesterolemia     Scoliosis 12/28/15    [facility]    Tobacco dependence     Unspecified essential hypertension     Uterine bleeding, dysfunctional 2/12    Uterine fibroid        Past Surgical History:   Procedure Laterality Date    COLONOSCOPY N/A 1/31/2018    COLONOSCOPY performed by Emir York MD at [facility] HX CHOLECYSTECTOMY      HX COLONOSCOPY  07/01/2013    [facility], repeat 10 years    HX ORTHOPAEDIC      Shattered bilateral ankles-metal plates & screws    HX OTHER SURGICAL      Benign cyst removal- L. foot    CT LEG/ANKLE SURGERY PROC UNLISTED         No current facility-administered medications on file prior to encounter. Current Outpatient Medications on File Prior to Encounter   Medication Sig Dispense Refill    magnesium 200 mg tab TAKE ONE TABLET BY MOUTH DAILY 90 Tablet 0    doxepin (SINEquan) 25 mg capsule Take 1 Capsule by mouth nightly as needed (sleep). 30 Capsule 0    folic acid (FOLVITE) 1 mg tablet Take 1 Tablet by mouth daily. 30 Tablet 0    pantoprazole (PROTONIX) 40 mg tablet Take 1 Tablet by mouth Daily (before breakfast). 30 Tablet 0    chlordiazePOXIDE (LIBRIUM) 5 mg capsule Take 1 Cap by mouth three (3) times daily as needed for Anxiety.  Max Daily Amount: 15 mg. 90 Cap 0    potassium chloride (KLOR-CON) 20 mEq pack TAKE ONE PACKET BY MOUTH DAILY 30 Packet 5    buPROPion XL (WELLBUTRIN XL) 150 mg tablet TAKE ONE TABLET BY MOUTH EVERY MORNING FOR SMOKING CESSATION 90 Tab 1    thiamine HCL (B-1) 100 mg tablet TAKE ONE TABLET BY MOUTH DAILY 90 Tab 3       Allergies   Allergen Reactions    Robaxin [Methocarbamol] Hives, Rash and Itching     Red splotches    Statins-Hmg-Coa Reductase Inhibitors Myalgia    Codeine Itching    Neosporin [Neomycin-Bacitracin-Polymyxin] Rash       Family History   Problem Relation Age of Onset    Hypertension Mother     Diabetes Mother     Cancer Mother         breast    Heart Disease Mother     Heart Disease Father         MI 42's    Kidney Disease Father     Hypertension Son        Social History     Socioeconomic History    Marital status:      Spouse name: Not on file    Number of children: Not on file    Years of education: Not on file    Highest education level: Not on file   Occupational History    Occupation: part time   Tobacco Use    Smoking status: Current Every Day Smoker     Packs/day: 0.50     Years: 17.00     Pack years: 8.50     Types: Cigarettes    Smokeless tobacco: Never Used   Vaping Use    Vaping Use: Never used   Substance and Sexual Activity    Alcohol use: Yes     Alcohol/week: 51.0 standard drinks     Types: 1 Cans of beer, 50 Shots of liquor per week     Comment: one pint daily liquor    Drug use: No    Sexual activity: Yes     Partners: Male     Birth control/protection: None     Social Determinants of Health     Financial Resource Strain:     Difficulty of Paying Living Expenses:    Food Insecurity:     Worried About Running Out of Food in the Last Year:     Ran Out of Food in the Last Year:    Transportation Needs:     Lack of Transportation (Medical):      Lack of Transportation (Non-Medical):    Physical Activity:     Days of Exercise per Week:     Minutes of Exercise per Session:    Stress:     Feeling of Stress :    Social Connections:     Frequency of Communication with Friends and Family:     Frequency of Social Gatherings with Friends and Family:     Attends Worship Services:     Active Member of Clubs or Organizations:     Attends Club or Organization Meetings:     Marital Status:          Review of Systems:       General: Denies headache, lethargy, fever, weight loss  Ears/Nose/Throat: Denies ear discharge, drainage, nosebleeds, hoarse voice, dental problems  Cardiovascular: Denies chest pain, shortness of breath  Lungs: Denies chest pain, breathing problems, wheezing, pneumonia  Stomach: Denies stomach pain, heartburn, constipation, irritable bowel  Skin: Denies rash, sores, open wounds  Musculoskeletal: Admits to knee pain, no deformity. Genitourinary: Denies dysuria, hematuria, polyuria  Gastrointestinal: Denies constipation, obstipation, diarrhea  Neurological: Denies changes in sight, smell, hearing, taste, seizures. Denies loss of consciousness. Psychiatric: Denies depression, sleep pattern changes, anxiety, change in personality  Endocrine: Denies mood swings, heat or cold intolerance  Hematologic/Lymphatic: Denies anemia, purpura, petechia  Allergic/Immunologic: Denies swelling of throat, pain or swelling at lymph nodes      Physical Examination:    Visit Vitals  BP (!) 142/85   Pulse 84   Temp 98.4 °F (36.9 °C)   Resp 20   Ht 5' 1\" (1.549 m)   Wt 119 lb (54 kg)   SpO2 97%   BMI 22.48 kg/m²        General: AOX3, no apparent distress  Psychiatric: mood and affect appropriate  Lungs: breathing is symmetric and unlabored bilaterally  Heart: regular rate and rhythm  Abdomen: no guarding  Head: normocephalic, atraumatic  Skin: No significant abnormalities, good turgor  Sensation intact to light touch: L1-S1 dermatomes  Muscular exam: 5/5 strength in all major muscle groups unless noted in specialty exam.    Extremities:      Left upper extremity: Full active and passive range of motion without pain, deformity, no open wound, strength 5/5 in all major muscle groups.     Right upper extremity: Full active and passive range of motion without pain, deformity, no open wound, strength 5/5 in all major muscle groups. Right lower extremity: Full active and passive range of motion without pain, deformity, no open wound, strength 5/5 in all major muscle groups. Left lower extremity:  No deformity is noted. Range of motion of the knee is limited by pain. Not tested. Joint line tenderness to palpation laterally. Popliteal area is unremarkable. Large effusion. No patellar crepitus. Patella tracks centrally with a + apprehension and grind test.  Pivot shift is negative. Strength testing is indicative of 5/5 strength at hip flexion, extension, knee flexion and extension, tibialis anterior, EHL, and FHL. Sensation is intact to light touch in the L1-S1 dermatomes. Capillary refill is less than 2 seconds in the toes. Diagnostics:    Pertinent Diagnostics:   Xrays and CT indicate large lipohemarthrosis, lateral tibial plateau fracture with depression of 1 cm. Assessment: Left tibial plateau fracture    Plan: This patient is beyond the maximum level of joint depression acceptable for knee recovery. Surgery is indicated by level of depression. We did discuss the risks of surgery which include but are not limited to infection, nerve or blood vessel damage, failure of fixation, failure of any possible implant, need for reoperation, postoperative pain and swelling, intra-or postoperative fracture, postoperative dislocation, leg length inequality, need for reoperation, implant failure, death, disability, organ dysfunction, wound healing issues, DVT, PE, and the need for further procedures. The patient did freely state their understanding and satisfaction with our discussion. We will proceed after medical clearances. The patient was counseled at length about the risks of linda Covid-19 during their perioperative period and any recovery window from their procedure.   The patient was made aware that linda Covid-19  may worsen their prognosis for recovering from their procedure and lend to a higher morbidity and/or mortality risk. All material risks, benefits, and reasonable alternatives including postponing the procedure were discussed. The patient does  wish to proceed with the procedure at this time. NPO, IVF  Plan for ORIF left tibial plateau      Time in consultation: 80 minutes              Ms. Sirisha Rueda has a reminder for a \"due or due soon\" health maintenance. I have asked that she contact her primary care provider for follow-up on this health maintenance.

## 2021-10-20 NOTE — BRIEF OP NOTE
Brief Postoperative Note    Patient: Shannon Pina  YOB: 1960  MRN: [id number]    Date of Procedure: 10/20/2021     Pre-Op Diagnosis: LEFT TIBIAL PLATEAU FRACTURE    Post-Op Diagnosis: Same as preoperative diagnosis. Procedure(s):  LEFT TIBIAL PLATEAU OPEN REDUCTION INTERNAL FIXATION    Surgeon(s):  Sae Dixon DO    Surgical Assistant: Surg Asst-1: Yeimy Yan RN    Anesthesia: General     Estimated Blood Loss (mL): less than 50     Complications: None    Specimens: * No specimens in log *     Implants:   Implant Name Type Inv. Item Serial No.  Lot No. LRB No. Used Action   BONE CHIP CANC [address] 1-8MM 20ML -- PCAN1/4 - [id number]  BONE CHIP CANC [address] 1-8MM 20ML -- PCAN1/4 [id number] [facility] TISSUE BANK N/A Left 1 Implanted   PLATE BNE V20BI 4 H NONSTERILE L PROX TIB S STL MELINA ANG DI [[id number]] [DEPUY [employer] [place]] - SNA  PLATE BNE P00TA 4 H NONSTERILE L PROX TIB S STL MELINA ANG DI [[id number]] [[employer]PU[employer]] NA [employer]_WD NA Left 1 Implanted   SCREW BNE L54MM DIA3. 5MM PROX TIB S STL ST FULL THRD T15 - SNA  SCREW BNE L54MM DIA3. 5MM PROX TIB S STL ST FULL THRD T15 NA [employer] [place]_WD NA Left 1 Implanted   SCREW BNE L44MM DIA3. 5MM PROX TIB S STL ST FULL THRD W/ T15 - SNA  SCREW BNE L44MM DIA3. 5MM PROX TIB S STL ST FULL THRD W/ T15 NA [employer] [place]_WD NA Left 1 Implanted   SCREW BNE L34MM DIA3. 5MM PROX TIB S STL ST FULL THRD W/ T15 - SNA  SCREW BNE L34MM DIA3. 5MM PROX TIB S STL ST FULL THRD W/ T15 NA DEPU[employer] [place]_WD NA Left 1 Implanted       Drains:   External Urinary Catheter 10/19/21 (Active)   Site Assessment Clean, dry, & intact 10/19/21 2138   Repositioned Yes 10/19/21 2138   Perineal Care Yes 10/19/21 2138   Wick Changed Yes 10/19/21 2138   Suction Canister/Tubing Changed Yes 10/19/21 2138       [REMOVED] Nasogastric Tube 02/20/18 (Removed)       [REMOVED] Rectal Tube (Removed)       [REMOVED] Fecal Management (Removed)       [REMOVED] External Female Catheter 02/24/18 (Removed)       Findings: comminuted fracture    Electronically Signed by Elis Jay DO on 10/20/2021 at 1:14 PM

## 2021-10-20 NOTE — PROGRESS NOTES
TRANSFER - OUT REPORT:    Verbal report given to Levon Zarate (name) on Chino Briseno  being transferred to [facility] (unit) for routine progression of care       Report consisted of patients Situation, Background, Assessment and   Recommendations(SBAR). Information from the following report(s) SBAR, Kardex, Intake/Output, MAR and Recent Results was reviewed with the receiving nurse.

## 2021-10-20 NOTE — ANESTHESIA POSTPROCEDURE EVALUATION
Procedure(s):  LEFT TIBIAL PLATEAU OPEN REDUCTION INTERNAL FIXATION. general    Anesthesia Post Evaluation        Patient location during evaluation: PACU  Note status: Adequate. Level of consciousness: responsive to verbal stimuli and sleepy but conscious  Pain management: satisfactory to patient  Airway patency: patent  Anesthetic complications: no  Cardiovascular status: acceptable  Respiratory status: acceptable  Hydration status: acceptable  Comments: +Post-Anesthesia Evaluation and Assessment    Patient: Cole Cleveland MRN: [id number]  SSN: [id number]   YOB: 1960  Age: 62 y.o. Sex: female      Cardiovascular Function/Vital Signs    BP (!) 148/75   Pulse 69   Temp 36.9 °C (98.4 °F)   Resp 17   Ht 5' 1\" (1.549 m)   Wt 54 kg (119 lb)   SpO2 100%   BMI 22.48 kg/m²     Patient is status post Procedure(s):  LEFT TIBIAL PLATEAU OPEN REDUCTION INTERNAL FIXATION. Nausea/Vomiting: Controlled. Postoperative hydration reviewed and adequate. Pain:  Pain Scale 1: Numeric (0 - 10) (10/20/21 1405)  Pain Intensity 1: 5 (10/20/21 1405)   Managed. Neurological Status:   Neuro (WDL): Within Defined Limits (10/20/21 1111)   At baseline. Mental Status and Level of Consciousness: Arousable. Pulmonary Status:   O2 Device: Nasal cannula (10/20/21 1320)   Adequate oxygenation and airway patent. Complications related to anesthesia: None    Post-anesthesia assessment completed. No concerns. Signed By: Graciela Johnson MD    10/20/2021  Post anesthesia nausea and vomiting:  controlled      INITIAL Post-op Vital signs:   Vitals Value Taken Time   /75 10/20/21 1400   Temp 36.9 °C (98.4 °F) 10/20/21 1320   Pulse 73 10/20/21 1415   Resp 16 10/20/21 1415   SpO2 100 % 10/20/21 1415   Vitals shown include unvalidated device data.

## 2021-10-20 NOTE — ANESTHESIA PREPROCEDURE EVALUATION
Relevant Problems   RESPIRATORY SYSTEM   (+) Emphysema of lung (HCC)   (+) Smoker      NEUROLOGY   (+) Alcohol abuse, in remission      ENDOCRINE   (+) Hypothyroidism due to acquired atrophy of thyroid      HEMATOLOGY   (+) Mediastinal lymphadenopathy       Anesthetic History   No history of anesthetic complications            Review of Systems / Medical History  Patient summary reviewed, nursing notes reviewed and pertinent labs reviewed    Pulmonary    COPD      Smoker         Neuro/Psych   Within defined limits           Cardiovascular    Hypertension              Exercise tolerance: >4 METS     GI/Hepatic/Renal  Within defined limits              Endo/Other      Hypothyroidism  Arthritis     Other Findings   Comments: Emphysema of lung (HCC)  Alcohol abuse, in remission      Sarcoidosis  Alcoholic peripheral neuropathy (HCC)  Nutritional ataxic neuropathy             Physical Exam    Airway  Mallampati: II  TM Distance: 4 - 6 cm  Neck ROM: normal range of motion   Mouth opening: Normal     Cardiovascular  Regular rate and rhythm,  S1 and S2 normal,  no murmur, click, rub, or gallop             Dental    Dentition: Full upper dentures     Pulmonary  Breath sounds clear to auscultation               Abdominal  GI exam deferred       Other Findings            Anesthetic Plan    ASA: 2  Anesthesia type: general          Induction: Intravenous  Anesthetic plan and risks discussed with: Patient      Discussed with pt possible need for nerve block for post op pain if narcotics are not enough, the risks and benefits.   This could be done post operatively if needed (femoral or adductor canal block)

## 2021-10-20 NOTE — PERIOP NOTES
TRANSFER - OUT REPORT:    Verbal report given to Sol RENNER(name) on Eder Sarabia  being transferred to [facility](unit) for routine progression of care       Report consisted of patients Situation, Background, Assessment and   Recommendations(SBAR). Information from the following report(s) OR Summary, Intake/Output and MAR was reviewed with the receiving nurse. Opportunity for questions and clarification was provided.       Patient transported with:   O2 @ 2 liters  Registered Nurse  [facility]

## 2021-10-21 LAB
ANION GAP SERPL CALC-SCNC: 5 MMOL/L (ref 5–15)
BUN SERPL-MCNC: 13 MG/DL (ref 6–20)
BUN/CREAT SERPL: 22 (ref 12–20)
CALCIUM SERPL-MCNC: 7.9 MG/DL (ref 8.5–10.1)
CHLORIDE SERPL-SCNC: 106 MMOL/L (ref 97–108)
CO2 SERPL-SCNC: 25 MMOL/L (ref 21–32)
CREAT SERPL-MCNC: 0.59 MG/DL (ref 0.55–1.02)
ERYTHROCYTE [DISTWIDTH] IN BLOOD BY AUTOMATED COUNT: 14.6 % (ref 11.5–14.5)
GLUCOSE SERPL-MCNC: 121 MG/DL (ref 65–100)
HCT VFR BLD AUTO: 30.5 % (ref 35–47)
HGB BLD-MCNC: 10.4 G/DL (ref 11.5–16)
MCH RBC QN AUTO: 31.8 PG (ref 26–34)
MCHC RBC AUTO-ENTMCNC: 34.1 G/DL (ref 30–36.5)
MCV RBC AUTO: 93.3 FL (ref 80–99)
NRBC # BLD: 0 K/UL (ref 0–0.01)
NRBC BLD-RTO: 0 PER 100 WBC
PLATELET # BLD AUTO: 113 K/UL (ref 150–400)
PMV BLD AUTO: 10.9 FL (ref 8.9–12.9)
POTASSIUM SERPL-SCNC: 3.9 MMOL/L (ref 3.5–5.1)
RBC # BLD AUTO: 3.27 M/UL (ref 3.8–5.2)
SODIUM SERPL-SCNC: 136 MMOL/L (ref 136–145)
WBC # BLD AUTO: 10.2 K/UL (ref 3.6–11)

## 2021-10-21 PROCEDURE — 80048 BASIC METABOLIC PNL TOTAL CA: CPT

## 2021-10-21 PROCEDURE — 74011250637 HC RX REV CODE- 250/637: Performed by: ORTHOPAEDIC SURGERY

## 2021-10-21 PROCEDURE — 74011250636 HC RX REV CODE- 250/636: Performed by: ORTHOPAEDIC SURGERY

## 2021-10-21 PROCEDURE — 36415 COLL VENOUS BLD VENIPUNCTURE: CPT

## 2021-10-21 PROCEDURE — 97530 THERAPEUTIC ACTIVITIES: CPT

## 2021-10-21 PROCEDURE — 99291 CRITICAL CARE FIRST HOUR: CPT | Performed by: PHYSICIAN ASSISTANT

## 2021-10-21 PROCEDURE — 94760 N-INVAS EAR/PLS OXIMETRY 1: CPT

## 2021-10-21 PROCEDURE — 97162 PT EVAL MOD COMPLEX 30 MIN: CPT

## 2021-10-21 PROCEDURE — 77010033678 HC OXYGEN DAILY

## 2021-10-21 PROCEDURE — 74011250637 HC RX REV CODE- 250/637: Performed by: PHYSICIAN ASSISTANT

## 2021-10-21 PROCEDURE — 65270000029 HC RM PRIVATE

## 2021-10-21 PROCEDURE — 85027 COMPLETE CBC AUTOMATED: CPT

## 2021-10-21 PROCEDURE — 74011250637 HC RX REV CODE- 250/637: Performed by: NURSE PRACTITIONER

## 2021-10-21 PROCEDURE — 74011000250 HC RX REV CODE- 250: Performed by: ORTHOPAEDIC SURGERY

## 2021-10-21 RX ORDER — HYDROMORPHONE HYDROCHLORIDE 2 MG/1
2-4 TABLET ORAL
Status: DISCONTINUED | OUTPATIENT
Start: 2021-10-21 | End: 2021-10-24

## 2021-10-21 RX ADMIN — POLYETHYLENE GLYCOL 3350 17 G: 17 POWDER, FOR SOLUTION ORAL at 08:55

## 2021-10-21 RX ADMIN — Medication 200 MG: at 08:56

## 2021-10-21 RX ADMIN — Medication 10 ML: at 23:59

## 2021-10-21 RX ADMIN — OXYCODONE 10 MG: 5 TABLET ORAL at 08:57

## 2021-10-21 RX ADMIN — CEFAZOLIN SODIUM 2 G: 1 INJECTION, POWDER, FOR SOLUTION INTRAMUSCULAR; INTRAVENOUS at 03:54

## 2021-10-21 RX ADMIN — ASPIRIN 325 MG: 325 TABLET, COATED ORAL at 17:04

## 2021-10-21 RX ADMIN — HYDROMORPHONE HYDROCHLORIDE 2 MG: 2 TABLET ORAL at 12:24

## 2021-10-21 RX ADMIN — ACETAMINOPHEN 1000 MG: 500 TABLET, FILM COATED ORAL at 23:54

## 2021-10-21 RX ADMIN — OXYCODONE 10 MG: 5 TABLET ORAL at 03:54

## 2021-10-21 RX ADMIN — ACETAMINOPHEN 1000 MG: 500 TABLET, FILM COATED ORAL at 12:24

## 2021-10-21 RX ADMIN — DOCUSATE SODIUM 50MG AND SENNOSIDES 8.6MG 1 TABLET: 8.6; 5 TABLET, FILM COATED ORAL at 17:04

## 2021-10-21 RX ADMIN — ASPIRIN 325 MG: 325 TABLET, COATED ORAL at 08:57

## 2021-10-21 RX ADMIN — THIAMINE HCL TAB 100 MG 100 MG: 100 TAB at 08:57

## 2021-10-21 RX ADMIN — Medication 10 ML: at 14:04

## 2021-10-21 RX ADMIN — DOCUSATE SODIUM 50MG AND SENNOSIDES 8.6MG 1 TABLET: 8.6; 5 TABLET, FILM COATED ORAL at 08:57

## 2021-10-21 RX ADMIN — HYDROMORPHONE HYDROCHLORIDE 4 MG: 2 TABLET ORAL at 21:25

## 2021-10-21 RX ADMIN — ACETAMINOPHEN 1000 MG: 500 TABLET, FILM COATED ORAL at 05:06

## 2021-10-21 RX ADMIN — DOXEPIN HYDROCHLORIDE 25 MG: 25 CAPSULE ORAL at 21:24

## 2021-10-21 RX ADMIN — BUPROPION HYDROCHLORIDE 150 MG: 150 TABLET, EXTENDED RELEASE ORAL at 08:57

## 2021-10-21 RX ADMIN — FOLIC ACID 1 MG: 1 TABLET ORAL at 08:57

## 2021-10-21 RX ADMIN — FAMOTIDINE 20 MG: 20 TABLET ORAL at 17:04

## 2021-10-21 RX ADMIN — Medication 10 ML: at 14:03

## 2021-10-21 RX ADMIN — FAMOTIDINE 20 MG: 20 TABLET ORAL at 08:56

## 2021-10-21 RX ADMIN — HYDROMORPHONE HYDROCHLORIDE 4 MG: 2 TABLET ORAL at 17:04

## 2021-10-21 RX ADMIN — PANTOPRAZOLE SODIUM 40 MG: 40 TABLET, DELAYED RELEASE ORAL at 08:56

## 2021-10-21 NOTE — PROGRESS NOTES
Po tibia fracture.  Still c/o moderate pain  hgb 10.4    Patient Vitals for the past 24 hrs:   Temp Pulse Resp BP SpO2   10/21/21 1120 (!) 96 °F (35.6 °C) 91 16 120/60 97 %   10/21/21 0812 97.8 °F (36.6 °C)       10/21/21 0808 (!) 96 °F (35.6 °C) 94 17 136/78 95 %   10/21/21 0331 98.4 °F (36.9 °C) 77 18 120/62 100 %   10/20/21 2320 98.4 °F (36.9 °C) 90 18 109/63 100 %   10/20/21 1937 98.5 °F (36.9 °C) 88 18 116/68 96 %   10/20/21 1845 98 °F (36.7 °C) 94 18 (!) 110/56 96 %   10/20/21 1750 98 °F (36.7 °C) 89 18 (!) 123/91 96 %   10/20/21 1645 98 °F (36.7 °C) 90 18 (!) 140/85 97 %   10/20/21 1540 97.6 °F (36.4 °C) 86 18 (!) 157/95 96 %   10/20/21 1456 97.8 °F (36.6 °C) 71 18 (!) 147/75 100 %   10/20/21 1432  73 17 (!) 147/82 100 %   10/20/21 1430 98.6 °F (37 °C)       10/20/21 1415  73 16 (!) 148/79 100 %   10/20/21 1400  69 17 (!) 148/75 100 %   10/20/21 1345  72 17 (!) 143/80 100 %   10/20/21 1340  72 19 137/80 100 %   10/20/21 1335  73 18 (!) 144/73 100 %   10/20/21 1330  78 21 (!) 143/80 100 %   10/20/21 1325  79 16 (!) 147/86 100 %   10/20/21 1320 98.4 °F (36.9 °C) 86 20 (!) 149/78 95 %     Dressing clean and dry    Elevate in bed  TTWB in knee immobilizer   Dc planning

## 2021-10-21 NOTE — PROGRESS NOTES
Transition of Care Plan:  RUR:  8% low risk for readmission   Disposition: SNF- referral sent to [facility] (2nd and 3rd choices pending) (will require insurance auth)   Follow up appointments: PCP, Ortho  DME needed: Pt has a cane and walker  Transportation at Discharge: Pt's spouse vs S  [place] or means to access home: Spouse will have        IM Medicare Letter: N/A - commercial insurance   Is patient a BCPI-A Bundle:  No                    If yes, was Bundle Letter given?:     Caregiver Contact: Pt's spouse, Sunny kwon) [phone]  Discharge Caregiver contacted prior to discharge? Unit CM to follow. MD informed CM that patient is going to need SNF placement at time of d/c. CM made room visit with patient who was alert and oriented and discussed d/c planning. Pt requested referral be sent to [facility] as she has been there in the past. CM agreed to send referral to [facility] but also requested 2-3 additional SNF choices in the event [facility] is unable to accept or does not have a bed available. Pt agreed. CM provided pt with SNF list. Pt reported that she could not read the print on the SNF list. CM offered to sit down with patient and review list together. Pt declined reporting she does not want to make any decisions without her . Pt stated her  is currently at work and will either visit tonight or tomorrow morning. CM agreed to follow up tomorrow morning for additional SNF choices.      Bina Cadet, [address]

## 2021-10-21 NOTE — PROGRESS NOTES
Hospitalist Progress Note    NAME: Tyler Nix   :  1960   MRN:  [id number]       Assessment / Plan:  Left lateral tibial plateau fracture  -S/P ORIF 10/20  -on ASA BID  -bowel regimen  -PT OT eval     Chronic alcoholism  -she reports that she has not been drinking much lately. Usually liquor if she does  -Continue CIWA protocol / Librium prn  -Continue multivitamins    Dyslipidemia  COPD  Chronic smoking  History of gastric ulcer  Hypertension  History of uterine fibroids  -Continue home PPI, Wellbutrin, nicotine patch      Body mass index is 22.48 kg/m². Code status: Full  Prophylaxis: per ortho  Recommended Disposition: Home w/Family     Subjective:     Chief Complaint / Reason for Physician Visit  Follow up ETOH, COPD  Feels okay    Review of Systems:  Symptom Y/N Comments  Symptom Y/N Comments   Fever/Chills    Chest Pain     Poor Appetite    Edema     Cough    Abdominal Pain     Sputum    Joint Pain     SOB/GRIER    Pruritis/Rash     Nausea/vomit    Tolerating PT/OT     Diarrhea    Tolerating Diet     Constipation    Other       Could NOT obtain due to:      Objective:     VITALS:   Last 24hrs VS reviewed since prior progress note.  Most recent are:  Patient Vitals for the past 24 hrs:   Temp Pulse Resp BP SpO2   10/21/21 0812 97.8 °F (36.6 °C)       10/21/21 0808 (!) 96 °F (35.6 °C) 94 17 136/78 95 %   10/21/21 0331 98.4 °F (36.9 °C) 77 18 120/62 100 %   10/20/21 2320 98.4 °F (36.9 °C) 90 18 109/63 100 %   10/20/21 1937 98.5 °F (36.9 °C) 88 18 116/68 96 %   10/20/21 1845 98 °F (36.7 °C) 94 18 (!) 110/56 96 %   10/20/21 1750 98 °F (36.7 °C) 89 18 (!) 123/91 96 %   10/20/21 1645 98 °F (36.7 °C) 90 18 (!) 140/85 97 %   10/20/21 1540 97.6 °F (36.4 °C) 86 18 (!) 157/95 96 %   10/20/21 1456 97.8 °F (36.6 °C) 71 18 (!) 147/75 100 %   10/20/21 1432  73 17 (!) 147/82 100 %   10/20/21 1430 98.6 °F (37 °C)       10/20/21 1415  73 16 (!) 148/79 100 %   10/20/21 1400  69 17 (!) 148/75 100 % 10/20/21 1345  72 17 (!) 143/80 100 %   10/20/21 1340  72 19 137/80 100 %   10/20/21 1335  73 18 (!) 144/73 100 %   10/20/21 1330  78 21 (!) 143/80 100 %   10/20/21 1325  79 16 (!) 147/86 100 %   10/20/21 1320 98.4 °F (36.9 °C) 86 20 (!) 149/78 95 %   10/20/21 1107 98.3 °F (36.8 °C) 88 16 (!) 135/92 99 %       Intake/Output Summary (Last 24 hours) at 10/21/2021 1041  Last data filed at 10/21/2021 0403  Gross per 24 hour   Intake 700 ml   Output 540 ml   Net 160 ml        PHYSICAL EXAM:  General: WD, WN. Alert, cooperative, no acute distress    EENT:  EOMI. Anicteric sclerae. MMM  Resp:  CTA bilaterally, no wheezing or rales. No accessory muscle use  CV:  Regular  rhythm,  No edema  GI:  Soft, Non distended, Non tender.  +Bowel sounds  Neurologic:  Alert and oriented X 3, normal speech,   Psych:   not anxious nor agitated  Skin:  No rashes.   No jaundice  (+) left leg in brace / bandage     Reviewed most current lab test results and cultures  YES  Reviewed most current radiology test results   YES  Review and summation of old records today    NO  Reviewed patient's current orders and MAR    YES  PMH/SH reviewed - no change compared to H&P          Current Facility-Administered Medications:     0.9% sodium chloride infusion, 125 mL/hr, IntraVENous, CONTINUOUS, Elder DO Junaid, Last Rate: 125 mL/hr at 10/20/21 1332, 125 mL/hr at 10/20/21 1332    sodium chloride 0.9 % bolus infusion 500 mL, 500 mL, IntraVENous, ONCE PRN, Chris Eric,     sodium chloride (NS) flush 5-40 mL, 5-40 mL, IntraVENous, Q8H, Chris Eric DO, 10 mL at 10/20/21 1721    sodium chloride (NS) flush 5-40 mL, 5-40 mL, IntraVENous, PRN, Chris Eric DO    naloxone [facility]) injection 0.4 mg, 0.4 mg, IntraVENous, PRN, Budny, Chris M, DO    senna-docusate (PERICOLACE) 8.6-50 mg per tablet 1 Tablet, 1 Tablet, Oral, BID, Crhis Eric DO, 1 Tablet at 10/21/21 0857    polyethylene glycol (MIRALAX) packet 17 g, 17 g, Oral, DAILY, Harriet Arenas DO, 17 g at 10/21/21 0855    [START ON 10/22/2021] bisacodyL (DULCOLAX) suppository 10 mg, 10 mg, Rectal, DAILY PRN, Chris Eric DO    acetaminophen (TYLENOL) tablet 1,000 mg, 1,000 mg, Oral, Q6H, Chris Eric, , 1,000 mg at 10/21/21 0506    acetaminophen (TYLENOL) tablet 650 mg, 650 mg, Oral, Q6H PRN, Chris Eric DO    oxyCODONE IR (ROXICODONE) tablet 5 mg, 5 mg, Oral, Q3H PRN, Harriet Arenas DO, 5 mg at 10/20/21 2058    oxyCODONE IR (ROXICODONE) tablet 10 mg, 10 mg, Oral, Q3H PRN, Harriet Arenas DO, 10 mg at 10/21/21 0857    HYDROmorphone (DILAUDID) syringe 0.5 mg, 0.5 mg, IntraVENous, Q4H PRN, Chris Eric DO    ondansetron (ZOFRAN ODT) tablet 4 mg, 4 mg, Oral, Q6H PRN, Chris Eric DO    famotidine (PEPCID) tablet 20 mg, 20 mg, Oral, BID, Chris Eric DO, 20 mg at 10/21/21 2288    aspirin delayed-release tablet 325 mg, 325 mg, Oral, BID, Chris Eric, , 325 mg at 10/21/21 0857    0.9% sodium chloride infusion, 75 mL/hr, IntraVENous, CONTINUOUS, CODY Chakraborty-JANET, Last Rate: 75 mL/hr at 10/20/21 0200, 75 mL/hr at 10/20/21 0200    sodium chloride (NS) flush 5-40 mL, 5-40 mL, IntraVENous, Q8H, CODY Chakraborty-C, 10 mL at 10/20/21 0609    sodium chloride (NS) flush 5-40 mL, 5-40 mL, IntraVENous, PRN, CODY Chakraborty-C    oxyCODONE IR (ROXICODONE) tablet 2.5 mg, 2.5 mg, Oral, Q4H PRN, Eric Cox PA-C, 2.5 mg at 10/20/21 0005    oxyCODONE IR (ROXICODONE) tablet 5 mg, 5 mg, Oral, Q4H PRN, Eric Cox PA-C    naloxone [facility]) injection 0.4 mg, 0.4 mg, IntraVENous, PRN, Eric Cox PA-C    ondansetron [facility]) injection 4 mg, 4 mg, IntraVENous, Q4H PRN, Eric Cox PA-C, 4 mg at 10/19/21 1110    buPROPion XL (WELLBUTRIN XL) tablet 150 mg, 150 mg, Oral, DAILY, Tika Yanez ACNP, 150 mg at 10/21/21 0857    chlordiazePOXIDE (LIBRIUM) capsule 5 mg, 5 mg, Oral, TID PRN, Holger Yanez ACNP    doxepin (SINEquan) capsule 25 mg, 25 mg, Oral, QHS PRN, Purveyor, Atoosa, ACNP, 25 mg at 83/76/62 0550    folic acid (FOLVITE) tablet 1 mg, 1 mg, Oral, DAILY, Purveyor, Atoosa, ACNP, 1 mg at 10/21/21 0857    magnesium oxide (MAG-OX) tablet 200 mg, 200 mg, Oral, DAILY, Purveyor, Atoosa, ACNP, 200 mg at 10/21/21 0856    pantoprazole (PROTONIX) tablet 40 mg, 40 mg, Oral, ACB, Purveyor, Atoosa, ACNP, 40 mg at 10/21/21 0856    thiamine mononitrate (B-1) tablet 100 mg, 100 mg, Oral, DAILY, Purveyor, Atoosa, ACNP, 100 mg at 10/21/21 0857    nicotine (NICODERM CQ) 21 mg/24 hr patch 1 Patch, 1 Patch, TransDERmal, Q24H, Purveyor, Atoosa, ACNP, 1 Patch at 10/21/21 0000  ________________________________________________________________________  Care Plan discussed with:    Comments   Patient y    Family      RN y    Care Manager     Consultant                        Multidiciplinary team rounds were held today with , nursing, pharmacist and clinical coordinator. Patient's plan of care was discussed; medications were reviewed and discharge planning was addressed. ________________________________________________________________________  Total NON critical care TIME:  25    Minutes    Total CRITICAL CARE TIME Spent:   Minutes non procedure based      Comments   >50% of visit spent in counseling and coordination of care     ________________________________________________________________________  Barkley Lanes, MD     Procedures: see electronic medical records for all procedures/Xrays and details which were not copied into this note but were reviewed prior to creation of Plan. LABS:  I reviewed today's most current labs and imaging studies.   Pertinent labs include:  Recent Labs     10/21/21  0329 10/19/21  1103   WBC 10.2 7.3   HGB 10.4* 13.7   HCT 30.5* 40.0   * 165     Recent Labs     10/21/21  0329 10/19/21  1103    137   K 3.9 4.0    101   CO2 25 28   * 110*   BUN 13 10   CREA 0.59 0.53*   CA 7.9* 9.6   ALB  --  3.5   TBILI  --  0.7   ALT  --  30   INR  --  1.1       Signed: Jody Soliman MD

## 2021-10-21 NOTE — PROGRESS NOTES
End of Shift Note    Bedside shift change report given to (oncoming nurse) by Catracho Bennett RN (offgoing nurse). Report included the following information SBAR, Kardex, Procedure Summary, Intake/Output and MAR    Shift worked:  night     Shift summary and any significant changes:    Voiding. Prn pain medicine given. Concerns for physician to address:    Zone phone for oncoming shift:       Activity:  Activity Level: Up with Assistance  Number times ambulated in hallways past shift: 0  Number of times OOB to chair past shift: 0    Cardiac:   Cardiac Monitoring: Yes      Cardiac Rhythm: Sinus Rhythm    Access:   Current line(s): PIV     Genitourinary:   Urinary status: external catheter    Respiratory:   O2 Device: Nasal cannula  Chronic home O2 use?: NO  Incentive spirometer at bedside: YES     GI:  Last Bowel Movement Date: 10/18/21  Current diet:  ADULT DIET Regular; 4 carb choices (60 gm/meal)  Passing flatus: YES  Tolerating current diet: YES       Pain Management:   Patient states pain is manageable on current regimen: YES    Skin:  Hilton Score: 20  Interventions: increase time out of bed and PT/OT consult    Patient Safety:  Fall Score:  Total Score: 4  Interventions: assistive device (walker, cane, etc), gripper socks and pt to call before getting OOB  High Fall Risk: Yes    Length of Stay:  Expected LOS: 2d 16h  Actual LOS: 2      Catracho Bennett RN

## 2021-10-21 NOTE — PROGRESS NOTES
Problem: Mobility Impaired (Adult and Pediatric)  Goal: *Acute Goals and Plan of Care (Insert Text)  Description: FUNCTIONAL STATUS PRIOR TO ADMISSION: Patient was modified independent using a rolling walker and/or SPC (cane in community) for functional mobility. Pt reports she has given up driving due to her neuropathy. HOME SUPPORT PRIOR TO ADMISSION: The patient lived alone with spouse to provide assistance. Physical Therapy Goals  Initiated 10/21/2021  1. Patient will move from supine to sit and sit to supine , scoot up and down, and roll side to side in bed with modified independence within 7 day(s). 2.  Patient will transfer from bed to chair and chair to bed with supervision/set-up using the least restrictive device within 7 day(s). 3.  Patient will perform sit to stand with supervision/set-up within 7 day(s). 4.  Patient will ambulate with supervision/set-up for 50 feet with the least restrictive device within 7 day(s). 5.  Patient will ascend/descend 4 stairs with handrail(s) with minimal assistance/contact guard assist within 7 day(s). Outcome: Progressing Towards Goal     PHYSICAL THERAPY EVALUATION  Patient: Blanka De La Torre (77 y.o. female)  Date: 10/21/2021  Primary Diagnosis: Closed fracture of lateral portion of left tibial plateau [I93.806P]  Procedure(s) (LRB):  LEFT TIBIAL PLATEAU OPEN REDUCTION INTERNAL FIXATION (Left) 1 Day Post-Op   Precautions:   Fall, TTWB (LLE ; knee immobilizer)    ASSESSMENT  Nurse clears pt for mobility. Based on the objective data described below, the patient presents with post-op LLE pain with mobility attempts, able to sit up edge ofbed and take steps over to bedside chair with RW use and maintenance of TTWB. Pt fatigued afterwards however and with increased pain unable to tolerate further gait and not safely ready to attempt stairs at this time (pt with stairs to enter/exit home).   She does not have assist of spouse during the day at home due to he works ; therefore, recommendation is SNF at discharge prior to transition to home to allow for pt to safely navigate stair/gait training. Ortho. MD stopped in during tx session and updated on recs. Continue to follow. Current Level of Function Impacting Discharge (mobility/balance): bed mob. CGA ; transfers/gait with RW min. assist    Functional Outcome Measure: The patient scored 55/100 on the Barthel outcome measure. Other factors to consider for discharge: pt lives with spouse but spouse works during the day     Patient will benefit from skilled therapy intervention to address the above noted impairments. PLAN :  Recommendations and Planned Interventions: bed mobility training, transfer training, gait training, edema management/control, patient and family training/education, and therapeutic activities      Frequency/Duration: Patient will be followed by physical therapy:  daily to address goals. Recommendation for discharge: (in order for the patient to meet his/her long term goals)  Therapy up to 5 days/week in SNF setting    This discharge recommendation:  Has been made in collaboration with the attending provider and/or case management    IF patient discharges home will need the following DME: to be determined (TBD) (pt owns w/c which she believes does have elevating leg rests, RW, SPC)         SUBJECTIVE:   Patient stated I will go back to the place by [facility]. .. I liked it there.     OBJECTIVE DATA SUMMARY:   HISTORY:    Past Medical History:   Diagnosis Date    GI (gastrointestinal bleed)     Insomnia 11/09    Iron deficiency anemia 04/2004    PPD positive     treated w/ INH- tests since have been negative    Pure hypercholesterolemia     Scoliosis 12/28/15    [facility]   Lisa. Sujatha Bhatti 135 dependence     Unspecified essential hypertension     Uterine bleeding, dysfunctional 2/12    Uterine fibroid      Past Surgical History:   Procedure Laterality Date    COLONOSCOPY N/A 1/31/2018    COLONOS[facility] by [facility]ime [facility]erra MD at [facility] HX CHOLECYSTECTOMY      HX COLONOSCOPY  07/01/2013    [facility], repeat 10 years    HX ORTHOPAEDIC      Shattered bilateral ankles-metal plates & screws    HX OTHER SURGICAL      Benign cyst removal- L. foot    [place] LEG/ANKLE SURGERY PROC UNLISTED         Personal factors and/or comorbidities impacting plan of care:     Home Situation  Home Environment: Private residence  # Steps to Enter: 4  Rails to Enter: Yes (from deck entrance)  Hand Rails : Bilateral  Wheelchair Ramp: No  One/Two Story Residence: One story  Living Alone: No  Support Systems: Spouse/Significant Other, Other (Comment) (spouse works during the day)  Patient Expects to be Discharged to[de-identified] Skilled nursing facility  Current DME Used/Available at Home: Bobbye Brace, straight, Walker, rolling    EXAMINATION/PRESENTATION/DECISION MAKING:   Critical Behavior:  Neurologic State: Alert, Appropriate for age  Orientation Level: Oriented X4  Cognition: Follows commands, Appropriate safety awareness, Appropriate for age attention/concentration       Skin:  Intact (LLE in knee immobilizer and bandaged)  Edema: LLE  Range Of Motion:  AROM: Generally decreased, functional (LLE ankle only tested due to knee immobilizer ; [facility])           PROM: Generally decreased, functional (LLE ankle only tested due to knee immobilizer ; [facility])           Strength:    Strength: Generally decreased, functional (LLE ankle only tested due to knee immobilizer)                    Tone & Sensation:   Tone: Normal              Sensation: Impaired (neuropathy hands/feet)               Coordination:  Coordination: Within functional limits    Functional Mobility:  Bed Mobility:     Supine to Sit: Contact guard assistance (HOB raised)     Scooting: Contact guard assistance  Transfers:  Sit to Stand: Minimum assistance  Stand to Sit: Minimum assistance        Bed to Chair: Minimum assistance              Balance:   Sitting: Intact  Standing: Impaired; With support  Standing - Static: Constant support; Fair (RW)  Standing - Dynamic : Constant support; Fair (RW)  Ambulation/Gait Training:  Distance (ft): 5 Feet (ft) (bed to bedside chair )  Assistive Device: Gait belt;Walker, rolling  Ambulation - Level of Assistance: Minimal assistance     Gait Description (WDL): Exceptions to WDL  Gait Abnormalities: Antalgic;Decreased step clearance; Other (TTWB LLE)              Speed/Diana: Pace decreased (<100 feet/min); Slow  Step Length: Left shortened;Right shortened        Interventions: Safety awareness training; Tactile cues; Verbal cues       Functional Measure:  Barthel Index:    Bathin  Bladder: 10  Bowels: 10  Groomin  Dressin  Feeding: 10  Mobility: 0  Stairs: 0  Toilet Use: 5  Transfer (Bed to Chair and Back): 10  Total: 55/100       The Barthel ADL Index: Guidelines  1. The index should be used as a record of what a patient does, not as a record of what a patient could do. 2. The main aim is to establish degree of independence from any help, physical or verbal, however minor and for whatever reason. 3. The need for supervision renders the patient not independent. 4. A patient's performance should be established using the best available evidence. Asking the patient, friends/relatives and nurses are the usual sources, but direct observation and common sense are also important. However direct testing is not needed. 5. Usually the patient's performance over the preceding 24-48 hours is important, but occasionally longer periods will be relevant. 6. Middle categories imply that the patient supplies over 50 per cent of the effort. 7. Use of aids to be independent is allowed. Vickey Lima., Barthel, D.W. (4233). Functional evaluation: the Barthel Index. [facility] (14)2. Sunny Chavira [place] CHUCK Hannon, Martha Reyes.Stephen., [place], [address] [place] [address] ().  Measuring the change indisability after inpatient rehabilitation; comparison of the responsiveness of the Barthel Index and Functional [place] Measure. Journal of Neurology, Neurosurgery, and Psychiatry, 664), 737-226. ALEM Garza, MOOSE Wu, & Damian Fenton M.A. (2004.) Assessment of post-stroke quality of life in cost-effectiveness studies: The usefulness of the Barthel Index and the EuroQoL-5D. Quality of Life Research, 15, 010-32           Physical Therapy Evaluation Charge Determination   History Examination Presentation Decision-Making   MEDIUM  Complexity : 1-2 comorbidities / personal factors will impact the outcome/ POC  MEDIUM Complexity : 3 Standardized tests and measures addressing body structure, function, activity limitation and / or participation in recreation  MEDIUM Complexity : Evolving with changing characteristics  Other outcome measures Barthel  MEDIUM      Based on the above components, the patient evaluation is determined to be of the following complexity level: MEDIUM    Pain Rating:  C/o 8/10 LLE pain with mobility attempts    Activity Tolerance:   Fair    After treatment patient left in no apparent distress:   Sitting in chair, Call bell within reach, and LLE elevated, nurse notified     COMMUNICATION/EDUCATION:   The patients plan of care was discussed with: Registered nurse and Rehabilitation technician. Fall prevention education was provided and the patient/caregiver indicated understanding., Patient/family have participated as able in goal setting and plan of care. , and Patient/family agree to work toward stated goals and plan of care.     Thank you for this referral.  Melissa Mabry, PT, DPT   Time Calculation: 24 mins

## 2021-10-21 NOTE — PROGRESS NOTES
End of Shift Note    Bedside shift change report given to Maury (oncoming nurse) by Rayne Cobb RN (offgoing nurse). Report included the following information SBAR and Kardex    Shift worked:  day     Shift summary and any significant changes:     POD 1, SNF, pain rating remains high, switched to dilaudid, assessing changes     Concerns for physician to address:  pain control     Zone phone for oncoming shift:   [phone]       Activity:  Activity Level: Up with Assistance  Number times ambulated in hallways past shift: 0  Number of times OOB to chair past shift: 1    Cardiac:   Cardiac Monitoring: No      Cardiac Rhythm: Sinus Rhythm    Access:   Current line(s): PIV     Genitourinary:   Urinary status: voiding    Respiratory:   O2 Device: None (Room air)  Chronic home O2 use?: NO  Incentive spirometer at bedside: YES     GI:  Last Bowel Movement Date: 10/19/21  Current diet:  ADULT DIET Regular; 4 carb choices (60 gm/meal)  Passing flatus: NO  Tolerating current diet: YES       Pain Management:   Patient states pain is manageable on current regimen: NO    Skin:  Hilton Score: 20  Interventions: increase time out of bed, PT/OT consult and internal/external urinary devices    Patient Safety:  Fall Score:  Total Score: 4  Interventions: assistive device (walker, cane, etc), gripper socks and pt to call before getting OOB  High Fall Risk: Yes    Length of Stay:  Expected LOS: 2d 16h  Actual LOS: [address], RN

## 2021-10-22 PROCEDURE — 97530 THERAPEUTIC ACTIVITIES: CPT

## 2021-10-22 PROCEDURE — 74011250637 HC RX REV CODE- 250/637: Performed by: ORTHOPAEDIC SURGERY

## 2021-10-22 PROCEDURE — 74011250637 HC RX REV CODE- 250/637: Performed by: PHYSICIAN ASSISTANT

## 2021-10-22 PROCEDURE — 94760 N-INVAS EAR/PLS OXIMETRY 1: CPT

## 2021-10-22 PROCEDURE — 74011250637 HC RX REV CODE- 250/637: Performed by: NURSE PRACTITIONER

## 2021-10-22 PROCEDURE — 65270000029 HC RM PRIVATE

## 2021-10-22 RX ADMIN — ACETAMINOPHEN 1000 MG: 500 TABLET, FILM COATED ORAL at 17:04

## 2021-10-22 RX ADMIN — FOLIC ACID 1 MG: 1 TABLET ORAL at 08:18

## 2021-10-22 RX ADMIN — ASPIRIN 325 MG: 325 TABLET, COATED ORAL at 17:04

## 2021-10-22 RX ADMIN — Medication 10 ML: at 06:00

## 2021-10-22 RX ADMIN — ACETAMINOPHEN 1000 MG: 500 TABLET, FILM COATED ORAL at 05:33

## 2021-10-22 RX ADMIN — HYDROMORPHONE HYDROCHLORIDE 4 MG: 2 TABLET ORAL at 08:18

## 2021-10-22 RX ADMIN — THIAMINE HCL TAB 100 MG 100 MG: 100 TAB at 08:18

## 2021-10-22 RX ADMIN — HYDROMORPHONE HYDROCHLORIDE 4 MG: 2 TABLET ORAL at 12:21

## 2021-10-22 RX ADMIN — Medication 10 ML: at 12:22

## 2021-10-22 RX ADMIN — BUPROPION HYDROCHLORIDE 150 MG: 150 TABLET, EXTENDED RELEASE ORAL at 08:18

## 2021-10-22 RX ADMIN — HYDROMORPHONE HYDROCHLORIDE 4 MG: 2 TABLET ORAL at 17:04

## 2021-10-22 RX ADMIN — HYDROMORPHONE HYDROCHLORIDE 4 MG: 2 TABLET ORAL at 02:43

## 2021-10-22 RX ADMIN — Medication 200 MG: at 08:18

## 2021-10-22 RX ADMIN — PANTOPRAZOLE SODIUM 40 MG: 40 TABLET, DELAYED RELEASE ORAL at 08:18

## 2021-10-22 RX ADMIN — DOCUSATE SODIUM 50MG AND SENNOSIDES 8.6MG 1 TABLET: 8.6; 5 TABLET, FILM COATED ORAL at 17:04

## 2021-10-22 RX ADMIN — ACETAMINOPHEN 1000 MG: 500 TABLET, FILM COATED ORAL at 12:21

## 2021-10-22 RX ADMIN — FAMOTIDINE 20 MG: 20 TABLET ORAL at 08:18

## 2021-10-22 RX ADMIN — FAMOTIDINE 20 MG: 20 TABLET ORAL at 17:04

## 2021-10-22 RX ADMIN — DOCUSATE SODIUM 50MG AND SENNOSIDES 8.6MG 1 TABLET: 8.6; 5 TABLET, FILM COATED ORAL at 08:18

## 2021-10-22 RX ADMIN — DOXEPIN HYDROCHLORIDE 25 MG: 25 CAPSULE ORAL at 22:42

## 2021-10-22 RX ADMIN — ASPIRIN 325 MG: 325 TABLET, COATED ORAL at 08:18

## 2021-10-22 RX ADMIN — POLYETHYLENE GLYCOL 3350 17 G: 17 POWDER, FOR SOLUTION ORAL at 08:17

## 2021-10-22 NOTE — PROGRESS NOTES
Transition of Care Plan:  RUR:  7% low risk for readmission   Disposition: SNF- referral sent to [facility] (2nd and 3rd choices pending) (will require insurance auth)   Follow up appointments: PCP, Ortho  DME needed: Pt has a cane and walker  Transportation at Discharge: Pt's spouse vs BLS  [place] or means to access home: Spouse will have        IM Medicare Letter: N/A - commercial insurance   Is patient a BCPI-A Bundle:  No                    If yes, was Bundle Letter given?:     Caregiver Contact: Pt's spouse, Uyen Flores cher) [phone]  Discharge Caregiver contacted prior to discharge? Unit CM to follow.     CM met with patient at the bedside to discuss 2nd and 3rd SNF options. Patient choose [facility] as well as [facility] [employer]. FOC signed and placed in chart. Patient will require an insurance authorization for SNF placement. Authorization started by [employer]. Expected discharge Monday 10/25/21 pending insurance authorization .       VEGA Martinez, RN, BSW   RN Care Manager

## 2021-10-22 NOTE — PROGRESS NOTES
Transition of Care Plan:    RUR:7% low risk  Disposition: [address] [facility] (Pending insurance authorization), patient also has chosen [facility] and [facility] [employer] however, both also require authorization and patient's first choice is [facility]. Follow up appointments: to be arranged by SNF  DME needed: To be provided by SNF  Transportation at Discharge: [facility] vs family transport  Gerlean Octave or means to access home:   Going to SNF     IM Medicare Letter: Commercial insurance no IMM required  Is patient a BCPI-A Bundle:     Pt not identified as bundle patient      If yes, was Bundle Letter given?:     Caregiver Contact:  Spouse, Damian Serna  Discharge Caregiver contacted prior to discharge? Family to be notified of discharge plan. Pt requiring insurance authorization for admission to SNF. Pt's first choice is [facility] and they started authorization Friday afternoon (10/20/2021). Pt has provided additional SNF choices but would also require insurance auth for each of these facilities, patient's preference is [facility]. Likely unable to discharge until Monday to [facility] due to need for insurance auth and female bed availability on the isolation unit due to patient not having a COVID vaccination. Care Management Interventions  PCP Verified by CM: Yes  Palliative Care Criteria Met (RRAT>21 & CHF Dx)?: No  Mode of Transport at Discharge: Other (see comment) (Pt's spouse)  Transition of Care Consult (CM Consult): Discharge Planning  Discharge Durable Medical Equipment: No (Pt has a walker and cane)  Physical Therapy Consult: No  Occupational Therapy Consult: No  Speech Therapy Consult: No  Support Systems: Spouse/Significant Other, Other (Comment) (spouse works during the day)  Confirm Follow Up Transport: Family (Spouse)  Discharge Location  Discharge Placement: Skilled nursing facility    [facility]ik[facility] C[facility], [address] - [address] [place]  [facility] [employer] ACP Facilitator  Zone Phone: [phone]

## 2021-10-22 NOTE — PROGRESS NOTES
End of Shift Note    Bedside shift change report given to JADE Mueller (oncoming nurse) by Keisah Mireles RN (offgoing nurse). Report included the following information SBAR, Kardex, Procedure Summary, Intake/Output, MAR and Recent Results    Shift worked:  7-7     Shift summary and any significant changes:          Concerns for physician to address:       Zone phone for oncoming shift:   [phone]       Activity:  Activity Level: Up with Assistance  Number times ambulated in hallways past shift: 0  Number of times OOB to chair past shift: 1    Cardiac:   Cardiac Monitoring: No      Cardiac Rhythm: Sinus Rhythm    Access:   Current line(s): PIV     Genitourinary:   Urinary status: voiding and external catheter    Respiratory:   O2 Device: None (Room air)  Chronic home O2 use?: NO  Incentive spirometer at bedside: YES     GI:  Last Bowel Movement Date: 10/19/21  Current diet:  ADULT DIET Regular; 4 carb choices (60 gm/meal)  Passing flatus: YES  Tolerating current diet: YES       Pain Management:   Patient states pain is manageable on current regimen: YES    Skin:  Hilton Score: 20  Interventions: increase time out of bed, PT/OT consult, internal/external urinary devices and nutritional support     Patient Safety:  Fall Score:  Total Score: 4  Interventions: bed/chair alarm, assistive device (walker, cane, etc), gripper socks, pt to call before getting OOB and stay with me (per policy)  High Fall Risk: Yes    Length of Stay:  Expected LOS: 2d 16h  Actual LOS: 3      Keisha Mireles RN

## 2021-10-22 NOTE — PROGRESS NOTES
Hospitalist Progress Note    NAME: Brittany Monte   :  1960   MRN:  [id number]       Assessment / Plan:  Left lateral tibial plateau fracture  -S/P ORIF 10/20  -on ASA BID  -bowel regimen  -PT OT eval.  Recommending SNF. Will need COVID screen over the weekend    Chronic alcoholism  -she reports that she has not been drinking much lately. Usually liquor if she does  -mood stable. Has not needed librium prn  -Continue multivitamins    Dyslipidemia  COPD  Chronic smoking  History of gastric ulcer  Hypertension  History of uterine fibroids  -Continue home PPI, Wellbutrin, nicotine patch      Body mass index is 22.48 kg/m². Code status: Full  Prophylaxis: per ortho  Recommended Disposition: Home w/Family     Subjective:     Chief Complaint / Reason for Physician Visit  Follow up ETOH, COPD      Review of Systems:  Symptom Y/N Comments  Symptom Y/N Comments   Fever/Chills    Chest Pain     Poor Appetite    Edema     Cough    Abdominal Pain     Sputum    Joint Pain     SOB/GRIER    Pruritis/Rash     Nausea/vomit    Tolerating PT/OT     Diarrhea    Tolerating Diet     Constipation    Other       Could NOT obtain due to:      Objective:     VITALS:   Last 24hrs VS reviewed since prior progress note. Most recent are:  Patient Vitals for the past 24 hrs:   Temp Pulse Resp BP SpO2   10/22/21 0823 98.2 °F (36.8 °C) 90 18 (!) 111/94 98 %   10/21/21 1915 98.5 °F (36.9 °C) 92 18 (!) 110/56 95 %   10/21/21 1606 98.1 °F (36.7 °C) 90 18 114/70 100 %   10/21/21 1223 97.8 °F (36.6 °C)         No intake or output data in the 24 hours ending 10/22/21 1219     PHYSICAL EXAM:  General: WD, WN. Alert, cooperative, no acute distress    EENT:  EOMI. Anicteric sclerae. MMM  Resp:  CTA bilaterally, no wheezing or rales.   No accessory muscle use  CV:  Regular  rhythm,  No edema  GI:  Soft, Non distended, Non tender.  +Bowel sounds  Neurologic:  Alert and oriented X 3, normal speech,   Psych:   not anxious nor agitated  Skin:  No rashes.   No jaundice  (+) left leg in brace / bandage     Reviewed most current lab test results and cultures  YES  Reviewed most current radiology test results   YES  Review and summation of old records today    NO  Reviewed patient's current orders and MAR    YES  PMH/SH reviewed - no change compared to H&P          Current Facility-Administered Medications:     HYDROmorphone (DILAUDID) tablet 2-4 mg, 2-4 mg, Oral, Q4H PRN, Yamil Joshi PA-C, 4 mg at 10/22/21 0818    sodium chloride (NS) flush 5-40 mL, 5-40 mL, IntraVENous, Q8H, Chris Eric, DO, 10 mL at 10/22/21 0600    sodium chloride (NS) flush 5-40 mL, 5-40 mL, IntraVENous, PRN, Chris Eric, DO    naloxone [facility]) injection 0.4 mg, 0.4 mg, IntraVENous, PRN, Chris Eric, DO    senna-docusate (PERICOLACE) 8.6-50 mg per tablet 1 Tablet, 1 Tablet, Oral, BID, Chris Eric, DO, 1 Tablet at 10/22/21 0818    polyethylene glycol (MIRALAX) packet 17 g, 17 g, Oral, DAILY, Chris Eric, DO, 17 g at 10/22/21 8846    bisacodyL (DULCOLAX) suppository 10 mg, 10 mg, Rectal, DAILY PRN, Chris Eric M, DO    acetaminophen (TYLENOL) tablet 1,000 mg, 1,000 mg, Oral, Q6H, Chris Eric, DO, 1,000 mg at 10/22/21 0533    acetaminophen (TYLENOL) tablet 650 mg, 650 mg, Oral, Q6H PRN, Chris Eric M, DO    ondansetron (ZOFRAN ODT) tablet 4 mg, 4 mg, Oral, Q6H PRN, Chris Eric, DO    famotidine (PEPCID) tablet 20 mg, 20 mg, Oral, BID, Chris Eric, DO, 20 mg at 10/22/21 0818    aspirin delayed-release tablet 325 mg, 325 mg, Oral, BID, Chris Eric, DO, 325 mg at 10/22/21 0818    0.9% sodium chloride infusion, 75 mL/hr, IntraVENous, CONTINUOUS, Trygve Macho, PA-C, Last Rate: 75 mL/hr at 10/20/21 0200, 75 mL/hr at 10/20/21 0200    sodium chloride (NS) flush 5-40 mL, 5-40 mL, IntraVENous, Q8H, Trygve Macho, PA-C, 10 mL at 10/22/21 0600    sodium chloride (NS) flush 5-40 mL, 5-40 mL, IntraVENous, PRN, Trygve Macho, CAROL ANN    naloxone (NARCAN) injection 0.4 mg, 0.4 mg, IntraVENous, PRN, Karyna Martinez PA-C    ondansetron [facility]) injection 4 mg, 4 mg, IntraVENous, Q4H PRN, Karyna Martinez PA-C, 4 mg at 10/19/21 1110    buPROPion XL (WELLBUTRIN XL) tablet 150 mg, 150 mg, Oral, DAILY, Purveyor, Atoosa, ACNP, 150 mg at 10/22/21 0818    chlordiazePOXIDE (LIBRIUM) capsule 5 mg, 5 mg, Oral, TID PRN, Purveyor, Atoosa, ACNP    doxepin (SINEquan) capsule 25 mg, 25 mg, Oral, QHS PRN, Purveyor, Atoosa, ACNP, 25 mg at 41/95/93 4180    folic acid (FOLVITE) tablet 1 mg, 1 mg, Oral, DAILY, Purveyor, Atoosa, ACNP, 1 mg at 10/22/21 0818    magnesium oxide (MAG-OX) tablet 200 mg, 200 mg, Oral, DAILY, Purveyor, Atoosa, ACNP, 200 mg at 10/22/21 0818    pantoprazole (PROTONIX) tablet 40 mg, 40 mg, Oral, ACB, Purveyor, Atoosa, ACNP, 40 mg at 10/22/21 0818    thiamine mononitrate (B-1) tablet 100 mg, 100 mg, Oral, DAILY, Purveyor, Atoosa, ACNP, 100 mg at 10/22/21 0818    nicotine (NICODERM CQ) 21 mg/24 hr patch 1 Patch, 1 Patch, TransDERmal, Q24H, Purveyor, Atoosa, ACNP, 1 Patch at 10/21/21 9371  ________________________________________________________________________  Care Plan discussed with:    Comments   Patient y    Family      RN y    Care Manager     Consultant                        Multidiciplinary team rounds were held today with , nursing, pharmacist and clinical coordinator. Patient's plan of care was discussed; medications were reviewed and discharge planning was addressed.      ________________________________________________________________________  Total NON critical care TIME:  25    Minutes    Total CRITICAL CARE TIME Spent:   Minutes non procedure based      Comments   >50% of visit spent in counseling and coordination of care     ________________________________________________________________________  Charlie Castro MD     Procedures: see electronic medical records for all procedures/Xrays and details which were not copied into this note but were reviewed prior to creation of Plan. LABS:  I reviewed today's most current labs and imaging studies.   Pertinent labs include:  Recent Labs     10/21/21  0329   WBC 10.2   HGB 10.4*   HCT 30.5*   *     Recent Labs     10/21/21  0329      K 3.9      CO2 25   *   BUN 13   CREA 0.59   CA 7.9*       Signed: Yony Hendricks MD

## 2021-10-22 NOTE — PROGRESS NOTES
Problem: Mobility Impaired (Adult and Pediatric)  Goal: *Acute Goals and Plan of Care (Insert Text)  Description: FUNCTIONAL STATUS PRIOR TO ADMISSION: Patient was modified independent using a rolling walker and/or SPC (cane in community) for functional mobility. Pt reports she has given up driving due to her neuropathy. HOME SUPPORT PRIOR TO ADMISSION: The patient lived alone with spouse to provide assistance. Physical Therapy Goals  Initiated 10/21/2021  1. Patient will move from supine to sit and sit to supine , scoot up and down, and roll side to side in bed with modified independence within 7 day(s). 2.  Patient will transfer from bed to chair and chair to bed with supervision/set-up using the least restrictive device within 7 day(s). 3.  Patient will perform sit to stand with supervision/set-up within 7 day(s). 4.  Patient will ambulate with supervision/set-up for 50 feet with the least restrictive device within 7 day(s). 5.  Patient will ascend/descend 4 stairs with handrail(s) with minimal assistance/contact guard assist within 7 day(s). Outcome: Progressing Towards Goal   PHYSICAL THERAPY TREATMENT  Patient: Tyler Nix (89 y.o. female)  Date: 10/22/2021  Diagnosis: Closed fracture of lateral portion of left tibial plateau [D66.575L] <principal problem not specified>  Procedure(s) (LRB):  LEFT TIBIAL PLATEAU OPEN REDUCTION INTERNAL FIXATION (Left) 2 Days Post-Op  Precautions: Fall, TTWB (LLE ; knee immobilizer)  Chart, physical therapy assessment, plan of care and goals were reviewed. ASSESSMENT  Patient continues with skilled PT services and is progressing towards goals. Pt presents with decreased strength and increased pain with mobility. Pt performed bed mobility at CGA/min  A with additional time. Pt requiring cueing for sequencing. Pt performed sit to stand transfer at min A with cueing for hand placement. Pt performed bed to chair transfer at min A with RW.  Pt was able to hop a few steps then able to swivel the rest of the way to the chair. Pt with good adherence of TTWB. Pt educated dn seated exercises with chair push ups . Current Level of Function Impacting Discharge (mobility/balance): bed mobility at min A/CGA, sit to stand transfer min A, bed to chair transfer at min A with RW       Other factors to consider for discharge: pain, TTWB         PLAN :  Patient continues to benefit from skilled intervention to address the above impairments. Continue treatment per established plan of care. to address goals. Recommendation for discharge: (in order for the patient to meet his/her long term goals)  Therapy up to 5 days/week in SNF setting    This discharge recommendation:  Has been made in collaboration with the attending provider and/or case management    IF patient discharges home will need the following DME: rolling walker       SUBJECTIVE:   Patient stated  I don't know how it just broke .     OBJECTIVE DATA SUMMARY:   Critical Behavior:  Neurologic State: Alert  Orientation Level: Oriented X4  Cognition: Follows commands     Functional Mobility Training:  Bed Mobility:     Supine to Sit: Contact guard assistance; Additional time     Scooting: Contact guard assistance        Transfers:  Sit to Stand: Minimum assistance  Stand to Sit: Contact guard assistance        Bed to Chair: Minimum assistance                    Balance:  Sitting: Intact  Standing: Impaired; With support  Standing - Static: Fair;Constant support  Standing - Dynamic : Fair;Constant support  Ambulation/Gait Training:  Distance (ft): 5 Feet (ft)  Assistive Device: Gait belt;Walker, rolling  Ambulation - Level of Assistance: Minimal assistance; Additional time        Gait Abnormalities: Decreased step clearance (hop to gait)              Speed/Diana: Slow;Shuffled  Step Length: Right shortened        Interventions: Verbal cues; Safety awareness training       Therapeutic Exercises:   Chair push ups x5    Pain Rating:  Pt reported increased pain with mobility. Activity Tolerance:   Fair and requires rest breaks    After treatment patient left in no apparent distress:   Sitting in chair and Call bell within reach    COMMUNICATION/COLLABORATION:   The patients plan of care was discussed with: Registered nurse.      Aidee Navarro PTA   Time Calculation: 14 mins

## 2021-10-22 NOTE — PROGRESS NOTES
End of Shift Note    Bedside shift change report given to Marlowe Peabody, RN (oncoming nurse) by Abhay Rao LPN (offgoing nurse). Report included the following information SBAR, Kardex, Procedure Summary, Intake/Output, MAR and Recent Results    Shift worked:  7p-7:30a     Shift summary and any significant changes:          Concerns for physician to address:       Zone phone for oncoming shift:   [phone]       Activity:  Activity Level: Up with Assistance  Number times ambulated in hallways past shift: 0  Number of times OOB to chair past shift: 0    Cardiac:   Cardiac Monitoring: No      Cardiac Rhythm: Sinus Rhythm    Access:   Current line(s): PIV     Genitourinary:   Urinary status: voiding and external catheter    Respiratory:   O2 Device: None (Room air)  Chronic home O2 use?: NO  Incentive spirometer at bedside: YES     GI:  Last Bowel Movement Date: 10/19/21  Current diet:  ADULT DIET Regular; 4 carb choices (60 gm/meal)  Passing flatus: YES  Tolerating current diet: YES       Pain Management:   Patient states pain is manageable on current regimen: YES    Skin:  Hilton Score: 20  Interventions: increase time out of bed, PT/OT consult, internal/external urinary devices and nutritional support     Patient Safety:  Fall Score:  Total Score: 4  Interventions: bed/chair alarm, assistive device (walker, cane, etc), gripper socks, pt to call before getting OOB and stay with me (per policy)  High Fall Risk: Yes    Length of Stay:  Expected LOS: 2d 16h  Actual LOS: 2      Abhay Rao LPN

## 2021-10-22 NOTE — PROGRESS NOTES
ORTHO - Progress Note  Post Op day: 2 Days [address]     [id number]  female    62 y.o.    1960    Admit date:10/19/2021  Date of Surgery:10/20/2021   Procedures:Procedure(s):LEFT TIBIAL PLATEAU OPEN REDUCTION INTERNAL FIXATION  Surgeon:Surgeon(s) and Role:   Jarrod Yepez, DO - Primary        SUBJECTIVE:     Hedy Kanner is a 62 y.o. female resting in the bed,  at bedside. Patient has complaints of appropriate post-op pain, tolerating PO pain medications, oxy Q4---states the left knee hurts worse after working with PT. Denies F/C, nausea, vomiting, dizziness, lightheadedness, chest pain, or shortness of breath. OBJECTIVE:       Physical Exam:  General: alert, cooperative, no distress. Gastrointestinal:  non-distended . Cardiovascular: equal pulses in the upper / lower extremities,  Brisk cap refill in all distal extremities   Genitourinary: Voiding independently   Respiratory: No respiratory distress   Neurological:Neurovascular exam within normal limits. Senstion intact: LE bilat. Motor: + DF/PF/EHL. Musculoskeletal: Joseph's sign negative in bilateral lower extremities. Calves soft, supple, non-tender upon palpation or with passive stretch. Dressing/Wound:  Dried bloody drainage and intact. No significant erythema or swelling. Vital Signs:       Patient Vitals for the past 8 hrs:   BP Temp Pulse Resp SpO2   10/22/21 0823 (!) 111/94 98.2 °F (36.8 °C) 90 18 98 %                                          Temp (24hrs), Av.7 °F (36.5 °C), Min:96 °F (35.6 °C), Max:98.5 °F (36.9 °C)      Labs:        Recent Labs     10/21/21  0329 10/19/21  1103 10/19/21  1103   HCT 30.5*   < > 40.0   HGB 10.4*   < > 13.7   INR  --   --  1.1    < > = values in this interval not displayed.      PT/OT:        Gait  Speed/Diana: Pace decreased (<100 feet/min), Slow  Step Length: Left shortened, Right shortened  Gait Abnormalities: Antalgic, Decreased step clearance, Other (TTWB LLE)  Ambulation - Level of Assistance: Minimal assistance  Distance (ft): 5 Feet (ft) (bed to bedside chair )  Assistive Device: Gait belt, Walker, rolling  Interventions: Safety awareness training, Tactile cues, Verbal cues  Interventions: Safety awareness training, Tactile cues, Verbal cues  ASSESSMENT / PLAN:   Active Problems:    Hyperlipidemia (6/26/2014)      Emphysema of lung ([facility] [address] (1/27/2018)      Smoker (3/8/2021)      History of gastric ulcer (3/8/2021)      Closed fracture of lateral portion of left tibial plateau (29/10/7373)         -  Continue PT/OT TTWB on the LLE  -  Wear knee immobilizer  -  Dressing changed today, gentle ACE wrap fitted  -  DVT prophylaxis- SCD w/  BID  -  DC planning - SNF likely     Smoking, ETOH cessation reviewed      Signed By: Manuel Lemus PA-C

## 2021-10-22 NOTE — PROGRESS NOTES
Problem: Falls - Risk of  Goal: *Absence of Falls  Description: Document Shanika Odonnell Fall Risk and appropriate interventions in the flowsheet. Outcome: Progressing Towards Goal  Note: Fall Risk Interventions:  Mobility Interventions: Utilize walker, cane, or other assistive device, PT Consult for assist device competence, PT Consult for mobility concerns, Patient to call before getting OOB, OT consult for ADLs         Medication Interventions: Patient to call before getting OOB, Teach patient to arise slowly    Elimination Interventions: Elevated toilet seat, Call light in reach, Toileting schedule/hourly rounds    History of Falls Interventions: Bed/chair exit alarm, Door open when patient unattended         Problem: Pressure Injury - Risk of  Goal: *Prevention of pressure injury  Description: Document Hilton Scale and appropriate interventions in the flowsheet.   Outcome: Progressing Towards Goal  Note: Pressure Injury Interventions:  Sensory Interventions: Check visual cues for pain    Moisture Interventions: Maintain skin hydration (lotion/cream), Apply protective barrier, creams and emollients    Activity Interventions: PT/OT evaluation, Pressure redistribution bed/mattress(bed type), Increase time out of bed    Mobility Interventions: HOB 30 degrees or less, Pressure redistribution bed/mattress (bed type), PT/OT evaluation    Nutrition Interventions: Document food/fluid/supplement intake    Friction and Shear Interventions: Apply protective barrier, creams and emollients

## 2021-10-23 PROCEDURE — 74011250637 HC RX REV CODE- 250/637: Performed by: NURSE PRACTITIONER

## 2021-10-23 PROCEDURE — 74011250637 HC RX REV CODE- 250/637: Performed by: PHYSICIAN ASSISTANT

## 2021-10-23 PROCEDURE — 97530 THERAPEUTIC ACTIVITIES: CPT

## 2021-10-23 PROCEDURE — 65270000029 HC RM PRIVATE

## 2021-10-23 PROCEDURE — 74011250637 HC RX REV CODE- 250/637: Performed by: ORTHOPAEDIC SURGERY

## 2021-10-23 PROCEDURE — 94760 N-INVAS EAR/PLS OXIMETRY 1: CPT

## 2021-10-23 RX ADMIN — POLYETHYLENE GLYCOL 3350 17 G: 17 POWDER, FOR SOLUTION ORAL at 08:50

## 2021-10-23 RX ADMIN — FAMOTIDINE 20 MG: 20 TABLET ORAL at 17:02

## 2021-10-23 RX ADMIN — HYDROMORPHONE HYDROCHLORIDE 4 MG: 2 TABLET ORAL at 06:37

## 2021-10-23 RX ADMIN — Medication 10 ML: at 06:00

## 2021-10-23 RX ADMIN — FAMOTIDINE 20 MG: 20 TABLET ORAL at 08:49

## 2021-10-23 RX ADMIN — Medication 10 ML: at 13:03

## 2021-10-23 RX ADMIN — Medication 10 ML: at 01:57

## 2021-10-23 RX ADMIN — ASPIRIN 325 MG: 325 TABLET, COATED ORAL at 08:49

## 2021-10-23 RX ADMIN — THIAMINE HCL TAB 100 MG 100 MG: 100 TAB at 08:49

## 2021-10-23 RX ADMIN — DOCUSATE SODIUM 50MG AND SENNOSIDES 8.6MG 1 TABLET: 8.6; 5 TABLET, FILM COATED ORAL at 08:49

## 2021-10-23 RX ADMIN — HYDROMORPHONE HYDROCHLORIDE 4 MG: 2 TABLET ORAL at 11:47

## 2021-10-23 RX ADMIN — ACETAMINOPHEN 1000 MG: 500 TABLET, FILM COATED ORAL at 00:53

## 2021-10-23 RX ADMIN — Medication 10 ML: at 22:07

## 2021-10-23 RX ADMIN — ACETAMINOPHEN 1000 MG: 500 TABLET, FILM COATED ORAL at 06:37

## 2021-10-23 RX ADMIN — PANTOPRAZOLE SODIUM 40 MG: 40 TABLET, DELAYED RELEASE ORAL at 08:50

## 2021-10-23 RX ADMIN — DOXEPIN HYDROCHLORIDE 25 MG: 25 CAPSULE ORAL at 21:01

## 2021-10-23 RX ADMIN — Medication 200 MG: at 08:49

## 2021-10-23 RX ADMIN — BUPROPION HYDROCHLORIDE 150 MG: 150 TABLET, EXTENDED RELEASE ORAL at 08:50

## 2021-10-23 RX ADMIN — HYDROMORPHONE HYDROCHLORIDE 4 MG: 2 TABLET ORAL at 17:02

## 2021-10-23 RX ADMIN — ACETAMINOPHEN 1000 MG: 500 TABLET, FILM COATED ORAL at 17:02

## 2021-10-23 RX ADMIN — ASPIRIN 325 MG: 325 TABLET, COATED ORAL at 17:02

## 2021-10-23 RX ADMIN — DOCUSATE SODIUM 50MG AND SENNOSIDES 8.6MG 1 TABLET: 8.6; 5 TABLET, FILM COATED ORAL at 17:02

## 2021-10-23 RX ADMIN — HYDROMORPHONE HYDROCHLORIDE 4 MG: 2 TABLET ORAL at 21:01

## 2021-10-23 RX ADMIN — HYDROMORPHONE HYDROCHLORIDE 4 MG: 2 TABLET ORAL at 01:56

## 2021-10-23 RX ADMIN — FOLIC ACID 1 MG: 1 TABLET ORAL at 08:49

## 2021-10-23 NOTE — PROGRESS NOTES
Problem: Mobility Impaired (Adult and Pediatric)  Goal: *Acute Goals and Plan of Care (Insert Text)  Description: FUNCTIONAL STATUS PRIOR TO ADMISSION: Patient was modified independent using a rolling walker and/or SPC (cane in community) for functional mobility. Pt reports she has given up driving due to her neuropathy. HOME SUPPORT PRIOR TO ADMISSION: The patient lived alone with spouse to provide assistance. Physical Therapy Goals  Initiated 10/21/2021  1. Patient will move from supine to sit and sit to supine , scoot up and down, and roll side to side in bed with modified independence within 7 day(s). 2.  Patient will transfer from bed to chair and chair to bed with supervision/set-up using the least restrictive device within 7 day(s). 3.  Patient will perform sit to stand with supervision/set-up within 7 day(s). 4.  Patient will ambulate with supervision/set-up for 50 feet with the least restrictive device within 7 day(s). 5.  Patient will ascend/descend 4 stairs with handrail(s) with minimal assistance/contact guard assist within 7 day(s). Outcome: Progressing Towards Goal   PHYSICAL THERAPY TREATMENT  Patient: Luana Copeland (89 y.o. female)  Date: 10/23/2021  Diagnosis: Closed fracture of lateral portion of left tibial plateau [Y96.114J] <principal problem not specified>  Procedure(s) (LRB):  LEFT TIBIAL PLATEAU OPEN REDUCTION INTERNAL FIXATION (Left) 3 Days Post-Op  Precautions: Fall, TTWB (LLE ; knee immobilizer)  Chart, physical therapy assessment, plan of care and goals were reviewed. ASSESSMENT  Patient continues with skilled PT services and is progressing towards goals. Pt received supine in bed with L knee immobilizer in place. Pt tolerated session well ,but continues to be limited by pain, decreased functional mobility, impaired balance and gait, increased risk for falls and dependency from baseline. Pt tolerated transfers and short gait training with  min A x 1 and RW.  Pt with good adherence to TTWB on the L during static and dynamic standing. Pt remained seated in the chair with LLE elevated and supported. Pt will continue to benefit from SNF placement upon discharge to continue therapy efforts. .     Current Level of Function Impacting Discharge (mobility/balance): min A 5 ft with RW    Other factors to consider for discharge: previously mod I          PLAN :  Patient continues to benefit from skilled intervention to address the above impairments. Continue treatment per established plan of care. to address goals. Recommendation for discharge: (in order for the patient to meet his/her long term goals)  Therapy up to 5 days/week in SNF setting    This discharge recommendation:  Has been made in collaboration with the attending provider and/or case management    IF patient discharges home will need the following DME: to be determined (TBD)       SUBJECTIVE:   Patient stated It hurts too much to even think about putting my whole foot down.     OBJECTIVE DATA SUMMARY:   Critical Behavior:  Neurologic State: Alert, Appropriate for age  Orientation Level: Oriented X4  Cognition: Follows commands     Functional Mobility Training:  Bed Mobility:     Supine to Sit: Contact guard assistance     Scooting: Stand-by assistance        Transfers:  Sit to Stand: Contact guard assistance  Stand to Sit: Contact guard assistance        Bed to Chair: Minimum assistance                    Balance:  Sitting: Intact  Standing: Impaired; With support  Standing - Static: Fair;Constant support  Standing - Dynamic : Fair;Constant support  Ambulation/Gait Training:  Distance (ft): 5 Feet (ft)  Assistive Device: Gait belt;Walker, rolling  Ambulation - Level of Assistance: Minimal assistance        Gait Abnormalities: Decreased step clearance (hop to gait)     Left Side Weight Bearing: Toe touch        Speed/Diana: Slow  Step Length: Right shortened                    Stairs:               Therapeutic Exercises:     Pain Rating:  Pt reported 7/10 pain with activity    Activity Tolerance:   Good    After treatment patient left in no apparent distress:   Sitting in chair and Call bell within reach    COMMUNICATION/COLLABORATION:   The patients plan of care was discussed with: Registered nurse.      Og Bansal, PT, DPT   Time Calculation: 11 mins

## 2021-10-23 NOTE — PROGRESS NOTES
Problem: Falls - Risk of  Goal: *Absence of Falls  Description: Document [place] Fall Risk and appropriate interventions in the flowsheet. 10/23/2021 0717 by Venkat Javier RN  Outcome: Progressing Towards Goal  Note: Fall Risk Interventions:  Mobility Interventions: Utilize walker, cane, or other assistive device, PT Consult for assist device competence, PT Consult for mobility concerns, Patient to call before getting OOB         Medication Interventions: Patient to call before getting OOB, Teach patient to arise slowly, Bed/chair exit alarm    Elimination Interventions: Call light in reach, Elevated toilet seat, Toilet paper/wipes in reach, Toileting schedule/hourly rounds    History of Falls Interventions: Bed/chair exit alarm, Door open when patient unattended      10/22/2021 1850 by Venkat Javier RN  Outcome: Progressing Towards Goal  Note: Fall Risk Interventions:  Mobility Interventions: Utilize walker, cane, or other assistive device, PT Consult for assist device competence, PT Consult for mobility concerns, Patient to call before getting OOB, OT consult for ADLs         Medication Interventions: Patient to call before getting OOB, Teach patient to arise slowly    Elimination Interventions: Elevated toilet seat, Call light in reach, Toileting schedule/hourly rounds    History of Falls Interventions: Bed/chair exit alarm, Door open when patient unattended         Problem: Pressure Injury - Risk of  Goal: *Prevention of pressure injury  Description: Document Hilton Scale and appropriate interventions in the flowsheet.   10/23/2021 0717 by Venkat Javier RN  Outcome: Progressing Towards Goal  Note: Pressure Injury Interventions:  Sensory Interventions: Check visual cues for pain    Moisture Interventions: Maintain skin hydration (lotion/cream), Apply protective barrier, creams and emollients    Activity Interventions: Increase time out of bed, Pressure redistribution bed/mattress(bed type), PT/OT evaluation    Mobility Interventions: HOB 30 degrees or less, Pressure redistribution bed/mattress (bed type), PT/OT evaluation    Nutrition Interventions: Document food/fluid/supplement intake    Friction and Shear Interventions: Apply protective barrier, creams and emollients             10/22/2021 1850 by Eva Alvarado RN  Outcome: Progressing Towards Goal  Note: Pressure Injury Interventions:  Sensory Interventions: Check visual cues for pain    Moisture Interventions: Maintain skin hydration (lotion/cream), Apply protective barrier, creams and emollients    Activity Interventions: PT/OT evaluation, Pressure redistribution bed/mattress(bed type), Increase time out of bed    Mobility Interventions: HOB 30 degrees or less, Pressure redistribution bed/mattress (bed type), PT/OT evaluation    Nutrition Interventions: Document food/fluid/supplement intake    Friction and Shear Interventions: Apply protective barrier, creams and emollients                Problem: Pain  Goal: *Control of Pain  10/23/2021 0717 by Eva Alvarado RN  Outcome: Progressing Towards Goal  10/22/2021 1850 by Eva Alvarado RN  Outcome: Progressing Towards Goal

## 2021-10-23 NOTE — PROGRESS NOTES
End of Shift Note    Bedside shift change report given to Jessica Doss RN (oncoming nurse) by Massiel Tim LPN (offgoing nurse). Report included the following information SBAR, Kardex, Procedure Summary, Intake/Output, MAR and Recent Results    Shift worked:  7p-7:30a     Shift summary and any significant changes:          Concerns for physician to address:       Zone phone for oncoming shift:   [phone]       Activity:  Activity Level: Up with Assistance  Number times ambulated in hallways past shift: 0  Number of times OOB to chair past shift: 0    Cardiac:   Cardiac Monitoring: No      Cardiac Rhythm: Sinus Rhythm    Access:   Current line(s): PIV     Genitourinary:   Urinary status: voiding and external catheter    Respiratory:   O2 Device: None (Room air)  Chronic home O2 use?: NO  Incentive spirometer at bedside: YES     GI:  Last Bowel Movement Date: 10/19/21  Current diet:  ADULT DIET Regular; 4 carb choices (60 gm/meal)  Passing flatus: YES  Tolerating current diet: YES       Pain Management:   Patient states pain is manageable on current regimen: YES    Skin:  Hilton Score: 20  Interventions: float heels, increase time out of bed, internal/external urinary devices and nutritional support     Patient Safety:  Fall Score:  Total Score: 4  Interventions: bed/chair alarm, assistive device (walker, cane, etc), gripper socks, pt to call before getting OOB and stay with me (per policy)  High Fall Risk: Yes    Length of Stay:  Expected LOS: 2d 16h  Actual LOS: 3      Massiel Tim LPN

## 2021-10-23 NOTE — PROGRESS NOTES
ORTHO - Progress Note  Post Op day: 3 Days [address]     [id number]  female    62 y.o.    1960    Admit date:10/19/2021  Date of Surgery:10/20/2021   Procedures:Procedure(s):LEFT TIBIAL PLATEAU OPEN REDUCTION INTERNAL FIXATION  Surgeon:Surgeon(s) and Role:   Carter Tineo, DO - Primary        SUBJECTIVE:     Pelon Bender is a 62 y.o. female resting in the bed. Patient has complaints of appropriate post-op pain, tolerating PO pain medications. Denies F/C, nausea, vomiting, dizziness, lightheadedness, chest pain, or shortness of breath. OBJECTIVE:       Physical Exam:  General: alert, cooperative, no distress. Gastrointestinal:  non-distended . Cardiovascular: equal pulses in the lower extremities,  Brisk cap refill in all distal extremities   Genitourinary: Voiding independently   Respiratory: No respiratory distress   Neurological:Neurovascular exam within normal limits. Senstion intact: LE bilat. Motor: + DF/PF/EHL. Musculoskeletal: Joseph's sign negative in bilateral lower extremities. Calves soft, supple, non-tender upon palpation or with passive stretch. Dressing/Wound:  Trace bloody drainage and intact. No significant erythema or swelling.     Vital Signs:       Patient Vitals for the past 8 hrs:   BP Temp Pulse Resp SpO2   10/23/21 0741 104/60 98.4 °F (36.9 °C) 95 16 99 %                                          Temp (24hrs), Av.4 °F (36.9 °C), Min:98.3 °F (36.8 °C), Max:98.5 °F (36.9 °C)      Labs:        Recent Labs     10/21/21  0329   HCT 30.5*   HGB 10.4*     PT/OT:        Gait  Speed/Diana: Slow, Shuffled  Step Length: Right shortened  Gait Abnormalities: Decreased step clearance (hop to gait)  Ambulation - Level of Assistance: Minimal assistance, Additional time  Distance (ft): 5 Feet (ft)  Assistive Device: Gait belt, Walker, rolling  Interventions: Verbal cues, Safety awareness training  Interventions: Verbal cues, Safety awareness training  ASSESSMENT / PLAN:   Active Problems:    Hyperlipidemia (6/26/2014)      Emphysema of lung ([address] (1/27/2018)      Smoker (3/8/2021)      History of gastric ulcer (3/8/2021)      Closed fracture of lateral portion of left tibial plateau (98/10/2553)       -  Continue PT/OT TTWB on the LLE  -  Wear knee immobilizer-  NO knee flexion at this time  -  Dressing changed today, gentle ACE wrap fitted  -  DVT prophylaxis- SCD w/  BID  -  DC planning - SNF awaiting placement      Smoking, ETOH cessation reviewed    Signed By: Timo Nolasco PA-C

## 2021-10-23 NOTE — PROGRESS NOTES
End of Shift Note    Bedside shift change report given to Radu Holliday RN (oncoming nurse) by Mojgan Shay RN (offgoing nurse). Report included the following information SBAR, Kardex, Procedure Summary, Intake/Output, MAR and Recent Results    Shift worked:  7a-7p     Shift summary and any significant changes:          Concerns for physician to address:       Zone phone for oncoming shift:   [phone]       Activity:  Activity Level: Up with Assistance  Number times ambulated in hallways past shift: 0  Number of times OOB to chair past shift: 1    Cardiac:   Cardiac Monitoring: No      Cardiac Rhythm: Sinus Rhythm    Access:   Current line(s): PIV     Genitourinary:   Urinary status: voiding and external catheter    Respiratory:   O2 Device: None (Room air)  Chronic home O2 use?: NO  Incentive spirometer at bedside: YES     GI:  Last Bowel Movement Date: 10/19/21  Current diet:  ADULT DIET Regular; 4 carb choices (60 gm/meal)  Passing flatus: YES  Tolerating current diet: YES       Pain Management:   Patient states pain is manageable on current regimen: YES    Skin:  Hilton Score: 20  Interventions: float heels, increase time out of bed, internal/external urinary devices and nutritional support     Patient Safety:  Fall Score:  Total Score: 4  Interventions: bed/chair alarm, assistive device (walker, cane, etc), gripper socks, pt to call before getting OOB and stay with me (per policy)  High Fall Risk: Yes    Length of Stay:  Expected LOS: 2d 16h  [facility]

## 2021-10-24 LAB
ANION GAP SERPL CALC-SCNC: 4 MMOL/L (ref 5–15)
BASOPHILS # BLD: 0 K/UL (ref 0–0.1)
BASOPHILS NFR BLD: 0 % (ref 0–1)
BUN SERPL-MCNC: 12 MG/DL (ref 6–20)
BUN/CREAT SERPL: 22 (ref 12–20)
CALCIUM SERPL-MCNC: 8.6 MG/DL (ref 8.5–10.1)
CHLORIDE SERPL-SCNC: 105 MMOL/L (ref 97–108)
CO2 SERPL-SCNC: 27 MMOL/L (ref 21–32)
COVID-19 RAPID TEST, COVR: NOT DETECTED
CREAT SERPL-MCNC: 0.54 MG/DL (ref 0.55–1.02)
DIFFERENTIAL METHOD BLD: ABNORMAL
EOSINOPHIL # BLD: 0.3 K/UL (ref 0–0.4)
EOSINOPHIL NFR BLD: 5 % (ref 0–7)
ERYTHROCYTE [DISTWIDTH] IN BLOOD BY AUTOMATED COUNT: 14.9 % (ref 11.5–14.5)
GLUCOSE BLD STRIP.AUTO-MCNC: 102 MG/DL (ref 65–117)
GLUCOSE BLD STRIP.AUTO-MCNC: 113 MG/DL (ref 65–117)
GLUCOSE BLD STRIP.AUTO-MCNC: 119 MG/DL (ref 65–117)
GLUCOSE BLD STRIP.AUTO-MCNC: 147 MG/DL (ref 65–117)
GLUCOSE SERPL-MCNC: 71 MG/DL (ref 65–100)
HCT VFR BLD AUTO: 32 % (ref 35–47)
HGB BLD-MCNC: 11 G/DL (ref 11.5–16)
IMM GRANULOCYTES # BLD AUTO: 0 K/UL (ref 0–0.04)
IMM GRANULOCYTES NFR BLD AUTO: 0 % (ref 0–0.5)
LYMPHOCYTES # BLD: 2.6 K/UL (ref 0.8–3.5)
LYMPHOCYTES NFR BLD: 42 % (ref 12–49)
MCH RBC QN AUTO: 32.1 PG (ref 26–34)
MCHC RBC AUTO-ENTMCNC: 34.4 G/DL (ref 30–36.5)
MCV RBC AUTO: 93.3 FL (ref 80–99)
MONOCYTES # BLD: 0.7 K/UL (ref 0–1)
MONOCYTES NFR BLD: 12 % (ref 5–13)
NEUTS SEG # BLD: 2.5 K/UL (ref 1.8–8)
NEUTS SEG NFR BLD: 41 % (ref 32–75)
NRBC # BLD: 0 K/UL (ref 0–0.01)
NRBC BLD-RTO: 0 PER 100 WBC
PLATELET # BLD AUTO: 152 K/UL (ref 150–400)
PMV BLD AUTO: 10.3 FL (ref 8.9–12.9)
POTASSIUM SERPL-SCNC: 3.8 MMOL/L (ref 3.5–5.1)
RBC # BLD AUTO: 3.43 M/UL (ref 3.8–5.2)
RBC MORPH BLD: ABNORMAL
SERVICE CMNT-IMP: ABNORMAL
SERVICE CMNT-IMP: ABNORMAL
SERVICE CMNT-IMP: NORMAL
SERVICE CMNT-IMP: NORMAL
SODIUM SERPL-SCNC: 136 MMOL/L (ref 136–145)
SOURCE, COVRS: NORMAL
WBC # BLD AUTO: 6.1 K/UL (ref 3.6–11)
WBC MORPH BLD: ABNORMAL

## 2021-10-24 PROCEDURE — 97530 THERAPEUTIC ACTIVITIES: CPT | Performed by: PHYSICAL THERAPIST

## 2021-10-24 PROCEDURE — 74011250637 HC RX REV CODE- 250/637: Performed by: ORTHOPAEDIC SURGERY

## 2021-10-24 PROCEDURE — 74011250637 HC RX REV CODE- 250/637: Performed by: NURSE PRACTITIONER

## 2021-10-24 PROCEDURE — 85025 COMPLETE CBC W/AUTO DIFF WBC: CPT

## 2021-10-24 PROCEDURE — 74011250637 HC RX REV CODE- 250/637: Performed by: PHYSICIAN ASSISTANT

## 2021-10-24 PROCEDURE — 2709999900 HC NON-CHARGEABLE SUPPLY

## 2021-10-24 PROCEDURE — 36415 COLL VENOUS BLD VENIPUNCTURE: CPT

## 2021-10-24 PROCEDURE — 74011250636 HC RX REV CODE- 250/636: Performed by: PHYSICIAN ASSISTANT

## 2021-10-24 PROCEDURE — 80048 BASIC METABOLIC PNL TOTAL CA: CPT

## 2021-10-24 PROCEDURE — 65270000029 HC RM PRIVATE

## 2021-10-24 PROCEDURE — 87635 SARS-COV-2 COVID-19 AMP PRB: CPT

## 2021-10-24 PROCEDURE — 82962 GLUCOSE BLOOD TEST: CPT

## 2021-10-24 RX ORDER — HYDROMORPHONE HYDROCHLORIDE 2 MG/1
2 TABLET ORAL
Status: DISCONTINUED | OUTPATIENT
Start: 2021-10-24 | End: 2021-10-25 | Stop reason: HOSPADM

## 2021-10-24 RX ADMIN — DOXEPIN HYDROCHLORIDE 25 MG: 25 CAPSULE ORAL at 21:56

## 2021-10-24 RX ADMIN — HYDROMORPHONE HYDROCHLORIDE 4 MG: 2 TABLET ORAL at 05:35

## 2021-10-24 RX ADMIN — POLYETHYLENE GLYCOL 3350 17 G: 17 POWDER, FOR SOLUTION ORAL at 08:58

## 2021-10-24 RX ADMIN — PANTOPRAZOLE SODIUM 40 MG: 40 TABLET, DELAYED RELEASE ORAL at 08:58

## 2021-10-24 RX ADMIN — FOLIC ACID 1 MG: 1 TABLET ORAL at 08:58

## 2021-10-24 RX ADMIN — THIAMINE HCL TAB 100 MG 100 MG: 100 TAB at 08:58

## 2021-10-24 RX ADMIN — DOCUSATE SODIUM 50MG AND SENNOSIDES 8.6MG 1 TABLET: 8.6; 5 TABLET, FILM COATED ORAL at 08:58

## 2021-10-24 RX ADMIN — HYDROMORPHONE HYDROCHLORIDE 4 MG: 2 TABLET ORAL at 01:35

## 2021-10-24 RX ADMIN — SODIUM CHLORIDE 75 ML/HR: 9 INJECTION, SOLUTION INTRAVENOUS at 05:37

## 2021-10-24 RX ADMIN — BUPROPION HYDROCHLORIDE 150 MG: 150 TABLET, EXTENDED RELEASE ORAL at 08:58

## 2021-10-24 RX ADMIN — FAMOTIDINE 20 MG: 20 TABLET ORAL at 17:30

## 2021-10-24 RX ADMIN — HYDROMORPHONE HYDROCHLORIDE 2 MG: 2 TABLET ORAL at 21:56

## 2021-10-24 RX ADMIN — FAMOTIDINE 20 MG: 20 TABLET ORAL at 08:58

## 2021-10-24 RX ADMIN — Medication 10 ML: at 21:56

## 2021-10-24 RX ADMIN — Medication 10 ML: at 05:40

## 2021-10-24 RX ADMIN — HYDROMORPHONE HYDROCHLORIDE 4 MG: 2 TABLET ORAL at 09:11

## 2021-10-24 RX ADMIN — Medication 10 ML: at 21:57

## 2021-10-24 RX ADMIN — DOCUSATE SODIUM 50MG AND SENNOSIDES 8.6MG 1 TABLET: 8.6; 5 TABLET, FILM COATED ORAL at 17:30

## 2021-10-24 RX ADMIN — ACETAMINOPHEN 1000 MG: 500 TABLET, FILM COATED ORAL at 05:34

## 2021-10-24 RX ADMIN — SODIUM CHLORIDE 75 ML/HR: 9 INJECTION, SOLUTION INTRAVENOUS at 19:17

## 2021-10-24 RX ADMIN — ACETAMINOPHEN 1000 MG: 500 TABLET, FILM COATED ORAL at 17:30

## 2021-10-24 RX ADMIN — ACETAMINOPHEN 1000 MG: 500 TABLET, FILM COATED ORAL at 00:53

## 2021-10-24 RX ADMIN — ASPIRIN 325 MG: 325 TABLET, COATED ORAL at 17:30

## 2021-10-24 RX ADMIN — ACETAMINOPHEN 1000 MG: 500 TABLET, FILM COATED ORAL at 11:35

## 2021-10-24 RX ADMIN — ASPIRIN 325 MG: 325 TABLET, COATED ORAL at 08:58

## 2021-10-24 RX ADMIN — ACETAMINOPHEN 1000 MG: 500 TABLET, FILM COATED ORAL at 23:07

## 2021-10-24 RX ADMIN — Medication 200 MG: at 08:58

## 2021-10-24 RX ADMIN — HYDROMORPHONE HYDROCHLORIDE 2 MG: 2 TABLET ORAL at 13:30

## 2021-10-24 NOTE — PROGRESS NOTES
Spiritual Care Assessment/Progress Note  [facility]      NAME: Dameon Malone      MRN: [id number]  AGE: 62 y.o.  SEX: female  Shinto Affiliation: Adventist   Language: English     10/24/2021     Total Time (in minutes): 17     Spiritual Assessment begun in [facility] 2 MED TELE through conversation with:         [x]Patient        [] Family    [] Friend(s)        Reason for Consult: Initial/Spiritual assessment, patient floor     Spiritual beliefs: (Please include comment if needed)     [x] Identifies with a emmie tradition:         [] Supported by a emmie community:            [] Claims no spiritual orientation:           [] Seeking spiritual identity:                [] Adheres to an individual form of spirituality:           [] Not able to assess:                           Identified resources for coping:      [x] Prayer                               [] Music                  [] Guided Imagery     [x] Family/friends                 [] Pet visits     [] Devotional reading                         [] Unknown     [] Other:                                            Interventions offered during this visit: (See comments for more details)    Patient Interventions: Affirmation of emotions/emotional suffering, Affirmation of emmie, Catharsis/review of pertinent events in supportive environment, Iconic (affirming the presence of God/Higher Power), Initial/Spiritual assessment, patient floor, Normalization of emotional/spiritual concerns, Prayer (assurance of), Shinto beliefs/image of God discussed           Plan of Care:     [x] Support spiritual and/or cultural needs    [] Support AMD and/or advance care planning process      [] Support grieving process   [] Coordinate Rites and/or Rituals    [] Coordination with community clergy   [] No spiritual needs identified at this time   [] Detailed Plan of Care below (See Comments)  [] Make referral to Music Therapy  [] Make referral to Pet Therapy     [] Make referral to Addiction services  [] Make referral to [facility]  [] Make referral to Spiritual Care Partner  [] No future visits requested        [x] Follow up upon further referrals     Comments:  Reviewed chart prior to visit on Med Tele for spiritual assessment. No family/friends present. Patient shared she is waiting for her son and daughter-in-law to visit today. She spoke briefly of why she is hospitalized. Ms. Timbo Richardson appears to be coping well at this time; she expressed no spiritual needs or concerns. Her son was recently ordained a deacon. She reports having good spiritual support as well as supportive family. Provided bedside presence, supportive listening and assurance of prayer. Advised of ongoing availability of pastoral support.      REANNA Rouse, [facility], Staff [address]    [address] Paging Service  [phone]

## 2021-10-24 NOTE — PROGRESS NOTES
End of Shift Note    Bedside shift change report given to Andrew Park (oncoming nurse) by Sonia Garrido (offgoing nurse). Report included the following information SBAR, Kardex, Intake/Output, MAR and Recent Results    Shift worked:  [id number]     Shift summary and any significant changes:     Pt admitted to floor from Ortho. No immediate concerns. C/o leg pain r/t time spend with PT yesterday. PRN pain medication given [id number].      Concerns for physician to address:  none     Zone phone for oncoming shift:              Sonia Garrido

## 2021-10-24 NOTE — PROGRESS NOTES
Hospitalist Progress Note    NAME: Aldo Basilio   :  1960   MRN:  [id number]       Assessment / Plan:  Left lateral tibial plateau fracture  -S/P ORIF 10/20  -on ASA BID  -bowel regimen  -PT OT eval.  Recommending SNF. Will need COVID screen over the weekend    Chronic alcoholism  -she reports that she has not been drinking much lately. Usually liquor if she does  -mood stable. Low risk for withdrawal seizures or DTs  -Continue multivitamins    Dyslipidemia  COPD  Chronic smoking  History of gastric ulcer  Hypertension  History of uterine fibroids  -Continue home PPI, Wellbutrin, nicotine patch      Body mass index is 22.48 kg/m². Code status: Full  Prophylaxis: per ortho  Recommended Disposition: Home w/Family     Subjective:     Chief Complaint / Reason for Physician Visit  Follow up ETOH, COPD      Review of Systems:  Symptom Y/N Comments  Symptom Y/N Comments   Fever/Chills    Chest Pain     Poor Appetite    Edema     Cough    Abdominal Pain     Sputum    Joint Pain     SOB/GRIER    Pruritis/Rash     Nausea/vomit    Tolerating PT/OT     Diarrhea    Tolerating Diet     Constipation    Other       Could NOT obtain due to:      Objective:     VITALS:   Last 24hrs VS reviewed since prior progress note. Most recent are:  Patient Vitals for the past 24 hrs:   Temp Pulse Resp BP SpO2   10/24/21 1620 98.1 °F (36.7 °C) 94 18 109/67 98 %   10/24/21 0809 98.2 °F (36.8 °C) 93 20 (!) 96/59 99 %   10/23/21 2352 99 °F (37.2 °C) 93 18 115/62 97 %   10/23/21 2010 98.5 °F (36.9 °C) 94 18 (!) 105/56 93 %       Intake/Output Summary (Last 24 hours) at 10/24/2021 1623  Last data filed at 10/23/2021 1703  Gross per 24 hour   Intake    Output 250 ml   Net -250 ml        PHYSICAL EXAM:  General: WD, WN. Alert, cooperative, no acute distress    EENT:  EOMI. Anicteric sclerae. MMM  Resp:  CTA bilaterally, no wheezing or rales.   No accessory muscle use  CV:  Regular  rhythm,  No edema  GI:  Soft, Non distended, Non tender.  +Bowel sounds  Neurologic:  Alert and oriented X 3, normal speech,   Psych:   not anxious nor agitated  Skin:  No rashes.   No jaundice  (+) left leg in brace / bandage     Reviewed most current lab test results and cultures  YES  Reviewed most current radiology test results   YES  Review and summation of old records today    NO  Reviewed patient's current orders and MAR    YES  PMH/SH reviewed - no change compared to H&P          Current Facility-Administered Medications:     HYDROmorphone (DILAUDID) tablet 2 mg, 2 mg, Oral, Q4H PRN, Brittani Bernstein PA-C, 2 mg at 10/24/21 1330    sodium chloride (NS) flush 5-40 mL, 5-40 mL, IntraVENous, Q8H, Chris Eric, DO, 10 mL at 10/24/21 0540    sodium chloride (NS) flush 5-40 mL, 5-40 mL, IntraVENous, PRN, Chris Eric, DO    naloxone [facility]) injection 0.4 mg, 0.4 mg, IntraVENous, PRN, Chris Eric, DO    senna-docusate (PERICOLACE) 8.6-50 mg per tablet 1 Tablet, 1 Tablet, Oral, BID, Chris Eric, DO, 1 Tablet at 10/24/21 0858    polyethylene glycol (MIRALAX) packet 17 g, 17 g, Oral, DAILY, Hazel Major, DO, 17 g at 10/24/21 0858    bisacodyL (DULCOLAX) suppository 10 mg, 10 mg, Rectal, DAILY PRN, Chris Eric, DO    acetaminophen (TYLENOL) tablet 1,000 mg, 1,000 mg, Oral, Q6H, Chris Eric, DO, 1,000 mg at 10/24/21 1135    acetaminophen (TYLENOL) tablet 650 mg, 650 mg, Oral, Q6H PRN, Chris Eric, DO    ondansetron (ZOFRAN ODT) tablet 4 mg, 4 mg, Oral, Q6H PRN, Chris Eric, DO    famotidine (PEPCID) tablet 20 mg, 20 mg, Oral, BID, Chris Eric, DO, 20 mg at 10/24/21 9139    aspirin delayed-release tablet 325 mg, 325 mg, Oral, BID, Chris Eric, DO, 325 mg at 10/24/21 0858    0.9% sodium chloride infusion, 75 mL/hr, IntraVENous, CONTINUOUS, Brittani Bernstein PA-C, Last Rate: 75 mL/hr at 10/24/21 0537, 75 mL/hr at 10/24/21 0537    sodium chloride (NS) flush 5-40 mL, 5-40 mL, IntraVENous, Q8H, Davy TORRES PA-C, 10 mL at 10/24/21 0540    sodium chloride (NS) flush 5-40 mL, 5-40 mL, IntraVENous, PRN, River Hannon PA-C    [facility]) injection 0.4 mg, 0.4 mg, IntraVENous, PRN, River Hannon PA-C    ondansetron [facility]) injection 4 mg, 4 mg, IntraVENous, Q4H PRN, River Hannon PA-C, 4 mg at 10/19/21 1110    buPROPion XL (WELLBUTRIN XL) tablet 150 mg, 150 mg, Oral, DAILY, Purveyor, Atoosa, ACNP, 150 mg at 10/24/21 0858    chlordiazePOXIDE (LIBRIUM) capsule 5 mg, 5 mg, Oral, TID PRN, Purveyor, Atoosa, ACNP    doxepin (SINEquan) capsule 25 mg, 25 mg, Oral, QHS PRN, Purveyor, Atoosa, ACNP, 25 mg at 00/59/80 1277    folic acid (FOLVITE) tablet 1 mg, 1 mg, Oral, DAILY, Purveyor, Atoosa, ACNP, 1 mg at 10/24/21 0858    magnesium oxide (MAG-OX) tablet 200 mg, 200 mg, Oral, DAILY, Purveyor, Atoosa, ACNP, 200 mg at 10/24/21 0858    pantoprazole (PROTONIX) tablet 40 mg, 40 mg, Oral, ACB, Purveyor, Atoosa, ACNP, 40 mg at 10/24/21 0858    thiamine mononitrate (B-1) tablet 100 mg, 100 mg, Oral, DAILY, Purveyor, Atoosa, ACNP, 100 mg at 10/24/21 0858    nicotine (NICODERM CQ) 21 mg/24 hr patch 1 Patch, 1 Patch, TransDERmal, Q24H, Purveyor, Atoosa, ACNP, 1 Patch at 10/24/21 0053  ________________________________________________________________________  Care Plan discussed with:    Comments   Patient y    Family      RN y    Care Manager     Consultant                        Multidiciplinary team rounds were held today with , nursing, pharmacist and clinical coordinator. Patient's plan of care was discussed; medications were reviewed and discharge planning was addressed.      ________________________________________________________________________  Total NON critical care TIME:  25    Minutes    Total CRITICAL CARE TIME Spent:   Minutes non procedure based      Comments   >50% of visit spent in counseling and coordination of care ________________________________________________________________________  Valere Landau, MD     Procedures: see electronic medical records for all procedures/Xrays and details which were not copied into this note but were reviewed prior to creation of Plan. LABS:  I reviewed today's most current labs and imaging studies.   Pertinent labs include:  Recent Labs     10/24/21  1134   WBC 6.1   HGB 11.0*   HCT 32.0*        Recent Labs     10/24/21  1134      K 3.8      CO2 27   GLU 71   BUN 12   CREA 0.54*   CA 8.6       Signed: Valere Landau, MD

## 2021-10-24 NOTE — PROGRESS NOTES
ORTHO - Progress Note  Post Op day: 4 Days [address]     [id number]  female    62 y.o.    1960    Admit date:10/19/2021  Date of Surgery:10/20/2021   Procedures:Procedure(s):LEFT TIBIAL PLATEAU OPEN REDUCTION INTERNAL FIXATION  Surgeon:Surgeon(s) and Role:   Joe Siddiqui, DO - Primary        SUBJECTIVE:     Ginna Cueto is a 62 y.o. female resting in the bed/chair. Patient has complaints of appropriate post-op pain, tolerating PO pain medications dilaudid PO. Denies F/C, nausea, vomiting, dizziness, lightheadedness, chest pain, or shortness of breath. Pain worse last night after working with therapy. C/O miquel horse b/s she isnt taking her potassium!!   OBJECTIVE:       Physical Exam:  General: alert, cooperative, no distress. Gastrointestinal:  non-distended . Cardiovascular: equal pulses in the upper / lower extremities,  Brisk cap refill in all distal extremities   Genitourinary: Rendon  Voiding independently   Respiratory: No respiratory distress   Neurological:Neurovascular exam within normal limits. Senstion intact: LE bilat. Motor: + DF/PF/EHL. Musculoskeletal: Joseph's sign negative in bilateral lower extremities. Calves soft, supple, non-tender upon palpation or with passive stretch. Dressing/Wound:   dry blood and intact. No significant erythema or swelling. Vital Signs:       Patient Vitals for the past 8 hrs:   BP Temp Pulse Resp SpO2   10/24/21 0809 (!) 96/59 98.2 °F (36.8 °C) 93 20 99 %                                          Temp (24hrs), Av.6 °F (37 °C), Min:98.2 °F (36.8 °C), Max:99 °F (37.2 °C)      Labs:      No results for input(s): HCT, HGB, INR, HCTEXT, HGBEXT, INREXT in the last 72 hours.   PT/OT:        Gait  Speed/Diana: Slow  Step Length: Right shortened  Gait Abnormalities: Decreased step clearance (hop to gait)  Ambulation - Level of Assistance: Minimal assistance  Distance (ft): 5 Feet (ft)  Assistive Device: Gait belt, Walker, rolling  Interventions: Verbal cues, Safety awareness training  Interventions: Verbal cues, Safety awareness training  ASSESSMENT / PLAN:   Active Problems:    Hyperlipidemia (6/26/2014)      Emphysema of lung ([facility] [address] (1/27/2018)      Smoker (3/8/2021)      History of gastric ulcer (3/8/2021)      Closed fracture of lateral portion of left tibial plateau (46/22/7170)       CBC, CMP, covid ordered     -  Continue PT/OT TTWB on the LLE  -  Wear knee immobilizer-  NO knee flexion at this time  -  Dressing changed today, gentle ACE wrap fitted  -  DVT prophylaxis- SCD w/  BID  -  DC planning - SNF awaiting placement      Smoking, ETOH cessation reviewed    Signed By: Sonia Park PA-C

## 2021-10-24 NOTE — PROGRESS NOTES
Problem: Mobility Impaired (Adult and Pediatric)  Goal: *Acute Goals and Plan of Care (Insert Text)  Description: FUNCTIONAL STATUS PRIOR TO ADMISSION: Patient was modified independent using a rolling walker and/or SPC (cane in community) for functional mobility. Pt reports she has given up driving due to her neuropathy. HOME SUPPORT PRIOR TO ADMISSION: The patient lived alone with spouse to provide assistance. Physical Therapy Goals  Initiated 10/21/2021  1. Patient will move from supine to sit and sit to supine , scoot up and down, and roll side to side in bed with modified independence within 7 day(s). 2.  Patient will transfer from bed to chair and chair to bed with supervision/set-up using the least restrictive device within 7 day(s). 3.  Patient will perform sit to stand with supervision/set-up within 7 day(s). 4.  Patient will ambulate with supervision/set-up for 50 feet with the least restrictive device within 7 day(s). 5.  Patient will ascend/descend 4 stairs with handrail(s) with minimal assistance/contact guard assist within 7 day(s). Outcome: Progressing Towards Goal  Note:   PHYSICAL THERAPY TREATMENT  Patient: Hedy Kanner (33 y.o. female)  Date: 10/24/2021  Diagnosis: Closed fracture of lateral portion of left tibial plateau [J54.098D] <principal problem not specified>  Procedure(s) (LRB):  LEFT TIBIAL PLATEAU OPEN REDUCTION INTERNAL FIXATION (Left) 4 Days Post-Op  Precautions: Fall, TTWB (LLE ; knee immobilizer)  Chart, physical therapy assessment, plan of care and goals were reviewed. ASSESSMENT  Patient continues with skilled PT services and is progressing towards goals. Patient supine upon arrival; agreeable to mobility. Knee immobilizer in place on L LE. Able to come to EOB with CGA. Good sitting balance EOB. Sit to stand with min assist then able to take a few hops to chair with rolling walker and min assist x 1. Good adherence to weightbearing precautions.   Up in chair at end of session with LE's elevated. Encouraged patient to ask for assistance back to bed once she feels tired as she sat too long yesterday. Recommend SNF at discharge. .     Current Level of Function Impacting Discharge (mobility/balance): min assist    Other factors to consider for discharge: TTWB left LE         PLAN :  Patient continues to benefit from skilled intervention to address the above impairments. Continue treatment per established plan of care. to address goals. Recommendation for discharge: (in order for the patient to meet his/her long term goals)  Therapy up to 5 days/week in SNF setting    This discharge recommendation:  Has been made in collaboration with the attending provider and/or case management    IF patient discharges home will need the following DME: to be determined (TBD)       SUBJECTIVE:   Patient stated I sat too long yesterday.     OBJECTIVE DATA SUMMARY:   Critical Behavior:  Neurologic State: Alert  Orientation Level: Oriented X4  Cognition: Appropriate decision making, Appropriate for age attention/concentration     Functional Mobility Training:  Bed Mobility:     Supine to Sit: Contact guard assistance     Scooting: Supervision        Transfers:  Sit to Stand: Minimum assistance  Stand to Sit: Contact guard assistance        Bed to Chair: Minimum assistance                    Balance:  Sitting: Intact  Standing: Impaired  Standing - Static: Constant support; Fair  Standing - Dynamic : Constant support; Fair  Ambulation/Gait Training:  Distance (ft): 5 Feet (ft)  Assistive Device: Gait belt;Walker, rolling  Ambulation - Level of Assistance: Minimal assistance        Gait Abnormalities: Decreased step clearance     Left Side Weight Bearing:  Toe touch        Speed/Diana: Slow  Step Length: Right shortened                   Pain Rating:  No pain at rest    Activity Tolerance:   Good    After treatment patient left in no apparent distress:   Sitting in chair, Heels elevated for pressure relief, and Call bell within reach    COMMUNICATION/COLLABORATION:   The patients plan of care was discussed with: Registered nurse.      Isaiah Griffin, PT   Time Calculation: 22 mins

## 2021-10-24 NOTE — PROGRESS NOTES
End of Shift Note    Bedside shift change report given to  (oncoming nurse) by Veena Adams RN (offgoing nurse). Report included the following information SBAR    Shift worked:  7a-7p     Shift summary and any significant changes:    Managed pain, labs drawn, assisted in ADLs PRN, IVF infusing, pt resting comfortably in room   Concerns for physician to address:    Zone phone for oncoming shift:  [phone]     Activity:  Activity Level: Bed Rest  Number times ambulated in hallways past shift: 0  Number of times OOB to chair past shift: 2    Cardiac:   Cardiac Monitoring: Yes      Cardiac Rhythm: Sinus Rhythm    Access:   Current line(s): PIV     Genitourinary:   Urinary status: voiding    Respiratory:   O2 Device: None (Room air)  Chronic home O2 use?: NO  Incentive spirometer at bedside: NO     GI:  Last Bowel Movement Date: 10/19/21  Current diet:  ADULT DIET Regular; 4 carb choices (60 gm/meal)  Passing flatus: YES  Tolerating current diet: YES       Pain Management:   Patient states pain is manageable on current regimen: YES    Skin:  Hilton Score: 20  Interventions: increase time out of bed    Patient Safety:  Fall Score:  Total Score: 4  Interventions: assistive device (walker, cane, etc), gripper socks and pt to call before getting OOB  High Fall Risk: Yes    Length of Stay:  Expected LOS: 2d 16h  Actual LOS: Tommie Joe RN

## 2021-10-25 VITALS
HEART RATE: 100 BPM | SYSTOLIC BLOOD PRESSURE: 120 MMHG | HEIGHT: 61 IN | OXYGEN SATURATION: 100 % | TEMPERATURE: 98.3 F | DIASTOLIC BLOOD PRESSURE: 75 MMHG | WEIGHT: 119 LBS | RESPIRATION RATE: 18 BRPM | BODY MASS INDEX: 22.47 KG/M2

## 2021-10-25 LAB
GLUCOSE BLD STRIP.AUTO-MCNC: 129 MG/DL (ref 65–117)
GLUCOSE BLD STRIP.AUTO-MCNC: 73 MG/DL (ref 65–117)
SERVICE CMNT-IMP: ABNORMAL
SERVICE CMNT-IMP: NORMAL

## 2021-10-25 PROCEDURE — 82962 GLUCOSE BLOOD TEST: CPT

## 2021-10-25 PROCEDURE — 97116 GAIT TRAINING THERAPY: CPT | Performed by: PHYSICAL THERAPIST

## 2021-10-25 PROCEDURE — 94760 N-INVAS EAR/PLS OXIMETRY 1: CPT

## 2021-10-25 PROCEDURE — 74011250637 HC RX REV CODE- 250/637: Performed by: NURSE PRACTITIONER

## 2021-10-25 PROCEDURE — 74011250637 HC RX REV CODE- 250/637: Performed by: ORTHOPAEDIC SURGERY

## 2021-10-25 PROCEDURE — 74011250637 HC RX REV CODE- 250/637: Performed by: PHYSICIAN ASSISTANT

## 2021-10-25 RX ORDER — FAMOTIDINE 20 MG/1
20 TABLET, FILM COATED ORAL 2 TIMES DAILY
Qty: 60 TABLET | Refills: 0 | Status: SHIPPED | OUTPATIENT
Start: 2021-10-25 | End: 2022-03-04

## 2021-10-25 RX ORDER — ASPIRIN 325 MG
325 TABLET, DELAYED RELEASE (ENTERIC COATED) ORAL 2 TIMES DAILY
Qty: 30 TABLET | Refills: 0 | Status: SHIPPED | OUTPATIENT
Start: 2021-10-25 | End: 2021-10-25 | Stop reason: SDUPTHER

## 2021-10-25 RX ORDER — FAMOTIDINE 20 MG/1
20 TABLET, FILM COATED ORAL 2 TIMES DAILY
Qty: 60 TABLET | Refills: 0 | Status: SHIPPED | OUTPATIENT
Start: 2021-10-25 | End: 2021-10-25 | Stop reason: SDUPTHER

## 2021-10-25 RX ORDER — HYDROMORPHONE HYDROCHLORIDE 2 MG/1
2 TABLET ORAL
Qty: 30 TABLET | Refills: 0 | Status: SHIPPED | OUTPATIENT
Start: 2021-10-25 | End: 2021-10-30

## 2021-10-25 RX ORDER — ASPIRIN 325 MG
325 TABLET, DELAYED RELEASE (ENTERIC COATED) ORAL 2 TIMES DAILY
Qty: 30 TABLET | Refills: 0 | Status: SHIPPED | OUTPATIENT
Start: 2021-10-25 | End: 2022-02-16 | Stop reason: ALTCHOICE

## 2021-10-25 RX ORDER — HYDROMORPHONE HYDROCHLORIDE 2 MG/1
2 TABLET ORAL
Qty: 30 TABLET | Refills: 0 | Status: SHIPPED | OUTPATIENT
Start: 2021-10-25 | End: 2021-10-25 | Stop reason: SDUPTHER

## 2021-10-25 RX ORDER — ACETAMINOPHEN 500 MG
TABLET ORAL
Qty: 100 TABLET | Refills: 0 | Status: SHIPPED | OUTPATIENT
Start: 2021-10-25 | End: 2021-11-13

## 2021-10-25 RX ADMIN — ASPIRIN 325 MG: 325 TABLET, COATED ORAL at 09:00

## 2021-10-25 RX ADMIN — Medication 10 ML: at 14:41

## 2021-10-25 RX ADMIN — FAMOTIDINE 20 MG: 20 TABLET ORAL at 09:03

## 2021-10-25 RX ADMIN — BUPROPION HYDROCHLORIDE 150 MG: 150 TABLET, EXTENDED RELEASE ORAL at 09:03

## 2021-10-25 RX ADMIN — Medication 10 ML: at 14:42

## 2021-10-25 RX ADMIN — THIAMINE HCL TAB 100 MG 100 MG: 100 TAB at 09:00

## 2021-10-25 RX ADMIN — ACETAMINOPHEN 1000 MG: 500 TABLET, FILM COATED ORAL at 06:07

## 2021-10-25 RX ADMIN — FOLIC ACID 1 MG: 1 TABLET ORAL at 09:02

## 2021-10-25 RX ADMIN — ACETAMINOPHEN 1000 MG: 500 TABLET, FILM COATED ORAL at 11:37

## 2021-10-25 RX ADMIN — DOCUSATE SODIUM 50MG AND SENNOSIDES 8.6MG 1 TABLET: 8.6; 5 TABLET, FILM COATED ORAL at 09:03

## 2021-10-25 RX ADMIN — Medication 200 MG: at 09:01

## 2021-10-25 RX ADMIN — HYDROMORPHONE HYDROCHLORIDE 2 MG: 2 TABLET ORAL at 04:41

## 2021-10-25 RX ADMIN — Medication 10 ML: at 14:00

## 2021-10-25 RX ADMIN — HYDROMORPHONE HYDROCHLORIDE 2 MG: 2 TABLET ORAL at 08:33

## 2021-10-25 RX ADMIN — Medication 10 ML: at 06:08

## 2021-10-25 RX ADMIN — PANTOPRAZOLE SODIUM 40 MG: 40 TABLET, DELAYED RELEASE ORAL at 09:03

## 2021-10-25 RX ADMIN — HYDROMORPHONE HYDROCHLORIDE 2 MG: 2 TABLET ORAL at 16:15

## 2021-10-25 NOTE — PROGRESS NOTES
Hospitalist Progress Note    NAME: Sammi Sarmiento   :  1960   MRN:  [id number]       Assessment / Plan:  Left lateral tibial plateau fracture  -S/P ORIF 10/20  -on ASA BID  -bowel regimen  -PT OT eval.  Recommending SNF. Rapid COVID NEG 10/24  -DC IVFs. Chronic alcoholism  -she reports that she has not been drinking much lately. Usually liquor if she does  -mood stable. Low risk for withdrawal seizures or DTs  -Continue multivitamins    Dyslipidemia  COPD  Chronic smoking  History of gastric ulcer  Hypertension  History of uterine fibroids  -Continue home PPI, Wellbutrin, nicotine patch      Body mass index is 22.48 kg/m². Code status: Full  Prophylaxis: per ortho  Recommended Disposition: Home w/Family     Subjective:     Chief Complaint / Reason for Physician Visit  Follow up ETOH, COPD    Review of Systems:  Symptom Y/N Comments  Symptom Y/N Comments   Fever/Chills    Chest Pain     Poor Appetite    Edema     Cough    Abdominal Pain     Sputum    Joint Pain     SOB/GRIER    Pruritis/Rash     Nausea/vomit    Tolerating PT/OT     Diarrhea    Tolerating Diet     Constipation    Other       Could NOT obtain due to:      Objective:     VITALS:   Last 24hrs VS reviewed since prior progress note. Most recent are:  Patient Vitals for the past 24 hrs:   Temp Pulse Resp BP SpO2   10/25/21 0757 97.9 °F (36.6 °C) 91 18 117/69 95 %   10/24/21 2242 99.2 °F (37.3 °C) 99 18 99/60 98 %   10/24/21 1620 98.1 °F (36.7 °C) 94 18 109/67 98 %     No intake or output data in the 24 hours ending 10/25/21 0855     PHYSICAL EXAM:  General: WD, WN. Alert, cooperative, no acute distress    EENT:  EOMI. Anicteric sclerae. MMM  Resp:  CTA bilaterally, no wheezing or rales. No accessory muscle use  CV:  Regular  rhythm,  No edema  GI:  Soft, Non distended, Non tender.  +Bowel sounds  Neurologic:  Alert and oriented X 3, normal speech,   Psych:   not anxious nor agitated  Skin:  No rashes.   No jaundice  (+) left leg in brace / bandage     Reviewed most current lab test results and cultures  YES  Reviewed most current radiology test results   YES  Review and summation of old records today    NO  Reviewed patient's current orders and MAR    YES  PMH/SH reviewed - no change compared to H&P          Current Facility-Administered Medications:     HYDROmorphone (DILAUDID) tablet 2 mg, 2 mg, Oral, Q4H PRN, TryCODY Miramontes-C, 2 mg at 10/25/21 7963    sodium chloride (NS) flush 5-40 mL, 5-40 mL, IntraVENous, Q8H, Chris Eric, DO, 10 mL at 10/25/21 0608    sodium chloride (NS) flush 5-40 mL, 5-40 mL, IntraVENous, PRN, Chris Eric, DO    naloxone [facility]) injection 0.4 mg, 0.4 mg, IntraVENous, PRN, Chris Eric, DO    senna-docusate (PERICOLACE) 8.6-50 mg per tablet 1 Tablet, 1 Tablet, Oral, BID, Chris Eric, DO, 1 Tablet at 10/24/21 1730    polyethylene glycol (MIRALAX) packet 17 g, 17 g, Oral, DAILY, Chris Eric, DO, 17 g at 10/24/21 0858    bisacodyL (DULCOLAX) suppository 10 mg, 10 mg, Rectal, DAILY PRN, Chris Eric M, DO    acetaminophen (TYLENOL) tablet 1,000 mg, 1,000 mg, Oral, Q6H, Chris Eric, DO, 1,000 mg at 10/25/21 0607    acetaminophen (TYLENOL) tablet 650 mg, 650 mg, Oral, Q6H PRN, Chris Eric, DO    ondansetron (ZOFRAN ODT) tablet 4 mg, 4 mg, Oral, Q6H PRN, Chris Eric, DO    famotidine (PEPCID) tablet 20 mg, 20 mg, Oral, BID, Chris Eric, DO, 20 mg at 10/24/21 1730    aspirin delayed-release tablet 325 mg, 325 mg, Oral, BID, Chris Eric, DO, 325 mg at 10/24/21 1730    sodium chloride (NS) flush 5-40 mL, 5-40 mL, IntraVENous, Q8H, Trygve Macho, PA-C, 10 mL at 10/25/21 0608    sodium chloride (NS) flush 5-40 mL, 5-40 mL, IntraVENous, PRN, Trygve Macho, PA-C    naloxone [facility]) injection 0.4 mg, 0.4 mg, IntraVENous, PRN, Trygve Macho, PA-C    ondansetron [facility]) injection 4 mg, 4 mg, IntraVENous, Q4H PRN, Trygve Macho, PA-C, 4 mg at 10/19/21 1110   buPROPion XL (WELLBUTRIN XL) tablet 150 mg, 150 mg, Oral, DAILY, Purveyor, Atoosa, ACNP, 150 mg at 10/24/21 0858    chlordiazePOXIDE (LIBRIUM) capsule 5 mg, 5 mg, Oral, TID PRN, Purveyor, Atoosa, ACNP    doxepin (SINEquan) capsule 25 mg, 25 mg, Oral, QHS PRN, Purveyor, Atoosa, ACNP, 25 mg at 75/20/64 4113    folic acid (FOLVITE) tablet 1 mg, 1 mg, Oral, DAILY, Purveyor, Atoosa, ACNP, 1 mg at 10/24/21 0858    magnesium oxide (MAG-OX) tablet 200 mg, 200 mg, Oral, DAILY, Purveyor, Atoosa, ACNP, 200 mg at 10/24/21 0858    pantoprazole (PROTONIX) tablet 40 mg, 40 mg, Oral, ACB, Purveyor, Atoosa, ACNP, 40 mg at 10/24/21 0858    thiamine mononitrate (B-1) tablet 100 mg, 100 mg, Oral, DAILY, Purveyor, Atoosa, ACNP, 100 mg at 10/24/21 0858    nicotine (NICODERM CQ) 21 mg/24 hr patch 1 Patch, 1 Patch, TransDERmal, Q24H, Purveyor, Atoosa, ACNP, 1 Patch at 10/24/21 2308  ________________________________________________________________________  Care Plan discussed with:    Comments   Patient y    Family      RN y    Care Manager     Consultant                        Multidiciplinary team rounds were held today with , nursing, pharmacist and clinical coordinator. Patient's plan of care was discussed; medications were reviewed and discharge planning was addressed. ________________________________________________________________________  Total NON critical care TIME:  25    Minutes    Total CRITICAL CARE TIME Spent:   Minutes non procedure based      Comments   >50% of visit spent in counseling and coordination of care     ________________________________________________________________________  Francia Sutton MD     Procedures: see electronic medical records for all procedures/Xrays and details which were not copied into this note but were reviewed prior to creation of Plan. LABS:  I reviewed today's most current labs and imaging studies.   Pertinent labs include:  Recent Labs     10/24/21  1124   WBC 6. 1   HGB 11.0*   HCT 32.0*        Recent Labs     10/24/21  1134      K 3.8      CO2 27   GLU 71   BUN 12   CREA 0.54*   CA 8.6       Signed: Hortencia Daniels MD

## 2021-10-25 NOTE — PROGRESS NOTES
S/p tibial plateau ORIF  Pain well managed  Working with pt  HGB stable  Ready for DC    Patient Vitals for the past 24 hrs:   Temp Pulse Resp BP SpO2   10/25/21 0757 97.9 °F (36.6 °C) 91 18 117/69 95 %   10/24/21 2242 99.2 °F (37.3 °C) 99 18 99/60 98 %   10/24/21 1620 98.1 °F (36.7 °C) 94 18 109/67 98 %     Dressing clean and dry  In knee immobilizer    Dc plan snf  Asa for dvt  TTWB  Will dc when approved by insurance

## 2021-10-25 NOTE — PROGRESS NOTES
[facility]                                                                        62 y.o.   female    [address]   Room: 2107/01    [facility] 2 MED TELE  Unit Phone# : [phone]      [address]  [facility] 2 MED TELE  [address] [id number]  Dept: [phone]  Loc: [phone]                    SITUATION     Admitted:  10/19/2021         Attending Provider:  Olvin Ernst DO       Consultations:  IP CONSULT TO HOSPITALIST    PCP:  Kareen Drummond NP   [phone]    Treatment Team: Attending Provider: Olvin Ernst DO; Consulting Provider: Olvin Ernst DO; Consulting Provider: MERRILL Olmedo; Care Manager: Faizan Deleon; Utilization Review: Dawson Morales; Care Manager: Julia Kwan; Staff Nurse: Santos Kirkland LPN; Primary Nurse: Matilde Hannon; Primary Nurse: Emely Adames RN    Admitting Dx:  Closed fracture of lateral portion of left tibial plateau [N74.649Y]       Principal Problem: <principal problem not specified>    5 Days Post-Op of   Procedure(s):  LEFT TIBIAL PLATEAU OPEN REDUCTION INTERNAL FIXATION   BY: Olvin Ernst DO             ON: 10/20/2021                  Code Status: Full Code                Advance Directives:   Advance Care Planning 2/18/2018   Patient's Healthcare Decision Maker is: -   Confirm Advance Directive None   Patient Would Like to Complete Advance Directive No    (Send w/patient)   No Doesnt Have       Isolation:  There are currently no Active Isolations       MDRO: No current active infections    Pain Medications given: Dilaudid     Last dose: 10/25/21 at  4:30pm    Special Equipment needed: Yes  Type of equipment: Walker       BACKGROUND     Allergies:   Allergies   Allergen Reactions    Robaxin [Methocarbamol] Hives, Rash and Itching     Red splotches    Statins-Hmg-Coa Reductase Inhibitors Myalgia    Codeine Itching    Neosporin [Neomycin-Bacitracin-Polymyxin] Rash       Past Medical History:   Diagnosis Date    GI (gastrointestinal bleed)     Insomnia 11/09    Iron deficiency anemia 04/2004    PPD positive     treated w/ INH- tests since have been negative    Pure hypercholesterolemia     Scoliosis 12/28/15    [facility]   [address] dependence     Unspecified essential hypertension     Uterine bleeding, dysfunctional 2/12    Uterine fibroid        Past Surgical History:   Procedure Laterality Date    COLONOSCOPY N/A 1/31/2018    COLONOSCOPY performed by Audrey Gramajo MD at [facility] HX CHOLECYSTECTOMY      HX COLONOSCOPY  07/01/2013    [facility], repeat 10 years    HX ORTHOPAEDIC      Shattered bilateral ankles-metal plates & screws    HX OTHER SURGICAL      Benign cyst removal- L. foot    UT LEG/ANKLE SURGERY PROC UNLISTED         Medications Prior to Admission   Medication Sig    magnesium 200 mg tab TAKE ONE TABLET BY MOUTH DAILY    doxepin (SINEquan) 25 mg capsule Take 1 Capsule by mouth nightly as needed (sleep).  folic acid (FOLVITE) 1 mg tablet Take 1 Tablet by mouth daily.  pantoprazole (PROTONIX) 40 mg tablet Take 1 Tablet by mouth Daily (before breakfast).  chlordiazePOXIDE (LIBRIUM) 5 mg capsule Take 1 Cap by mouth three (3) times daily as needed for Anxiety. Max Daily Amount: 15 mg.  potassium chloride (KLOR-CON) 20 mEq pack TAKE ONE PACKET BY MOUTH DAILY    buPROPion XL (WELLBUTRIN XL) 150 mg tablet TAKE ONE TABLET BY MOUTH EVERY MORNING FOR SMOKING CESSATION    thiamine HCL (B-1) 100 mg tablet TAKE ONE TABLET BY MOUTH DAILY       Hard scripts included in transfer packet yes    Vaccinations: There is no immunization history on file for this patient. Readmission Risks:    Known Risks: The Charlson CoMorbitiy Index tool is an evidenced based tool that has more automatic generated information.  The tool looks at many different items such as the age of the patient, how many times they were admitted in the last calendar year, current length of stay in the hospital and their diagnosis. All of these items are pulled automatically from information documented in the chart from various places and will generate a score that predicts whether a patient is at low (less than 13), medium (13-20) or high (21 or greater) risk of being readmitted.         ASSESSMENT                Temp: 99.7 °F (37.6 °C) (10/25/21 1119) Pulse (Heart Rate): 87 (10/25/21 1119)     Resp Rate: 18 (10/25/21 1119)           BP: 122/78 (10/25/21 1119)     O2 Sat (%): 95 % (10/25/21 1119)     Weight: 54 kg (119 lb)    Height: 5' 1\" (154.9 cm) (10/19/21 5787)       If above not within 1 hour of discharge:    BP:_____  P:____  R:____ T:_____ O2 Sat: ___%  O2: ______    Active Orders   Diet    ADULT DIET Regular; 4 carb choices (60 gm/meal)         Orientation: oriented to time, place, person and situation     Active Behaviors: None                                   Active Lines/Drains:  (Peg Tube / Rendon / CL or S/L?): no    Urinary Status: Voiding, External catheter     Last BM: Last Bowel Movement Date: 10/19/21     Skin Integrity: Incision (comment)             Mobility: Slightly limited   Weight Bearing Status: TTWB (Toe Touch Weight Bearing)      Gait Training  Assistive Device: Gait belt, Walker, rolling  Ambulation - Level of Assistance: Minimal assistance, Assist x1  Distance (ft): 5 Feet (ft)  Interventions: Verbal cues, Safety awareness training         Lab Results   Component Value Date/Time    Glucose 71 10/24/2021 11:34 AM    Hemoglobin A1c <3.8 (L) 02/25/2021 09:43 AM    INR 1.1 10/19/2021 11:03 AM    INR 1.1 05/21/2021 10:47 AM    HGB 11.0 (L) 10/24/2021 11:34 AM    HGB 10.4 (L) 10/21/2021 03:29 AM        RECOMMENDATION     See After Visit Summary (AVS) for:  · Discharge instructions  · After [address]   · Special equipment needed (entered pre-discharge by Care Management)  · Medication Reconciliation    · Follow up Appointment(s)         Report given/sent by:  Tonny Or, RN                    Verbal report given to: [facility] GABRIELA        Estimated discharge time:  10/25/2021 at 5:00pm          Patient's IV has been taken out. Her belongings have been packed up and she is waiting on her ride. Hard scripts have been faxed over; [facility].

## 2021-10-25 NOTE — DISCHARGE SUMMARY
Date of Admission: 10/19/21  Date of Discharge: same  Attending on admission and discharge: Dr. Noel Burnett    Admitting Diagnosis: left tibial plateau fracture  Discharge Diagnosis: same  Procedure Performed: ORIF left tibial plateau    Hospital Course and Treatment:     The patient was admitted through the ED. The patient tolerated the procedure well and was taken to the surgical floor for further observation and treatment. The patient was maintained on IV fluids until tolerating a PO diet. They were also maintained on sequential compression devices and DVT prophylaxis. The diet was advanced as tolerated. The patient was mobilized with physical therapy. There were no complications during the course of the hospital stay, and the patient was deemed medically stable for discharge to home. After consultation with physical therapy, the patient was cleared for discharge. Discharge instructions: The patient should not be in a pool or tub, or otherwise immerse the wound in water. The patient has been counseled on the importance of mobilizing and moving at least every two hours to help prevent blood clots. The patient should call Dr. Stover Letters office or go to an emergency department if they note a fever over 101.1 degrees fahrenheit, more or unrelieved pain, more or new swelling at the operative site, or any drainage from the wound. Condition at Discharge: Stable    Discharge medications:  Resume regular home medications  Additional medications to be prescribed on discharge  Enterically coated aspirin 325 mg po BID for 1 month  oxycodone      Follow up instructions: The patient should follow up in 2  weeks for a wound check and xrays with Dr. Patrick Baumann.

## 2021-10-25 NOTE — PROGRESS NOTES
Problem: Mobility Impaired (Adult and Pediatric)  Goal: *Acute Goals and Plan of Care (Insert Text)  Description: FUNCTIONAL STATUS PRIOR TO ADMISSION: Patient was modified independent using a rolling walker and/or SPC (cane in community) for functional mobility. Pt reports she has given up driving due to her neuropathy. HOME SUPPORT PRIOR TO ADMISSION: The patient lived alone with spouse to provide assistance. Physical Therapy Goals  Initiated 10/21/2021  1. Patient will move from supine to sit and sit to supine , scoot up and down, and roll side to side in bed with modified independence within 7 day(s). 2.  Patient will transfer from bed to chair and chair to bed with supervision/set-up using the least restrictive device within 7 day(s). 3.  Patient will perform sit to stand with supervision/set-up within 7 day(s). 4.  Patient will ambulate with supervision/set-up for 50 feet with the least restrictive device within 7 day(s). 5.  Patient will ascend/descend 4 stairs with handrail(s) with minimal assistance/contact guard assist within 7 day(s). Outcome: Progressing Towards Goal   PHYSICAL THERAPY TREATMENT  Patient: Lázaro Killian (43 y.o. female)  Date: 10/25/2021  Diagnosis: Closed fracture of lateral portion of left tibial plateau [E38.663Z] <principal problem not specified>  Procedure(s) (LRB):  LEFT TIBIAL PLATEAU OPEN REDUCTION INTERNAL FIXATION (Left) 5 Days Post-Op  Precautions: Fall, TTWB (LLE ; knee immobilizer)  Chart, physical therapy assessment, plan of care and goals were reviewed. ASSESSMENT  Patient continues with skilled PT services and is progressing towards goals. Patient limited by pain and impaired balance. She is compliant with TTWB throughout session but has increased pain with transfers as well as balance. Rainer for bed mobility and Rainer for transfers. Amb approx 5 feet with RW and Rainer with overall fair balance.   Patient lives with spouse but has no assistance during the day when he is at work. Anticipate SNF would be most appropriate DC plan at this time. Other factors to consider for discharge: at risk for falls, below baseline         PLAN :  Patient continues to benefit from skilled intervention to address the above impairments. Continue treatment per established plan of care. to address goals. Recommendation for discharge: (in order for the patient to meet his/her long term goals)  Therapy up to 5 days/week in SNF setting as she has no assistance while  at work    IF patient discharges home will need the following DME: patient owns DME required for discharge       SUBJECTIVE:   Patient stated I might be able to get my granddaughter to help me some at home.     OBJECTIVE DATA SUMMARY:   Critical Behavior:  Neurologic State: Alert  Orientation Level: Oriented X4  Cognition: Appropriate decision making     Functional Mobility Training:  Bed Mobility:     Supine to Sit: Minimum assistance; Additional time;Assist x1 (to assist LE OOB, and to adjust brace)     Scooting: Contact guard assistance; Additional time;Assist x1        Transfers:  Sit to Stand: Minimum assistance; Additional time;Assist x1  Stand to Sit: Contact guard assistance; Additional time;Assist x1                             Balance:  Sitting: Intact  Standing: Impaired  Standing - Static: Constant support; Fair  Standing - Dynamic : Constant support; Fair  Ambulation/Gait Training:  Distance (ft): 5 Feet (ft)  Assistive Device: Gait belt;Walker, rolling  Ambulation - Level of Assistance: Minimal assistance;Assist x1        Gait Abnormalities: Antalgic;Decreased step clearance; Step to gait     Left Side Weight Bearing: Toe touch        Speed/Diana: Pace decreased (<100 feet/min); Slow  Step Length: Right shortened         Pain Rating:  Reports pain but does not rate    Activity Tolerance:   Fair and requires rest breaks    After treatment patient left in no apparent distress:   Sitting in chair and Call bell within reach    COMMUNICATION/COLLABORATION:   The patients plan of care was discussed with: Physical therapist, Occupational therapist, and Registered nurse.      Lilian Petersen, PT, DPT   Time Calculation: 15 mins

## 2021-10-25 NOTE — PROGRESS NOTES
No further needs identified at this time. Patient is ready to d/c on a CM standpoint. RN aware. Transition of Care Plan:     RUR:7% low risk  Disposition: [address] [facility]    Follow up appointments: to be arranged by SNF  DME needed: To be provided by SNF  Transportation at Discharge: Pt's daughter in law at 5:00 pm  Keys or means to access home:   Going to SNF     IM Medicare Letter: Commercial insurance no IMM required  Is patient a BCPI-A Bundle:     Pt not identified as bundle patient                 If yes, was Bundle Letter given?:     Caregiver Contact:  Spouse, Tha Gibbs  Discharge Caregiver contacted prior to discharge? Family to be notified of discharge plan. 3:19 PM  Pt is going to d/c to [address] [place] [address]. Pt's daughter in law will transport around 5:00 pm. Nurse call report to [phone]. CM met with pt at bedside and she is in agreement with d/c plan. She reports no questions or concerns. Care Management Interventions  PCP Verified by CM: Yes  Palliative Care Criteria Met (RRAT>21 & CHF Dx)?: No  Mode of Transport at Discharge: Other (see comment) (pt's daughter in law)  Hospital Transport Time of Discharge: [address] (CM Consult): SNF  Partner SNF: Yes  Discharge Durable Medical Equipment: No (Pt has a walker and cane)  Physical Therapy Consult: No  Occupational Therapy Consult: No  Speech Therapy Consult: No  Support Systems: Spouse/Significant Other, Child(glen)  Confirm Follow Up Transport: Family  Discharge Location  Discharge Placement: Skilled nursing facility    Transition of Care Plan to SNF/Rehab    SNF/Rehab Transition:  Patient has been accepted to [address] [place] [address] and meets criteria for admission. Patient will transported by pt's daughter in law and expected to leave at 5:00 pm.    Communication to Patient/Family:  Met with patient and they are agreeable to the transition plan.     Communication to SNF/Rehab:  Bedside RNPayal, has been notified to update the transition plan to the facility and call report (phone number [phone]). Discharge information has been updated on the AVS.       Nursing Please include all hard scripts for controlled substances, med rec and dc summary, and AVS in packet. Reviewed and confirmed with facility,[facility], can manage the patient care needs for the following:     [place] with (X) only those applicable:    Medication:  [x]  Medications will be available at the facility  []  IV Antibiotics   []  Controlled Substance - hard copy to be sent with patient   []  Weekly Labs   Documents:  [] Hard RX  [x] MAR  [x] Kardex  [x] AVS  []Transfer Summary  [x]Discharge   Equipment:  []  CPAP/BiPAP  []  Wound Vacuum  []  [place] or Urinary Device  []  PICC/Central Line  []  Nebulizer  []  Ventilator   Treatment:  []Isolation (for MRSA, VRE, etc.)  []Surgical Drain Management  []Tracheostomy Care  []Dressing Changes  []Dialysis with transportation and chair time . []PEG Care  []Oxygen  []Daily Weights for Heart Failure   Dietary:  [x]Any diet limitations  []Tube Feedings   []Total Parenteral Management (TPN)   Eligible for Medicaid Long Term Services and Supports  Yes:  [] Eligible for medical assistance or will become eligible within 180 days and UAI completed. [] Provider/Patient and/or support system has requested screening. [] UAI copy provided to patient or responsible party, .  [] UAI unavailable at discharge will send once processed to SNF provider. [] UAI unavailable at discharged mailed to patient  No:   [] Private pay and is not financially eligible for Medicaid within the next 180 days. [] Reside out-of-state.   [] A residents of a state owned/operated facility that is licensed  by [facility] and [facility] Services or [facility]  [] Enrollment in [facility] hospice services  [] [address]  [x] Patient /Family declines to have screening completed or provide financial information for screening     Financial Resources:  Medicaid    [] Initiated and application pending   [] Full coverage     Advanced Care Plan:  []Surrogate Decision Maker of Care  []POA  [x]Communicated Code Status FULL   Other     12:02 PM  CM reviewed pt's chart. CM reached out to [address] [place] [address] to inquire about insurance authorization. CM left a HIPAA complaint message requesting a call back.     LATASHA De  Care Manager [facility]  [phone]

## 2021-10-26 ENCOUNTER — PATIENT OUTREACH (OUTPATIENT)
Dept: CASE MANAGEMENT | Age: 61
End: 2021-10-26

## 2021-10-26 DIAGNOSIS — Z47.89 ORTHOPEDIC AFTERCARE: Primary | ICD-10-CM

## 2021-11-05 ENCOUNTER — OFFICE VISIT (OUTPATIENT)
Dept: ORTHOPEDIC SURGERY | Age: 61
End: 2021-11-05
Payer: COMMERCIAL

## 2021-11-05 ENCOUNTER — HOSPITAL ENCOUNTER (OUTPATIENT)
Dept: GENERAL RADIOLOGY | Age: 61
Discharge: HOME | End: 2021-11-05
Payer: COMMERCIAL

## 2021-11-05 ENCOUNTER — TELEPHONE (OUTPATIENT)
Dept: ORTHOPEDIC SURGERY | Age: 61
End: 2021-11-05

## 2021-11-05 VITALS
TEMPERATURE: 97.7 F | DIASTOLIC BLOOD PRESSURE: 81 MMHG | HEIGHT: 61 IN | HEART RATE: 92 BPM | SYSTOLIC BLOOD PRESSURE: 124 MMHG | BODY MASS INDEX: 22.48 KG/M2 | OXYGEN SATURATION: 98 %

## 2021-11-05 DIAGNOSIS — Z47.89 ORTHOPEDIC AFTERCARE: Primary | ICD-10-CM

## 2021-11-05 DIAGNOSIS — Z47.89 ORTHOPEDIC AFTERCARE: ICD-10-CM

## 2021-11-05 PROCEDURE — 99024 POSTOP FOLLOW-UP VISIT: CPT | Performed by: ORTHOPAEDIC SURGERY

## 2021-11-05 PROCEDURE — 73560 X-RAY EXAM OF KNEE 1 OR 2: CPT

## 2021-11-05 RX ORDER — NALOXONE HYDROCHLORIDE 4 MG/.1ML
SPRAY NASAL
Qty: 1 EACH | Refills: 2 | Status: SHIPPED | OUTPATIENT
Start: 2021-11-05

## 2021-11-05 RX ORDER — OXYCODONE HYDROCHLORIDE 5 MG/1
5 TABLET ORAL
Qty: 42 TABLET | Refills: 0 | Status: SHIPPED | OUTPATIENT
Start: 2021-11-05 | End: 2021-11-06

## 2021-11-05 NOTE — TELEPHONE ENCOUNTER
Pt's granddaughter called stating [facility] sent her home without any kind of pain medication. They have called back [facility] multiple times and they won't help them. Pt is wondering if you can send her in some pain medication to her [address] on file. Please advise.

## 2021-11-05 NOTE — PROGRESS NOTES
Identified pt with two pt identifiers (name and ). Reviewed chart in preparation for visit and have obtained necessary documentation. Brittany Monte is a 62 y.o. female  Chief Complaint   Patient presents with    Surgical Follow-up     LT tib     Visit Vitals  /81 (BP 1 Location: Right arm, BP Patient Position: Sitting, BP Cuff Size: Large adult)   Pulse 92   Temp 97.7 °F (36.5 °C) (Tympanic)   Ht 5' 1\" (1.549 m)   SpO2 98%   BMI 22.48 kg/m²     1. Have you been to the ER, urgent care clinic since your last visit? Hospitalized since your last visit? No    2. Have you seen or consulted any other health care providers outside of the [address] [place] [address] since your last visit? Include any pap smears or colon screening.  No

## 2021-11-06 DIAGNOSIS — Z47.89 ORTHOPEDIC AFTERCARE: ICD-10-CM

## 2021-11-06 DIAGNOSIS — M25.562 LEFT KNEE PAIN, UNSPECIFIED CHRONICITY: Primary | ICD-10-CM

## 2021-11-06 RX ORDER — OXYCODONE HYDROCHLORIDE 5 MG/1
5 TABLET ORAL
Qty: 42 TABLET | Refills: 0 | Status: SHIPPED | OUTPATIENT
Start: 2021-11-06 | End: 2021-11-13

## 2021-11-30 ENCOUNTER — HOSPITAL ENCOUNTER (OUTPATIENT)
Dept: GENERAL RADIOLOGY | Age: 61
Discharge: HOME | End: 2021-11-30
Payer: COMMERCIAL

## 2021-11-30 ENCOUNTER — OFFICE VISIT (OUTPATIENT)
Dept: ORTHOPEDIC SURGERY | Age: 61
End: 2021-11-30
Payer: COMMERCIAL

## 2021-11-30 VITALS
SYSTOLIC BLOOD PRESSURE: 127 MMHG | OXYGEN SATURATION: 100 % | WEIGHT: 119 LBS | TEMPERATURE: 97.1 F | BODY MASS INDEX: 22.47 KG/M2 | HEIGHT: 61 IN | DIASTOLIC BLOOD PRESSURE: 67 MMHG | HEART RATE: 99 BPM

## 2021-11-30 DIAGNOSIS — Z47.89 ORTHOPEDIC AFTERCARE: Primary | ICD-10-CM

## 2021-11-30 DIAGNOSIS — Z47.89 ORTHOPEDIC AFTERCARE: ICD-10-CM

## 2021-11-30 PROCEDURE — 99024 POSTOP FOLLOW-UP VISIT: CPT | Performed by: ORTHOPAEDIC SURGERY

## 2021-11-30 PROCEDURE — 73560 X-RAY EXAM OF KNEE 1 OR 2: CPT

## 2021-11-30 NOTE — PROGRESS NOTES
is here for a follow up visit from a left tibial plateau ORIF. Pain has been appropriate since surgery, no major medical complications since surgery. Current Outpatient Medications on File Prior to Visit   Medication Sig Dispense Refill    naloxone (NARCAN) 4 mg/actuation nasal spray Use 1 spray intranasally, then discard. Repeat with new spray every 2 min as needed for opioid overdose symptoms, alternating nostrils. 1 Each 2    aspirin delayed-release 325 mg tablet Take 1 Tablet by mouth two (2) times a day. 30 Tablet 0    famotidine (PEPCID) 20 mg tablet Take 1 Tablet by mouth two (2) times a day. 60 Tablet 0    magnesium 200 mg tab TAKE ONE TABLET BY MOUTH DAILY 90 Tablet 0    doxepin (SINEquan) 25 mg capsule Take 1 Capsule by mouth nightly as needed (sleep). (Patient taking differently: Take 50 mg by mouth nightly as needed (sleep). ) 30 Capsule 0    folic acid (FOLVITE) 1 mg tablet Take 1 Tablet by mouth daily. 30 Tablet 0    pantoprazole (PROTONIX) 40 mg tablet Take 1 Tablet by mouth Daily (before breakfast). 30 Tablet 0    chlordiazePOXIDE (LIBRIUM) 5 mg capsule Take 1 Cap by mouth three (3) times daily as needed for Anxiety. Max Daily Amount: 15 mg. 90 Cap 0    potassium chloride (KLOR-CON) 20 mEq pack TAKE ONE PACKET BY MOUTH DAILY 30 Packet 5    buPROPion XL (WELLBUTRIN XL) 150 mg tablet TAKE ONE TABLET BY MOUTH EVERY MORNING FOR SMOKING CESSATION 90 Tab 1    thiamine HCL (B-1) 100 mg tablet TAKE ONE TABLET BY MOUTH DAILY 90 Tab 3     No current facility-administered medications on file prior to visit. ROS:  General: denies agitation, major chest pain, unexpected weakness  Patient states pain and stiffness improving  Skin: healing wound is without issue or drainage   Strength: appropriate weakness of involved extremity is resolving since surgery      Physical Examination:    There were no vitals taken for this visit.     Dressing: none  Skin: clean, dry, intact  Sensation intact to light touch at level of wound and distally  Strength is not tested  Range of motion is 0-110  Distal swelling is noted, but appropriate for postoperative course  Distal capillary refill less than 2 seconds      Imaging:    Postoperative imaging: Xrays of the left knee are taken with fracture in anatomic alignment with stable internal fixation. No evidence of hardware complication.           Assessment: Status post ORIF left tibial plateau fracture    Plan:  Patient will continue physical therapy  Wound care was reviewed  Weightbearing will be 25% through the leg, then progress by 25% per week  Deep Venous Thrombosis Prophylaxis: none    Follow up will be at 4 weeks from now,  Left knee xrays on follow up

## 2021-11-30 NOTE — PROGRESS NOTES
Identified pt with two pt identifiers (name and ). Reviewed chart in preparation for visit and have obtained necessary documentation. Eder Sarabia is a 62 y.o. female  Chief Complaint   Patient presents with    Surgical Follow-up     LT leg     Visit Vitals  /67 (BP 1 Location: Left upper arm, BP Patient Position: Sitting, BP Cuff Size: Large adult)   Pulse 99   Temp 97.1 °F (36.2 °C) (Tympanic)   Ht 5' 1\" (1.549 m)   Wt 119 lb (54 kg)   SpO2 100%   BMI 22.48 kg/m²     1. Have you been to the ER, urgent care clinic since your last visit? Hospitalized since your last visit? No    2. Have you seen or consulted any other health care providers outside of the [address] [place] [address] since your last visit? Include any pap smears or colon screening.  No

## 2021-12-16 ENCOUNTER — APPOINTMENT (OUTPATIENT)
Dept: PHYSICAL THERAPY | Age: 61
End: 2021-12-16

## 2021-12-16 ENCOUNTER — VIRTUAL VISIT (OUTPATIENT)
Dept: ORTHOPEDIC SURGERY | Age: 61
End: 2021-12-16
Payer: COMMERCIAL

## 2021-12-16 ENCOUNTER — TELEPHONE (OUTPATIENT)
Dept: ORTHOPEDIC SURGERY | Age: 61
End: 2021-12-16

## 2021-12-16 DIAGNOSIS — Z47.89 ORTHOPEDIC AFTERCARE: ICD-10-CM

## 2021-12-16 DIAGNOSIS — R60.9 SWELLING: Primary | ICD-10-CM

## 2021-12-16 PROCEDURE — 99024 POSTOP FOLLOW-UP VISIT: CPT | Performed by: ORTHOPAEDIC SURGERY

## 2021-12-16 RX ORDER — TRAMADOL HYDROCHLORIDE 50 MG/1
50 TABLET ORAL
Qty: 28 TABLET | Refills: 0 | Status: SHIPPED | OUTPATIENT
Start: 2021-12-16 | End: 2021-12-23

## 2021-12-17 ENCOUNTER — TELEPHONE (OUTPATIENT)
Dept: ORTHOPEDIC SURGERY | Age: 61
End: 2021-12-17

## 2021-12-17 ENCOUNTER — HOSPITAL ENCOUNTER (OUTPATIENT)
Dept: ULTRASOUND IMAGING | Age: 61
Discharge: HOME | End: 2021-12-17
Attending: ORTHOPAEDIC SURGERY
Payer: COMMERCIAL

## 2021-12-17 DIAGNOSIS — R60.9 SWELLING: ICD-10-CM

## 2021-12-17 PROCEDURE — 93970 EXTREMITY STUDY: CPT

## 2021-12-17 NOTE — PROGRESS NOTES
12/16/2021      CC: bilateral leg swelling    HPI:      This is a 62y.o. year old female who is now two months status post ORIF left tibial plateau, she has had one day of swelling and pain in the leg. She has been walking with a walker. She is unable to come in, and doing a telehealth visit. PMH:  Past Medical History:   Diagnosis Date    GI (gastrointestinal bleed)     Insomnia 11/09    Iron deficiency anemia 04/2004    PPD positive     treated w/ INH- tests since have been negative    Pure hypercholesterolemia     Scoliosis 12/28/15    [facility]    Tobacco dependence     Unspecified essential hypertension     Uterine bleeding, dysfunctional 2/12    Uterine fibroid        PSxHx:  Past Surgical History:   Procedure Laterality Date    COLONOSCOPY N/A 1/31/2018    COLONOSCOPY performed by Abdulkadir Leon MD at [facility] HX CHOLECYSTECTOMY      HX COLONOSCOPY  07/01/2013    [facility], repeat 10 years    HX ORTHOPAEDIC      Shattered bilateral ankles-metal plates & screws    HX OTHER SURGICAL      Benign cyst removal- L. foot    PA LEG/ANKLE SURGERY PROC UNLISTED         Meds:    Current Outpatient Medications:     traMADoL (ULTRAM) 50 mg tablet, Take 1 Tablet by mouth every six (6) hours as needed for Pain for up to 7 days. Max Daily Amount: 200 mg., Disp: 28 Tablet, Rfl: 0    naloxone (NARCAN) 4 mg/actuation nasal spray, Use 1 spray intranasally, then discard. Repeat with new spray every 2 min as needed for opioid overdose symptoms, alternating nostrils. , Disp: 1 Each, Rfl: 2    aspirin delayed-release 325 mg tablet, Take 1 Tablet by mouth two (2) times a day., Disp: 30 Tablet, Rfl: 0    famotidine (PEPCID) 20 mg tablet, Take 1 Tablet by mouth two (2) times a day., Disp: 60 Tablet, Rfl: 0    magnesium 200 mg tab, TAKE ONE TABLET BY MOUTH DAILY, Disp: 90 Tablet, Rfl: 0    doxepin (SINEquan) 25 mg capsule, Take 1 Capsule by mouth nightly as needed (sleep). (Patient taking differently: Take 50 mg by mouth nightly as needed (sleep). ), Disp: 30 Capsule, Rfl: 0    folic acid (FOLVITE) 1 mg tablet, Take 1 Tablet by mouth daily. , Disp: 30 Tablet, Rfl: 0    pantoprazole (PROTONIX) 40 mg tablet, Take 1 Tablet by mouth Daily (before breakfast). , Disp: 30 Tablet, Rfl: 0    chlordiazePOXIDE (LIBRIUM) 5 mg capsule, Take 1 Cap by mouth three (3) times daily as needed for Anxiety. Max Daily Amount: 15 mg., Disp: 90 Cap, Rfl: 0    potassium chloride (KLOR-CON) 20 mEq pack, TAKE ONE PACKET BY MOUTH DAILY, Disp: 30 Packet, Rfl: 5    buPROPion XL (WELLBUTRIN XL) 150 mg tablet, TAKE ONE TABLET BY MOUTH EVERY MORNING FOR SMOKING CESSATION, Disp: 90 Tab, Rfl: 1    thiamine HCL (B-1) 100 mg tablet, TAKE ONE TABLET BY MOUTH DAILY, Disp: 90 Tab, Rfl: 3    All: Allergies   Allergen Reactions    Robaxin [Methocarbamol] Hives, Rash and Itching     Red splotches    Statins-Hmg-Coa Reductase Inhibitors Myalgia    Codeine Itching    Neosporin [Neomycin-Bacitracin-Polymyxin] Rash       Social Hx:  Social History     Socioeconomic History    Marital status:    Occupational History    Occupation: part time   Tobacco Use    Smoking status: Current Every Day Smoker     Packs/day: 0.50     Years: 17.00     Pack years: 8.50     Types: Cigarettes    Smokeless tobacco: Never Used   Vaping Use    Vaping Use: Never used   Substance and Sexual Activity    Alcohol use:  Yes     Alcohol/week: 51.0 standard drinks     Types: 1 Cans of beer, 50 Shots of liquor per week     Comment: one pint daily liquor    Drug use: No    Sexual activity: Yes     Partners: Male     Birth control/protection: None       Family Hx:  Family History   Problem Relation Age of Onset    Hypertension Mother     Diabetes Mother     Cancer Mother         breast    Heart Disease Mother     Heart Disease Father         MI 42's    Kidney Disease Father     Hypertension Son          Review of Systems:       General: Denies headache, lethargy, fever, weight loss  Ears/Nose/Throat: Denies ear discharge, drainage, nosebleeds, hoarse voice, dental problems  Cardiovascular: Denies chest pain, shortness of breath  Lungs: Denies chest pain, breathing problems, wheezing, pneumonia  Stomach: Denies stomach pain, heartburn, constipation, irritable bowel  Skin: Denies rash, sores, open wounds  Musculoskeletal: bilateral leg swelling  Genitourinary: Denies dysuria, hematuria, polyuria  Gastrointestinal: Denies constipation, obstipation, diarrhea  Neurological: Denies changes in sight, smell, hearing, taste, seizures. Denies loss of consciousness. Psychiatric: Denies depression, sleep pattern changes, anxiety, change in personality  Endocrine: Denies mood swings, heat or cold intolerance  Hematologic/Lymphatic: Denies anemia, purpura, petechia  Allergic/Immunologic: Denies swelling of throat, pain or swelling at lymph nodes      Physical Examination:    There were no vitals taken for this visit. General: AOX3, no apparent distress  Psychiatric: mood and affect appropriate  Video imaging indicates swollen bilateral legs, possible effusion      Diagnostics:    Pertinent Diagnostics: none today    Assessment: bilateral leg swelling  Plan:    We discussed that this patient needs at least an ultrasound to rule out DVT, ordered stat today, and pain control I called in tramadol. As soon as we have details on the vascular status, we can proceed with further workup. She will work to come in when she can. She was in [place] at the time of consult. I was in the office while conducting this encounter. Consent:  She and/or her healthcare decision maker is aware that this patient-initiated Telehealth encounter is a billable service, with coverage as determined by her insurance carrier.  She is aware that she may receive a bill and has provided verbal consent to proceed: Yes    This virtual visit was conducted via [email or web address]. Pursuant to the emergency declaration under the [facility], [facility] waiver authority and the [employer] Act, this Virtual  Visit was conducted to reduce the patient's risk of exposure to COVID-19 and provide continuity of care for an established patient. Services were provided through a video synchronous discussion virtually to substitute for in-person clinic visit. Due to this being a TeleHealth evaluation, many elements of the physical examination are unable to be assessed. Total Time: minutes: 5-10 minutes. Ms. Natasha Matias has a reminder for a \"due or due soon\" health maintenance. I have asked that she contact her primary care provider for follow-up on this health maintenance.

## 2021-12-17 NOTE — TELEPHONE ENCOUNTER
Called pt to inform her there were no blood clots. Pt's granddaughter said she's been doing okay, but the Tramadol makes her sleepy, so she would like to know if there is something she can take for during the day time to help with her pain, but not make her so sleepy?

## 2021-12-26 ENCOUNTER — HOSPITAL ENCOUNTER (EMERGENCY)
Age: 61
Discharge: HOME | End: 2021-12-26
Attending: EMERGENCY MEDICINE
Payer: COMMERCIAL

## 2021-12-26 VITALS
DIASTOLIC BLOOD PRESSURE: 89 MMHG | HEART RATE: 100 BPM | SYSTOLIC BLOOD PRESSURE: 163 MMHG | HEIGHT: 61 IN | WEIGHT: 115 LBS | TEMPERATURE: 98.7 F | OXYGEN SATURATION: 98 % | RESPIRATION RATE: 18 BRPM | BODY MASS INDEX: 21.71 KG/M2

## 2021-12-26 DIAGNOSIS — L30.9 DERMATITIS: Primary | ICD-10-CM

## 2021-12-26 DIAGNOSIS — L01.00 IMPETIGO: ICD-10-CM

## 2021-12-26 LAB
ALBUMIN SERPL-MCNC: 3.7 G/DL (ref 3.5–5)
ALBUMIN/GLOB SERPL: 0.9 {RATIO} (ref 1.1–2.2)
ALP SERPL-CCNC: 130 U/L (ref 45–117)
ALT SERPL-CCNC: 22 U/L (ref 12–78)
ANION GAP SERPL CALC-SCNC: 4 MMOL/L (ref 5–15)
AST SERPL-CCNC: 26 U/L (ref 15–37)
BASOPHILS # BLD: 0 K/UL (ref 0–0.1)
BASOPHILS NFR BLD: 0 % (ref 0–1)
BILIRUB SERPL-MCNC: 0.5 MG/DL (ref 0.2–1)
BUN SERPL-MCNC: 17 MG/DL (ref 6–20)
BUN/CREAT SERPL: 25 (ref 12–20)
CALCIUM SERPL-MCNC: 9.7 MG/DL (ref 8.5–10.1)
CHLORIDE SERPL-SCNC: 102 MMOL/L (ref 97–108)
CO2 SERPL-SCNC: 31 MMOL/L (ref 21–32)
CREAT SERPL-MCNC: 0.67 MG/DL (ref 0.55–1.02)
DIFFERENTIAL METHOD BLD: ABNORMAL
EOSINOPHIL # BLD: 0.1 K/UL (ref 0–0.4)
EOSINOPHIL NFR BLD: 2 % (ref 0–7)
ERYTHROCYTE [DISTWIDTH] IN BLOOD BY AUTOMATED COUNT: 17.1 % (ref 11.5–14.5)
GLOBULIN SER CALC-MCNC: 4.3 G/DL (ref 2–4)
GLUCOSE SERPL-MCNC: 100 MG/DL (ref 65–100)
HCT VFR BLD AUTO: 39.7 % (ref 35–47)
HGB BLD-MCNC: 13.2 G/DL (ref 11.5–16)
IMM GRANULOCYTES # BLD AUTO: 0 K/UL (ref 0–0.04)
IMM GRANULOCYTES NFR BLD AUTO: 0 % (ref 0–0.5)
LYMPHOCYTES # BLD: 2.5 K/UL (ref 0.8–3.5)
LYMPHOCYTES NFR BLD: 38 % (ref 12–49)
MCH RBC QN AUTO: 31.8 PG (ref 26–34)
MCHC RBC AUTO-ENTMCNC: 33.2 G/DL (ref 30–36.5)
MCV RBC AUTO: 95.7 FL (ref 80–99)
MONOCYTES # BLD: 1.1 K/UL (ref 0–1)
MONOCYTES NFR BLD: 17 % (ref 5–13)
NEUTS SEG # BLD: 2.9 K/UL (ref 1.8–8)
NEUTS SEG NFR BLD: 43 % (ref 32–75)
NRBC # BLD: 0 K/UL (ref 0–0.01)
NRBC BLD-RTO: 0 PER 100 WBC
PLATELET # BLD AUTO: 181 K/UL (ref 150–400)
PMV BLD AUTO: 10.8 FL (ref 8.9–12.9)
POTASSIUM SERPL-SCNC: 3.8 MMOL/L (ref 3.5–5.1)
PROT SERPL-MCNC: 8 G/DL (ref 6.4–8.2)
RBC # BLD AUTO: 4.15 M/UL (ref 3.8–5.2)
RBC MORPH BLD: ABNORMAL
SODIUM SERPL-SCNC: 137 MMOL/L (ref 136–145)
WBC # BLD AUTO: 6.6 K/UL (ref 3.6–11)

## 2021-12-26 PROCEDURE — 99281 EMR DPT VST MAYX REQ PHY/QHP: CPT

## 2021-12-26 PROCEDURE — 85025 COMPLETE CBC W/AUTO DIFF WBC: CPT

## 2021-12-26 PROCEDURE — 80053 COMPREHEN METABOLIC PANEL: CPT

## 2021-12-26 RX ORDER — PREDNISONE 5 MG/1
TABLET ORAL
Qty: 1 DOSE PACK | Refills: 0 | Status: SHIPPED | OUTPATIENT
Start: 2021-12-26 | End: 2022-02-16 | Stop reason: ALTCHOICE

## 2021-12-26 RX ORDER — CEPHALEXIN 500 MG/1
500 CAPSULE ORAL 4 TIMES DAILY
Qty: 20 CAPSULE | Refills: 0 | Status: SHIPPED | OUTPATIENT
Start: 2021-12-26 | End: 2021-12-31

## 2021-12-27 NOTE — ED PROVIDER NOTES
EMERGENCY DEPARTMENT HISTORY AND PHYSICAL EXAM      Date: 12/26/2021  Patient Name: Owen Calderon    History of Presenting Illness     Chief Complaint   Patient presents with    Allergic Reaction     Pt believes one of he rmedications caused her to breakout in a rash all over her neck line and hair. Pt denies any difficulty breathing       History Provided By: Patient    HPI: Owen Calderon, 62 y.o. female with PMHx as noted below presents the emergency department chief complaint of rash/allergic reaction. Patient notes a itchy, irritated rash along her neck and ears began on 12/22. Patient states that it did seem to begin after starting tramadol. Notes that the area has been very irritated with some drainage. Symptoms are constant, moderate in intensity and nonradiating. Denies any shortness of breath, vomiting, diarrhea. Pt denies any other alleviating or exacerbating factors. Additionally, pt specifically denies any recent fever, chills, headache, nausea, vomiting, abdominal pain, CP, SOB, lightheadedness, dizziness, numbness, weakness, BLE swelling, heart palpitations, urinary sxs, diarrhea, constipation, melena, hematochezia, cough, or congestion. PCP: Britt Whitten NP    Current Outpatient Medications   Medication Sig Dispense Refill    predniSONE (STERAPRED) 5 mg dose pack 1 pack 1 Dose Pack 0    cephALEXin (Keflex) 500 mg capsule Take 1 Capsule by mouth four (4) times daily for 5 days. 20 Capsule 0    naloxone (NARCAN) 4 mg/actuation nasal spray Use 1 spray intranasally, then discard. Repeat with new spray every 2 min as needed for opioid overdose symptoms, alternating nostrils. 1 Each 2    aspirin delayed-release 325 mg tablet Take 1 Tablet by mouth two (2) times a day. 30 Tablet 0    famotidine (PEPCID) 20 mg tablet Take 1 Tablet by mouth two (2) times a day.  60 Tablet 0    magnesium 200 mg tab TAKE ONE TABLET BY MOUTH DAILY 90 Tablet 0    doxepin (SINEquan) 25 mg capsule Take 1 Capsule by mouth nightly as needed (sleep). (Patient taking differently: Take 50 mg by mouth nightly as needed (sleep). ) 30 Capsule 0    folic acid (FOLVITE) 1 mg tablet Take 1 Tablet by mouth daily. 30 Tablet 0    pantoprazole (PROTONIX) 40 mg tablet Take 1 Tablet by mouth Daily (before breakfast). 30 Tablet 0    chlordiazePOXIDE (LIBRIUM) 5 mg capsule Take 1 Cap by mouth three (3) times daily as needed for Anxiety.  Max Daily Amount: 15 mg. 90 Cap 0    potassium chloride (KLOR-CON) 20 mEq pack TAKE ONE PACKET BY MOUTH DAILY 30 Packet 5    buPROPion XL (WELLBUTRIN XL) 150 mg tablet TAKE ONE TABLET BY MOUTH EVERY MORNING FOR SMOKING CESSATION 90 Tab 1    thiamine HCL (B-1) 100 mg tablet TAKE ONE TABLET BY MOUTH DAILY 90 Tab 3       Past History     Past Medical History:  Past Medical History:   Diagnosis Date    GI (gastrointestinal bleed)     Insomnia 11/09    Iron deficiency anemia 04/2004    PPD positive     treated w/ INH- tests since have been negative    Pure hypercholesterolemia     Scoliosis 12/28/15    [facility]    Tobacco dependence     Unspecified essential hypertension     Uterine bleeding, dysfunctional 2/12    Uterine fibroid        Past Surgical History:  Past Surgical History:   Procedure Laterality Date    COLONOSCOPY N/A 1/31/2018    COLONOSCOPY performed by Hernesto Torres MD at [facility] HX CHOLECYSTECTOMY      HX COLONOSCOPY  07/01/2013    [facility], repeat 10 years   [facility] HX ORTHOPAEDIC      Shattered bilateral ankles-metal plates & screws    HX OTHER SURGICAL      Benign cyst removal- L. foot    [place] LEG/ANKLE SURGERY PROC UNLISTED         Family History:  Family History   Problem Relation Age of Onset    Hypertension Mother     Diabetes Mother     Cancer Mother         breast    Heart Disease Mother     Heart Disease Father         MI 42's    Kidney Disease Father     Hypertension Son        Social History:  Social History Tobacco Use    Smoking status: Current Every Day Smoker     Packs/day: 0.50     Years: 17.00     Pack years: 8.50     Types: Cigarettes    Smokeless tobacco: Never Used   Vaping Use    Vaping Use: Never used   Substance Use Topics    Alcohol use: Yes     Alcohol/week: 51.0 standard drinks     Types: 1 Cans of beer, 50 Shots of liquor per week     Comment: one pint daily liquor    Drug use: No       Allergies: Allergies   Allergen Reactions    Robaxin [Methocarbamol] Hives, Rash and Itching     Red splotches    Statins-Hmg-Coa Reductase Inhibitors Myalgia    Codeine Itching    Neosporin [Neomycin-Bacitracin-Polymyxin] Rash         Review of Systems   Review of Systems  Constitutional: Negative for fever, chills, and fatigue. HENT: Negative for congestion, sore throat, rhinorrhea, sneezing and neck stiffness   Eyes: Negative for discharge and redness. Respiratory: Negative for  shortness of breath, wheezing   Cardiovascular: Negative for chest pain, palpitations   Gastrointestinal: Negative for nausea, vomiting, abdominal pain, constipation, diarrhea and blood in stool. Genitourinary: Negative for dysuria, hematuria, flank pain, decreased urine volume, discharge,   Musculoskeletal: Negative for myalgias or joint pain . Skin: Positive for rash, lesions . Neurological: Negative weakness, light-headedness, numbness and headaches. Physical Exam   Physical Exam    GENERAL: alert and oriented, no acute distress  EYES: PEERL, No injection, discharge or icterus. ENT: Mucous membranes pink and moist.  NECK: Supple  LUNGS: Airway patent. Non-labored respirations. Breath sounds clear with good air entry bilaterally. HEART: Regular rate and rhythm. No peripheral edema  ABDOMEN: Non-distended and non-tender, without guarding or rebound. SKIN: There is erythematous, macular rash on the patient's neck with some crusting noted.   There is no desquamation, no mucous membrane involvement  MSK/EXTREMITIES: Without swelling, tenderness or deformity, symmetric with normal ROM  NEUROLOGICAL: Alert, oriented      Diagnostic Study Results     Labs -     Recent Results (from the past 12 hour(s))   CBC WITH AUTOMATED DIFF    Collection Time: 12/26/21  3:48 PM   Result Value Ref Range    WBC 6.6 3.6 - 11.0 K/uL    RBC 4.15 3.80 - 5.20 M/uL    HGB 13.2 11.5 - 16.0 g/dL    HCT 39.7 35.0 - 47.0 %    MCV 95.7 80.0 - 99.0 FL    MCH 31.8 26.0 - 34.0 PG    MCHC 33.2 30.0 - 36.5 g/dL    RDW 17.1 (H) 11.5 - 14.5 %    PLATELET 898 548 - 361 K/uL    MPV 10.8 8.9 - 12.9 FL    NRBC 0.0 0  WBC    ABSOLUTE NRBC 0.00 0.00 - 0.01 K/uL    NEUTROPHILS 43 32 - 75 %    LYMPHOCYTES 38 12 - 49 %    MONOCYTES 17 (H) 5 - 13 %    EOSINOPHILS 2 0 - 7 %    BASOPHILS 0 0 - 1 %    IMMATURE GRANULOCYTES 0 0.0 - 0.5 %    ABS. NEUTROPHILS 2.9 1.8 - 8.0 K/UL    ABS. LYMPHOCYTES 2.5 0.8 - 3.5 K/UL    ABS. MONOCYTES 1.1 (H) 0.0 - 1.0 K/UL    ABS. EOSINOPHILS 0.1 0.0 - 0.4 K/UL    ABS. BASOPHILS 0.0 0.0 - 0.1 K/UL    ABS. IMM. GRANS. 0.0 0.00 - 0.04 K/UL    DF MANUAL      RBC COMMENTS ANISOCYTOSIS  1+       METABOLIC PANEL, COMPREHENSIVE    Collection Time: 12/26/21  3:48 PM   Result Value Ref Range    Sodium 137 136 - 145 mmol/L    Potassium 3.8 3.5 - 5.1 mmol/L    Chloride 102 97 - 108 mmol/L    CO2 31 21 - 32 mmol/L    Anion gap 4 (L) 5 - 15 mmol/L    Glucose 100 65 - 100 mg/dL    BUN 17 6 - 20 MG/DL    Creatinine 0.67 0.55 - 1.02 MG/DL    BUN/Creatinine ratio 25 (H) 12 - 20      GFR est AA >60 >60 ml/min/1.73m2    GFR est non-AA >60 >60 ml/min/1.73m2    Calcium 9.7 8.5 - 10.1 MG/DL    Bilirubin, total 0.5 0.2 - 1.0 MG/DL    ALT (SGPT) 22 12 - 78 U/L    AST (SGOT) 26 15 - 37 U/L    Alk.  phosphatase 130 (H) 45 - 117 U/L    Protein, total 8.0 6.4 - 8.2 g/dL    Albumin 3.7 3.5 - 5.0 g/dL    Globulin 4.3 (H) 2.0 - 4.0 g/dL    A-G Ratio 0.9 (L) 1.1 - 2.2         Radiologic Studies -   No orders to display     CT Results  (Last 48 hours)    None        CXR Results  (Last 48 hours)    None            Medical Decision Making     I, Apryl Latham MD am the first provider for this patient and am the attending of record for this patient encounter. I reviewed the vital signs, available nursing notes, past medical history, past surgical history, family history and social history. Vital Signs-Reviewed the patient's vital signs. Patient Vitals for the past 12 hrs:   Temp Pulse Resp BP SpO2   12/26/21 1531 98.7 °F (37.1 °C) 100 18 (!) 163/89 98 %           Records Reviewed: Nursing Notes and Old Medical Records    Provider Notes (Medical Decision Making): On presentation, the patient is well appearing, in no acute distress with normal vital signs. Based on my history and exam the differential diagnosis for this patient includes allergic reaction, contact dermatitis, desquamation reaction. Basic labs obtained and on my interpretation were nondiagnostic and reassuring. Exam findings are not consistent with SJS/TN, patient is overall well-appearing and no reason to suspect sepsis/ SSS. Exam findings are most consistent with contact dermatitis however have also instructed her to discontinue her tramadol. There are some areas that do have some crusting and erythema possibly consistent with impetigo. Will start on course of steroids as well as course of Keflex. Have referred to dermatology. Otherwise not having any symptoms that would be concerning for anaphylaxis. ED Course:   Initial assessment performed. The patients presenting problems have been discussed, and they are in agreement with the care plan formulated and outlined with them. I have encouraged them to ask questions as they arise throughout their visit. Medications - No data to display      PROGRESS  Gratiotnoris Cotter's  results have been reviewed with her. She has been counseled regarding her diagnosis.   She verbally conveys understanding and agreement of the signs, symptoms, diagnosis, treatment and prognosis and additionally agrees to follow up as recommended with Dr. Walter Laws, ELIZABETH in 24 - 48 hours. She also agrees with the care-plan and conveys that all of her questions have been answered. I have also put together some discharge instructions for her that include: 1) educational information regarding their diagnosis, 2) how to care for their diagnosis at home, as well a 3) list of reasons why they would want to return to the ED prior to their follow-up appointment, should their condition change. Disposition:  home    PLAN:  1. Discharge Medication List as of 12/26/2021  8:22 PM      START taking these medications    Details   predniSONE (STERAPRED) 5 mg dose pack 1 pack, Normal, Disp-1 Dose Pack, R-0      cephALEXin (Keflex) 500 mg capsule Take 1 Capsule by mouth four (4) times daily for 5 days. , Normal, Disp-20 Capsule, R-0         CONTINUE these medications which have NOT CHANGED    Details   naloxone (NARCAN) 4 mg/actuation nasal spray Use 1 spray intranasally, then discard. Repeat with new spray every 2 min as needed for opioid overdose symptoms, alternating nostrils. , Normal, Disp-1 Each, R-2      aspirin delayed-release 325 mg tablet Take 1 Tablet by mouth two (2) times a day., Print, Disp-30 Tablet, R-0      famotidine (PEPCID) 20 mg tablet Take 1 Tablet by mouth two (2) times a day., Print, Disp-60 Tablet, R-0      magnesium 200 mg tab TAKE ONE TABLET BY MOUTH DAILY, Normal, Disp-90 Tablet, R-0      doxepin (SINEquan) 25 mg capsule Take 1 Capsule by mouth nightly as needed (sleep). , Normal, Disp-30 Capsule, R-0      folic acid (FOLVITE) 1 mg tablet Take 1 Tablet by mouth daily. , Normal, Disp-30 Tablet, R-0      pantoprazole (PROTONIX) 40 mg tablet Take 1 Tablet by mouth Daily (before breakfast). , Normal, Disp-30 Tablet, R-0      chlordiazePOXIDE (LIBRIUM) 5 mg capsule Take 1 Cap by mouth three (3) times daily as needed for Anxiety.  Max Daily Amount: 15 mg., Normal, Disp-90 Cap, R-0      potassium chloride (KLOR-CON) 20 mEq pack TAKE ONE PACKET BY MOUTH DAILY, Normal, Disp-30 Packet, R-5      buPROPion XL (WELLBUTRIN XL) 150 mg tablet TAKE ONE TABLET BY MOUTH EVERY MORNING FOR SMOKING CESSATION, Normal, Disp-90 Tab, R-1      thiamine HCL (B-1) 100 mg tablet TAKE ONE TABLET BY MOUTH DAILY, Normal, Disp-90 Tab, R-3           2. Follow-up Information     Follow up With Specialties Details Why Contact Info    Leonard Pearson NP Nurse Practitioner Schedule an appointment as soon as possible for a visit in 2 days  [phone]      [facility] EMERGENCY DEPT Emergency Medicine  If symptoms worsen [address]  [address]   [address] Dermatology Dermatology   [address] [place] [id number]  [phone]        Return to ED if worse     Diagnosis     Clinical Impression:   1. Dermatitis    2. Impetigo        Please note that this dictation was completed with Dragon, computer voice recognition software. Quite often unanticipated grammatical, syntax, homophones, and other interpretive errors are inadvertently transcribed by the computer software. Please disregard these errors. Additionally, please excuse any errors that have escaped final proofreading.

## 2021-12-28 ENCOUNTER — TELEPHONE (OUTPATIENT)
Dept: FAMILY MEDICINE CLINIC | Age: 61
End: 2021-12-28

## 2021-12-28 DIAGNOSIS — L01.00 IMPETIGO: ICD-10-CM

## 2021-12-28 DIAGNOSIS — L50.9 URTICARIA: Primary | ICD-10-CM

## 2021-12-28 DIAGNOSIS — Z47.89 ORTHOPEDIC AFTERCARE: Primary | ICD-10-CM

## 2021-12-28 NOTE — TELEPHONE ENCOUNTER
----- Message from Ulices Perez sent at 12/27/2021  4:21 PM EST -----  Subject: Referral Request    QUESTIONS   Reason for referral request? The patient was seen in ED 12/26/21 for   Allergic Reaction. Patient was advised to see Dermatology by [facility]. The patient needs a Referral from her PCP before she can   be seen. Please send a referral to [facility] for the patient. Please   call the patient to advise   Has the physician seen you for this condition before? No   Preferred Specialist (if applicable)? Do you already have an appointment scheduled? No  Additional Information for Provider? Diagnoses Diagnosis Dermatitis   Impetigo   ---------------------------------------------------------------------------  --------------  CALL BACK INFO  What is the best way for the office to contact you? OK to leave message on   voicemail  Preferred Call Back Phone Number?  [phone]

## 2021-12-29 ENCOUNTER — OFFICE VISIT (OUTPATIENT)
Dept: ORTHOPEDIC SURGERY | Age: 61
End: 2021-12-29
Payer: COMMERCIAL

## 2021-12-29 ENCOUNTER — TELEPHONE (OUTPATIENT)
Dept: ORTHOPEDIC SURGERY | Age: 61
End: 2021-12-29

## 2021-12-29 VITALS
HEART RATE: 96 BPM | HEIGHT: 61 IN | BODY MASS INDEX: 21.73 KG/M2 | RESPIRATION RATE: 18 BRPM | TEMPERATURE: 97.3 F | SYSTOLIC BLOOD PRESSURE: 143 MMHG | OXYGEN SATURATION: 97 % | DIASTOLIC BLOOD PRESSURE: 87 MMHG

## 2021-12-29 DIAGNOSIS — Z47.89 ORTHOPEDIC AFTERCARE: Primary | ICD-10-CM

## 2021-12-29 PROCEDURE — 99024 POSTOP FOLLOW-UP VISIT: CPT | Performed by: ORTHOPAEDIC SURGERY

## 2021-12-29 RX ORDER — TRAMADOL HYDROCHLORIDE 50 MG/1
50 TABLET ORAL
Qty: 28 TABLET | Refills: 0 | Status: SHIPPED | OUTPATIENT
Start: 2021-12-29 | End: 2022-01-03 | Stop reason: SDUPTHER

## 2021-12-29 NOTE — PROGRESS NOTES
is here for a follow up visit from an orif left lateral tibial plateau. Pain has been appropriate since surgery, no major medical complications since surgery. Current Outpatient Medications on File Prior to Visit   Medication Sig Dispense Refill    predniSONE (STERAPRED) 5 mg dose pack 1 pack 1 Dose Pack 0    cephALEXin (Keflex) 500 mg capsule Take 1 Capsule by mouth four (4) times daily for 5 days. 20 Capsule 0    naloxone (NARCAN) 4 mg/actuation nasal spray Use 1 spray intranasally, then discard. Repeat with new spray every 2 min as needed for opioid overdose symptoms, alternating nostrils. 1 Each 2    aspirin delayed-release 325 mg tablet Take 1 Tablet by mouth two (2) times a day. 30 Tablet 0    famotidine (PEPCID) 20 mg tablet Take 1 Tablet by mouth two (2) times a day. 60 Tablet 0    magnesium 200 mg tab TAKE ONE TABLET BY MOUTH DAILY 90 Tablet 0    doxepin (SINEquan) 25 mg capsule Take 1 Capsule by mouth nightly as needed (sleep). (Patient taking differently: Take 50 mg by mouth nightly as needed (sleep). ) 30 Capsule 0    folic acid (FOLVITE) 1 mg tablet Take 1 Tablet by mouth daily. 30 Tablet 0    pantoprazole (PROTONIX) 40 mg tablet Take 1 Tablet by mouth Daily (before breakfast). 30 Tablet 0    chlordiazePOXIDE (LIBRIUM) 5 mg capsule Take 1 Cap by mouth three (3) times daily as needed for Anxiety. Max Daily Amount: 15 mg. 90 Cap 0    potassium chloride (KLOR-CON) 20 mEq pack TAKE ONE PACKET BY MOUTH DAILY 30 Packet 5    buPROPion XL (WELLBUTRIN XL) 150 mg tablet TAKE ONE TABLET BY MOUTH EVERY MORNING FOR SMOKING CESSATION 90 Tab 1    thiamine HCL (B-1) 100 mg tablet TAKE ONE TABLET BY MOUTH DAILY 90 Tab 3     No current facility-administered medications on file prior to visit.        ROS:  General: denies agitation, major chest pain, unexpected weakness  Patient states no issues  Skin: healing wound is without issue or drainage   Strength: appropriate weakness of involved extremity is resolving since surgery      Physical Examination:    Blood pressure (!) 143/87, pulse 96, temperature 97.3 °F (36.3 °C), temperature source Temporal, resp. rate 18, height 5' 1\" (1.549 m), SpO2 97 %. Dressing: none  Skin: no issues  Sensation intact to light touch at level of wound and distally  Strength is 5/5 knee flexion and extension  Range of motion is full   Distal swelling is noted, but appropriate for postoperative course  Distal capillary refill less than 2 seconds      Imaging:    Postoperative imaging: none today      Assessment: Status post ORIF left tibial plateau    Plan:  Patient will continue gait training with physical therapy  Wound care was reviewed  Weightbearing will be full through the leg  Deep Venous Thrombosis Prophylaxis: none  One final prescription for pain medications today  Follow up will be prn as she is back at her baseline.

## 2021-12-29 NOTE — PROGRESS NOTES
Chief Complaint   Patient presents with    Surgical Follow-up     1m f/u Left ORIF tibial plateau fx       1. Have you been to the ER, urgent care clinic since your last visit? Hospitalized since your last visit? ER, Rash    2. Have you seen or consulted any other health care providers outside of the [address] [place] [address] since your last visit? Include any pap smears or colon screening.  No

## 2021-12-29 NOTE — TELEPHONE ENCOUNTER
vd call from [address] stating patient filled Naltrexone 12/27/2021. He states that this can not be taken with Tramadol and at the time that the Naltrexone was filled, the pt advised she was not taking Tramadol. He would like to know how you would like to proceed.

## 2021-12-30 NOTE — TELEPHONE ENCOUNTER
Spoke with Kvng Garibay. He will cancel medication and contact the patient again. He states he has attempted to call her already and has not heard back.

## 2022-01-03 ENCOUNTER — DOCUMENTATION ONLY (OUTPATIENT)
Dept: ORTHOPEDIC SURGERY | Age: 62
End: 2022-01-03

## 2022-01-03 DIAGNOSIS — Z47.89 ORTHOPEDIC AFTERCARE: ICD-10-CM

## 2022-01-03 RX ORDER — TRAMADOL HYDROCHLORIDE 50 MG/1
50 TABLET ORAL
Qty: 28 TABLET | Refills: 0 | Status: SHIPPED | OUTPATIENT
Start: 2022-01-03 | End: 2022-01-10

## 2022-01-03 NOTE — PROGRESS NOTES
Cesario Boland     12/29/21     vd call from [address] [place] [address] stating patient filled Naltrexone 12/27/2021. Pharmist states that this cannot be taken with Tramadol and at the time that the Naltrexone was filled, the pt advised she was not taking Tramadol. Doctor advised if she does not take the tramadol and is comfortable for the pharmacist to cancel the prescription. 12/30/21    Pharmacist Ti Pascual advised he would cancel the prescription and contact the patient. He states he has attempted to call her already but has not head back. Obey Johnson    1/3/22    PT called stating she had not received her prescription from Tramadol and when she spoke with the pharmacy at [facility] they did not have it on file. When speaking to pharmacy Piotr learned that the patient's prescription had been cancell by pharmacist Ti Pascual on 12/30/21 because she had picked up her prescription of Naltrexone. Piotr spoke with PT explaining why the prescription of Tramadol had not been filled. PT sated she has not been taking Naltrexone for months. Piotr [facility] phone call from juan Pascual with a concern about the pt who had called again wanting to receive the Tramadol and told him as well she has not been taking the Naltrexone for months. The pharmacist states he cannot accept the Naltrexone prescription back but if she brings it to him he can confirm its an entire prescription before filling a new order for the Tramadol, if he receives a new order from Dr. Silviano Maynard

## 2022-01-03 NOTE — TELEPHONE ENCOUNTER
Tramadol refill. Pt did not  her last medication at [facility], due to some confusion with another medication (Naltrexone). I have confirmed this with the pharmacy. Pt would like a new refill sent to a new pharmacy on file as she's \"done with [facility]\".

## 2022-01-27 DIAGNOSIS — M89.8X6 PAIN OF RIGHT TIBIA: Primary | ICD-10-CM

## 2022-02-01 ENCOUNTER — HOSPITAL ENCOUNTER (OUTPATIENT)
Dept: GENERAL RADIOLOGY | Age: 62
Discharge: HOME | End: 2022-02-01
Payer: COMMERCIAL

## 2022-02-01 DIAGNOSIS — M89.8X6 PAIN OF RIGHT TIBIA: ICD-10-CM

## 2022-02-01 PROCEDURE — 73590 X-RAY EXAM OF LOWER LEG: CPT

## 2022-02-02 ENCOUNTER — OFFICE VISIT (OUTPATIENT)
Dept: FAMILY MEDICINE CLINIC | Age: 62
End: 2022-02-02
Payer: COMMERCIAL

## 2022-02-02 VITALS
WEIGHT: 125.2 LBS | OXYGEN SATURATION: 98 % | SYSTOLIC BLOOD PRESSURE: 121 MMHG | DIASTOLIC BLOOD PRESSURE: 81 MMHG | RESPIRATION RATE: 18 BRPM | BODY MASS INDEX: 23.64 KG/M2 | TEMPERATURE: 98.8 F | HEIGHT: 61 IN | HEART RATE: 85 BPM

## 2022-02-02 DIAGNOSIS — G60.8 IDIOPATHIC SMALL AND LARGE FIBER SENSORY NEUROPATHY: ICD-10-CM

## 2022-02-02 DIAGNOSIS — E53.8 FOLIC ACID DEFICIENCY: ICD-10-CM

## 2022-02-02 DIAGNOSIS — E51.9 THIAMIN DEFICIENCY: ICD-10-CM

## 2022-02-02 DIAGNOSIS — Z13.6 SCREENING FOR CARDIOVASCULAR CONDITION: ICD-10-CM

## 2022-02-02 DIAGNOSIS — F10.939 ALCOHOL WITHDRAWAL SYNDROME WITH COMPLICATION (HCC): ICD-10-CM

## 2022-02-02 DIAGNOSIS — Z12.31 ENCOUNTER FOR SCREENING MAMMOGRAM FOR MALIGNANT NEOPLASM OF BREAST: ICD-10-CM

## 2022-02-02 DIAGNOSIS — K76.6 PORTAL HYPERTENSION (HCC): ICD-10-CM

## 2022-02-02 DIAGNOSIS — G62.1 ALCOHOLIC PERIPHERAL NEUROPATHY (HCC): Primary | ICD-10-CM

## 2022-02-02 DIAGNOSIS — F51.01 PRIMARY INSOMNIA: ICD-10-CM

## 2022-02-02 DIAGNOSIS — J43.2 CENTRILOBULAR EMPHYSEMA (HCC): ICD-10-CM

## 2022-02-02 LAB
ALBUMIN SERPL-MCNC: 3.6 G/DL (ref 3.5–5)
ALBUMIN/GLOB SERPL: 0.9 {RATIO} (ref 1.1–2.2)
ALP SERPL-CCNC: 108 U/L (ref 45–117)
ALT SERPL-CCNC: 24 U/L (ref 12–78)
ANION GAP SERPL CALC-SCNC: 2 MMOL/L (ref 5–15)
AST SERPL-CCNC: 17 U/L (ref 15–37)
BILIRUB SERPL-MCNC: 0.6 MG/DL (ref 0.2–1)
BUN SERPL-MCNC: 16 MG/DL (ref 6–20)
BUN/CREAT SERPL: 17 (ref 12–20)
CALCIUM SERPL-MCNC: 9.9 MG/DL (ref 8.5–10.1)
CHLORIDE SERPL-SCNC: 101 MMOL/L (ref 97–108)
CHOLEST SERPL-MCNC: 295 MG/DL
CO2 SERPL-SCNC: 29 MMOL/L (ref 21–32)
CREAT SERPL-MCNC: 0.93 MG/DL (ref 0.55–1.02)
FOLATE SERPL-MCNC: 18.7 NG/ML (ref 5–21)
GLOBULIN SER CALC-MCNC: 3.9 G/DL (ref 2–4)
GLUCOSE SERPL-MCNC: 105 MG/DL (ref 65–100)
HDLC SERPL-MCNC: 68 MG/DL
HDLC SERPL: 4.3 {RATIO} (ref 0–5)
LDLC SERPL CALC-MCNC: 209 MG/DL (ref 0–100)
POTASSIUM SERPL-SCNC: 5.5 MMOL/L (ref 3.5–5.1)
PROT SERPL-MCNC: 7.5 G/DL (ref 6.4–8.2)
SODIUM SERPL-SCNC: 132 MMOL/L (ref 136–145)
TRIGL SERPL-MCNC: 90 MG/DL (ref ?–150)
VIT B12 SERPL-MCNC: 282 PG/ML (ref 193–986)
VLDLC SERPL CALC-MCNC: 18 MG/DL

## 2022-02-02 PROCEDURE — 99214 OFFICE O/P EST MOD 30 MIN: CPT | Performed by: NURSE PRACTITIONER

## 2022-02-02 RX ORDER — DOXEPIN HYDROCHLORIDE 50 MG/1
50 CAPSULE ORAL
Qty: 90 CAPSULE | Refills: 1 | Status: SHIPPED | OUTPATIENT
Start: 2022-02-02 | End: 2022-08-12 | Stop reason: SDUPTHER

## 2022-02-02 RX ORDER — FOLIC ACID 1 MG/1
1 TABLET ORAL DAILY
Qty: 90 TABLET | Refills: 1 | Status: SHIPPED | OUTPATIENT
Start: 2022-02-02 | End: 2022-08-12 | Stop reason: SDUPTHER

## 2022-02-02 RX ORDER — BUPROPION HYDROCHLORIDE 150 MG/1
150 TABLET ORAL
Qty: 90 TABLET | Refills: 1 | Status: SHIPPED | OUTPATIENT
Start: 2022-02-02

## 2022-02-02 RX ORDER — CLOBETASOL PROPIONATE 0.5 MG/G
OINTMENT TOPICAL
COMMUNITY
Start: 2022-01-11

## 2022-02-02 RX ORDER — HYDROCORTISONE 25 MG/G
OINTMENT TOPICAL
COMMUNITY
Start: 2022-01-21

## 2022-02-02 RX ORDER — LANOLIN ALCOHOL/MO/W.PET/CERES
CREAM (GRAM) TOPICAL
Qty: 90 TABLET | Refills: 3 | Status: SHIPPED | OUTPATIENT
Start: 2022-02-02 | End: 2022-06-29 | Stop reason: SDUPTHER

## 2022-02-02 RX ORDER — CHLORDIAZEPOXIDE HYDROCHLORIDE 5 MG/1
5 CAPSULE, GELATIN COATED ORAL
Qty: 30 CAPSULE | Refills: 0 | Status: SHIPPED | OUTPATIENT
Start: 2022-02-02 | End: 2022-02-28 | Stop reason: SDUPTHER

## 2022-02-02 NOTE — PATIENT INSTRUCTIONS
Learning About Peripheral Neuropathy  What is peripheral neuropathy? Peripheral neuropathy is a problem that affects the peripheral nerves. These nerves lead from the spinal cord to other parts of the body. They control your sense of touch, how you feel pain and temperature, and your muscle strength. Most of the time the problem starts in the fingers and toes. As it gets worse, it moves into the limbs. It can cause pain and loss of feeling in the feet, legs, and hands. What causes it? There are several causes:  · Diabetes. This is the most common cause. If your blood sugar is too high for too long, it can damage the nerves. · Kidney problems. These can lead to toxic substances in the blood that damage nerves. · Low levels of thyroid hormone (hypothyroidism). This may cause swelling of the tissues around the nerves, which can put pressure on them. · Infectious or inflammatory diseases. Examples are HIV and Guillain-Barré syndrome. These diseases can damage the nerves. · Peripheral nerve injury. A physical injury can damage the nerves. Injuries can be from things like falls, car crashes, or playing sports. · Overusing alcohol and not eating a healthy diet. These can lead to your body not having enough of certain vitamins, such as vitamin B-12. This can damage nerves. · Being exposed to toxic substances. These include lead, mercury, and certain medicines, such as those used for chemotherapy. Sometimes the cause isn't known. What are the symptoms? Symptoms of peripheral neuropathy can occur slowly over time. The most common ones are:  · Numbness, tightness, and tingling, especially in the legs, hands, and feet. · Loss of feeling. · Burning, shooting, or stabbing pain in the legs, hands, and feet. Often the pain is worse at night. · Weakness and loss of balance. What can happen if you have it? If peripheral neuropathy gets worse, it can lead to a complete lack of feeling in your hands or feet. This can make you more likely to injure them. It may lead to calluses and blisters. It can also lead to bone and joint problems, infection, and ulcers. For instance, small, repeated injuries to the foot may lead to bigger problems. This can happen because you can't feel the injuries. Reduced feeling in the feet can also change your step, leading to bone or joint problems. If untreated, foot problems can become so severe that the foot or lower leg may have to be amputated. But treatment can slow down peripheral neuropathy. And it's a good idea to take care to avoid injury. How is it diagnosed? To diagnose peripheral neuropathy, your doctor will ask you about:  · Your symptoms. · Your medical history. This may include your use of alcohol, risk of HIV infection, or exposure to toxic substances. · Your family's medical history, including nerve disease. Your doctor may test how well you can feel touch, temperature, and pain. You may also have blood tests. These tests will help the doctor find out if you have conditions that can cause neuropathy. Examples are diabetes, vitamin deficiencies, thyroid disease, and kidney problems. How is it treated? Treatment for peripheral neuropathy can relieve symptoms. This is done by treating the health problem that's causing it. For example, if you have diabetes, keeping your blood sugar within your target range may help. Or maybe your body lacks certain vitamins caused by drinking too much alcohol. In that case, treatment may include eating a healthy diet, taking vitamins, and stopping alcohol use. You may have physical therapy. This can increase muscle strength and help build muscle control. Over-the-counter medicine can relieve mild nerve pain. Your doctor may also prescribe medicine to help with severe pain, numbness, tingling, and weakness. If you have neuropathy in your feet, it's a good idea to have them checked during each office visit.  This can help prevent problems. Some people find that physical therapy, acupuncture, or transcutaneous electrical nerve stimulation (TENS) helps relieve pain. Follow-up care is a key part of your treatment and safety. Be sure to make and go to all appointments, and call your doctor if you are having problems. It's also a good idea to know your test results and keep a list of the medicines you take. Where can you learn more? Go to http://www.gray.com/  Enter P130 in the search box to learn more about \"Learning About Peripheral Neuropathy. \"  Current as of: August 31, 2020               Content Version: 13.0  © 4491-2748 [employer]. Care instructions adapted under license by [employer] (which disclaims liability or warranty for this information). If you have questions about a medical condition or this instruction, always ask your healthcare professional. [address] any warranty or liability for your use of this information.

## 2022-02-02 NOTE — PROGRESS NOTES
[facility] Note     Alec Rome (: 1960) is a 62 y.o. female, established patient, here for evaluation of the following chief complaint(s):  ED Follow-up and Medication Refill       ASSESSMENT/PLAN:  1. Alcoholic peripheral neuropathy (HCC)  -     METABOLIC PANEL, COMPREHENSIVE; Future  -     VITAMIN B12 & FOLATE; Future  -EMG reviewed and discussed with patient. She has not followed up with neurology since her testing. Her symptoms are getting worse. She is taking B vitamins as instructed. She does continue to drink but has significantly reduced her amount. She also continues to smoke. -ABIs reviewed which were normal.    2. Idiopathic small and large fiber sensory neuropathy  -     REFERRAL TO NEUROLOGY to reassessment and management  -Discussed scheduling appointment-she has not been reevaluated in some time  -Plan per #1    3. Portal hypertension (HCC)  -     METABOLIC PANEL, COMPREHENSIVE; Future  -Continues to smoke    4. Centrilobular emphysema (HCC)  - Mediastinal lymphadenopathy noted on prior CT 2018. Previously seen by pulmonology, Dr. Renetta Prasad. Possibly related to sarcoidosis. Biopsy was inconclusive. Follow up CT have been stable. -Recommended follow-up appointment with pulmonary  -Patient is interested in smoking cessation. She was receptive to Chantix previously. She is currently prescribed Wellbutrin. 5. Alcohol withdrawal syndrome with complication (HCC)  -     chlordiazePOXIDE (LIBRIUM) 5 mg capsule; Take 1 Capsule by mouth three (3) times daily as needed for Anxiety. Max Daily Amount: 15 mg., Normal, Disp-30 Capsule, R-0    6. Folic acid deficiency  -     folic acid (FOLVITE) 1 mg tablet; Take 1 Tablet by mouth daily. , Normal, Disp-90 Tablet, R-1    7. Thiamin deficiency  -     thiamine HCL (B-1) 100 mg tablet; TAKE ONE TABLET BY MOUTH DAILY, Normal, Disp-90 Tablet, R-3    8. Primary insomnia  -     doxepin (SINEquan) 50 mg capsule;  Take 1 Capsule by mouth nightly as needed (sleep). , Normal, Disp-90 Capsule, R-1  -Do not mix alcohol  9. Screening for cardiovascular condition  -     LIPID PANEL; Future    10. Encounter for screening mammogram for malignant neoplasm of breast  -     [facility] MAMMO BI SCREENING INCL CAD; Future      Return in about 6 months (around 8/2/2022). SUBJECTIVE/OBJECTIVE:    Lui Darden is a 62 y.o. female seen in clinic today. Subjective:  Cardiovascular Review:  The patient has hypertension, hyperlipidemia and PVD (Bilateral <50% stenosis of the internal carotid arteries). Diet and Lifestyle: generally follows a low fat low cholesterol diet, generally follows a low sodium diet, smoker 1 pack every 2-3 days. Home BP Monitoring: not monitoring home readings  Pertinent ROS: taking medications as instructed, no TIA's, no chest pain on exertion, no dyspnea on exertion. Noting swelling of ankles. COPD:  Patient has h/o COPD and current tobacco use. Mediastinal lymphadenopathy noted on prior CT 1/2018. Previously seen by pulmonology, Dr. Tameka Cadet. Possibly related to sarcoidosis. Biopsy was inconclusive. Follow up CT have been stable. Osteoarthritis and Chronic Pain:  Patient has osteoarthritis, primarily affecting the back, knees and joints. She is followed by Ortho Dr. Romina Courtney     Peripheral neuropathy:  Was followed by neurology Dr. Gómez De Leon. She is not been seen in recent months. She is complaining of worsening peripheral neuropathy in her bilateral lower extremities. Patient reports worsening leg weakness, instability and falls. ETOH:  She is taking B vitamins as instructed. Alcohol use has been significantly reduced to approximately a shot of liquor 1-2 times a week down from several a day. Review of Systems   Constitutional: Negative. HENT: Negative. Respiratory: Negative. Cardiovascular: Negative. Gastrointestinal: Negative. Genitourinary: Negative.     Musculoskeletal: Positive for gait problem and joint swelling. Neurological: Positive for numbness (BLE). Negative for dizziness, facial asymmetry and headaches. Psychiatric/Behavioral: Negative. Wt Readings from Last 3 Encounters:   02/02/22 125 lb 3.2 oz (56.8 kg)   12/26/21 115 lb (52.2 kg)   11/30/21 119 lb (54 kg)     Temp Readings from Last 3 Encounters:   02/02/22 98.8 °F (37.1 °C) (Temporal)   12/29/21 97.3 °F (36.3 °C) (Temporal)   12/26/21 98.7 °F (37.1 °C)     BP Readings from Last 3 Encounters:   02/02/22 121/81   12/29/21 (!) 143/87   12/26/21 (!) 163/89     Pulse Readings from Last 3 Encounters:   02/02/22 85   12/29/21 96   12/26/21 100           PHYSICAL EXAMINATION:       General: Alert, cooperative, no distress  Respiratory: Breathing comfortably, in no acute respiratory distress. Clear to auscultation bilaterally. Cardiovascular: Regular rate and rhythm, S1, S2 normal, no murmur, click, rub or gallop. Extremities: no edema. Abdomen: Soft, non-tender, not distended. Bowel sounds normal. No masses or organomegaly. MSK: Extremities normal appearing, atraumatic, no effusion. Gait stiff assisted with use of cane. Skin: Healing chemical burn to posterior aspect of nec. No rashes or suspicious lesions on exposed skin. Neurologic: A/Ox3  Psychiatric: Normal affect. Mood euthymic. Thoughts logical. Speech volume and speed normal            Treatment risks/benefits/costs/interactions/alternatives discussed with patient. Advised patient to call back or return to office if symptoms worsen/change/persist. If patient cannot reach us or should anything more severe/urgent arise he/she should proceed directly to the nearest emergency department. Discussed expected course/resolution/complications of diagnosis in detail with patient. Patient expressed understanding with the diagnosis and plan. An electronic signature was used to authenticate this note.   -- Pj Andujar NP

## 2022-02-02 NOTE — PROGRESS NOTES
Chief Complaint   Patient presents with    ED Follow-up    Medication Refill         1. \"Have you been to the ER, urgent care clinic since your last visit? Hospitalized since your last visit? \" Yes When: 12/26/21 [facility] Reason for visit: skin reaction on back of neck    2. \"Have you seen or consulted any other health care providers outside of the [address] [place] [address] since your last visit? \" No     3. For patients over 45: Has the patient had a colonoscopy? Yes - no Care Gap present     If the patient is female:    4. For patients over 40: Has the patient had a mammogram? Yes - Care Gap present. OVERDUE!    5. For patients over 21: Has the patient had a pap smear? No   Have you had the covid vaccine. .. no  Pt declined flu shot      3 most recent PHQ Screens 2/2/2022   Little interest or pleasure in doing things Not at all   Feeling down, depressed, irritable, or hopeless Not at all   Total Score PHQ 2 0   Trouble falling or staying asleep, or sleeping too much -   Feeling tired or having little energy -   Poor appetite, weight loss, or overeating -   Feeling bad about yourself - or that you are a failure or have let yourself or your family down -   Trouble concentrating on things such as school, work, reading, or watching TV -   Moving or speaking so slowly that other people could have noticed; or the opposite being so fidgety that others notice -   Thoughts of being better off dead, or hurting yourself in some way -   PHQ 9 Score -   How difficult have these problems made it for you to do your work, take care of your home and get along with others -       Health Maintenance Due   Topic Date Due    COVID-19 Vaccine (1) Never done    Pneumococcal 0-64 years (1 of 2 - PPSV23) Never done    Foot Exam Q1  Never done    MICROALBUMIN Q1  Never done    Eye Exam Retinal or Dilated  Never done    Cervical cancer screen  Never done    Shingrix Vaccine Age 50> (1 of 2) Never done    Breast Cancer Screen Mammogram  01/14/2021    Flu Vaccine (1) Never done

## 2022-02-04 ENCOUNTER — OFFICE VISIT (OUTPATIENT)
Dept: ORTHOPEDIC SURGERY | Age: 62
End: 2022-02-04
Payer: COMMERCIAL

## 2022-02-04 VITALS
HEART RATE: 103 BPM | SYSTOLIC BLOOD PRESSURE: 116 MMHG | DIASTOLIC BLOOD PRESSURE: 78 MMHG | BODY MASS INDEX: 24.73 KG/M2 | HEIGHT: 61 IN | TEMPERATURE: 97.6 F | OXYGEN SATURATION: 97 % | WEIGHT: 131 LBS

## 2022-02-04 DIAGNOSIS — E87.5 HYPERKALEMIA: Primary | ICD-10-CM

## 2022-02-04 DIAGNOSIS — E87.1 HYPONATREMIA: ICD-10-CM

## 2022-02-04 DIAGNOSIS — M17.11 UNILATERAL PRIMARY OSTEOARTHRITIS, RIGHT KNEE: Primary | ICD-10-CM

## 2022-02-04 PROCEDURE — 20610 DRAIN/INJ JOINT/BURSA W/O US: CPT | Performed by: ORTHOPAEDIC SURGERY

## 2022-02-04 RX ORDER — BETAMETHASONE SODIUM PHOSPHATE AND BETAMETHASONE ACETATE 3; 3 MG/ML; MG/ML
6 INJECTION, SUSPENSION INTRA-ARTICULAR; INTRALESIONAL; INTRAMUSCULAR; SOFT TISSUE ONCE
Status: COMPLETED | OUTPATIENT
Start: 2022-02-04 | End: 2022-02-04

## 2022-02-04 RX ADMIN — BETAMETHASONE SODIUM PHOSPHATE AND BETAMETHASONE ACETATE 6 MG: 3; 3 INJECTION, SUSPENSION INTRA-ARTICULAR; INTRALESIONAL; INTRAMUSCULAR; SOFT TISSUE at 09:10

## 2022-02-04 NOTE — PROGRESS NOTES
Please call patient. She will need repeat labs. I also recommend cholesterol medication. I see \"statins\" are listed as an allergy that causes muscle aches. Her risk factors are high. Please ask if she is willing to consider this medication again. If she wants to talk further, we can make an appointment to discuss further. The sodium level and potassium levels are slightly abnormal. I recommend having them re-drawn in 1 week. Your cholesterol was higher than normal. I recommend you adjust your diet to avoid foods high in saturated fats such as greasy snacks, fried food, and processed meats. Try replacing them with whole foods, like fruits and vegetables. Stopping tobacco, exercise and weight loss will also help with this. The ASCVD risk of 24% exceeds threshold for starting statin per USPSTF (> 10%) and ACC (> 7.5%). It is recommended to be on a cholesterol medication called a \"statin\". We will discuss this recommendation further. Please make sure you are taking at least 1000 mcg B12 daily with your other B Vitamins as your levels are low.

## 2022-02-04 NOTE — PROGRESS NOTES
Identified pt with two pt identifiers (name and ). Reviewed chart in preparation for visit and have obtained necessary documentation. Soraida Palacio is a 62 y.o. female  Chief Complaint   Patient presents with    Knee Pain     RT Knee     Visit Vitals  /78 (BP 1 Location: Right arm, BP Patient Position: Sitting, BP Cuff Size: Large adult)   Pulse (!) 103   Temp 97.6 °F (36.4 °C) (Tympanic)   Ht 5' 1\" (1.549 m)   Wt 131 lb (59.4 kg)   SpO2 97%   BMI 24.75 kg/m²     1. Have you been to the ER, urgent care clinic since your last visit? Hospitalized since your last visit? No    2. Have you seen or consulted any other health care providers outside of the [address] [place] [address] since your last visit? Include any pap smears or colon screening.  No

## 2022-02-04 NOTE — PROGRESS NOTES
2/4/2022    Chief Complaint: Right knee pain    Assessment: Osteoarthritis right knee    Plan: This patient and I did discuss the many options in treating knee osteoarthritis. We did discuss that we could continue to seek out nonoperative modalities, such as: NSAIDs, oral and topical analgesics, knee injections, knee braces, physical therapy, stretching, strengthening, and weight loss strategies, activity modification, ambulatory assistive devices. The patient stated their understanding with this, but and would like to proceed with nonsurgical management in the form of injection. HPI: This is a 62 y.o. female who complains of right knee pain. Onset was sudden without trauma. The patient has had activity dependent pain for two weeks. The patient has tried activity modification, physical therapy exercises, injections have not been attempted. The pain is in the medial knee, it is severe in intensity. The patient feels unstable with the knee, fears falling, and has significant limitation with activities of daily living, recreation, and walks with a limp.     Past Medical History:   Diagnosis Date    GI (gastrointestinal bleed)     Insomnia 11/09    Iron deficiency anemia 04/2004    PPD positive     treated w/ INH- tests since have been negative    Pure hypercholesterolemia     Scoliosis 12/28/15    [facility]    Tobacco dependence     Unspecified essential hypertension     Uterine bleeding, dysfunctional 2/12    Uterine fibroid        Past Surgical History:   Procedure Laterality Date    COLONOSCOPY N/A 1/31/2018    COLONOSCOPY performed by Rayne Thomas MD at [address] [place] [address] HX CHOLECYSTECTOMY      HX COLONOSCOPY  07/01/2013    [facility], repeat 10 years    HX ORTHOPAEDIC      Shattered bilateral ankles-metal plates & screws    HX OTHER SURGICAL      Benign cyst removal- L. foot    TX LEG/ANKLE SURGERY PROC UNLISTED         Current Outpatient Medications on File Prior to Visit   Medication Sig Dispense Refill    clobetasoL (TEMOVATE) 0.05 % ointment APPLY TO AFFECTED AREA(S) OF BODY TWO TIMES A DAY AND AS NEEDED      hydrocortisone (HYTONE) 2.5 % ointment APPLY TO AFFECTED AREA(S) OF FACE TWO TIMES A DAY AS NEEDED      buPROPion XL (WELLBUTRIN XL) 150 mg tablet Take 1 Tablet by mouth every morning. 90 Tablet 1    chlordiazePOXIDE (LIBRIUM) 5 mg capsule Take 1 Capsule by mouth three (3) times daily as needed for Anxiety. Max Daily Amount: 15 mg. 30 Capsule 0    folic acid (FOLVITE) 1 mg tablet Take 1 Tablet by mouth daily. 90 Tablet 1    thiamine HCL (B-1) 100 mg tablet TAKE ONE TABLET BY MOUTH DAILY 90 Tablet 3    doxepin (SINEquan) 50 mg capsule Take 1 Capsule by mouth nightly as needed (sleep). 90 Capsule 1    naloxone (NARCAN) 4 mg/actuation nasal spray Use 1 spray intranasally, then discard. Repeat with new spray every 2 min as needed for opioid overdose symptoms, alternating nostrils. 1 Each 2    magnesium 200 mg tab TAKE ONE TABLET BY MOUTH DAILY 90 Tablet 0    pantoprazole (PROTONIX) 40 mg tablet Take 1 Tablet by mouth Daily (before breakfast). 30 Tablet 0    potassium chloride (KLOR-CON) 20 mEq pack TAKE ONE PACKET BY MOUTH DAILY 30 Packet 5    predniSONE (STERAPRED) 5 mg dose pack 1 pack (Patient not taking: Reported on 2/2/2022) 1 Dose Pack 0    aspirin delayed-release 325 mg tablet Take 1 Tablet by mouth two (2) times a day. (Patient not taking: Reported on 2/2/2022) 30 Tablet 0    famotidine (PEPCID) 20 mg tablet Take 1 Tablet by mouth two (2) times a day. (Patient not taking: Reported on 2/2/2022) 60 Tablet 0     No current facility-administered medications on file prior to visit.        Allergies   Allergen Reactions    Robaxin [Methocarbamol] Hives, Rash and Itching     Red splotches    Statins-Hmg-Coa Reductase Inhibitors Myalgia    Codeine Itching    Neosporin [Neomycin-Bacitracin-Polymyxin] Rash       Family History Problem Relation Age of Onset    Hypertension Mother     Diabetes Mother     Cancer Mother         breast    Heart Disease Mother     Heart Disease Father         MI 42's    Kidney Disease Father     Hypertension Son        Social History     Socioeconomic History    Marital status:    Occupational History    Occupation: part time   Tobacco Use    Smoking status: Current Every Day Smoker     Packs/day: 0.50     Years: 17.00     Pack years: 8.50     Types: Cigarettes    Smokeless tobacco: Never Used   Vaping Use    Vaping Use: Never used   Substance and Sexual Activity    Alcohol use: Yes     Alcohol/week: 51.0 standard drinks     Types: 1 Cans of beer, 50 Shots of liquor per week     Comment: one pint daily liquor    Drug use: No    Sexual activity: Yes     Partners: Male     Birth control/protection: None         Review of Systems:       General: Denies headache, lethargy, fever, weight loss  Ears/Nose/Throat: Denies ear discharge, drainage, nosebleeds, hoarse voice, dental problems  Cardiovascular: Denies chest pain, shortness of breath  Lungs: Denies chest pain, breathing problems, wheezing, pneumonia  Stomach: Denies stomach pain, heartburn, constipation, irritable bowel  Skin: Denies rash, sores, open wounds  Musculoskeletal: Admits to knee pain  Genitourinary: Denies dysuria, hematuria, polyuria  Gastrointestinal: Denies constipation, obstipation, diarrhea  Neurological: Denies changes in sight, smell, hearing, taste, seizures. Denies loss of consciousness.   Psychiatric: Denies depression, sleep pattern changes, anxiety, change in personality  Endocrine: Denies mood swings, heat or cold intolerance  Hematologic/Lymphatic: Denies anemia, purpura, petechia  Allergic/Immunologic: Denies swelling of throat, pain or swelling at lymph nodes      Physical Examination:    Visit Vitals  /78 (BP 1 Location: Right arm, BP Patient Position: Sitting, BP Cuff Size: Large adult)   Pulse (!) 103 Temp 97.6 °F (36.4 °C) (Tympanic)   Ht 5' 1\" (1.549 m)   Wt 131 lb (59.4 kg)   SpO2 97%   BMI 24.75 kg/m²        General: AOX3, no apparent distress  Psychiatric: mood and affect appropriate  Lungs: breathing is symmetric and unlabored bilaterally  Heart: regular rate and rhythm  Abdomen: no guarding  Head: normocephalic, atraumatic  Skin: No significant abnormalities, good turgor  Sensation intact to light touch: L1-S1 dermatomes  Muscular exam: 5/5 strength in all major muscle groups unless noted in specialty exam.    Extremities:      Left upper extremity: Full active and passive range of motion without pain, deformity, no open wound, strength 5/5 in all major muscle groups. Right upper extremity: Full active and passive range of motion without pain, deformity, no open wound, strength 5/5 in all major muscle groups. Left lower extremity: Full active and passive range of motion without pain, deformity, no open wound, strength 5/5 in all major muscle groups. Right lower extremity:  No deformity is noted. Range of motion of the knee is 0-120. Ligamentous testing of the knee indicates stability of the the MCL, LCL, PCL, and ACL. Lachman's, anterior and posterior drawer tests are specifically negative. Medial joint line tenderness to palpation is noted. Popliteal area is unremarkable. 1+  for effusion. + patellar crepitus. Patella tracks centrally. Pivot shift is negative. Strength testing is indicative of 5/5 strength at hip flexion, extension, knee flexion and extension, tibialis anterior, EHL, and FHL. Sensation is intact to light touch in the L1-S1 dermatomes. Capillary refill is less than 2 seconds in the toes. Diagnostics:    Pertinent Diagnostics:  Xrays are available of the right knee, they indicate moderate lateral compartment osteoarthritis of the knee joint, no significant other findings, no other osseus abnormalities, fractures, or dislocations.        Ms. Graciela Law has a reminder for a \"due or due soon\" health maintenance. I have asked that she contact her primary care provider for follow-up on this health maintenance. Date of Procedure: 2/4/2022  PROCEDURE NOTE: Right knee injection of Celestone    Consent was obtained from the patient. The correct site was identified after confirmation with the patient. Following identification and confirmation of the correct site with the patient, the superolateral right knee was prepped in the normal sterile fashion. A local anesthetic of 1% lidocaine without epinephrine was then administered to the local tissues. Following, an injection of a mixture of  6 mg Celestone and 1% lidocaine without epinephrine was administered to the right knee. The needle was then withdrawn and the injection site dressed with a sterile bandage at the conclusion. The procedure was well tolerated by the patient. No immediate adverse reactions were noted. Post injection instructions were given.

## 2022-02-04 NOTE — PROGRESS NOTES
Spoke with patient 2 identifiers confirmed. Patient was informed of lab results as reviewed by Stella Ibrahim NP. Patient was scheduled for her 6 months follow up 8/1/2022 and scheduled for 7/21/2022 for discussion of being on a statin.   Lora Payan LPN

## 2022-02-07 ENCOUNTER — TELEPHONE (OUTPATIENT)
Dept: FAMILY MEDICINE CLINIC | Age: 62
End: 2022-02-07

## 2022-02-07 NOTE — TELEPHONE ENCOUNTER
Called patient. Two patient identifiers verified. Rescheduled pt's lab appt.   ----- Message from Tawana Olson sent at 2/7/2022  8:47 AM EST -----  Subject: Message to Provider    QUESTIONS  Information for Provider? Please return call to reschedule pt appointment   for 9:30 for labs tomorrow. 2/8/22, attempted to transfer. ---------------------------------------------------------------------------  --------------  Benita GRULLON  What is the best way for the office to contact you? OK to leave message on   voicemail  Preferred Call Back Phone Number?  [phone]  ---------------------------------------------------------------------------  --------------  SCRIPT ANSWERS  undefined

## 2022-02-07 NOTE — PROGRESS NOTES
Correction for discussion of statin it is 2/21/2022 not the 7/21/2022. Abbey Flores LPN  Patient was called to schedule an labs only appointment for recheck of potassium.   Abbey Flores LPN

## 2022-02-08 ENCOUNTER — LAB ONLY (OUTPATIENT)
Dept: FAMILY MEDICINE CLINIC | Age: 62
End: 2022-02-08

## 2022-02-08 ENCOUNTER — TELEPHONE (OUTPATIENT)
Dept: FAMILY MEDICINE CLINIC | Age: 62
End: 2022-02-08

## 2022-02-08 DIAGNOSIS — E87.1 HYPONATREMIA: ICD-10-CM

## 2022-02-08 DIAGNOSIS — E87.5 HYPERKALEMIA: ICD-10-CM

## 2022-02-08 LAB
ANION GAP SERPL CALC-SCNC: 0 MMOL/L (ref 5–15)
BUN SERPL-MCNC: 21 MG/DL (ref 6–20)
BUN/CREAT SERPL: 25 (ref 12–20)
CALCIUM SERPL-MCNC: 9.9 MG/DL (ref 8.5–10.1)
CHLORIDE SERPL-SCNC: 101 MMOL/L (ref 97–108)
CO2 SERPL-SCNC: 32 MMOL/L (ref 21–32)
CREAT SERPL-MCNC: 0.85 MG/DL (ref 0.55–1.02)
GLUCOSE SERPL-MCNC: 95 MG/DL (ref 65–100)
POTASSIUM SERPL-SCNC: 5.5 MMOL/L (ref 3.5–5.1)
SODIUM SERPL-SCNC: 133 MMOL/L (ref 136–145)

## 2022-02-08 NOTE — PROGRESS NOTES
Patient arrived at office to complete labs as ordered by Franck Dsouza NP. Patient was to complete blood work and give urine sample.  Stefanie Knapp LPN

## 2022-02-09 LAB
CREAT UR-MCNC: 71.4 MG/DL
MICROALBUMIN UR-MCNC: <0.5 MG/DL
MICROALBUMIN/CREAT UR-RTO: <7 MG/G (ref 0–30)

## 2022-02-09 NOTE — TELEPHONE ENCOUNTER
----- Message from Jake Gonzales sent at 2/8/2022 11:38 AM EST -----  Regarding: FW: microalbumin order  Could you enter a microalbumin order for this patient? I texted Rafiq Jin and she said she is not at her computer so she can't enter it and asked if you could enter it and she would sign it when she gets back.    ----- Message -----  From: Daylin Gaitan  Sent: 2/8/2022  11:07 AM EST  To:   Subject: microalbumin order                               patient came back today for lab appointment to give urine sample but microalbumin order was discontinued so one needs to be reentered please

## 2022-02-09 NOTE — TELEPHONE ENCOUNTER
Chief Complaint   Patient presents with    New Order     Lab orders placed for the microalbumin per verbal order of Luis Argueta NP.

## 2022-02-10 NOTE — PROGRESS NOTES
Patient was left an second message to contact office to clarify which appointment she is wanting 2/17/22 at 9:45 am or the 2/21/22 at 8:00 am. Informed to clarify with phone staff.   Rachel Tam LPN

## 2022-02-11 ENCOUNTER — TELEPHONE (OUTPATIENT)
Dept: FAMILY MEDICINE CLINIC | Age: 62
End: 2022-02-11

## 2022-02-11 NOTE — TELEPHONE ENCOUNTER
Chief Complaint   Patient presents with   [facility] Appointment     Appointment 2/17/2022. Patient returned call to office to confirm that she will do appointment 2/17/2022 instead of 2/21/2022.   Maurizio Almanzar LPN

## 2022-02-14 ENCOUNTER — VIRTUAL VISIT (OUTPATIENT)
Dept: ORTHOPEDIC SURGERY | Age: 62
End: 2022-02-14
Payer: COMMERCIAL

## 2022-02-14 DIAGNOSIS — M17.11 UNILATERAL PRIMARY OSTEOARTHRITIS, RIGHT KNEE: Primary | ICD-10-CM

## 2022-02-14 PROCEDURE — 99441 PR PHYS/QHP TELEPHONE EVALUATION 5-10 MIN: CPT | Performed by: ORTHOPAEDIC SURGERY

## 2022-02-14 NOTE — PROGRESS NOTES
2/14/2022      CC: right knee pain    HPI:      This is a 62y.o. year old female who presents for follow up of their right knee injection. The patient states that they have had very good relief of their pain. The time since injection has been approximately a week. PMH:  Past Medical History:   Diagnosis Date    GI (gastrointestinal bleed)     Insomnia 11/09    Iron deficiency anemia 04/2004    PPD positive     treated w/ INH- tests since have been negative    Pure hypercholesterolemia     Scoliosis 12/28/15    [facility]    Tobacco dependence     Unspecified essential hypertension     Uterine bleeding, dysfunctional 2/12    Uterine fibroid        PSxHx:  Past Surgical History:   Procedure Laterality Date    COLONOSCOPY N/A 1/31/2018    COLONOSCOPY performed by James Rodriguez MD at [facility] HX CHOLECYSTECTOMY      HX COLONOSCOPY  07/01/2013    [facility], repeat 10 years    HX ORTHOPAEDIC      Shattered bilateral ankles-metal plates & screws    HX OTHER SURGICAL      Benign cyst removal- L. foot    MO LEG/ANKLE SURGERY PROC UNLISTED         Meds:    Current Outpatient Medications:     clobetasoL (TEMOVATE) 0.05 % ointment, APPLY TO AFFECTED AREA(S) OF BODY TWO TIMES A DAY AND AS NEEDED, Disp: , Rfl:     hydrocortisone (HYTONE) 2.5 % ointment, APPLY TO AFFECTED AREA(S) OF FACE TWO TIMES A DAY AS NEEDED, Disp: , Rfl:     buPROPion XL (WELLBUTRIN XL) 150 mg tablet, Take 1 Tablet by mouth every morning., Disp: 90 Tablet, Rfl: 1    chlordiazePOXIDE (LIBRIUM) 5 mg capsule, Take 1 Capsule by mouth three (3) times daily as needed for Anxiety. Max Daily Amount: 15 mg., Disp: 30 Capsule, Rfl: 0    folic acid (FOLVITE) 1 mg tablet, Take 1 Tablet by mouth daily. , Disp: 90 Tablet, Rfl: 1    thiamine HCL (B-1) 100 mg tablet, TAKE ONE TABLET BY MOUTH DAILY, Disp: 90 Tablet, Rfl: 3    doxepin (SINEquan) 50 mg capsule, Take 1 Capsule by mouth nightly as needed (sleep). , Disp: 90 Capsule, Rfl: 1    predniSONE (STERAPRED) 5 mg dose pack, 1 pack (Patient not taking: Reported on 2/2/2022), Disp: 1 Dose Pack, Rfl: 0    naloxone (NARCAN) 4 mg/actuation nasal spray, Use 1 spray intranasally, then discard. Repeat with new spray every 2 min as needed for opioid overdose symptoms, alternating nostrils. , Disp: 1 Each, Rfl: 2    aspirin delayed-release 325 mg tablet, Take 1 Tablet by mouth two (2) times a day. (Patient not taking: Reported on 2/2/2022), Disp: 30 Tablet, Rfl: 0    famotidine (PEPCID) 20 mg tablet, Take 1 Tablet by mouth two (2) times a day. (Patient not taking: Reported on 2/2/2022), Disp: 60 Tablet, Rfl: 0    magnesium 200 mg tab, TAKE ONE TABLET BY MOUTH DAILY, Disp: 90 Tablet, Rfl: 0    pantoprazole (PROTONIX) 40 mg tablet, Take 1 Tablet by mouth Daily (before breakfast). , Disp: 30 Tablet, Rfl: 0    potassium chloride (KLOR-CON) 20 mEq pack, TAKE ONE PACKET BY MOUTH DAILY, Disp: 30 Packet, Rfl: 5    All: Allergies   Allergen Reactions    Robaxin [Methocarbamol] Hives, Rash and Itching     Red splotches    Statins-Hmg-Coa Reductase Inhibitors Myalgia    Codeine Itching    Neosporin [Neomycin-Bacitracin-Polymyxin] Rash       Social Hx:  Social History     Socioeconomic History    Marital status:    Occupational History    Occupation: part time   Tobacco Use    Smoking status: Current Every Day Smoker     Packs/day: 0.50     Years: 17.00     Pack years: 8.50     Types: Cigarettes    Smokeless tobacco: Never Used   Vaping Use    Vaping Use: Never used   Substance and Sexual Activity    Alcohol use:  Yes     Alcohol/week: 51.0 standard drinks     Types: 1 Cans of beer, 50 Shots of liquor per week     Comment: one pint daily liquor    Drug use: No    Sexual activity: Yes     Partners: Male     Birth control/protection: None       Family Hx:  Family History   Problem Relation Age of Onset    Hypertension Mother     Diabetes Mother    Beau Cancer Mother         breast    Heart Disease Mother     Heart Disease Father         MI 42's    Kidney Disease Father     Hypertension Son          Review of Systems:       General: Denies headache, lethargy, fever, weight loss  Ears/Nose/Throat: Denies ear discharge, drainage, nosebleeds, hoarse voice, dental problems  Cardiovascular: Denies chest pain, shortness of breath  Lungs: Denies chest pain, breathing problems, wheezing, pneumonia  Stomach: Denies stomach pain, heartburn, constipation, irritable bowel  Skin: Denies rash, sores, open wounds  Musculoskeletal: right knee pain - resolved  Genitourinary: Denies dysuria, hematuria, polyuria  Gastrointestinal: Denies constipation, obstipation, diarrhea  Neurological: Denies changes in sight, smell, hearing, taste, seizures. Denies loss of consciousness. Psychiatric: Denies depression, sleep pattern changes, anxiety, change in personality  Endocrine: Denies mood swings, heat or cold intolerance  Hematologic/Lymphatic: Denies anemia, purpura, petechia  Allergic/Immunologic: Denies swelling of throat, pain or swelling at lymph nodes      Physical Examination:    There were no vitals taken for this visit. General: AOX3, no apparent distress  Psychiatric: mood and affect appropriate        Diagnostics:    Pertinent Diagnostics: none    Assessment: Status post right knee injection  Plan: This patient and I discussed the normal course for injections, we discussed that pain relief will likely be temporary to some degree, but I cannot predict the longevity of relief. We also discussed the limitations of injections, and that I cannot inject the same area any more often than every three months. We will proceed with continued observation. Patient was in [place] at the time of consultation. I was in the office while conducting this encounter.     Consent:  She and/or her healthcare decision maker is aware that this patient-initiated Telehealth encounter is a billable service, with coverage as determined by her insurance carrier. She is aware that she may receive a bill and has provided verbal consent to proceed: Yes    This virtual visit was conducted telephone encounter only. -  I affirm this is a Patient Initiated Episode with an Established Patient who has not had a related appointment within my department in the past 7 days or scheduled within the next 24 hours. Note: this encounter is not billable if this call serves to triage the patient into an appointment for the relevant concern. Total Time: minutes: 5-10 minutes. Ms. Darrick Dent has a reminder for a \"due or due soon\" health maintenance. I have asked that she contact her primary care provider for follow-up on this health maintenance.

## 2022-02-16 ENCOUNTER — OFFICE VISIT (OUTPATIENT)
Dept: NEUROLOGY | Age: 62
End: 2022-02-16
Payer: COMMERCIAL

## 2022-02-16 VITALS
HEIGHT: 61 IN | HEART RATE: 92 BPM | OXYGEN SATURATION: 92 % | SYSTOLIC BLOOD PRESSURE: 125 MMHG | WEIGHT: 138 LBS | BODY MASS INDEX: 26.06 KG/M2 | DIASTOLIC BLOOD PRESSURE: 84 MMHG

## 2022-02-16 DIAGNOSIS — G62.89 NUTRITIONAL ATAXIC NEUROPATHY: ICD-10-CM

## 2022-02-16 DIAGNOSIS — G62.1 ALCOHOLIC PERIPHERAL NEUROPATHY (HCC): ICD-10-CM

## 2022-02-16 DIAGNOSIS — E53.8 B12 DEFICIENCY: ICD-10-CM

## 2022-02-16 DIAGNOSIS — R26.0 SENSORY ATAXIC GAIT: ICD-10-CM

## 2022-02-16 DIAGNOSIS — Z72.0 TOBACCO ABUSE: ICD-10-CM

## 2022-02-16 DIAGNOSIS — G60.8 IDIOPATHIC SMALL AND LARGE FIBER SENSORY NEUROPATHY: Primary | ICD-10-CM

## 2022-02-16 PROCEDURE — 99214 OFFICE O/P EST MOD 30 MIN: CPT | Performed by: PSYCHIATRY & NEUROLOGY

## 2022-02-16 RX ORDER — LANOLIN ALCOHOL/MO/W.PET/CERES
1000 CREAM (GRAM) TOPICAL DAILY
COMMUNITY
End: 2022-06-29 | Stop reason: SDUPTHER

## 2022-02-16 NOTE — LETTER
2/16/2022    Patient: Adam Camara   YOB: 1960   Date of Visit: 2/16/2022     Malcolm Rico NP  [address] [facility] [address]  Via In Basket    Dear Malcolm Rico NP,      Thank you for referring Ms. Mata Pinto to [facility] for evaluation. My notes for this consultation are attached. Consult  REFERRED BY:  Denton Lopez NP    CHIEF COMPLAINT: Weakness and loss of balance and unsteady gait      Subjective:     Adam Camara is a 62 y.o. right-handed -American female seen at the request of ELIZABETH Burch for evaluation of new problem of unsteady gait and progressive weakness and difficulty walking that the patient has had for about a year, and increasing numbness in her feet, and she had a work-up including a normal MRI of the brain in May 2021, and all her metabolic studies showed she did not have diabetes, and everything was normal except she had a polyclonal increase in her gammaglobulins which is a normal variant usually, and she has low B12 levels and they were checked just recently and she still is low normal despite vitamin supplementation so she is now on B12 supplements. She had an EMG study done in May last year that showed she had a length dependent demyelinating and axonal polyneuropathy that could be consistent with B12 deficiency and alcohol abuse, and a mild right carpal tunnel syndrome. She had no evidence of motor radiculopathy. She is slowly getting better and she is cut her alcohol down to just 2-4 shots a week. She used to drink 1-1/2 pints a day. She is ambulatory with a cane now because she had knee surgery on the left knee and has degenerative arthritis in the right knee. She has seen Dr. Lea Arora her orthopedist.. She still smokes cigarettes also she was counseled on quitting that, and quitting the alcohol. She got home therapy and is now able to walk with a cane.    She has no family history of similar problems. She does not complain of any major neck pain or back pain no major headache and apparently is never had a seizure. Does complain of numbness and burning pain in her feet but has normal bowel and bladder function and no trouble with her hands. Her metabolic studies apparently has shown no other cause for her symptoms. She is not a diabetic. She clearly has a neuropathy on exam, most likely alcohol related, but recent loss complete neuropathy panel to rule out any other treatable cause of her symptoms. 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 She is advised to make sure she takes a vitamin every day and vitamin D every day and eat a good healthy diet, and try to taper off the alcohol and get back with her [facility] rehab people for alcohol rehab.     Past Medical History:   Diagnosis Date    GI (gastrointestinal bleed)     Insomnia 11/09    Iron deficiency anemia 04/2004    PPD positive     treated w/ INH- tests since have been negative    Pure hypercholesterolemia     Scoliosis 12/28/15    [facility]    Tobacco dependence     Unspecified essential hypertension     Uterine bleeding, dysfunctional 2/12    Uterine fibroid       Past Surgical History:   Procedure Laterality Date    COLONOSCOPY N/A 1/31/2018    COLONOSCOPY performed by Marisol Bajwa MD at [address] [facility] HX CHOLECYSTECTOMY      HX COLONOSCOPY  07/01/2013    [facility], repeat 10 years   [facility]X ORTHOPAEDIC      Shattered bilateral ankles-metal plates & screws    HX OTHER SURGICAL      Benign cyst removal- L. foot    ND LEG/ANKLE SURGERY PROC UNLISTED       Family History   Problem Relation Age of Onset    Hypertension Mother    [facility] Diabetes Mother    [facility] Cancer Mother         breast    Heart Disease Mother     Heart Disease Father         MI 42's    Kidney Disease Father     Hypertension Son       Social History     Tobacco Use    Smoking status: Current Every Day Smoker     Packs/day: 0.50     Years: 17.00     Pack years: 8.50     Types: Cigarettes    Smokeless tobacco: Never Used   Substance Use Topics    Alcohol use: Yes     Alcohol/week: 51.0 standard drinks     Types: 1 Cans of beer, 50 Shots of liquor per week     Comment: one pint daily liquor         Current Outpatient Medications:     cyanocobalamin (Vitamin B-12) 1,000 mcg tablet, Take 1,000 mcg by mouth daily. , Disp: , Rfl:     clobetasoL (TEMOVATE) 0.05 % ointment, APPLY TO AFFECTED AREA(S) OF BODY TWO TIMES A DAY AND AS NEEDED, Disp: , Rfl:     hydrocortisone (HYTONE) 2.5 % ointment, APPLY TO AFFECTED AREA(S) OF FACE TWO TIMES A DAY AS NEEDED, Disp: , Rfl:     buPROPion XL (WELLBUTRIN XL) 150 mg tablet, Take 1 Tablet by mouth every morning., Disp: 90 Tablet, Rfl: 1    chlordiazePOXIDE (LIBRIUM) 5 mg capsule, Take 1 Capsule by mouth three (3) times daily as needed for Anxiety. Max Daily Amount: 15 mg., Disp: 30 Capsule, Rfl: 0    folic acid (FOLVITE) 1 mg tablet, Take 1 Tablet by mouth daily. , Disp: 90 Tablet, Rfl: 1    thiamine HCL (B-1) 100 mg tablet, TAKE ONE TABLET BY MOUTH DAILY, Disp: 90 Tablet, Rfl: 3    doxepin (SINEquan) 50 mg capsule, Take 1 Capsule by mouth nightly as needed (sleep). , Disp: 90 Capsule, Rfl: 1    naloxone (NARCAN) 4 mg/actuation nasal spray, Use 1 spray intranasally, then discard. Repeat with new spray every 2 min as needed for opioid overdose symptoms, alternating nostrils. , Disp: 1 Each, Rfl: 2    famotidine (PEPCID) 20 mg tablet, Take 1 Tablet by mouth two (2) times a day., Disp: 60 Tablet, Rfl: 0    magnesium 200 mg tab, TAKE ONE TABLET BY MOUTH DAILY, Disp: 90 Tablet, Rfl: 0    pantoprazole (PROTONIX) 40 mg tablet, Take 1 Tablet by mouth Daily (before breakfast). , Disp: 30 Tablet, Rfl: 0    potassium chloride (KLOR-CON) 20 mEq pack, TAKE ONE PACKET BY MOUTH DAILY, Disp: 30 Packet, Rfl: 5        Allergies   Allergen Reactions    Robaxin [Methocarbamol] Hives, Rash and Itching     Red splotches    Statins-Hmg-Coa Reductase Inhibitors Myalgia    Codeine Itching    Neosporin [Neomycin-Bacitracin-Polymyxin] Rash      MRI Results (most recent):  Results from Hospital Encounter encounter on 04/30/21    MRI BRAIN W WO CONT    Addendum 5/19/2021  2:17 PM  Addendum:  MRI technique should have also stated: Following intravenous infusion 10 mL  Dotarem gadolinium repeat axial as well as coronal T1-weighted images of the  head were obtained. Findings should have also stated: No abnormal intracranial enhancement. PLEASE SEE FULL ORIGINAL REPORT BELOW:    Narrative  EXAM:  MRI BRAIN W WO CONT  INDICATION:  ataxia and memory loss  TECHNIQUE:  Sagittal T1, axial FLAIR, T2,T1 and gradient echo images as well as coronal T2  weighted images and axial diffusion weighted images of the head were obtained. COMPARISON:  CT head 3/18/21, 1/26/18. FINDINGS:  Slight prominence of lateral ventricles and to lesser extent third ventricles is  chronic, possibly developmental and without significant change since 2018. Scattered foci T2 hyperintensity in the cerebral white matter without associated  abnormal restricted diffusion in a nonspecific distribution. Possibly related to  chronic small vessel ischemic change. No evidence of hemorrhage, mass, infarct or abnormal extra-axial fluid  collections. Flow voids are present in the vertebral basilar and carotid artery systems. The craniocervical junction is normal.  The structures at the cranial base including paranasal sinuses are unremarkable. Impression  No acute intracranial abnormality or significant change.       Results from [place] encounter on 04/30/21    MRI BRAIN W WO CONT    Addendum 5/19/2021  2:17 PM  Addendum:  MRI technique should have also stated: Following intravenous infusion 10 mL  Dotarem gadolinium repeat axial as well as coronal T1-weighted images of the  head were obtained. Findings should have also stated: No abnormal intracranial enhancement. PLEASE SEE FULL ORIGINAL REPORT BELOW:    Narrative  EXAM:  MRI BRAIN W WO CONT  INDICATION:  ataxia and memory loss  TECHNIQUE:  Sagittal T1, axial FLAIR, T2,T1 and gradient echo images as well as coronal T2  weighted images and axial diffusion weighted images of the head were obtained. COMPARISON:  CT head 3/18/21, 1/26/18. FINDINGS:  Slight prominence of lateral ventricles and to lesser extent third ventricles is  chronic, possibly developmental and without significant change since 2018. Scattered foci T2 hyperintensity in the cerebral white matter without associated  abnormal restricted diffusion in a nonspecific distribution. Possibly related to  chronic small vessel ischemic change. No evidence of hemorrhage, mass, infarct or abnormal extra-axial fluid  collections. Flow voids are present in the vertebral basilar and carotid artery systems. The craniocervical junction is normal.  The structures at the cranial base including paranasal sinuses are unremarkable. Impression  No acute intracranial abnormality or significant change.     Review of Systems:  A comprehensive review of systems was negative except for: Constitutional: positive for fatigue, malaise and anorexia  Ears, nose, mouth, throat, and face: positive for hearing loss  Musculoskeletal: positive for myalgias, arthralgias, stiff joints and muscle weakness  Neurological: positive for memory problems, paresthesia, coordination problems, gait problems and weakness  Behvioral/Psych: positive for Alcohol abuse, anxiety and depression   Vitals:    02/16/22 0808   BP: 125/84   Pulse: 92   SpO2: 92%   Weight: 138 lb (62.6 kg)   Height: 5' 1\" (1.549 m)     Objective:     I      NEUROLOGICAL EXAM:    Appearance: The patient is fairly developed and nourished, provides a poor history and is in no acute distress. Mental Status: Oriented to time, place and person, and the president, patient slow mentally and cognitive function is mildly abnormal and speech is fluent and no aphasia or dysarthria. Mood and affect appropriate, but anxious and depressed. Cranial Nerves:   Intact visual fields. Fundi are benign, disc are flat, no lesions seen on funduscopy. KALE, EOM's full, no nystagmus, no ptosis. Facial sensation is normal. Corneal reflexes are not tested. Facial movement is symmetric. Hearing is abnormal bilaterally. Palate is midline with normal sternocleidomastoid and trapezius muscles are normal. Tongue is midline. Neck without meningismus or bruits  Temporal arteries are not tender or enlarged  TMJ areas are not tender on palpation   Motor:  3-4/5 strength in upper and lower proximal and distal muscles. Normal bulk and tone. No fasciculations. Rapid alternating movement is symmetric and slow bilaterally   Reflexes:   Deep tendon reflexes 0-1+/4 and symmetrical.  No babinski or clonus present   Sensory:   Abnormal to touch, pinprick and vibration and temperature in both legs to midcalf level. DSS is intact   Gait:  Abnormal gait for patient's age, with patient walking with wide-based and unsteady gait, and has to use a cane because of her arthritis in her knees. .   Tremor:   No tremor noted. Cerebellar:   Mildly abnormal cerebellar signs present on Romberg and tandem testing and finger-nose-finger exam.   Neurovascular:  Normal heart sounds and regular rhythm, peripheral pulses decreased, and no carotid bruits. Assessment:       ICD-10-CM ICD-9-CM    1. Idiopathic small and large fiber sensory neuropathy  G60.8 356.4 PARIETAL CELL AB   2. Alcoholic peripheral neuropathy (HCC)  G62.1 357.5 PARIETAL CELL AB   3. Nutritional ataxic neuropathy  G62.89 355.9 PARIETAL CELL AB   4.  B12 deficiency  E53.8 266.2 PARIETAL CELL AB   5. Sensory ataxic gait  R26.0 781.2 PARIETAL CELL AB   6. Tobacco abuse  Z72.0 305.1 PARIETAL CELL AB     Active Problems:    * No active hospital problems. *      Plan:     Patient with clear neuropathy, in both legs, and work-up showed an EMG that showed a length dependent demyelinating and axonal polyneuropathy consistent with various toxic or metabolic or acquired neuropathies, and could be related to her alcohol and B12 deficiency, and she still has a low B12 level so as not she is on supplementation, we will check an antiparietal cell antibody titer to rule out pernicious anemia. Patient still drinking and we counseled her strongly on getting alcohol altogether and getting back in her rehab program, and quitting smoking also. We encouraged her to stay physically mentally active and start exercise program and eat a good diet, more the Mediterranean-based diet. .  Patient is encouraged to get back to her alcohol treatment program at [facility], and to try to gradually reduce her alcohol intake, she is to take her vitamins, vitamin B1, vitamin B12, and vitamin D every day. MRI of the brain was normal essentially. She clearly has nutritional problems from her alcohol abuse also. 36 minutes spent with the patient and her  today, going over her problems, and she has a seriously debilitating progressive neurologic disease if she keeps drinking, which will only lead to prolonged disability and even death if she keeps drinking. We reviewed all her records from previous work-ups and through the EMGs MRI scans and all the blood work, and the results are as above. Follow-up in 6 months time or earlier if needed. Signed By: Jammie Wood MD     February 16, 2022       CC: Casimiro Jacobsen NP  FAX: [phone]      If you have questions, please do not hesitate to call me. I look forward to following your patient along with you.       Sincerely,    Jammie Wood, MD

## 2022-02-16 NOTE — PROGRESS NOTES
Please notify the patient that her potassium level is elevated. Confirm if she is still taking her supplemental potassium. If she is this should be held.

## 2022-02-16 NOTE — PROGRESS NOTES
Consult  REFERRED BY:  Shalini Quick NP    CHIEF COMPLAINT: Weakness and loss of balance and unsteady gait      Subjective:     Jordyn Cruzito is a 62 y.o. right-handed -American female seen at the request of ELIZABETH Flannery for evaluation of new problem of unsteady gait and progressive weakness and difficulty walking that the patient has had for about a year, and increasing numbness in her feet, and she had a work-up including a normal MRI of the brain in May 2021, and all her metabolic studies showed she did not have diabetes, and everything was normal except she had a polyclonal increase in her gammaglobulins which is a normal variant usually, and she has low B12 levels and they were checked just recently and she still is low normal despite vitamin supplementation so she is now on B12 supplements. She had an EMG study done in May last year that showed she had a length dependent demyelinating and axonal polyneuropathy that could be consistent with B12 deficiency and alcohol abuse, and a mild right carpal tunnel syndrome. She had no evidence of motor radiculopathy. She is slowly getting better and she is cut her alcohol down to just 2-4 shots a week. She used to drink 1-1/2 pints a day. She is ambulatory with a cane now because she had knee surgery on the left knee and has degenerative arthritis in the right knee. She has seen Dr. Rui Aparicio her orthopedist.. She still smokes cigarettes also she was counseled on quitting that, and quitting the alcohol. She got home therapy and is now able to walk with a cane. She has no family history of similar problems. She does not complain of any major neck pain or back pain no major headache and apparently is never had a seizure. Does complain of numbness and burning pain in her feet but has normal bowel and bladder function and no trouble with her hands. Her metabolic studies apparently has shown no other cause for her symptoms. She is not a diabetic.   She clearly has a neuropathy on exam, most likely alcohol related, but recent loss complete neuropathy panel to rule out any other treatable cause of her symptoms. 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 She is advised to make sure she takes a vitamin every day and vitamin D every day and eat a good healthy diet, and try to taper off the alcohol and get back with her [facility] rehab people for alcohol rehab.     Past Medical History:   Diagnosis Date    GI (gastrointestinal bleed)     Insomnia 11/09    Iron deficiency anemia 04/2004    PPD positive     treated w/ [facility]- tests since have been negative    Pure hypercholesterolemia     Scoliosis 12/28/15    [facility]    Tobacco dependence     Unspecified essential hypertension     Uterine bleeding, dysfunctional 2/12    Uterine fibroid       Past Surgical History:   Procedure Laterality Date    COLONOSCOPY N/A 1/31/2018    COLONOSCOPY performed by Marina Bernal MD at [facility] HX CHOLECYSTECTOMY      HX COLONOSCOPY  07/01/2013    [facility], repeat 10 years   [facility] Morse HX ORTHOPAEDIC      Shattered bilateral ankles-metal plates & screws    HX OTHER SURGICAL      Benign cyst removal- L. foot    NV LEG/ANKLE SURGERY PROC UNLISTED       Family History   Problem Relation Age of Onset    Hypertension Mother     Diabetes Mother     Cancer Mother         breast    Heart Disease Mother     Heart Disease Father         MI 42's    Kidney Disease Father     Hypertension Son       Social History     Tobacco Use    Smoking status: Current Every Day Smoker     Packs/day: 0.50     Years: 17.00     Pack years: 8.50     Types: Cigarettes    Smokeless tobacco: Never Used   Substance Use Topics    Alcohol use: Yes     Alcohol/week: 51.0 standard drinks     Types: 1 Cans of beer, 50 Shots of liquor per week     Comment: one pint daily liquor         Current Outpatient Medications:     cyanocobalamin (Vitamin B-12) 1,000 mcg tablet, Take 1,000 mcg by mouth daily. , Disp: , Rfl:     clobetasoL (TEMOVATE) 0.05 % ointment, APPLY TO AFFECTED AREA(S) OF BODY TWO TIMES A DAY AND AS NEEDED, Disp: , Rfl:     hydrocortisone (HYTONE) 2.5 % ointment, APPLY TO AFFECTED AREA(S) OF FACE TWO TIMES A DAY AS NEEDED, Disp: , Rfl:     buPROPion XL (WELLBUTRIN XL) 150 mg tablet, Take 1 Tablet by mouth every morning., Disp: 90 Tablet, Rfl: 1    chlordiazePOXIDE (LIBRIUM) 5 mg capsule, Take 1 Capsule by mouth three (3) times daily as needed for Anxiety. Max Daily Amount: 15 mg., Disp: 30 Capsule, Rfl: 0    folic acid (FOLVITE) 1 mg tablet, Take 1 Tablet by mouth daily. , Disp: 90 Tablet, Rfl: 1    thiamine HCL (B-1) 100 mg tablet, TAKE ONE TABLET BY MOUTH DAILY, Disp: 90 Tablet, Rfl: 3    doxepin (SINEquan) 50 mg capsule, Take 1 Capsule by mouth nightly as needed (sleep). , Disp: 90 Capsule, Rfl: 1    naloxone (NARCAN) 4 mg/actuation nasal spray, Use 1 spray intranasally, then discard. Repeat with new spray every 2 min as needed for opioid overdose symptoms, alternating nostrils. , Disp: 1 Each, Rfl: 2    famotidine (PEPCID) 20 mg tablet, Take 1 Tablet by mouth two (2) times a day., Disp: 60 Tablet, Rfl: 0    magnesium 200 mg tab, TAKE ONE TABLET BY MOUTH DAILY, Disp: 90 Tablet, Rfl: 0    pantoprazole (PROTONIX) 40 mg tablet, Take 1 Tablet by mouth Daily (before breakfast). , Disp: 30 Tablet, Rfl: 0    potassium chloride (KLOR-CON) 20 mEq pack, TAKE ONE PACKET BY MOUTH DAILY, Disp: 30 Packet, Rfl: 5        Allergies   Allergen Reactions    Robaxin [Methocarbamol] Hives, Rash and Itching     Red splotches    Statins-Hmg-Coa Reductase Inhibitors Myalgia    Codeine Itching    Neosporin [Neomycin-Bacitracin-Polymyxin] Rash MRI Results (most recent):  Results from [place] encounter on 04/30/21    MRI BRAIN W WO CONT    Addendum 5/19/2021  2:17 PM  Addendum:  MRI technique should have also stated: Following intravenous infusion 10 mL  Dotarem gadolinium repeat axial as well as coronal T1-weighted images of the  head were obtained. Findings should have also stated: No abnormal intracranial enhancement. PLEASE SEE FULL ORIGINAL REPORT BELOW:    Narrative  EXAM:  MRI BRAIN W WO CONT  INDICATION:  ataxia and memory loss  TECHNIQUE:  Sagittal T1, axial FLAIR, T2,T1 and gradient echo images as well as coronal T2  weighted images and axial diffusion weighted images of the head were obtained. COMPARISON:  CT head 3/18/21, 1/26/18. FINDINGS:  Slight prominence of lateral ventricles and to lesser extent third ventricles is  chronic, possibly developmental and without significant change since 2018. Scattered foci T2 hyperintensity in the cerebral white matter without associated  abnormal restricted diffusion in a nonspecific distribution. Possibly related to  chronic small vessel ischemic change. No evidence of hemorrhage, mass, infarct or abnormal extra-axial fluid  collections. Flow voids are present in the vertebral basilar and carotid artery systems. The craniocervical junction is normal.  The structures at the cranial base including paranasal sinuses are unremarkable. Impression  No acute intracranial abnormality or significant change. Results from [place] encounter on 04/30/21    MRI BRAIN W WO CONT    Addendum 5/19/2021  2:17 PM  Addendum:  MRI technique should have also stated: Following intravenous infusion 10 mL  Dotarem gadolinium repeat axial as well as coronal T1-weighted images of the  head were obtained. Findings should have also stated: No abnormal intracranial enhancement.   PLEASE SEE FULL ORIGINAL REPORT BELOW:    Narrative  EXAM:  MRI BRAIN W WO CONT  INDICATION:  ataxia and memory loss  TECHNIQUE:  Sagittal T1, axial FLAIR, T2,T1 and gradient echo images as well as coronal T2  weighted images and axial diffusion weighted images of the head were obtained. COMPARISON:  CT head 3/18/21, 1/26/18. FINDINGS:  Slight prominence of lateral ventricles and to lesser extent third ventricles is  chronic, possibly developmental and without significant change since 2018. Scattered foci T2 hyperintensity in the cerebral white matter without associated  abnormal restricted diffusion in a nonspecific distribution. Possibly related to  chronic small vessel ischemic change. No evidence of hemorrhage, mass, infarct or abnormal extra-axial fluid  collections. Flow voids are present in the vertebral basilar and carotid artery systems. The craniocervical junction is normal.  The structures at the cranial base including paranasal sinuses are unremarkable. Impression  No acute intracranial abnormality or significant change. Review of Systems:  A comprehensive review of systems was negative except for: Constitutional: positive for fatigue, malaise and anorexia  Ears, nose, mouth, throat, and face: positive for hearing loss  Musculoskeletal: positive for myalgias, arthralgias, stiff joints and muscle weakness  Neurological: positive for memory problems, paresthesia, coordination problems, gait problems and weakness  Behvioral/Psych: positive for Alcohol abuse, anxiety and depression   Vitals:    02/16/22 0808   BP: 125/84   Pulse: 92   SpO2: 92%   Weight: 138 lb (62.6 kg)   Height: 5' 1\" (1.549 m)     Objective:     I      NEUROLOGICAL EXAM:    Appearance: The patient is fairly developed and nourished, provides a poor history and is in no acute distress. Mental Status: Oriented to time, place and person, and the president, patient slow mentally and cognitive function is mildly abnormal and speech is fluent and no aphasia or dysarthria. Mood and affect appropriate, but anxious and depressed.    Cranial Nerves:   Intact visual fields. Fundi are benign, disc are flat, no lesions seen on funduscopy. KALE, EOM's full, no nystagmus, no ptosis. Facial sensation is normal. Corneal reflexes are not tested. Facial movement is symmetric. Hearing is abnormal bilaterally. Palate is midline with normal sternocleidomastoid and trapezius muscles are normal. Tongue is midline. Neck without meningismus or bruits  Temporal arteries are not tender or enlarged  TMJ areas are not tender on palpation   Motor:  3-4/5 strength in upper and lower proximal and distal muscles. Normal bulk and tone. No fasciculations. Rapid alternating movement is symmetric and slow bilaterally   Reflexes:   Deep tendon reflexes 0-1+/4 and symmetrical.  No babinski or clonus present   Sensory:   Abnormal to touch, pinprick and vibration and temperature in both legs to midcalf level. DSS is intact   Gait:  Abnormal gait for patient's age, with patient walking with wide-based and unsteady gait, and has to use a cane because of her arthritis in her knees. .   Tremor:   No tremor noted. Cerebellar:   Mildly abnormal cerebellar signs present on Romberg and tandem testing and finger-nose-finger exam.   Neurovascular:  Normal heart sounds and regular rhythm, peripheral pulses decreased, and no carotid bruits. Assessment:       ICD-10-CM ICD-9-CM    1. Idiopathic small and large fiber sensory neuropathy  G60.8 356.4 PARIETAL CELL AB   2. Alcoholic peripheral neuropathy (HCC)  G62.1 357.5 PARIETAL CELL AB   3. Nutritional ataxic neuropathy  G62.89 355.9 PARIETAL CELL AB   4. B12 deficiency  E53.8 266.2 PARIETAL CELL AB   5. Sensory ataxic gait  R26.0 781.2 PARIETAL CELL AB   6. Tobacco abuse  Z72.0 305.1 PARIETAL CELL AB     Active Problems:    * No active hospital problems.  *      Plan:     Patient with clear neuropathy, in both legs, and work-up showed an EMG that showed a length dependent demyelinating and axonal polyneuropathy consistent with various toxic or metabolic or acquired neuropathies, and could be related to her alcohol and B12 deficiency, and she still has a low B12 level so as not she is on supplementation, we will check an antiparietal cell antibody titer to rule out pernicious anemia. Patient still drinking and we counseled her strongly on getting alcohol altogether and getting back in her rehab program, and quitting smoking also. We encouraged her to stay physically mentally active and start exercise program and eat a good diet, more the Mediterranean-based diet. .  Patient is encouraged to get back to her alcohol treatment program at [facility], and to try to gradually reduce her alcohol intake, she is to take her vitamins, vitamin B1, vitamin B12, and vitamin D every day. MRI of the brain was normal essentially. She clearly has nutritional problems from her alcohol abuse also. 36 minutes spent with the patient and her  today, going over her problems, and she has a seriously debilitating progressive neurologic disease if she keeps drinking, which will only lead to prolonged disability and even death if she keeps drinking. We reviewed all her records from previous work-ups and through the EMGs MRI scans and all the blood work, and the results are as above. Follow-up in 6 months time or earlier if needed.     Signed By: Chris Beal MD     February 16, 2022       CC: Jaime Marquez NP  FAX: [phone]

## 2022-02-18 NOTE — PROGRESS NOTES
Patient was left an voice message informing that potassium levels are elevated. Informed that if she is still taking supplement to stop taking. Informed to contact office via mychart or phone. Also, patient was sent an [employer] message informing of information as well.  Franklyn Mckeon LPN

## 2022-02-18 NOTE — PROGRESS NOTES
Patient returned call to office. Confirmed that she is taking the potassium 20 meq daily, at one time had stopped the medication but when she began having the cramps in legs and feet began taking it again. Patient states she is currently not having any problems. Ms. Joaquín Harrison did state she will stop taking as directed.  Geetha Jaffe LPN

## 2022-02-28 ENCOUNTER — TELEPHONE (OUTPATIENT)
Dept: FAMILY MEDICINE CLINIC | Age: 62
End: 2022-02-28

## 2022-02-28 ENCOUNTER — OFFICE VISIT (OUTPATIENT)
Dept: FAMILY MEDICINE CLINIC | Age: 62
End: 2022-02-28
Payer: COMMERCIAL

## 2022-02-28 VITALS
HEIGHT: 61 IN | WEIGHT: 138.2 LBS | TEMPERATURE: 97.9 F | DIASTOLIC BLOOD PRESSURE: 85 MMHG | OXYGEN SATURATION: 98 % | BODY MASS INDEX: 26.09 KG/M2 | HEART RATE: 86 BPM | SYSTOLIC BLOOD PRESSURE: 126 MMHG | RESPIRATION RATE: 16 BRPM

## 2022-02-28 DIAGNOSIS — F10.939 ALCOHOL WITHDRAWAL SYNDROME WITH COMPLICATION (HCC): ICD-10-CM

## 2022-02-28 DIAGNOSIS — G62.1 ALCOHOLIC PERIPHERAL NEUROPATHY (HCC): ICD-10-CM

## 2022-02-28 DIAGNOSIS — E87.1 HYPONATREMIA: Primary | ICD-10-CM

## 2022-02-28 DIAGNOSIS — E78.2 MIXED HYPERLIPIDEMIA: Chronic | ICD-10-CM

## 2022-02-28 PROBLEM — E03.4 HYPOTHYROIDISM DUE TO ACQUIRED ATROPHY OF THYROID: Status: RESOLVED | Noted: 2021-04-16 | Resolved: 2022-02-28

## 2022-02-28 LAB
ANION GAP SERPL CALC-SCNC: 4 MMOL/L (ref 5–15)
BUN SERPL-MCNC: 23 MG/DL (ref 6–20)
BUN/CREAT SERPL: 22 (ref 12–20)
CALCIUM SERPL-MCNC: 9.5 MG/DL (ref 8.5–10.1)
CHLORIDE SERPL-SCNC: 103 MMOL/L (ref 97–108)
CO2 SERPL-SCNC: 28 MMOL/L (ref 21–32)
CREAT SERPL-MCNC: 1.06 MG/DL (ref 0.55–1.02)
GLUCOSE SERPL-MCNC: 92 MG/DL (ref 65–100)
POTASSIUM SERPL-SCNC: 4.8 MMOL/L (ref 3.5–5.1)
SODIUM SERPL-SCNC: 135 MMOL/L (ref 136–145)

## 2022-02-28 PROCEDURE — 99214 OFFICE O/P EST MOD 30 MIN: CPT | Performed by: NURSE PRACTITIONER

## 2022-02-28 RX ORDER — CHLORDIAZEPOXIDE HYDROCHLORIDE 5 MG/1
5 CAPSULE, GELATIN COATED ORAL
Qty: 30 CAPSULE | Refills: 0 | Status: SHIPPED | OUTPATIENT
Start: 2022-02-28 | End: 2022-06-29 | Stop reason: SDUPTHER

## 2022-02-28 RX ORDER — NALTREXONE HYDROCHLORIDE 50 MG/1
50 TABLET, FILM COATED ORAL DAILY
Qty: 30 TABLET | Refills: 1 | Status: SHIPPED | OUTPATIENT
Start: 2022-02-28 | End: 2022-04-26

## 2022-02-28 RX ORDER — ATORVASTATIN CALCIUM 10 MG/1
10 TABLET, FILM COATED ORAL DAILY
Qty: 30 TABLET | Refills: 1 | Status: SHIPPED | OUTPATIENT
Start: 2022-02-28

## 2022-02-28 RX ORDER — PROPRANOLOL HYDROCHLORIDE 10 MG/1
10 TABLET ORAL 2 TIMES DAILY
Qty: 180 TABLET | Refills: 1 | Status: SHIPPED | OUTPATIENT
Start: 2022-02-28 | End: 2022-09-20 | Stop reason: SDUPTHER

## 2022-02-28 RX ORDER — NALTREXONE HYDROCHLORIDE 50 MG/1
50 TABLET, FILM COATED ORAL DAILY
COMMUNITY
End: 2022-02-28 | Stop reason: SDUPTHER

## 2022-02-28 NOTE — TELEPHONE ENCOUNTER
Faxed and confirmed.      ----- Message from Paradise Adler NP sent at 2/28/2022  9:46 AM EST -----  Regarding: records  benny  (Dr. Jessica Hernandez) @ [facility]

## 2022-02-28 NOTE — PATIENT INSTRUCTIONS
Learning About Alcohol Use Disorder  What is alcohol use disorder? Alcohol use disorder means that a person drinks alcohol even though it causes harm to themselves or others. It can range from mild to severe. The more signs of this disorder you have, the more severe it may be. Moderate to severe alcohol use disorder is sometimes called addiction. People who have it may find it hard to control their use of alcohol. People who have this disorder may argue with others about how much they're drinking. Their job may be affected because of drinking. They may drink when it's dangerous or illegal, such as when they drive. They also may have a strong need, or craving, to drink. They may feel like they must drink just to get by. Their drinking may increase their risk of getting hurt or being in a car crash. Over time, drinking too much alcohol may cause health problems. These may include high blood pressure, liver problems, or problems with digestion. What are the signs? Maybe you've wondered about your alcohol habits, or how to tell if your drinking is becoming a problem. Here are some of the signs of alcohol use disorder. You may have it if you have two or more of the following signs:  · You drink larger amounts of alcohol than you ever meant to. Or you've been drinking for a longer time than you ever meant to. · You can't cut down or control your use. Or you constantly wish you could cut down. · You spend a lot of time getting or drinking alcohol or recovering from its effects. · You have strong cravings for alcohol. · You can no longer do your main jobs at work, at school, or at home. · You keep drinking alcohol, even though your use hurts your relationships. · You have stopped doing important activities because of your alcohol use. · You drink alcohol in situations where doing so is dangerous. · You keep drinking alcohol even though you know it's causing health problems.   · You need more and more alcohol to get the same effect, or you get less effect from the same amount over time. This is called tolerance. · You have uncomfortable symptoms when you stop drinking alcohol or use less. This is called withdrawal.  Alcohol use disorder can range from mild to severe. The more signs you have, the more severe the disorder may be. Moderate to severe alcohol use disorder is sometimes called addiction. You might not realize that your drinking is a problem. You might not drink large amounts when you drink. Or you might go for days or weeks between drinking episodes. But even if you don't drink very often, your drinking could still be harmful and put you at risk. How is alcohol use disorder treated? Getting help is up to you. But you don't have to do it alone. There are many people and kinds of treatments that can help. Treatment for alcohol use disorder can include:  · Group therapy, one or more types of counseling, and alcohol education. · Medicines that help to:  ? Reduce withdrawal symptoms and help you safely stop drinking. ? Reduce cravings for alcohol. · Support groups. These groups include Alcoholics Anonymous and [facility] (Self-Management and Recovery Training). Some people are able to stop or cut back on drinking with help from a counselor. People who have moderate to severe alcohol use disorder may need medical treatment. They may need to stay in a hospital or treatment center. You may have a treatment team to help you. This team may include a psychologist or psychiatrist, counselors, doctors, social workers, nurses, and a . A  helps plan and manage your treatment. Follow-up care is a key part of your treatment and safety. Be sure to make and go to all appointments, and call your doctor if you are having problems. It's also a good idea to know your test results and keep a list of the medicines you take. Where can you learn more?   Go to http://www.gray.com/  Enter [id number] in the search box to learn more about \"Learning About Alcohol Use Disorder. \"  Current as of: February 11, 2021               Content Version: 13.0  © 1443-9413 [employer]. Care instructions adapted under license by [employer] (which disclaims liability or warranty for this information). If you have questions about a medical condition or this instruction, always ask your healthcare professional. [place] [address] any warranty or liability for your use of this information.

## 2022-02-28 NOTE — PROGRESS NOTES
[facility] Note     Xavier Uriostegui (: 1960) is a 65 y.o. female, established patient, here for evaluation of the following chief complaint(s):  Follow-up (cholesterol) and Medication Refill       ASSESSMENT/PLAN:  1. Hyponatremia  -     METABOLIC PANEL, BASIC; Future    2. Mixed hyperlipidemia  - The ASCVD risk of 24% exceeds threshold for starting statin per USPSTF (> 10%) and ACC (> 7.5%). -Counseling provided on statin therapy recommendation.  -Ms. Bi Esteban is unable to recall which medications she has tried or her reaction when taking these  but it is documented that she had myalgias with statin medications. Review of her chart shows at some point she was on Crestor & atorvastatin but was discontinued due to noncompliance. Zetia is also listed as a previous medication. We reviewed potential side effects.    -Trial atorvastatin 10 mg daily  -Diet and lifestyle modification encouraged for weight loss and chronic disease prevention/ management  - Repeat labs in 3 months    3. Alcoholic peripheral neuropathy (HCC)  -      DIABETES FOOT EXAM  - Not diabetic or Pre-diabetic.  - Neuro notes reviewed. - Discussed compliance taking B12    4. Alcohol withdrawal syndrome with complication (HCC)  -     chlordiazePOXIDE (LIBRIUM) 5 mg capsule; Take 1 Capsule by mouth three (3) times daily as needed for Anxiety. Max Daily Amount: 15 mg., Normal, Disp-30 Capsule, R-0  -      DIABETES FOOT EXAM  - Increased ETOH use reported by patient. We discuss use of medications above. - She is not currently driving          Return in about 3 months (around 2022), or if symptoms worsen or fail to improve. SUBJECTIVE/OBJECTIVE:    Xavier Uriostegui is a 65 y.o. female seen today for lab counseling, ETOH use. Ms. Bi Esteban presents to our office to discuss her cholesterol panel as her ASCVD score has been calculated 24%.   Ms. Bi Esteban is unable to recall being on cholesterol medicine as well as which medication she has been on previously. Ms. Brigida Lange has a history of alcohol use and is followed by neurology for her peripheral neuropathy. She had been actively working to cut down her use at our last encounter. Ms. Brigida Lange shares that she is averaging approximately 15 shots a week but is able to go extended period time up to a week without a single alcoholic beverage. She is requesting updated refills on her naltrexone and Librium which has previously help curb her use. REVIEW OF SYSTEMS:    Review of Systems   Constitutional: Negative. HENT: Negative. Respiratory: Negative. Cardiovascular: Negative. Gastrointestinal: Negative. Genitourinary: Negative. Musculoskeletal: Negative for back pain and joint swelling. Leg cramps at night   Skin: Negative. Neurological: Positive for numbness (to feet). Negative for facial asymmetry, weakness and light-headedness. Psychiatric/Behavioral: Negative. VITAL SIGNS:    Wt Readings from Last 3 Encounters:   02/28/22 138 lb 3.2 oz (62.7 kg)   02/16/22 138 lb (62.6 kg)   02/04/22 131 lb (59.4 kg)     Temp Readings from Last 3 Encounters:   02/28/22 97.9 °F (36.6 °C) (Temporal)   02/04/22 97.6 °F (36.4 °C) (Tympanic)   02/02/22 98.8 °F (37.1 °C) (Temporal)     BP Readings from Last 3 Encounters:   02/28/22 126/85   02/16/22 125/84   02/04/22 116/78     Pulse Readings from Last 3 Encounters:   02/28/22 86   02/16/22 92   02/04/22 (!) 103           PHYSICAL EXAMINATION:       General: Alert, cooperative, no distress  Respiratory: Breathing comfortably, in no acute respiratory distress. Clear to auscultation bilaterally. Cardiovascular: Regular rate and rhythm, S1, S2 normal, no murmur, click, rub or gallop. Extremities: no edema. Abdomen: Soft, non-tender, not distended. Bowel sounds normal. No masses or organomegaly. MSK: Extremities normal appearing, atraumatic, no effusion. Gait steady and unassisted.    Skin: Skin color, texture, turgor normal. No rashes or lesions on exposed skin. Neurologic: A/Ox3  Psychiatric: Normal affect. Mood euthymic. Thoughts logical. Speech volume and speed normal    Diabetic foot exam:     Left Foot:   Visual Exam: normal    Pulse DP: 2+ (normal)   Filament test: 2/6         Right Foot:   Visual Exam: normal    Pulse DP: 2+ (normal)   Filament test: 2/6           Treatment risks/benefits/costs/interactions/alternatives discussed with patient. Advised patient to call back or return to office if symptoms worsen/change/persist. If patient cannot reach us or should anything more severe/urgent arise he/she should proceed directly to the nearest emergency department. Discussed expected course/resolution/complications of diagnosis in detail with patient. Patient expressed understanding with the diagnosis and plan. An electronic signature was used to authenticate this note.   -- Alma Rosa Chaidez NP

## 2022-02-28 NOTE — PROGRESS NOTES
Chief Complaint   Patient presents with    Follow-up     cholesterol    Medication Refill         1. \"Have you been to the ER, urgent care clinic since your last visit? Hospitalized since your last visit? \" No    2. \"Have you seen or consulted any other health care providers outside of the [address] [place] [address] since your last visit? \" No     3. For patients over 45: Has the patient had a colonoscopy? Yes - no Care Gap present     If the patient is female:    4. For patients over 40: Has the patient had a mammogram? Yes - Care Gap present. Most recent result on file scheduled for next week    5. For patients over 21: Has the patient had a pap smear?  No       3 most recent PHQ Screens 2/28/2022   Little interest or pleasure in doing things Not at all   Feeling down, depressed, irritable, or hopeless Not at all   Total Score PHQ 2 0   Trouble falling or staying asleep, or sleeping too much -   Feeling tired or having little energy -   Poor appetite, weight loss, or overeating -   Feeling bad about yourself - or that you are a failure or have let yourself or your family down -   Trouble concentrating on things such as school, work, reading, or watching TV -   Moving or speaking so slowly that other people could have noticed; or the opposite being so fidgety that others notice -   Thoughts of being better off dead, or hurting yourself in some way -   PHQ 9 Score -   How difficult have these problems made it for you to do your work, take care of your home and get along with others -       Health Maintenance Due   Topic Date Due    COVID-19 Vaccine (1) Never done    Pneumococcal 0-64 years (1 of 2 - PPSV23) Never done    Foot Exam Q1  Never done    Eye Exam Retinal or Dilated  Never done    Cervical cancer screen  Never done    Shingrix Vaccine Age 50> (1 of 2) Never done    Breast Cancer Screen Mammogram  01/14/2021    Flu Vaccine (1) Never done    A1C test (Diabetic or Prediabetic)  02/25/2022

## 2022-03-03 ENCOUNTER — TELEPHONE (OUTPATIENT)
Dept: FAMILY MEDICINE CLINIC | Age: 62
End: 2022-03-03

## 2022-03-03 ENCOUNTER — PATIENT OUTREACH (OUTPATIENT)
Dept: CASE MANAGEMENT | Age: 62
End: 2022-03-03

## 2022-03-03 NOTE — TELEPHONE ENCOUNTER
Called patient. Two patient identifiers verified. Discussed lab results per provider's note. Verbalized understanding. Pt wants to know if she should still take new cholesterol medication prescribed at last appointment based off of this blood work.  Told pt I would check with ELIZABETH Cotton and let her know.     ----- Message from Kendra Bear NP sent at 3/3/2022  7:50 AM EST -----  Please inform the patient that her sodium and potassium levels are normal.

## 2022-03-04 ENCOUNTER — TELEPHONE (OUTPATIENT)
Dept: FAMILY MEDICINE CLINIC | Age: 62
End: 2022-03-04

## 2022-03-04 DIAGNOSIS — K29.21 CHRONIC ALCOHOLIC GASTRITIS WITH HEMORRHAGE: ICD-10-CM

## 2022-03-04 RX ORDER — PANTOPRAZOLE SODIUM 40 MG/1
40 TABLET, DELAYED RELEASE ORAL
Qty: 90 TABLET | Refills: 1 | Status: CANCELLED | OUTPATIENT
Start: 2022-03-04

## 2022-03-04 RX ORDER — PANTOPRAZOLE SODIUM 40 MG/1
40 TABLET, DELAYED RELEASE ORAL
Qty: 90 TABLET | Refills: 0 | Status: SHIPPED | OUTPATIENT
Start: 2022-03-04 | End: 2022-08-08 | Stop reason: SDUPTHER

## 2022-03-04 NOTE — TELEPHONE ENCOUNTER
Called patient. Two patient identifiers verified. Informed pt she should be taking the cholesterol medication and to hold on taking potassium as her potassium is at the upper limit of the normal range. Verbalized understanding. Pt also requested refill on pantoprazole for her ulcer as she only has 5 left and would like to pick it up with all of her other medications so she only has to make one trip.     ----- Message from Jelena Mancini sent at 3/4/2022 10:16 AM EST -----  Subject: Message to Provider    QUESTIONS  Information for Provider? patient has questions about how long she should   be taking meds for cholesterol & potassium   ---------------------------------------------------------------------------  --------------  CALL BACK INFO  What is the best way for the office to contact you? OK to leave message on   voicemail  Preferred Call Back Phone Number? [phone]  ---------------------------------------------------------------------------  --------------  SCRIPT ANSWERS  Relationship to Patient?  Self

## 2022-03-04 NOTE — TELEPHONE ENCOUNTER
Patient call requesting refill pantoprazole. tarik Cox    Last Visit: 2/28/22 NP Sophia Cottrell  Next Appointment: Not scheduled  Previous Refill Encounter(s): 6/28/21 30    Requested Prescriptions     Pending Prescriptions Disp Refills    pantoprazole (PROTONIX) 40 mg tablet 90 Tablet 1     Sig: Take 1 Tablet by mouth Daily (before breakfast).

## 2022-03-18 PROBLEM — S82.122A CLOSED FRACTURE OF LATERAL PORTION OF LEFT TIBIAL PLATEAU: Status: ACTIVE | Noted: 2021-10-19

## 2022-03-18 PROBLEM — M17.0 BILATERAL PRIMARY OSTEOARTHRITIS OF KNEE: Status: ACTIVE | Noted: 2021-03-11

## 2022-03-18 PROBLEM — J43.9 EMPHYSEMA OF LUNG (HCC): Status: ACTIVE | Noted: 2018-01-27

## 2022-03-18 PROBLEM — F10.11 ALCOHOL ABUSE, IN REMISSION: Status: ACTIVE | Noted: 2018-04-10

## 2022-03-19 PROBLEM — F17.200 SMOKER: Status: ACTIVE | Noted: 2021-03-08

## 2022-03-19 PROBLEM — G62.89 NUTRITIONAL ATAXIC NEUROPATHY: Status: ACTIVE | Noted: 2021-05-19

## 2022-03-19 PROBLEM — G62.1 ALCOHOLIC PERIPHERAL NEUROPATHY (HCC): Status: ACTIVE | Noted: 2021-05-19

## 2022-03-19 PROBLEM — R26.0 SENSORY ATAXIC GAIT: Status: ACTIVE | Noted: 2021-04-16

## 2022-03-19 PROBLEM — G60.8 IDIOPATHIC SMALL AND LARGE FIBER SENSORY NEUROPATHY: Status: ACTIVE | Noted: 2021-04-16

## 2022-03-19 PROBLEM — E53.8 B12 DEFICIENCY: Status: ACTIVE | Noted: 2021-04-16

## 2022-03-19 PROBLEM — Z87.11 HISTORY OF GASTRIC ULCER: Status: ACTIVE | Noted: 2021-03-08

## 2022-03-19 PROBLEM — G62.9 NEUROPATHY: Status: ACTIVE | Noted: 2021-03-08

## 2022-03-20 PROBLEM — R59.0 MEDIASTINAL LYMPHADENOPATHY: Status: ACTIVE | Noted: 2018-04-12

## 2022-03-20 PROBLEM — D86.9 SARCOIDOSIS: Status: ACTIVE | Noted: 2021-05-07

## 2022-03-20 PROBLEM — Z72.0 TOBACCO ABUSE: Status: ACTIVE | Noted: 2022-02-16

## 2022-03-20 PROBLEM — E55.9 VITAMIN D DEFICIENCY: Status: ACTIVE | Noted: 2021-04-16

## 2022-04-19 DIAGNOSIS — Z12.31 ENCOUNTER FOR SCREENING MAMMOGRAM FOR MALIGNANT NEOPLASM OF BREAST: ICD-10-CM

## 2022-04-26 RX ORDER — NALTREXONE HYDROCHLORIDE 50 MG/1
TABLET, FILM COATED ORAL
Qty: 30 TABLET | Refills: 1 | Status: SHIPPED | OUTPATIENT
Start: 2022-04-26 | End: 2022-06-29 | Stop reason: SDUPTHER

## 2022-06-24 ENCOUNTER — TELEPHONE (OUTPATIENT)
Dept: FAMILY MEDICINE CLINIC | Age: 62
End: 2022-06-24

## 2022-06-24 NOTE — TELEPHONE ENCOUNTER
Called patient. Two patient identifiers verified. Informed pt spironolactone was not on her current med list, last prescribed by Dr. Rebecca Dsouza in 2021 and had been discontinued by him in 2021. Pt said that ELIZABETH Llamas had refilled it for her. Informed pt a refill request would be sent to ELIZABETH Rodarte but that she might need to see her for an appt since she had not prescribed it to her previously and it was not on her med list currently. Pt said that she thinks she takes this for something related to her alcoholism. Asked pt if she was sure spironolactone was the medication she threw out, pt said she was sure and that the bottle had ELIZABETH Meek's name on it.

## 2022-06-27 NOTE — TELEPHONE ENCOUNTER
Called patient. Two patient identifiers verified. Informed pt we could not see where it had been prescribed so NP Barbra Covarrubias was not able to refill it. Pt said that it was originally prescribed by her liver specialist Dr. Patsy Novak. Pt requested to move up follow-up appt to this week because she was completely out of it. Rescheduled appt to 6/29/22.

## 2022-06-29 ENCOUNTER — OFFICE VISIT (OUTPATIENT)
Dept: FAMILY MEDICINE CLINIC | Age: 62
End: 2022-06-29
Payer: COMMERCIAL

## 2022-06-29 VITALS
SYSTOLIC BLOOD PRESSURE: 121 MMHG | OXYGEN SATURATION: 97 % | DIASTOLIC BLOOD PRESSURE: 81 MMHG | TEMPERATURE: 97.8 F | WEIGHT: 147.6 LBS | HEIGHT: 61 IN | RESPIRATION RATE: 16 BRPM | BODY MASS INDEX: 27.87 KG/M2 | HEART RATE: 81 BPM

## 2022-06-29 DIAGNOSIS — F10.10 ALCOHOL ABUSE: Primary | ICD-10-CM

## 2022-06-29 DIAGNOSIS — J43.2 CENTRILOBULAR EMPHYSEMA (HCC): ICD-10-CM

## 2022-06-29 DIAGNOSIS — G62.1 ALCOHOLIC PERIPHERAL NEUROPATHY (HCC): ICD-10-CM

## 2022-06-29 DIAGNOSIS — K70.30 ALCOHOLIC CIRRHOSIS OF LIVER WITHOUT ASCITES (HCC): ICD-10-CM

## 2022-06-29 DIAGNOSIS — E53.8 B12 DEFICIENCY: ICD-10-CM

## 2022-06-29 DIAGNOSIS — E51.9 THIAMIN DEFICIENCY: ICD-10-CM

## 2022-06-29 LAB
ALBUMIN SERPL-MCNC: 3.3 G/DL (ref 3.5–5)
ALBUMIN/GLOB SERPL: 1 {RATIO} (ref 1.1–2.2)
ALP SERPL-CCNC: 93 U/L (ref 45–117)
ALT SERPL-CCNC: 14 U/L (ref 12–78)
ANION GAP SERPL CALC-SCNC: 8 MMOL/L (ref 5–15)
AST SERPL-CCNC: 20 U/L (ref 15–37)
BASOPHILS # BLD: 0 K/UL (ref 0–0.1)
BASOPHILS NFR BLD: 0 % (ref 0–1)
BILIRUB SERPL-MCNC: 0.2 MG/DL (ref 0.2–1)
BUN SERPL-MCNC: 13 MG/DL (ref 6–20)
BUN/CREAT SERPL: 12 (ref 12–20)
CALCIUM SERPL-MCNC: 9.5 MG/DL (ref 8.5–10.1)
CHLORIDE SERPL-SCNC: 100 MMOL/L (ref 97–108)
CO2 SERPL-SCNC: 29 MMOL/L (ref 21–32)
CREAT SERPL-MCNC: 1.12 MG/DL (ref 0.55–1.02)
DIFFERENTIAL METHOD BLD: ABNORMAL
EOSINOPHIL # BLD: 0.1 K/UL (ref 0–0.4)
EOSINOPHIL NFR BLD: 1 % (ref 0–7)
ERYTHROCYTE [DISTWIDTH] IN BLOOD BY AUTOMATED COUNT: 14.8 % (ref 11.5–14.5)
EST. AVERAGE GLUCOSE BLD GHB EST-MCNC: 103 MG/DL
GLOBULIN SER CALC-MCNC: 3.4 G/DL (ref 2–4)
GLUCOSE SERPL-MCNC: 106 MG/DL (ref 65–100)
HBA1C MFR BLD: 5.2 % (ref 4–5.6)
HCT VFR BLD AUTO: 39.6 % (ref 35–47)
HGB BLD-MCNC: 13 G/DL (ref 11.5–16)
IMM GRANULOCYTES # BLD AUTO: 0 K/UL (ref 0–0.04)
IMM GRANULOCYTES NFR BLD AUTO: 1 % (ref 0–0.5)
LYMPHOCYTES # BLD: 2.1 K/UL (ref 0.8–3.5)
LYMPHOCYTES NFR BLD: 28 % (ref 12–49)
MCH RBC QN AUTO: 32.6 PG (ref 26–34)
MCHC RBC AUTO-ENTMCNC: 32.8 G/DL (ref 30–36.5)
MCV RBC AUTO: 99.2 FL (ref 80–99)
MONOCYTES # BLD: 1.1 K/UL (ref 0–1)
MONOCYTES NFR BLD: 14 % (ref 5–13)
NEUTS SEG # BLD: 4.1 K/UL (ref 1.8–8)
NEUTS SEG NFR BLD: 56 % (ref 32–75)
NRBC # BLD: 0 K/UL (ref 0–0.01)
NRBC BLD-RTO: 0 PER 100 WBC
PLATELET # BLD AUTO: 223 K/UL (ref 150–400)
PMV BLD AUTO: 10.8 FL (ref 8.9–12.9)
POTASSIUM SERPL-SCNC: 4 MMOL/L (ref 3.5–5.1)
PROT SERPL-MCNC: 6.7 G/DL (ref 6.4–8.2)
RBC # BLD AUTO: 3.99 M/UL (ref 3.8–5.2)
SODIUM SERPL-SCNC: 137 MMOL/L (ref 136–145)
WBC # BLD AUTO: 7.5 K/UL (ref 3.6–11)

## 2022-06-29 PROCEDURE — 99214 OFFICE O/P EST MOD 30 MIN: CPT | Performed by: NURSE PRACTITIONER

## 2022-06-29 RX ORDER — CHLORDIAZEPOXIDE HYDROCHLORIDE 5 MG/1
5 CAPSULE, GELATIN COATED ORAL
Qty: 30 CAPSULE | Refills: 0 | Status: SHIPPED | OUTPATIENT
Start: 2022-06-29

## 2022-06-29 RX ORDER — LANOLIN ALCOHOL/MO/W.PET/CERES
CREAM (GRAM) TOPICAL
Qty: 90 TABLET | Refills: 3 | Status: SHIPPED | OUTPATIENT
Start: 2022-06-29

## 2022-06-29 RX ORDER — LANOLIN ALCOHOL/MO/W.PET/CERES
1000 CREAM (GRAM) TOPICAL DAILY
Qty: 90 TABLET | Refills: 3 | Status: SHIPPED | OUTPATIENT
Start: 2022-06-29

## 2022-06-29 RX ORDER — NALTREXONE HYDROCHLORIDE 50 MG/1
50 TABLET, FILM COATED ORAL DAILY
Qty: 90 TABLET | Refills: 3 | Status: SHIPPED | OUTPATIENT
Start: 2022-06-29

## 2022-06-29 NOTE — PROGRESS NOTES
[facility] Note     Arcelia Kong (: 1960) is a 65 y.o. female, established patient, here for evaluation of the following chief complaint(s):  Medication Refill (threw out spironolactone)       ASSESSMENT/PLAN:  1. Alcohol abuse  -Counseling provided on ceasing alcohol use. Agree with entering [employer] program again. We discussed returning to [place] [id number] in which she feels that this is not helpful. She admits that she would only tell people what they would want to hear. She also says is about speaking with counseling or psychiatric services. Admits to not feeling comfortable or trusting of others      - chlordiazePOXIDE (LIBRIUM) 5 mg capsule; Take 1 Capsule by mouth three (3) times daily as needed for Anxiety. Max Daily Amount: 15 mg., Normal, Disp-30 Capsule, R-0  -     naltrexone (DEPADE) 50 mg tablet; Take 1 Tablet by mouth daily. , Normal, Disp-90 Tablet, R-3  -     cyanocobalamin (Vitamin B-12) 1,000 mcg tablet; Take 1 Tablet by mouth daily. , Normal, Disp-90 Tablet, R-3  -     thiamine HCL (B-1) 100 mg tablet; TAKE ONE TABLET BY MOUTH DAILY, Normal, Disp-90 Tablet, R-3    2. Alcoholic peripheral neuropathy (HCC)  -Followed regularly by neurology      - cyanocobalamin (Vitamin B-12) 1,000 mcg tablet; Take 1 Tablet by mouth daily. , Normal, Disp-90 Tablet, R-3  -     thiamine HCL (B-1) 100 mg tablet; TAKE ONE TABLET BY MOUTH DAILY, Normal, Disp-90 Tablet, R-3  -     METABOLIC PANEL, COMPREHENSIVE; Future  -     CBC WITH AUTOMATED DIFF; Future  -     HEMOGLOBIN A1C WITH EAG; Future    3. Alcoholic cirrhosis of liver without ascites ([facility] [address]  - Plan per # 1  - Followed by Dr. Charlie Gong / Hepatology    4. Centrilobular emphysema (HCC)  - Stable    5. B12 deficiency  -Check B12 levels  -Refill B12 and thiamine    6.  Thiamine deficiency  -     thiamine HCL (B-1) 100 mg tablet; TAKE ONE TABLET BY MOUTH DAILY, Normal, Disp-90 Tablet, R-3          Return in about 2 months (around 8/29/2022). SUBJECTIVE/OBJECTIVE:    Alyssa Blanchard is a 65 y.o. female seen today for medication refill and alcohol use. Ms. Burgess Vieira shares that her alcohol use has increased. She attributes this to the inability to drive herself, isolation at home and pain associated with peripheral neuropathy. She estimates she is up to 15-16 shots per day. Ms. Burgess Vieira is aware that this is a problem for her and is actively seeking help. She is requesting to resume naltrexone and Librium with plans to enter [employer] program / NP Jayant Mancilla which had helped her in the past.  Ms. Burgess Vieira is eating and drinking without difficulty. Denies falls. Denies vomiting blood blood in the stool or black tarry stool. REVIEW OF SYSTEMS:    Review of Systems   Constitutional: Negative. HENT: Negative. Respiratory: Positive for shortness of breath. Negative for cough and wheezing. Cardiovascular: Negative. Gastrointestinal: Negative. Genitourinary: Negative. Musculoskeletal: Positive for gait problem (Uses cane). Negative for back pain. Neurological: Positive for numbness. Chronic leg pain   Psychiatric/Behavioral: Positive for decreased concentration (Feeling isolated) and dysphoric mood. VITAL SIGNS:    Wt Readings from Last 3 Encounters:   06/29/22 147 lb 9.6 oz (67 kg)   02/28/22 138 lb 3.2 oz (62.7 kg)   02/16/22 138 lb (62.6 kg)     Temp Readings from Last 3 Encounters:   06/29/22 97.8 °F (36.6 °C) (Temporal)   02/28/22 97.9 °F (36.6 °C) (Temporal)   02/04/22 97.6 °F (36.4 °C) (Tympanic)     BP Readings from Last 3 Encounters:   06/29/22 121/81   02/28/22 126/85   02/16/22 125/84     Pulse Readings from Last 3 Encounters:   06/29/22 81   02/28/22 86   02/16/22 92           PHYSICAL EXAMINATION:       General: Alert, cooperative, no distress  Respiratory: Breathing comfortably, in no acute respiratory distress. Clear to auscultation bilaterally.    Cardiovascular: Regular rate and rhythm, S1, S2 normal, no murmur, click, rub or gallop. Extremities: no edema. Abdomen: Soft, non-tender, not distended. Bowel sounds normal. No masses or organomegaly. MSK: Extremities normal appearing, atraumatic, no effusion. Gait steady and unassisted. Skin: Skin color, texture, turgor normal. No rashes or lesions on exposed skin. Neurologic: A/Ox3  Psychiatric: Normal affect. Mood euthymic. Thoughts logical. Speech volume and speed normal            Treatment risks/benefits/costs/interactions/alternatives discussed with patient. Advised patient to call back or return to office if symptoms worsen/change/persist. If patient cannot reach us or should anything more severe/urgent arise he/she should proceed directly to the nearest emergency department. Discussed expected course/resolution/complications of diagnosis in detail with patient. Patient expressed understanding with the diagnosis and plan. An electronic signature was used to authenticate this note.   -- Kamille Jean-Baptiste NP

## 2022-06-29 NOTE — PATIENT INSTRUCTIONS
9 Ways to Cut Back on Drinking  Maybe you've found yourself drinking more alcohol than you'd prefer. If you want to cut back, here are some ideas to try. Think before you drink. Do you really want a drink, or is it just a habit? If you're used to having a drink at a certain time, try doing something else then. Look for substitutes. Find some no-alcohol drinks that you enjoy, like flavored seltzer water, tea with honey, or tonic with a slice of lime. Or try alcohol-free beer or \"virgin\" cocktails (without the alcohol). Drink more water. Use water to quench your thirst. Drink a glass of water before you have any alcohol. Have another glass along with every drink or between drinks. Shrink your drink. For example, have a bottle of beer instead of a pint. Use a smaller glass for wine. Choose drinks with lower alcohol content (ABV%). Or use less liquor and more mixer in cocktails. Slow down. It's easy to drink quickly and without thinking about it. Pay attention, and make each drink last longer. Do the math. Total up how much you spend on alcohol each month. How much is that a year? If you cut back, what could you do with the money you save? Take a break. Choose a day or two each week when you won't drink at all. Notice how you feel on those days, physically and emotionally. How did you sleep? Do you feel better? Over time, add more break days. Count calories. Would you like to lose some weight? That can be a good motivator for cutting back. Figure out how many calories are in each drink. How many does that add up to in a day? In a week? In a month? Practice saying no. Be ready when someone offers you a drink. Try: Jo Ann May, I've had enough. \" Or \"Thanks, but I'm cutting back. \" Or \"No, thanks. I feel better when I drink less. \"   Current as of: November 8, 2021               Content Version: 13.2  © 0258-9270 [employer]wise, [facility].    Care instructions adapted under license by [employer] (which disclaims liability or warranty for this information). If you have questions about a medical condition or this instruction, always ask your healthcare professional. [address] any warranty or liability for your use of this information.

## 2022-06-29 NOTE — PROGRESS NOTES
Chief Complaint   Patient presents with    Medication Refill     threw out spironolactone         1. \"Have you been to the ER, urgent care clinic since your last visit? Hospitalized since your last visit? \" No    2. \"Have you seen or consulted any other health care providers outside of the [address] [place] [address] since your last visit? \" No     3. For patients over 45: Has the patient had a colonoscopy? Yes - no Care Gap present     If the patient is female:    4. For patients over 40: Has the patient had a mammogram? Yes - no Care Gap present    5. For patients over 21: Has the patient had a pap smear?  No     3 most recent PHQ Screens 6/29/2022   Little interest or pleasure in doing things Not at all   Feeling down, depressed, irritable, or hopeless Not at all   Total Score PHQ 2 0   Trouble falling or staying asleep, or sleeping too much -   Feeling tired or having little energy -   Poor appetite, weight loss, or overeating -   Feeling bad about yourself - or that you are a failure or have let yourself or your family down -   Trouble concentrating on things such as school, work, reading, or watching TV -   Moving or speaking so slowly that other people could have noticed; or the opposite being so fidgety that others notice -   Thoughts of being better off dead, or hurting yourself in some way -   PHQ 9 Score -   How difficult have these problems made it for you to do your work, take care of your home and get along with others -       Health Maintenance Due   Topic Date Due    COVID-19 Vaccine (1) Never done    Pneumococcal 0-64 years (1 - PCV) Never done    Eye Exam Retinal or Dilated  Never done    Cervical cancer screen  Never done    Shingrix Vaccine Age 50> (1 of 2) Never done    A1C test (Diabetic or Prediabetic)  02/25/2022

## 2022-06-30 PROBLEM — K70.30 ALCOHOLIC CIRRHOSIS OF LIVER WITHOUT ASCITES (HCC): Status: ACTIVE | Noted: 2022-06-30

## 2022-08-08 DIAGNOSIS — K29.21 CHRONIC ALCOHOLIC GASTRITIS WITH HEMORRHAGE: ICD-10-CM

## 2022-08-08 NOTE — TELEPHONE ENCOUNTER
PCP: Leonard Pearson NP    Last appt: 6/29/2022  Future Appointments   Date Time Provider Nubia Yip   9/29/2022  8:40 AM MD DAFNE Trevino BS AMB       Requested Prescriptions     Pending Prescriptions Disp Refills    pantoprazole (PROTONIX) 40 mg tablet 90 Tablet 0     Sig: Take 1 Tablet by mouth Daily (before breakfast).          Prior labs and Blood pressures:  BP Readings from Last 3 Encounters:   06/29/22 121/81   02/28/22 126/85   02/16/22 125/84     Lab Results   Component Value Date/Time    Sodium 137 06/29/2022 10:21 AM    Potassium 4.0 06/29/2022 10:21 AM    Chloride 100 06/29/2022 10:21 AM    CO2 29 06/29/2022 10:21 AM    Anion gap 8 06/29/2022 10:21 AM    Glucose 106 (H) 06/29/2022 10:21 AM    BUN 13 06/29/2022 10:21 AM    Creatinine 1.12 (H) 06/29/2022 10:21 AM    BUN/Creatinine ratio 12 06/29/2022 10:21 AM    GFR est AA 60 (L) 06/29/2022 10:21 AM    GFR est non-AA 49 (L) 06/29/2022 10:21 AM    Calcium 9.5 06/29/2022 10:21 AM     Lab Results   Component Value Date/Time    Hemoglobin A1c 5.2 06/29/2022 10:21 AM     Lab Results   Component Value Date/Time    Cholesterol, total 295 (H) 02/02/2022 10:10 AM    HDL Cholesterol 68 02/02/2022 10:10 AM    LDL, calculated 209 (H) 02/02/2022 10:10 AM    VLDL, calculated 18 02/02/2022 10:10 AM    Triglyceride 90 02/02/2022 10:10 AM    CHOL/HDL Ratio 4.3 02/02/2022 10:10 AM     Lab Results   Component Value Date/Time    Vitamin D 25-Hydroxy 38.3 04/16/2021 10:50 AM       Lab Results   Component Value Date/Time    TSH 1.35 04/16/2021 10:50 AM

## 2022-08-09 RX ORDER — PANTOPRAZOLE SODIUM 40 MG/1
40 TABLET, DELAYED RELEASE ORAL
Qty: 90 TABLET | Refills: 0 | Status: SHIPPED | OUTPATIENT
Start: 2022-08-09

## 2022-08-12 DIAGNOSIS — F51.01 PRIMARY INSOMNIA: ICD-10-CM

## 2022-08-12 DIAGNOSIS — E53.8 FOLIC ACID DEFICIENCY: ICD-10-CM

## 2022-08-12 RX ORDER — DOXEPIN HYDROCHLORIDE 50 MG/1
50 CAPSULE ORAL
Qty: 90 CAPSULE | Refills: 1 | Status: SHIPPED | OUTPATIENT
Start: 2022-08-12

## 2022-08-12 RX ORDER — FOLIC ACID 1 MG/1
1 TABLET ORAL DAILY
Qty: 90 TABLET | Refills: 1 | Status: SHIPPED | OUTPATIENT
Start: 2022-08-12

## 2022-08-12 NOTE — TELEPHONE ENCOUNTER
Last Visit: 6/29/22 ELIZABETH Izaguirre  Next Appointment: 12/30/22 ELIZABETH Izaguirre  Previous Refill Encounter(s): 2/2/22 90 + 1(both)    Requested Prescriptions     Pending Prescriptions Disp Refills    doxepin (SINEquan) 50 mg capsule 90 Capsule 1     Sig: Take 1 Capsule by mouth nightly as needed (sleep). folic acid (FOLVITE) 1 mg tablet 90 Tablet 1     Sig: Take 1 Tablet by mouth in the morning. For [facility] in place:   Recommendation Provided To:    Intervention Detail: New Rx: 2, reason: Patient Preference  Gap Closed?:   Intervention Accepted By:   Time Spent (min): 5

## 2022-09-20 RX ORDER — PROPRANOLOL HYDROCHLORIDE 10 MG/1
10 TABLET ORAL 2 TIMES DAILY
Qty: 180 TABLET | Refills: 1 | Status: SHIPPED | OUTPATIENT
Start: 2022-09-20

## 2022-09-20 NOTE — TELEPHONE ENCOUNTER
Last Visit: 6/29/22 ELIZABETH Morrissey  Next Appointment: 12/30/22 ELIZABETH Morrissey  Previous Refill Encounter(s): 2/28/22 180 + 1    Requested Prescriptions     Pending Prescriptions Disp Refills    propranoloL (INDERAL) 10 mg tablet 180 Tablet 1     Sig: Take 1 Tablet by mouth two (2) times a day. For [facility] [facility] in place:   Recommendation Provided To:    Intervention Detail: New Rx: 1, reason: Patient Preference  Gap Closed?:   Intervention Accepted By:   Time Spent (min): 5

## 2022-12-08 ENCOUNTER — OFFICE VISIT (OUTPATIENT)
Dept: NEUROLOGY | Age: 62
End: 2022-12-08
Payer: COMMERCIAL

## 2022-12-08 VITALS
RESPIRATION RATE: 16 BRPM | HEART RATE: 90 BPM | BODY MASS INDEX: 31.04 KG/M2 | SYSTOLIC BLOOD PRESSURE: 160 MMHG | TEMPERATURE: 97.7 F | WEIGHT: 164.4 LBS | DIASTOLIC BLOOD PRESSURE: 80 MMHG | OXYGEN SATURATION: 97 % | HEIGHT: 61 IN

## 2022-12-08 DIAGNOSIS — G62.9 AXONAL POLYNEUROPATHY: Primary | ICD-10-CM

## 2022-12-08 DIAGNOSIS — R26.89 BALANCE DISORDER: ICD-10-CM

## 2022-12-08 DIAGNOSIS — Z72.0 TOBACCO ABUSE: ICD-10-CM

## 2022-12-08 NOTE — PROGRESS NOTES
[facility]  [address] [address] [facility]  [facility], [place] L[place]pez  Tel: [phone]  Fax: [phone]      Date:  22     Name:  Rosalia Leung  :  1960  MRN:  [id number]     PCP:  Rickey López NP    Chief Complaint   Patient presents with    Follow-up     neuropathy         HISTORY OF PRESENT ILLNESS:  Patient presents today for regular follow up. Not driving her car b/c of the neuropathy in the feet. Feels off balance at times. Can't feel her feet. No recent falls. She denies any associated pain now. Otherwise patient really has no other complaints or concerns, she  does note that she had some had a lower extremity fracture she was seen by Dr. Nickolas Paz, she has an upcoming appointment with her PCP. Diet: it could be better. She feels as though she continues to gain weight, she is trying to eat healthy. Limited exercise because she cannot drive as well as patient is afraid of falling. Alcohol: pt has cut way back, not drinking on a daily basis, 3-4 drinks a week. previously she was drinking excessive amounts. Tobacco: 1 pack lasts about 2 days, pt has cut back a lot. Recap from last visit 2022: Patient with clear neuropathy, in both legs, and work-up showed an EMG that showed a length dependent demyelinating and axonal polyneuropathy consistent with various toxic or metabolic or acquired neuropathies, and could be related to her alcohol and B12 deficiency, and she still has a low B12 level so as not she is on supplementation, we will check an antiparietal cell antibody titer to rule out pernicious anemia. Patient still drinking and we counseled her strongly on getting alcohol altogether and getting back in her rehab program, and quitting smoking also. We encouraged her to stay physically mentally active and start exercise program and eat a good diet, more the Mediterranean-based diet. .  Patient is encouraged to get back to [facility], and to try to gradually reduce her alcohol intake, she is to take her vitamins, vitamin B1, vitamin B12, and vitamin D every day. MRI of the brain was normal essentially. REVIEW OF SYSTEMS:     Review of Systems   Eyes: Negative. Musculoskeletal:  Negative for falls. Neurological:  Positive for sensory change (B feet to her to the middle oh her shin). Negative for dizziness, tingling, tremors, weakness and headaches. Psychiatric/Behavioral:  Negative for depression (well controllled). The patient has insomnia (meds help her sleep). The patient is not nervous/anxious. All other systems reviewed and are negative. Current Outpatient Medications   Medication Sig    propranoloL (INDERAL) 10 mg tablet Take 1 Tablet by mouth two (2) times a day. doxepin (SINEquan) 50 mg capsule Take 1 Capsule by mouth nightly as needed (sleep). folic acid (FOLVITE) 1 mg tablet Take 1 Tablet by mouth in the morning. pantoprazole (PROTONIX) 40 mg tablet Take 1 Tablet by mouth Daily (before breakfast). naltrexone (DEPADE) 50 mg tablet Take 1 Tablet by mouth daily. cyanocobalamin (Vitamin B-12) 1,000 mcg tablet Take 1 Tablet by mouth daily. thiamine HCL (B-1) 100 mg tablet TAKE ONE TABLET BY MOUTH DAILY    clobetasoL (TEMOVATE) 0.05 % ointment APPLY TO AFFECTED AREA(S) OF BODY TWO TIMES A DAY AND AS NEEDED    hydrocortisone (HYTONE) 2.5 % ointment APPLY TO AFFECTED AREA(S) OF FACE TWO TIMES A DAY AS NEEDED    buPROPion XL (WELLBUTRIN XL) 150 mg tablet Take 1 Tablet by mouth every morning.    magnesium 200 mg tab TAKE ONE TABLET BY MOUTH DAILY    chlordiazePOXIDE (LIBRIUM) 5 mg capsule Take 1 Capsule by mouth three (3) times daily as needed for Anxiety. Max Daily Amount: 15 mg. (Patient not taking: Reported on 12/8/2022)    atorvastatin (LIPITOR) 10 mg tablet Take 1 Tablet by mouth daily.  (Patient not taking: No sig reported)    naloxone (NARCAN) 4 mg/actuation nasal spray Use 1 spray intranasally, then discard. Repeat with new spray every 2 min as needed for opioid overdose symptoms, alternating nostrils. (Patient not taking: No sig reported)    potassium chloride (KLOR-CON) 20 mEq pack TAKE ONE PACKET BY MOUTH DAILY (Patient not taking: No sig reported)     No current facility-administered medications for this visit. Allergies   Allergen Reactions    Robaxin [Methocarbamol] Hives, Rash and Itching     Red splotches    Statins-Hmg-Coa Reductase Inhibitors Myalgia    Codeine Itching    Neosporin [Neomycin-Bacitracin-Polymyxin] Rash     Past Medical History:   Diagnosis Date    GI (gastrointestinal bleed)     Insomnia 11/09    Iron deficiency anemia 04/2004    PPD positive     treated w/ INH- tests since have been negative    Pure hypercholesterolemia     Scoliosis 12/28/15    dx given by [facility] M[facility] at [facility]    Tobacco dependence     Unspecified essential hypertension     Uterine bleeding, dysfunctional 2/12    Uterine fibroid      Past Surgical History:   Procedure Laterality Date    COLONOSCOPY N/A 1/31/2018    COLONOSCOPY performed by James Rodriguez MD at [address] [place] [address]      HX COLONOSCOPY  07/01/2013    [facility], repeat 10 years    HX ORTHOPAEDIC      Shattered bilateral ankles-metal plates & screws    HX OTHER SURGICAL      Benign cyst removal- L. foot    MA LEG/ANKLE SURGERY PROC UNLISTED       Social History     Socioeconomic History    Marital status:      Spouse name: Not on file    Number of children: Not on file    Years of education: Not on file    Highest education level: Not on file   Occupational History    Occupation: part time   Tobacco Use    Smoking status: Every Day     Packs/day: 0.50     Years: 17.00     Pack years: 8.50     Types: Cigarettes    Smokeless tobacco: Never   Vaping Use    Vaping Use: Never used   Substance and Sexual Activity    Alcohol use:  Yes     Alcohol/week: 2.0 standard drinks     Types: 1 Cans of beer, 1 Shots of liquor per week    Drug use: No    Sexual activity: Yes     Partners: Male     Birth control/protection: None   Other Topics Concern    Not on file   Social History Narrative    Not on file     Social Determinants of Health     Financial Resource Strain: Not on file   Food Insecurity: Not on file   Transportation Needs: Not on file   Physical Activity: Not on file   Stress: Not on file   Social Connections: Not on file   Intimate Partner Violence: Not on file   Housing Stability: Not on file     Family History   Problem Relation Age of Onset    Hypertension Mother     Diabetes Mother     Cancer Mother         breast    Heart Disease Mother     Heart Disease Father         MI 42's    Kidney Disease Father     Hypertension Son          PHYSICAL EXAMINATION:    Visit Vitals  BP (!) 160/80 (BP 1 Location: Left upper arm, BP Patient Position: Sitting, BP Cuff Size: Large adult)   Pulse 90   Temp 97.7 °F (36.5 °C) (Temporal)   Resp 16   Ht 5' 1\" (1.549 m)   Wt 164 lb 6.4 oz (74.6 kg)   SpO2 97%   BMI 31.06 kg/m²     General:  Well defined, nourished, and well groomed individual in no acute distress. Musculoskeletal:  B Lower Extremities revealed Trace edema and had full range of motion of joints. Psych:  Good mood and bright affect. NEUROLOGICAL EXAMINATION:     Mental Status:   Alert and oriented to person, place, and time with recent and remote memory intact. Attention span and concentration are normal. Clear speech. Fund of knowledge preserved. Cranial Nerves:   PERRLA. Visual fields were full  EOM: no evidence of nystagmus  Facial sensation:  normal and symmetric  Facial motor: normal and symmetric, no facial droop noted. Hearing intact  SCM strength intact  Tongue: midline without fasciculations    Motor Examination: Normal tone. 5/5 muscle strength in bilateral upper and lower extremities. No cogwheel rigidity. No muscle wasting, no twitching or fasciculation noted.       Sensory exam: Normal throughout to light touch, Decreased vibration sense in L ankle and L toe, relatively normal vibration sense in R ankle and R toe. Coordination:   Finger to nose and rapid arm movement testing was normal.  No resting or intention tremor. Negative Romberg, negative pronator drift. Gait and Station:  Smaller steps noted, unable to perform tandem walkin. Normal arm swing. Reflexes:  DTRs 1+ in bilateral biceps, brachioradialis, patella . ASSESSMENT AND PLAN      ICD-10-CM ICD-9-CM    1. Axonal polyneuropathy  G62.9 356.9       2. Tobacco abuse  Z72.0 305.1       3. Balance disorder  R26.89 781.99         1. Axonal polyneuropathy: EMG completed previously, will continue to monitor, she defers any supplemental medication at this time, healthy lifestyle was encouraged, fall precautions noted. Patient is encouraged to try to maintain regular exercise program.  2. Tobacco abuse: Smoking cessation was discussed. 3. Balance disorder: Patient is continue with a Rollator walker, fall precautions recommended, consider physical therapy. Patient and/or family verbalized understand of all instructions and all questions/concerns were addressed. Safety/side effects of medications discussed. Patient remains a complex patient secondary to polypharmacy, significant comorbid conditions, and use of multiple medications which complicate the decision making process related to patient's neurologic diagnosis. We will see the patient back in 1 year, sooner if needed. I did advise for the patient to discuss possibly getting a bone density scan by her primary care at the upcoming appointment.     Stan Palacio, AIXA-BC

## 2022-12-08 NOTE — PROGRESS NOTES
Chief Complaint   Patient presents with    Follow-up     neuropathy       1. Have you been to the ER, urgent care clinic since your last visit? Yes  Hospitalized since your last visit? No    2. Have you seen or consulted any other health care providers outside of the [address] [place] [address] since your last visit? Include any pap smears or colon screening. Patient C/O feet and hand numbness .

## 2023-01-16 NOTE — CONSULTS
Assessment complete. Alert. Oriented to self only. Tried multiple times since shift start to get up without assistance from chair. Refused need to go to the bathroom or return to bed when offered. When asked if he is in pain, patient stated that his left hip was sore. Given PRN oxycodone and scheduled Tylenol. Given PRN Trazodone to promote rest. Assisted to bathroom, then back to bed using stedy x1 assist. The care plan and education has been reviewed and mutually agreed upon with the patient. In bed, alarm on, bed in lowest position, call light and table within reach, camera in place, sitter at bedside. No further needs expressed at this time. 2130  Patient having difficulty getting comfortable. Sat on side of bed with sitter within reach. 2209  Patient unable to tolerate lying in bed due to left hip pain. Assisted up to chair. Offered ice pack and Biofreeze application; patient refused, stating they will not help. No other medications due at this time for relief. Patient willing to try Biofreeze and ice pack after re-offered. 2340  Patient feeling more comfortable. Assisted back to bed per patient request.    0033  PRN oxycodone available at this time, but patient sleeping soundly in bed (snoring). 1889  Patient awoken, encouraged to void as he has not done so this shift. Refused diaper to be checked; refused to go to the bathroom or use urinal. Complaining of some soreness to left hip. Given PRN oxycodone. 0300  Refused diaper to be checked. Refused to go to the bathroom or use urinal. Refused bladder scan. Patient begging this RN to come back in the morning as his hip hurts. PRN oxycodone given a little over 30 minutes ago. Refusing Biofreeze or ice pack application. 0400  Diaper clean. Unable to void per urinal. Refused to go to bathroom. Bladder scanned for 277 mL. Provided water, sitter encouraging drinking. Patient agreeable to application of Biofreeze and ice pack.  Patient asking to be PULMONARY/Critical Care/SLEEP MEDICINE  [facility]  Name: Amy Galvan   : 1960   MRN: [id number]   Date: 2018 8:46 AM   [x]I have reviewed the flowsheet and previous days notes. [x]The patient is unable to give any meaningful history or review of systems because the patient is:  []Intubated [x]Unreliable historian   []Sedated    []Unresponsive      []The patient is critically ill on      []Mechanical ventilation []Pressors   []BiPAP []     Seen and examined. Chart reviewed. Not a good historian. Admitted with generalized weakness, severe hypokalemia, hypo mag, anemia of unclear source and malnutrition. Was at [facility] earlier. Pressor dependent. Anemia source unclear. Chart mentions uterine fibroids, DUB, GI source, iron def vs other. Not sure if hematology has been involved. Receiving aggressive IV electrolyte replacement. ROS:Generalized weakness.  Not a good historian     Vital Signs:    Visit Vitals    /53    Pulse 95    Temp 97.9 °F (36.6 °C)    Resp 26    Ht 5' 1\" (1.549 m)    Wt 55 kg (121 lb 3.2 oz)    SpO2 99%    BMI 22.9 kg/m2       O2 Device: Nasal cannula   O2 Flow Rate (L/min): 2 l/min   Temp (24hrs), Av °F (36.7 °C), Min:97.7 °F (36.5 °C), Max:98.5 °F (36.9 °C)       Intake/Output:   Last shift:         Last 3 shifts: 02/15 1901 -  0700  In: 695.3 [I.V.:695.3]  Out: -     Intake/Output Summary (Last 24 hours) at 18 0846  Last data filed at 18 0600   Gross per 24 hour   Intake           695.27 ml   Output                0 ml   Net           695.27 ml          Physical Exam:    General: []Intubated/sedated []No acute distress []    [x]Ill appearing [] []      HEENT: []Icteric []PERRL []    [x]Anicteric Mucosa:[]moist   []dry []      Resp: []Wheeze [x]Clear to ascultation bilaterally []Accessory muscle use    []Rales []Chest tube:  []Air leak []    []Ronchi []Crepitus []   []Coarse bilateral ventilator breath left to sleep due to pain. 5719  Patient woke up with urge to void. Voided 260 mL fela urine. Encouraged to drink more fluids today. Drank some water with this RN at bedside. Given PRN oxycodone for left hip pain. sounds      CV: [x]Regular []Tachycardia []    []Irregular []Bradycardic []    []Murmur []S3 []    []Edema []S4 []      GI: [x]Soft  []NG Tube Bowel sounds: []present []absent    []Firm []PEG []    []Distended  []      : []Rendon []Hematuria []    []Clear urine []Edema []      MSK:    []SCDs []Edema []    []No deformities [] []      Skin: [x]Warm []Dry  []    []Cool []Moist []    []Hot  []Diaphoretic []    []Rash  []Cyanosis []      N-psych: []Sedated  []Agitated []    [x]Alert  Follows commands:   []Yes  []No []      Devices: []ET-Tube []Tracheostomy []Chest tube:  Air leak? []Y/[]N    []Central Line []PA Catheter []    []PICC [] []      DATA:   Current Facility-Administered Medications   Medication Dose Route Frequency    NOREPINephrine (LEVOPHED) 8 mg in 5% dextrose 250mL infusion  2-16 mcg/min IntraVENous TITRATE    0.9% sodium chloride infusion 250 mL  250 mL IntraVENous PRN    magnesium sulfate 2 g/50 ml IVPB (premix or compounded)  2 g IntraVENous ONCE    potassium, sodium phosphates (NEUTRA-PHOS) packet 1 Packet  1 Packet Oral QID    potassium chloride SR (KLOR-CON 10) tablet 40 mEq  40 mEq Oral Q4H    potassium chloride 20 mEq in 50 ml IVPB  20 mEq IntraVENous Q1H    thiamine (B-1) tablet 100 mg  100 mg Oral DAILY    zolpidem (AMBIEN) tablet 5 mg  5 mg Oral QHS PRN    folic acid (FOLVITE) tablet 1 mg  1 mg Oral DAILY    calcium-vitamin D (OS-DELORIS) 500 mg-200 unit tablet  1 Tab Oral BID WITH MEALS    sodium chloride (NS) flush 5-10 mL  5-10 mL IntraVENous Q8H    sodium chloride (NS) flush 5-10 mL  5-10 mL IntraVENous PRN    acetaminophen (TYLENOL) tablet 650 mg  650 mg Oral Q4H PRN    ondansetron (ZOFRAN) injection 4 mg  4 mg IntraVENous Q4H PRN    0.9% sodium chloride infusion  100 mL/hr IntraVENous CONTINUOUS    buPROPion SR (WELLBUTRIN SR) tablet 150 mg  150 mg Oral DAILY       Telemetry: []Sinus []A-flutter []Paced    []A-fib []Multiple PVCs                  Labs:  Recent Labs 02/17/18   0554  02/16/18   1520   WBC  6.3  8.8   HGB  5.4*  8.2*   HCT  16.9*  25.7*   PLT  211  323     Recent Labs      02/17/18   0554  02/16/18   2259  02/16/18   1520   NA  143   --   138   K  2.8*  2.8*  2.3*  1.9*   CL  105   --   92*   CO2  29   --   36*   GLU  96   --   126*   BUN  4*   --   4*   CREA  0.29*   --   0.56   CA  6.5*   --   7.6*   MG  1.7  1.7  1.8  1.4*   PHOS  2.0*   --    --    ALB  1.6*   --   2.1*   TBILI  0.5   --   1.1*   SGOT  36   --   50*   ALT  12   --   12   INR   --    --   1.2*     No results for input(s): PH, PCO2, PO2, HCO3, FIO2 in the last 72 hours.     Imaging:  [x]I have personally reviewed the patients radiographs  []Radiographs reviewed with radiologist   []No change from prior, tubes and lines in adequate position  []Improved   []Worsening       IMPRESSION:   · Generalized weakness  · Severe hypokalemia, hypo mag  · Severe Anemia ?source DUB, ?GI,  ?other  · Hypoalbuminemia  · Malnutrition  · Failure to thrive    PLAN:   · Transfuse PRN  · Pressors  · Replace lytes  · Hematology evaluation  · IV fluids  · DVT and GI prophylaxis      The patient is: [x] acutely ill Risk of deterioration: [x] moderate    [] critically ill  [] high     []See my orders for details     My assessment/plan was discussed with:  [x]nursing []PT/OT    []respiratory therapy []Dr. Tom Henry []     []Total critical care time exclusive of procedures       minutes  Harriet Graves MD

## 2023-01-23 DIAGNOSIS — K29.21 CHRONIC ALCOHOLIC GASTRITIS WITH HEMORRHAGE: ICD-10-CM

## 2023-01-24 RX ORDER — PANTOPRAZOLE SODIUM 40 MG/1
TABLET, DELAYED RELEASE ORAL
Qty: 90 TABLET | Refills: 0 | Status: SHIPPED | OUTPATIENT
Start: 2023-01-24

## 2023-02-23 DIAGNOSIS — F51.01 PRIMARY INSOMNIA: ICD-10-CM

## 2023-02-24 RX ORDER — DOXEPIN HYDROCHLORIDE 50 MG/1
CAPSULE ORAL
Qty: 90 CAPSULE | Refills: 1 | Status: SHIPPED | OUTPATIENT
Start: 2023-02-24

## 2023-04-03 PROBLEM — E11.42 DIABETIC PERIPHERAL NEUROPATHY ASSOCIATED WITH TYPE 2 DIABETES MELLITUS (HCC): Status: RESOLVED | Noted: 2021-04-16 | Resolved: 2021-05-07

## 2023-04-03 NOTE — PROGRESS NOTES
Problem: Alcohol Withdrawal  Goal: *STG: Remains safe in hospital  Outcome: Not Progressing Towards Goal  2330 Pt had multiple attempts of trying to get out of bed. Prn does not respond well to ativan. Pt remains agitated and noncompliant. Pt trying to pull central line, and NG tubing. Bed alarm remains on. Sitter placed in room. Pt oriented to self. Will continue to monitor. Colchicine Pregnancy And Lactation Text: This medication is Pregnancy Category C and isn't considered safe during pregnancy. It is excreted in breast milk.

## 2023-05-11 ENCOUNTER — TELEPHONE (OUTPATIENT)
Age: 63
End: 2023-05-11

## 2023-06-01 ENCOUNTER — HOME HEALTH ADMISSION (OUTPATIENT)
Dept: HOME HEALTH SERVICES | Facility: HOME HEALTH | Age: 63
End: 2023-06-01
Payer: COMMERCIAL

## 2023-06-01 ENCOUNTER — OFFICE VISIT (OUTPATIENT)
Age: 63
End: 2023-06-01
Payer: COMMERCIAL

## 2023-06-01 VITALS
BODY MASS INDEX: 30.7 KG/M2 | DIASTOLIC BLOOD PRESSURE: 90 MMHG | SYSTOLIC BLOOD PRESSURE: 160 MMHG | HEART RATE: 81 BPM | WEIGHT: 162.6 LBS | HEIGHT: 61 IN | OXYGEN SATURATION: 99 % | TEMPERATURE: 98 F | RESPIRATION RATE: 16 BRPM

## 2023-06-01 DIAGNOSIS — J43.2 CENTRILOBULAR EMPHYSEMA (HCC): ICD-10-CM

## 2023-06-01 DIAGNOSIS — E53.8 B12 DEFICIENCY: ICD-10-CM

## 2023-06-01 DIAGNOSIS — M81.0 AGE-RELATED OSTEOPOROSIS WITHOUT CURRENT PATHOLOGICAL FRACTURE: ICD-10-CM

## 2023-06-01 DIAGNOSIS — Z13.820 SCREENING FOR OSTEOPOROSIS: ICD-10-CM

## 2023-06-01 DIAGNOSIS — E43 UNSPECIFIED SEVERE PROTEIN-CALORIE MALNUTRITION (HCC): ICD-10-CM

## 2023-06-01 DIAGNOSIS — F10.230 ALCOHOL DEPENDENCE WITH UNCOMPLICATED WITHDRAWAL (HCC): ICD-10-CM

## 2023-06-01 DIAGNOSIS — Z01.84 IMMUNITY STATUS TESTING: ICD-10-CM

## 2023-06-01 DIAGNOSIS — F17.200 SMOKER: ICD-10-CM

## 2023-06-01 DIAGNOSIS — E78.2 MIXED HYPERLIPIDEMIA: ICD-10-CM

## 2023-06-01 DIAGNOSIS — I10 UNCONTROLLED HYPERTENSION: ICD-10-CM

## 2023-06-01 DIAGNOSIS — K76.6 PORTAL HYPERTENSION (HCC): Primary | ICD-10-CM

## 2023-06-01 DIAGNOSIS — G62.1 ALCOHOLIC POLYNEUROPATHY (HCC): ICD-10-CM

## 2023-06-01 DIAGNOSIS — Z00.00 HEALTHCARE MAINTENANCE: ICD-10-CM

## 2023-06-01 LAB
ALBUMIN SERPL-MCNC: 3.6 G/DL (ref 3.5–5)
ALBUMIN/GLOB SERPL: 0.9 (ref 1.1–2.2)
ALP SERPL-CCNC: 148 U/L (ref 45–117)
ALT SERPL-CCNC: 21 U/L (ref 12–78)
ANION GAP SERPL CALC-SCNC: 5 MMOL/L (ref 5–15)
AST SERPL-CCNC: 22 U/L (ref 15–37)
BASOPHILS # BLD: 0.1 K/UL (ref 0–0.1)
BASOPHILS NFR BLD: 1 % (ref 0–1)
BILIRUB SERPL-MCNC: 0.6 MG/DL (ref 0.2–1)
BUN SERPL-MCNC: 13 MG/DL (ref 6–20)
BUN/CREAT SERPL: 12 (ref 12–20)
CALCIUM SERPL-MCNC: 9.5 MG/DL (ref 8.5–10.1)
CHLORIDE SERPL-SCNC: 106 MMOL/L (ref 97–108)
CHOLEST SERPL-MCNC: 266 MG/DL
CO2 SERPL-SCNC: 30 MMOL/L (ref 21–32)
CREAT SERPL-MCNC: 1.11 MG/DL (ref 0.55–1.02)
DIFFERENTIAL METHOD BLD: ABNORMAL
EOSINOPHIL # BLD: 0.2 K/UL (ref 0–0.4)
EOSINOPHIL NFR BLD: 2 % (ref 0–7)
ERYTHROCYTE [DISTWIDTH] IN BLOOD BY AUTOMATED COUNT: 16.4 % (ref 11.5–14.5)
GLOBULIN SER CALC-MCNC: 3.9 G/DL (ref 2–4)
GLUCOSE SERPL-MCNC: 110 MG/DL (ref 65–100)
HCT VFR BLD AUTO: 41.1 % (ref 35–47)
HDLC SERPL-MCNC: 40 MG/DL
HDLC SERPL: 6.7 (ref 0–5)
HGB BLD-MCNC: 13 G/DL (ref 11.5–16)
IMM GRANULOCYTES # BLD AUTO: 0 K/UL
IMM GRANULOCYTES NFR BLD AUTO: 0 %
LDLC SERPL CALC-MCNC: 171.2 MG/DL (ref 0–100)
LYMPHOCYTES # BLD: 2.5 K/UL (ref 0.8–3.5)
LYMPHOCYTES NFR BLD: 32 % (ref 12–49)
MCH RBC QN AUTO: 30.6 PG (ref 26–34)
MCHC RBC AUTO-ENTMCNC: 31.6 G/DL (ref 30–36.5)
MCV RBC AUTO: 96.7 FL (ref 80–99)
MONOCYTES # BLD: 0.5 K/UL (ref 0–1)
MONOCYTES NFR BLD: 7 % (ref 5–13)
NEUTS SEG # BLD: 4.4 K/UL (ref 1.8–8)
NEUTS SEG NFR BLD: 58 % (ref 32–75)
NRBC # BLD: 0 K/UL (ref 0–0.01)
NRBC BLD-RTO: 0 PER 100 WBC
PLATELET # BLD AUTO: 176 K/UL (ref 150–400)
PMV BLD AUTO: 10.8 FL (ref 8.9–12.9)
POTASSIUM SERPL-SCNC: 4.3 MMOL/L (ref 3.5–5.1)
PROT SERPL-MCNC: 7.5 G/DL (ref 6.4–8.2)
RBC # BLD AUTO: 4.25 M/UL (ref 3.8–5.2)
RBC MORPH BLD: ABNORMAL
SODIUM SERPL-SCNC: 141 MMOL/L (ref 136–145)
TRIGL SERPL-MCNC: 274 MG/DL
VIT B12 SERPL-MCNC: 1074 PG/ML (ref 193–986)
VLDLC SERPL CALC-MCNC: 54.8 MG/DL
WBC # BLD AUTO: 7.7 K/UL (ref 3.6–11)
WBC MORPH BLD: ABNORMAL

## 2023-06-01 PROCEDURE — 3074F SYST BP LT 130 MM HG: CPT | Performed by: NURSE PRACTITIONER

## 2023-06-01 PROCEDURE — 99215 OFFICE O/P EST HI 40 MIN: CPT | Performed by: NURSE PRACTITIONER

## 2023-06-01 PROCEDURE — 3078F DIAST BP <80 MM HG: CPT | Performed by: NURSE PRACTITIONER

## 2023-06-01 RX ORDER — ZOSTER VACCINE RECOMBINANT, ADJUVANTED 50 MCG/0.5
0.5 KIT INTRAMUSCULAR SEE ADMIN INSTRUCTIONS
Qty: 0.5 ML | Refills: 0 | Status: SHIPPED | OUTPATIENT
Start: 2023-06-01 | End: 2023-11-28

## 2023-06-01 RX ORDER — OLMESARTAN MEDOXOMIL 20 MG/1
20 TABLET ORAL DAILY
Qty: 90 TABLET | Refills: 1 | Status: SHIPPED | OUTPATIENT
Start: 2023-06-01

## 2023-06-01 SDOH — ECONOMIC STABILITY: FOOD INSECURITY: WITHIN THE PAST 12 MONTHS, YOU WORRIED THAT YOUR FOOD WOULD RUN OUT BEFORE YOU GOT MONEY TO BUY MORE.: NEVER TRUE

## 2023-06-01 SDOH — ECONOMIC STABILITY: HOUSING INSECURITY
IN THE LAST 12 MONTHS, WAS THERE A TIME WHEN YOU DID NOT HAVE A STEADY PLACE TO SLEEP OR SLEPT IN A SHELTER (INCLUDING NOW)?: NO

## 2023-06-01 SDOH — ECONOMIC STABILITY: FOOD INSECURITY: WITHIN THE PAST 12 MONTHS, THE FOOD YOU BOUGHT JUST DIDN'T LAST AND YOU DIDN'T HAVE MONEY TO GET MORE.: NEVER TRUE

## 2023-06-01 SDOH — ECONOMIC STABILITY: INCOME INSECURITY: HOW HARD IS IT FOR YOU TO PAY FOR THE VERY BASICS LIKE FOOD, HOUSING, MEDICAL CARE, AND HEATING?: NOT VERY HARD

## 2023-06-01 ASSESSMENT — PATIENT HEALTH QUESTIONNAIRE - PHQ9
SUM OF ALL RESPONSES TO PHQ QUESTIONS 1-9: 0
SUM OF ALL RESPONSES TO PHQ QUESTIONS 1-9: 0
2. FEELING DOWN, DEPRESSED OR HOPELESS: 0
1. LITTLE INTEREST OR PLEASURE IN DOING THINGS: 0
SUM OF ALL RESPONSES TO PHQ QUESTIONS 1-9: 0
SUM OF ALL RESPONSES TO PHQ QUESTIONS 1-9: 0
SUM OF ALL RESPONSES TO PHQ9 QUESTIONS 1 & 2: 0

## 2023-06-01 NOTE — PROGRESS NOTES
Chief Complaint   Patient presents with    Follow-up Chronic Condition    Medication Check         1. \"Have you been to the ER, urgent care clinic since your last visit? Hospitalized since your last visit? \" No    2. \"Have you seen or consulted any other health care providers outside of the [address] [place] [address] since your last visit? \" No     3. For patients over 39: Has the patient had a colorectal cancer screening? Yes     If the patient is female:    4. For patients over 40: Has the patient had a mammogram? Yes    5. For patients over 21: Has the patient had a pap smear?  No     PHQ-9 Total Score: 0 (6/1/2023 10:18 AM)      Health Maintenance Due   Topic Date Due    COVID-19 Vaccine (1) Never done    Hepatitis A vaccine (1 of 2 - Risk 2-dose series) Never done    Pneumococcal 0-64 years Vaccine (1 - PCV) Never done    Hepatitis B vaccine (1 of 3 - Risk 3-dose series) Never done    DTaP/Tdap/Td vaccine (1 - Tdap) Never done    Cervical cancer screen  Never done    Shingles vaccine (1 of 2) Never done    Lipids  02/02/2023    Depression Screen  06/29/2023
course/resolution/complications of diagnosis in detail with patient. Patient expressed understanding with the diagnosis and plan. An electronic signature was used to authenticate this note.   -- JT Rinaldi - NP

## 2023-06-02 ENCOUNTER — HOME CARE VISIT (OUTPATIENT)
Facility: HOME HEALTH | Age: 63
End: 2023-06-02
Payer: COMMERCIAL

## 2023-06-02 VITALS
DIASTOLIC BLOOD PRESSURE: 94 MMHG | HEART RATE: 80 BPM | RESPIRATION RATE: 16 BRPM | BODY MASS INDEX: 30.58 KG/M2 | OXYGEN SATURATION: 98 % | HEIGHT: 61 IN | SYSTOLIC BLOOD PRESSURE: 162 MMHG | TEMPERATURE: 97.3 F | WEIGHT: 162 LBS

## 2023-06-02 PROCEDURE — G0299 HHS/HOSPICE OF RN EA 15 MIN: HCPCS

## 2023-06-02 ASSESSMENT — ENCOUNTER SYMPTOMS
SKIN LESIONS: 1
GASTROINTESTINAL NEGATIVE: 1
DYSPNEA ACTIVITY LEVEL: AFTER AMBULATING MORE THAN 20 FT
RESPIRATORY NEGATIVE: 1
HEMOPTYSIS: 0

## 2023-06-03 LAB
HBV CORE AB SERPL QL IA: NEGATIVE
HBV E AG SERPL QL IA: NEGATIVE

## 2023-06-03 NOTE — HOME HEALTH
Skilled reason for admission/summary of clinical condition: 59year old female patient of Ira Linder NP admitted to home care for HYPERTENSION. Nursing needed for cardiopulmonary assessment and medication management. Patient seen in office on 6/1/23 and prescribed olmesartan which she has not picked up from pharmacy yet. PMH OF PORTAL HYPERTENSION, HYPERTENSION, EMPHYSEMA, COPD, HLD, CURRENT DAILY SMOKER, ALCOHOLIC POLYNEUROPATHY, Alcoholic cirrhosis of liver without ascites, sarcoidosis, OSTEOPOROSIS, VITAMIN B 12 DEFICIENCY. Patient denies ever taking atorvastatin and librium. Patient lives with  in single-level home. Ambulates with walker and is unsteady on feet. Patient is a high fall risk related to chronic back pain, peripheral neuropathy and weakness. No wheelchair in home to assess ability to wheel self. Patient is unsafe ambulating independently, and caregivers need training to assist patient. Blood pressure is 162/94 manually on assessment. Ira Linder NP notified via [employer]. Patient requires skilled nursing for cardiopulmonary assessment, and medication management to reduce risk of rehospitalization. Diagnosis: Essential Hypertension    Subjective: Patient reports pain 5/10 to feet and hands and chronic back pain. Caregiver: spouse. Caregiver assists with: Medications, Meals, Bathing, ADL, IADL, Transportation and Housekeeping. Caregiver is available On weekends, In the evenings and Regularly Caregiver is not present at this visit and did not participate with clinician. Medications reconciled and all medications are available in the home this visit except olmesartan.  will  today from [employer]. The following education was provided regarding medications: medication interactions and look alike medications. No high risk medications noted. Patient/CG able to demonstrate knowledge through teach back with 75 percent accuracy.   Medications are too early to assess

## 2023-06-05 ENCOUNTER — HOME CARE VISIT (OUTPATIENT)
Facility: HOME HEALTH | Age: 63
End: 2023-06-05
Payer: COMMERCIAL

## 2023-06-05 LAB — HBV E AB SERPL QL IA: NEGATIVE

## 2023-06-06 ENCOUNTER — HOME CARE VISIT (OUTPATIENT)
Facility: HOME HEALTH | Age: 63
End: 2023-06-06
Payer: COMMERCIAL

## 2023-06-06 VITALS
OXYGEN SATURATION: 96 % | HEART RATE: 80 BPM | RESPIRATION RATE: 16 BRPM | DIASTOLIC BLOOD PRESSURE: 110 MMHG | TEMPERATURE: 98 F | SYSTOLIC BLOOD PRESSURE: 160 MMHG

## 2023-06-06 PROCEDURE — G0151 HHCP-SERV OF PT,EA 15 MIN: HCPCS

## 2023-06-06 ASSESSMENT — ENCOUNTER SYMPTOMS: PAIN LOCATION - PAIN QUALITY: ACHE

## 2023-06-06 NOTE — HOME HEALTH
Skilled reason for admission/summary of clinical condition: Patient referred to home health secondary to uncontrolled hypertension. She also has a history of alcoholic neuropathy which she states has impacted her balance. She has used a walker for the past 1 to 2 years to help with her balance, but she is usually able to walk throughout her house without it. Patient also has a history of spinal stenosis which causes back pain with prolong, standing and walking. She states that she would like to be able to go back to taking walks outside. On a weekly basis, she goes and visits her brother who is at a long-term care facility as well as does her grocery shopping and errands. She would like to be able to do more activities, such as these things, but states that she stopped driving because of the neuropathy in her feet, and not being able to feel the gas and brake pedals. Diagnosis: essential primary hypertension    PMH:portal hypertension, unspecified severe protein-calorie malnutrition, alcohol dependence, alcoholic neuropathy, ventriloquist emphysema, osteoporosis, smoker (1/2 pack per day), mixed hyperlipidemia,     Subjective: I want to get back to taking walks. I used to walk around the neighborhood. Caregiver: spouse. Caregiver assists with: Transportation and Housekeeping Caregiver unable to assist with: Medications, Meals, Bathing, ADL and IADL. Caregiver is available Inconsistently  Caregiver is not present at this visit and did not participate with clinician. Medications reconciled and all medications are available in the home this visit. The following education was provided regarding medications: medication interactions and look alike medications: take medications at same time each day. Patient/CG able to demonstrate knowledge through teach back with 100 percent accuracy.     Home health supplies by type and quantity ordered/delivered this visit include: n/a  Patient/caregiver instructed on plan of

## 2023-06-08 ENCOUNTER — HOME CARE VISIT (OUTPATIENT)
Facility: HOME HEALTH | Age: 63
End: 2023-06-08
Payer: COMMERCIAL

## 2023-06-08 VITALS
BODY MASS INDEX: 30.61 KG/M2 | OXYGEN SATURATION: 98 % | WEIGHT: 162 LBS | RESPIRATION RATE: 18 BRPM | HEART RATE: 88 BPM | DIASTOLIC BLOOD PRESSURE: 84 MMHG | SYSTOLIC BLOOD PRESSURE: 148 MMHG | TEMPERATURE: 98.6 F

## 2023-06-08 PROCEDURE — G0300 HHS/HOSPICE OF LPN EA 15 MIN: HCPCS

## 2023-06-09 ENCOUNTER — HOME CARE VISIT (OUTPATIENT)
Facility: HOME HEALTH | Age: 63
End: 2023-06-09
Payer: COMMERCIAL

## 2023-06-09 VITALS
OXYGEN SATURATION: 96 % | DIASTOLIC BLOOD PRESSURE: 110 MMHG | TEMPERATURE: 97.8 F | RESPIRATION RATE: 18 BRPM | HEART RATE: 80 BPM | SYSTOLIC BLOOD PRESSURE: 170 MMHG

## 2023-06-09 PROCEDURE — G0151 HHCP-SERV OF PT,EA 15 MIN: HCPCS

## 2023-06-09 ASSESSMENT — ENCOUNTER SYMPTOMS: PAIN LOCATION - PAIN QUALITY: ACHE

## 2023-06-09 NOTE — HOME HEALTH
Subjective: Patient states that her back is bothering her a little more today but its manageable. Falls since last visit No(if yes complete the Fall Tracking Form and include bsrifallreport):   Caregiver involvement changes: no change  Home health supplies by type and quantity ordered/delivered this visit include: n/a    Clinician asked if patient has had any physician contact since last home care visit and patient states: NO  Clinician asked if patient has any new or changed medications and patient states:  NO    If Yes, were medications reconciled? YES    Was the certifying physician notified of changes in medications? N/A      Clinical assessment (what this visit means for the patient overall and need for ongoing skilled care) and progress or lack of progress towards SPECIFIC goals: Patient has been seen for 2 visits [facility] PT. She demonstrates understanding of home exercises for strengthening and increasing activity tolerance. There is no further skilled need for PT and all goals have been achieved. Patient will be discharged from PT today. Written Teaching Material Utilized: HEP    Interdisciplinary communication with: Otilia Claire NP for the purpose of elevated BP    Discharge planning as follows: Discharge from Physical Therapy today. Discharge instructions signed and patient/caregiver are in agreement with plan of care.     Specific plan for next visit: n/a

## 2023-06-12 ENCOUNTER — HOME CARE VISIT (OUTPATIENT)
Facility: HOME HEALTH | Age: 63
End: 2023-06-12
Payer: COMMERCIAL

## 2023-06-12 PROCEDURE — G0300 HHS/HOSPICE OF LPN EA 15 MIN: HCPCS

## 2023-06-13 VITALS
DIASTOLIC BLOOD PRESSURE: 87 MMHG | RESPIRATION RATE: 16 BRPM | WEIGHT: 152 LBS | SYSTOLIC BLOOD PRESSURE: 141 MMHG | HEART RATE: 70 BPM | BODY MASS INDEX: 28.72 KG/M2 | TEMPERATURE: 98.1 F | OXYGEN SATURATION: 97 %

## 2023-06-15 ENCOUNTER — HOME CARE VISIT (OUTPATIENT)
Facility: HOME HEALTH | Age: 63
End: 2023-06-15
Payer: COMMERCIAL

## 2023-06-15 VITALS
BODY MASS INDEX: 30.61 KG/M2 | OXYGEN SATURATION: 96 % | DIASTOLIC BLOOD PRESSURE: 68 MMHG | RESPIRATION RATE: 18 BRPM | WEIGHT: 162 LBS | HEART RATE: 68 BPM | SYSTOLIC BLOOD PRESSURE: 148 MMHG | TEMPERATURE: 98.4 F

## 2023-06-15 PROCEDURE — G0300 HHS/HOSPICE OF LPN EA 15 MIN: HCPCS

## 2023-06-19 NOTE — HOME HEALTH
Subjective: feeling better today  Falls since last visit No(if yes complete the Fall Tracking Form and include bsrifallreport):   Caregiver involvement changes: na  Home health supplies by type and quantity ordered/delivered this visit include:     Clinician asked if patient has had any physician contact since last home care visit and patient states:no  Clinician asked if patient has any new or changed medications and patient states:  NO   If Yes, were medications reconciled? N/A   Was the certifying physician notified of change in [place]    Clinical assessment (what this visit means for the patient overall and need for ongoing skilled care) and progress or lack of progress towards SPECIFIC goals: risk of falls due to decreased endurance. need reinforcement on healthy diet regime. Written Teaching Material Utilized:verbal    Interdisciplinary communication with: shahram    Discharge planning as follows:  When goals are met    Specific plan for next visit:assessment pain regime weight monitoring safety precautions

## 2023-06-20 ENCOUNTER — HOME CARE VISIT (OUTPATIENT)
Facility: HOME HEALTH | Age: 63
End: 2023-06-20
Payer: COMMERCIAL

## 2023-06-20 VITALS
HEART RATE: 75 BPM | BODY MASS INDEX: 30.61 KG/M2 | RESPIRATION RATE: 18 BRPM | WEIGHT: 162 LBS | TEMPERATURE: 98.2 F | SYSTOLIC BLOOD PRESSURE: 132 MMHG | DIASTOLIC BLOOD PRESSURE: 75 MMHG

## 2023-06-20 PROCEDURE — G0300 HHS/HOSPICE OF LPN EA 15 MIN: HCPCS

## 2023-06-21 ASSESSMENT — ENCOUNTER SYMPTOMS: DYSPNEA ACTIVITY LEVEL: AFTER AMBULATING LESS THAN 10 FT

## 2023-06-21 NOTE — HOME HEALTH
Subjective: didnt each much today but some icecream  Falls since last visit No(if yes complete the Fall Tracking Form and include bsrifallreport):   Caregiver involvement changes: na  Home health supplies by type and quantity ordered/delivered this visit include: na    Clinician asked if patient has had any physician contact since last home care visit and patient states: NO  Clinician asked if patient has any new or changed medications and patient states:  NO   If Yes, were medications reconciled? N/A   Was the certifying physician notified of changes in medications? na    Clinical assessment (what this visit means for the patient overall and need for ongoing skilled care) and progress or lack of progress towards SPECIFIC goals: risk of falls due to unsteady gait,awaiting rollator to be delivered. reviewed heart healthy diet. Written Teaching Material Utilized: no    Interdisciplinary communication with:na    Discharge planning as follows:  When goals are met    Specific plan for next visit:assessment saferty precautions med regime weight monitoring

## 2023-06-23 NOTE — HOME HEALTH
Subjective: feeling tired today  Falls since last visit No(if yes complete the Fall Tracking Form and include bsrifallreport):   Caregiver involvement changes: no  Home health supplies by type and quantity ordered/delivered this visit include: na    Clinician asked if patient has had any physician contact since last home care visit and patient states: NO  Clinician asked if patient has any new or changed medications and patient states:  N/A   If Yes, were medications reconciled? N/A   Was the certifying physician notified of changes in medications? N/A     Clinical assessment (what this visit means for the patient overall and need for ongoing skilled care) and progress or lack of progress towards SPECIFIC goals: risk of falls due to unsteady gait.educated on heart healthy diet. Written Teaching Material Utilized:verbal    Interdisciplinary communication with:shahram    Discharge planning as follows:  When goals are met    Specific plan for next visit: assessment med regime weight monitoring safety precautions

## 2023-06-28 ENCOUNTER — HOME CARE VISIT (OUTPATIENT)
Facility: HOME HEALTH | Age: 63
End: 2023-06-28
Payer: COMMERCIAL

## 2023-06-28 PROCEDURE — G0299 HHS/HOSPICE OF RN EA 15 MIN: HCPCS

## 2023-06-29 ENCOUNTER — HOME CARE VISIT (OUTPATIENT)
Dept: HOME HEALTH SERVICES | Facility: HOME HEALTH | Age: 63
End: 2023-06-29
Payer: COMMERCIAL

## 2023-06-29 VITALS
SYSTOLIC BLOOD PRESSURE: 160 MMHG | TEMPERATURE: 97.5 F | OXYGEN SATURATION: 95 % | HEART RATE: 85 BPM | DIASTOLIC BLOOD PRESSURE: 78 MMHG | RESPIRATION RATE: 14 BRPM

## 2023-06-29 RX ORDER — PROPRANOLOL HYDROCHLORIDE 10 MG/1
10 TABLET ORAL 2 TIMES DAILY
Qty: 180 TABLET | Refills: 1 | Status: SHIPPED | OUTPATIENT
Start: 2023-06-29

## 2023-07-04 NOTE — HOME HEALTH
Subjective: \"I'm not using my cane much any more.:  Falls since last visit No(if yes complete the Fall Tracking Form and include bsrifallreport):   Caregiver involvement changes: no  Home health supplies by type and quantity ordered/delivered this visit include: n/a    Clinician asked if patient has had any physician contact since last home care visit and patient states: NO  Clinician asked if patient has any new or changed medications and patient states:  NO   If Yes, were medications reconciled? N/A   Was the certifying physician notified of changes in medications? N/A     Clinical assessment (what this visit means for the patient overall and need for ongoing skilled care) and progress or lack of progress towards SPECIFIC goals: Pt. at risk for hospitalization due to uncontrolled htn requiring SN for education. Written Teaching Material Utilized: N/A    Interdisciplinary communication with: Ms. Rosa Isela Saeed NP for the purpose of htn and pt. b/p up into 170s in the morning and inquired about b/p medicaiton changes. Discharge planning as follows: Is no longer homebound, Per physician order, Will discharge when the patient has reached their maximum functional potential and maximum safety in their home and When goals are met    Specific plan for next visit: Continue education on salt limiting and b/p management.

## 2023-07-05 ENCOUNTER — HOME CARE VISIT (OUTPATIENT)
Facility: HOME HEALTH | Age: 63
End: 2023-07-05
Payer: COMMERCIAL

## 2023-07-05 VITALS
HEART RATE: 83 BPM | DIASTOLIC BLOOD PRESSURE: 100 MMHG | OXYGEN SATURATION: 98 % | SYSTOLIC BLOOD PRESSURE: 164 MMHG | RESPIRATION RATE: 16 BRPM

## 2023-07-05 PROCEDURE — G0300 HHS/HOSPICE OF LPN EA 15 MIN: HCPCS

## 2023-07-05 RX ORDER — PANTOPRAZOLE SODIUM 40 MG/1
TABLET, DELAYED RELEASE ORAL
Qty: 90 TABLET | Refills: 0 | Status: SHIPPED | OUTPATIENT
Start: 2023-07-05

## 2023-07-05 NOTE — TELEPHONE ENCOUNTER
PCP: JT Ho NP    Last appt: 6/1/2023   Future Appointments   Date Time Provider [address]   7/10/2023  9:00 AM JT Sosa NP PAFP BS AMB   7/12/20[place] [id number] [address]   7/19/2023 [place] [id number] [address]   7/26/2023 [place] [address] [address]   7/31/2023 [place] [id number] [address]   8/8[facility]2023 10:00 [facility]   12/14/2023  9:30 AM JT Galdamez NP NEUMRSPB BS AMB       Requested Prescriptions     Pending Prescriptions Disp Refills    pantoprazole (PROTONIX) 40 MG tablet [Pharmacy Med Name: PANTOPRAZOLE DR 40 MG TAB[*]] 90 tablet 0     Sig: TAKE ONE TABLET BY MOUTH EVERY MORNING BEFORE BREAKFAST         Prior labs and Blood pressures:  BP Readings from Last 3 Encounters:   06/28/23 (!) 160/78   06/20/23 132/75   06/15/23 (!) 148/68     Lab Results   Component Value Date/Time     06/01/2023 11:07 AM    K 4.3 06/01/2023 11:07 AM     06/01/2023 11:07 AM    CO2 30 06/01/2023 11:07 AM    BUN 13 06/01/2023 11:07 AM    GFRAA 60 06/29/2022 10:21 AM     No results found for: HBA1C, RTG3YVGB  Lab Results   Component Value Date/Time    CHOL 266 06/01/2023 11:07 AM    HDL 40 06/01/2023 11:07 AM     No results found for: VITD3, VD3RIA    Lab Results   Component Value Date/Time    TSH 1.35 04/16/2021 10:50 AM

## 2023-07-10 ENCOUNTER — OFFICE VISIT (OUTPATIENT)
Age: 63
End: 2023-07-10
Payer: COMMERCIAL

## 2023-07-10 ENCOUNTER — HOSPITAL ENCOUNTER (OUTPATIENT)
Facility: HOSPITAL | Age: 63
Setting detail: SPECIMEN
Discharge: HOME | End: 2023-07-13
Payer: COMMERCIAL

## 2023-07-10 VITALS
DIASTOLIC BLOOD PRESSURE: 89 MMHG | BODY MASS INDEX: 31.6 KG/M2 | HEIGHT: 61 IN | TEMPERATURE: 97.4 F | HEART RATE: 92 BPM | OXYGEN SATURATION: 95 % | RESPIRATION RATE: 16 BRPM | SYSTOLIC BLOOD PRESSURE: 161 MMHG | WEIGHT: 167.4 LBS

## 2023-07-10 DIAGNOSIS — I10 UNCONTROLLED HYPERTENSION: ICD-10-CM

## 2023-07-10 DIAGNOSIS — Z12.4 ENCOUNTER FOR PAPANICOLAOU SMEAR FOR CERVICAL CANCER SCREENING: Primary | ICD-10-CM

## 2023-07-10 DIAGNOSIS — Z00.00 WELLNESS EXAMINATION: ICD-10-CM

## 2023-07-10 DIAGNOSIS — Z72.0 TOBACCO ABUSE: ICD-10-CM

## 2023-07-10 DIAGNOSIS — Z87.891 PERSONAL HISTORY OF TOBACCO USE, PRESENTING HAZARDS TO HEALTH: ICD-10-CM

## 2023-07-10 DIAGNOSIS — Z78.9 STATIN INTOLERANCE: ICD-10-CM

## 2023-07-10 PROCEDURE — 99406 BEHAV CHNG SMOKING 3-10 MIN: CPT | Performed by: NURSE PRACTITIONER

## 2023-07-10 PROCEDURE — 88175 CYTOPATH C/V AUTO FLUID REDO: CPT

## 2023-07-10 PROCEDURE — 3079F DIAST BP 80-89 MM HG: CPT | Performed by: NURSE PRACTITIONER

## 2023-07-10 PROCEDURE — 87624 HPV HI-RISK TYP POOLED RSLT: CPT

## 2023-07-10 PROCEDURE — 99396 PREV VISIT EST AGE 40-64: CPT | Performed by: NURSE PRACTITIONER

## 2023-07-10 PROCEDURE — 99213 OFFICE O/P EST LOW 20 MIN: CPT | Performed by: NURSE PRACTITIONER

## 2023-07-10 PROCEDURE — 3077F SYST BP >= 140 MM HG: CPT | Performed by: NURSE PRACTITIONER

## 2023-07-10 RX ORDER — VARENICLINE TARTRATE 1 MG/1
1 TABLET, FILM COATED ORAL 2 TIMES DAILY
Qty: 60 TABLET | Refills: 0 | Status: SHIPPED | OUTPATIENT
Start: 2023-07-10

## 2023-07-10 RX ORDER — OLMESARTAN MEDOXOMIL 40 MG/1
40 TABLET ORAL DAILY
Qty: 90 TABLET | Refills: 1 | Status: SHIPPED | OUTPATIENT
Start: 2023-07-10

## 2023-07-10 RX ORDER — VARENICLINE TARTRATE 0.5 MG/1
.5-1 TABLET, FILM COATED ORAL SEE ADMIN INSTRUCTIONS
Qty: 57 TABLET | Refills: 0 | Status: SHIPPED | OUTPATIENT
Start: 2023-07-10

## 2023-07-10 RX ORDER — CHLORTHALIDONE 25 MG/1
25 TABLET ORAL DAILY
Qty: 30 TABLET | Refills: 1 | Status: SHIPPED | OUTPATIENT
Start: 2023-07-10 | End: 2023-07-10

## 2023-07-10 RX ORDER — AMLODIPINE BESYLATE 10 MG/1
10 TABLET ORAL DAILY
Qty: 90 TABLET | Refills: 1 | Status: SHIPPED | OUTPATIENT
Start: 2023-07-10

## 2023-07-10 NOTE — PROGRESS NOTES
Chief Complaint   Patient presents with    Gynecologic Exam         1. \"Have you been to the ER, urgent care clinic since your last visit? Hospitalized since your last visit? \" No    2. \"Have you seen or consulted any other health care providers outside of the [address] [place] [address] since your last visit? \" No     3. For patients age 43-73: Has the patient had a colorectal cancer screening? Yes     If the patient is female:    4. For patients age 52-65: Has the patient had a mammogram? Yes    5. For patients age 21-65: Has the patient had a pap smear? No     PHQ-9  6/1/2023   Little interest or pleasure in doing things 0   Little interest or pleasure in doing things -   Feeling down, depressed, or hopeless 0   Trouble falling or staying asleep, or sleeping too much -   Trouble falling or staying asleep, or sleeping too much -   Feeling tired or having little energy -   Feeling tired or having little energy -   Poor appetite or overeating -   Poor appetite, weight loss, or overeating -   Feeling bad about yourself - or that you are a failure or have let yourself or your family down -   Feeling bad about yourself - or that you are a failure or have let yourself or your family down -   Trouble concentrating on things, such as reading the newspaper or watching television -   Trouble concentrating on things such as school, work, reading, or watching TV -   Moving or speaking so slowly that other people could have noticed.  Or the opposite - being so fidgety or restless that you have been moving around a lot more than usual -   Moving or speaking so slowly that other people could have noticed; or the opposite being so fidgety that others notice -   Thoughts of being better off dead, or hurting yourself in some way -   PHQ-2 Score 0   Total Score PHQ 2 -   PHQ-9 Total Score 0   PHQ 9 Score -   How difficult have these problems made it for you to do your work, take care of your home and get along with others -       Health
advised about behavior change, including information about personal health harms, usage of appropriate cessation measures and benefits of cessation. Time spent (minutes): 3 min     Cardiovascular Disease Risk Counseling: Assessed the patient's risk to develop cardiovascular disease and reviewed main risk factors. Reviewed steps to reduce disease risk including:   Quitting tobacco use, reducing amount smoked, or not starting the habit  Making healthy food choices  Being physically active and gradualy increasing activity levels   Reduce weight and determine a healthy BMI goal  Monitor blood pressure and treat if higher than 140/90 mmHg  Maintain blood total cholesterol levels under 5 mmol/l or 190 mg/dl  Maintain LDL cholesterol levels under 3.0 mmol/l or 115 mg/dl   Control blood glucose levels  Tobacco Cessation Counseling: Patient advised about behavior change, including information about personal health harms, usage of appropriate cessation measures and benefits of cessation. Time spent (minutes): 3 min    5 . Statin intolerance          Return in about 4 weeks (around 8/7/2023) for hypertension / blood pressure. SUBJECTIVE/OBJECTIVE:    Mari Louie is a 59 y.o. female seen today for wellness exam w/  Pap smear, HTN    Cardiovascular Review:  She has hypertension and hyperlipidemia. Diet and Lifestyle: smoker daily  Home BP Monitoring: is not well controlled at home, ranging 160-180's/90's. Pertinent ROS: taking medications as instructed, no medication side effects noted, no TIA's, no chest pain on exertion, no dyspnea on exertion, no swelling of ankles. REVIEW OF SYSTEMS:    Review of Systems   Neurological:  Positive for numbness (Polyneuropathy). All other systems reviewed and are negative.       VITAL SIGNS:    Wt Readings from Last 3 Encounters:   07/10/23 167 lb 6.4 oz (75.9 kg)   06/20/23 162 lb (73.5 kg)   06/15/23 162 lb (73.5 kg)     Temp Readings from Last 3 Encounters:

## 2023-07-12 ENCOUNTER — HOME CARE VISIT (OUTPATIENT)
Facility: HOME HEALTH | Age: 63
End: 2023-07-12
Payer: COMMERCIAL

## 2023-07-12 ENCOUNTER — HOME CARE VISIT (OUTPATIENT)
Dept: HOME HEALTH SERVICES | Facility: HOME HEALTH | Age: 63
End: 2023-07-12
Payer: COMMERCIAL

## 2023-07-12 ENCOUNTER — TELEPHONE (OUTPATIENT)
Age: 63
End: 2023-07-12

## 2023-07-12 VITALS
TEMPERATURE: 97.3 F | HEART RATE: 73 BPM | RESPIRATION RATE: 14 BRPM | OXYGEN SATURATION: 97 % | SYSTOLIC BLOOD PRESSURE: 178 MMHG | DIASTOLIC BLOOD PRESSURE: 88 MMHG

## 2023-07-12 PROCEDURE — G0299 HHS/HOSPICE OF RN EA 15 MIN: HCPCS

## 2023-07-12 NOTE — TELEPHONE ENCOUNTER
Melinda from [facility] called that patient a little dehydrated unable to get labs and do provider want her to go to a lab to get it done. Patient has been eating a lot of salt and blood pressure 178/88 has not started taking new medication as of yet.     Best call back #[phone]

## 2023-07-13 NOTE — TELEPHONE ENCOUNTER
Called patient. Two patient identifiers verified. Pt said she had picked up blood pressure medication and has taken it today.

## 2023-07-19 ENCOUNTER — HOME CARE VISIT (OUTPATIENT)
Facility: HOME HEALTH | Age: 63
End: 2023-07-19
Payer: COMMERCIAL

## 2023-07-19 VITALS
HEART RATE: 87 BPM | RESPIRATION RATE: 14 BRPM | TEMPERATURE: 98.4 F | SYSTOLIC BLOOD PRESSURE: 122 MMHG | DIASTOLIC BLOOD PRESSURE: 68 MMHG | OXYGEN SATURATION: 96 %

## 2023-07-20 LAB
BUN SERPL-MCNC: 21 MG/DL (ref 8–27)
BUN/CREAT SERPL: 17 (ref 12–28)
CALCIUM SERPL-MCNC: 9.7 MG/DL (ref 8.7–10.3)
CHLORIDE SERPL-SCNC: 98 MMOL/L (ref 96–106)
CO2 SERPL-SCNC: 19 MMOL/L (ref 20–29)
CREAT SERPL-MCNC: 1.27 MG/DL (ref 0.57–1)
EGFRCR SERPLBLD CKD-EPI 2021: 48 ML/MIN/1.73
GLUCOSE SERPL-MCNC: 120 MG/DL (ref 70–99)
POTASSIUM SERPL-SCNC: 4.3 MMOL/L (ref 3.5–5.2)
SODIUM SERPL-SCNC: 135 MMOL/L (ref 134–144)

## 2023-07-20 NOTE — HOME HEALTH
Subjective: \"I'm breathing much better today. \"  Falls since last visit No(if yes complete the Fall Tracking Form and include bsrifallreport):   Caregiver involvement changes: no  Home health supplies by type and quantity ordered/delivered this visit include: n/a    Clinician asked if patient has had any physician contact since last home care visit and patient states: Yes  Clinician asked if patient has any new or changed medications and patient states:  Yes. Added amlodipine and chantix  If Yes, were medications reconciled? YES   Was the certifying physician notified of changes in medications? YES Certifying provider was signing provider    Clinical assessment (what this visit means for the patient overall and need for ongoing skilled care) and progress or lack of progress towards SPECIFIC goals: Pt. at risk for COPD and HTN exaceracerbation due to lack of efficient management by patient requiring SN for education and monitoring    Written Teaching Material Utilized: N/A    Interdisciplinary communication with: Ed Arriola NP for the purpose of notifying her of high salt     Discharge planning as follows:  Is no longer homebound, Per physician order, Will discharge when the patient has reached their maximum functional potential and maximum safety in their home and When goals are met    Specific plan for next visit: Review education and medications and d/c

## 2023-07-28 ENCOUNTER — HOME CARE VISIT (OUTPATIENT)
Facility: HOME HEALTH | Age: 63
End: 2023-07-28
Payer: COMMERCIAL

## 2023-07-28 PROCEDURE — G0299 HHS/HOSPICE OF RN EA 15 MIN: HCPCS

## 2023-07-28 RX ORDER — NALTREXONE HYDROCHLORIDE 50 MG/1
TABLET, FILM COATED ORAL
Qty: 90 TABLET | Refills: 3 | Status: SHIPPED | OUTPATIENT
Start: 2023-07-28

## 2023-07-28 NOTE — TELEPHONE ENCOUNTER
PCP: JT Starks NP    Last appt: 7/10/2023   Future Appointments   Date Time Provider [address]   7/28/2023 [place] [id number] [address]   [facility]   8/11/2023  9:30 AM JT Smith NP PAFP BS AMB   12/14/2023  9:30 AM JT Sabillon NP NEUM[facility]B BS AMB       Requested Prescriptions     Pending Prescriptions Disp Refills    naltrexone (DEPADE) 50 MG tablet [Pharmacy Med Name: NALTREXONE 50 MG TAB] 90 tablet 3     Sig: TAKE ONE TABLET BY MOUTH ONE TIME DAILY         Prior labs and Blood pressures:  BP Readings from Last 3 Encounters:   07/19/23 122/68   07/12/23 (!) 178/88   07/10/23 (!) 161/89     Lab Results   Component Value Date/Time     07/19/2023 11:39 AM    K 4.3 07/19/2023 11:39 AM    CL 98 07/19/2023 11:39 AM    CO2 19 07/19/2023 11:39 AM    BUN 21 07/19/2023 11:39 AM    GFRAA 60 06/29/2022 10:21 AM     No results found for: HBA1C, QVM4KCIW  Lab Results   Component Value Date/Time    CHOL 266 06/01/2023 11:07 AM    HDL 40 06/01/2023 11:07 AM     No results found for: VITD3, VD3RIA    Lab Results   Component Value Date/Time    TSH 1.35 04/16/2021 10:50 AM

## 2023-07-29 VITALS
RESPIRATION RATE: 17 BRPM | HEART RATE: 87 BPM | TEMPERATURE: 98 F | OXYGEN SATURATION: 98 % | DIASTOLIC BLOOD PRESSURE: 74 MMHG | SYSTOLIC BLOOD PRESSURE: 108 MMHG

## 2023-07-29 ASSESSMENT — ENCOUNTER SYMPTOMS: STOOL DESCRIPTION: FORMED

## 2023-07-30 NOTE — HOME HEALTH
Subjective: Patient states she is doing just fine. Falls since last visit No(if yes complete the Fall Tracking Form and include bsrifallreport):   Caregiver involvement changes: N/a  Home health supplies by type and quantity ordered/delivered this visit include: N/A    Clinician asked if patient has had any physician contact since last home care visit and patient states: NO  Clinician asked if patient has any new or changed medications and patient states:  N/A   If Yes, were medications reconciled? N/A   Was the certifying physician notified of changes in medications? N/A no    Clinical assessment (what this visit means for the patient overall and need for ongoing skilled care) and progress or lack of progress towards SPECIFIC goals: Patient states she is fully aware of her medications, diagnosis, and discharge reasons. All questions/concerns resolved. All goals met. Written Teaching Material Utilized: N/A    Interdisciplinary communication with: N/A    Discharge planning as follows: discharged.

## 2023-08-08 ENCOUNTER — HOSPITAL ENCOUNTER (OUTPATIENT)
Facility: HOSPITAL | Age: 63
Discharge: HOME | End: 2023-08-11
Payer: COMMERCIAL

## 2023-08-08 DIAGNOSIS — M81.0 AGE-RELATED OSTEOPOROSIS WITHOUT CURRENT PATHOLOGICAL FRACTURE: ICD-10-CM

## 2023-08-08 DIAGNOSIS — Z13.820 SCREENING FOR OSTEOPOROSIS: ICD-10-CM

## 2023-08-08 PROCEDURE — 77080 DXA BONE DENSITY AXIAL: CPT

## 2023-08-11 ENCOUNTER — OFFICE VISIT (OUTPATIENT)
Age: 63
End: 2023-08-11
Payer: COMMERCIAL

## 2023-08-11 VITALS
HEART RATE: 90 BPM | RESPIRATION RATE: 16 BRPM | TEMPERATURE: 97.2 F | BODY MASS INDEX: 30.32 KG/M2 | SYSTOLIC BLOOD PRESSURE: 113 MMHG | HEIGHT: 61 IN | DIASTOLIC BLOOD PRESSURE: 67 MMHG | WEIGHT: 160.6 LBS | OXYGEN SATURATION: 97 %

## 2023-08-11 DIAGNOSIS — N18.31 STAGE 3A CHRONIC KIDNEY DISEASE (HCC): ICD-10-CM

## 2023-08-11 DIAGNOSIS — I10 UNCONTROLLED HYPERTENSION: Primary | ICD-10-CM

## 2023-08-11 DIAGNOSIS — Z72.0 TOBACCO ABUSE: ICD-10-CM

## 2023-08-11 DIAGNOSIS — R73.01 IMPAIRED FASTING GLUCOSE: ICD-10-CM

## 2023-08-11 LAB
ANION GAP SERPL CALC-SCNC: 8 MMOL/L (ref 5–15)
BUN SERPL-MCNC: 99 MG/DL (ref 6–20)
BUN/CREAT SERPL: 29 (ref 12–20)
CALCIUM SERPL-MCNC: 9.7 MG/DL (ref 8.5–10.1)
CHLORIDE SERPL-SCNC: 104 MMOL/L (ref 97–108)
CO2 SERPL-SCNC: 21 MMOL/L (ref 21–32)
CREAT SERPL-MCNC: 3.38 MG/DL (ref 0.55–1.02)
EST. AVERAGE GLUCOSE BLD GHB EST-MCNC: 111 MG/DL
GLUCOSE SERPL-MCNC: 106 MG/DL (ref 65–100)
HBA1C MFR BLD: 5.5 % (ref 4–5.6)
POTASSIUM SERPL-SCNC: 4.4 MMOL/L (ref 3.5–5.1)
SODIUM SERPL-SCNC: 133 MMOL/L (ref 136–145)

## 2023-08-11 PROCEDURE — 3074F SYST BP LT 130 MM HG: CPT | Performed by: NURSE PRACTITIONER

## 2023-08-11 PROCEDURE — 3078F DIAST BP <80 MM HG: CPT | Performed by: NURSE PRACTITIONER

## 2023-08-11 PROCEDURE — 99214 OFFICE O/P EST MOD 30 MIN: CPT | Performed by: NURSE PRACTITIONER

## 2023-08-11 RX ORDER — VARENICLINE TARTRATE 1 MG/1
0.5 TABLET, FILM COATED ORAL 2 TIMES DAILY
Qty: 60 TABLET | Refills: 0 | Status: SHIPPED | OUTPATIENT
Start: 2023-08-11

## 2023-08-11 RX ORDER — AMLODIPINE AND OLMESARTAN MEDOXOMIL 10; 40 MG/1; MG/1
1 TABLET ORAL DAILY
Qty: 90 TABLET | Refills: 1 | Status: SHIPPED | OUTPATIENT
Start: 2023-08-11

## 2023-08-11 NOTE — PROGRESS NOTES
Chief Complaint   Patient presents with    Hypertension         1. \"Have you been to the ER, urgent care clinic since your last visit? Hospitalized since your last visit? \" No    2. \"Have you seen or consulted any other health care providers outside of the [address] [place] [address] since your last visit? \" No     3. For patients age 43-73: Has the patient had a colorectal cancer screening? Yes     If the patient is female:    4. For patients age 52-65: Has the patient had a mammogram? Yes    5. For patients age 21-65: Has the patient had a pap smear? Yes     PHQ-9  6/1/2023   Little interest or pleasure in doing things 0   Little interest or pleasure in doing things -   Feeling down, depressed, or hopeless 0   Trouble falling or staying asleep, or sleeping too much -   Trouble falling or staying asleep, or sleeping too much -   Feeling tired or having little energy -   Feeling tired or having little energy -   Poor appetite or overeating -   Poor appetite, weight loss, or overeating -   Feeling bad about yourself - or that you are a failure or have let yourself or your family down -   Feeling bad about yourself - or that you are a failure or have let yourself or your family down -   Trouble concentrating on things, such as reading the newspaper or watching television -   Trouble concentrating on things such as school, work, reading, or watching TV -   Moving or speaking so slowly that other people could have noticed.  Or the opposite - being so fidgety or restless that you have been moving around a lot more than usual -   Moving or speaking so slowly that other people could have noticed; or the opposite being so fidgety that others notice -   Thoughts of being better off dead, or hurting yourself in some way -   PHQ-2 Score 0   Total Score PHQ 2 -   PHQ-9 Total Score 0   PHQ 9 Score -   How difficult have these problems made it for you to do your work, take care of your home and get along with others -       Health

## 2023-08-26 NOTE — PROGRESS NOTES
3/11/2021    Chief Complaint: Bilateral knee pain    HPI: This is a 62 y.o. female who complains of bilateral knee pain. neither side is worse than the other side. Onset was gradual.  The patient has had activity dependent pain for years. The patient has tried activity modification, physical therapy exercises, injections have not been attempted. The pain is in the lateral right and medial left knee, it is severe in intensity. The patient feels unstable with the knee, fears falling, and has significant limitation with activities of daily living, recreation, and walks with a limp. Past Medical History:   Diagnosis Date    Alcohol abuse     GI (gastrointestinal bleed)     Insomnia 11/09    Iron deficiency anemia 04/2004    PPD positive     treated w/ INH- tests since have been negative    Pure hypercholesterolemia     Scoliosis 12/28/15    [facility]    Tobacco dependence     Unspecified essential hypertension     Uterine bleeding, dysfunctional 2/12    Uterine fibroid        Past Surgical History:   Procedure Laterality Date    COLONOSCOPY N/A 1/31/2018    COLONOSCOPY performed by Dejon Pizarro MD at [address] HX CHOLECYSTECTOMY      HX COLONOSCOPY  07/01/2013    [facility], repeat 10 years    HX ORTHOPAEDIC      Shattered bilateral ankles-metal plates & screws    HX OTHER SURGICAL      Benign cyst removal- L. foot    [place] LEG/ANKLE SURGERY PROC UNLISTED         Current Outpatient Medications on File Prior to Visit   Medication Sig Dispense Refill    furosemide (LASIX) 20 mg tablet Take 1 Tab by mouth daily as needed (leg swelling).  30 Tab 1    buPROPion XL (WELLBUTRIN XL) 150 mg tablet TAKE ONE TABLET BY MOUTH EVERY MORNING FOR SMOKING CESSATION 90 Tab 1    pantoprazole (PROTONIX) 40 mg tablet TAKE ONE TABLET BY MOUTH DAILY BEFORE BREAKFAST 90 Tab 1    thiamine HCL (B-1) 100 mg tablet TAKE ONE TABLET BY MOUTH DAILY 90 Tab 3    folic acid (FOLVITE) 1 mg tablet TAKE ONE TABLET BY MOUTH DAILY 90 Tab 1    magnesium 200 mg tab TAKE ONE TABLET BY MOUTH DAILY 90 Tab 1    diclofenac (VOLTAREN) 1 % gel Apply  to affected area four (4) times daily. For joint pain 100 g 2    potassium chloride (KLOR-CON) 20 mEq pack TAKE ONE PACKET BY MOUTH DAILY 90 Packet 1    ergocalciferol (ERGOCALCIFEROL) 1,250 mcg (50,000 unit) capsule Take 1 Cap by mouth every seven (7) days. 12 Cap 1    doxepin (SINEquan) 25 mg capsule TAKE ONE CAPSULE BY MOUTH ONCE NIGHTLY AS NEEDED FOR SLEEP 90 Cap 1     No current facility-administered medications on file prior to visit. Allergies   Allergen Reactions    Robaxin [Methocarbamol] Hives, Rash and Itching     Red splotches    Statins-Hmg-Coa Reductase Inhibitors Myalgia    Codeine Itching    Neosporin [Neomycin-Bacitracin-Polymyxin] Rash       Family History   Problem Relation Age of Onset    Hypertension Mother     Diabetes Mother     Cancer Mother         breast    Heart Disease Father         MI 42's    Kidney Disease Father     Hypertension Son        Social History     Socioeconomic History    Marital status:      Spouse name: Not on file    Number of children: Not on file    Years of education: Not on file    Highest education level: Not on file   Occupational History    Occupation: part time   Tobacco Use    Smoking status: Current Every Day Smoker     Packs/day: 0.50     Years: 17.00     Pack years: 8.50     Types: Cigarettes    Smokeless tobacco: Never Used   Substance and Sexual Activity    Alcohol use:  Yes     Alcohol/week: 51.0 standard drinks     Types: 1 Cans of beer, 50 Shots of liquor per week     Frequency: 4 or more times a week     Drinks per session: 5 or 6     Binge frequency: Never     Comment: daily    Drug use: No    Sexual activity: Yes     Partners: Male     Birth control/protection: None         Review of Systems:       General: Denies headache, lethargy, fever, weight loss  Ears/Nose/Throat: Denies ear discharge, drainage, nosebleeds, hoarse voice, dental problems  Cardiovascular: Denies chest pain, shortness of breath  Lungs: Denies chest pain, breathing problems, wheezing, pneumonia  Stomach: Denies stomach pain, heartburn, constipation, irritable bowel  Skin: Denies rash, sores, open wounds  Musculoskeletal: Admits to bilateral knee pain, no deformity. Genitourinary: Denies dysuria, hematuria, polyuria  Gastrointestinal: Denies constipation, obstipation, diarrhea  Neurological: Denies changes in sight, smell, hearing, taste, seizures. Denies loss of consciousness. Psychiatric: Denies depression, sleep pattern changes, anxiety, change in personality  Endocrine: Denies mood swings, heat or cold intolerance  Hematologic/Lymphatic: Denies anemia, purpura, petechia  Allergic/Immunologic: Denies swelling of throat, pain or swelling at lymph nodes      Physical Examination:    Visit Vitals  /75   Pulse (!) 101   Temp 97.7 °F (36.5 °C) (Oral)   Resp 18   Ht 5' 1.5\" (1.562 m)   SpO2 100%   BMI 20.82 kg/m²        General: AOX3, no apparent distress  Psychiatric: mood and affect appropriate  Lungs: breathing is symmetric and unlabored bilaterally  Heart: regular rate and rhythm  Abdomen: no guarding  Head: normocephalic, atraumatic  Skin: No significant abnormalities, good turgor  Sensation intact to light touch: L1-S1 dermatomes  Muscular exam: 5/5 strength in all major muscle groups unless noted in specialty exam.    Extremities:      Left upper extremity: Full active and passive range of motion without pain, deformity, no open wound, strength 5/5 in all major muscle groups. Right upper extremity: Full active and passive range of motion without pain, deformity, no open wound, strength 5/5 in all major muscle groups. Right lower extremity: No deformity is noted. Range of motion of the knee is 0-80.    Ligamentous testing of the knee indicates stability of the the MCL, LCL, PCL, and ACL. Lachman's, anterior and posterior drawer tests are specifically negative. Medial and lateral joint line tenderness to palpation is noted. Popliteal area is unremarkable. 1+  for effusion. + patellar crepitus. Patella tracks centrally. Pivot shift is negative. Strength testing is indicative of 5/5 strength at hip flexion, extension, knee flexion and extension, tibialis anterior, EHL, and FHL. Sensation is intact to light touch in the L1-S1 dermatomes. Capillary refill is less than 2 seconds in the toes. Left lower extremity:  No deformity is noted. Range of motion of the knee is 0-80. Ligamentous testing of the knee indicates stability of the the MCL, LCL, PCL, and ACL. Lachman's, anterior and posterior drawer tests are specifically negative. Medial joint line tenderness to palpation is noted. Popliteal area is unremarkable. 1+  for effusion. + patellar crepitus. Patella tracks centrally. Pivot shift is negative. Strength testing is indicative of 5/5 strength at hip flexion, extension, knee flexion and extension, tibialis anterior, EHL, and FHL. Sensation is intact to light touch in the L1-S1 dermatomes. Capillary refill is less than 2 seconds in the toes. Diagnostics:    Pertinent Diagnostics:  Xrays are available of the bilateral knee, they indicate severe right lateral and left medial compartment osteoarthritis of the knee joints, no significant other findings, no other osseus abnormalities, fractures, or dislocations. Assessment: Osteoarthritis bilateral knee    Plan: This patient and I did discuss the many options in treating knee osteoarthritis. We did discuss that we could continue to seek out nonoperative modalities, such as: NSAIDs, oral and topical analgesics, knee injections, knee braces, physical therapy, stretching, strengthening, and weight loss strategies, activity modification, ambulatory assistive devices.   The patient stated their understanding with this, but and would like to proceed with nonsurgical management in the form of injections bilateral knees. She will likely need workup and treatment for her pitting edema, which is likely causing some of this pain. Procedures: Date of Procedure: 3/11/2021  PROCEDURE NOTE - Bilateral knee injection of Celestone    Consent was obtained from the patient. The correct site was identified after confirmation with the patient. Following identification and confirmation of the correct site with the patient, the superolateral right knee was prepped in the normal sterile fashion. A local anesthetic of 1% lidocaine without epinephrine was then administered to the local tissues. Following, an injection of a mixture of  6 mg Celestone and 1% lidocaine without epinephrine was administered to the right knee. The needle was then withdrawn and the injection site dressed with a sterile bandage at the conclusion. The procedure was well tolerated by the patient. No immediate adverse reactions were noted. Attention was then turned to the left knee. Following identification and confirmation of the correct site with the patient, the superolateral left knee was prepped in the normal sterile fashion. A local anesthetic of 1% lidocaine without epinephrine was then administered to the local tissues. Following, an injection of a mixture of  6 mg Celestone and 1% lidocaine without epinephrine was administered to the left knee. The needle was then withdrawn and the injection site dressed with a sterile bandage at the conclusion. The procedure was well tolerated by the patient. No immediate adverse reactions were noted. Post injection instructions were given      Ms. Anthony Briceño has a reminder for a \"due or due soon\" health maintenance. I have asked that she contact her primary care provider for follow-up on this health maintenance. Calm

## 2023-08-28 RX ORDER — DOXEPIN HYDROCHLORIDE 50 MG/1
CAPSULE ORAL
Qty: 90 CAPSULE | Refills: 1 | Status: SHIPPED | OUTPATIENT
Start: 2023-08-28

## 2023-08-28 NOTE — TELEPHONE ENCOUNTER
PCP: JT Mcdonnell NP    Last appt: 8/11/2023   Future Appointments   Date Time Provider [address]   12/14/2023  9:30 AM JT Cruz NP [facility]   2/12/2024  9:15 AM JT Cespedes NP [facility]       Requested Prescriptions     Pending Prescriptions Disp Refills    doxepin (SINEQUAN) 50 MG capsule [Pharmacy Med Name: DOXEPIN 50 MG CAP] 90 capsule 1     Sig: TAKE ONE CAPSULE BY MOUTH AT BEDTIME AS NEEDED FOR SLEEP         Prior labs and Blood pressures:  BP Readings from Last 3 Encounters:   08/11/23 113/67   07/28/23 108/74   07/19/23 122/68     Lab Results   Component Value Date/Time     08/11/2023 10:13 AM    K 4.4 08/11/2023 10:13 AM     08/11/2023 10:13 AM    CO2 21 08/11/2023 10:13 AM    BUN 99 08/11/2023 10:13 AM    GFRAA 60 06/29/2022 10:21 AM     No results found for: HBA1C, [id number]  Lab Results   Component Value Date/Time    CHOL 266 06/01/2023 11:07 AM    HDL 40 06/01/2023 11:07 AM     No results found for: VITD3, VD3RIA    Lab Results   Component Value Date/Time    TSH 1.35 04/16/2021 10:50 AM

## 2023-10-09 ENCOUNTER — TELEPHONE (OUTPATIENT)
Age: 63
End: 2023-10-09

## 2023-10-09 NOTE — TELEPHONE ENCOUNTER
----- Message from Evie Santos MA sent at 10/9/2023  1:46 PM EDT -----  Subject: Message to Provider    QUESTIONS  Information for Provider? Hayley Tyson a nurse  with [address] [place] [address] just   wanted to reach out to the office and give her information in regards to   if the patient needs help with anything to help medication adherence plans   or goals she will be happy to help. She has tried to reach out to the   patient with no success. Her contact info is [phone] [address]? [phone]. EXT?   [phone]  ---------------------------------------------------------------------------  --------------  Alannah Marks INFO  [phone]; OK to leave message on voicemail  ---------------------------------------------------------------------------  --------------  SCRIPT ANSWERS  Relationship to Patient? Covered Entity  Covered Entity Type? Health Insurance? Representative Name?  Danya Nurse  with [employer]

## 2023-10-30 RX ORDER — BUPROPION HYDROCHLORIDE 150 MG/1
150 TABLET ORAL EVERY MORNING
Qty: 90 TABLET | Refills: 1 | Status: SHIPPED | OUTPATIENT
Start: 2023-10-30

## 2023-10-30 NOTE — TELEPHONE ENCOUNTER
Last appointment: 8/11/23 NP Rodolfo Martins  Next appointment: 2/12/24 Rodolfo Martins  Previous refill encounter(s): 3/24/23 90 (last filled 8/5/23)    Requested Prescriptions     Pending Prescriptions Disp Refills    buPROPion (WELLBUTRIN XL) 150 MG extended release tablet 90 tablet 1     Sig: Take 1 tablet by mouth every morning     For Pharmacy Admin Tracking Only    Program: Medication Refill  CPA in place:    Recommendation Provided To:    Intervention Detail: New Rx: 1, reason: Patient Preference  Intervention Accepted By:   Roberto Escobedo Closed?:    Time Spent (min): 5

## 2023-11-24 RX ORDER — CHLORTHALIDONE 25 MG/1
25 TABLET ORAL DAILY
Qty: 90 TABLET | Refills: 1 | Status: SHIPPED | OUTPATIENT
Start: 2023-11-24

## 2023-11-24 NOTE — TELEPHONE ENCOUNTER
PCP: Dotty Mckay APRN - NP    Last appt: 8/11/2023   Future Appointments   Date Time Provider Department Center   12/14/2023  9:30 AM Macarena Ko APRN - NP [facility]   2/12/2024  9:15 AM Dotty Mckay APRN - NP [facility]       Requested Prescriptions     Pending Prescriptions Disp Refills    chlorthalidone (HYGROTON) 25 MG tablet [Pharmacy Med Name: CHLORTHALIDONE 25 MG TAB[*]] 30 tablet 1     Sig: TAKE ONE TABLET BY MOUTH ONE TIME DAILY         Prior labs and Blood pressures:  BP Readings from Last 3 Encounters:   08/11/23 113/67   07/28/23 108/74   07/19/23 122/68     Lab Results   Component Value Date/Time     08/11/2023 10:13 AM    K 4.4 08/11/2023 10:13 AM     08/11/2023 10:13 AM    CO2 21 08/11/2023 10:13 AM    BUN 99 08/11/2023 10:13 AM    GFRAA 60 06/29/2022 10:21 AM     No results found for: \"HBA1C\", \"[id number]\"  Lab Results   Component Value Date/Time    CHOL 266 06/01/2023 11:07 AM    HDL 40 06/01/2023 11:07 AM     No results found for: \"VITD3\", \"VD3RIA\"    Lab Results   Component Value Date/Time    TSH 1.35 04/16/2021 10:50 AM

## 2023-11-26 NOTE — PROGRESS NOTES
Bedside and Verbal shift change report given to Sunitha Morales RN (oncoming nurse) by Hemant Gee RN (offgoing nurse). Report included the following information SBAR, Kardex, Intake/Output, MAR, Accordion and Recent Results. [facility]

## 2023-12-14 ENCOUNTER — OFFICE VISIT (OUTPATIENT)
Age: 63
End: 2023-12-14
Payer: COMMERCIAL

## 2023-12-14 VITALS
RESPIRATION RATE: 18 BRPM | OXYGEN SATURATION: 98 % | HEIGHT: 61 IN | WEIGHT: 165 LBS | HEART RATE: 72 BPM | SYSTOLIC BLOOD PRESSURE: 122 MMHG | DIASTOLIC BLOOD PRESSURE: 80 MMHG | BODY MASS INDEX: 31.15 KG/M2

## 2023-12-14 DIAGNOSIS — R26.9 GAIT DIFFICULTY: ICD-10-CM

## 2023-12-14 DIAGNOSIS — G62.9 POLYNEUROPATHY, UNSPECIFIED: Primary | ICD-10-CM

## 2023-12-14 DIAGNOSIS — R26.89 BALANCE DISORDER: ICD-10-CM

## 2023-12-14 PROCEDURE — 99214 OFFICE O/P EST MOD 30 MIN: CPT | Performed by: NURSE PRACTITIONER

## 2023-12-14 RX ORDER — MULTIVIT-MIN/IRON/FOLIC ACID/K 18-600-40
2000 CAPSULE ORAL DAILY
COMMUNITY

## 2023-12-14 RX ORDER — GABAPENTIN 100 MG/1
100 CAPSULE ORAL 3 TIMES DAILY
Qty: 90 CAPSULE | Refills: 5 | Status: SHIPPED | OUTPATIENT
Start: 2023-12-14 | End: 2024-06-11

## 2023-12-14 ASSESSMENT — PATIENT HEALTH QUESTIONNAIRE - PHQ9
SUM OF ALL RESPONSES TO PHQ QUESTIONS 1-9: 0
1. LITTLE INTEREST OR PLEASURE IN DOING THINGS: 0
1. LITTLE INTEREST OR PLEASURE IN DOING THINGS: 0
2. FEELING DOWN, DEPRESSED OR HOPELESS: 0
SUM OF ALL RESPONSES TO PHQ9 QUESTIONS 1 & 2: 0
SUM OF ALL RESPONSES TO PHQ QUESTIONS 1-9: 0
2. FEELING DOWN, DEPRESSED OR HOPELESS: 0
SUM OF ALL RESPONSES TO PHQ QUESTIONS 1-9: 0
SUM OF ALL RESPONSES TO PHQ9 QUESTIONS 1 & 2: 0
SUM OF ALL RESPONSES TO PHQ QUESTIONS 1-9: 0
SUM OF ALL RESPONSES TO PHQ QUESTIONS 1-9: 0

## 2023-12-14 NOTE — PATIENT INSTRUCTIONS
Gabapentin 100mg: start with 1 a night, then after 1 week increase to twice a day, then after 1 week increase to three times a day and continue

## 2023-12-14 NOTE — PROGRESS NOTES
[address] [facility]  [address] [facility]  Tel: [phone]  Fax: [phone]      Date:  23     Name:  Komal Foley  :  1960  MRN:  [id number]     PCP:  JT Juarez - NP    Chief Complaint   Patient presents with    Neurologic Problem     Follow up neuropathy in hands and feet - about the same as last OV        HISTORY OF PRESENT ILLNESS:  Patient presents today for regular follow up, last seen 2022. She is here today with her significant other, notes things are about the same. She still has some issues with balance, but luckily no falls. Uses a rollator walker when out of the house. At home she has a walker if needed. Sometimes her right knee bothers her. Everything is on 1 floor. Her feet feel really cold, feet are very sensitive. Some tingling at times. A little numbness in her fingertips. She can drop things easy and not even know it. No longer driving her car. Patient would be willing to try medication to try to lessen her symptoms. PCP: Olivia Butler NP: Regular follows. 1 cigarette lasts most of the day, pt has been working on cutting back. Was smoking nearly a pack a day. Chantix has been very beneficial.. ETOH: doing better on that as well, 1/5 of vodka last her 2 weeks now, this is a significant decrease. 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   Clinical correlation recommended, and on the basis  of this study there was no clear

## 2023-12-14 NOTE — PROGRESS NOTES
Chief Complaint   Patient presents with    Neurologic Problem     Follow up neuropathy in hands and feet - about the same as last OV      1. Have you been to the ER, urgent care clinic since your last visit? Hospitalized since your last visit? No     2. Have you seen or consulted any other health care providers outside of the [address] [place] [address] Avenue since your last visit? Include any pap smears or colon screening.   Yes [facility] for dehydration

## 2023-12-28 ENCOUNTER — TELEPHONE (OUTPATIENT)
Age: 63
End: 2023-12-28

## 2023-12-28 NOTE — TELEPHONE ENCOUNTER
Pt called requesting a call back pertaining to medication. Pt stated she is currently taking gabapentin (NEURONTIN) 100 MG capsule & E.Stefani advised her to call and let her know how she does on the medication. Pt stated she has no side effects but its not helping with the existing problems, she still feels the numbness from her ankles to the calf muscle.     Please call: [phone]   

## 2023-12-28 NOTE — TELEPHONE ENCOUNTER
Verified patient with 2 identifiers   Patient states the gabapentin 100 mg daily is not helping. Continues with numbness in both lower legs - from ankles to knees. Please advise

## 2023-12-29 DIAGNOSIS — G62.9 POLYNEUROPATHY, UNSPECIFIED: Primary | ICD-10-CM

## 2023-12-29 RX ORDER — GABAPENTIN 300 MG/1
300 CAPSULE ORAL 3 TIMES DAILY
Qty: 90 CAPSULE | Refills: 2 | Status: SHIPPED | OUTPATIENT
Start: 2023-12-29 | End: 2024-06-26

## 2024-01-02 NOTE — TELEPHONE ENCOUNTER
Left message on Identified VM to call office at [phone]. Advised Macarena Ko NP did call an increased dose of the gabapentin. Advised to call our office with any questions

## 2024-01-16 NOTE — ROUTINE PROCESS
SPOKE TO ZURDO TORRES NPO, IV ACCESS, HOLD BLOOD THINNERS, STAT LABS    DMV 8878 not applicable/with patient

## 2024-01-17 RX ORDER — FOLIC ACID 1 MG/1
1000 TABLET ORAL EVERY MORNING
Qty: 90 TABLET | Refills: 0 | Status: SHIPPED | OUTPATIENT
Start: 2024-01-17

## 2024-01-17 NOTE — TELEPHONE ENCOUNTER
PCP: Dotty Mckay APRN - NP    Last appt: 8/11/2023   Future Appointments   Date Time Provider Department Center   2/12/2024  9:15 AM Dotty Mckay APRN - NP [facility]   6/14/2024  8:30 AM Macarena Ko APRN - NP [facility] AM[facility]       Requested Prescriptions     Pending Prescriptions Disp Refills    folic acid (FOLVITE) 1 MG tablet [Pharmacy Med Name: FOLIC ACID 1 MG TAB[*]] 90 tablet 1     Sig: TAKE ONE TABLET BY MOUTH EVERY MORNING         Prior labs and Blood pressures:  BP Readings from Last 3 Encounters:   12/14/23 122/80   08/11/23 113/67   07/28/23 108/74     Lab Results   Component Value Date/Time     08/11/2023 10:13 AM    K 4.4 08/11/2023 10:13 AM     08/11/2023 10:13 AM    CO2 21 08/11/2023 10:13 AM    BUN 99 08/11/2023 10:13 AM    GFRAA 60 06/29/2022 10:21 AM     No results found for: \"HBA1C\", \"[id number]\"  Lab Results   Component Value Date/Time    CHOL 266 06/01/2023 11:07 AM    HDL 40 06/01/2023 11:07 AM     No results found for: \"VITD3\", \"VD3RIA\"    Lab Results   Component Value Date/Time    TSH 1.35 04/16/2021 10:50 AM

## 2024-01-22 NOTE — TELEPHONE ENCOUNTER
PCP: Dotty Mckay APRN - NP    Last appt: 8/11/2023   Future Appointments   Date Time Provider Department Center   2/12/2024  9:15 AM Dotty Mckay APRN - NP [facility] AM[facility]   6/14/2024  8:30 AM Macarena Ko APRN - NP [facility] BS AMB       Requested Prescriptions     Pending Prescriptions Disp Refills    pantoprazole (PROTONIX) 40 MG tablet [Pharmacy Med Name: PANTOPRAZOLE DR 40 MG TAB[*]] 90 tablet 0     Sig: TAKE ONE TABLET BY MOUTH EVERY MORNING BEFORE BREAKFAST         Prior labs and Blood pressures:  BP Readings from Last 3 Encounters:   12/14/23 122/80   08/11/23 113/67   07/28/23 108/74     Lab Results   Component Value Date/Time     08/11/2023 10:13 AM    K 4.4 08/11/2023 10:13 AM     08/11/2023 10:13 AM    CO2 21 08/11/2023 10:13 AM    BUN 99 08/11/2023 10:13 AM    GFRAA 60 06/29/2022 10:21 AM     No results found for: \"HBA1C\", \"POU1IILL\"  Lab Results   Component Value Date/Time    CHOL 266 06/01/2023 11:07 AM    HDL 40 06/01/2023 11:07 AM     No results found for: \"VITD3\", \"VD3RIA\"    Lab Results   Component Value Date/Time    TSH 1.35 04/16/2021 10:50 AM

## 2024-01-23 RX ORDER — PANTOPRAZOLE SODIUM 40 MG/1
TABLET, DELAYED RELEASE ORAL
Qty: 90 TABLET | Refills: 0 | Status: SHIPPED | OUTPATIENT
Start: 2024-01-23

## 2024-02-01 RX ORDER — PROPRANOLOL HYDROCHLORIDE 10 MG/1
10 TABLET ORAL 2 TIMES DAILY
Qty: 180 TABLET | Refills: 1 | Status: SHIPPED | OUTPATIENT
Start: 2024-02-01

## 2024-02-01 NOTE — TELEPHONE ENCOUNTER
PCP: Dotty Mckay APRN - NP    Last appt: 8/11/2023   Future Appointments   Date Time Provider Department Center   2/12/2024  9:15 AM Dotty Mckay APRN - NP [facility] AM[facility]   6/14/2024  8:30 AM Macarena Ko APRN - NP [facility] AM[facility]       Requested Prescriptions     Pending Prescriptions Disp Refills    propranolol (INDERAL) 10 MG tablet [Pharmacy Med Name: PROPRANOLOL 10 MG TAB] 180 tablet 1     Sig: TAKE ONE TABLET BY MOUTH TWICE A DAY         Prior labs and Blood pressures:  BP Readings from Last 3 Encounters:   12/14/23 122/80   08/11/23 113/67   07/28/23 108/74     Lab Results   Component Value Date/Time     08/11/2023 10:13 AM    K 4.4 08/11/2023 10:13 AM     08/11/2023 10:13 AM    CO2 21 08/11/2023 10:13 AM    BUN 99 08/11/2023 10:13 AM    GFRAA 60 06/29/2022 10:21 AM     No results found for: \"HBA1C\", \"[id number]\"  Lab Results   Component Value Date/Time    CHOL 266 06/01/2023 11:07 AM    HDL 40 06/01/2023 11:07 AM     No results found for: \"VITD3\", \"VD3RIA\"    Lab Results   Component Value Date/Time    TSH 1.35 04/16/2021 10:50 AM

## 2024-02-20 ENCOUNTER — HOSPITAL ENCOUNTER (OUTPATIENT)
Facility: HOSPITAL | Age: 64
Setting detail: RECURRING SERIES
Discharge: HOME | End: 2024-02-23
Payer: COMMERCIAL

## 2024-02-20 PROCEDURE — 97162 PT EVAL MOD COMPLEX 30 MIN: CPT

## 2024-02-20 PROCEDURE — 97112 NEUROMUSCULAR REEDUCATION: CPT

## 2024-02-20 NOTE — THERAPY EVALUATION
participation in daily functional tasks and with ambulation safely and with minimal to no pain.      Evaluation Complexity:  History:  HIGH Complexity :3+ comorbidities / personal factors will impact the outcome/ POC ; Examination:  HIGH Complexity : 4+ Standardized tests and measures addressing body structure, function, activity limitation and / or participation in recreation  ;Presentation:  MEDIUM Complexity : Evolving with changing characteristics  ;Clinical Decision Making:  MEDIUM Complexity : FOTO score of 26-74 Overall Complexity Rating: MEDIUM  Problem List: pain affecting function, decrease ROM, decrease strength, edema affection function, impaired gait/balance, decrease ADL/functional abilities, decrease activity tolerance, decrease flexibility/joint mobility, and decrease transfer abilities    Treatment Plan may include any combination of the followin Therapeutic Exercise, 77201 Neuromuscular Re-Education, 50828 Manual Therapy, 40985 Therapeutic Activity, 11297 Self Care/Home Management, 23535 Electrical Stim unattended, 48625 Electrical Stim attended, 89853 Vasopneumatic Device  (Vasopnuematic compression justification:  Per bilateral girth measures taken and listed above the edema is considered significant and having an impact on the patient's strength, balance, gait, transfers, self care, and ADL's), 38936 Gait Training, 18475 Ultrasound, 18260 Mechanical Traction, 39856 Canalith Repositioning, 20818 Biofeedback Training, initial 15, 15188 Biofeedback Training, add'l 15, 67863 Orthotic Management and Training, 64316 Work Hardening/Simulation Init. 2hr, and (Elective Self Pay) Needle Insertion w/o Injection (1 or 2 muscles), (3+ muscles)  Patient / Family readiness to learn indicated by: asking questions, trying to perform skills, interest, return verbalization , and return demonstration   Persons(s) to be included in education: patient (P) and patient   Barriers to Learning/Limitations:

## 2024-02-23 ENCOUNTER — HOSPITAL ENCOUNTER (OUTPATIENT)
Facility: HOSPITAL | Age: 64
Setting detail: RECURRING SERIES
Discharge: HOME | End: 2024-02-26
Payer: COMMERCIAL

## 2024-02-23 PROCEDURE — 97112 NEUROMUSCULAR REEDUCATION: CPT

## 2024-02-23 PROCEDURE — 97110 THERAPEUTIC EXERCISES: CPT

## 2024-02-23 NOTE — PROGRESS NOTES
PHYSICAL THERAPY - MEDICARE DAILY TREATMENT NOTE (updated 3/23)      Date: 2024          Patient Name:  Astrid Hurley :  1960   Medical   Diagnosis:  Other abnormalities of gait and mobility [R26.89]  Polyneuropathy, unspecified [G62.9] Treatment Diagnosis:  M62.81  GENERAL MUSCLE WEAKNESS and R26.89   Abnormalities of gait and mobility    Referral Source:  Macarena Ko APRN - * Insurance:   Payor: LEONARDO / Plan: [facility] / Product Type: *No Product type* /                     Patient  verified yes     Visit #   Current  / Total 2 24   Time   In / Out 901  AM   Total Treatment Time 39   Total Timed Codes 34   1:1 Treatment Time 24      [facility] Totals Reminder:  bill using total billable   min of TIMED therapeutic procedures and modalities.   8-22 min = 1 unit; 23-37 min = 2 units; 38-52 min = 3 units; 53-67 min = 4 units; 68-82 min = 5 units            SUBJECTIVE    Pain Level (0-10 scale): 1/10    Any medication changes, allergies to medications, adverse drug reactions, diagnosis change, or new procedure performed?: [x] No    [] Yes (see summary sheet for update)  Medications: Verified on Patient Summary List    Subjective functional status/changes:     Patient reports \"soreness\" low back region; reports that she performed exercises 1 time yesterday, notes fatigue following the exercises     OBJECTIVE      Therapeutic Procedures:  Tx Min Billable or 1:1 Min (if diff from Tx Min) Procedure, Rationale, Specifics   18 8 26001 Therapeutic Exercise (timed):  increase ROM, strength, coordination, balance, and proprioception to improve patient's ability to progress to PLOF and address remaining functional goals. (see flow sheet as applicable)     Details if applicable:     16 16 00731 Neuromuscular Re-Education (timed):  improve balance, coordination, kinesthetic sense, posture, core stability and proprioception to improve patient's ability to develop conscious control of individual muscles

## 2024-03-01 ENCOUNTER — HOSPITAL ENCOUNTER (OUTPATIENT)
Facility: HOSPITAL | Age: 64
Setting detail: RECURRING SERIES
Discharge: HOME | End: 2024-03-04
Payer: COMMERCIAL

## 2024-03-01 PROCEDURE — 97140 MANUAL THERAPY 1/> REGIONS: CPT

## 2024-03-01 PROCEDURE — 97112 NEUROMUSCULAR REEDUCATION: CPT

## 2024-03-01 NOTE — PROGRESS NOTES
increase in pain from baseline level or without reported fear of falling and with single UE assistance on railing to improve safety in the home.     PLAN  Yes  Continue plan of care  []  Upgrade activities as tolerated  []  Discharge due to :  []  Other:      Judith Rodriguez, PT       3/1/2024       11:56 AM

## 2024-03-04 RX ORDER — DOXEPIN HYDROCHLORIDE 50 MG/1
CAPSULE ORAL
Qty: 90 CAPSULE | Refills: 0 | Status: SHIPPED | OUTPATIENT
Start: 2024-03-04

## 2024-03-04 NOTE — TELEPHONE ENCOUNTER
PCP: Dotty Mckay APRN - NP    Last appt: 8/11/2023   Future Appointments   Date Time Provider Department Center   3/5/2024  9:00 AM Privott, Judith, PT [facility]   3/6/2024  9:45 AM Dotty Mckay APRN - NP [facility] BS AMB   3/7/2024  9:00 AM Privott, Judith, PT [facility]   3/12/2024  9:00 AM Privott, Judith, PT [facility]   3/14/2024  9:00 AM Privott, Judith, PT [facility]   3/19/2024  9:00 AM Privott, Judith, PT [facility]   3/21/2024  9:00 AM Privott, Judith, PT [facility]   3/26/2024  9:00 AM Luz Kang, PT [facility]   3/28/2024  9:00 AM Luz Kang, PT [facility]   6/14/2024  8:30 AM Macarena Ko APRN - NP NEUM[facility] BS AMB       Requested Prescriptions     Pending Prescriptions Disp Refills    doxepin (SINEQUAN) 50 MG capsule [Pharmacy Med Name: DOXEPIN 50 MG CAP] 90 capsule 1     Sig: TAKE ONE CAPSULE BY MOUTH EVERY NIGHT AT BEDTIME AS NEEDED FOR SLEEP         Prior labs and Blood pressures:  BP Readings from Last 3 Encounters:   12/14/23 122/80   08/11/23 113/67   07/28/23 108/74     Lab Results   Component Value Date/Time     08/11/2023 10:13 AM    K 4.4 08/11/2023 10:13 AM     08/11/2023 10:13 AM    CO2 21 08/11/2023 10:13 AM    BUN 99 08/11/2023 10:13 AM    GFRAA 60 06/29/2022 10:21 AM     No results found for: \"HBA1C\", \"NCH6TADU\"  Lab Results   Component Value Date/Time    CHOL 266 06/01/2023 11:07 AM    HDL 40 06/01/2023 11:07 AM     No results found for: \"VITD3\", \"VD3RIA\"    Lab Results   Component Value Date/Time    TSH 1.35 04/16/2021 10:50 AM

## 2024-03-05 ENCOUNTER — HOSPITAL ENCOUNTER (OUTPATIENT)
Facility: HOSPITAL | Age: 64
Setting detail: RECURRING SERIES
End: 2024-03-05
Payer: COMMERCIAL

## 2024-03-06 ENCOUNTER — OFFICE VISIT (OUTPATIENT)
Age: 64
End: 2024-03-06
Payer: COMMERCIAL

## 2024-03-06 VITALS
WEIGHT: 160.4 LBS | SYSTOLIC BLOOD PRESSURE: 121 MMHG | BODY MASS INDEX: 30.29 KG/M2 | RESPIRATION RATE: 16 BRPM | HEIGHT: 61 IN | OXYGEN SATURATION: 99 % | HEART RATE: 103 BPM | TEMPERATURE: 97.8 F | DIASTOLIC BLOOD PRESSURE: 73 MMHG

## 2024-03-06 DIAGNOSIS — N18.31 STAGE 3A CHRONIC KIDNEY DISEASE (CKD) (HCC): ICD-10-CM

## 2024-03-06 DIAGNOSIS — I10 UNCONTROLLED HYPERTENSION: ICD-10-CM

## 2024-03-06 DIAGNOSIS — G62.1 ALCOHOLIC PERIPHERAL NEUROPATHY (HCC): Primary | ICD-10-CM

## 2024-03-06 DIAGNOSIS — Z12.31 ENCOUNTER FOR SCREENING MAMMOGRAM FOR MALIGNANT NEOPLASM OF BREAST: ICD-10-CM

## 2024-03-06 DIAGNOSIS — E78.2 MIXED HYPERLIPIDEMIA: ICD-10-CM

## 2024-03-06 DIAGNOSIS — F10.11 ALCOHOL ABUSE, IN REMISSION: ICD-10-CM

## 2024-03-06 DIAGNOSIS — R73.01 IMPAIRED FASTING GLUCOSE: ICD-10-CM

## 2024-03-06 DIAGNOSIS — Z72.0 TOBACCO ABUSE: ICD-10-CM

## 2024-03-06 DIAGNOSIS — Z87.81 HISTORY OF LOWER LEG FRACTURE: ICD-10-CM

## 2024-03-06 DIAGNOSIS — N17.9 AKI (ACUTE KIDNEY INJURY) (HCC): ICD-10-CM

## 2024-03-06 DIAGNOSIS — E53.8 B12 DEFICIENCY: ICD-10-CM

## 2024-03-06 DIAGNOSIS — Z23 NEED FOR HEPATITIS B VACCINATION: ICD-10-CM

## 2024-03-06 DIAGNOSIS — Z71.6 ENCOUNTER FOR TOBACCO USE CESSATION COUNSELING: ICD-10-CM

## 2024-03-06 DIAGNOSIS — E87.1 HYPONATREMIA: ICD-10-CM

## 2024-03-06 DIAGNOSIS — Z23 NEED FOR DIPHTHERIA-TETANUS-PERTUSSIS (TDAP) VACCINE: ICD-10-CM

## 2024-03-06 LAB
ALBUMIN SERPL-MCNC: 3.8 G/DL (ref 3.5–5)
ALBUMIN/GLOB SERPL: 0.9 (ref 1.1–2.2)
ALP SERPL-CCNC: 134 U/L (ref 45–117)
ALT SERPL-CCNC: 24 U/L (ref 12–78)
ANION GAP SERPL CALC-SCNC: 6 MMOL/L (ref 5–15)
AST SERPL-CCNC: 20 U/L (ref 15–37)
BASOPHILS # BLD: 0.1 K/UL (ref 0–0.1)
BASOPHILS NFR BLD: 1 % (ref 0–1)
BILIRUB SERPL-MCNC: 0.6 MG/DL (ref 0.2–1)
BUN SERPL-MCNC: 26 MG/DL (ref 6–20)
BUN/CREAT SERPL: 18 (ref 12–20)
CALCIUM SERPL-MCNC: 9.7 MG/DL (ref 8.5–10.1)
CHLORIDE SERPL-SCNC: 103 MMOL/L (ref 97–108)
CO2 SERPL-SCNC: 25 MMOL/L (ref 21–32)
CREAT SERPL-MCNC: 1.43 MG/DL (ref 0.55–1.02)
DIFFERENTIAL METHOD BLD: NORMAL
EOSINOPHIL # BLD: 0.2 K/UL (ref 0–0.4)
EOSINOPHIL NFR BLD: 2 % (ref 0–7)
ERYTHROCYTE [DISTWIDTH] IN BLOOD BY AUTOMATED COUNT: 14.4 % (ref 11.5–14.5)
EST. AVERAGE GLUCOSE BLD GHB EST-MCNC: 114 MG/DL
GLOBULIN SER CALC-MCNC: 4.1 G/DL (ref 2–4)
HBA1C MFR BLD: 5.6 % (ref 4–5.6)
HGB BLD-MCNC: 12.5 G/DL (ref 11.5–16)
IMM GRANULOCYTES # BLD AUTO: 0 K/UL (ref 0–0.04)
IMM GRANULOCYTES NFR BLD AUTO: 0 % (ref 0–0.5)
LYMPHOCYTES # BLD: 2.2 K/UL (ref 0.8–3.5)
LYMPHOCYTES NFR BLD: 23 % (ref 12–49)
MCHC RBC AUTO-ENTMCNC: 32.8 G/DL (ref 30–36.5)
MCV RBC AUTO: 93.4 FL (ref 80–99)
MONOCYTES # BLD: 0.8 K/UL (ref 0–1)
MONOCYTES NFR BLD: 9 % (ref 5–13)
NEUTS SEG NFR BLD: 65 % (ref 32–75)
NRBC # BLD: 0 K/UL (ref 0–0.01)
NRBC BLD-RTO: 0 PER 100 WBC
PLATELET # BLD AUTO: 234 K/UL (ref 150–400)
PMV BLD AUTO: 11.1 FL (ref 8.9–12.9)
POTASSIUM SERPL-SCNC: 3.9 MMOL/L (ref 3.5–5.1)
PROT SERPL-MCNC: 7.9 G/DL (ref 6.4–8.2)
RBC # BLD AUTO: 4.08 M/UL (ref 3.8–5.2)
SODIUM SERPL-SCNC: 134 MMOL/L (ref 136–145)
VIT B12 SERPL-MCNC: 616 PG/ML (ref 193–986)
WBC # BLD AUTO: 9.6 K/UL (ref 3.6–11)

## 2024-03-06 PROCEDURE — 90471 IMMUNIZATION ADMIN: CPT | Performed by: NURSE PRACTITIONER

## 2024-03-06 PROCEDURE — 90739 HEPB VACC 2/4 DOSE ADULT IM: CPT | Performed by: NURSE PRACTITIONER

## 2024-03-06 PROCEDURE — 99214 OFFICE O/P EST MOD 30 MIN: CPT | Performed by: NURSE PRACTITIONER

## 2024-03-06 PROCEDURE — 90472 IMMUNIZATION ADMIN EACH ADD: CPT | Performed by: NURSE PRACTITIONER

## 2024-03-06 PROCEDURE — 90715 TDAP VACCINE 7 YRS/> IM: CPT | Performed by: NURSE PRACTITIONER

## 2024-03-06 PROCEDURE — 99406 BEHAV CHNG SMOKING 3-10 MIN: CPT | Performed by: NURSE PRACTITIONER

## 2024-03-06 PROCEDURE — 3074F SYST BP LT 130 MM HG: CPT | Performed by: NURSE PRACTITIONER

## 2024-03-06 PROCEDURE — 3078F DIAST BP <80 MM HG: CPT | Performed by: NURSE PRACTITIONER

## 2024-03-06 RX ORDER — VARENICLINE TARTRATE 1 MG/1
1 TABLET, FILM COATED ORAL 2 TIMES DAILY
Qty: 60 TABLET | Refills: 3 | Status: SHIPPED | OUTPATIENT
Start: 2024-03-27

## 2024-03-06 RX ORDER — NALTREXONE HYDROCHLORIDE 50 MG/1
50 TABLET, FILM COATED ORAL DAILY
Qty: 90 TABLET | Refills: 1 | Status: SHIPPED | OUTPATIENT
Start: 2024-03-06

## 2024-03-06 RX ORDER — CHLORTHALIDONE 25 MG/1
25 TABLET ORAL DAILY
Qty: 90 TABLET | Refills: 1 | Status: SHIPPED | OUTPATIENT
Start: 2024-03-06

## 2024-03-06 RX ORDER — OLMESARTAN MEDOXOMIL 40 MG/1
40 TABLET ORAL DAILY
Qty: 90 TABLET | Refills: 1 | Status: SHIPPED | OUTPATIENT
Start: 2024-03-06

## 2024-03-06 RX ORDER — VARENICLINE TARTRATE 0.5 MG/1
.5-1 TABLET, FILM COATED ORAL SEE ADMIN INSTRUCTIONS
Qty: 57 TABLET | Refills: 0 | Status: SHIPPED | OUTPATIENT
Start: 2024-03-06

## 2024-03-06 ASSESSMENT — PATIENT HEALTH QUESTIONNAIRE - PHQ9
SUM OF ALL RESPONSES TO PHQ9 QUESTIONS 1 & 2: 0
SUM OF ALL RESPONSES TO PHQ QUESTIONS 1-9: 0
2. FEELING DOWN, DEPRESSED OR HOPELESS: 0
1. LITTLE INTEREST OR PLEASURE IN DOING THINGS: 0
SUM OF ALL RESPONSES TO PHQ QUESTIONS 1-9: 0

## 2024-03-06 NOTE — PROGRESS NOTES
Chief Complaint   Patient presents with    Hypertension    Chronic Kidney Disease       \"Have you been to the ER, urgent care clinic since your last visit?  Hospitalized since your last visit?\"    NO    “Have you seen or consulted any other health care providers outside of [facility] since your last visit?”    NO             3/6/2024     9:42 AM   PHQ-9    Little interest or pleasure in doing things 0   Feeling down, depressed, or hopeless 0   PHQ-2 Score 0   PHQ-9 Total Score 0       Health Maintenance Due   Topic Date Due    COVID-19 Vaccine (1) Never done    Pneumococcal 0-64 years Vaccine (1 - PCV) Never done    Hepatitis A vaccine (1 of 2 - Risk 2-dose series) Never done    DTaP/Tdap/Td vaccine (1 - Tdap) Never done    Shingles vaccine (1 of 2) Never done    Hepatitis B vaccine (1 of 3 - Risk 3-dose series) Never done    Respiratory Syncytial Virus (RSV) Pregnant or age 60 yrs+ (1 - 1-dose 60+ series) Never done    Flu vaccine (1) Never done    Breast cancer screen  04/05/2024       
INTERMEDIATE 3-10 MINUTES    9. History of lower leg fracture  Comments:  w/ hardware in place with new pain and mild swelling.  Orders:  -     XR TIBIA FIBULA LEFT (2 VIEWS); Future    10. ANTONINO (acute kidney injury) (HCC)  -     Comprehensive Metabolic Panel; Future  -Creatinine 3.38 on 8/11/2023.  At the time she was recommended to seek care in the emergency room due to marked elevation from her baseline creatinine.  No follow up testing available for review    11. Encounter for screening mammogram for malignant neoplasm of breast  -     KACY DIGITAL SCREEN W OR WO CAD BILATERAL; Future    12. Need for diphtheria-tetanus-pertussis (Tdap) vaccine  -     DC IM ADM PRQ ID SUBQ/IM NJXS   1 VACCINE  -     Tdap, BOOSTRIX, (age 10 yrs+), IM    13. Need for hepatitis B vaccination  -     DC IM ADM PRQ ID SUBQ/IM NJXS EA VACCINE  -     Hep B, HEPLISAV-B, (age 18 yrs+), IM, 0.5mL, 2-dose    14. Alcohol abuse, in remission  -     naltrexone (DEPADE) 50 MG tablet; Take 1 tablet by mouth daily, Disp-90 tablet, R-1Normal  -Continue thiamine    15. Encounter for tobacco use cessation counseling  -     varenicline (CHANTIX) 0.5 MG tablet; Take 1-2 tablets by mouth See Admin Instructions 0.5mg DAILY for 3 days followed by 0.5mg TWICE DAILY for 4 days followed by 1mg TWICE DAILY, Disp-57 tablet, R-0Normal  -     varenicline (CHANTIX CONTINUING MONTH TARIQ) 1 MG tablet; Take 1 tablet by mouth 2 times daily, Disp-60 tablet, R-3Normal  -     DC TOBACCO USE CESSATION INTERMEDIATE 3-10 MINUTES  -Tobacco Cessation Counseling: Patient advised about behavior change, including information about personal health harms, usage of appropriate cessation measures and benefits of cessation.  I advised patient to quit, and offered support. Discussed current use pattern. Wishes to try Chantix again. Time spent (minutes): 3 min            Return in about 6 months (around 9/6/2024).              SUBJECTIVE/OBJECTIVE:    Astrid Hurley is a 64 y.o. female

## 2024-03-06 NOTE — PATIENT INSTRUCTIONS
[facility] Transportation Resources*  (Call 211 if need more resources.)    [employer] [employer]  What they offer: Provides public transportation to the [place] and parts of [place] and [facility].  Website: http://www.ridegrtc.com/  Phone Number: 401.966.3443    [employer] Specialized Services (CARE & CARE Plus)  What they offer: Provides public transportation access to individuals with disabilities who may not reasonably be able to use [employer] fixed route bus service. Provides cvydnr-uh-kxzhurjfboc services under the guidelines of the ADA.  Website: http://Mytonomy/services/specialized-transportation/care  Phone Number: 372.899.2888    [employer] Connection  What they offer: Ride Connection provides 2 round trip rides/month to non-emergency medical appointments (must qualify)  Website:  https://seniorconnections-va.org/services/support-to-stay-home/ride-connections/  Phone Number: 176.210.2534  Eligibility Requirements: Individuals with disabilities (18+) or older adults (60+) and live in the [facility] or the [facility] of [place], [facility] and [place].    [employer]  What they offer: Donation based transportation services for veterans, families in need, the elderly, and those with disabilities.  Website: https://www.[employer]icon.Stratio/  Phone Number: 388.738.9272  Additional Information: Offers transport to appointments, grocery store, airport, etc. in the areas of [facility], [facility], and [place].      [facility] Transit - Community transit service serving [place], [place], [facility], [place], [place], [place], [place], [place], [place], and [place].  Phone: [phone] Website: https://[email or web address].[email or web address]/    [place] Senior Resources - Serves residents in [facility], [facility], [facility], [place], [facility] and

## 2024-03-07 ENCOUNTER — APPOINTMENT (OUTPATIENT)
Facility: HOSPITAL | Age: 64
End: 2024-03-07
Payer: COMMERCIAL

## 2024-03-11 ENCOUNTER — TELEPHONE (OUTPATIENT)
Age: 64
End: 2024-03-11

## 2024-03-11 NOTE — TELEPHONE ENCOUNTER
Received call from pt. Two patient identifiers verified. Discussed lab and x-ray results per provider's note. Verbalized understanding. Pt would like a referral to nephrology.

## 2024-03-12 ENCOUNTER — HOSPITAL ENCOUNTER (OUTPATIENT)
Facility: HOSPITAL | Age: 64
Setting detail: RECURRING SERIES
Discharge: HOME | End: 2024-03-15
Payer: COMMERCIAL

## 2024-03-12 DIAGNOSIS — N18.32 STAGE 3B CHRONIC KIDNEY DISEASE (HCC): Primary | ICD-10-CM

## 2024-03-12 PROCEDURE — 97140 MANUAL THERAPY 1/> REGIONS: CPT

## 2024-03-12 PROCEDURE — 97112 NEUROMUSCULAR REEDUCATION: CPT

## 2024-03-12 NOTE — PROGRESS NOTES
PHYSICAL THERAPY - MEDICARE DAILY TREATMENT NOTE (updated 3/23)      Date: 3/12/2024          Patient Name:  Astrid Hurley :  1960   Medical   Diagnosis:  Other abnormalities of gait and mobility [R26.89]  Polyneuropathy, unspecified [G62.9] Treatment Diagnosis:  M62.81  GENERAL MUSCLE WEAKNESS and R26.89   Abnormalities of gait and mobility    Referral Source:  Macarena Ko APRN - * Insurance:   Payor: LEONARDO / Plan: [facility] / Product Type: *No Product type* /                     Patient  verified yes     Visit #   Current  / Total 4 24   Time   In / Out 9:05 AM 9:45 AM   Total Treatment Time 40 min   Total Timed Codes 40 min   1:1 Treatment Time 32 min       [facility] Totals Reminder:  bill using total billable   min of TIMED therapeutic procedures and modalities.   8-22 min = 1 unit; 23-37 min = 2 units; 38-52 min = 3 units; 53-67 min = 4 units; 68-82 min = 5 units        SUBJECTIVE    Pain Level (0-10 scale): 4-5/10    Any medication changes, allergies to medications, adverse drug reactions, diagnosis change, or new procedure performed?: [x] No    [] Yes (see summary sheet for update)  Medications: Verified on Patient Summary List    Subjective functional status/changes:     Patient reports she had pain in left foot/ankle for ~ 2 days, had appt with PCP on 3/6/24 for 6 month check up. Got xray of ankle/foot which showed arthritic changes and was told to make an appt with orthopedist. Reports pain resolved on its own after ~2 days. Minimal pain since this. Reports she took a break from doing exercises while ankle/foot was in pain, but has been able to re-initiate them . Does not feel like they have gotten any easier.      OBJECTIVE    Therapeutic Procedures:  Tx Min Billable or 1:1 Min (if diff from Tx Min) Procedure, Rationale, Specifics   8 0 32623 Therapeutic Exercise (timed):  increase ROM, strength, coordination, balance, and proprioception to improve patient's ability to progress to PLOF

## 2024-03-14 ENCOUNTER — HOSPITAL ENCOUNTER (OUTPATIENT)
Facility: HOSPITAL | Age: 64
Setting detail: RECURRING SERIES
Discharge: HOME | End: 2024-03-17
Payer: COMMERCIAL

## 2024-03-14 PROCEDURE — 97112 NEUROMUSCULAR REEDUCATION: CPT

## 2024-03-14 PROCEDURE — 97110 THERAPEUTIC EXERCISES: CPT

## 2024-03-14 NOTE — PROGRESS NOTES
PHYSICAL THERAPY - MEDICARE DAILY TREATMENT NOTE (updated 3/23)      Date: 3/14/2024          Patient Name:  Astrid Hurley :  1960   Medical   Diagnosis:  Other abnormalities of gait and mobility [R26.89]  Polyneuropathy, unspecified [G62.9] Treatment Diagnosis:  M62.81  GENERAL MUSCLE WEAKNESS and R26.89   Abnormalities of gait and mobility    Referral Source:  Macarena Ko APRN - * Insurance:   Payor: LEONARDO / Plan: [facility] / Product Type: *No Product type* /                     Patient  verified yes     Visit #   Current  / Total 5 24   Time   In / Out 9:01 AM 9:47 AM   Total Treatment Time 46 min   Total Timed Codes 46 min   1:1 Treatment Time 39 min       [facility] Totals Reminder:  bill using total billable   min of TIMED therapeutic procedures and modalities.   8-22 min = 1 unit; 23-37 min = 2 units; 38-52 min = 3 units; 53-67 min = 4 units; 68-82 min = 5 units        SUBJECTIVE    Pain Level (0-10 scale): 2-3/10    Any medication changes, allergies to medications, adverse drug reactions, diagnosis change, or new procedure performed?: [x] No    [] Yes (see summary sheet for update)  Medications: Verified on Patient Summary List    Subjective functional status/changes:     Patient reports pain through bilat buttock and hips is better today compared to previous visit. Reports she has not tried the new exercises yet at home, but has kept up with the previous exercises in HEP.     OBJECTIVE    Therapeutic Procedures:  Tx Min Billable or 1:1 Min (if diff from Tx Min) Procedure, Rationale, Specifics    25  25 [id number] Therapeutic Exercise (timed):  increase ROM, strength, coordination, balance, and proprioception to improve patient's ability to progress to PLOF and address remaining functional goals. (see flow sheet as applicable)     Details if applicable:       58195 Neuromuscular Re-Education (timed):  improve balance, coordination, kinesthetic sense, posture, core stability and

## 2024-03-19 ENCOUNTER — HOSPITAL ENCOUNTER (OUTPATIENT)
Facility: HOSPITAL | Age: 64
Setting detail: RECURRING SERIES
Discharge: HOME | End: 2024-03-22
Payer: COMMERCIAL

## 2024-03-19 PROCEDURE — 97112 NEUROMUSCULAR REEDUCATION: CPT

## 2024-03-19 PROCEDURE — 97110 THERAPEUTIC EXERCISES: CPT

## 2024-03-19 NOTE — PROGRESS NOTES
[facility]  [address]  [place] [address]  Phone: [phone]   Fax: [phone]     PHYSICAL THERAPY PROGRESS NOTE  Patient Name:  Astrid Hurley :  1960   Treatment/Medical Diagnosis: Other abnormalities of gait and mobility [R26.89]  Polyneuropathy, unspecified [G62.9]   Referral Source:  Macarena Ko APRN - *     Date of Initial Visit:  2024 Attended Visits:  6 Missed Visits:  1     SUMMARY OF TREATMENT/ASSESSMENT:  Patient is a 64 year old female who has been seen in skilled physical therapy for 6 visits since initial evaluation on 2024 due to concerns for safety with ambulation and balance. Patient also has primary complaint of low back pain and bilateral buttock/posterior thigh pain. Patient has made consistent progress towards all goals. Patient has met 2/4 short term goals and 2/4 long term goals. Patient demonstrates significant improvements in nearly all outcome measures assessed this visit compared to initial evaluation. Patient demonstrates improving static postural control via SLS and tandem stance trials, improving strength and functional mobility via 5xSTS and 30sec STS, and improving safety with ambulation and transfers via Timed up and go. Patient does still exhibit significant limitations in overall endurance to activity and walking. Patient was unable to complete 2 minute walk test today without seated rest break due to fatigue and increase in pain. Patient has subjective report of typically needing a seated rest break on rollator after ~4-5 minutes when ambulating out in the community and running errands. Patient ambulates in household without use of AD. Patient reports at this point in PT, she does not feel she has made any significant improvements and does not feel any more confident in balance. Patient would benefit from continued strength, balance, and gait training to 
(timed):  increase ROM, strength, coordination, balance, and proprioception to improve patient's ability to progress to PLOF and address remaining functional goals. (see flow sheet as applicable)     Details if applicable:     12 12 97112 Neuromuscular Re-Education (timed):  improve balance, coordination, kinesthetic sense, posture, core stability and proprioception to improve patient's ability to develop conscious control of individual muscles and awareness of position of extremities in order to progress to PLOF and address remaining functional goals. (see flow sheet as applicable)     Details if applicable:      nt  nt 79437 Manual Therapy (timed):  decrease pain, increase ROM, increase tissue extensibility, decrease trigger points, and increase postural awareness to improve patient's ability to progress to PLOF and address remaining functional goals.  The manual therapy interventions were performed at a separate and distinct time from the therapeutic activities interventions . (see flow sheet as applicable)    Details if applicable:   supine: STM bilateral glute/hasmtring/quadriceps/adductors; bilat hip PROM with overpressure to tolerance, manual hamstring stretching   46  42    Total Total     [x]  Patient Education billed concurrently with other procedures   [x] Review HEP    [] Progressed/Changed HEP, detail:    [] Other detail:       Other Objective/Functional Measures                                                    LOWER QUARTER                            MUSCLE STRENGTH  KEY                                                                                         R                      L  0 - No Contraction                   Hip flexion                               4+ (4)               4+ (4+)          1- Trace                                   Hip Abduction                          nt                     nt  2 - Poor                                   Hip ER                                     nt

## 2024-03-21 ENCOUNTER — HOSPITAL ENCOUNTER (OUTPATIENT)
Facility: HOSPITAL | Age: 64
Setting detail: RECURRING SERIES
Discharge: HOME | End: 2024-03-24
Payer: COMMERCIAL

## 2024-03-21 PROCEDURE — 97112 NEUROMUSCULAR REEDUCATION: CPT

## 2024-03-21 PROCEDURE — 97110 THERAPEUTIC EXERCISES: CPT

## 2024-03-21 NOTE — PROGRESS NOTES
improve patient overall confidence in her own balance and walking. Patient will continue to benefit from skilled PT / OT services to modify and progress therapeutic interventions, analyze and address functional mobility deficits, analyze and address ROM deficits, analyze and address strength deficits, analyze and address soft tissue restrictions, analyze and cue for proper movement patterns, analyze and modify for postural abnormalities, and instruct in home and community integration to address functional deficits and attain remaining goals.    Progress toward goals / Updated goals:  [x]  See Progress Note/Recertification    Short Term Goals: To be accomplished in 12 treatments.  Patient will demonstrate understanding of and compliance with HEP showing full participation in physical therapy. progressing  Patient will improve 5xSTS test to 18 seconds without UE assistance showing improving functional mobility and ease with household transfers. met  Patient will improve Timed Up and Go to </= 16 seconds without AD and no LOB to demonstrate improving safety with ambulation. met  Patient will demonstrate understanding/application of postural principles/recommendations to daily activities toward improved symptom management. progressing     Long Term Goals: To be accomplished in 24 treatments.  Patient will improve FOTO score by the MDC of 3 to >/= 40 showing significant improvement in their self-reported level of function. Met (47/100)  Patient will report improving tolerance to walking (>/= 10 minutes) with LRAD and no LOB to allow improving safety with community ambulation such as walking through a parking lot. Progressing (5 minutes)  Patient will report improving tolerance to standing (>/= 10 minutes) to allow improving ability to perform tasks such as cooking and cleaning. Progressing (5 minutes)  Patient will negotiate 3 steps reciprocally without an increase in pain from baseline level or without reported fear of

## 2024-03-26 ENCOUNTER — HOSPITAL ENCOUNTER (OUTPATIENT)
Facility: HOSPITAL | Age: 64
Setting detail: RECURRING SERIES
Discharge: HOME | End: 2024-03-29
Payer: COMMERCIAL

## 2024-03-26 PROCEDURE — 97112 NEUROMUSCULAR REEDUCATION: CPT

## 2024-03-26 PROCEDURE — 97110 THERAPEUTIC EXERCISES: CPT

## 2024-03-26 NOTE — PROGRESS NOTES
PHYSICAL THERAPY - MEDICARE DAILY TREATMENT NOTE (updated 3/23)      Date: 3/26/2024          Patient Name:  Astrid Hurley :  1960   Medical   Diagnosis:  Other abnormalities of gait and mobility [R26.89]  Polyneuropathy, unspecified [G62.9] Treatment Diagnosis:  M62.81  GENERAL MUSCLE WEAKNESS and R26.89   Abnormalities of gait and mobility    Referral Source:  Macarena Ko APRN - * Insurance:   Payor: LEONARDO / Plan: [facility] / Product Type: *No Product type* /                     Patient  verified yes     Visit #   Current  / Total 8 24   Time   In / Out 9:00 AM  9:45 AM   Total Treatment Time 45 minutes   Total Timed Codes 45 minutes   1:1 Treatment Time 40 minutes      [facility] Totals Reminder:  bill using total billable   min of TIMED therapeutic procedures and modalities.   8-22 min = 1 unit; 23-37 min = 2 units; 38-52 min = 3 units; 53-67 min = 4 units; 68-82 min = 5 units        SUBJECTIVE    Pain Level (0-10 scale): 0-1/10    Any medication changes, allergies to medications, adverse drug reactions, diagnosis change, or new procedure performed?: [x] No    [] Yes (see summary sheet for update)  Medications: Verified on Patient Summary List    Subjective functional status/changes:     The Pt has been able to do her exercises at home 2x since her last session.  She went to visit her brother at a nursing home and had a very long walk to his room on Saturday.  She was able to walk from the car to the entrance of the facility without stopping, but after that had to take 2 breaks on her rollator from the entrance to her brother's room.  She said she was exhausted after this and did not do much after this visit.     OBJECTIVE    Therapeutic Procedures:  Tx Min Billable or 1:1 Min (if diff from Tx Min) Procedure, Rationale, Specifics   20 15 03545 Therapeutic Exercise (timed):  increase ROM, strength, coordination, balance, and proprioception to improve patient's ability to progress to PLOF and

## 2024-03-28 ENCOUNTER — HOSPITAL ENCOUNTER (OUTPATIENT)
Facility: HOSPITAL | Age: 64
Setting detail: RECURRING SERIES
Discharge: HOME | End: 2024-03-31
Payer: COMMERCIAL

## 2024-03-28 PROCEDURE — 97110 THERAPEUTIC EXERCISES: CPT

## 2024-03-28 PROCEDURE — 97112 NEUROMUSCULAR REEDUCATION: CPT

## 2024-03-28 NOTE — PROGRESS NOTES
PHYSICAL THERAPY - MEDICARE DAILY TREATMENT NOTE (updated 3/23)      Date: 3/28/2024          Patient Name:  Astrid Hurley :  1960   Medical   Diagnosis:  Other abnormalities of gait and mobility [R26.89]  Polyneuropathy, unspecified [G62.9] Treatment Diagnosis:  M62.81  GENERAL MUSCLE WEAKNESS and R26.89   Abnormalities of gait and mobility    Referral Source:  Macarena Ko APRN - * Insurance:   Payor: LEONARDO / Plan: [facility] / Product Type: *No Product type* /                     Patient  verified yes     Visit #   Current  / Total 9 24   Time   In / Out 9:02 AM 9:46 AM   Total Treatment Time 44 minutes   Total Timed Codes 44 minutes   1:1 Treatment Time 39 minutes      [facility] Totals Reminder:  bill using total billable   min of TIMED therapeutic procedures and modalities.   8-22 min = 1 unit; 23-37 min = 2 units; 38-52 min = 3 units; 53-67 min = 4 units; 68-82 min = 5 units        SUBJECTIVE    Pain Level (0-10 scale): 0/10    Any medication changes, allergies to medications, adverse drug reactions, diagnosis change, or new procedure performed?: [x] No    [] Yes (see summary sheet for update)  Medications: Verified on Patient Summary List    Subjective functional status/changes:     The Pt reports that she was a little tired after last session, but it did not stop her. She is feeling good this morning.     OBJECTIVE    Therapeutic Procedures:  Tx Min Billable or 1:1 Min (if diff from Tx Min) Procedure, Rationale, Specifics   14 9 70931 Therapeutic Exercise (timed):  increase ROM, strength, coordination, balance, and proprioception to improve patient's ability to progress to PLOF and address remaining functional goals. (see flow sheet as applicable)     Details if applicable:     30 30 81088 Neuromuscular Re-Education (timed):  improve balance, coordination, kinesthetic sense, posture, core stability and proprioception to improve patient's ability to develop conscious control of individual

## 2024-04-02 ENCOUNTER — HOSPITAL ENCOUNTER (OUTPATIENT)
Facility: HOSPITAL | Age: 64
Setting detail: RECURRING SERIES
Discharge: HOME | End: 2024-04-05
Payer: COMMERCIAL

## 2024-04-02 PROCEDURE — 97112 NEUROMUSCULAR REEDUCATION: CPT

## 2024-04-02 PROCEDURE — 97110 THERAPEUTIC EXERCISES: CPT

## 2024-04-02 NOTE — PROGRESS NOTES
20277 Neuromuscular Re-Education (timed):  improve balance, coordination, kinesthetic sense, posture, core stability and proprioception to improve patient's ability to develop conscious control of individual muscles and awareness of position of extremities in order to progress to PLOF and address remaining functional goals. (see flow sheet as applicable)     Details if applicable:      nt  nt 23213 Manual Therapy (timed):  decrease pain, increase ROM, increase tissue extensibility, decrease trigger points, and increase postural awareness to improve patient's ability to progress to PLOF and address remaining functional goals.  The manual therapy interventions were performed at a separate and distinct time from the therapeutic activities interventions . (see flow sheet as applicable)    Details if applicable:   supine: STM bilateral glute/hasmtring/quadriceps/adductors; bilat hip PROM with overpressure to tolerance, manual hamstring stretching   40 40    Total Total     [x]  Patient Education billed concurrently with other procedures   [x] Review HEP    [] Progressed/Changed HEP, detail:    [] Other detail:       Other Objective/Functional Measures  None noted                   Pain Level at end of session (0-10 scale): 4/10     Assessment   The Pt did well with her exercise program. Introduced cone taps to work on single leg balance and patient was able to progress from 1 hand to 1 finger for balance. Patient required 1 finger for balance during wobble board taps as well this visit. Note inc difficulty performing med/lat taps > ant/post during wobble board exercise. Patient notes significant LE muscle fatigue post-tx, but no inc in pain. Patient will continue to benefit from skilled PT / OT services to modify and progress therapeutic interventions, analyze and address functional mobility deficits, analyze and address ROM deficits, analyze and address strength deficits, analyze and address soft tissue restrictions,

## 2024-04-03 ENCOUNTER — NURSE ONLY (OUTPATIENT)
Age: 64
End: 2024-04-03
Payer: COMMERCIAL

## 2024-04-03 VITALS — TEMPERATURE: 97.7 F

## 2024-04-03 DIAGNOSIS — Z23 NEED FOR HEPATITIS B VACCINATION: Primary | ICD-10-CM

## 2024-04-03 PROCEDURE — 90471 IMMUNIZATION ADMIN: CPT | Performed by: NURSE PRACTITIONER

## 2024-04-03 PROCEDURE — 90739 HEPB VACC 2/4 DOSE ADULT IM: CPT | Performed by: NURSE PRACTITIONER

## 2024-04-04 ENCOUNTER — HOSPITAL ENCOUNTER (OUTPATIENT)
Facility: HOSPITAL | Age: 64
Setting detail: RECURRING SERIES
Discharge: HOME | End: 2024-04-07
Payer: COMMERCIAL

## 2024-04-04 PROCEDURE — 97110 THERAPEUTIC EXERCISES: CPT

## 2024-04-04 PROCEDURE — 97112 NEUROMUSCULAR REEDUCATION: CPT

## 2024-04-04 NOTE — PROGRESS NOTES
PHYSICAL THERAPY - MEDICARE DAILY TREATMENT NOTE (updated 3/23)      Date: 2024          Patient Name:  Astrid Hurley :  1960   Medical   Diagnosis:  Other abnormalities of gait and mobility [R26.89]  Polyneuropathy, unspecified [G62.9] Treatment Diagnosis:  M62.81  GENERAL MUSCLE WEAKNESS and R26.89   Abnormalities of gait and mobility    Referral Source:  Macarena Ko APRN - * Insurance:   Payor: LEONARDO / [facility]: [facility] / Product Type: *No Product type* /                     Patient  verified yes     Visit #   Current  / Total 11 24   Time   In / Out 9:02 AM 9:44 AM   Total Treatment Time 42  minutes   Total Timed Codes 42  minutes   1:1 Treatment Time 42 minutes      [facility] Totals Reminder:  bill using total billable   min of TIMED therapeutic procedures and modalities.   8-22 min = 1 unit; 23-37 min = 2 units; 38-52 min = 3 units; 53-67 min = 4 units; 68-82 min = 5 units        SUBJECTIVE    Pain Level (0-10 scale): 2/10    Any medication changes, allergies to medications, adverse drug reactions, diagnosis change, or new procedure performed?: [x] No    [] Yes (see summary sheet for update)  Medications: Verified on Patient Summary List    Subjective functional status/changes:     The Pt reports she has not been doing any walking for exercise as previously discussed due to weather, but does note she has been having to walk a lot getting to doctors offices and visiting friends.     OBJECTIVE    Therapeutic Procedures:  Tx Min Billable or 1:1 Min (if diff from Tx Min) Procedure, Rationale, Specifics   17  66350 Therapeutic Exercise (timed):  increase ROM, strength, coordination, balance, and proprioception to improve patient's ability to progress to PLOF and address remaining functional goals. (see flow sheet as applicable)     Details if applicable:     25  74008 Neuromuscular Re-Education (timed):  improve balance, coordination, kinesthetic sense, posture, core stability and

## 2024-04-09 ENCOUNTER — HOSPITAL ENCOUNTER (OUTPATIENT)
Facility: HOSPITAL | Age: 64
Setting detail: RECURRING SERIES
Discharge: HOME | End: 2024-04-12
Payer: COMMERCIAL

## 2024-04-09 PROCEDURE — 97112 NEUROMUSCULAR REEDUCATION: CPT

## 2024-04-09 PROCEDURE — 97110 THERAPEUTIC EXERCISES: CPT

## 2024-04-09 NOTE — PROGRESS NOTES
PHYSICAL THERAPY - MEDICARE DAILY TREATMENT NOTE (updated 3/23)      Date: 2024        Patient Name:  Astrid Hurley :  1960   Medical   Diagnosis:  Other abnormalities of gait and mobility [R26.89]  Polyneuropathy, unspecified [G62.9] Treatment Diagnosis:  M62.81  GENERAL MUSCLE WEAKNESS and R26.89   Abnormalities of gait and mobility    Referral Source:  Macarena Ko APRN - * Insurance:   Payor: LEONARDO / [facility]: [facility] / Product Type: *No Product type* /                     Patient  verified yes     Visit #   Current  / Total 12 24   Time   In / Out 9:04 AM 9:46 AM   Total Treatment Time 42 minutes   Total Timed Codes 42  minutes   1:1 Treatment Time 29 minutes      [facility] Totals Reminder:  bill using total billable   min of TIMED therapeutic procedures and modalities.   8-22 min = 1 unit; 23-37 min = 2 units; 38-52 min = 3 units; 53-67 min = 4 units; 68-82 min = 5 units        SUBJECTIVE    Pain Level (0-10 scale): 0/10    Any medication changes, allergies to medications, adverse drug reactions, diagnosis change, or new procedure performed?: [x] No    [] Yes (see summary sheet for update)  Medications: Verified on Patient Summary List    Subjective functional status/changes:     The Pt reports she went to visit her brother on  and was able to walk the entire distance to his room without stopping. Previously had to stop at least 3x for rest due to the distance ~2 weeks when she went. Reports she kept up with all the exercises besides on  due to all the walking.    OBJECTIVE    Therapeutic Procedures:  Tx Min Billable or 1:1 Min (if diff from Tx Min) Procedure, Rationale, Specifics   23 10 62574 Therapeutic Exercise (timed):  increase ROM, strength, coordination, balance, and proprioception to improve patient's ability to progress to PLOF and address remaining functional goals. (see flow sheet as applicable)     Details if applicable:      Neuromuscular Re-Education

## 2024-04-11 ENCOUNTER — HOSPITAL ENCOUNTER (OUTPATIENT)
Facility: HOSPITAL | Age: 64
Setting detail: RECURRING SERIES
Discharge: HOME | End: 2024-04-14
Payer: COMMERCIAL

## 2024-04-11 PROCEDURE — 97112 NEUROMUSCULAR REEDUCATION: CPT

## 2024-04-11 PROCEDURE — 97110 THERAPEUTIC EXERCISES: CPT

## 2024-04-11 NOTE — PROGRESS NOTES
See Progress Note/Recertification    Short Term Goals: To be accomplished in 12 treatments.  Patient will demonstrate understanding of and compliance with HEP showing full participation in physical therapy. progressing  Patient will improve 5xSTS test to 18 seconds without UE assistance showing improving functional mobility and ease with household transfers. met  Patient will improve Timed Up and Go to </= 16 seconds without AD and no LOB to demonstrate improving safety with ambulation. met  Patient will demonstrate understanding/application of postural principles/recommendations to daily activities toward improved symptom management. progressing     Long Term Goals: To be accomplished in 24 treatments.  Patient will improve FOTO score by the MDC of 3 to >/= 40 showing significant improvement in their self-reported level of function. Met (47/100)  Patient will report improving tolerance to walking (>/= 10 minutes) with LRAD and no LOB to allow improving safety with community ambulation such as walking through a parking lot. Progressing (5 minutes)  Patient will report improving tolerance to standing (>/= 10 minutes) to allow improving ability to perform tasks such as cooking and cleaning. Progressing (5 minutes)  Patient will negotiate 3 steps reciprocally without an increase in pain from baseline level or without reported fear of falling and with single UE assistance on railing to improve safety in the home. Met    PLAN  Yes  Continue plan of care  [x]  Upgrade activities as tolerated  []  Discharge due to :  []  Other:      Judith Rodriguez, PT       4/11/2024       8:10 AM

## 2024-04-14 DIAGNOSIS — G62.9 POLYNEUROPATHY, UNSPECIFIED: ICD-10-CM

## 2024-04-15 RX ORDER — GABAPENTIN 300 MG/1
300 CAPSULE ORAL 3 TIMES DAILY
Qty: 90 CAPSULE | Refills: 3 | Status: SHIPPED | OUTPATIENT
Start: 2024-04-15 | End: 2024-07-15

## 2024-04-16 ENCOUNTER — HOSPITAL ENCOUNTER (OUTPATIENT)
Facility: HOSPITAL | Age: 64
Setting detail: RECURRING SERIES
Discharge: HOME | End: 2024-04-19
Payer: COMMERCIAL

## 2024-04-16 PROCEDURE — 97112 NEUROMUSCULAR REEDUCATION: CPT

## 2024-04-16 NOTE — PROGRESS NOTES
Note/Recertification    Short Term Goals: To be accomplished in 12 treatments.  Patient will demonstrate understanding of and compliance with HEP showing full participation in physical therapy. progressing  Patient will improve 5xSTS test to 18 seconds without UE assistance showing improving functional mobility and ease with household transfers. met  Patient will improve Timed Up and Go to </= 16 seconds without AD and no LOB to demonstrate improving safety with ambulation. met  Patient will demonstrate understanding/application of postural principles/recommendations to daily activities toward improved symptom management. progressing     Long Term Goals: To be accomplished in 24 treatments.  Patient will improve FOTO score by the MDC of 3 to >/= 40 showing significant improvement in their self-reported level of function. Met (47/100)  Patient will report improving tolerance to walking (>/= 10 minutes) with LRAD and no LOB to allow improving safety with community ambulation such as walking through a parking lot. Progressing (5 minutes)  Patient will report improving tolerance to standing (>/= 10 minutes) to allow improving ability to perform tasks such as cooking and cleaning. Progressing (5 minutes)  Patient will negotiate 3 steps reciprocally without an increase in pain from baseline level or without reported fear of falling and with single UE assistance on railing to improve safety in the home. Met    PLAN  Yes  Continue plan of care  [x]  Upgrade activities as tolerated  []  Discharge due to :  []  Other:      uLz Kang, PT       4/16/2024       9:12 AM

## 2024-04-18 ENCOUNTER — HOSPITAL ENCOUNTER (OUTPATIENT)
Facility: HOSPITAL | Age: 64
Setting detail: RECURRING SERIES
Discharge: HOME | End: 2024-04-21
Payer: COMMERCIAL

## 2024-04-18 PROCEDURE — 97112 NEUROMUSCULAR REEDUCATION: CPT

## 2024-04-18 PROCEDURE — 97110 THERAPEUTIC EXERCISES: CPT

## 2024-04-18 NOTE — PROGRESS NOTES
[facility]  [address]  [place] [address]  Phone: [phone]   Fax: [phone]     PHYSICAL THERAPY PROGRESS NOTE  Patient Name:  Astrid Hurley :  1960   Treatment/Medical Diagnosis: Other abnormalities of gait and mobility [R26.89]  Polyneuropathy, unspecified [G62.9]   Referral Source:  Macarena Ko APRN - *     Date of Initial Visit:  02/10/2024 Attended Visits:  15 Missed Visits:  1     SUMMARY OF TREATMENT/ASSESSMENT:  Patient is a 64 year old female who has been seen in skilled physical therapy for 15 visits since initial evaluation on 2024 due to concerns for safety with ambulation and balance. Patient has continued to make significant improvements on outcome measurements assessed this visit compared to previous re-assessment and even more so from initial evaluation. Patient scores on all balance and strength related outcome measures are listed below with scores compared to previous reassessment ~1 month ago. Patient continues to note low back and bilateral buttock pain that remains limiting to ability to tolerate prolonged standing activity, but have noted improvement in this as well. Patient has subjective report of being able to walk for ~10 minutes with rollator at slow pace without need for rest and ability to stand ~5-6 minutes without AD when doing task such as cooking. Patient continues to demonstrate impaired static balance, specifically with single leg stance and on uneven surfaces. Patient continues to ambulate with rollator for community ambulation, but has been able to begin to walk short distances such as in to a convenience store without AD> Patient would benefit from continued strength, balance, and gait training to maximize overall progress and benefit of outpatient PT to maximize safety and independence of patient will all daily tasks and ambulation. Patient will continue 
walking (>/= 10 minutes) with LRAD and no LOB to allow improving safety with community ambulation such as walking through a parking lot. Met (10 minutes)   Patient will report improving tolerance to standing (>/= 10 minutes) to allow improving ability to perform tasks such as cooking and cleaning. Progressing (5-6 minutes)   Patient will negotiate 3 steps reciprocally without an increase in pain from baseline level or without reported fear of falling and with single UE assistance on railing to improve safety in the home. Met    PLAN  Yes  Continue plan of care  [x]  Upgrade activities as tolerated  []  Discharge due to :  []  Other:      Judith Rodriguez, PT       4/18/2024       4:16 PM

## 2024-04-23 ENCOUNTER — HOSPITAL ENCOUNTER (OUTPATIENT)
Facility: HOSPITAL | Age: 64
Setting detail: RECURRING SERIES
Discharge: HOME | End: 2024-04-26
Payer: COMMERCIAL

## 2024-04-23 PROCEDURE — 97110 THERAPEUTIC EXERCISES: CPT

## 2024-04-23 PROCEDURE — 97112 NEUROMUSCULAR REEDUCATION: CPT

## 2024-04-25 ENCOUNTER — HOSPITAL ENCOUNTER (OUTPATIENT)
Facility: HOSPITAL | Age: 64
Setting detail: RECURRING SERIES
Discharge: HOME | End: 2024-04-28
Payer: COMMERCIAL

## 2024-04-25 PROCEDURE — 97112 NEUROMUSCULAR REEDUCATION: CPT

## 2024-04-25 PROCEDURE — 97110 THERAPEUTIC EXERCISES: CPT

## 2024-04-25 NOTE — PROGRESS NOTES
with single UE assistance on railing to improve safety in the home. Met    PLAN  Yes  Continue plan of care  [x]  Upgrade activities as tolerated  []  Discharge due to :  []  Other:      Judith Rodriguez, PT       4/25/2024       7:44 AM

## 2024-04-30 ENCOUNTER — APPOINTMENT (OUTPATIENT)
Facility: HOSPITAL | Age: 64
End: 2024-04-30
Payer: COMMERCIAL

## 2024-05-07 ENCOUNTER — HOSPITAL ENCOUNTER (OUTPATIENT)
Facility: HOSPITAL | Age: 64
Setting detail: RECURRING SERIES
Discharge: HOME | End: 2024-05-10
Payer: COMMERCIAL

## 2024-05-07 PROCEDURE — 97116 GAIT TRAINING THERAPY: CPT

## 2024-05-07 PROCEDURE — 97110 THERAPEUTIC EXERCISES: CPT

## 2024-05-07 NOTE — PROGRESS NOTES
patterns, analyze and modify for postural abnormalities, and instruct in home and community integration to address functional deficits and attain remaining goals.    Progress toward goals / Updated goals:  [x]  See Progress Note/Recertification    Short Term Goals: To be accomplished in 12 treatments.  Patient will demonstrate understanding of and compliance with HEP showing full participation in physical therapy. Met  Patient will improve 5xSTS test to 18 seconds without UE assistance showing improving functional mobility and ease with household transfers. Met  Patient will improve Timed Up and Go to </= 16 seconds without AD and no LOB to demonstrate improving safety with ambulation. Met  Patient will demonstrate understanding/application of postural principles/recommendations to daily activities toward improved symptom management. Met     Long Term Goals: To be accomplished in 24 treatments.  Patient will improve FOTO score by the MDC of 3 to >/= 40 showing significant improvement in their self-reported level of function. Met (47/100)  Patient will report improving tolerance to walking (>/= 10 minutes) with LRAD and no LOB to allow improving safety with community ambulation such as walking through a parking lot. Met (10 minutes)   Patient will report improving tolerance to standing (>/= 10 minutes) to allow improving ability to perform tasks such as cooking and cleaning. Progressing (5-6 minutes)   Patient will negotiate 3 steps reciprocally without an increase in pain from baseline level or without reported fear of falling and with single UE assistance on railing to improve safety in the home. Met    PLAN  Yes  Continue plan of care  [x]  Upgrade activities as tolerated  []  Discharge due to :  []  Other:      Judith Rodriguez, PT       5/7/2024       3:14 PM

## 2024-05-14 ENCOUNTER — HOSPITAL ENCOUNTER (OUTPATIENT)
Facility: HOSPITAL | Age: 64
Setting detail: RECURRING SERIES
Discharge: HOME | End: 2024-05-17
Payer: COMMERCIAL

## 2024-05-14 PROCEDURE — 97112 NEUROMUSCULAR REEDUCATION: CPT

## 2024-05-14 NOTE — PROGRESS NOTES
PHYSICAL THERAPY - MEDICARE DAILY TREATMENT NOTE (updated 3/23)      Date: 2024        Patient Name:  Astrid Hurley :  1960   Medical   Diagnosis:  Other abnormalities of gait and mobility [R26.89]  Polyneuropathy, unspecified [G62.9] Treatment Diagnosis:  M62.81  GENERAL MUSCLE WEAKNESS and R26.89   Abnormalities of gait and mobility    Referral Source:  Macarena Ko APRN - * Insurance:   Payor: LEONARDO / [facility]: [facility] / Product Type: *No Product type* /                     Patient  verified yes     Visit #   Current  / Total 19 24   Time   In / Out 7:36 AM 8:20 AM   Total Treatment Time 44 minutes   Total Timed Codes 44 minutes   1:1 Treatment Time 32 minutes      [facility] Totals Reminder:  bill using total billable   min of TIMED therapeutic procedures and modalities.   8-22 min = 1 unit; 23-37 min = 2 units; 38-52 min = 3 units; 53-67 min = 4 units; 68-82 min = 5 units        SUBJECTIVE    Pain Level (0-10 scale): 2/10    Any medication changes, allergies to medications, adverse drug reactions, diagnosis change, or new procedure performed?: [x] No    [] Yes (see summary sheet for update)  Medications: Verified on Patient Summary List    Subjective functional status/changes:     The Pt reports it is too early for her to tell how she is feeling today. Reports she has not looked into getting an adjustable cane for home.     OBJECTIVE    Therapeutic Procedures:  Tx Min Billable or 1:1 Min (if diff from Tx Min) Procedure, Rationale, Specifics   12 nt 19859 Therapeutic Exercise (timed):  increase ROM, strength, coordination, balance, and proprioception to improve patient's ability to progress to PLOF and address remaining functional goals. (see flow sheet as applicable)     Details if applicable:     32 32 [id number] Neuromuscular Re-Education (timed):  improve balance, coordination, kinesthetic sense, posture, core stability and proprioception to improve patient's ability to develop conscious

## 2024-05-21 ENCOUNTER — HOSPITAL ENCOUNTER (OUTPATIENT)
Facility: HOSPITAL | Age: 64
Setting detail: RECURRING SERIES
Discharge: HOME | End: 2024-05-24
Payer: COMMERCIAL

## 2024-05-21 PROCEDURE — 97110 THERAPEUTIC EXERCISES: CPT

## 2024-05-21 NOTE — PROGRESS NOTES
[facility]  [address]  [place] [address]  Phone: [phone]   Fax: [phone]     DISCHARGE SUMMARY  Patient Name: Astrid Hurley : 1960   Treatment/Medical Diagnosis: Other abnormalities of gait and mobility [R26.89]  Polyneuropathy, unspecified [G62.9]   Referral Source: Macarena Ko APRN - *     Date of Initial Visit: 2024 Attended Visits: 20 Missed Visits: 2     SUMMARY OF TREATMENT  Patient is a 21 year old female who has been seen for 20 visits since initial evaluation on 2024  due to concerns for safety with ambulation and balance. Patient has made consistent progress in therapy towards all short term and long term goals. Patient has currently met all short term goals and all long term goals to date. Patient demonstrates improving bilateral lower extremity strength, static and dynamic balance, functional mobility, gait mechanics, and tolerance to activity. Patient has made significant improvements on nearly all outcome measures assessed this visit compared to initial evaluation, but demonstrates a plateau in progress since previous reassessment a month ago likely demonstrating reaching maximum benefit of outpatient physical therapy. Patient has subjective report of improving confidence and overall safety ambulating with use of SPC for short community distances instead of rollator. Patient notes she recently has been keeping the rollator in her car when going out running errands. Patient reports she is able to walk around the grocery store (with support from grocery cart) for >/= 40 minutes, this has improved significantly from report of ~5 minutes at initial visit. Patient notes improving tolerance to prolonged standing as well to complete self care tasks, cooking, cleaning. Emphasis placed on importance of continued compliance to all exercises in HEP to maintain current level of 
Term Goals: To be accomplished in 24 treatments.  Patient will improve FOTO score by the MDC of 3 to >/= 40 showing significant improvement in their self-reported level of function. Met (47/100)  Patient will report improving tolerance to walking (>/= 10 minutes) with LRAD and no LOB to allow improving safety with community ambulation such as walking through a parking lot. Met (40 minutes in grocery store)   Patient will report improving tolerance to standing (>/= 10 minutes) to allow improving ability to perform tasks such as cooking and cleaning. Met  Patient will negotiate 3 steps reciprocally without an increase in pain from baseline level or without reported fear of falling and with single UE assistance on railing to improve safety in the home. Met    PLAN  No  Continue plan of care  []  Upgrade activities as tolerated  [x]  Discharge due to : Progress towards all goals  []  Other:      Judith Rodriguez, PT       5/21/2024       10:48 AM

## 2024-05-24 ENCOUNTER — HOSPITAL ENCOUNTER (OUTPATIENT)
Facility: HOSPITAL | Age: 64
Discharge: HOME | End: 2024-05-24
Attending: INTERNAL MEDICINE
Payer: COMMERCIAL

## 2024-05-24 DIAGNOSIS — N18.31 CHRONIC KIDNEY DISEASE, STAGE 3A (HCC): ICD-10-CM

## 2024-05-24 PROCEDURE — 76770 US EXAM ABDO BACK WALL COMP: CPT

## 2024-05-31 ENCOUNTER — APPOINTMENT (OUTPATIENT)
Facility: HOSPITAL | Age: 64
End: 2024-05-31
Payer: COMMERCIAL

## 2024-06-09 DIAGNOSIS — I10 UNCONTROLLED HYPERTENSION: ICD-10-CM

## 2024-06-10 RX ORDER — CHLORTHALIDONE 25 MG/1
25 TABLET ORAL DAILY
Qty: 90 TABLET | Refills: 1 | Status: SHIPPED | OUTPATIENT
Start: 2024-06-10

## 2024-06-14 ENCOUNTER — OFFICE VISIT (OUTPATIENT)
Age: 64
End: 2024-06-14

## 2024-06-14 VITALS
OXYGEN SATURATION: 97 % | HEIGHT: 61 IN | HEART RATE: 81 BPM | BODY MASS INDEX: 32.28 KG/M2 | RESPIRATION RATE: 18 BRPM | DIASTOLIC BLOOD PRESSURE: 76 MMHG | SYSTOLIC BLOOD PRESSURE: 120 MMHG | WEIGHT: 171 LBS

## 2024-06-14 DIAGNOSIS — M79.2 NEUROPATHIC PAIN: ICD-10-CM

## 2024-06-14 DIAGNOSIS — R20.0 NUMBNESS AND TINGLING IN BOTH HANDS: ICD-10-CM

## 2024-06-14 DIAGNOSIS — R26.89 BALANCE DISORDER: ICD-10-CM

## 2024-06-14 DIAGNOSIS — G62.9 AXONAL POLYNEUROPATHY: Primary | ICD-10-CM

## 2024-06-14 DIAGNOSIS — R20.2 NUMBNESS AND TINGLING IN BOTH HANDS: ICD-10-CM

## 2024-06-14 ASSESSMENT — PATIENT HEALTH QUESTIONNAIRE - PHQ9
2. FEELING DOWN, DEPRESSED OR HOPELESS: NOT AT ALL
SUM OF ALL RESPONSES TO PHQ9 QUESTIONS 1 & 2: 0
SUM OF ALL RESPONSES TO PHQ QUESTIONS 1-9: 0
1. LITTLE INTEREST OR PLEASURE IN DOING THINGS: NOT AT ALL
SUM OF ALL RESPONSES TO PHQ QUESTIONS 1-9: 0

## 2024-06-14 NOTE — PROGRESS NOTES
Chief Complaint   Patient presents with    Neurologic Problem     Follow up for neuropathy in hands and feet - doing about the same since last OV      1. Have you been to the ER, urgent care clinic since your last visit?  Hospitalized since your last visit? No     2. Have you seen or consulted any other health care providers outside of the [facility] since your last visit?  Include any pap smears or colon screening.  Yes Nephrologist     
[place]        1.  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  Will modify plan of care based upon results.  Patient is try to maintain healthy lifestyle, continue with cessation of Tobacco and ETOH products. She does note that the physical therapy was beneficial, she is to continue exercises on her own and resume PT if needed.  Patient is continue gabapentin as prescribed as well. Follow up PCP for ongoing lab monitoring and management.  2.  Balance disorder: Fall precautions recommended, resume therapy if needed.  3.  Numbness tingling of bilateral hands: Updated EMG to be completed, will modify plan of care based upon results, continue gabapentin as prescribed.  4.  Neuropathic pain: Patient is to continue gabapentin 300 mg 3 times a day, no adjustments have been made.  Safety and side effects medication discussed.    Patient and/or family verbalized understand of all instructions and all questions/concerns were addressed.  Safety/side effects of medications discussed.    Patient remains a complex patient secondary to polypharmacy, significant comorbid conditions, and use of high-risk medications which complicate the decision making process related to patient's neurologic diagnosis.    The patient is to follow up in 6 months, sooner if needed.    Macarena Ko, ADA-BC

## 2024-07-19 ENCOUNTER — HOSPITAL ENCOUNTER (OUTPATIENT)
Facility: HOSPITAL | Age: 64
Discharge: HOME | End: 2024-07-19
Attending: INTERNAL MEDICINE
Payer: COMMERCIAL

## 2024-07-19 DIAGNOSIS — N17.9 ACUTE RENAL FAILURE, UNSPECIFIED ACUTE RENAL FAILURE TYPE (HCC): ICD-10-CM

## 2024-07-19 PROCEDURE — 74178 CT ABD&PLV WO CNTR FLWD CNTR: CPT

## 2024-07-19 PROCEDURE — 6360000004 HC RX CONTRAST MEDICATION: Performed by: INTERNAL MEDICINE

## 2024-07-19 RX ORDER — PANTOPRAZOLE SODIUM 40 MG/1
TABLET, DELAYED RELEASE ORAL
Qty: 90 TABLET | Refills: 0 | Status: SHIPPED | OUTPATIENT
Start: 2024-07-19

## 2024-07-19 RX ADMIN — IOPAMIDOL 100 ML: 755 INJECTION, SOLUTION INTRAVENOUS at 08:59

## 2024-07-19 NOTE — TELEPHONE ENCOUNTER
Patient LOV: 3/6/2024 with CARLY Mckay  Next OV: 9/9/2024 with CARLY Mckay  Please send to pharmacy on file.

## 2024-08-07 ENCOUNTER — PROCEDURE VISIT (OUTPATIENT)
Age: 64
End: 2024-08-07

## 2024-08-07 DIAGNOSIS — G62.1 ALCOHOLIC PERIPHERAL NEUROPATHY (HCC): ICD-10-CM

## 2024-08-07 DIAGNOSIS — G60.8 IDIOPATHIC SMALL AND LARGE FIBER SENSORY NEUROPATHY: Primary | ICD-10-CM

## 2024-08-07 DIAGNOSIS — R26.0 SENSORY ATAXIC GAIT: ICD-10-CM

## 2024-08-07 DIAGNOSIS — G62.89 NUTRITIONAL ATAXIC NEUROPATHY: ICD-10-CM

## 2024-08-07 RX ORDER — M-VIT,TX,IRON,MINS/CALC/FOLIC 27MG-0.4MG
1 TABLET ORAL DAILY
COMMUNITY

## 2024-08-07 RX ORDER — ACETAMINOPHEN 160 MG
TABLET,DISINTEGRATING ORAL
COMMUNITY

## 2024-08-07 NOTE — PROCEDURES
++++++++++++++++++++++++++++++++++++++++++++++++++++++++++++++++++++++++++++++++++++++++++++++++++++++++++++++++++++++++++++++++++++++++++++++++++++++++++++++++++++++++++++++++++++++++++++++++++++++++++++++++++++++++++++++++++++++[PROCEDURE NOTE  Date: 2024   Name: Astrid Hurley  YOB: 1960    Procedures      ELECTRODIANOSTIC REPORT  Test Date:  2024    Patient: Astrid Hurley : 1960 Physician: Flo Stoll MD   Sex: Female Tech: Tammy Wiggins, EMG Technician  Ref Phys:      Technician: Tammy Wiggins    Clinical Indication: Patient is a 64-year-old female with a history of previous chronic alcoholism and nutritional neuropathy seen today for follow-up EMG study because of persistent numbness in her hands and feet to evaluate progression of her neuropathy.  Patient with previous bilateral severe ankle fractures requiring multiple surgeries.  Patient still drinks occasionally.    Neuro exam: Patient has hypoactive but symmetric reflexes throughout and no Babinski or clonus present.  Patient has normal strength and muscle tone and bulk in all extremities.  Sensory examination shows mild decreased vibration and temperature in both feet to ankle level.  Cranial nerves II through XII are intact and mental status is normal.  Patient's gait and station is normal.  Cerebellar testing is normal except for slight abnormal tandem walking.    Impression: This study shows electrophysiologic evidence of a mild distal length-dependent demyelinating and axonal polyneuropathy consistent with very toxic, metabolic or acquired neuropathies.  There was no clear evidence of motor radiculopathy, entrapment neuropathy, or other neuromuscular disease.  In comparison to her previous studies which showed a borderline carpal tunnel syndrome on the right the study shows no clear evidence of carpal tunnel syndrome.  There did not appear to be any major change from the patient's previous study done May 2021.

## 2024-08-07 NOTE — PROGRESS NOTES
Patient EMG study showed mild distal length-dependent mildly axonal polyneuropathy similar to her previous study done in May 2021, no major change, no evidence of carpal tunnel syndrome on the right side that she had previously.

## 2024-08-12 RX ORDER — PROPRANOLOL HYDROCHLORIDE 10 MG/1
10 TABLET ORAL 2 TIMES DAILY
Qty: 180 TABLET | Refills: 0 | Status: SHIPPED | OUTPATIENT
Start: 2024-08-12

## 2024-08-12 NOTE — TELEPHONE ENCOUNTER
PCP: Dotty Mckay APRN - NP    Last appt: 3/6/2024   Future Appointments   Date Time Provider Department Center   9/9/2024  9:00 AM Dotty Mckay APRN - NP PAFP [facility]   12/17/2024  9:00 AM Macarena Ko APRN - NP [facility]UM[facility]       Requested Prescriptions     Pending Prescriptions Disp Refills    propranolol (INDERAL) 10 MG tablet [Pharmacy Med Name: PROPRANOLOL 10 MG TAB[*]] 180 tablet 0     Sig: TAKE ONE TABLET BY MOUTH TWICE A DAY         Prior labs and Blood pressures:  BP Readings from Last 3 Encounters:   06/14/24 120/76   03/06/24 121/73   12/14/23 122/80     Lab Results   Component Value Date/Time     03/06/2024 10:30 AM    K 3.9 03/06/2024 10:30 AM     03/06/2024 10:30 AM    CO2 25 03/06/2024 10:30 AM    BUN 26 03/06/2024 10:30 AM    GFRAA 60 06/29/2022 10:21 AM     No results found for: \"HBA1C\", \"[id number]\"  Lab Results   Component Value Date/Time    CHOL 266 06/01/2023 11:07 AM    HDL 40 06/01/2023 11:07 AM    .2 06/01/2023 11:07 AM    VLDL 54.8 06/01/2023 11:07 AM     No results found for: \"VITD3\"    Lab Results   Component Value Date/Time    TSH 1.35 04/16/2021 10:50 AM

## 2024-08-19 DIAGNOSIS — G62.9 POLYNEUROPATHY, UNSPECIFIED: ICD-10-CM

## 2024-08-19 RX ORDER — FOLIC ACID 1 MG/1
1000 TABLET ORAL EVERY MORNING
Qty: 90 TABLET | Refills: 0 | Status: SHIPPED | OUTPATIENT
Start: 2024-08-19

## 2024-08-19 NOTE — TELEPHONE ENCOUNTER
PCP: Dotty Mckay APRN - NP    Last appt: 3/6/2024     Future Appointments   Date Time Provider Department Center   9/9/2024  9:00 AM Dotty Mckay APRN - NP PAFP [facility]   12/17/2024  9:00 AM Macarena Ko APRN - NP NEUM[facility]B BS [facility]       Requested Prescriptions     Pending Prescriptions Disp Refills    folic acid (FOLVITE) 1 MG tablet [Pharmacy Med Name: FOLIC ACID 1 MG TAB[**]] 90 tablet 0     Sig: TAKE ONE TABLET BY MOUTH EVERY MORNING       Prior labs and Blood pressures:  BP Readings from Last 3 Encounters:   06/14/24 120/76   03/06/24 121/73   12/14/23 122/80     Lab Results   Component Value Date/Time     03/06/2024 10:30 AM    K 3.9 03/06/2024 10:30 AM     03/06/2024 10:30 AM    CO2 25 03/06/2024 10:30 AM    BUN 26 03/06/2024 10:30 AM    GFRAA 60 06/29/2022 10:21 AM     No results found for: \"HBA1C\", \"[id number]\"  Lab Results   Component Value Date/Time    CHOL 266 06/01/2023 11:07 AM    HDL 40 06/01/2023 11:07 AM    .2 06/01/2023 11:07 AM    VLDL 54.8 06/01/2023 11:07 AM     No results found for: \"VITD3\"    Lab Results   Component Value Date/Time    TSH 1.35 04/16/2021 10:50 AM

## 2024-08-20 RX ORDER — GABAPENTIN 300 MG/1
300 CAPSULE ORAL 3 TIMES DAILY
Qty: 90 CAPSULE | Refills: 3 | Status: SHIPPED | OUTPATIENT
Start: 2024-08-20 | End: 2024-11-18

## 2024-08-29 DIAGNOSIS — F10.11 ALCOHOL ABUSE, IN REMISSION: ICD-10-CM

## 2024-08-29 NOTE — TELEPHONE ENCOUNTER
Patient call for refill naltrexone.  Asisatou Bautista    Last appointment: 3/6/24 Nely  Next appointment: 9/9/24 Nely  Previous refill encounter(s): 3/6/24 90 + 1    Requested Prescriptions     Pending Prescriptions Disp Refills    naltrexone (DEPADE) 50 MG tablet 90 tablet 1     Sig: Take 1 tablet by mouth daily     For Pharmacy Admin Tracking Only    Program: Medication Refill  CPA in place:    Recommendation Provided To:   Intervention Detail: New Rx: 1, reason: Patient Preference  Intervention Accepted By:   Gap Closed?:    Time Spent (min): 5

## 2024-08-30 RX ORDER — NALTREXONE HYDROCHLORIDE 50 MG/1
50 TABLET, FILM COATED ORAL DAILY
Qty: 90 TABLET | Refills: 1 | Status: SHIPPED | OUTPATIENT
Start: 2024-08-30

## 2024-09-09 ENCOUNTER — OFFICE VISIT (OUTPATIENT)
Age: 64
End: 2024-09-09
Payer: COMMERCIAL

## 2024-09-09 VITALS
BODY MASS INDEX: 32.36 KG/M2 | HEIGHT: 61 IN | HEART RATE: 83 BPM | WEIGHT: 171.4 LBS | SYSTOLIC BLOOD PRESSURE: 125 MMHG | DIASTOLIC BLOOD PRESSURE: 82 MMHG | TEMPERATURE: 97.8 F | RESPIRATION RATE: 14 BRPM | OXYGEN SATURATION: 98 %

## 2024-09-09 DIAGNOSIS — E53.8 B12 DEFICIENCY: ICD-10-CM

## 2024-09-09 DIAGNOSIS — K70.30 ALCOHOLIC CIRRHOSIS OF LIVER WITHOUT ASCITES (HCC): ICD-10-CM

## 2024-09-09 DIAGNOSIS — F10.230 ALCOHOL DEPENDENCE WITH UNCOMPLICATED WITHDRAWAL (HCC): ICD-10-CM

## 2024-09-09 DIAGNOSIS — K76.6 PORTAL HYPERTENSION (HCC): Primary | ICD-10-CM

## 2024-09-09 DIAGNOSIS — J43.2 CENTRILOBULAR EMPHYSEMA (HCC): ICD-10-CM

## 2024-09-09 DIAGNOSIS — Z12.31 ENCOUNTER FOR SCREENING MAMMOGRAM FOR MALIGNANT NEOPLASM OF BREAST: ICD-10-CM

## 2024-09-09 DIAGNOSIS — Z87.891 PERSONAL HISTORY OF TOBACCO USE: ICD-10-CM

## 2024-09-09 DIAGNOSIS — Z87.891 PERSONAL HISTORY OF TOBACCO USE, PRESENTING HAZARDS TO HEALTH: ICD-10-CM

## 2024-09-09 DIAGNOSIS — I10 PRIMARY HYPERTENSION: ICD-10-CM

## 2024-09-09 DIAGNOSIS — Z72.0 TOBACCO ABUSE: ICD-10-CM

## 2024-09-09 DIAGNOSIS — N28.89 OBSTRUCTION OF KIDNEY: ICD-10-CM

## 2024-09-09 DIAGNOSIS — Z71.6 ENCOUNTER FOR TOBACCO USE CESSATION COUNSELING: ICD-10-CM

## 2024-09-09 DIAGNOSIS — R73.01 IMPAIRED FASTING GLUCOSE: ICD-10-CM

## 2024-09-09 PROBLEM — E43 UNSPECIFIED SEVERE PROTEIN-CALORIE MALNUTRITION (HCC): Status: RESOLVED | Noted: 2023-06-01 | Resolved: 2024-09-09

## 2024-09-09 LAB
ALBUMIN SERPL-MCNC: 3.6 G/DL (ref 3.5–5)
ALBUMIN/GLOB SERPL: 0.9 (ref 1.1–2.2)
ALP SERPL-CCNC: 127 U/L (ref 45–117)
ALT SERPL-CCNC: 31 U/L (ref 12–78)
ANION GAP SERPL CALC-SCNC: 3 MMOL/L (ref 2–12)
AST SERPL-CCNC: 32 U/L (ref 15–37)
BASOPHILS # BLD: 0 K/UL (ref 0–0.1)
BASOPHILS NFR BLD: 0 % (ref 0–1)
BILIRUB SERPL-MCNC: 0.4 MG/DL (ref 0.2–1)
BUN SERPL-MCNC: 15 MG/DL (ref 6–20)
BUN/CREAT SERPL: 12 (ref 12–20)
CALCIUM SERPL-MCNC: 9.4 MG/DL (ref 8.5–10.1)
CHLORIDE SERPL-SCNC: 99 MMOL/L (ref 97–108)
CO2 SERPL-SCNC: 35 MMOL/L (ref 21–32)
CREAT SERPL-MCNC: 1.24 MG/DL (ref 0.55–1.02)
DIFFERENTIAL METHOD BLD: ABNORMAL
EOSINOPHIL # BLD: 0.3 K/UL (ref 0–0.4)
EOSINOPHIL NFR BLD: 4 % (ref 0–7)
ERYTHROCYTE [DISTWIDTH] IN BLOOD BY AUTOMATED COUNT: 15 % (ref 11.5–14.5)
EST. AVERAGE GLUCOSE BLD GHB EST-MCNC: 114 MG/DL
GLOBULIN SER CALC-MCNC: 3.9 G/DL (ref 2–4)
GLUCOSE SERPL-MCNC: 119 MG/DL (ref 65–100)
HBA1C MFR BLD: 5.6 % (ref 4–5.6)
HCT VFR BLD AUTO: 41.4 % (ref 35–47)
HGB BLD-MCNC: 13.7 G/DL (ref 11.5–16)
IMM GRANULOCYTES # BLD AUTO: 0 K/UL
IMM GRANULOCYTES NFR BLD AUTO: 0 %
LYMPHOCYTES # BLD: 2.2 K/UL (ref 0.8–3.5)
LYMPHOCYTES NFR BLD: 28 % (ref 12–49)
MCH RBC QN AUTO: 30.6 PG (ref 26–34)
MCHC RBC AUTO-ENTMCNC: 33.1 G/DL (ref 30–36.5)
MCV RBC AUTO: 92.6 FL (ref 80–99)
MONOCYTES # BLD: 0.5 K/UL (ref 0–1)
MONOCYTES NFR BLD: 6 % (ref 5–13)
NEUTS SEG # BLD: 4.8 K/UL (ref 1.8–8)
NEUTS SEG NFR BLD: 62 % (ref 32–75)
NRBC # BLD: 0 K/UL (ref 0–0.01)
NRBC BLD-RTO: 0 PER 100 WBC
PLATELET # BLD AUTO: 217 K/UL (ref 150–400)
PMV BLD AUTO: 11.2 FL (ref 8.9–12.9)
POTASSIUM SERPL-SCNC: 3.8 MMOL/L (ref 3.5–5.1)
PROT SERPL-MCNC: 7.5 G/DL (ref 6.4–8.2)
RBC # BLD AUTO: 4.47 M/UL (ref 3.8–5.2)
RBC MORPH BLD: ABNORMAL
RBC MORPH BLD: ABNORMAL
SODIUM SERPL-SCNC: 137 MMOL/L (ref 136–145)
VIT B12 SERPL-MCNC: 763 PG/ML (ref 193–986)
WBC # BLD AUTO: 7.8 K/UL (ref 3.6–11)

## 2024-09-09 PROCEDURE — 3074F SYST BP LT 130 MM HG: CPT | Performed by: NURSE PRACTITIONER

## 2024-09-09 PROCEDURE — 3079F DIAST BP 80-89 MM HG: CPT | Performed by: NURSE PRACTITIONER

## 2024-09-09 PROCEDURE — G0296 VISIT TO DETERM LDCT ELIG: HCPCS | Performed by: NURSE PRACTITIONER

## 2024-09-09 PROCEDURE — 99214 OFFICE O/P EST MOD 30 MIN: CPT | Performed by: NURSE PRACTITIONER

## 2024-09-09 PROCEDURE — 99406 BEHAV CHNG SMOKING 3-10 MIN: CPT | Performed by: NURSE PRACTITIONER

## 2024-09-09 RX ORDER — CHLORTHALIDONE 25 MG/1
25 TABLET ORAL DAILY
Qty: 90 TABLET | Refills: 1 | Status: SHIPPED | OUTPATIENT
Start: 2024-09-09

## 2024-09-09 RX ORDER — HYDROCORTISONE 25 MG/G
OINTMENT TOPICAL
Qty: 20 G | Refills: 0 | Status: SHIPPED | OUTPATIENT
Start: 2024-09-09

## 2024-09-09 RX ORDER — DOXEPIN HYDROCHLORIDE 50 MG/1
50 CAPSULE ORAL NIGHTLY
Qty: 90 CAPSULE | Refills: 1 | Status: SHIPPED | OUTPATIENT
Start: 2024-09-09

## 2024-09-09 RX ORDER — NALTREXONE HYDROCHLORIDE 50 MG/1
50 TABLET, FILM COATED ORAL DAILY
Qty: 90 TABLET | Refills: 1 | Status: SHIPPED | OUTPATIENT
Start: 2024-09-09

## 2024-09-09 RX ORDER — BUPROPION HYDROCHLORIDE 150 MG/1
150 TABLET ORAL EVERY MORNING
Qty: 90 TABLET | Refills: 1 | Status: SHIPPED | OUTPATIENT
Start: 2024-09-09

## 2024-09-09 RX ORDER — VARENICLINE TARTRATE 0.5 MG/1
.5-1 TABLET, FILM COATED ORAL SEE ADMIN INSTRUCTIONS
Qty: 57 TABLET | Refills: 0 | Status: SHIPPED | OUTPATIENT
Start: 2024-09-09

## 2024-09-09 SDOH — ECONOMIC STABILITY: FOOD INSECURITY: WITHIN THE PAST 12 MONTHS, THE FOOD YOU BOUGHT JUST DIDN'T LAST AND YOU DIDN'T HAVE MONEY TO GET MORE.: PATIENT DECLINED

## 2024-09-09 SDOH — ECONOMIC STABILITY: FOOD INSECURITY: WITHIN THE PAST 12 MONTHS, YOU WORRIED THAT YOUR FOOD WOULD RUN OUT BEFORE YOU GOT MONEY TO BUY MORE.: PATIENT DECLINED

## 2024-09-09 SDOH — ECONOMIC STABILITY: INCOME INSECURITY: HOW HARD IS IT FOR YOU TO PAY FOR THE VERY BASICS LIKE FOOD, HOUSING, MEDICAL CARE, AND HEATING?: PATIENT DECLINED

## 2024-10-14 DIAGNOSIS — Z71.6 ENCOUNTER FOR TOBACCO USE CESSATION COUNSELING: ICD-10-CM

## 2024-10-14 DIAGNOSIS — Z87.891 PERSONAL HISTORY OF TOBACCO USE: ICD-10-CM

## 2024-10-14 DIAGNOSIS — Z72.0 TOBACCO ABUSE: ICD-10-CM

## 2024-10-14 RX ORDER — VARENICLINE TARTRATE 0.5 MG/1
TABLET, FILM COATED ORAL
Qty: 57 TABLET | Refills: 0 | Status: SHIPPED | OUTPATIENT
Start: 2024-10-14

## 2024-10-14 NOTE — TELEPHONE ENCOUNTER
PCP: Dotty Mckay APRN - NP    Last appt: 9/9/2024   Future Appointments   Date Time Provider Department Center   12/17/2024  9:00 AM Macarena Ko APRN - NP [facility]       Requested Prescriptions     Pending Prescriptions Disp Refills    varenicline (CHANTIX) 0.5 MG tablet [Pharmacy Med Name: VARENICLINE 0.5 MG TABLET] 57 tablet 0     Sig: TAKE 1 TABLET BY MOUTH DAILY FOR 3 DAYS FOLLOWED BY 1 TABLET TWO TIMES A DAY FOR 4 DAYS FOLLOWED BY 2 TABLETS TWICE DAILY         Prior labs and Blood pressures:  BP Readings from Last 3 Encounters:   09/09/24 125/82   06/14/24 120/76   03/06/24 121/73     Lab Results   Component Value Date/Time     09/09/2024 10:03 AM    K 3.8 09/09/2024 10:03 AM    CL 99 09/09/2024 10:03 AM    CO2 35 09/09/2024 10:03 AM    BUN 15 09/09/2024 10:03 AM    GFRAA 60 06/29/2022 10:21 AM     No results found for: \"HBA1C\", \"[id number]\"  Lab Results   Component Value Date/Time    CHOL 266 06/01/2023 11:07 AM    HDL 40 06/01/2023 11:07 AM    .2 06/01/2023 11:07 AM    VLDL 54.8 06/01/2023 11:07 AM     No results found for: \"VITD3\"    Lab Results   Component Value Date/Time    TSH 1.35 04/16/2021 10:50 AM

## 2024-10-23 ENCOUNTER — TELEPHONE (OUTPATIENT)
Age: 64
End: 2024-10-23

## 2024-10-23 NOTE — TELEPHONE ENCOUNTER
Patient called need referral sent to [facility] for her mammogram, but it was sent to [facility].

## 2024-10-29 ENCOUNTER — HOSPITAL ENCOUNTER (OUTPATIENT)
Facility: HOSPITAL | Age: 64
Discharge: HOME | End: 2024-11-01
Payer: COMMERCIAL

## 2024-10-29 DIAGNOSIS — Z71.6 ENCOUNTER FOR TOBACCO USE CESSATION COUNSELING: ICD-10-CM

## 2024-10-29 DIAGNOSIS — Z87.891 PERSONAL HISTORY OF TOBACCO USE: ICD-10-CM

## 2024-10-29 DIAGNOSIS — Z72.0 TOBACCO ABUSE: ICD-10-CM

## 2024-10-29 PROCEDURE — 71271 CT THORAX LUNG CANCER SCR C-: CPT

## 2024-12-09 ENCOUNTER — OFFICE VISIT (OUTPATIENT)
Age: 64
End: 2024-12-09
Payer: COMMERCIAL

## 2024-12-09 VITALS
TEMPERATURE: 97.8 F | WEIGHT: 171.4 LBS | HEIGHT: 61 IN | SYSTOLIC BLOOD PRESSURE: 148 MMHG | OXYGEN SATURATION: 96 % | BODY MASS INDEX: 32.36 KG/M2 | DIASTOLIC BLOOD PRESSURE: 83 MMHG | HEART RATE: 95 BPM | RESPIRATION RATE: 16 BRPM

## 2024-12-09 DIAGNOSIS — M17.11 UNILATERAL PRIMARY OSTEOARTHRITIS, RIGHT KNEE: ICD-10-CM

## 2024-12-09 DIAGNOSIS — M25.561 RIGHT KNEE PAIN, UNSPECIFIED CHRONICITY: Primary | ICD-10-CM

## 2024-12-09 PROCEDURE — 99213 OFFICE O/P EST LOW 20 MIN: CPT | Performed by: ORTHOPAEDIC SURGERY

## 2024-12-09 PROCEDURE — 20610 DRAIN/INJ JOINT/BURSA W/O US: CPT | Performed by: ORTHOPAEDIC SURGERY

## 2024-12-09 RX ORDER — BETAMETHASONE SODIUM PHOSPHATE AND BETAMETHASONE ACETATE 3; 3 MG/ML; MG/ML
6 INJECTION, SUSPENSION INTRA-ARTICULAR; INTRALESIONAL; INTRAMUSCULAR; SOFT TISSUE ONCE
Status: COMPLETED | OUTPATIENT
Start: 2024-12-09 | End: 2024-12-09

## 2024-12-09 RX ADMIN — BETAMETHASONE SODIUM PHOSPHATE AND BETAMETHASONE ACETATE 6 MG: 3; 3 INJECTION, SUSPENSION INTRA-ARTICULAR; INTRALESIONAL; INTRAMUSCULAR; SOFT TISSUE at 09:08

## 2024-12-09 ASSESSMENT — PATIENT HEALTH QUESTIONNAIRE - PHQ9
2. FEELING DOWN, DEPRESSED OR HOPELESS: NOT AT ALL
SUM OF ALL RESPONSES TO PHQ QUESTIONS 1-9: 0
SUM OF ALL RESPONSES TO PHQ QUESTIONS 1-9: 0
SUM OF ALL RESPONSES TO PHQ9 QUESTIONS 1 & 2: 0
1. LITTLE INTEREST OR PLEASURE IN DOING THINGS: NOT AT ALL
SUM OF ALL RESPONSES TO PHQ QUESTIONS 1-9: 0
SUM OF ALL RESPONSES TO PHQ QUESTIONS 1-9: 0

## 2024-12-09 NOTE — PROGRESS NOTES
12/9/2024      CC: right knee pain    HPI:      This is a 64 y.o. year old female who presents for a follow up visit.  The patient was last seen and diagnosed with right knee osteoarthritis.   The patient's treatments since the most recent visit have comprised of injection, rollator.   The patient has had several months' relief of the chief complaint.        PMH:  Past Medical History:   Diagnosis Date    GI (gastrointestinal bleed)     Insomnia 11/09    Iron deficiency anemia 04/2004    PPD positive     treated w/ INH- tests since have been negative    Pure hypercholesterolemia     Scoliosis 12/28/15    [facility]    Tobacco dependence     Unspecified essential hypertension     Uterine bleeding, dysfunctional 2/12    Uterine fibroid        PSxHx:  Past Surgical History:   Procedure Laterality Date    CHOLECYSTECTOMY      COLONOSCOPY N/A 1/31/2018    COLONOSCOPY performed by Kale Casas MD at [facility] ENDOSCOPY    COLONOSCOPY  07/01/2013    [facility], repeat 10 years    LEG/ANKLE SURGERY PROC UNLISTED      ORTHOPEDIC SURGERY      Shattered bilateral ankles-metal plates & screws    OTHER SURGICAL HISTORY      Benign cyst removal- L. foot       Meds:    Current Outpatient Medications:     varenicline (CHANTIX) 0.5 MG tablet, TAKE 1 TABLET BY MOUTH DAILY FOR 3 DAYS FOLLOWED BY 1 TABLET TWO TIMES A DAY FOR 4 DAYS FOLLOWED BY 2 TABLETS TWICE DAILY, Disp: 57 tablet, Rfl: 0    buPROPion (WELLBUTRIN XL) 150 MG extended release tablet, Take 1 tablet by mouth every morning, Disp: 90 tablet, Rfl: 1    chlorthalidone (HYGROTON) 25 MG tablet, Take 1 tablet by mouth daily, Disp: 90 tablet, Rfl: 1    doxepin (SINEQUAN) 50 MG capsule, Take 1 capsule by mouth nightly, Disp: 90 capsule, Rfl: 1    naltrexone (DEPADE) 50 MG tablet, Take 1 tablet by mouth daily, Disp: 90 tablet, Rfl: 1    hydrocortisone 2.5 % ointment, Apply topically 2 times daily. Do not exceed more than 7 days, Disp: 20 g, Rfl:

## 2024-12-09 NOTE — PROGRESS NOTES
Identified pt with two pt identifiers (name and ). Reviewed chart in preparation for visit and have obtained necessary documentation.    Astrid Hurley is a 64 y.o. female Knee Pain (RT knee pain )  .    Vitals:    24 0835   BP: (!) 148/83   Site: Right Upper Arm   Position: Sitting   Cuff Size: Large Adult   Pulse: 95   Resp: 16   Temp: 97.8 °F (36.6 °C)   TempSrc: Oral   SpO2: 96%   Weight: 77.7 kg (171 lb 6.4 oz)   Height: 1.549 m (5' 1\")          1. Have you been to the ER, urgent care clinic since your last visit?  Hospitalized since your last visit?  no     2. Have you seen or consulted any other health care providers outside of the [facility] since your last visit?  Include any pap smears or colon screening.  no

## 2024-12-17 ENCOUNTER — OFFICE VISIT (OUTPATIENT)
Age: 64
End: 2024-12-17
Payer: COMMERCIAL

## 2024-12-17 VITALS
WEIGHT: 175 LBS | HEIGHT: 61 IN | BODY MASS INDEX: 33.04 KG/M2 | SYSTOLIC BLOOD PRESSURE: 112 MMHG | DIASTOLIC BLOOD PRESSURE: 64 MMHG | HEART RATE: 85 BPM | OXYGEN SATURATION: 96 % | RESPIRATION RATE: 18 BRPM

## 2024-12-17 DIAGNOSIS — M79.645 PAIN OF LEFT THUMB: ICD-10-CM

## 2024-12-17 DIAGNOSIS — R26.89 BALANCE DISORDER: ICD-10-CM

## 2024-12-17 DIAGNOSIS — G62.9 AXONAL POLYNEUROPATHY: Primary | ICD-10-CM

## 2024-12-17 PROCEDURE — 99214 OFFICE O/P EST MOD 30 MIN: CPT | Performed by: NURSE PRACTITIONER

## 2024-12-17 ASSESSMENT — PATIENT HEALTH QUESTIONNAIRE - PHQ9
SUM OF ALL RESPONSES TO PHQ9 QUESTIONS 1 & 2: 0
SUM OF ALL RESPONSES TO PHQ QUESTIONS 1-9: 0
2. FEELING DOWN, DEPRESSED OR HOPELESS: NOT AT ALL
SUM OF ALL RESPONSES TO PHQ QUESTIONS 1-9: 0
1. LITTLE INTEREST OR PLEASURE IN DOING THINGS: NOT AT ALL

## 2024-12-17 NOTE — PROGRESS NOTES
Chief Complaint   Patient presents with    Neurologic Problem     Follow up for neuropathy in hands and feet - no better - no worse -had EMG on 8/7/24     1. Have you been to the ER, urgent care clinic since your last visit?  Hospitalized since your last visit? No     2. Have you seen or consulted any other health care providers outside of the [facility] since your last visit?  Include any pap smears or colon screening.  No

## 2024-12-17 NOTE — PROGRESS NOTES
[facility]  [address]  [address]  [place] [address]  Tel: [phone]  Fax: [phone]      Date:  24     Name:  EDENILSON DAWSON  :  1960  MRN:  [id number]     PCP:  Dotty Mckay APRN - NP    Chief Complaint   Patient presents with    Neurologic Problem     Follow up for neuropathy in hands and feet - no better - no worse -had EMG on 24       HISTORY OF PRESENT ILLNESS:  Patient presents today for regular follow up, last seen 2024 for neuropathy, patient is here today with her significant other who notes that her symptoms remain about the same.  She complains that the Tips of her fingers are numb. Feet are very sensitive.Feels like they are going to sleep.  Since last seen patient did have repeat EMG with Dr. Stoll, that confirmed a mild axonal polyneuropathy.  Tripped on something lately, normally she gets around pretty well.  Patient did do physical therapy in the past, she notes it has been beneficial.  C/o pain in the left thumb at times, it shoots and gets swollen.  She notes it is rather bothersome.  Gabapentin 300mg TID: doing ok, no side effects.  Smokes about 3 cigarettes per day.  ETOH: she doesn't drink daily, not buying it like she used to.  She consumes liquor when she does drink.    Impression: This study shows electrophysiologic evidence of a mild distal length-dependent demyelinating and axonal polyneuropathy consistent with very toxic, metabolic or acquired neuropathies.  There was no clear evidence of motor radiculopathy, entrapment neuropathy, or other neuromuscular disease.  In comparison to her previous studies which showed a borderline carpal tunnel syndrome on the right the study shows no clear evidence of carpal tunnel syndrome.  There did not appear to be any major change from the patient's previous study done May 2021.  Clinical correlation recommended, and follow-up studies may be evaluated on this patient in another year

## 2024-12-28 DIAGNOSIS — G62.9 POLYNEUROPATHY, UNSPECIFIED: ICD-10-CM

## 2024-12-30 RX ORDER — GABAPENTIN 300 MG/1
CAPSULE ORAL
Qty: 90 CAPSULE | Refills: 3 | Status: SHIPPED | OUTPATIENT
Start: 2024-12-30 | End: 2025-03-30

## 2025-01-08 ENCOUNTER — APPOINTMENT (OUTPATIENT)
Facility: HOSPITAL | Age: 65
End: 2025-01-08
Payer: COMMERCIAL

## 2025-01-14 RX ORDER — PANTOPRAZOLE SODIUM 40 MG/1
TABLET, DELAYED RELEASE ORAL
Qty: 90 TABLET | Refills: 0 | Status: SHIPPED | OUTPATIENT
Start: 2025-01-14

## 2025-01-14 NOTE — TELEPHONE ENCOUNTER
PCP: Dotty Mckay, APRN - NP    Last appt: 9/9/2024   Future Appointments   Date Time Provider Department Center   1/22/2025  8:00 AM Danya Mukherjee, PT MRM OPPT [facility]   6/17/2025  8:00 AM Macarena Ko APRN - NP [facility]       Requested Prescriptions     Pending Prescriptions Disp Refills    pantoprazole (PROTONIX) 40 MG tablet [Pharmacy Med Name: PANTOPRAZOLE DR 40 MG TAB[*]] 90 tablet 0     Sig: TAKE ONE TABLET BY MOUTH EVERY MORNING BEFORE BREAKFAST         Prior labs and Blood pressures:  BP Readings from Last 3 Encounters:   12/17/24 112/64   12/09/24 (!) 148/83   09/09/24 125/82     Lab Results   Component Value Date/Time     09/09/2024 10:03 AM    K 3.8 09/09/2024 10:03 AM    CL 99 09/09/2024 10:03 AM    CO2 35 09/09/2024 10:03 AM    BUN 15 09/09/2024 10:03 AM    GFRAA 60 06/29/2022 10:21 AM     No results found for: \"HBA1C\", \"[id number]\"  Lab Results   Component Value Date/Time    CHOL 266 06/01/2023 11:07 AM    HDL 40 06/01/2023 11:07 AM    .2 06/01/2023 11:07 AM    VLDL 54.8 06/01/2023 11:07 AM     No results found for: \"VITD3\"    Lab Results   Component Value Date/Time    TSH 1.35 04/16/2021 10:50 AM

## 2025-01-22 ENCOUNTER — HOSPITAL ENCOUNTER (OUTPATIENT)
Facility: HOSPITAL | Age: 65
Setting detail: RECURRING SERIES
Discharge: HOME | End: 2025-01-25
Payer: COMMERCIAL

## 2025-01-22 PROCEDURE — 97162 PT EVAL MOD COMPLEX 30 MIN: CPT

## 2025-01-22 PROCEDURE — 97110 THERAPEUTIC EXERCISES: CPT

## 2025-01-22 NOTE — THERAPY EVALUATION
[facility]  [address]  [place] [address]  Phone: [phone]   Fax: [phone]          PHYSICAL THERAPY - MEDICARE EVALUATION/PLAN OF CARE NOTE (updated 3/23)      Date: 2025          Patient Name:  Astrid Hurley :  1960   Medical   Diagnosis:  Axonal polyneuropathy [G62.9]  Balance disorder [R26.89] Treatment Diagnosis:  R26.81   Unsteadiness on feet and R26.89   Abnormalities of gait and mobility    Referral Source:  Macarena Ko APRN - * Provider #:  [id number]                Insurance: Payor: [facility] / Plan: [facility] [employer]EEPERS / Product Type: *No Product type* /      Patient  verified yes     Visit #   Current  / Total 1 24   Time   In / Out 8:10 9:05   Total Treatment Time 55   Total Timed Codes 25   1:1 Treatment Time 25      [facility] Totals Reminder:  bill using total billable   min of TIMED therapeutic procedures and modalities.   8-22 min = 1 unit; 23-37 min = 2 units; 38-52 min = 3 units;  53-67 min = 4 units; 68-82 min = 5 units           SUBJECTIVE  Pain Level (0-10 scale): 0  []constant []intermittent []improving []worsening []no change since onset    Any medication changes, allergies to medications, adverse drug reactions, diagnosis change, or new procedure performed?: [x] No    [] Yes (see summary sheet for update)  Medications: Verified on Patient Summary List    Subjective functional status/changes:     Patient is a 64 year old presenting to therapy today with a chief complaint of balance and endurance trouble. Patient reports that she has had balance difficulty for years, secondary to neuropathy. She also has history of bilateral ankle injuries and left knee. She reports that she has done PT in the past which helped, however she has not kept up with any of her exercises. She uses a rollator for further distance community ambulation, and uses a cane or no assistive device

## 2025-01-24 ENCOUNTER — HOSPITAL ENCOUNTER (OUTPATIENT)
Facility: HOSPITAL | Age: 65
Setting detail: RECURRING SERIES
Discharge: HOME | End: 2025-01-27
Payer: COMMERCIAL

## 2025-01-29 ENCOUNTER — HOSPITAL ENCOUNTER (OUTPATIENT)
Facility: HOSPITAL | Age: 65
Setting detail: RECURRING SERIES
Discharge: HOME | End: 2025-02-01
Payer: COMMERCIAL

## 2025-01-29 PROCEDURE — 97110 THERAPEUTIC EXERCISES: CPT

## 2025-01-29 NOTE — PROGRESS NOTES
PHYSICAL THERAPY - MEDICARE DAILY TREATMENT NOTE (updated 3/23)      Date: 2025          Patient Name:  Astrid Hurely :  1960   Medical   Diagnosis:  Unsteadiness on feet [R26.81]  Other abnormalities of gait and mobility [R26.89] Treatment Diagnosis:  R26.81   Unsteadiness on feet and R26.89   Abnormalities of gait and mobility    Referral Source:  Macarena Ko APRN - * Insurance:   Payor: [facility] / Plan: [facility] HE[employer]KEEPERS / Product Type: *No Product type* /                     Patient  verified yes     Visit #   Current  / Total 2 24   Time   In / Out [id number]   Total Treatment Time 46   Total Timed Codes 46   1:1 Treatment Time 46      [facility] Totals Reminder:  bill using total billable   min of TIMED therapeutic procedures and modalities.   8-22 min = 1 unit; 23-37 min = 2 units; 38-52 min = 3 units; 53-67 min = 4 units; 68-82 min = 5 units            SUBJECTIVE    Pain Level (0-10 scale): 1-2    Any medication changes, allergies to medications, adverse drug reactions, diagnosis change, or new procedure performed?: [x] No    [] Yes (see summary sheet for update)  Medications: Verified on Patient Summary List    Subjective functional status/changes:     Patient noted they have been doing alright and have not had any falls since last treatment session. Patient also noted they were able to work on their exercises 3x in the last week but did not do all of them.     OBJECTIVE      Therapeutic Procedures:  Tx Min Billable or 1:1 Min (if diff from Tx Min) Procedure, Rationale, Specifics   46 [phone] [id number] Therapeutic Exercise (timed):  increase ROM, strength, coordination, balance, and proprioception to improve patient's ability to progress to PLOF and address remaining functional goals. (see flow sheet as applicable)     Details if applicable:                         46 46    Total Total           [x]  Patient Education billed concurrently with other procedures   [x] Review HEP    []

## 2025-02-05 ENCOUNTER — HOSPITAL ENCOUNTER (OUTPATIENT)
Facility: HOSPITAL | Age: 65
Setting detail: RECURRING SERIES
Discharge: HOME | End: 2025-02-08
Payer: COMMERCIAL

## 2025-02-05 PROCEDURE — 97110 THERAPEUTIC EXERCISES: CPT

## 2025-02-05 NOTE — PROGRESS NOTES
PHYSICAL THERAPY - MEDICARE DAILY TREATMENT NOTE (updated 3/23)      Date: 2025          Patient Name:  Astrid Hurley :  1960   Medical   Diagnosis:  Unsteadiness on feet [R26.81]  Other abnormalities of gait and mobility [R26.89] Treatment Diagnosis:  R26.81   Unsteadiness on feet and R26.89   Abnormalities of gait and mobility    Referral Source:  Macarena Ko APRN - * Insurance:   Payor: [facility] / Plan: [facility] [employer]KEEPERS / Product Type: *No Product type* /                     Patient  verified yes     Visit #   Current  / Total 3 24   Time   In / Out 9:00 9:47   Total Treatment Time 47   Total Timed Codes 47   1:1 Treatment Time 47      [facility] Totals Reminder:  bill using total billable   min of TIMED therapeutic procedures and modalities.   8-22 min = 1 unit; 23-37 min = 2 units; 38-52 min = 3 units; 53-67 min = 4 units; 68-82 min = 5 units            SUBJECTIVE    Pain Level (0-10 scale): 1    Any medication changes, allergies to medications, adverse drug reactions, diagnosis change, or new procedure performed?: [x] No    [] Yes (see summary sheet for update)  Medications: Verified on Patient Summary List    Subjective functional status/changes:     Patient reports that she was sore after last session, but no major pain. Notes that she has been doing some of her exercises, but the standing ones she doesn't do as often.     OBJECTIVE      Therapeutic Procedures:  Tx Min Billable or 1:1 Min (if diff from Tx Min) Procedure, Rationale, Specifics   [phone] [id number] Therapeutic Exercise (timed):  increase ROM, strength, coordination, balance, and proprioception to improve patient's ability to progress to PLOF and address remaining functional goals. (see flow sheet as applicable)     Details if applicable:                         47 47    Total Total       [x]  Patient Education billed concurrently with other procedures   [x] Review HEP    [] Progressed/Changed HEP, detail:    [] Other

## 2025-02-07 ENCOUNTER — HOSPITAL ENCOUNTER (OUTPATIENT)
Facility: HOSPITAL | Age: 65
Setting detail: RECURRING SERIES
Discharge: HOME | End: 2025-02-10
Payer: COMMERCIAL

## 2025-02-07 PROCEDURE — 97110 THERAPEUTIC EXERCISES: CPT

## 2025-02-07 NOTE — PROGRESS NOTES
PHYSICAL THERAPY - MEDICARE DAILY TREATMENT NOTE (updated 3/23)      Date: 2025          Patient Name:  Astrid Hurley :  1960   Medical   Diagnosis:  Unsteadiness on feet [R26.81]  Other abnormalities of gait and mobility [R26.89] Treatment Diagnosis:  R26.81   Unsteadiness on feet and R26.89   Abnormalities of gait and mobility    Referral Source:  Macarena Ko APRN - * Insurance:   Payor: [facility] / Plan: [facility] [employer]KEEPERS / Product Type: *No Product type* /                     Patient  verified yes     Visit #   Current  / Total 4 24   Time   In / Out 8:29 9:14   Total Treatment Time 45   Total Timed Codes 45   1:1 Treatment Time 45      [facility] Totals Reminder:  bill using total billable   min of TIMED therapeutic procedures and modalities.   8-22 min = 1 unit; 23-37 min = 2 units; 38-52 min = 3 units; 53-67 min = 4 units; 68-82 min = 5 units            SUBJECTIVE    Pain Level (0-10 scale): 1    Any medication changes, allergies to medications, adverse drug reactions, diagnosis change, or new procedure performed?: [x] No    [] Yes (see summary sheet for update)  Medications: Verified on Patient Summary List    Subjective functional status/changes:   Patient reports that she was sore after last session, but overall is feeling good.     OBJECTIVE      Therapeutic Procedures:  Tx Min Billable or 1:1 Min (if diff from Tx Min) Procedure, Rationale, Specifics   45  [id number] Therapeutic Exercise (timed):  increase ROM, strength, coordination, balance, and proprioception to improve patient's ability to progress to PLOF and address remaining functional goals. (see flow sheet as applicable)     Details if applicable:                         45     Total Total       [x]  Patient Education billed concurrently with other procedures   [x] Review HEP    [] Progressed/Changed HEP, detail:    [] Other detail:         Other Objective/Functional Measures  NA    Pain Level at end of session (0-10 scale):

## 2025-02-08 ENCOUNTER — HOSPITAL ENCOUNTER (OUTPATIENT)
Facility: HOSPITAL | Age: 65
Discharge: HOME | End: 2025-02-11
Attending: ORTHOPAEDIC SURGERY
Payer: COMMERCIAL

## 2025-02-08 DIAGNOSIS — M25.842 MASS OF JOINT OF LEFT HAND: ICD-10-CM

## 2025-02-08 PROCEDURE — A9579 GAD-BASE MR CONTRAST NOS,1ML: HCPCS | Performed by: ORTHOPAEDIC SURGERY

## 2025-02-08 PROCEDURE — 73220 MRI UPPR EXTREMITY W/O&W/DYE: CPT

## 2025-02-08 PROCEDURE — 6360000004 HC RX CONTRAST MEDICATION: Performed by: ORTHOPAEDIC SURGERY

## 2025-02-08 RX ADMIN — GADOTERIDOL 15 ML: 279.3 INJECTION, SOLUTION INTRAVENOUS at 08:41

## 2025-02-12 ENCOUNTER — APPOINTMENT (OUTPATIENT)
Facility: HOSPITAL | Age: 65
End: 2025-02-12
Payer: COMMERCIAL

## 2025-02-17 ENCOUNTER — HOSPITAL ENCOUNTER (OUTPATIENT)
Facility: HOSPITAL | Age: 65
Setting detail: RECURRING SERIES
Discharge: HOME | End: 2025-02-20
Payer: COMMERCIAL

## 2025-02-17 PROCEDURE — 97110 THERAPEUTIC EXERCISES: CPT

## 2025-02-17 NOTE — PROGRESS NOTES
PHYSICAL THERAPY - MEDICARE DAILY TREATMENT NOTE (updated 3/23)      Date: 2025          Patient Name:  Astrid Hurley :  1960   Medical   Diagnosis:  Unsteadiness on feet [R26.81]  Other abnormalities of gait and mobility [R26.89] Treatment Diagnosis:  R26.81   Unsteadiness on feet and R26.89   Abnormalities of gait and mobility    Referral Source:  Macarena Ko APRN - * Insurance:   Payor: [facility] / Plan: [facility] [employer] / Product Type: *No Product type* /                     Patient  verified yes     Visit #   Current  / Total 5 24   Time   In / Out 9:00 9:43   Total Treatment Time 43   Total Timed Codes 43   1:1 Treatment Time 43      [facility] Totals Reminder:  bill using total billable   min of TIMED therapeutic procedures and modalities.   8-22 min = 1 unit; 23-37 min = 2 units; 38-52 min = 3 units; 53-67 min = 4 units; 68-82 min = 5 units            SUBJECTIVE    Pain Level (0-10 scale): 0    Any medication changes, allergies to medications, adverse drug reactions, diagnosis change, or new procedure performed?: [x] No    [] Yes (see summary sheet for update)  Medications: Verified on Patient Summary List    Subjective functional status/changes:   Patient reports that she was able to keep up with the standing exercises a few times since last session. She found out she has to get surgery on her hand so that was a bit of a distraction.     OBJECTIVE      Therapeutic Procedures:  Tx Min Billable or 1:1 Min (if diff from Tx Min) Procedure, Rationale, Specifics   43  [id number] Therapeutic Exercise (timed):  increase ROM, strength, coordination, balance, and proprioception to improve patient's ability to progress to PLOF and address remaining functional goals. (see flow sheet as applicable)     Details if applicable:                         43     Total Total       [x]  Patient Education billed concurrently with other procedures   [x] Review HEP    [] Progressed/Changed HEP, detail:    []

## 2025-02-19 ENCOUNTER — HOSPITAL ENCOUNTER (OUTPATIENT)
Facility: HOSPITAL | Age: 65
Setting detail: RECURRING SERIES
Discharge: HOME | End: 2025-02-22
Payer: COMMERCIAL

## 2025-02-19 PROCEDURE — 97110 THERAPEUTIC EXERCISES: CPT

## 2025-02-19 NOTE — PROGRESS NOTES
[facility]  [address]  [place] [address]  Phone: [phone]   Fax: [phone]     PHYSICAL THERAPY PROGRESS NOTE  Patient Name:  Astrid Hurley :  1960   Treatment/Medical Diagnosis: Unsteadiness on feet [R26.81]  Other abnormalities of gait and mobility [R26.89]   Referral Source:  Macarena Ko APRN - *     Date of Initial Visit:  2025 Attended Visits:  6 Missed Visits:  3     SUMMARY OF TREATMENT/ASSESSMENT:  Patient is a 64 year old being seen for gait and balance. Patient has been seen for 6 visits and has been treated using therapeutic exercise and neuromuscular re-education to make improvements with lower quarter strength, balance, and overall functional mobility.      CURRENT STATUS/GOALS:    Patient presents today without new symptoms. Since the initial evaluation she demonstrates improvements in lower quarter strength, balance measures, gait speed, outcome measures, and overall mobility. Today she demonstrates tandem stance R fwd = 40 seconds (vs 14 seconds), L fwd= 60 seconds (vs 34 seconds). She also demonstrates improvements in single leg stance, with R= 17 seconds (vs 10 seconds), and L = 10 seconds (vs 6 seconds). Her timed up and go improved to 12 seconds (vs 13.2 seconds). She also demonstrates improvements with eyes open and closed balance tests, with NBOS on compliant surface with eyes closed improving to 32 seconds (vs 23). Her gait speed improved to 0.91 m/s (vs 0.8 m/s). She did still require one standing rest break during the 2 minute walk test, however this was around 1 minute and 30 seconds vs at 1 minute prior, indicating some improved endurance. Her FOTO score improved to 47/100, and she has met 2/6 goals. She does continue to demonstrate deficits in gait and balance, and would continue to benefit from skilled physical therapy to address remaining goals and work towards

## 2025-02-19 NOTE — PROGRESS NOTES
PHYSICAL THERAPY - MEDICARE DAILY TREATMENT NOTE (updated 3/23)      Date: 2025          Patient Name:  Astrid Hurley :  1960   Medical   Diagnosis:  Unsteadiness on feet [R26.81]  Other abnormalities of gait and mobility [R26.89] Treatment Diagnosis:  R26.81   Unsteadiness on feet and R26.89   Abnormalities of gait and mobility    Referral Source:  Macarena Ko APRN - * Insurance:   Payor: [facility] / Plan: [facility] [employer] / Product Type: *No Product type* /                     Patient  verified yes     Visit #   Current  / Total 6 24   Time   In / Out 8:55 9:35   Total Treatment Time 40   Total Timed Codes 40   1:1 Treatment Time 40      [facility] Totals Reminder:  bill using total billable   min of TIMED therapeutic procedures and modalities.   8-22 min = 1 unit; 23-37 min = 2 units; 38-52 min = 3 units; 53-67 min = 4 units; 68-82 min = 5 units            SUBJECTIVE    Pain Level (0-10 scale): 0    Any medication changes, allergies to medications, adverse drug reactions, diagnosis change, or new procedure performed?: [x] No    [] Yes (see summary sheet for update)  Medications: Verified on Patient Summary List    Subjective functional status/changes:   Patient reports about a 50-60% improvement overall. She notes that the stairs are particularly easier than they used to be. She notes that she can now do a step over step pattern, where before PT she was doing a step to pattern. She reports that her walking tolerance feels about the same. Reports that she hasn't been as consistent with her HEP as she knows she should, but she is doing some exercises on most days.       OBJECTIVE      Therapeutic Procedures:  Tx Min Billable or 1:1 Min (if diff from Tx Min) Procedure, Rationale, Specifics   40  [id number] Therapeutic Exercise (timed):  increase ROM, strength, coordination, balance, and proprioception to improve patient's ability to progress to PLOF and address remaining functional goals.

## 2025-02-24 ENCOUNTER — HOSPITAL ENCOUNTER (OUTPATIENT)
Facility: HOSPITAL | Age: 65
Setting detail: RECURRING SERIES
Discharge: HOME | End: 2025-02-27
Payer: COMMERCIAL

## 2025-02-24 PROCEDURE — 97110 THERAPEUTIC EXERCISES: CPT

## 2025-02-26 ENCOUNTER — HOSPITAL ENCOUNTER (OUTPATIENT)
Facility: HOSPITAL | Age: 65
Setting detail: RECURRING SERIES
Discharge: HOME | End: 2025-03-01
Payer: COMMERCIAL

## 2025-02-26 PROCEDURE — 97110 THERAPEUTIC EXERCISES: CPT

## 2025-02-26 NOTE — PROGRESS NOTES
Objective/Functional Measures   NA    Pain Level at end of session (0-10 scale): 2-3/10    Assessment   Patient presents today with some knee pain, however she notes that she has dealt with this for a long time. Able to tolerate progressions and new exercises well, with an emphasis on balance activities today. Patient continues to demonstrate difficulty with single leg stability, as well as with decreased visual and proprioceptive input. Patient would like to continue through another 4 weeks to maximize balance improvements. Plan to continue to progress as able.     Patient will continue to benefit from skilled PT / OT services to modify and progress therapeutic interventions, analyze and address functional mobility deficits, analyze and address ROM deficits, analyze and address strength deficits, analyze and address soft tissue restrictions, analyze and cue for proper movement patterns, and analyze and modify for postural abnormalities to address functional deficits and attain remaining goals.    Progress toward goals / Updated goals:  []  See Progress Note/Recertification    Short Term Goals: To be accomplished in 8-10 treatments.  Patient will be independent with initial HEP in order to transition to general wellness program. - MET  Patient will demonstrate all LE strength >4/5 in order to improve ability to ascend and descend 3 steps to get in and out of her home. - progressing  Patient will demonstrate tandem stance > 30 seconds bilaterally to indicate decreased fall risk. - MET     Long Term Goals: To be accomplished in 20-24 treatments.  1.Patient will demonstrate gait speed of at least 0.91 m/s without any rest breaks in order to improve community ambulation. - progressing  2. Patient will demonstrate 15 sit to stands in 30 seconds to indicate improved LE strength and endurance to improve her ability to walk for >10 minutes to get to appointments. - progressing  3. Patient will report the ability to ambulate

## 2025-03-07 ENCOUNTER — APPOINTMENT (OUTPATIENT)
Facility: HOSPITAL | Age: 65
End: 2025-03-07
Payer: COMMERCIAL

## 2025-03-21 ENCOUNTER — HOSPITAL ENCOUNTER (OUTPATIENT)
Facility: HOSPITAL | Age: 65
Setting detail: RECURRING SERIES
Discharge: HOME | End: 2025-03-24
Payer: COMMERCIAL

## 2025-03-21 PROCEDURE — 97110 THERAPEUTIC EXERCISES: CPT

## 2025-03-21 NOTE — PROGRESS NOTES
PHYSICAL THERAPY - MEDICARE DAILY TREATMENT NOTE (updated 3/23)      Date: 3/21/2025          Patient Name:  Astrid Hurley :  1960   Medical   Diagnosis:  Unsteadiness on feet [R26.81]  Other abnormalities of gait and mobility [R26.89] Treatment Diagnosis:  R26.81   Unsteadiness on feet and R26.89   Abnormalities of gait and mobility    Referral Source:  Macarena Ko APRN - * Insurance:   Payor: [facility] / Plan: [facility] [employer] / Product Type: *No Product type* /                     Patient  verified yes     Visit #   Current  / Total 9 24   Time   In / Out 9:30 10:11   Total Treatment Time 41   Total Timed Codes 41   1:1 Treatment Time 41      [facility] Totals Reminder:  bill using total billable   min of TIMED therapeutic procedures and modalities.   8-22 min = 1 unit; 23-37 min = 2 units; 38-52 min = 3 units; 53-67 min = 4 units; 68-82 min = 5 units            SUBJECTIVE    Pain Level (0-10 scale): 3    Any medication changes, allergies to medications, adverse drug reactions, diagnosis change, or new procedure performed?: [x] No    [] Yes (see summary sheet for update)  Medications: Verified on Patient Summary List    Subjective functional status/changes:   Patient reports that she doesn't feel much different since the last evaluation, but she hasn't been able to do much secondary to her hand surgery. Requests to hold on re-evaluation until next session since she just got her bandages removed this morning and she is in a lot of pain. Would like to continue therapy for a few more weeks if possible.     OBJECTIVE    Therapeutic Procedures:  Tx Min Billable or 1:1 Min (if diff from Tx Min) Procedure, Rationale, Specifics   [id number] [phone] [id number] Therapeutic Exercise (timed):  increase ROM, strength, coordination, balance, and proprioception to improve patient's ability to progress to PLOF and address remaining functional goals. (see flow sheet as applicable)     Details if applicable:

## 2025-03-26 ENCOUNTER — HOSPITAL ENCOUNTER (OUTPATIENT)
Facility: HOSPITAL | Age: 65
Setting detail: RECURRING SERIES
Discharge: HOME | End: 2025-03-29
Payer: COMMERCIAL

## 2025-03-26 PROCEDURE — 97110 THERAPEUTIC EXERCISES: CPT

## 2025-03-26 NOTE — PROGRESS NOTES
PHYSICAL THERAPY - MEDICARE DAILY TREATMENT NOTE (updated 3/23)      Date: 3/26/2025          Patient Name:  Astrid Hurley :  1960   Medical   Diagnosis:  Unsteadiness on feet [R26.81]  Other abnormalities of gait and mobility [R26.89] Treatment Diagnosis:  R26.81   Unsteadiness on feet and R26.89   Abnormalities of gait and mobility    Referral Source:  Macarena Ko APRN - * Insurance:   Payor: [facility] / Plan: [facility] [employer] / Product Type: *No Product type* /                     Patient  verified yes     Visit #   Current  / Total 10 24   Time   In / Out 9:30 10:14   Total Treatment Time 44   Total Timed Codes 44   1:1 Treatment Time 44      [facility] Totals Reminder:  bill using total billable   min of TIMED therapeutic procedures and modalities.   8-22 min = 1 unit; 23-37 min = 2 units; 38-52 min = 3 units; 53-67 min = 4 units; 68-82 min = 5 units            SUBJECTIVE    Pain Level (0-10 scale): 1    Any medication changes, allergies to medications, adverse drug reactions, diagnosis change, or new procedure performed?: [x] No    [] Yes (see summary sheet for update)  Medications: Verified on Patient Summary List    Subjective functional status/changes:   Patient reports that she hasn't been as consistent with HEP secondary to hand surgery, but she is finally feeling better. Notes an overall 60-70% improvement but would like to continue to progress her balance over a few more sessions.     OBJECTIVE    Therapeutic Procedures:  Tx Min Billable or 1:1 Min (if diff from Tx Min) Procedure, Rationale, Specifics   44 [phone] [id number] Therapeutic Exercise (timed):  increase ROM, strength, coordination, balance, and proprioception to improve patient's ability to progress to PLOF and address remaining functional goals. (see flow sheet as applicable)     Details if applicable:                         44 44    Total Total       [x]  Patient Education billed concurrently with other procedures   [x]

## 2025-03-26 NOTE — PROGRESS NOTES
[facility]  [address]  [place] [address]  Phone: [phone]   Fax: [phone]     PHYSICAL THERAPY PROGRESS NOTE  Patient Name:  Astrid Hurley :  1960   Treatment/Medical Diagnosis: Unsteadiness on feet [R26.81]  Other abnormalities of gait and mobility [R26.89]   Referral Source:  Macarena Ko APRN - *     Date of Initial Visit:  2025 Attended Visits:  10 Missed Visits:  3     SUMMARY OF TREATMENT/ASSESSMENT:  Patient is a 65 year old being seen for gait and balance. Patient has been seen for 10 visits and has been treated using therapeutic exercise and neuromuscular re-education to make improvements with lower quarter strength, balance, and overall functional mobility.      CURRENT STATUS/GOALS:    Patient presents today without new symptoms. Since the initial evaluation she demonstrates improvements in lower quarter strength, balance measures, gait speed, outcome measures, and overall mobility. Today she demonstrates tandem stance R fwd = 45 seconds (vs 14 seconds), L fwd= 60 seconds (vs 34 seconds). She also demonstrates improvements in single leg stance, with R= 17 seconds (vs 10 seconds), and L = 10 seconds (vs 6 seconds). Her timed up and go improved to 11 seconds (vs 13.2 seconds). She also demonstrates improvements with eyes open and closed balance tests, with NBOS on compliant surface with eyes closed improving to 32 seconds (vs 23). Her gait speed improved to 0.91 m/s (vs 0.8 m/s). Her FOTO score improved at most to 47/100, and she has met 3/6 goals. She does continue to demonstrate deficits in gait and balance, and would continue to benefit from skilled physical therapy to address remaining goals and work towards PLOF. Plan to continue to progress as able.     Short Term Goals: To be accomplished in 8-10 treatments.  Patient will be independent with initial HEP in order to transition to

## 2025-03-28 ENCOUNTER — HOSPITAL ENCOUNTER (OUTPATIENT)
Facility: HOSPITAL | Age: 65
Setting detail: RECURRING SERIES
End: 2025-03-28
Payer: COMMERCIAL

## 2025-03-29 DIAGNOSIS — Z72.0 TOBACCO ABUSE: ICD-10-CM

## 2025-03-29 DIAGNOSIS — F10.230 ALCOHOL DEPENDENCE WITH UNCOMPLICATED WITHDRAWAL (HCC): ICD-10-CM

## 2025-03-29 DIAGNOSIS — Z71.6 ENCOUNTER FOR TOBACCO USE CESSATION COUNSELING: ICD-10-CM

## 2025-03-29 DIAGNOSIS — Z87.891 PERSONAL HISTORY OF TOBACCO USE: ICD-10-CM

## 2025-03-31 ENCOUNTER — HOSPITAL ENCOUNTER (OUTPATIENT)
Facility: HOSPITAL | Age: 65
Setting detail: RECURRING SERIES
Discharge: HOME | End: 2025-04-03
Payer: COMMERCIAL

## 2025-03-31 PROCEDURE — 97110 THERAPEUTIC EXERCISES: CPT

## 2025-03-31 NOTE — PROGRESS NOTES
PHYSICAL THERAPY - MEDICARE DAILY TREATMENT NOTE (updated 3/23)      Date: 3/31/2025          Patient Name:  Astrid Hurley :  1960   Medical   Diagnosis:  Unsteadiness on feet [R26.81]  Other abnormalities of gait and mobility [R26.89] Treatment Diagnosis:  R26.81   Unsteadiness on feet and R26.89   Abnormalities of gait and mobility    Referral Source:  Macarena Ko APRN - * Insurance:   Payor: [facility] / Plan: [facility] [employer]ERS / Product Type: *No Product type* /                     Patient  verified yes     Visit #   Current  / Total 11 24   Time   In / Out 9:04 9:47   Total Treatment Time 43   Total Timed Codes 43   1:1 Treatment Time 43      [facility] Totals Reminder:  bill using total billable   min of TIMED therapeutic procedures and modalities.   8-22 min = 1 unit; 23-37 min = 2 units; 38-52 min = 3 units; 53-67 min = 4 units; 68-82 min = 5 units            SUBJECTIVE    Pain Level (0-10 scale): 1    Any medication changes, allergies to medications, adverse drug reactions, diagnosis change, or new procedure performed?: [x] No    [] Yes (see summary sheet for update)  Medications: Verified on Patient Summary List    Subjective functional status/changes:   Patient reports that she got a new walker (rollator vs RW) and she has felt good with it. Notes that she moves a little faster with it but doesn't feel unsafe.     OBJECTIVE    Therapeutic Procedures:  Tx Min Billable or 1:1 Min (if diff from Tx Min) Procedure, Rationale, Specifics   43 43 [id number] Therapeutic Exercise (timed):  increase ROM, strength, coordination, balance, and proprioception to improve patient's ability to progress to PLOF and address remaining functional goals. (see flow sheet as applicable)     Details if applicable:                         43 43    Total Total       [x]  Patient Education billed concurrently with other procedures   [x] Review HEP    [] Progressed/Changed HEP, detail:    [] Other detail:

## 2025-04-01 NOTE — TELEPHONE ENCOUNTER
Last appointment: 9/9/24 Nely  Next appointment: 4/25/25 Nely  Previous refill encounter(s): 9/9/24 90 + 1 (all 3)    Requested Prescriptions     Pending Prescriptions Disp Refills    buPROPion (WELLBUTRIN XL) 150 MG extended release tablet [Pharmacy Med Name: BUPROPION  MG TAB[*]] 90 tablet 0     Sig: Take 1 tablet by mouth every morning    doxepin (SINEQUAN) 50 MG capsule [Pharmacy Med Name: DOXEPIN 50 MG CAP] 90 capsule 0     Sig: Take 1 capsule by mouth nightly    naltrexone (DEPADE) 50 MG tablet [Pharmacy Med Name: NALTREXONE 50 MG TABLET] 90 tablet 0     Sig: Take 1 tablet by mouth daily     For Pharmacy Admin Tracking Only    Program: Medication Refill  CPA in place:    Recommendation Provided To:   Intervention Detail: New Rx: 3, reason: Patient Preference  Intervention Accepted By:   Gap Closed?:    Time Spent (min): 5

## 2025-04-02 RX ORDER — NALTREXONE HYDROCHLORIDE 50 MG/1
50 TABLET, FILM COATED ORAL DAILY
Qty: 30 TABLET | Refills: 0 | Status: SHIPPED | OUTPATIENT
Start: 2025-04-02

## 2025-04-02 RX ORDER — DOXEPIN HYDROCHLORIDE 50 MG/1
50 CAPSULE ORAL NIGHTLY
Qty: 30 CAPSULE | Refills: 0 | Status: SHIPPED | OUTPATIENT
Start: 2025-04-02

## 2025-04-02 RX ORDER — BUPROPION HYDROCHLORIDE 150 MG/1
150 TABLET ORAL EVERY MORNING
Qty: 30 TABLET | Refills: 0 | Status: SHIPPED | OUTPATIENT
Start: 2025-04-02

## 2025-04-04 ENCOUNTER — HOSPITAL ENCOUNTER (OUTPATIENT)
Facility: HOSPITAL | Age: 65
Setting detail: RECURRING SERIES
Discharge: HOME | End: 2025-04-07
Payer: COMMERCIAL

## 2025-04-04 PROCEDURE — 97110 THERAPEUTIC EXERCISES: CPT

## 2025-04-04 NOTE — PROGRESS NOTES
tolerated  []  Discharge due to:  []  Other:      Danya Mukherjee, PT       4/4/2025       8:05 AM

## 2025-04-05 DIAGNOSIS — I10 PRIMARY HYPERTENSION: ICD-10-CM

## 2025-04-07 ENCOUNTER — HOSPITAL ENCOUNTER (OUTPATIENT)
Facility: HOSPITAL | Age: 65
Setting detail: RECURRING SERIES
End: 2025-04-07
Payer: COMMERCIAL

## 2025-04-07 NOTE — TELEPHONE ENCOUNTER
PCP: Dotty Mckay APRN - NP    Last appt: 9/9/2024     Future Appointments   Date Time Provider Department Center   4/11/2025  9:30 AM Danya Mukherjee, PT [facility]   4/18/2025  9:00 AM [facility], PT [facility]   4/25/2025  9:00 AM [facility] - [facility]   5/2/2025  9:00 AM Danya Mukherjee, PT [facility]   5/5/2025  9:00 AM Danya Mukherjee, PT [facility]   6/17/2025  8:00 AM Macarena Ko APRN - NP NEUMRSPBPBB BS [facility]       Requested Prescriptions     Pending Prescriptions Disp Refills    chlorthalidone (HYGROTON) 25 MG tablet [Pharmacy Med Name: CHLORTHALIDONE 25 MG TAB[*]] 90 tablet 1     Sig: TAKE ONE TABLET BY MOUTH ONE TIME DAILY         Prior labs and Blood pressures:  BP Readings from Last 3 Encounters:   12/17/24 112/64   12/09/24 (!) 148/83   09/09/24 125/82     Lab Results   Component Value Date/Time     09/09/2024 10:03 AM    K 3.8 09/09/2024 10:03 AM    CL 99 09/09/2024 10:03 AM    CO2 35 09/09/2024 10:03 AM    BUN 15 09/09/2024 10:03 AM    GFRAA 60 06/29/2022 10:21 AM     Lab Results   Component Value Date/Time    CHOL 266 06/01/2023 11:07 AM    HDL 40 06/01/2023 11:07 AM    .2 06/01/2023 11:07 AM    VLDL 54.8 06/01/2023 11:07 AM     Lab Results   Component Value Date/Time    TSH 1.35 04/16/2021 10:50 AM

## 2025-04-08 RX ORDER — CHLORTHALIDONE 25 MG/1
25 TABLET ORAL DAILY
Qty: 90 TABLET | Refills: 0 | Status: SHIPPED | OUTPATIENT
Start: 2025-04-08

## 2025-04-11 ENCOUNTER — HOSPITAL ENCOUNTER (OUTPATIENT)
Facility: HOSPITAL | Age: 65
Setting detail: RECURRING SERIES
End: 2025-04-11
Payer: COMMERCIAL

## 2025-04-18 ENCOUNTER — APPOINTMENT (OUTPATIENT)
Facility: HOSPITAL | Age: 65
End: 2025-04-18
Payer: COMMERCIAL

## 2025-04-25 ENCOUNTER — APPOINTMENT (OUTPATIENT)
Facility: HOSPITAL | Age: 65
End: 2025-04-25
Payer: COMMERCIAL

## 2025-04-25 ENCOUNTER — OFFICE VISIT (OUTPATIENT)
Age: 65
End: 2025-04-25
Payer: COMMERCIAL

## 2025-04-25 VITALS
OXYGEN SATURATION: 97 % | BODY MASS INDEX: 31.3 KG/M2 | RESPIRATION RATE: 18 BRPM | SYSTOLIC BLOOD PRESSURE: 126 MMHG | WEIGHT: 165.8 LBS | HEIGHT: 61 IN | TEMPERATURE: 98.2 F | DIASTOLIC BLOOD PRESSURE: 78 MMHG | HEART RATE: 93 BPM

## 2025-04-25 DIAGNOSIS — N18.31 CHRONIC KIDNEY DISEASE, STAGE 3A (HCC): ICD-10-CM

## 2025-04-25 DIAGNOSIS — J43.2 CENTRILOBULAR EMPHYSEMA (HCC): ICD-10-CM

## 2025-04-25 DIAGNOSIS — F10.230 ALCOHOL DEPENDENCE WITH UNCOMPLICATED WITHDRAWAL (HCC): ICD-10-CM

## 2025-04-25 DIAGNOSIS — G62.1 ALCOHOLIC PERIPHERAL NEUROPATHY: ICD-10-CM

## 2025-04-25 DIAGNOSIS — E78.2 MIXED HYPERLIPIDEMIA: ICD-10-CM

## 2025-04-25 DIAGNOSIS — E55.9 HYPOVITAMINOSIS D: ICD-10-CM

## 2025-04-25 DIAGNOSIS — Z87.891 PERSONAL HISTORY OF TOBACCO USE: ICD-10-CM

## 2025-04-25 DIAGNOSIS — Z72.0 TOBACCO ABUSE: ICD-10-CM

## 2025-04-25 DIAGNOSIS — K70.30 ALCOHOLIC CIRRHOSIS OF LIVER WITHOUT ASCITES (HCC): ICD-10-CM

## 2025-04-25 DIAGNOSIS — R25.1 TREMOR OF BOTH HANDS: ICD-10-CM

## 2025-04-25 DIAGNOSIS — D51.3 OTHER DIETARY VITAMIN B12 DEFICIENCY ANEMIA: ICD-10-CM

## 2025-04-25 DIAGNOSIS — R73.01 IMPAIRED FASTING GLUCOSE: ICD-10-CM

## 2025-04-25 DIAGNOSIS — I10 PRIMARY HYPERTENSION: ICD-10-CM

## 2025-04-25 DIAGNOSIS — Z71.6 ENCOUNTER FOR TOBACCO USE CESSATION COUNSELING: ICD-10-CM

## 2025-04-25 DIAGNOSIS — K76.6 PORTAL HYPERTENSION (HCC): Primary | ICD-10-CM

## 2025-04-25 DIAGNOSIS — F51.01 PRIMARY INSOMNIA: ICD-10-CM

## 2025-04-25 PROCEDURE — 99406 BEHAV CHNG SMOKING 3-10 MIN: CPT | Performed by: NURSE PRACTITIONER

## 2025-04-25 PROCEDURE — 3074F SYST BP LT 130 MM HG: CPT | Performed by: NURSE PRACTITIONER

## 2025-04-25 PROCEDURE — 3078F DIAST BP <80 MM HG: CPT | Performed by: NURSE PRACTITIONER

## 2025-04-25 PROCEDURE — 99214 OFFICE O/P EST MOD 30 MIN: CPT | Performed by: NURSE PRACTITIONER

## 2025-04-25 RX ORDER — VARENICLINE TARTRATE 1 MG/1
1 TABLET, FILM COATED ORAL 2 TIMES DAILY
Qty: 180 TABLET | Refills: 1 | Status: SHIPPED | OUTPATIENT
Start: 2025-04-25

## 2025-04-25 RX ORDER — NALTREXONE HYDROCHLORIDE 50 MG/1
50 TABLET, FILM COATED ORAL DAILY
Qty: 90 TABLET | Refills: 1 | Status: SHIPPED | OUTPATIENT
Start: 2025-04-25

## 2025-04-25 RX ORDER — BUPROPION HYDROCHLORIDE 150 MG/1
150 TABLET ORAL EVERY MORNING
Qty: 90 TABLET | Refills: 1 | Status: SHIPPED | OUTPATIENT
Start: 2025-04-25

## 2025-04-25 RX ORDER — DOXEPIN HYDROCHLORIDE 50 MG/1
50 CAPSULE ORAL NIGHTLY
Qty: 90 CAPSULE | Refills: 1 | Status: SHIPPED | OUTPATIENT
Start: 2025-04-25

## 2025-04-25 RX ORDER — CHLORTHALIDONE 25 MG/1
25 TABLET ORAL DAILY
Qty: 90 TABLET | Refills: 1 | Status: SHIPPED | OUTPATIENT
Start: 2025-04-25

## 2025-04-25 RX ORDER — PROPRANOLOL HYDROCHLORIDE 10 MG/1
10 TABLET ORAL 2 TIMES DAILY
Qty: 180 TABLET | Refills: 1 | Status: SHIPPED | OUTPATIENT
Start: 2025-04-25

## 2025-04-25 RX ORDER — FOLIC ACID 1 MG/1
1000 TABLET ORAL EVERY MORNING
Qty: 90 TABLET | Refills: 1 | Status: SHIPPED | OUTPATIENT
Start: 2025-04-25

## 2025-04-25 SDOH — ECONOMIC STABILITY: FOOD INSECURITY: WITHIN THE PAST 12 MONTHS, THE FOOD YOU BOUGHT JUST DIDN'T LAST AND YOU DIDN'T HAVE MONEY TO GET MORE.: NEVER TRUE

## 2025-04-25 SDOH — ECONOMIC STABILITY: FOOD INSECURITY: WITHIN THE PAST 12 MONTHS, YOU WORRIED THAT YOUR FOOD WOULD RUN OUT BEFORE YOU GOT MONEY TO BUY MORE.: NEVER TRUE

## 2025-04-25 ASSESSMENT — PATIENT HEALTH QUESTIONNAIRE - PHQ9
1. LITTLE INTEREST OR PLEASURE IN DOING THINGS: NOT AT ALL
SUM OF ALL RESPONSES TO PHQ QUESTIONS 1-9: 0
2. FEELING DOWN, DEPRESSED OR HOPELESS: NOT AT ALL
SUM OF ALL RESPONSES TO PHQ QUESTIONS 1-9: 0

## 2025-04-25 NOTE — ASSESSMENT & PLAN NOTE
- See's Neurology  Orders:    CBC with Auto Differential; Future    Comprehensive Metabolic Panel; Future    Vitamin B12; Future

## 2025-04-25 NOTE — PROGRESS NOTES
Chief Complaint   Patient presents with    Hypertension     Follow up         \"Have you been to the ER, urgent care clinic since your last visit?  Hospitalized since your last visit?\"    NO    “Have you seen or consulted any other health care providers outside of [facility] since your last visit?”    NO    Have you had a mammogram?”   Yes [facility]     Date of last Mammogram: 4/5/2022             Click Here for Release of Records Request           4/25/2025     8:57 AM   PHQ-9    Little interest or pleasure in doing things 0   Feeling down, depressed, or hopeless 0   PHQ-2 Score 0   PHQ-9 Total Score 0           Financial Resource Strain: Patient Declined (9/9/2024)    Overall Financial Resource Strain (CARDIA)     Difficulty of Paying Living Expenses: Patient declined      Food Insecurity: No Food Insecurity (4/25/2025)    Hunger Vital Sign     Worried About Running Out of Food in the Last Year: Never true     Ran Out of Food in the Last Year: Never true          Health Maintenance Due   Topic Date Due    Hepatitis A vaccine (1 of 2 - Risk 2-dose series) Never done    Respiratory Syncytial Virus (RSV) Pregnant or age 60 yrs+ (1 - Risk 60-74 years 1-dose series) Never done    Breast cancer screen  04/05/2024    COVID-19 Vaccine (1 - 2024-25 season) Never done       
763    Orders:    CBC with Auto Differential; Future    Vitamin B12; Future    Primary hypertension   - Chronic. Controlled.  - No dose adjustment at this time  Orders:    Comprehensive Metabolic Panel; Future    chlorthalidone (HYGROTON) 25 MG tablet; Take 1 tablet by mouth daily    Tobacco abuse  Orders:    varenicline (CHANTIX) 1 MG tablet; Take 1 tablet by mouth 2 times daily    buPROPion (WELLBUTRIN XL) 150 MG extended release tablet; Take 1 tablet by mouth every morning    chlorthalidone (HYGROTON) 25 MG tablet; Take 1 tablet by mouth daily    Mixed hyperlipidemia   - Statin intolerant  Lab Results   Component Value Date    CHOL 266 (H) 06/01/2023    TRIG 274 (H) 06/01/2023    HDL 40 06/01/2023    .2 (H) 06/01/2023    VLDL 54.8 06/01/2023    CHOLHDLRATIO 6.7 (H) 06/01/2023   Orders:    Comprehensive Metabolic Panel; Future    Impaired fasting glucose   -Last A1c = 5.6 on 9/9/2024    Orders:    Comprehensive Metabolic Panel; Future    Hemoglobin A1C; Future    Hypovitaminosis D   Orders:    Vitamin D 25 Hydroxy; Future    Personal history of tobacco use   Orders:    varenicline (CHANTIX) 1 MG tablet; Take 1 tablet by mouth 2 times daily    buPROPion (WELLBUTRIN XL) 150 MG extended release tablet; Take 1 tablet by mouth every morning    Encounter for tobacco use cessation counseling   Tobacco Cessation Counseling: Patient advised about behavior change, including information about personal health harms, usage of appropriate cessation measures and benefits of cessation.  I advised patient to quit, and offered support. Discussed current use pattern.  Continues to smoke but overall reduced her daily average.  She has had success with Chantix.  Wishes to continue maintenance pack. Time spent (minutes): 3 min   Orders:    varenicline (CHANTIX) 1 MG tablet; Take 1 tablet by mouth 2 times daily    buPROPion (WELLBUTRIN XL) 150 MG extended release tablet; Take 1 tablet by mouth every morning    Alcoholic

## 2025-04-25 NOTE — ASSESSMENT & PLAN NOTE
- Chronic, working to reduce intake.   Limited intake to mixed drinks on weekends.  - Using naltrexone, though inconsistently.  - Counseling on regular medication use.  - Recommended complete abstinence  Orders:    CBC with Auto Differential; Future    naltrexone (DEPADE) 50 MG tablet; Take 1 tablet by mouth daily    folic acid (FOLVITE) 1 MG tablet; Take 1 tablet by mouth every morning

## 2025-04-25 NOTE — ASSESSMENT & PLAN NOTE
- Statin intolerant  Lab Results   Component Value Date    CHOL 266 (H) 06/01/2023    TRIG 274 (H) 06/01/2023    HDL 40 06/01/2023    .2 (H) 06/01/2023    VLDL 54.8 06/01/2023    CHOLHDLRATIO 6.7 (H) 06/01/2023   Orders:    Comprehensive Metabolic Panel; Future

## 2025-04-25 NOTE — ASSESSMENT & PLAN NOTE
- Had been followed by Hepatology, Dr. Sanchez. Fibroscan was 19.0 kPa which correlates with cirrhosis.   -Recommend complete abstinence from alcohol  Orders:    naltrexone (DEPADE) 50 MG tablet; Take 1 tablet by mouth daily    folic acid (FOLVITE) 1 MG tablet; Take 1 tablet by mouth every morning

## 2025-04-25 NOTE — ASSESSMENT & PLAN NOTE
- Continues to smoke. Averages about 4 every 3 days. Chantix has worked well in the past. On Chantix maintenance pack.  - Has been followed by Dr. Joya

## 2025-04-25 NOTE — ASSESSMENT & PLAN NOTE
Orders:    varenicline (CHANTIX) 1 MG tablet; Take 1 tablet by mouth 2 times daily    buPROPion (WELLBUTRIN XL) 150 MG extended release tablet; Take 1 tablet by mouth every morning    chlorthalidone (HYGROTON) 25 MG tablet; Take 1 tablet by mouth daily

## 2025-04-25 NOTE — ASSESSMENT & PLAN NOTE
- Stable.   - US: Liver is echogenic, portal vein flow is hepatopedal, main portal vein diameter is 1 cm

## 2025-04-26 LAB
25(OH)D3+25(OH)D2 SERPL-MCNC: 19.6 NG/ML (ref 30–100)
ALBUMIN SERPL-MCNC: 4.3 G/DL (ref 3.9–4.9)
ALP SERPL-CCNC: 159 IU/L (ref 44–121)
ALT SERPL-CCNC: 47 IU/L (ref 0–32)
AST SERPL-CCNC: 47 IU/L (ref 0–40)
BASOPHILS # BLD AUTO: 0 X10E3/UL (ref 0–0.2)
BASOPHILS NFR BLD AUTO: 0 %
BILIRUB SERPL-MCNC: 0.7 MG/DL (ref 0–1.2)
BUN SERPL-MCNC: 21 MG/DL (ref 8–27)
BUN/CREAT SERPL: 17 (ref 12–28)
CALCIUM SERPL-MCNC: 9.4 MG/DL (ref 8.7–10.3)
CHLORIDE SERPL-SCNC: 92 MMOL/L (ref 96–106)
CO2 SERPL-SCNC: 24 MMOL/L (ref 20–29)
CREAT SERPL-MCNC: 1.24 MG/DL (ref 0.57–1)
EGFRCR SERPLBLD CKD-EPI 2021: 49 ML/MIN/1.73
EOSINOPHIL # BLD AUTO: 0.1 X10E3/UL (ref 0–0.4)
EOSINOPHIL NFR BLD AUTO: 1 %
ERYTHROCYTE [DISTWIDTH] IN BLOOD BY AUTOMATED COUNT: 16.5 % (ref 11.7–15.4)
GLOBULIN SER CALC-MCNC: 3.4 G/DL (ref 1.5–4.5)
GLUCOSE SERPL-MCNC: 100 MG/DL (ref 70–99)
HBA1C MFR BLD: 5.7 % (ref 4.8–5.6)
HCT VFR BLD AUTO: 44.6 % (ref 34–46.6)
HGB BLD-MCNC: 14.6 G/DL (ref 11.1–15.9)
IMM GRANULOCYTES # BLD AUTO: 0 X10E3/UL (ref 0–0.1)
IMM GRANULOCYTES NFR BLD AUTO: 0 %
LYMPHOCYTES # BLD AUTO: 2 X10E3/UL (ref 0.7–3.1)
LYMPHOCYTES NFR BLD AUTO: 20 %
MCH RBC QN AUTO: 30.8 PG (ref 26.6–33)
MCHC RBC AUTO-ENTMCNC: 32.7 G/DL (ref 31.5–35.7)
MCV RBC AUTO: 94 FL (ref 79–97)
MONOCYTES # BLD AUTO: 1.3 X10E3/UL (ref 0.1–0.9)
MONOCYTES NFR BLD AUTO: 13 %
NEUTROPHILS # BLD AUTO: 6.7 X10E3/UL (ref 1.4–7)
NEUTROPHILS NFR BLD AUTO: 66 %
PLATELET # BLD AUTO: 222 X10E3/UL (ref 150–450)
POTASSIUM SERPL-SCNC: 4 MMOL/L (ref 3.5–5.2)
PROT SERPL-MCNC: 7.7 G/DL (ref 6–8.5)
RBC # BLD AUTO: 4.74 X10E6/UL (ref 3.77–5.28)
REPORT: NORMAL
SODIUM SERPL-SCNC: 136 MMOL/L (ref 134–144)
VIT B12 SERPL-MCNC: 564 PG/ML (ref 232–1245)
WBC # BLD AUTO: 10.2 X10E3/UL (ref 3.4–10.8)

## 2025-04-28 ENCOUNTER — RESULTS FOLLOW-UP (OUTPATIENT)
Age: 65
End: 2025-04-28

## 2025-04-28 RX ORDER — ERGOCALCIFEROL 1.25 MG/1
50000 CAPSULE, LIQUID FILLED ORAL WEEKLY
Qty: 12 CAPSULE | Refills: 0 | Status: SHIPPED | OUTPATIENT
Start: 2025-04-28

## 2025-05-02 ENCOUNTER — HOSPITAL ENCOUNTER (OUTPATIENT)
Facility: HOSPITAL | Age: 65
Setting detail: RECURRING SERIES
Discharge: HOME | End: 2025-05-05
Payer: COMMERCIAL

## 2025-05-02 PROCEDURE — 97110 THERAPEUTIC EXERCISES: CPT

## 2025-05-02 NOTE — PROGRESS NOTES
[facility]  [address]  [place] [address]  Phone: [phone]   Fax: [phone]     DISCHARGE SUMMARY  Patient Name: Astrid Hurley : 1960   Treatment/Medical Diagnosis: Unsteadiness on feet [R26.81]  Other abnormalities of gait and mobility [R26.89]   Referral Source: Macarena Ko APRN - *     Date of Initial Visit: 2025 Attended Visits: 13 Missed Visits: 6     SUMMARY OF TREATMENT  Patient is a 65 year old being seen for gait and balance. Patient has been seen for 13 visits and has been treated using therapeutic exercise and neuromuscular re-education to make improvements with lower quarter strength, balance, and overall functional mobility     CURRENT STATUS    Patient presents today without new symptoms. Since the initial evaluation she demonstrates improvements in lower quarter strength, balance measures, gait speed, outcome measures, and overall mobility. Today she demonstrates tandem stance R fwd = 45 seconds (vs 14 seconds), L fwd= 60 seconds (vs 34 seconds). She also demonstrates improvements in single leg stance, with R= 17 seconds (vs 10 seconds), and L = 10 seconds (vs 6 seconds). Her timed up and go improved to 11 seconds (vs 13.2 seconds). She also demonstrates improvements with eyes open and closed balance tests, with NBOS on compliant surface with eyes closed improving to 32 seconds (vs 23). Her gait speed improved to 0.91 m/s (vs 0.8 m/s). Her FOTO score improved at most to 52/100, and she has met 3/6 goals. Patient feels comfortable to maintain her progress with her HEP, therefore will discharge today.     Short Term Goals: To be accomplished in 8-10 treatments.  Patient will be independent with initial HEP in order to transition to general wellness program. - MET  Patient will demonstrate all LE strength >4/5 in order to improve ability to ascend and descend 3 steps to get in and out

## 2025-05-02 NOTE — PROGRESS NOTES
PHYSICAL THERAPY - MEDICARE DAILY TREATMENT NOTE (updated 3/23)      Date: 2025          Patient Name:  Astrid Hurley :  1960   Medical   Diagnosis:  Unsteadiness on feet [R26.81]  Other abnormalities of gait and mobility [R26.89] Treatment Diagnosis:  R26.81   Unsteadiness on feet and R26.89   Abnormalities of gait and mobility    Referral Source:  Macarena Ko APRN - * Insurance:   Payor: [facility] / Plan: [facility] [employer] / Product Type: *No Product type* /                     Patient  verified yes     Visit #   Current  / Total 13 24   Time   In / Out 8:52 9:31   Total Treatment Time 39   Total Timed Codes 39   1:1 Treatment Time 39      [facility] Totals Reminder:  bill using total billable   min of TIMED therapeutic procedures and modalities.   8-22 min = 1 unit; 23-37 min = 2 units; 38-52 min = 3 units; 53-67 min = 4 units; 68-82 min = 5 units        SUBJECTIVE    Pain Level (0-10 scale): 0    Any medication changes, allergies to medications, adverse drug reactions, diagnosis change, or new procedure performed?: [x] No    [] Yes (see summary sheet for update)  Medications: Verified on Patient Summary List    Subjective functional status/changes:   Patient reports that she feels 60-70% improvement since starting PT. She reports that she still doesn't leave the house much secondary to not driving and her endurance, but she doesn't feel as worried about falling anymore. She reports that she would still like to be able to use the cane more than the walker, but she doesn't feel comfortable yet.     OBJECTIVE    Therapeutic Procedures:  Tx Min Billable or 1:1 Min (if diff from Tx Min) Procedure, Rationale, Specifics   39 [id number] Therapeutic Exercise (timed):  increase ROM, strength, coordination, balance, and proprioception to improve patient's ability to progress to PLOF and address remaining functional goals. (see flow sheet as applicable)     Details if applicable:                      WDL

## 2025-05-05 ENCOUNTER — APPOINTMENT (OUTPATIENT)
Facility: HOSPITAL | Age: 65
End: 2025-05-05
Payer: COMMERCIAL

## 2025-05-12 DIAGNOSIS — G62.9 POLYNEUROPATHY, UNSPECIFIED: ICD-10-CM

## 2025-05-12 RX ORDER — GABAPENTIN 300 MG/1
CAPSULE ORAL
Qty: 90 CAPSULE | Refills: 5 | Status: SHIPPED | OUTPATIENT
Start: 2025-05-12 | End: 2025-08-10

## 2025-06-17 ENCOUNTER — OFFICE VISIT (OUTPATIENT)
Age: 65
End: 2025-06-17
Payer: COMMERCIAL

## 2025-06-17 VITALS
DIASTOLIC BLOOD PRESSURE: 82 MMHG | RESPIRATION RATE: 16 BRPM | WEIGHT: 160 LBS | HEIGHT: 61 IN | SYSTOLIC BLOOD PRESSURE: 136 MMHG | OXYGEN SATURATION: 96 % | HEART RATE: 93 BPM | TEMPERATURE: 97.5 F | BODY MASS INDEX: 30.21 KG/M2

## 2025-06-17 DIAGNOSIS — G62.9 AXONAL POLYNEUROPATHY: Primary | ICD-10-CM

## 2025-06-17 DIAGNOSIS — R26.89 BALANCE DISORDER: ICD-10-CM

## 2025-06-17 PROCEDURE — 99214 OFFICE O/P EST MOD 30 MIN: CPT | Performed by: NURSE PRACTITIONER

## 2025-06-17 ASSESSMENT — PATIENT HEALTH QUESTIONNAIRE - PHQ9
SUM OF ALL RESPONSES TO PHQ QUESTIONS 1-9: 0
SUM OF ALL RESPONSES TO PHQ QUESTIONS 1-9: 0
2. FEELING DOWN, DEPRESSED OR HOPELESS: NOT AT ALL
SUM OF ALL RESPONSES TO PHQ QUESTIONS 1-9: 0
1. LITTLE INTEREST OR PLEASURE IN DOING THINGS: NOT AT ALL
SUM OF ALL RESPONSES TO PHQ QUESTIONS 1-9: 0

## 2025-06-17 NOTE — PROGRESS NOTES
[facility]  [address]  [address]  [place] [address]  Tel: [phone]  Fax: [phone]      Date:  25     Name:  EDENILSON DAWSON  :  1960  MRN:  [id number]     PCP:  Dotty Mckay APRN - NP    Chief Complaint   Patient presents with    Axonal polyneuropathy     Follow up. Everything still the same       HISTORY OF PRESENT ILLNESS:  History of Present Illness  The patient is a 65-year-old female here today for a regular follow-up appointment. 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 Physical therapy orders were placed, and she was experiencing pain in her left thumb, leading to a referral to Dr. Aguilera    She reports no significant changes in her condition, with her hands being less problematic than her feet. She describes a sensation of numbness in her feet, but not to the extent that it impairs her ability to feel objects underfoot. Her feet are more sensitive, and both sides are equally affected. Prolonged activity during the day can lead to slight swelling in her feet, but she retains the ability to touch them. However, if she remains inactive and does not stand on her feet, touching them becomes uncomfortable. She has found some relief from physical therapy, which primarily focused on balance exercises. She was also provided with home exercises that she can perform independently. She reports no recent falls or new symptoms such as lightheadedness or dizziness. She continues to take gabapentin and needs to refill her prescription.    She underwent surgery for a lipoma on her left wrist under the care of Dr. Aguilera, which has since improved. Post-surgery, she was prescribed pain medication, but she did not find it necessary.    She received a knee injection a few months ago, but it did not provide the same level of relief as previous injections. The effects of the most recent injection lasted only 1 to 2 months,

## 2025-07-21 DIAGNOSIS — F51.01 PRIMARY INSOMNIA: ICD-10-CM

## 2025-07-23 RX ORDER — DOXEPIN HYDROCHLORIDE 50 MG/1
CAPSULE ORAL
Qty: 90 CAPSULE | Refills: 0 | Status: SHIPPED | OUTPATIENT
Start: 2025-07-23

## 2025-07-23 NOTE — TELEPHONE ENCOUNTER
PCP: Dotty Mckay APRN - NP    Last appt: 4/25/2025     Future Appointments   Date Time Provider Department Center   10/27/2025  8:40 AM Dotty Mckay APRN - NP [facility]   6/18/2026  8:30 AM Macarena Ko APRN - NP [facility]       Requested Prescriptions     Pending Prescriptions Disp Refills    doxepin (SINEQUAN) 50 MG capsule [Pharmacy Med Name: DOXEPIN 50 MG CAP] 30 capsule 0     Sig: TAKE ONE CAPSULE BY MOUTH EVERY NIGHT       Prior labs and Blood pressures:  BP Readings from Last 3 Encounters:   06/17/25 136/82   04/25/25 126/78   12/17/24 112/64     Lab Results   Component Value Date/Time     04/25/2025 09:50 AM    K 4.0 04/25/2025 09:50 AM    CL 92 04/25/2025 09:50 AM    CO2 24 04/25/2025 09:50 AM    BUN 21 04/25/2025 09:50 AM    GFRAA 60 06/29/2022 10:21 AM     No results found for: \"HBA1C\", \"[id number]\"  Lab Results   Component Value Date/Time    CHOL 266 06/01/2023 11:07 AM    HDL 40 06/01/2023 11:07 AM    .2 06/01/2023 11:07 AM    VLDL 54.8 06/01/2023 11:07 AM     No results found for: \"VITD3\"    Lab Results   Component Value Date/Time    TSH 1.35 04/16/2021 10:50 AM

## (undated) DEVICE — AIRLIFE™ ADULT OXYGEN MASK VINYL, UNDER-THE-CHIN STYLE, 3 IN 1 MASK WITH 7 FEET (2.1 M) CRUSH-RESISTANT TUBING AND U/CONNECT-IT ADAPTER: Brand: AIRLIFE™

## (undated) DEVICE — BW-412T DISP COMBO CLEANING BRUSH: Brand: SINGLE USE COMBINATION CLEANING BRUSH

## (undated) DEVICE — NEEDLE ASPIR 22GA L40MM WRK L70CM SYR VLV ECHOGENIC DIMPLED

## (undated) DEVICE — AIRLIFE™ FACE TENT MASK VINYL: Brand: AIRLIFE™

## (undated) DEVICE — ESOPHAGEAL BALLOON DILATATION CATHETER: Brand: CRE FIXED WIRE

## (undated) DEVICE — SYR 3ML LL TIP 1/10ML GRAD --

## (undated) DEVICE — DRAPE,REIN 53X77,STERILE: Brand: MEDLINE

## (undated) DEVICE — Z DISCONTINUED NO SUB IDED SET EXTN W/ 4 W STPCOCK M SPIN LOK 36IN

## (undated) DEVICE — SOLIDIFIER MEDC 1200ML -- CONVERT TO 356117

## (undated) DEVICE — SOLUTION IRRIG 1000ML STRL H2O USP PLAS POUR BTL

## (undated) DEVICE — CANN NASAL O2 CAPNOGRAPHY AD -- FILTERLINE

## (undated) DEVICE — 3M™ IOBAN™ 2 ANTIMICROBIAL INCISE DRAPE 6648EZ: Brand: IOBAN™ 2

## (undated) DEVICE — SYRINGE MED 20ML STD CLR PLAS LUERLOCK TIP N CTRL DISP

## (undated) DEVICE — ELECTRODE BLDE L4IN NONINSULATED EDGE

## (undated) DEVICE — 3M™ CUROS™ DISINFECTING CAP FOR NEEDLELESS CONNECTORS 270/CARTON 20 CARTONS/CASE CFF1-270: Brand: CUROS™

## (undated) DEVICE — SOLUTION IRRIG 1000ML 0.9% SOD CHL USP POUR PLAS BTL

## (undated) DEVICE — Device

## (undated) DEVICE — STRIP,CLOSURE,WOUND,MEDI-STRIP,1/2X4: Brand: MEDLINE

## (undated) DEVICE — BAG SPEC BIOHZRD 10 X 10 IN --

## (undated) DEVICE — TUBING HYDR IRR --

## (undated) DEVICE — KIT COLON W/ 1.1OZ LUB AND 2 END

## (undated) DEVICE — GLOVE SURG SZ 85 L12IN FNGR ORTHO 126MIL CRM LTX FREE

## (undated) DEVICE — BLOCK BITE ENDO --

## (undated) DEVICE — CUFF BLD PRSS CHILD SM SZ 8 FOR 12-16CM LIMB VYN SFT W/O TB

## (undated) DEVICE — BAG BELONG PT PERS CLEAR HANDL

## (undated) DEVICE — SUTURE MCRYL SZ 3-0 L27IN ABSRB UD L19MM PS-2 3/8 CIR PRIM Y427H

## (undated) DEVICE — KENDALL RADIOLUCENT FOAM MONITORING ELECTRODE -RECTANGULAR SHAPE: Brand: KENDALL

## (undated) DEVICE — CONTAINER SPEC 20 ML LID NEUT BUFF FORMALIN 10 % POLYPR STS

## (undated) DEVICE — CATH IV AUTOGRD BC BLU 22GA 25 -- INSYTE

## (undated) DEVICE — SYSTEM BX DIA22GA FN W/ PRE LD NDL SHARKCORE

## (undated) DEVICE — KIT IV STRT W CHLORAPREP PD 1ML

## (undated) DEVICE — Device: Brand: SINGLE USE SOFT BRUSH

## (undated) DEVICE — BASIN EMSIS 16OZ GRAPHITE PLAS KID SHP MOLD GRAD FOR ORAL

## (undated) DEVICE — 1200 GUARD II KIT W/5MM TUBE W/O VAC TUBE: Brand: GUARDIAN

## (undated) DEVICE — ENDO CARRY-ON PROCEDURE KIT INCLUDES ENZYMATIC SPONGE, GAUZE, BIOHAZARD LABEL, TRAY, LUBRICANT, DIRTY SCOPE LABEL, WATER LABEL, TRAY, DRAWSTRING PAD, AND DEFENDO 4-PIECE KIT.: Brand: ENDO CARRY-ON PROCEDURE KIT

## (undated) DEVICE — ZIMMER® STERILE DISPOSABLE TOURNIQUET CUFF WITH PROTECTIVE SLEEVE AND PLC, DUAL PORT, SINGLE BLADDER, 34 IN. (86 CM)

## (undated) DEVICE — KIT COMPLIANCE W ENDOGLDE + 11 NO BRSH ENDOKT

## (undated) DEVICE — SET ADMIN 16ML TBNG L100IN 2 Y INJ SITE IV PIGGY BK DISP

## (undated) DEVICE — SET GRAV CK VLV NEEDLESS ST 3 GANGED 4WAY STPCOCK HI FLO 10

## (undated) DEVICE — BLOCK BITE PEDIATRIC 14 MM MOUTHPIECE POLYETH ENDO-GUARD LTX 69100] AVANOS MEDICAL]

## (undated) DEVICE — BANDAGE COMPR M W6INXL10YD WHT BGE VELC E MTRX HK AND LOOP

## (undated) DEVICE — NEEDLE HYPO 18GA L1.5IN PNK S STL HUB POLYPR SHLD REG BVL

## (undated) DEVICE — GLOVE SURG SZ 85 L12IN FNGR THK79MIL GRN LTX FREE

## (undated) DEVICE — SIMPLICITY FLUFF UNDERPAD 23X36, MODERATE: Brand: SIMPLICITY

## (undated) DEVICE — SOLIDIFIER FLUID 3000 CC ABSORB

## (undated) DEVICE — SYR ASSEMB INFL BLLN 60ML --

## (undated) DEVICE — GUIDEWIRE ORTH L200MM DIA1.6MM S STL DRL TIP

## (undated) DEVICE — NEONATAL-ADULT SPO2 SENSOR: Brand: NELLCOR

## (undated) DEVICE — SYR 5ML 1/5 GRAD LL NSAF LF --

## (undated) DEVICE — DRAPE,EXTREMITY,89X128,STERILE: Brand: MEDLINE

## (undated) DEVICE — TOTAL JOINT-MRMC: Brand: MEDLINE INDUSTRIES, INC.

## (undated) DEVICE — FORCEPS BX L240CM JAW DIA2.8MM L CAP W/ NDL MIC MESH TOOTH

## (undated) DEVICE — SUTURE ABSORBABLE BRAIDED 2-0 CT-1 27 IN UD VICRYL J259H

## (undated) DEVICE — SUTURE VCRL 1 L27IN ABSRB CT BRAID COAT UD J281H

## (undated) DEVICE — CATH IV AUTOGRD BC PNK 20GA 25 -- INSYTE

## (undated) DEVICE — BITEBLOCK ENDOSCP 60FR MAXI WHT POLYETH STURDY W/ VELC WVN

## (undated) DEVICE — NDL FLTR TIP 5 MIC 18GX1.5IN --

## (undated) DEVICE — AIRLIFE™ CORRUGATED FLEXIBLE EVA TUBING FOR AEROSOL AND IPPB USE, SEGMENTED, 6 FEET (1.8 M) LENGTH, 22 MM I.D.: Brand: AIRLIFE™

## (undated) DEVICE — BIT DRL L200MM DIA2.8MM CALIB L100MM FOR 3.5MM VA LCP PROX

## (undated) DEVICE — GOWN,SIRUS,NONRNF,XLN/2XL,18/CS: Brand: MEDLINE

## (undated) DEVICE — REM POLYHESIVE ADULT PATIENT RETURN ELECTRODE: Brand: VALLEYLAB

## (undated) DEVICE — 2.5MM DRILL BIT/QC/GOLD/110MM

## (undated) DEVICE — BRUSH SCRB 4% CHG RED DISP --

## (undated) DEVICE — DRAPE C-ARMOUR C-ARM KIT --

## (undated) DEVICE — TRAP FLUID BUFFALO FLTR

## (undated) DEVICE — DEVICE TRNSF SPIK STL 2008S] MICROTEK MEDICAL INC]

## (undated) DEVICE — Device: Brand: BALLOON

## (undated) DEVICE — STAPLER SKIN H3.9MM WIRE DIA0.58MM CRWN 6.9MM 35 STPL ROT

## (undated) DEVICE — SNAP KOVER: Brand: UNBRANDED

## (undated) DEVICE — ADULT SPO2 SENSOR: Brand: NELLCOR

## (undated) DEVICE — NDL PRT INJ NSAF BLNT 18GX1.5 --

## (undated) DEVICE — SOLUTION SURG PREP 26 CC PURPREP

## (undated) DEVICE — BW-400L DISP SNGL-END CLEANINGBRUSH: Brand: OLYMPUS

## (undated) DEVICE — WRISTBAND ID AD W1XL11.5IN RED POLY ALRG PREPRINTED PERM